# Patient Record
Sex: FEMALE | Race: ASIAN | Employment: UNEMPLOYED | URBAN - METROPOLITAN AREA
[De-identification: names, ages, dates, MRNs, and addresses within clinical notes are randomized per-mention and may not be internally consistent; named-entity substitution may affect disease eponyms.]

---

## 2023-04-04 ENCOUNTER — HOSPITAL ENCOUNTER (OUTPATIENT)
Dept: GENERAL RADIOLOGY | Age: 43
Discharge: HOME | End: 2023-04-04
Payer: MEDICAID

## 2023-04-04 ENCOUNTER — OFFICE VISIT (OUTPATIENT)
Dept: FAMILY MEDICINE CLINIC | Age: 43
End: 2023-04-04
Payer: MEDICAID

## 2023-04-04 VITALS
BODY MASS INDEX: 31.47 KG/M2 | TEMPERATURE: 98.3 F | HEART RATE: 89 BPM | OXYGEN SATURATION: 95 % | HEIGHT: 62 IN | SYSTOLIC BLOOD PRESSURE: 138 MMHG | DIASTOLIC BLOOD PRESSURE: 88 MMHG | WEIGHT: 171 LBS

## 2023-04-04 DIAGNOSIS — G89.29 CHRONIC RIGHT SHOULDER PAIN: ICD-10-CM

## 2023-04-04 DIAGNOSIS — Z00.00 ROUTINE GENERAL MEDICAL EXAMINATION AT A HEALTH CARE FACILITY: ICD-10-CM

## 2023-04-04 DIAGNOSIS — M95.8 DEFORMITY, CLAVICLE: ICD-10-CM

## 2023-04-04 DIAGNOSIS — M25.511 CHRONIC RIGHT SHOULDER PAIN: ICD-10-CM

## 2023-04-04 DIAGNOSIS — Z12.31 ENCOUNTER FOR SCREENING MAMMOGRAM FOR MALIGNANT NEOPLASM OF BREAST: ICD-10-CM

## 2023-04-04 DIAGNOSIS — D49.2 SKIN GROWTH: ICD-10-CM

## 2023-04-04 DIAGNOSIS — Z12.11 COLON CANCER SCREENING: Primary | ICD-10-CM

## 2023-04-04 PROBLEM — F33.1 MAJOR DEPRESSIVE DISORDER, RECURRENT EPISODE, MODERATE (HCC): Status: ACTIVE | Noted: 2023-01-18

## 2023-04-04 PROBLEM — F41.1 GAD (GENERALIZED ANXIETY DISORDER): Status: ACTIVE | Noted: 2023-01-18

## 2023-04-04 PROCEDURE — 90715 TDAP VACCINE 7 YRS/> IM: CPT | Performed by: NURSE PRACTITIONER

## 2023-04-04 PROCEDURE — 73000 X-RAY EXAM OF COLLAR BONE: CPT

## 2023-04-04 PROCEDURE — 73030 X-RAY EXAM OF SHOULDER: CPT

## 2023-04-04 PROCEDURE — 99204 OFFICE O/P NEW MOD 45 MIN: CPT | Performed by: NURSE PRACTITIONER

## 2023-04-04 PROCEDURE — 90471 IMMUNIZATION ADMIN: CPT | Performed by: NURSE PRACTITIONER

## 2023-04-04 RX ORDER — SUMATRIPTAN 100 MG/1
TABLET, FILM COATED ORAL
COMMUNITY
Start: 2022-11-24

## 2023-04-04 RX ORDER — FUROSEMIDE 80 MG
80 TABLET ORAL DAILY
COMMUNITY
Start: 2023-01-23

## 2023-04-04 RX ORDER — ASCORBIC ACID, THIAMINE MONONITRATE,RIBOFLAVIN, NIACINAMIDE, PYRIDOXINE HYDROCHLORIDE, FOLIC ACID, CYANOCOBALAMIN, BIOTIN, CALCIUM PANTOTHENATE, 100; 1.5; 1.7; 20; 10; 1; 6000; 150000; 5 MG/1; MG/1; MG/1; MG/1; MG/1; MG/1; UG/1; UG/1; MG/1
CAPSULE, LIQUID FILLED ORAL
COMMUNITY
Start: 2023-01-23

## 2023-04-04 RX ORDER — ERGOCALCIFEROL 1.25 MG/1
CAPSULE ORAL
COMMUNITY
Start: 2023-01-23

## 2023-04-04 RX ORDER — CALCIUM ACETATE 667 MG/1
CAPSULE ORAL
COMMUNITY
Start: 2023-01-23

## 2023-04-04 RX ORDER — ALBUTEROL SULFATE 90 UG/1
AEROSOL, METERED RESPIRATORY (INHALATION)
COMMUNITY
Start: 2023-01-23

## 2023-04-04 RX ORDER — PANTOPRAZOLE SODIUM 40 MG/1
TABLET, DELAYED RELEASE ORAL
COMMUNITY
Start: 2023-01-23

## 2023-04-04 RX ORDER — CYCLOBENZAPRINE HCL 5 MG
5 TABLET ORAL 3 TIMES DAILY PRN
COMMUNITY
Start: 2022-03-28

## 2023-04-04 RX ORDER — LEVOTHYROXINE SODIUM 0.03 MG/1
TABLET ORAL
COMMUNITY
Start: 2023-02-07

## 2023-04-04 RX ORDER — GABAPENTIN 100 MG/1
CAPSULE ORAL
COMMUNITY
Start: 2023-01-23

## 2023-04-04 SDOH — ECONOMIC STABILITY: FOOD INSECURITY: WITHIN THE PAST 12 MONTHS, THE FOOD YOU BOUGHT JUST DIDN'T LAST AND YOU DIDN'T HAVE MONEY TO GET MORE.: NEVER TRUE

## 2023-04-04 SDOH — ECONOMIC STABILITY: FOOD INSECURITY: WITHIN THE PAST 12 MONTHS, YOU WORRIED THAT YOUR FOOD WOULD RUN OUT BEFORE YOU GOT MONEY TO BUY MORE.: NEVER TRUE

## 2023-04-04 SDOH — ECONOMIC STABILITY: HOUSING INSECURITY
IN THE LAST 12 MONTHS, WAS THERE A TIME WHEN YOU DID NOT HAVE A STEADY PLACE TO SLEEP OR SLEPT IN A SHELTER (INCLUDING NOW)?: NO

## 2023-04-04 SDOH — ECONOMIC STABILITY: INCOME INSECURITY: HOW HARD IS IT FOR YOU TO PAY FOR THE VERY BASICS LIKE FOOD, HOUSING, MEDICAL CARE, AND HEATING?: NOT HARD AT ALL

## 2023-04-04 ASSESSMENT — ENCOUNTER SYMPTOMS
SINUS PRESSURE: 0
BACK PAIN: 0
ABDOMINAL PAIN: 0
CONSTIPATION: 0
COUGH: 0
SHORTNESS OF BREATH: 0
SINUS PAIN: 0
COLOR CHANGE: 0
DIARRHEA: 0
WHEEZING: 0

## 2023-04-04 ASSESSMENT — PATIENT HEALTH QUESTIONNAIRE - PHQ9
2. FEELING DOWN, DEPRESSED OR HOPELESS: 0
SUM OF ALL RESPONSES TO PHQ QUESTIONS 1-9: 0
DEPRESSION UNABLE TO ASSESS: FUNCTIONAL CAPACITY MOTIVATION LIMITS ACCURACY
SUM OF ALL RESPONSES TO PHQ QUESTIONS 1-9: 0
SUM OF ALL RESPONSES TO PHQ QUESTIONS 1-9: 0
1. LITTLE INTEREST OR PLEASURE IN DOING THINGS: 0
SUM OF ALL RESPONSES TO PHQ9 QUESTIONS 1 & 2: 0
SUM OF ALL RESPONSES TO PHQ QUESTIONS 1-9: 0

## 2023-04-04 NOTE — PROGRESS NOTES
Insecurity: No Food Insecurity    Worried About Running Out of Food in the Last Year: Never true    Ran Out of Food in the Last Year: Never true   Transportation Needs: Unknown    Lack of Transportation (Medical): Not on file    Lack of Transportation (Non-Medical): No   Physical Activity: Not on file   Stress: Not on file   Social Connections: Not on file   Intimate Partner Violence: Not on file   Housing Stability: Unknown    Unable to Pay for Housing in the Last Year: Not on file    Number of Places Lived in the Last Year: Not on file    Unstable Housing in the Last Year: No       Family History   Problem Relation Age of Onset    Diabetes Mother     Hypertension Mother     Stroke Brother        Vitals:    04/04/23 1416   BP: 138/88   Pulse: 89   Temp: 98.3 °F (36.8 °C)   SpO2: 95%       Wt Readings from Last 3 Encounters:   04/04/23 171 lb (77.6 kg)       Review of Systems   Constitutional:  Negative for chills, fatigue and fever. HENT:  Negative for congestion, sinus pressure and sinus pain. Respiratory:  Negative for cough, shortness of breath and wheezing. Cardiovascular:  Negative for chest pain and palpitations. Gastrointestinal:  Negative for abdominal pain, constipation and diarrhea. Musculoskeletal:  Positive for arthralgias (right shoulder). Negative for back pain and myalgias. Skin:  Negative for color change, pallor and rash. Skin growth to left external ear   Neurological:  Negative for dizziness, syncope, weakness, light-headedness and headaches. Psychiatric/Behavioral:  Negative for behavioral problems, confusion and sleep disturbance. The patient is not nervous/anxious. Objective:   Physical Exam  Constitutional:       Appearance: She is well-developed. HENT:      Head: Normocephalic and atraumatic. Eyes:      Conjunctiva/sclera: Conjunctivae normal.      Pupils: Pupils are equal, round, and reactive to light. Neck:      Thyroid: No thyromegaly.       Vascular: No

## 2023-04-05 DIAGNOSIS — G89.29 CHRONIC RIGHT SHOULDER PAIN: Primary | ICD-10-CM

## 2023-04-05 DIAGNOSIS — M25.511 CHRONIC RIGHT SHOULDER PAIN: Primary | ICD-10-CM

## 2023-04-05 PROBLEM — F33.1 MAJOR DEPRESSIVE DISORDER, RECURRENT EPISODE, MODERATE (HCC): Status: RESOLVED | Noted: 2023-01-18 | Resolved: 2023-04-05

## 2023-04-05 PROBLEM — F41.1 GAD (GENERALIZED ANXIETY DISORDER): Status: RESOLVED | Noted: 2023-01-18 | Resolved: 2023-04-05

## 2023-04-05 PROBLEM — Z00.00 ROUTINE GENERAL MEDICAL EXAMINATION AT A HEALTH CARE FACILITY: Status: ACTIVE | Noted: 2023-04-05

## 2023-04-05 PROBLEM — D49.2 SKIN GROWTH: Status: ACTIVE | Noted: 2023-04-05

## 2023-04-05 NOTE — ASSESSMENT & PLAN NOTE
Flesh-colored  Increasing in size in the last 2 months  We will refer to [facility]

## 2023-04-05 NOTE — ASSESSMENT & PLAN NOTE
Ongoing for several years  Limited range of motion  Occasionally takes Tylenol  We will check x-ray today  Referral to Ortho

## 2023-04-11 DIAGNOSIS — E78.2 MIXED HYPERLIPIDEMIA: ICD-10-CM

## 2023-04-11 DIAGNOSIS — R79.89 LFT ELEVATION: ICD-10-CM

## 2023-04-11 RX ORDER — ERGOCALCIFEROL 1.25 MG/1
50000 CAPSULE ORAL WEEKLY
Qty: 12 CAPSULE | Refills: 1 | Status: SHIPPED | OUTPATIENT
Start: 2023-04-11

## 2023-04-13 PROBLEM — Z12.4 SCREENING FOR CERVICAL CANCER: Status: ACTIVE | Noted: 2023-04-13

## 2023-05-05 PROBLEM — Z00.00 ROUTINE GENERAL MEDICAL EXAMINATION AT A HEALTH CARE FACILITY: Status: RESOLVED | Noted: 2023-04-05 | Resolved: 2023-05-05

## 2023-05-09 ENCOUNTER — APPOINTMENT (OUTPATIENT)
Dept: LAB | Facility: LAB | Age: 43
End: 2023-05-09

## 2023-05-09 LAB
ABO GROUP (TYPE) IN BLOOD: NORMAL
ALANINE AMINOTRANSFERASE (SGPT) (U/L) IN SER/PLAS: 17 U/L (ref 7–45)
ALBUMIN (G/DL) IN SER/PLAS: 4.3 G/DL (ref 3.4–5)
ALKALINE PHOSPHATASE (U/L) IN SER/PLAS: 82 U/L (ref 33–110)
ASPARTATE AMINOTRANSFERASE (SGOT) (U/L) IN SER/PLAS: 9 U/L (ref 9–39)
BILIRUBIN DIRECT (MG/DL) IN SER/PLAS: 0.1 MG/DL (ref 0–0.3)
BILIRUBIN TOTAL (MG/DL) IN SER/PLAS: 0.8 MG/DL (ref 0–1.2)
C PEPTIDE (NG/ML) IN SER/PLAS: 7.1 NG/ML (ref 0.7–3.9)
CREATININE (MG/DL) IN SER/PLAS: 10.4 MG/DL (ref 0.5–1.05)
CYTOMEGALOVIRUS IGG ANTIBODY: REACTIVE
ERYTHROCYTE DISTRIBUTION WIDTH (RATIO) BY AUTOMATED COUNT: 12.8 % (ref 11.5–14.5)
ERYTHROCYTE MEAN CORPUSCULAR HEMOGLOBIN CONCENTRATION (G/DL) BY AUTOMATED: 32.3 G/DL (ref 32–36)
ERYTHROCYTE MEAN CORPUSCULAR VOLUME (FL) BY AUTOMATED COUNT: 98 FL (ref 80–100)
ERYTHROCYTES (10*6/UL) IN BLOOD BY AUTOMATED COUNT: 4.08 X10E12/L (ref 4–5.2)
ESTIMATED AVERAGE GLUCOSE FOR HBA1C: 91 MG/DL
FLOW AUTOCROSSMATCH: NORMAL
GFR FEMALE: 4 ML/MIN/1.73M2
HEMATOCRIT (%) IN BLOOD BY AUTOMATED COUNT: 39.9 % (ref 36–46)
HEMOGLOBIN (G/DL) IN BLOOD: 12.9 G/DL (ref 12–16)
HEMOGLOBIN A1C/HEMOGLOBIN TOTAL IN BLOOD: 4.8 %
HEPATITIS B VIRUS CORE AB (PRESENCE) IN SER/PLAS BY IMM: REACTIVE
HEPATITIS B VIRUS SURFACE AB (MIU/ML) IN SERUM: 38.5 MIU/ML
HEPATITIS B VIRUS SURFACE AG PRESENCE IN SERUM: NONREACTIVE
HEPATITIS C VIRUS AB PRESENCE IN SERUM: NONREACTIVE
HIV 1/ 2 AG/AB SCREEN: NONREACTIVE
HLA CLASS I ANTIBODY SCREEN, FLOW CYTOMETRY: NORMAL
HLA CLASS II ANTIBODY SCREEN, FLOW CYTOMETRY: NORMAL
HLA-A LOCUS LOW RESOLUTION TYPING: NORMAL
HLA-B LOCUS LOW RESOLUTION TYPING: NORMAL
HLA-C LOCUS LOW RESOLUTION TYPING: NORMAL
HLA-DPB1 HIGH RESOLUTION TYPING: NORMAL
HLA-DQB1 HIGH RESOLUTION TYPING: NORMAL
HLA-DR1/3/4/5 + DQB1 LOW RES TYPING: NORMAL
LEUKOCYTES (10*3/UL) IN BLOOD BY AUTOMATED COUNT: 3.5 X10E9/L (ref 4.4–11.3)
NRBC (PER 100 WBCS) BY AUTOMATED COUNT: 0 /100 WBC (ref 0–0)
PHOSPHATE (MG/DL) IN SER/PLAS: 4 MG/DL (ref 2.5–4.9)
PLATELETS (10*3/UL) IN BLOOD AUTOMATED COUNT: 151 X10E9/L (ref 150–450)
PROTEIN TOTAL: 8.3 G/DL (ref 6.4–8.2)
RH FACTOR: NORMAL
SYPHILIS TOTAL AB: NONREACTIVE

## 2023-05-11 ENCOUNTER — HOSPITAL ENCOUNTER (OUTPATIENT)
Dept: CT IMAGING | Age: 43
Discharge: HOME | End: 2023-05-11
Payer: MEDICAID

## 2023-05-11 DIAGNOSIS — M25.511 CHRONIC RIGHT SHOULDER PAIN: ICD-10-CM

## 2023-05-11 DIAGNOSIS — G89.29 CHRONIC RIGHT SHOULDER PAIN: ICD-10-CM

## 2023-05-11 LAB
EBV INTERPRETATION: ABNORMAL
EPSTEIN-BARR VCA IGG: POSITIVE
EPSTEIN-BARR VCA IGM: NEGATIVE
EPSTEIN-BARR VIRUS EARLY ANTIGEN ANTIBODY, IGG: NEGATIVE
EPSTIEN-BARR NUCLEAR ANTIGEN AB: POSITIVE
NIL(NEG) CONTROL SPOT COUNT: NORMAL
PANEL A SPOT COUNT: 2
PANEL B SPOT COUNT: 1
POS CONTROL SPOT COUNT: NORMAL
T-SPOT. TB INTERPRETATION: NEGATIVE
VARICELLA ZOSTER IGG: POSITIVE

## 2023-05-11 PROCEDURE — 71250 CT THORAX DX C-: CPT

## 2023-05-13 PROBLEM — Z12.4 SCREENING FOR CERVICAL CANCER: Status: RESOLVED | Noted: 2023-04-13 | Resolved: 2023-05-13

## 2023-05-13 LAB
AMPHETAMINE SCREEN BLOOD: NEGATIVE NG/ML
BARBITURATE SCREEN BLOOD: NEGATIVE NG/ML
BENZODIAZEPINES SCREEN BLOOD: NEGATIVE NG/ML
BUPRENORPHINE SCREEN BLOOD: NEGATIVE NG/ML
CANNABINOIDS SCREEN BLOOD: NEGATIVE NG/ML
COCAINE SCREEN BLOOD: NEGATIVE NG/ML
DRUG SCREEN COMMENT BLOOD: NORMAL
METHADONE SCREEN BLOOD: NEGATIVE NG/ML
METHAMPHETAMINE, BLOOD, SCREEN: NEGATIVE NG/ML
OPIATE SCREEN BLOOD: NEGATIVE NG/ML
OXYCODONE SCREEN BLOOD: NEGATIVE NG/ML
PHENCYCLIDINE SCREEN BLOOD: NEGATIVE NG/ML

## 2023-05-15 LAB
COTININE BLOOD QUANTITATIVE: 415 NG/ML
NICOTINE BLOOD QUANTITATIVE: 21 NG/ML

## 2023-05-23 ENCOUNTER — TELEPHONE (OUTPATIENT)
Dept: ORTHOPEDIC SURGERY | Age: 43
End: 2023-05-23

## 2023-07-05 ENCOUNTER — HOSPITAL ENCOUNTER (EMERGENCY)
Age: 43
Discharge: HOME | End: 2023-07-05
Payer: MEDICAID

## 2023-07-05 VITALS
TEMPERATURE: 98 F | DIASTOLIC BLOOD PRESSURE: 103 MMHG | OXYGEN SATURATION: 98 % | SYSTOLIC BLOOD PRESSURE: 170 MMHG | RESPIRATION RATE: 16 BRPM | HEART RATE: 86 BPM

## 2023-07-05 DIAGNOSIS — M54.50 ACUTE BILATERAL LOW BACK PAIN, UNSPECIFIED WHETHER SCIATICA PRESENT: Primary | ICD-10-CM

## 2023-07-05 PROCEDURE — 6360000002 HC RX W HCPCS: Performed by: PHYSICIAN ASSISTANT

## 2023-07-05 PROCEDURE — 6370000000 HC RX 637 (ALT 250 FOR IP): Performed by: PHYSICIAN ASSISTANT

## 2023-07-05 PROCEDURE — 96372 THER/PROPH/DIAG INJ SC/IM: CPT

## 2023-07-05 PROCEDURE — 99284 EMERGENCY DEPT VISIT MOD MDM: CPT

## 2023-07-05 RX ORDER — NAPROXEN 500 MG/1
500 TABLET ORAL 2 TIMES DAILY
Qty: 20 TABLET | Refills: 0 | Status: SHIPPED | OUTPATIENT
Start: 2023-07-05 | End: 2023-07-15

## 2023-07-05 RX ORDER — KETOROLAC TROMETHAMINE 30 MG/ML
30 INJECTION, SOLUTION INTRAMUSCULAR; INTRAVENOUS ONCE
Status: COMPLETED | OUTPATIENT
Start: 2023-07-05 | End: 2023-07-05

## 2023-07-05 RX ORDER — LIDOCAINE 50 MG/G
1 PATCH TOPICAL DAILY
Qty: 30 PATCH | Refills: 0 | Status: SHIPPED | OUTPATIENT
Start: 2023-07-05

## 2023-07-05 RX ORDER — CYCLOBENZAPRINE HCL 10 MG
10 TABLET ORAL 3 TIMES DAILY PRN
Qty: 20 TABLET | Refills: 0 | Status: SHIPPED | OUTPATIENT
Start: 2023-07-05 | End: 2023-07-15

## 2023-07-05 RX ORDER — CYCLOBENZAPRINE HCL 10 MG
10 TABLET ORAL ONCE
Status: COMPLETED | OUTPATIENT
Start: 2023-07-05 | End: 2023-07-05

## 2023-07-05 RX ORDER — LIDOCAINE 4 G/G
1 PATCH TOPICAL ONCE
Status: DISCONTINUED | OUTPATIENT
Start: 2023-07-05 | End: 2023-07-05 | Stop reason: HOSPADM

## 2023-07-05 RX ADMIN — KETOROLAC TROMETHAMINE 30 MG: 30 INJECTION, SOLUTION INTRAMUSCULAR; INTRAVENOUS at 11:15

## 2023-07-05 RX ADMIN — CYCLOBENZAPRINE 10 MG: 10 TABLET, FILM COATED ORAL at 11:15

## 2023-07-05 ASSESSMENT — LIFESTYLE VARIABLES
HOW MANY STANDARD DRINKS CONTAINING ALCOHOL DO YOU HAVE ON A TYPICAL DAY: PATIENT DOES NOT DRINK
HOW OFTEN DO YOU HAVE A DRINK CONTAINING ALCOHOL: NEVER

## 2023-07-05 NOTE — ED NOTES
Patient discharged with discharge instructions and medications reviewed. Patient verbalized understanding of instructions and follow up.        Mikayla Gay RN  07/05/23 7801
99

## 2023-07-05 NOTE — ED PROVIDER NOTES
[facility]        Pt Name: Sherry Rico  MRN: [id number]  [address] [facility], [place] 1980  Date of evaluation: 7/5/2023  Provider: Ford Park PA-C  PCP: OMAR Melgoza CNP  Note Started: 11:20 AM EDT 7/5/23      MITCHELL. I have evaluated this patient. CHIEF COMPLAINT       Chief Complaint   Patient presents with    Back Pain     Complaints of bad back pain when standing or moving around or voiding x2-3 days states no pain with voiding just lower back pain with any movement       HISTORY OF PRESENT ILLNESS: 1 or more Elements     History From: patient  Limitations to history : Language language line [id number] was used to obtain history with [facility]     Sherry Rico is a 37 y.o. female who presents to the emergency department with a chief complaint of some low back pain more on the left side into the left buttock without trauma for the past 2 to 3 days. Denies difficulty urinating, dysuria, hematuria, diarrhea, bloody stool, loss of bowel or bladder function, history of kidney failure, diabetes or recent injection or surgery to her back. Denies abdominal pain, vomiting, fevers. Pain is worse with moving and walking. She was driven here by her children. Denies any other symptoms. Nursing Notes were all reviewed and agreed with or any disagreements were addressed in the HPI. REVIEW OF SYSTEMS :      Review of Systems    Positives and Pertinent negatives as per HPI. SURGICAL HISTORY   History reviewed. No pertinent surgical history. CURRENTMEDICATIONS       Previous Medications    ACETAMINOPHEN (TYLENOL) 325 MG CAPS    Take by mouth    ERGOCALCIFEROL (ERGOCALCIFEROL) 1.25 MG (81258 UT) CAPSULE    TAKE 1 CAPSULE BY MOUTH ONCE A WEEK ON SUNDAY ([facility])    VITAMIN D (ERGOCALCIFEROL) 1.25 MG (08826 UT) CAPS CAPSULE    Take 1 capsule by mouth once a week       ALLERGIES     Patient has no known allergies.     FAMILYHISTORY

## 2023-07-06 ENCOUNTER — OFFICE VISIT (OUTPATIENT)
Dept: FAMILY MEDICINE CLINIC | Age: 43
End: 2023-07-06
Payer: MEDICAID

## 2023-07-06 VITALS
HEART RATE: 83 BPM | TEMPERATURE: 98.5 F | DIASTOLIC BLOOD PRESSURE: 82 MMHG | WEIGHT: 171 LBS | BODY MASS INDEX: 31.47 KG/M2 | SYSTOLIC BLOOD PRESSURE: 112 MMHG | HEIGHT: 62 IN | OXYGEN SATURATION: 97 %

## 2023-07-06 DIAGNOSIS — M54.50 CHRONIC BILATERAL LOW BACK PAIN WITHOUT SCIATICA: Primary | ICD-10-CM

## 2023-07-06 DIAGNOSIS — G89.29 CHRONIC BILATERAL LOW BACK PAIN WITHOUT SCIATICA: Primary | ICD-10-CM

## 2023-07-06 PROCEDURE — 99214 OFFICE O/P EST MOD 30 MIN: CPT | Performed by: NURSE PRACTITIONER

## 2023-07-06 ASSESSMENT — ENCOUNTER SYMPTOMS
WHEEZING: 0
BLOOD IN STOOL: 0
NAUSEA: 0
DIARRHEA: 0
SHORTNESS OF BREATH: 0
BACK PAIN: 1

## 2023-07-06 NOTE — PROGRESS NOTES
Lisha Mathew (:  1980) is a 37 y.o. female,Established patient, here for evaluation of the following chief complaint(s):  Follow-up (ED, back pain)      ASSESSMENT/PLAN:  1. Chronic bilateral low back pain without sciatica  Assessment & Plan:   Patient states back pain is improving with medication. Patient educated that medication is going to help the pain but will not prevent pain from returning. Recommended physical therapy, but patient doesn't wish to have physical therapy at this time. Back exercises provided. Patient instructed to call if symptoms worsen or fail to improve. No follow-ups on file. SUBJECTIVE/OBJECTIVE:  HPI- patient is here for follow up from ED visit for back pain. She is here with her son. She refuses interpretor and wishes for her son to interpret. Son agrees to interpret for his mother. She states she doesn't remember injurying it and states she has had this back pain for years. Describes the pain as burning. She states the pain increases with movement, denies pain when lying down. Patient denies blood in urine or fevers. She states the pain has improved with the medication she was prescribed with from the ER visit (muscle relaxant, naproxen, and lidocaine patch). She states she wore the lidocaine patch for 12 hours. She denies ever going to physical therapy for her back pain. She states that whenever she experiences this back pain it eventually subsides. Current Outpatient Medications   Medication Sig Dispense Refill    naproxen (NAPROSYN) 500 MG tablet Take 1 tablet by mouth 2 times daily for 20 doses 20 tablet 0    cyclobenzaprine (FLEXERIL) 10 MG tablet Take 1 tablet by mouth 3 times daily as needed for Muscle spasms 20 tablet 0    lidocaine (LIDODERM) 5 % Place 1 patch onto the skin daily 12 hours on, 12 hours off.  30 patch 0    vitamin D (ERGOCALCIFEROL) 1.25 MG (11536 UT) CAPS capsule Take 1 capsule by mouth once a week (Patient not taking: Reported

## 2023-07-06 NOTE — ASSESSMENT & PLAN NOTE
Patient states back pain is improving with medication. Patient educated that medication is going to help the pain but will not prevent pain from returning. Recommended physical therapy, but patient doesn't wish to have physical therapy at this time. Back exercises provided. Patient instructed to call if symptoms worsen or fail to improve.

## 2023-07-07 ENCOUNTER — OFFICE VISIT (OUTPATIENT)
Dept: ORTHOPEDIC SURGERY | Age: 43
End: 2023-07-07

## 2023-07-07 DIAGNOSIS — M19.011 OSTEOARTHRITIS OF RIGHT STERNOCLAVICULAR JOINT: Primary | ICD-10-CM

## 2023-07-07 NOTE — PROGRESS NOTES
physical therapy. If these are not effective, cortisone injection can be considered. We discussed surgical options as well, should conservative measures fail. Electronically signed by Cheryl Ram MD on 7/7/2023 at 2:38 PM  This dictation was generated by voice recognition computer software. Although all attempts are made to edit the dictation for accuracy, there may be errors in the transcription that are not intended.

## 2023-07-10 ENCOUNTER — TELEPHONE (OUTPATIENT)
Dept: ORTHOPEDIC SURGERY | Age: 43
End: 2023-07-10

## 2023-07-10 DIAGNOSIS — M19.011 OSTEOARTHRITIS OF RIGHT STERNOCLAVICULAR JOINT: Primary | ICD-10-CM

## 2023-07-10 NOTE — TELEPHONE ENCOUNTER
ORDER FOR INJECTION, TITLE SAYS LEFT SHOULDER BUT T IN THE ORDER IT HAS RIGHT, PATIENT STATES IT IS HER RIGHT SHOULDER. PLEASE CORRECT ORDER IN Epic.

## 2023-07-18 ENCOUNTER — HOSPITAL ENCOUNTER (OUTPATIENT)
Dept: CT IMAGING | Age: 43
Discharge: HOME | End: 2023-07-18
Attending: ORTHOPAEDIC SURGERY
Payer: MEDICAID

## 2023-07-18 DIAGNOSIS — M19.011 OSTEOARTHRITIS OF RIGHT STERNOCLAVICULAR JOINT: ICD-10-CM

## 2023-07-18 PROCEDURE — 77012 CT SCAN FOR NEEDLE BIOPSY: CPT

## 2023-10-11 ENCOUNTER — TELEPHONE (OUTPATIENT)
Dept: PRIMARY CARE CLINIC | Age: 43
End: 2023-10-11

## 2023-10-11 ENCOUNTER — OFFICE VISIT (OUTPATIENT)
Dept: PRIMARY CARE CLINIC | Age: 43
End: 2023-10-11
Payer: MEDICAID

## 2023-10-11 VITALS
OXYGEN SATURATION: 96 % | HEART RATE: 87 BPM | SYSTOLIC BLOOD PRESSURE: 146 MMHG | RESPIRATION RATE: 14 BRPM | DIASTOLIC BLOOD PRESSURE: 100 MMHG | HEIGHT: 62 IN | WEIGHT: 173.2 LBS | BODY MASS INDEX: 31.87 KG/M2

## 2023-10-11 DIAGNOSIS — M25.511 RIGHT SHOULDER PAIN, UNSPECIFIED CHRONICITY: ICD-10-CM

## 2023-10-11 DIAGNOSIS — E78.2 MIXED HYPERLIPIDEMIA: ICD-10-CM

## 2023-10-11 DIAGNOSIS — I10 BENIGN ESSENTIAL HTN: Primary | ICD-10-CM

## 2023-10-11 DIAGNOSIS — Z12.11 COLON CANCER SCREENING: ICD-10-CM

## 2023-10-11 DIAGNOSIS — M25.521 RIGHT ELBOW PAIN: ICD-10-CM

## 2023-10-11 DIAGNOSIS — Z23 NEEDS FLU SHOT: ICD-10-CM

## 2023-10-11 PROCEDURE — 3077F SYST BP >= 140 MM HG: CPT | Performed by: INTERNAL MEDICINE

## 2023-10-11 PROCEDURE — 86833 HLA CLASS II HIGH DEFIN QUAL: CPT | Mod: OUT | Performed by: SURGERY

## 2023-10-11 PROCEDURE — 90471 IMMUNIZATION ADMIN: CPT | Performed by: INTERNAL MEDICINE

## 2023-10-11 PROCEDURE — 99204 OFFICE O/P NEW MOD 45 MIN: CPT | Performed by: INTERNAL MEDICINE

## 2023-10-11 PROCEDURE — 90674 CCIIV4 VAC NO PRSV 0.5 ML IM: CPT | Performed by: INTERNAL MEDICINE

## 2023-10-11 PROCEDURE — 3080F DIAST BP >= 90 MM HG: CPT | Performed by: INTERNAL MEDICINE

## 2023-10-11 RX ORDER — VALSARTAN 160 MG/1
160 TABLET ORAL DAILY
Qty: 30 TABLET | Refills: 5 | Status: SHIPPED | OUTPATIENT
Start: 2023-10-11

## 2023-10-11 ASSESSMENT — ENCOUNTER SYMPTOMS
BACK PAIN: 0
ABDOMINAL DISTENTION: 0
BLOOD IN STOOL: 0
DIARRHEA: 0
SHORTNESS OF BREATH: 0
NAUSEA: 0
VOMITING: 0
CHEST TIGHTNESS: 0
TROUBLE SWALLOWING: 0
ABDOMINAL PAIN: 0
WHEEZING: 0
CONSTIPATION: 0
EYE PAIN: 0
COLOR CHANGE: 0
EYE REDNESS: 0
COUGH: 0
SINUS PRESSURE: 0
SORE THROAT: 0

## 2023-10-11 NOTE — TELEPHONE ENCOUNTER
----- Message from Rocky Schmitt MD sent at 10/11/2023  1:47 PM EDT -----  Regarding: Right shoulder and elbow pain  Please provide patient telephone number to schedule appointment to see orthopedics for chronic right shoulder an elbow pain.

## 2023-10-11 NOTE — CONSULTS
Session ID: [id number]  Language: [facility]   ID: #[id number]   Name: Galdino Woods

## 2023-10-11 NOTE — ASSESSMENT & PLAN NOTE
Reviewing care everywhere it is documented that patient had colonoscopy 7/12/23  Patient is adamant that she never had colonoscopy  And thinks someone else's information was entered erroneously.   I will order a FIT test.  If positive she will be referred to GI done

## 2023-10-11 NOTE — ASSESSMENT & PLAN NOTE
BP elevated  Start valsartan 160 mg daily  To verify blood pressure cuff accuracy  To keep outpatient BP log,  counseled on exercise and diet (including DASH diet)  Goal to achieve appropriate BMI  Patient agreed with plan with verbal understanding

## 2023-10-11 NOTE — ASSESSMENT & PLAN NOTE
Patient called stating that he originally told Lenin Ferro that he did not want the Hoover 2 reader.  But now after thinking about it, he would like to get the reader to come with the sensor on 4/14/22    Will route to PharmD to review/order accordingly   Advised patient that if we had any questions we would call him back Patient will follow-up with orthopedics Tracking Only     CPA in place:  No   Recommendation Provided To: Patient/Caregiver: 1 via Telephone and Pharmacy: 1   Gap Closed?: Yes    Intervention Accepted By: Patient/Caregiver: 1 and Pharmacy: 1   Time Spent (min): 20

## 2023-10-13 ENCOUNTER — LAB REQUISITION (OUTPATIENT)
Dept: LAB | Facility: CLINIC | Age: 43
End: 2023-10-13
Payer: COMMERCIAL

## 2023-10-13 DIAGNOSIS — N18.6 END STAGE RENAL DISEASE (MULTI): ICD-10-CM

## 2023-10-13 LAB
HLA RESULTS: NORMAL
HLA-A+B+C AB NFR SER: NORMAL %
HLA-DP+DQ+DR AB NFR SER: NORMAL %

## 2023-10-16 ENCOUNTER — NURSE ONLY (OUTPATIENT)
Dept: PRIMARY CARE CLINIC | Age: 43
End: 2023-10-16
Payer: MEDICAID

## 2023-10-16 DIAGNOSIS — Z12.11 COLON CANCER SCREENING: Primary | ICD-10-CM

## 2023-10-16 LAB
CONTROL: NORMAL
FECAL BLOOD IMMUNOCHEMICAL TEST: NEGATIVE

## 2023-10-16 PROCEDURE — 82274 ASSAY TEST FOR BLOOD FECAL: CPT | Performed by: INTERNAL MEDICINE

## 2023-10-17 ENCOUNTER — OFFICE VISIT (OUTPATIENT)
Dept: ORTHOPEDIC SURGERY | Age: 43
End: 2023-10-17
Payer: MEDICAID

## 2023-10-17 VITALS — HEIGHT: 62 IN | BODY MASS INDEX: 31.83 KG/M2 | WEIGHT: 173 LBS

## 2023-10-17 DIAGNOSIS — M25.511 RIGHT SHOULDER PAIN, UNSPECIFIED CHRONICITY: Primary | ICD-10-CM

## 2023-10-17 PROCEDURE — 99244 OFF/OP CNSLTJ NEW/EST MOD 40: CPT | Performed by: ORTHOPAEDIC SURGERY

## 2023-10-17 RX ORDER — METHYLPREDNISOLONE 4 MG/1
TABLET ORAL
Qty: 1 KIT | Refills: 0 | Status: SHIPPED | OUTPATIENT
Start: 2023-10-17

## 2023-10-24 ENCOUNTER — OFFICE VISIT (OUTPATIENT)
Dept: PRIMARY CARE CLINIC | Age: 43
End: 2023-10-24
Payer: MEDICAID

## 2023-10-24 VITALS
DIASTOLIC BLOOD PRESSURE: 78 MMHG | SYSTOLIC BLOOD PRESSURE: 114 MMHG | HEART RATE: 102 BPM | OXYGEN SATURATION: 96 % | HEIGHT: 62 IN | RESPIRATION RATE: 14 BRPM | BODY MASS INDEX: 31.17 KG/M2 | WEIGHT: 169.4 LBS

## 2023-10-24 DIAGNOSIS — G89.29 CHRONIC BILATERAL LOW BACK PAIN WITHOUT SCIATICA: ICD-10-CM

## 2023-10-24 DIAGNOSIS — M25.511 CHRONIC RIGHT SHOULDER PAIN: ICD-10-CM

## 2023-10-24 DIAGNOSIS — M54.50 CHRONIC BILATERAL LOW BACK PAIN WITHOUT SCIATICA: ICD-10-CM

## 2023-10-24 DIAGNOSIS — E78.2 MIXED HYPERLIPIDEMIA: ICD-10-CM

## 2023-10-24 DIAGNOSIS — I10 BENIGN ESSENTIAL HTN: ICD-10-CM

## 2023-10-24 DIAGNOSIS — G89.29 CHRONIC RIGHT SHOULDER PAIN: ICD-10-CM

## 2023-10-24 PROCEDURE — 3078F DIAST BP <80 MM HG: CPT | Performed by: INTERNAL MEDICINE

## 2023-10-24 PROCEDURE — 99214 OFFICE O/P EST MOD 30 MIN: CPT | Performed by: INTERNAL MEDICINE

## 2023-10-24 PROCEDURE — 3074F SYST BP LT 130 MM HG: CPT | Performed by: INTERNAL MEDICINE

## 2023-10-24 ASSESSMENT — ENCOUNTER SYMPTOMS
BACK PAIN: 0
NAUSEA: 0
EYE PAIN: 0
CHEST TIGHTNESS: 0
EYE REDNESS: 0
SINUS PRESSURE: 0
SHORTNESS OF BREATH: 0
TROUBLE SWALLOWING: 0
ABDOMINAL PAIN: 0
SORE THROAT: 0
DIARRHEA: 0
ABDOMINAL DISTENTION: 0
CONSTIPATION: 0
VOMITING: 0
COUGH: 0
COLOR CHANGE: 0
WHEEZING: 0
BLOOD IN STOOL: 0

## 2023-10-24 NOTE — PROGRESS NOTES
imaging reviewing, and counseling on next steps. Lastly, time was spent discussing orders with my staff as well as providing documentation in the chart. Magda Campos MD            Orthopaedic Surgery Sports Medicine and [address] [facility]            Team Physician [facility] ([place])      Disclaimer: This note was dictated with voice recognition software. Though review and correction are routine, we apologize for any errors.

## 2023-10-24 NOTE — CONSULTS
Session ID: [id number]  Language: [facility]   ID: #[id number]   Name: Jonh Flores General Sunscreen Counseling: I recommended a broad spectrum sunscreen with a SPF of 30 or higher. I explained that SPF 30 sunscreens block approximately 97 percent of the sun's harmful rays.  Sunscreens should be applied at least 15 minutes prior to expected sun exposure and then every 2 hours after that as long as sun exposure continues. If swimming or exercising sunscreen should be reapplied every 45 minutes to an hour after getting wet or sweating. I also recommended a lip balm with a sunscreen as well. Detail Level: Generalized

## 2023-11-01 PROCEDURE — 86826 HLA X-MATCH NONCYTOTOXC ADDL: CPT | Mod: OUT | Performed by: SURGERY

## 2023-11-01 PROCEDURE — 86825 HLA X-MATH NON-CYTOTOXIC: CPT | Mod: OUT | Performed by: SURGERY

## 2023-11-02 ENCOUNTER — HOSPITAL ENCOUNTER (OUTPATIENT)
Dept: MRI IMAGING | Age: 43
Discharge: HOME | End: 2023-11-02
Attending: ORTHOPAEDIC SURGERY
Payer: MEDICAID

## 2023-11-02 DIAGNOSIS — M25.511 RIGHT SHOULDER PAIN, UNSPECIFIED CHRONICITY: ICD-10-CM

## 2023-11-02 PROCEDURE — 73221 MRI JOINT UPR EXTREM W/O DYE: CPT

## 2023-11-03 PROCEDURE — 86808 CYTOTOXIC ANTIBODY SCREENING: CPT | Mod: OUT | Performed by: SURGERY

## 2023-11-07 ENCOUNTER — OFFICE VISIT (OUTPATIENT)
Dept: ORTHOPEDIC SURGERY | Age: 43
End: 2023-11-07
Payer: MEDICAID

## 2023-11-07 VITALS — BODY MASS INDEX: 31.1 KG/M2 | HEIGHT: 62 IN | WEIGHT: 169 LBS

## 2023-11-07 DIAGNOSIS — M79.601 RIGHT ARM PAIN: Primary | ICD-10-CM

## 2023-11-07 PROCEDURE — 99214 OFFICE O/P EST MOD 30 MIN: CPT | Performed by: ORTHOPAEDIC SURGERY

## 2023-11-07 NOTE — PROGRESS NOTES
including history, physical examination, imaging reviewing, and counseling on next steps. Lastly, time was spent discussing orders with my staff as well as providing documentation in the chart. Anna Gonzalez MD            Orthopaedic Surgery Sports Medicine and [address] [facility] and [address] [facility]            Team Physician [facility] ([place])      Disclaimer: This note was dictated with voice recognition software. Though review and correction are routine, we apologize for any errors. I have seen and examined the patient. I agree with the above assessment and plan.

## 2023-11-10 PROBLEM — Z12.11 COLON CANCER SCREENING: Status: RESOLVED | Noted: 2023-04-05 | Resolved: 2023-11-10

## 2023-11-16 ENCOUNTER — TELEPHONE (OUTPATIENT)
Dept: TRANSPLANT | Facility: HOSPITAL | Age: 43
End: 2023-11-16

## 2023-11-22 ENCOUNTER — HOSPITAL ENCOUNTER (OUTPATIENT)
Dept: MRI IMAGING | Age: 43
Discharge: HOME | End: 2023-11-22
Attending: ORTHOPAEDIC SURGERY
Payer: MEDICAID

## 2023-11-22 DIAGNOSIS — M79.601 RIGHT ARM PAIN: ICD-10-CM

## 2023-11-22 PROCEDURE — 72141 MRI NECK SPINE W/O DYE: CPT

## 2023-11-29 ENCOUNTER — LAB REQUISITION (OUTPATIENT)
Dept: LAB | Facility: CLINIC | Age: 43
End: 2023-11-29
Payer: COMMERCIAL

## 2023-11-29 DIAGNOSIS — N18.6 END STAGE RENAL DISEASE (MULTI): ICD-10-CM

## 2023-11-29 LAB — FREEZE CROSSMATCH: NORMAL

## 2023-11-30 ENCOUNTER — APPOINTMENT (OUTPATIENT)
Dept: RADIOLOGY | Facility: HOSPITAL | Age: 43
DRG: 650 | End: 2023-11-30
Payer: COMMERCIAL

## 2023-11-30 ENCOUNTER — TELEPHONE (OUTPATIENT)
Dept: TRANSPLANT | Facility: HOSPITAL | Age: 43
End: 2023-11-30

## 2023-11-30 ENCOUNTER — ANESTHESIA (OUTPATIENT)
Dept: OPERATING ROOM | Facility: HOSPITAL | Age: 43
DRG: 650 | End: 2023-11-30
Payer: COMMERCIAL

## 2023-11-30 ENCOUNTER — ANESTHESIA EVENT (OUTPATIENT)
Dept: OPERATING ROOM | Facility: HOSPITAL | Age: 43
DRG: 650 | End: 2023-11-30
Payer: COMMERCIAL

## 2023-11-30 ENCOUNTER — APPOINTMENT (OUTPATIENT)
Dept: CARDIOLOGY | Facility: HOSPITAL | Age: 43
DRG: 650 | End: 2023-11-30
Payer: COMMERCIAL

## 2023-11-30 ENCOUNTER — LAB REQUISITION (OUTPATIENT)
Dept: LAB | Facility: CLINIC | Age: 43
End: 2023-11-30

## 2023-11-30 ENCOUNTER — LAB REQUISITION (OUTPATIENT)
Dept: LAB | Facility: CLINIC | Age: 43
End: 2023-11-30
Payer: COMMERCIAL

## 2023-11-30 ENCOUNTER — HOSPITAL ENCOUNTER (INPATIENT)
Facility: HOSPITAL | Age: 43
LOS: 14 days | Discharge: HOME | DRG: 650 | End: 2023-12-14
Attending: SURGERY | Admitting: SURGERY
Payer: COMMERCIAL

## 2023-11-30 DIAGNOSIS — N18.6 END STAGE RENAL DISEASE (MULTI): ICD-10-CM

## 2023-11-30 DIAGNOSIS — Z94.0 KIDNEY REPLACED BY TRANSPLANT (HHS-HCC): Primary | ICD-10-CM

## 2023-11-30 DIAGNOSIS — N18.6 ESRD (END STAGE RENAL DISEASE) (MULTI): ICD-10-CM

## 2023-11-30 PROBLEM — K21.9 GASTROESOPHAGEAL REFLUX DISEASE: Status: ACTIVE | Noted: 2023-11-30

## 2023-11-30 PROBLEM — I10 HTN (HYPERTENSION): Status: ACTIVE | Noted: 2023-11-30

## 2023-11-30 LAB
ABO GROUP (TYPE) IN BLOOD: NORMAL
ALBUMIN SERPL BCP-MCNC: 3.7 G/DL (ref 3.4–5)
ALBUMIN SERPL BCP-MCNC: 3.9 G/DL (ref 3.4–5)
ALP SERPL-CCNC: 139 U/L (ref 33–110)
ALT SERPL W P-5'-P-CCNC: 16 U/L (ref 7–45)
ANION GAP BLDV CALCULATED.4IONS-SCNC: 12 MMOL/L (ref 10–25)
ANION GAP BLDV CALCULATED.4IONS-SCNC: 9 MMOL/L (ref 10–25)
ANION GAP SERPL CALC-SCNC: 15 MMOL/L (ref 10–20)
ANION GAP SERPL CALC-SCNC: 17 MMOL/L (ref 10–20)
ANTIBODY SCREEN: NORMAL
APTT PPP: 33 SECONDS (ref 27–38)
AST SERPL W P-5'-P-CCNC: 11 U/L (ref 9–39)
ATRIAL RATE: 75 BPM
B-HCG SERPL-ACNC: <3 MIU/ML
BASE EXCESS BLDV CALC-SCNC: -5.3 MMOL/L (ref -2–3)
BASE EXCESS BLDV CALC-SCNC: 4 MMOL/L (ref -2–3)
BASOPHILS # BLD AUTO: 0 X10*3/UL (ref 0–0.1)
BASOPHILS NFR BLD AUTO: 0 %
BILIRUB DIRECT SERPL-MCNC: 0.1 MG/DL (ref 0–0.3)
BILIRUB SERPL-MCNC: 0.5 MG/DL (ref 0–1.2)
BODY TEMPERATURE: 37 DEGREES CELSIUS
BODY TEMPERATURE: 37 DEGREES CELSIUS
BUN SERPL-MCNC: 34 MG/DL (ref 6–23)
BUN SERPL-MCNC: 35 MG/DL (ref 6–23)
CA-I BLDV-SCNC: 1.02 MMOL/L (ref 1.1–1.33)
CA-I BLDV-SCNC: 1.15 MMOL/L (ref 1.1–1.33)
CALCIUM SERPL-MCNC: 8.4 MG/DL (ref 8.6–10.6)
CALCIUM SERPL-MCNC: 8.9 MG/DL (ref 8.6–10.6)
CHLORIDE BLDV-SCNC: 100 MMOL/L (ref 98–107)
CHLORIDE BLDV-SCNC: 99 MMOL/L (ref 98–107)
CHLORIDE SERPL-SCNC: 100 MMOL/L (ref 98–107)
CHLORIDE SERPL-SCNC: 97 MMOL/L (ref 98–107)
CMV IGG AVIDITY SERPL IA-RTO: REACTIVE %
CO2 SERPL-SCNC: 22 MMOL/L (ref 21–32)
CO2 SERPL-SCNC: 27 MMOL/L (ref 21–32)
CREAT SERPL-MCNC: 9.02 MG/DL (ref 0.5–1.05)
CREAT SERPL-MCNC: 9.46 MG/DL (ref 0.5–1.05)
EBV NA AB SER QL: POSITIVE
EOSINOPHIL # BLD AUTO: 0.01 X10*3/UL (ref 0–0.7)
EOSINOPHIL NFR BLD AUTO: 0.2 %
ERYTHROCYTE [DISTWIDTH] IN BLOOD BY AUTOMATED COUNT: 12.9 % (ref 11.5–14.5)
FERRITIN SERPL-MCNC: 1298 NG/ML (ref 8–150)
FLOW ALLOCROSSMATCH PEAK: NORMAL
GFR SERPL CREATININE-BSD FRML MDRD: 5 ML/MIN/1.73M*2
GFR SERPL CREATININE-BSD FRML MDRD: 5 ML/MIN/1.73M*2
GLUCOSE BLDV-MCNC: 143 MG/DL (ref 74–99)
GLUCOSE BLDV-MCNC: 83 MG/DL (ref 74–99)
GLUCOSE SERPL-MCNC: 130 MG/DL (ref 74–99)
GLUCOSE SERPL-MCNC: 76 MG/DL (ref 74–99)
HBV CORE AB SER QL: REACTIVE
HBV SURFACE AB SER-ACNC: 40.4 MIU/ML
HBV SURFACE AG SERPL QL IA: NONREACTIVE
HCO3 BLDV-SCNC: 22.6 MMOL/L (ref 22–26)
HCO3 BLDV-SCNC: 29.2 MMOL/L (ref 22–26)
HCT VFR BLD AUTO: 43.5 % (ref 36–46)
HCT VFR BLD EST: 35 % (ref 36–46)
HCT VFR BLD EST: 39 % (ref 36–46)
HCV AB SER QL: NONREACTIVE
HGB BLD-MCNC: 14.4 G/DL (ref 12–16)
HGB BLDV-MCNC: 11.6 G/DL (ref 12–16)
HGB BLDV-MCNC: 12.9 G/DL (ref 12–16)
HIV 1+2 AB+HIV1 P24 AG SERPL QL IA: NONREACTIVE
HLA RESULTS: NORMAL
HOLD SPECIMEN: NORMAL
IMM GRANULOCYTES # BLD AUTO: 0.03 X10*3/UL (ref 0–0.7)
IMM GRANULOCYTES NFR BLD AUTO: 0.5 % (ref 0–0.9)
INHALED O2 CONCENTRATION: 32 %
INHALED O2 CONCENTRATION: 99 %
INR PPP: 1 (ref 0.9–1.1)
IRON SATN MFR SERPL: 40 % (ref 25–45)
IRON SERPL-MCNC: 89 UG/DL (ref 35–150)
LACTATE BLDV-SCNC: 1.9 MMOL/L (ref 0.4–2)
LACTATE BLDV-SCNC: 2.7 MMOL/L (ref 0.4–2)
LYMPHOCYTES # BLD AUTO: 0.06 X10*3/UL (ref 1.2–4.8)
LYMPHOCYTES NFR BLD AUTO: 1 %
MCH RBC QN AUTO: 32.6 PG (ref 26–34)
MCHC RBC AUTO-ENTMCNC: 33.1 G/DL (ref 32–36)
MCV RBC AUTO: 98 FL (ref 80–100)
MONOCYTES # BLD AUTO: 0.13 X10*3/UL (ref 0.1–1)
MONOCYTES NFR BLD AUTO: 2.3 %
NEUTROPHILS # BLD AUTO: 5.49 X10*3/UL (ref 1.2–7.7)
NEUTROPHILS NFR BLD AUTO: 96 %
NRBC BLD-RTO: 0 /100 WBCS (ref 0–0)
OXYHGB MFR BLDV: 61.9 % (ref 45–75)
OXYHGB MFR BLDV: 91.7 % (ref 45–75)
P AXIS: 50 DEGREES
P OFFSET: 196 MS
P ONSET: 149 MS
PCO2 BLDV: 45 MM HG (ref 41–51)
PCO2 BLDV: 54 MM HG (ref 41–51)
PH BLDV: 7.23 PH (ref 7.33–7.43)
PH BLDV: 7.42 PH (ref 7.33–7.43)
PHOSPHATE SERPL-MCNC: 3 MG/DL (ref 2.5–4.9)
PHOSPHATE SERPL-MCNC: 3.3 MG/DL (ref 2.5–4.9)
PLATELET # BLD AUTO: 91 X10*3/UL (ref 150–450)
PO2 BLDV: 42 MM HG (ref 35–45)
PO2 BLDV: 66 MM HG (ref 35–45)
POTASSIUM BLDV-SCNC: 3.5 MMOL/L (ref 3.5–5.3)
POTASSIUM BLDV-SCNC: 4.3 MMOL/L (ref 3.5–5.3)
POTASSIUM SERPL-SCNC: 3.7 MMOL/L (ref 3.5–5.3)
POTASSIUM SERPL-SCNC: 4.3 MMOL/L (ref 3.5–5.3)
PR INTERVAL: 136 MS
PROT SERPL-MCNC: 7.2 G/DL (ref 6.4–8.2)
PROTHROMBIN TIME: 11.6 SECONDS (ref 9.8–12.8)
Q ONSET: 217 MS
QRS COUNT: 12 BEATS
QRS DURATION: 90 MS
QT INTERVAL: 422 MS
QTC CALCULATION(BAZETT): 471 MS
QTC FREDERICIA: 454 MS
R AXIS: 74 DEGREES
RBC # BLD AUTO: 4.42 X10*6/UL (ref 4–5.2)
RH FACTOR (ANTIGEN D): NORMAL
SAO2 % BLDV: 63 % (ref 45–75)
SAO2 % BLDV: 95 % (ref 45–75)
SARS-COV-2 RNA RESP QL NAA+PROBE: NOT DETECTED
SODIUM BLDV-SCNC: 129 MMOL/L (ref 136–145)
SODIUM BLDV-SCNC: 135 MMOL/L (ref 136–145)
SODIUM SERPL-SCNC: 132 MMOL/L (ref 136–145)
SODIUM SERPL-SCNC: 138 MMOL/L (ref 136–145)
T AXIS: 0 DEGREES
T OFFSET: 428 MS
TIBC SERPL-MCNC: 223 UG/DL (ref 240–445)
UIBC SERPL-MCNC: 134 UG/DL (ref 110–370)
VENTRICULAR RATE: 75 BPM
WBC # BLD AUTO: 5.7 X10*3/UL (ref 4.4–11.3)

## 2023-11-30 PROCEDURE — 93010 ELECTROCARDIOGRAM REPORT: CPT | Performed by: INTERNAL MEDICINE

## 2023-11-30 PROCEDURE — 2500000004 HC RX 250 GENERAL PHARMACY W/ HCPCS (ALT 636 FOR OP/ED)

## 2023-11-30 PROCEDURE — 93005 ELECTROCARDIOGRAM TRACING: CPT

## 2023-11-30 PROCEDURE — 5A1D70Z PERFORMANCE OF URINARY FILTRATION, INTERMITTENT, LESS THAN 6 HOURS PER DAY: ICD-10-PCS | Performed by: STUDENT IN AN ORGANIZED HEALTH CARE EDUCATION/TRAINING PROGRAM

## 2023-11-30 PROCEDURE — 86664 EPSTEIN-BARR NUCLEAR ANTIGEN: CPT | Performed by: STUDENT IN AN ORGANIZED HEALTH CARE EDUCATION/TRAINING PROGRAM

## 2023-11-30 PROCEDURE — 84132 ASSAY OF SERUM POTASSIUM: CPT | Performed by: STUDENT IN AN ORGANIZED HEALTH CARE EDUCATION/TRAINING PROGRAM

## 2023-11-30 PROCEDURE — 82728 ASSAY OF FERRITIN: CPT

## 2023-11-30 PROCEDURE — 2720000007 HC OR 272 NO HCPCS: Performed by: SURGERY

## 2023-11-30 PROCEDURE — 85610 PROTHROMBIN TIME: CPT | Performed by: STUDENT IN AN ORGANIZED HEALTH CARE EDUCATION/TRAINING PROGRAM

## 2023-11-30 PROCEDURE — 99223 1ST HOSP IP/OBS HIGH 75: CPT | Performed by: HOSPITALIST

## 2023-11-30 PROCEDURE — 36415 COLL VENOUS BLD VENIPUNCTURE: CPT | Performed by: STUDENT IN AN ORGANIZED HEALTH CARE EDUCATION/TRAINING PROGRAM

## 2023-11-30 PROCEDURE — 83540 ASSAY OF IRON: CPT

## 2023-11-30 PROCEDURE — 76776 US EXAM K TRANSPL W/DOPPLER: CPT

## 2023-11-30 PROCEDURE — 76776 US EXAM K TRANSPL W/DOPPLER: CPT | Performed by: RADIOLOGY

## 2023-11-30 PROCEDURE — 2500000004 HC RX 250 GENERAL PHARMACY W/ HCPCS (ALT 636 FOR OP/ED): Performed by: STUDENT IN AN ORGANIZED HEALTH CARE EDUCATION/TRAINING PROGRAM

## 2023-11-30 PROCEDURE — 3600000009 HC OR TIME - EACH INCREMENTAL 1 MINUTE - PROCEDURE LEVEL FOUR: Performed by: SURGERY

## 2023-11-30 PROCEDURE — 71045 X-RAY EXAM CHEST 1 VIEW: CPT | Performed by: RADIOLOGY

## 2023-11-30 PROCEDURE — 7100000001 HC RECOVERY ROOM TIME - INITIAL BASE CHARGE: Performed by: SURGERY

## 2023-11-30 PROCEDURE — 2500000005 HC RX 250 GENERAL PHARMACY W/O HCPCS: Performed by: STUDENT IN AN ORGANIZED HEALTH CARE EDUCATION/TRAINING PROGRAM

## 2023-11-30 PROCEDURE — 3600000004 HC OR TIME - INITIAL BASE CHARGE - PROCEDURE LEVEL FOUR: Performed by: SURGERY

## 2023-11-30 PROCEDURE — 7100000002 HC RECOVERY ROOM TIME - EACH INCREMENTAL 1 MINUTE: Performed by: SURGERY

## 2023-11-30 PROCEDURE — 87522 HEPATITIS C REVRS TRNSCRPJ: CPT | Performed by: STUDENT IN AN ORGANIZED HEALTH CARE EDUCATION/TRAINING PROGRAM

## 2023-11-30 PROCEDURE — 2500000004 HC RX 250 GENERAL PHARMACY W/ HCPCS (ALT 636 FOR OP/ED): Mod: JZ | Performed by: STUDENT IN AN ORGANIZED HEALTH CARE EDUCATION/TRAINING PROGRAM

## 2023-11-30 PROCEDURE — 99222 1ST HOSP IP/OBS MODERATE 55: CPT | Performed by: SURGERY

## 2023-11-30 PROCEDURE — 84702 CHORIONIC GONADOTROPIN TEST: CPT | Performed by: STUDENT IN AN ORGANIZED HEALTH CARE EDUCATION/TRAINING PROGRAM

## 2023-11-30 PROCEDURE — C2617 STENT, NON-COR, TEM W/O DEL: HCPCS | Performed by: SURGERY

## 2023-11-30 PROCEDURE — 87389 HIV-1 AG W/HIV-1&-2 AB AG IA: CPT | Performed by: STUDENT IN AN ORGANIZED HEALTH CARE EDUCATION/TRAINING PROGRAM

## 2023-11-30 PROCEDURE — 2500000001 HC RX 250 WO HCPCS SELF ADMINISTERED DRUGS (ALT 637 FOR MEDICARE OP): Performed by: STUDENT IN AN ORGANIZED HEALTH CARE EDUCATION/TRAINING PROGRAM

## 2023-11-30 PROCEDURE — 84132 ASSAY OF SERUM POTASSIUM: CPT

## 2023-11-30 PROCEDURE — 85025 COMPLETE CBC W/AUTO DIFF WBC: CPT | Performed by: STUDENT IN AN ORGANIZED HEALTH CARE EDUCATION/TRAINING PROGRAM

## 2023-11-30 PROCEDURE — 8110000001 HC LIVE DONOR - KIDNEY: Performed by: SURGERY

## 2023-11-30 PROCEDURE — 0TC30ZZ EXTIRPATION OF MATTER FROM RIGHT KIDNEY PELVIS, OPEN APPROACH: ICD-10-PCS | Performed by: STUDENT IN AN ORGANIZED HEALTH CARE EDUCATION/TRAINING PROGRAM

## 2023-11-30 PROCEDURE — 0TY00Z0 TRANSPLANTATION OF RIGHT KIDNEY, ALLOGENEIC, OPEN APPROACH: ICD-10-PCS | Performed by: STUDENT IN AN ORGANIZED HEALTH CARE EDUCATION/TRAINING PROGRAM

## 2023-11-30 PROCEDURE — 36415 COLL VENOUS BLD VENIPUNCTURE: CPT | Performed by: SURGERY

## 2023-11-30 PROCEDURE — 1100000001 HC PRIVATE ROOM DAILY

## 2023-11-30 PROCEDURE — 86644 CMV ANTIBODY: CPT | Performed by: STUDENT IN AN ORGANIZED HEALTH CARE EDUCATION/TRAINING PROGRAM

## 2023-11-30 PROCEDURE — 82248 BILIRUBIN DIRECT: CPT | Performed by: STUDENT IN AN ORGANIZED HEALTH CARE EDUCATION/TRAINING PROGRAM

## 2023-11-30 PROCEDURE — 71045 X-RAY EXAM CHEST 1 VIEW: CPT

## 2023-11-30 PROCEDURE — 2780000003 HC OR 278 NO HCPCS: Performed by: SURGERY

## 2023-11-30 PROCEDURE — 87635 SARS-COV-2 COVID-19 AMP PRB: CPT | Performed by: STUDENT IN AN ORGANIZED HEALTH CARE EDUCATION/TRAINING PROGRAM

## 2023-11-30 PROCEDURE — 36415 COLL VENOUS BLD VENIPUNCTURE: CPT

## 2023-11-30 PROCEDURE — 86704 HEP B CORE ANTIBODY TOTAL: CPT | Performed by: STUDENT IN AN ORGANIZED HEALTH CARE EDUCATION/TRAINING PROGRAM

## 2023-11-30 PROCEDURE — 86803 HEPATITIS C AB TEST: CPT | Performed by: STUDENT IN AN ORGANIZED HEALTH CARE EDUCATION/TRAINING PROGRAM

## 2023-11-30 PROCEDURE — 86901 BLOOD TYPING SEROLOGIC RH(D): CPT | Performed by: STUDENT IN AN ORGANIZED HEALTH CARE EDUCATION/TRAINING PROGRAM

## 2023-11-30 PROCEDURE — 3700000001 HC GENERAL ANESTHESIA TIME - INITIAL BASE CHARGE: Performed by: SURGERY

## 2023-11-30 PROCEDURE — 3700000002 HC GENERAL ANESTHESIA TIME - EACH INCREMENTAL 1 MINUTE: Performed by: SURGERY

## 2023-11-30 PROCEDURE — 87340 HEPATITIS B SURFACE AG IA: CPT | Performed by: STUDENT IN AN ORGANIZED HEALTH CARE EDUCATION/TRAINING PROGRAM

## 2023-11-30 PROCEDURE — 80504 PATH CLIN CONSLTJ MOD 21-40: CPT | Performed by: SURGERY

## 2023-11-30 PROCEDURE — 80069 RENAL FUNCTION PANEL: CPT | Mod: CCI | Performed by: STUDENT IN AN ORGANIZED HEALTH CARE EDUCATION/TRAINING PROGRAM

## 2023-11-30 PROCEDURE — 84100 ASSAY OF PHOSPHORUS: CPT | Performed by: STUDENT IN AN ORGANIZED HEALTH CARE EDUCATION/TRAINING PROGRAM

## 2023-11-30 PROCEDURE — 8120000002 HC CADAVER DONOR - KIDNEY: Performed by: SURGERY

## 2023-11-30 PROCEDURE — A4217 STERILE WATER/SALINE, 500 ML: HCPCS | Performed by: SURGERY

## 2023-11-30 PROCEDURE — 2500000004 HC RX 250 GENERAL PHARMACY W/ HCPCS (ALT 636 FOR OP/ED): Performed by: SURGERY

## 2023-11-30 PROCEDURE — 86706 HEP B SURFACE ANTIBODY: CPT | Performed by: STUDENT IN AN ORGANIZED HEALTH CARE EDUCATION/TRAINING PROGRAM

## 2023-11-30 DEVICE — PATCH, TACHOSIL, 4.8CM X 4.8CM, ABSORABLE FIBRIN SEALANT: Type: IMPLANTABLE DEVICE | Site: KIDNEY | Status: FUNCTIONAL

## 2023-11-30 DEVICE — STENT, POLARIS ULTRA 5FR X 12CM, W/O WIRE: Type: IMPLANTABLE DEVICE | Site: URETER | Status: FUNCTIONAL

## 2023-11-30 RX ORDER — HYDROMORPHONE HYDROCHLORIDE 1 MG/ML
0.2 INJECTION, SOLUTION INTRAMUSCULAR; INTRAVENOUS; SUBCUTANEOUS EVERY 5 MIN PRN
Status: DISCONTINUED | OUTPATIENT
Start: 2023-11-30 | End: 2023-12-01 | Stop reason: HOSPADM

## 2023-11-30 RX ORDER — MIRTAZAPINE 7.5 MG/1
7.5 TABLET, FILM COATED ORAL NIGHTLY
COMMUNITY
End: 2024-01-08 | Stop reason: SDUPTHER

## 2023-11-30 RX ORDER — HYDROMORPHONE HYDROCHLORIDE 1 MG/ML
0.5 INJECTION, SOLUTION INTRAMUSCULAR; INTRAVENOUS; SUBCUTANEOUS EVERY 5 MIN PRN
Status: DISCONTINUED | OUTPATIENT
Start: 2023-11-30 | End: 2023-12-01 | Stop reason: HOSPADM

## 2023-11-30 RX ORDER — METOPROLOL TARTRATE 1 MG/ML
INJECTION, SOLUTION INTRAVENOUS AS NEEDED
Status: DISCONTINUED | OUTPATIENT
Start: 2023-11-30 | End: 2023-11-30

## 2023-11-30 RX ORDER — MIDAZOLAM HYDROCHLORIDE 1 MG/ML
INJECTION INTRAMUSCULAR; INTRAVENOUS AS NEEDED
Status: DISCONTINUED | OUTPATIENT
Start: 2023-11-30 | End: 2023-11-30

## 2023-11-30 RX ORDER — HEPARIN SODIUM 1000 [USP'U]/ML
INJECTION, SOLUTION INTRAVENOUS; SUBCUTANEOUS AS NEEDED
Status: DISCONTINUED | OUTPATIENT
Start: 2023-11-30 | End: 2023-11-30

## 2023-11-30 RX ORDER — LABETALOL HYDROCHLORIDE 5 MG/ML
5 INJECTION, SOLUTION INTRAVENOUS ONCE AS NEEDED
Status: DISCONTINUED | OUTPATIENT
Start: 2023-11-30 | End: 2023-12-01 | Stop reason: HOSPADM

## 2023-11-30 RX ORDER — FENTANYL CITRATE 50 UG/ML
INJECTION, SOLUTION INTRAMUSCULAR; INTRAVENOUS AS NEEDED
Status: DISCONTINUED | OUTPATIENT
Start: 2023-11-30 | End: 2023-11-30

## 2023-11-30 RX ORDER — RENO CAPS 100; 1.5; 1.7; 20; 10; 1; 150; 5; 6 MG/1; MG/1; MG/1; MG/1; MG/1; MG/1; UG/1; MG/1; UG/1
1 CAPSULE ORAL
COMMUNITY
Start: 2022-08-30 | End: 2023-12-14 | Stop reason: HOSPADM

## 2023-11-30 RX ORDER — OXYCODONE HYDROCHLORIDE 5 MG/1
10 TABLET ORAL EVERY 4 HOURS PRN
Status: DISCONTINUED | OUTPATIENT
Start: 2023-11-30 | End: 2023-12-01 | Stop reason: HOSPADM

## 2023-11-30 RX ORDER — MANNITOL 20 G/100ML
INJECTION, SOLUTION INTRAVENOUS CONTINUOUS PRN
Status: DISCONTINUED | OUTPATIENT
Start: 2023-11-30 | End: 2023-11-30

## 2023-11-30 RX ORDER — SODIUM CHLORIDE 9 MG/ML
0-1000 INJECTION, SOLUTION INTRAVENOUS CONTINUOUS
Status: DISCONTINUED | OUTPATIENT
Start: 2023-11-30 | End: 2023-11-30

## 2023-11-30 RX ORDER — HYDROMORPHONE HYDROCHLORIDE 1 MG/ML
INJECTION, SOLUTION INTRAMUSCULAR; INTRAVENOUS; SUBCUTANEOUS AS NEEDED
Status: DISCONTINUED | OUTPATIENT
Start: 2023-11-30 | End: 2023-11-30

## 2023-11-30 RX ORDER — SODIUM CHLORIDE 0.9 G/100ML
IRRIGANT IRRIGATION AS NEEDED
Status: DISCONTINUED | OUTPATIENT
Start: 2023-11-30 | End: 2023-11-30 | Stop reason: HOSPADM

## 2023-11-30 RX ORDER — OXYCODONE HYDROCHLORIDE 5 MG/1
5 TABLET ORAL EVERY 4 HOURS PRN
Status: DISCONTINUED | OUTPATIENT
Start: 2023-11-30 | End: 2023-12-01 | Stop reason: HOSPADM

## 2023-11-30 RX ORDER — PANTOPRAZOLE SODIUM 40 MG/1
40 TABLET, DELAYED RELEASE ORAL
Status: DISCONTINUED | OUTPATIENT
Start: 2023-11-30 | End: 2023-12-01

## 2023-11-30 RX ORDER — LEVOTHYROXINE SODIUM 25 UG/1
25 TABLET ORAL
COMMUNITY

## 2023-11-30 RX ORDER — CEFAZOLIN SODIUM 2 G/100ML
2 INJECTION, SOLUTION INTRAVENOUS ONCE
Status: DISCONTINUED | OUTPATIENT
Start: 2023-11-30 | End: 2023-11-30 | Stop reason: HOSPADM

## 2023-11-30 RX ORDER — PANTOPRAZOLE SODIUM 40 MG/1
40 TABLET, DELAYED RELEASE ORAL
COMMUNITY
End: 2023-12-14 | Stop reason: HOSPADM

## 2023-11-30 RX ORDER — PROPOFOL 10 MG/ML
INJECTION, EMULSION INTRAVENOUS AS NEEDED
Status: DISCONTINUED | OUTPATIENT
Start: 2023-11-30 | End: 2023-11-30

## 2023-11-30 RX ORDER — ROCURONIUM BROMIDE 10 MG/ML
INJECTION, SOLUTION INTRAVENOUS AS NEEDED
Status: DISCONTINUED | OUTPATIENT
Start: 2023-11-30 | End: 2023-11-30

## 2023-11-30 RX ORDER — SODIUM CHLORIDE 9 MG/ML
0-1000 INJECTION, SOLUTION INTRAVENOUS CONTINUOUS
Status: DISCONTINUED | OUTPATIENT
Start: 2023-11-30 | End: 2023-12-01

## 2023-11-30 RX ORDER — METHYLPREDNISOLONE SODIUM SUCCINATE 500 MG/8ML
INJECTION INTRAMUSCULAR; INTRAVENOUS AS NEEDED
Status: DISCONTINUED | OUTPATIENT
Start: 2023-11-30 | End: 2023-11-30

## 2023-11-30 RX ORDER — GABAPENTIN 300 MG/1
300 CAPSULE ORAL ONCE
Status: DISCONTINUED | OUTPATIENT
Start: 2023-11-30 | End: 2023-11-30 | Stop reason: HOSPADM

## 2023-11-30 RX ORDER — LEVOTHYROXINE SODIUM 50 UG/1
25 TABLET ORAL
Status: DISCONTINUED | OUTPATIENT
Start: 2023-11-30 | End: 2023-12-14 | Stop reason: HOSPADM

## 2023-11-30 RX ORDER — DIPHENHYDRAMINE HYDROCHLORIDE 50 MG/ML
INJECTION INTRAMUSCULAR; INTRAVENOUS AS NEEDED
Status: DISCONTINUED | OUTPATIENT
Start: 2023-11-30 | End: 2023-11-30

## 2023-11-30 RX ORDER — LIDOCAINE HYDROCHLORIDE 20 MG/ML
INJECTION, SOLUTION INFILTRATION; PERINEURAL AS NEEDED
Status: DISCONTINUED | OUTPATIENT
Start: 2023-11-30 | End: 2023-11-30

## 2023-11-30 RX ORDER — ONDANSETRON HYDROCHLORIDE 2 MG/ML
INJECTION, SOLUTION INTRAVENOUS AS NEEDED
Status: DISCONTINUED | OUTPATIENT
Start: 2023-11-30 | End: 2023-11-30

## 2023-11-30 RX ORDER — SODIUM CHLORIDE, SODIUM LACTATE, POTASSIUM CHLORIDE, CALCIUM CHLORIDE 600; 310; 30; 20 MG/100ML; MG/100ML; MG/100ML; MG/100ML
100 INJECTION, SOLUTION INTRAVENOUS CONTINUOUS
Status: DISCONTINUED | OUTPATIENT
Start: 2023-11-30 | End: 2023-11-30

## 2023-11-30 RX ORDER — ACETAMINOPHEN 325 MG/1
975 TABLET ORAL ONCE
Status: DISCONTINUED | OUTPATIENT
Start: 2023-11-30 | End: 2023-11-30 | Stop reason: HOSPADM

## 2023-11-30 RX ORDER — SODIUM CHLORIDE 450 MG/100ML
60 INJECTION, SOLUTION INTRAVENOUS CONTINUOUS
Status: DISCONTINUED | OUTPATIENT
Start: 2023-11-30 | End: 2023-12-01

## 2023-11-30 RX ORDER — CALCIUM ACETATE 667 MG/1
667 CAPSULE ORAL
COMMUNITY
End: 2023-12-14 | Stop reason: HOSPADM

## 2023-11-30 RX ORDER — DEXTROSE MONOHYDRATE AND SODIUM CHLORIDE 5; .45 G/100ML; G/100ML
60 INJECTION, SOLUTION INTRAVENOUS CONTINUOUS
Status: DISCONTINUED | OUTPATIENT
Start: 2023-11-30 | End: 2023-11-30

## 2023-11-30 RX ORDER — FUROSEMIDE 80 MG/1
80 TABLET ORAL DAILY
Status: ON HOLD | COMMUNITY
End: 2023-12-14 | Stop reason: SDUPTHER

## 2023-11-30 RX ORDER — CEFAZOLIN 1 G/1
INJECTION, POWDER, FOR SOLUTION INTRAVENOUS AS NEEDED
Status: DISCONTINUED | OUTPATIENT
Start: 2023-11-30 | End: 2023-11-30

## 2023-11-30 RX ORDER — SODIUM CHLORIDE 450 MG/100ML
60 INJECTION, SOLUTION INTRAVENOUS CONTINUOUS
Status: DISCONTINUED | OUTPATIENT
Start: 2023-11-30 | End: 2023-11-30

## 2023-11-30 RX ORDER — FUROSEMIDE 10 MG/ML
INJECTION INTRAMUSCULAR; INTRAVENOUS AS NEEDED
Status: DISCONTINUED | OUTPATIENT
Start: 2023-11-30 | End: 2023-11-30

## 2023-11-30 RX ORDER — ONDANSETRON HYDROCHLORIDE 2 MG/ML
4 INJECTION, SOLUTION INTRAVENOUS ONCE AS NEEDED
Status: DISCONTINUED | OUTPATIENT
Start: 2023-11-30 | End: 2023-12-01 | Stop reason: HOSPADM

## 2023-11-30 RX ORDER — SODIUM CHLORIDE, SODIUM LACTATE, POTASSIUM CHLORIDE, CALCIUM CHLORIDE 600; 310; 30; 20 MG/100ML; MG/100ML; MG/100ML; MG/100ML
INJECTION, SOLUTION INTRAVENOUS CONTINUOUS PRN
Status: DISCONTINUED | OUTPATIENT
Start: 2023-11-30 | End: 2023-11-30

## 2023-11-30 RX ORDER — MIRTAZAPINE 7.5 MG/1
7.5 TABLET, FILM COATED ORAL NIGHTLY
Status: DISCONTINUED | OUTPATIENT
Start: 2023-11-30 | End: 2023-12-14 | Stop reason: HOSPADM

## 2023-11-30 RX ADMIN — ROCURONIUM BROMIDE 20 MG: 10 INJECTION INTRAVENOUS at 16:03

## 2023-11-30 RX ADMIN — METHYLPREDNISOLONE SODIUM SUCCINATE 500 MG: 500 INJECTION, POWDER, FOR SOLUTION INTRAMUSCULAR; INTRAVENOUS at 15:20

## 2023-11-30 RX ADMIN — SODIUM CHLORIDE 60 ML/HR: 4.5 INJECTION, SOLUTION INTRAVENOUS at 19:15

## 2023-11-30 RX ADMIN — LIDOCAINE HYDROCHLORIDE 100 MG: 20 INJECTION, SOLUTION INFILTRATION; PERINEURAL at 15:00

## 2023-11-30 RX ADMIN — HYDROMORPHONE HYDROCHLORIDE 0.4 MG: 1 INJECTION, SOLUTION INTRAMUSCULAR; INTRAVENOUS; SUBCUTANEOUS at 18:16

## 2023-11-30 RX ADMIN — ONDANSETRON 4 MG: 2 INJECTION INTRAMUSCULAR; INTRAVENOUS at 18:39

## 2023-11-30 RX ADMIN — MANNITOL: 20 INJECTION, SOLUTION INTRAVENOUS at 17:03

## 2023-11-30 RX ADMIN — PROPOFOL 50 MG: 10 INJECTION, EMULSION INTRAVENOUS at 17:29

## 2023-11-30 RX ADMIN — MIDAZOLAM HYDROCHLORIDE 2 MG: 1 INJECTION, SOLUTION INTRAMUSCULAR; INTRAVENOUS at 14:52

## 2023-11-30 RX ADMIN — METOPROLOL TARTRATE 2.5 MG: 1 INJECTION, SOLUTION INTRAVENOUS at 18:03

## 2023-11-30 RX ADMIN — LEVOTHYROXINE SODIUM 25 MCG: 50 TABLET ORAL at 08:29

## 2023-11-30 RX ADMIN — PROPOFOL 20 MG: 10 INJECTION, EMULSION INTRAVENOUS at 18:16

## 2023-11-30 RX ADMIN — SUGAMMADEX 100 MG: 100 INJECTION, SOLUTION INTRAVENOUS at 18:48

## 2023-11-30 RX ADMIN — HYDROMORPHONE HYDROCHLORIDE 0.5 MG: 1 INJECTION, SOLUTION INTRAMUSCULAR; INTRAVENOUS; SUBCUTANEOUS at 21:15

## 2023-11-30 RX ADMIN — PROPOFOL 30 MG: 10 INJECTION, EMULSION INTRAVENOUS at 18:07

## 2023-11-30 RX ADMIN — ROCURONIUM BROMIDE 50 MG: 10 INJECTION INTRAVENOUS at 15:00

## 2023-11-30 RX ADMIN — Medication 6 L/MIN: at 19:15

## 2023-11-30 RX ADMIN — PANTOPRAZOLE SODIUM 40 MG: 40 TABLET, DELAYED RELEASE ORAL at 08:29

## 2023-11-30 RX ADMIN — HEPARIN SODIUM 100 MG: 1000 INJECTION INTRAVENOUS; SUBCUTANEOUS at 15:44

## 2023-11-30 RX ADMIN — ROCURONIUM BROMIDE 10 MG: 10 INJECTION INTRAVENOUS at 17:13

## 2023-11-30 RX ADMIN — DIPHENHYDRAMINE HYDROCHLORIDE 50 MG: 50 INJECTION, SOLUTION INTRAMUSCULAR; INTRAVENOUS at 15:20

## 2023-11-30 RX ADMIN — HYDROMORPHONE HYDROCHLORIDE 0.5 MG: 1 INJECTION, SOLUTION INTRAMUSCULAR; INTRAVENOUS; SUBCUTANEOUS at 22:25

## 2023-11-30 RX ADMIN — FENTANYL CITRATE 50 MCG: 50 INJECTION, SOLUTION INTRAMUSCULAR; INTRAVENOUS at 15:35

## 2023-11-30 RX ADMIN — HEPARIN SODIUM 2000 UNITS: 1000 INJECTION INTRAVENOUS; SUBCUTANEOUS at 16:18

## 2023-11-30 RX ADMIN — SUGAMMADEX 200 MG: 100 INJECTION, SOLUTION INTRAVENOUS at 18:44

## 2023-11-30 RX ADMIN — HYDROMORPHONE HYDROCHLORIDE 0.6 MG: 1 INJECTION, SOLUTION INTRAMUSCULAR; INTRAVENOUS; SUBCUTANEOUS at 19:18

## 2023-11-30 RX ADMIN — ROCURONIUM BROMIDE 10 MG: 10 INJECTION INTRAVENOUS at 18:05

## 2023-11-30 RX ADMIN — SODIUM CHLORIDE, POTASSIUM CHLORIDE, SODIUM LACTATE AND CALCIUM CHLORIDE: 600; 310; 30; 20 INJECTION, SOLUTION INTRAVENOUS at 14:47

## 2023-11-30 RX ADMIN — METOPROLOL TARTRATE 2.5 MG: 1 INJECTION, SOLUTION INTRAVENOUS at 18:08

## 2023-11-30 RX ADMIN — HYDROMORPHONE HYDROCHLORIDE 0.4 MG: 1 INJECTION, SOLUTION INTRAMUSCULAR; INTRAVENOUS; SUBCUTANEOUS at 19:13

## 2023-11-30 RX ADMIN — FUROSEMIDE 100 MG: 10 INJECTION, SOLUTION INTRAMUSCULAR; INTRAVENOUS at 17:03

## 2023-11-30 RX ADMIN — PROPOFOL 100 MG: 10 INJECTION, EMULSION INTRAVENOUS at 15:00

## 2023-11-30 RX ADMIN — HYDROMORPHONE HYDROCHLORIDE 0.5 MG: 1 INJECTION, SOLUTION INTRAMUSCULAR; INTRAVENOUS; SUBCUTANEOUS at 20:07

## 2023-11-30 RX ADMIN — HYDROMORPHONE HYDROCHLORIDE 0.4 MG: 1 INJECTION, SOLUTION INTRAMUSCULAR; INTRAVENOUS; SUBCUTANEOUS at 18:07

## 2023-11-30 RX ADMIN — CEFAZOLIN 2 G: 330 INJECTION, POWDER, FOR SOLUTION INTRAMUSCULAR; INTRAVENOUS at 15:03

## 2023-11-30 RX ADMIN — SODIUM CHLORIDE 0 ML/HR: 9 INJECTION, SOLUTION INTRAVENOUS at 20:30

## 2023-11-30 RX ADMIN — FENTANYL CITRATE 50 MCG: 50 INJECTION, SOLUTION INTRAMUSCULAR; INTRAVENOUS at 15:00

## 2023-11-30 RX ADMIN — HYDROMORPHONE HYDROCHLORIDE 0.2 MG: 1 INJECTION, SOLUTION INTRAMUSCULAR; INTRAVENOUS; SUBCUTANEOUS at 18:03

## 2023-11-30 RX ADMIN — HYDROMORPHONE HYDROCHLORIDE 0.5 MG: 1 INJECTION, SOLUTION INTRAMUSCULAR; INTRAVENOUS; SUBCUTANEOUS at 20:43

## 2023-11-30 SDOH — SOCIAL STABILITY: SOCIAL INSECURITY: DO YOU FEEL UNSAFE GOING BACK TO THE PLACE WHERE YOU ARE LIVING?: NO

## 2023-11-30 SDOH — SOCIAL STABILITY: SOCIAL INSECURITY: ARE YOU OR HAVE YOU BEEN THREATENED OR ABUSED PHYSICALLY, EMOTIONALLY, OR SEXUALLY BY ANYONE?: NO

## 2023-11-30 SDOH — SOCIAL STABILITY: SOCIAL INSECURITY: HAVE YOU HAD THOUGHTS OF HARMING ANYONE ELSE?: NO

## 2023-11-30 SDOH — SOCIAL STABILITY: SOCIAL INSECURITY: DOES ANYONE TRY TO KEEP YOU FROM HAVING/CONTACTING OTHER FRIENDS OR DOING THINGS OUTSIDE YOUR HOME?: NO

## 2023-11-30 SDOH — SOCIAL STABILITY: SOCIAL INSECURITY: ARE THERE ANY APPARENT SIGNS OF INJURIES/BEHAVIORS THAT COULD BE RELATED TO ABUSE/NEGLECT?: NO

## 2023-11-30 SDOH — SOCIAL STABILITY: SOCIAL INSECURITY: HAS ANYONE EVER THREATENED TO HURT YOUR FAMILY OR YOUR PETS?: NO

## 2023-11-30 SDOH — SOCIAL STABILITY: SOCIAL INSECURITY: WERE YOU ABLE TO COMPLETE ALL THE BEHAVIORAL HEALTH SCREENINGS?: YES

## 2023-11-30 SDOH — SOCIAL STABILITY: SOCIAL INSECURITY: DO YOU FEEL ANYONE HAS EXPLOITED OR TAKEN ADVANTAGE OF YOU FINANCIALLY OR OF YOUR PERSONAL PROPERTY?: NO

## 2023-11-30 SDOH — SOCIAL STABILITY: SOCIAL INSECURITY: ABUSE: ADULT

## 2023-11-30 SDOH — HEALTH STABILITY: MENTAL HEALTH: CURRENT SMOKER: 0

## 2023-11-30 ASSESSMENT — ENCOUNTER SYMPTOMS
ABDOMINAL PAIN: 0
RESPIRATORY NEGATIVE: 1
FLANK PAIN: 0
ADENOPATHY: 0
MYALGIAS: 0
LIGHT-HEADEDNESS: 0
SHORTNESS OF BREATH: 0
NEUROLOGICAL NEGATIVE: 1
AGITATION: 0
HEADACHES: 0
RHINORRHEA: 0
CHILLS: 0
VOMITING: 0
EYE DISCHARGE: 0
CONSTITUTIONAL NEGATIVE: 1
ABDOMINAL DISTENTION: 0
DYSURIA: 0
ARTHRALGIAS: 0
CHEST TIGHTNESS: 0
GASTROINTESTINAL NEGATIVE: 1
COLOR CHANGE: 0
NAUSEA: 0
CARDIOVASCULAR NEGATIVE: 1
CONFUSION: 0
EYE ITCHING: 0
FEVER: 0
EYE PAIN: 0
BRUISES/BLEEDS EASILY: 0
ACTIVITY CHANGE: 0
COUGH: 0

## 2023-11-30 ASSESSMENT — PAIN SCALES - GENERAL
PAINLEVEL_OUTOF10: 8
PAINLEVEL_OUTOF10: 10 - WORST POSSIBLE PAIN
PAINLEVEL_OUTOF10: 0 - NO PAIN
PAINLEVEL_OUTOF10: 10 - WORST POSSIBLE PAIN
PAINLEVEL_OUTOF10: 8
PAINLEVEL_OUTOF10: 7
PAINLEVEL_OUTOF10: 10 - WORST POSSIBLE PAIN
PAINLEVEL_OUTOF10: 9
PAINLEVEL_OUTOF10: 9
PAINLEVEL_OUTOF10: 10 - WORST POSSIBLE PAIN
PAIN_LEVEL: 2
PAINLEVEL_OUTOF10: 10 - WORST POSSIBLE PAIN
PAINLEVEL_OUTOF10: 7

## 2023-11-30 ASSESSMENT — LIFESTYLE VARIABLES
HOW MANY STANDARD DRINKS CONTAINING ALCOHOL DO YOU HAVE ON A TYPICAL DAY: PATIENT DOES NOT DRINK
AUDIT-C TOTAL SCORE: 0
HOW OFTEN DO YOU HAVE 6 OR MORE DRINKS ON ONE OCCASION: NEVER
SUBSTANCE_ABUSE_PAST_12_MONTHS: NO
SKIP TO QUESTIONS 9-10: 1
HOW OFTEN DO YOU HAVE A DRINK CONTAINING ALCOHOL: NEVER
PRESCIPTION_ABUSE_PAST_12_MONTHS: NO
AUDIT-C TOTAL SCORE: 0

## 2023-11-30 ASSESSMENT — PAIN - FUNCTIONAL ASSESSMENT

## 2023-11-30 ASSESSMENT — ACTIVITIES OF DAILY LIVING (ADL)
ADEQUATE_TO_COMPLETE_ADL: YES
HEARING - LEFT EAR: FUNCTIONAL
GROOMING: INDEPENDENT
LACK_OF_TRANSPORTATION: NO
PATIENT'S MEMORY ADEQUATE TO SAFELY COMPLETE DAILY ACTIVITIES?: YES
DRESSING YOURSELF: INDEPENDENT
HEARING - RIGHT EAR: FUNCTIONAL
TOILETING: INDEPENDENT
WALKS IN HOME: INDEPENDENT
JUDGMENT_ADEQUATE_SAFELY_COMPLETE_DAILY_ACTIVITIES: YES
FEEDING YOURSELF: INDEPENDENT
BATHING: INDEPENDENT

## 2023-11-30 ASSESSMENT — COGNITIVE AND FUNCTIONAL STATUS - GENERAL
DAILY ACTIVITIY SCORE: 24
PATIENT BASELINE BEDBOUND: NO
MOBILITY SCORE: 24

## 2023-11-30 ASSESSMENT — PATIENT HEALTH QUESTIONNAIRE - PHQ9
1. LITTLE INTEREST OR PLEASURE IN DOING THINGS: NOT AT ALL
SUM OF ALL RESPONSES TO PHQ9 QUESTIONS 1 & 2: 0
2. FEELING DOWN, DEPRESSED OR HOPELESS: NOT AT ALL

## 2023-11-30 ASSESSMENT — COLUMBIA-SUICIDE SEVERITY RATING SCALE - C-SSRS
6. HAVE YOU EVER DONE ANYTHING, STARTED TO DO ANYTHING, OR PREPARED TO DO ANYTHING TO END YOUR LIFE?: NO
2. HAVE YOU ACTUALLY HAD ANY THOUGHTS OF KILLING YOURSELF?: NO
1. IN THE PAST MONTH, HAVE YOU WISHED YOU WERE DEAD OR WISHED YOU COULD GO TO SLEEP AND NOT WAKE UP?: NO

## 2023-11-30 NOTE — H&P
History Of Present Illness  Herber Leblanc is a 43 y.o. female with pertinent past medical history of chewing tobacco use and ESRD likely secondary to HTN vs chronic NSAID use presenting for kidney transplantation. She has been on dialysis via LUE AVF since 2018 on MWF. She reports making minimal urine since that time. She denies history of diabetes. She denies family history of kidney problems. She has had no prior abdominal surgeries.     Recipient:  ABO: B  CMV: pending  EBV: pending  HCV AB: pending  HBcAb: pending  HBsAg: pending    Donor:  ABO: B  CMV: positive  EBV: positive  Toxo: negative  HCV AB: negative  HBcAb: negative  HBsAg: negative     Past Medical History  Past Medical History:   Diagnosis Date    Chronic kidney disease, unspecified     CKD, patient interested in transplantation    Personal history of COVID-19 07/20/2022    History of COVID-19       Surgical History  Past Surgical History:   Procedure Laterality Date    MR HEAD ANGIO W AND WO IV CONTRAST  1/26/2021    MR HEAD ANGIO W AND WO IV CONTRAST    MR HEAD ANGIO WO IV CONTRAST  1/26/2021    MR HEAD ANGIO WO IV CONTRAST    OTHER SURGICAL HISTORY  07/20/2022    Arteriovenous fistula creation procedure        Social History  She reports that she has never smoked. She has never used smokeless tobacco. She reports that she does not drink alcohol and does not use drugs.  Chewing tobacco    Family History  No family history on file.  No family history of kidney disease     Allergies  Patient has no known allergies.    Review of Systems   Constitutional:  Negative for activity change, chills and fever.   HENT:  Negative for congestion and rhinorrhea.    Eyes:  Negative for pain, discharge and itching.   Respiratory:  Negative for cough, chest tightness and shortness of breath.    Cardiovascular:  Negative for chest pain.   Gastrointestinal:  Negative for abdominal distention, abdominal pain, nausea and vomiting.   Genitourinary:  Negative for dysuria and  "flank pain.   Musculoskeletal:  Negative for arthralgias and myalgias.   Skin:  Negative for color change and rash.   Neurological:  Negative for light-headedness and headaches.   Hematological:  Negative for adenopathy. Does not bruise/bleed easily.   Psychiatric/Behavioral:  Negative for agitation and confusion.         Physical Exam  Constitutional:       Appearance: Normal appearance.   HENT:      Head: Normocephalic and atraumatic.      Nose: Nose normal.      Mouth/Throat:      Mouth: Mucous membranes are moist.      Pharynx: Oropharynx is clear.   Eyes:      Extraocular Movements: Extraocular movements intact.      Pupils: Pupils are equal, round, and reactive to light.   Cardiovascular:      Rate and Rhythm: Normal rate.   Pulmonary:      Effort: Pulmonary effort is normal. No respiratory distress.   Abdominal:      General: Abdomen is flat. There is no distension.      Palpations: Abdomen is soft.      Tenderness: There is no abdominal tenderness.   Musculoskeletal:         General: Normal range of motion.      Cervical back: Normal range of motion.      Comments: LUE AVF with palpable thrill   Skin:     General: Skin is warm and dry.      Capillary Refill: Capillary refill takes less than 2 seconds.   Neurological:      General: No focal deficit present.      Mental Status: She is alert and oriented to person, place, and time.   Psychiatric:         Mood and Affect: Mood normal.         Behavior: Behavior normal.         Thought Content: Thought content normal.         Judgment: Judgment normal.          Last Recorded Vitals  Blood pressure 112/75, pulse 69, temperature 36.5 °C (97.7 °F), resp. rate 18, height 1.626 m (5' 4\"), weight 74.8 kg (164 lb 14.5 oz), SpO2 96 %.    Relevant Results  CBC, BMP, phos, Covid, HIV, hepatic function panel, CMB, coagulation screen, type and screen, EBV, HCV, HBV core, surface, and antibody, and VBG pending.      Assessment/Plan   Principal Problem:    ESRD (end stage " renal disease) (CMS/HCC)    Herber Leblanc is a 43 year old female with ESRD secondary to HTN vs NSAID use on dialysis MWF via LUE AVF presenting for kidney transplant.     NPO  Re-order home medications  CBC, BMP, hepatic function panel, coags  Check recipient serologies  CXR  ERAS protocol  Prepare 2 units pRBCs for OR  Transplant nephrology consultation  OR today for left kidney transplantation    Risks, benefits, and complications discussed with patient and her family/friend at bedside. They express understanding and agree to proceed.    Kristine Rodriguez MD

## 2023-11-30 NOTE — CONSULTS
"Inpatient consult to Nephrology  Consult performed by: Lexii Nielsen DO  Consult ordered by: Magi Lambert MD        Reason For Consult  ESRD, patient present for kidney transplant     History Of Present Illness  Herber Leblanc is a 43 y.o. female with PMHX of ESRD 2/2 HTN and chronic NSAID use is presenting for KT.  On dialysis since 2018 MWF schedule via LUE AVF    Recipient:  ABO: B  CMV: pending  EBV: pending  HCV AB: pending  HBcAb: pending  HBsAg: pending     Donor:  ABO: B,KDPI: 56%  CMV: positive  EBV: positive  Toxo: negative  HCV AB: negative  HBcAb: negative  HBsAg: negative       Past Medical History  She has a past medical history of Chronic kidney disease, unspecified and Personal history of COVID-19 (07/20/2022).      Surgical History  She has a past surgical history that includes Other surgical history (07/20/2022); MR angio head w and wo IV contrast (1/26/2021); and MR angio head wo IV contrast (1/26/2021).     Social History  She reports that she has never smoked. She has never used smokeless tobacco. She reports that she does not drink alcohol and does not use drugs.    Family History  No family history on file.     Allergies  Patient has no known allergies.    Review of Systems   Constitutional: Negative.    HENT: Negative.     Respiratory: Negative.     Cardiovascular: Negative.    Gastrointestinal: Negative.    Genitourinary: Negative.    Neurological: Negative.        Last Recorded Vitals  Blood pressure 117/75, pulse 58, temperature 36.1 °C (97 °F), resp. rate 18, height 1.626 m (5' 4\"), weight 74.8 kg (164 lb 14.5 oz), SpO2 96 %.     PHYSICAL EXAMINATION   General: Awake, alert, conversant, appears stated age, in no distress   Neuro: AOx4, moving all limbs spontaneously, follows commands  HEENT: PERRL, no scleral icterus, mucosa moist   CVS: Regular rate, Normal S1, S2, no murmurs   RESP: Lungs clear to auscultation   Abdomen: Soft, non-tender, no masses, guarding, rebound tenderness   : " No flank pain or indwelling urinary catheter  EXT: No peripheral edema, no asymmetry noted  MSK: No focal joint swelling noted  Skin: No suspect lesions or rashes noted on visible skin  Psych: Coherent thought process, appropriate mood and affect  Access: LUE AVF with palpable thrill        Relevant Results  Admission on 11/30/2023   Component Date Value Ref Range Status    POCT pH, Venous 11/30/2023 7.42  7.33 - 7.43 pH Final    POCT pCO2, Venous 11/30/2023 45  41 - 51 mm Hg Final    POCT pO2, Venous 11/30/2023 66 (H)  35 - 45 mm Hg Final    POCT SO2, Venous 11/30/2023 95 (H)  45 - 75 % Final    POCT Oxy Hemoglobin, Venous 11/30/2023 91.7 (H)  45.0 - 75.0 % Final    POCT Hematocrit Calculated, Venous 11/30/2023 39.0  36.0 - 46.0 % Final    POCT Sodium, Venous 11/30/2023 135 (L)  136 - 145 mmol/L Final    POCT Potassium, Venous 11/30/2023 3.5  3.5 - 5.3 mmol/L Final    POCT Chloride, Venous 11/30/2023 100  98 - 107 mmol/L Final    POCT Ionized Calicum, Venous 11/30/2023 1.15  1.10 - 1.33 mmol/L Final    POCT Glucose, Venous 11/30/2023 83  74 - 99 mg/dL Final    POCT Lactate, Venous 11/30/2023 1.9  0.4 - 2.0 mmol/L Final    POCT Base Excess, Venous 11/30/2023 4.0 (H)  -2.0 - 3.0 mmol/L Final    POCT HCO3 Calculated, Venous 11/30/2023 29.2 (H)  22.0 - 26.0 mmol/L Final    POCT Hemoglobin, Venous 11/30/2023 12.9  12.0 - 16.0 g/dL Final    POCT Anion Gap, Venous 11/30/2023 9.0 (L)  10.0 - 25.0 mmol/L Final    Patient Temperature 11/30/2023 37.0  degrees Celsius Final    FiO2 11/30/2023 99  % Final    Hepatitis C AB 11/30/2023 Nonreactive  Nonreactive Final    Results from patients taking biotin supplements or receiving high-dose biotin therapy should be interpreted with caution due to possible interference with this test. Providers may contact their local laboratory for further information.    Hepatitis B Surface AB 11/30/2023 40.4 (H)  <10.0 mIU/mL Final    Interpretive Criteria:                         <10 mIU/mL     Nonreactive                          >=10 mIU/mL    Reactive     Biotin interference may cause falsely decreased results. Patients taking a Biotin dose of up to 5 mg/day should refrain from taking Biotin for 24 hours before sample collection. Providers may contact their local laboratory for further information.    Hepatitis B Surface AG 11/30/2023 Nonreactive  Nonreactive Final    Biotin interference may cause falsely decreased results. Patients taking a Biotin dose of up to 5 mg/day should refrain from taking Biotin for 24 hours before sample collection. Providers may contact their local laboratory for  further information.    ABO TYPE 11/30/2023 B   Final    Rh TYPE 11/30/2023 POS   Final    ANTIBODY SCREEN 11/30/2023 NEG   Final    Protime 11/30/2023 11.6  9.8 - 12.8 seconds Final    INR 11/30/2023 1.0  0.9 - 1.1 Final    aPTT 11/30/2023 33  27 - 38 seconds Final    Albumin 11/30/2023 3.7  3.4 - 5.0 g/dL Final    Bilirubin, Total 11/30/2023 0.5  0.0 - 1.2 mg/dL Final    Bilirubin, Direct 11/30/2023 0.1  0.0 - 0.3 mg/dL Final    Alkaline Phosphatase 11/30/2023 139 (H)  33 - 110 U/L Final    ALT 11/30/2023 16  7 - 45 U/L Final    Patients treated with Sulfasalazine may generate falsely decreased results for ALT.    AST 11/30/2023 11  9 - 39 U/L Final    Total Protein 11/30/2023 7.2  6.4 - 8.2 g/dL Final    HIV 1/2 Antigen/Antibody Screen wi* 11/30/2023 Nonreactive  Nonreactive Final    Coronavirus 2019, PCR 11/30/2023 Not Detected  Not Detected Final    Phosphorus 11/30/2023 3.3  2.5 - 4.9 mg/dL Final    The performance characteristics of phosphorus testing in heparinized plasma have been validated by the individual  laboratory site where testing is performed. Testing on heparinized plasma is not approved by the FDA; however, such approval is not necessary.    Glucose 11/30/2023 76  74 - 99 mg/dL Final    Sodium 11/30/2023 138  136 - 145 mmol/L Final    Potassium 11/30/2023 3.7  3.5 - 5.3 mmol/L Final    Chloride  "11/30/2023 100  98 - 107 mmol/L Final    Bicarbonate 11/30/2023 27  21 - 32 mmol/L Final    Anion Gap 11/30/2023 15  10 - 20 mmol/L Final    Urea Nitrogen 11/30/2023 34 (H)  6 - 23 mg/dL Final    Creatinine 11/30/2023 9.02 (H)  0.50 - 1.05 mg/dL Final    eGFR 11/30/2023 5 (L)  >60 mL/min/1.73m*2 Final    Calculations of estimated GFR are performed using the 2021 CKD-EPI Study Refit equation without the race variable for the IDMS-Traceable creatinine methods.  https://jasn.asnjournals.org/content/early/2021/09/22/ASN.7750899877    Calcium 11/30/2023 8.9  8.6 - 10.6 mg/dL Final    Ventricular Rate 11/30/2023 75  BPM Final    Atrial Rate 11/30/2023 75  BPM Final    OH Interval 11/30/2023 136  ms Final    QRS Duration 11/30/2023 90  ms Final    QT Interval 11/30/2023 422  ms Final    QTC Calculation(Bazett) 11/30/2023 471  ms Final    P Axis 11/30/2023 50  degrees Final    R Axis 11/30/2023 74  degrees Final    T Axis 11/30/2023 0  degrees Final    QRS Count 11/30/2023 12  beats Final    Q Onset 11/30/2023 217  ms Final    P Onset 11/30/2023 149  ms Final    P Offset 11/30/2023 196  ms Final    T Offset 11/30/2023 428  ms Final    QTC Fredericia 11/30/2023 454  ms Final    Extra Tube 11/30/2023 Hold for add-ons.   Final    Auto resulted.    Extra Tube 11/30/2023 Hold for add-ons.   Preliminary    Auto resulted.    Extra Tube 11/30/2023 Hold for add-ons.   Preliminary    Auto resulted.    Extra Tube 11/30/2023 Hold for add-ons.   Preliminary    Auto resulted.    Extra Tube 11/30/2023 Hold for add-ons.   Preliminary    Auto resulted.    HCG, Beta-Quantitative 11/30/2023 <3  <5 mIU/mL Final   Lab Requisition on 11/01/2023   Component Date Value Ref Range Status    HLA Results 11/01/2023 See Flow Allocrossmatch Report   Final   Lab Requisition on 11/03/2023   Component Date Value Ref Range Status    Freeze Crossmatch 11/03/2023 Frozen Sample   Final   ;         No results found for: \"ALBUR\", \"[id number]\"       Lab Results "   Component Value Date     11/30/2023    K 3.7 11/30/2023     11/30/2023    CO2 27 11/30/2023            Admission on 11/30/2023   Component Date Value Ref Range Status    POCT pH, Venous 11/30/2023 7.42  7.33 - 7.43 pH Final    POCT pCO2, Venous 11/30/2023 45  41 - 51 mm Hg Final    POCT pO2, Venous 11/30/2023 66 (H)  35 - 45 mm Hg Final    POCT SO2, Venous 11/30/2023 95 (H)  45 - 75 % Final    POCT Oxy Hemoglobin, Venous 11/30/2023 91.7 (H)  45.0 - 75.0 % Final    POCT Hematocrit Calculated, Venous 11/30/2023 39.0  36.0 - 46.0 % Final    POCT Sodium, Venous 11/30/2023 135 (L)  136 - 145 mmol/L Final    POCT Potassium, Venous 11/30/2023 3.5  3.5 - 5.3 mmol/L Final    POCT Chloride, Venous 11/30/2023 100  98 - 107 mmol/L Final    POCT Ionized Calicum, Venous 11/30/2023 1.15  1.10 - 1.33 mmol/L Final    POCT Glucose, Venous 11/30/2023 83  74 - 99 mg/dL Final    POCT Lactate, Venous 11/30/2023 1.9  0.4 - 2.0 mmol/L Final    POCT Base Excess, Venous 11/30/2023 4.0 (H)  -2.0 - 3.0 mmol/L Final    POCT HCO3 Calculated, Venous 11/30/2023 29.2 (H)  22.0 - 26.0 mmol/L Final    POCT Hemoglobin, Venous 11/30/2023 12.9  12.0 - 16.0 g/dL Final    POCT Anion Gap, Venous 11/30/2023 9.0 (L)  10.0 - 25.0 mmol/L Final    Patient Temperature 11/30/2023 37.0  degrees Celsius Final    FiO2 11/30/2023 99  % Final    Hepatitis C AB 11/30/2023 Nonreactive  Nonreactive Final    Results from patients taking biotin supplements or receiving high-dose biotin therapy should be interpreted with caution due to possible interference with this test. Providers may contact their local laboratory for further information.    Hepatitis B Surface AB 11/30/2023 40.4 (H)  <10.0 mIU/mL Final    Interpretive Criteria:                         <10 mIU/mL    Nonreactive                          >=10 mIU/mL    Reactive     Biotin interference may cause falsely decreased results. Patients taking a Biotin dose of up to 5 mg/day should refrain from  taking Biotin for 24 hours before sample collection. Providers may contact their local laboratory for further information.    Hepatitis B Surface AG 11/30/2023 Nonreactive  Nonreactive Final    Biotin interference may cause falsely decreased results. Patients taking a Biotin dose of up to 5 mg/day should refrain from taking Biotin for 24 hours before sample collection. Providers may contact their local laboratory for  further information.    ABO TYPE 11/30/2023 B   Final    Rh TYPE 11/30/2023 POS   Final    ANTIBODY SCREEN 11/30/2023 NEG   Final    Protime 11/30/2023 11.6  9.8 - 12.8 seconds Final    INR 11/30/2023 1.0  0.9 - 1.1 Final    aPTT 11/30/2023 33  27 - 38 seconds Final    Albumin 11/30/2023 3.7  3.4 - 5.0 g/dL Final    Bilirubin, Total 11/30/2023 0.5  0.0 - 1.2 mg/dL Final    Bilirubin, Direct 11/30/2023 0.1  0.0 - 0.3 mg/dL Final    Alkaline Phosphatase 11/30/2023 139 (H)  33 - 110 U/L Final    ALT 11/30/2023 16  7 - 45 U/L Final    Patients treated with Sulfasalazine may generate falsely decreased results for ALT.    AST 11/30/2023 11  9 - 39 U/L Final    Total Protein 11/30/2023 7.2  6.4 - 8.2 g/dL Final    HIV 1/2 Antigen/Antibody Screen wi* 11/30/2023 Nonreactive  Nonreactive Final    Coronavirus 2019, PCR 11/30/2023 Not Detected  Not Detected Final    Phosphorus 11/30/2023 3.3  2.5 - 4.9 mg/dL Final    The performance characteristics of phosphorus testing in heparinized plasma have been validated by the individual  laboratory site where testing is performed. Testing on heparinized plasma is not approved by the FDA; however, such approval is not necessary.    Glucose 11/30/2023 76  74 - 99 mg/dL Final    Sodium 11/30/2023 138  136 - 145 mmol/L Final    Potassium 11/30/2023 3.7  3.5 - 5.3 mmol/L Final    Chloride 11/30/2023 100  98 - 107 mmol/L Final    Bicarbonate 11/30/2023 27  21 - 32 mmol/L Final    Anion Gap 11/30/2023 15  10 - 20 mmol/L Final    Urea Nitrogen 11/30/2023 34 (H)  6 - 23 mg/dL  Final    Creatinine 11/30/2023 9.02 (H)  0.50 - 1.05 mg/dL Final    eGFR 11/30/2023 5 (L)  >60 mL/min/1.73m*2 Final    Calculations of estimated GFR are performed using the 2021 CKD-EPI Study Refit equation without the race variable for the IDMS-Traceable creatinine methods.  https://jasn.asnjournals.org/content/early/2021/09/22/ASN.1532579192    Calcium 11/30/2023 8.9  8.6 - 10.6 mg/dL Final    Ventricular Rate 11/30/2023 75  BPM Final    Atrial Rate 11/30/2023 75  BPM Final    MD Interval 11/30/2023 136  ms Final    QRS Duration 11/30/2023 90  ms Final    QT Interval 11/30/2023 422  ms Final    QTC Calculation(Bazett) 11/30/2023 471  ms Final    P Axis 11/30/2023 50  degrees Final    R Axis 11/30/2023 74  degrees Final    T Axis 11/30/2023 0  degrees Final    QRS Count 11/30/2023 12  beats Final    Q Onset 11/30/2023 217  ms Final    P Onset 11/30/2023 149  ms Final    P Offset 11/30/2023 196  ms Final    T Offset 11/30/2023 428  ms Final    QTC Fredericia 11/30/2023 454  ms Final    Extra Tube 11/30/2023 Hold for add-ons.   Final    Auto resulted.    Extra Tube 11/30/2023 Hold for add-ons.   Preliminary    Auto resulted.    Extra Tube 11/30/2023 Hold for add-ons.   Preliminary    Auto resulted.    Extra Tube 11/30/2023 Hold for add-ons.   Preliminary    Auto resulted.    Extra Tube 11/30/2023 Hold for add-ons.   Preliminary    Auto resulted.    HCG, Beta-Quantitative 11/30/2023 <3  <5 mIU/mL Final   Lab Requisition on 11/01/2023   Component Date Value Ref Range Status    HLA Results 11/01/2023 See Flow Allocrossmatch Report   Final   Lab Requisition on 11/03/2023   Component Date Value Ref Range Status    Freeze Crossmatch 11/03/2023 Frozen Sample   Final        No intake or output data in the 24 hours ending 11/30/23 1316     Electrocardiogram, 12-lead PRN ACS symptoms  Normal sinus rhythm  Nonspecific T wave abnormality  Prolonged QT  Abnormal ECG  When compared with ECG of 30-NOV-2023 06:57,  No significant  change was found  Confirmed by Jasper Ferguson ([id number]) on 11/30/2023 10:28:26 AM  XR chest 1 view  Narrative: Interpreted By:  Fuad Quesada and Hofer Lindsay   STUDY:  XR CHEST 1 VIEW;  11/30/2023 7:41 am      INDICATION:  Signs/Symptoms:pre-procedure.      COMPARISON:  Chest radiograph 06/08/2023      ACCESSION NUMBER(S):  [id number]      ORDERING CLINICIAN:  JULIA AUGUSTINE      FINDINGS:  AP radiograph of the chest was provided.      MEDICAL DEVICES:  None      CARDIOMEDIASTINAL SILHOUETTE:  Cardiomediastinal silhouette is normal in size and configuration.      LUNGS:  No pulmonary consolidations. No pleural effusions. No visualized  pneumothorax.      ABDOMEN:  No remarkable upper abdominal findings.      BONES:  No acute osseous changes.      Impression: 1.  No evidence of acute cardiopulmonary process.      I personally reviewed the images/study and resident's interpretation  and I agree with the findings as stated by Kiara Caballero MD (resident  radiologist). This study was analyzed and interpreted at [place], [place].      MACRO:  None      Signed by: Fuad Quesada 11/30/2023 9:04 AM  Dictation workstation:   [id number]          Assessment/Plan   Herber Leblanc is a 43 y.o. female with PMHX of ESRD 2/2 HTN and chronic NSAID use is presenting for KT. Nephrology following for management of dialysis if needed post transplant in addition to immunosuppression.     Kidney info: KDPI :56%,PRA :48%,6 antigen mismatch ,CIT : 17 hrs ,DCD ,CMV D+/R+,EBV D+/R+,HCV negative .    Recommendations:  - labs reviewed, no indications for RRT   - will follow post-op with transplant surgery team ,Induction :Thymo  - will follow for dialysis needs if indicated post transplant     Lexii Nielsen DO

## 2023-11-30 NOTE — TELEPHONE ENCOUNTER
UNOS ID:  [id number]  MATCH ID:  [id number]  ORGAN:    KDPI (if applicable):  KNOWN RISK STATUS:   LOCAL VS IMPORT:   HCV (if applicable):  HepBcAb (if applicable):    Confirm the following:  Any COVID symptoms (nausea, vomiting, diarrhea, fever, chills, cough, sore throat, headache): YES-DESCRIBE/NO: No  Any contact with anyone positive for or suspected to have COVID: YES-DESCRIBE/NO: No  Any recent hospitalizations: YES-DESCRIBE/NO: No  Any recent illnesses: YES-DESCRIBE/NO: No  Any recent injuries (cracked teeth, broken bones, wounds that won’t heal): YES-DESCRIBE/NO: No  Any antibiotics: YES-DESCRIBE/NO: No  Any blood thinners: YES-DESCRIBE/NO: No  Any blood transfusions: YES-DESCRIBE/NO: No  When was last dialysis/type: YES/NA/NO: Yes  hemodialysis    The last time they ate or drank anythin/29 1900  Ask the surgeon whether or not the patient should be made NPO at this time.  It is not necessary for the patient to bring anything with them other than a current medication list and insurance information  Determine ETA to hospital: 0630  Patient aware of the possibility of a dry-run.

## 2023-11-30 NOTE — ANESTHESIA PROCEDURE NOTES
Peripheral IV  Date/Time: 11/30/2023 3:10 PM      Placement  Needle size: 18 G  Laterality: right  Location: wrist  Local anesthetic: none  Site prep: chlorhexidine  Technique: anatomical landmarks  Attempts: 1

## 2023-11-30 NOTE — ANESTHESIA PREPROCEDURE EVALUATION
Patient: Herber Leblanc    Procedure Information       Date/Time: 11/30/23 1300    Procedure: Transplant Kidney (Abdomen)    Location: [facility] OR 17 / [facility] OR    Surgeons: Magi Lambert MD            Relevant Problems   Anesthesia (within normal limits)  Denies family history of malignant hyperthermia, denies personal history of any issues with anesthesia       Cardiovascular   (+) HTN (hypertension)      Endocrine (within normal limits)      GI   (+) Gastroesophageal reflux disease      /Renal   (+) ESRD (end stage renal disease) (CMS/HCC)      Neuro/Psych (within normal limits)      Pulmonary (within normal limits)      GI/Hepatic (within normal limits)      Hematology (within normal limits)      Musculoskeletal (within normal limits)       Clinical information reviewed:   Tobacco  Allergies  Meds  Problems  Med Hx  Surg Hx   Fam Hx  Soc   Hx        NPO Detail:  NPO/Void Status  Date of Last Liquid: 11/30/23  Time of Last Liquid: 0800 (sips with meds)  Date of Last Solid: 11/29/23  Time of Last Solid: 0000         Physical Exam    Airway  Mallampati: III  TM distance: >3 FB  Neck ROM: full     Cardiovascular    Dental    Pulmonary    Abdominal      Other findings: Nasal piercing left side of nose, unable to remove           Anesthesia Plan    ASA 3     general     The patient is not a current smoker.    intravenous induction   Postoperative administration of opioids is intended.  Trial extubation is planned.  Anesthetic plan and risks discussed with patient, spouse and sibling.  Use of blood products discussed with patient, spouse and sibling who consented to blood products.    Plan discussed with resident and attending.    History and anesthesia preoperative discussed with patient and her  and brother at bedside. Per the patient's family and discussion with team, [facility] dialect not easily translated by Shaji and family agrees to translate. Brother served as , patient  agreed to this. Discussed and questions answered. Agree to proceed.

## 2023-11-30 NOTE — TELEPHONE ENCOUNTER
Received call from Francy Mcmillan coordinator asking for admission orders, placed orders called transfer center spoke to Jose confirmed order was received. Jose will update on bed with direct call.

## 2023-12-01 ENCOUNTER — APPOINTMENT (OUTPATIENT)
Dept: RADIOLOGY | Facility: HOSPITAL | Age: 43
DRG: 650 | End: 2023-12-01
Payer: COMMERCIAL

## 2023-12-01 ENCOUNTER — APPOINTMENT (OUTPATIENT)
Dept: DIALYSIS | Facility: HOSPITAL | Age: 43
End: 2023-12-01
Payer: COMMERCIAL

## 2023-12-01 ENCOUNTER — DOCUMENTATION (OUTPATIENT)
Dept: TRANSPLANT | Facility: HOSPITAL | Age: 43
End: 2023-12-01
Payer: COMMERCIAL

## 2023-12-01 ENCOUNTER — SPECIALTY PHARMACY (OUTPATIENT)
Dept: PHARMACY | Facility: CLINIC | Age: 43
End: 2023-12-01

## 2023-12-01 LAB
ALBUMIN SERPL BCP-MCNC: 3 G/DL (ref 3.4–5)
ANION GAP SERPL CALC-SCNC: 20 MMOL/L (ref 10–20)
BASOPHILS # BLD AUTO: 0.02 X10*3/UL (ref 0–0.1)
BASOPHILS NFR BLD AUTO: 0.1 %
BUN SERPL-MCNC: 43 MG/DL (ref 6–23)
CALCIUM SERPL-MCNC: 7.4 MG/DL (ref 8.6–10.6)
CHLORIDE SERPL-SCNC: 99 MMOL/L (ref 98–107)
CO2 SERPL-SCNC: 20 MMOL/L (ref 21–32)
CREAT SERPL-MCNC: 10.07 MG/DL (ref 0.5–1.05)
EOSINOPHIL # BLD AUTO: 0.01 X10*3/UL (ref 0–0.7)
EOSINOPHIL NFR BLD AUTO: 0.1 %
ERYTHROCYTE [DISTWIDTH] IN BLOOD BY AUTOMATED COUNT: 12.7 % (ref 11.5–14.5)
FLOW ALLOCROSSMATCH: NORMAL
GFR SERPL CREATININE-BSD FRML MDRD: 4 ML/MIN/1.73M*2
GLUCOSE SERPL-MCNC: 123 MG/DL (ref 74–99)
HCT VFR BLD AUTO: 30.6 % (ref 36–46)
HCV RNA SERPL NAA+PROBE-ACNC: NOT DETECTED K[IU]/ML
HCV RNA SERPL NAA+PROBE-LOG IU: NORMAL {LOG_IU}/ML
HGB BLD-MCNC: 10.1 G/DL (ref 12–16)
HLA RESULTS: NORMAL
IMM GRANULOCYTES # BLD AUTO: 0.11 X10*3/UL (ref 0–0.7)
IMM GRANULOCYTES NFR BLD AUTO: 0.6 % (ref 0–0.9)
LACTATE BLDV-SCNC: 4.8 MMOL/L (ref 0.4–2)
LACTATE SERPL-SCNC: 3.3 MMOL/L (ref 0.4–2)
LACTATE SERPL-SCNC: 3.5 MMOL/L (ref 0.4–2)
LACTATE SERPL-SCNC: 3.9 MMOL/L (ref 0.4–2)
LACTATE SERPL-SCNC: 4 MMOL/L (ref 0.4–2)
LYMPHOCYTES # BLD AUTO: 0.03 X10*3/UL (ref 1.2–4.8)
LYMPHOCYTES NFR BLD AUTO: 0.2 %
MAGNESIUM SERPL-MCNC: 1.93 MG/DL (ref 1.6–2.4)
MCH RBC QN AUTO: 32.1 PG (ref 26–34)
MCHC RBC AUTO-ENTMCNC: 33 G/DL (ref 32–36)
MCV RBC AUTO: 97 FL (ref 80–100)
MONOCYTES # BLD AUTO: 0.64 X10*3/UL (ref 0.1–1)
MONOCYTES NFR BLD AUTO: 3.7 %
NEUTROPHILS # BLD AUTO: 16.63 X10*3/UL (ref 1.2–7.7)
NEUTROPHILS NFR BLD AUTO: 95.3 %
NRBC BLD-RTO: 0 /100 WBCS (ref 0–0)
PHOSPHATE SERPL-MCNC: 3.5 MG/DL (ref 2.5–4.9)
PLATELET # BLD AUTO: 128 X10*3/UL (ref 150–450)
POTASSIUM SERPL-SCNC: 5.6 MMOL/L (ref 3.5–5.3)
RBC # BLD AUTO: 3.15 X10*6/UL (ref 4–5.2)
SODIUM SERPL-SCNC: 133 MMOL/L (ref 136–145)
WBC # BLD AUTO: 17.4 X10*3/UL (ref 4.4–11.3)

## 2023-12-01 PROCEDURE — 2500000001 HC RX 250 WO HCPCS SELF ADMINISTERED DRUGS (ALT 637 FOR MEDICARE OP): Performed by: STUDENT IN AN ORGANIZED HEALTH CARE EDUCATION/TRAINING PROGRAM

## 2023-12-01 PROCEDURE — 80069 RENAL FUNCTION PANEL: CPT | Performed by: STUDENT IN AN ORGANIZED HEALTH CARE EDUCATION/TRAINING PROGRAM

## 2023-12-01 PROCEDURE — 99233 SBSQ HOSP IP/OBS HIGH 50: CPT | Performed by: HOSPITALIST

## 2023-12-01 PROCEDURE — 85025 COMPLETE CBC W/AUTO DIFF WBC: CPT | Performed by: STUDENT IN AN ORGANIZED HEALTH CARE EDUCATION/TRAINING PROGRAM

## 2023-12-01 PROCEDURE — 76776 US EXAM K TRANSPL W/DOPPLER: CPT | Performed by: RADIOLOGY

## 2023-12-01 PROCEDURE — 8010000001 HC DIALYSIS - HEMODIALYSIS PER DAY

## 2023-12-01 PROCEDURE — 83605 ASSAY OF LACTIC ACID: CPT

## 2023-12-01 PROCEDURE — 36415 COLL VENOUS BLD VENIPUNCTURE: CPT

## 2023-12-01 PROCEDURE — 2500000004 HC RX 250 GENERAL PHARMACY W/ HCPCS (ALT 636 FOR OP/ED)

## 2023-12-01 PROCEDURE — 76776 US EXAM K TRANSPL W/DOPPLER: CPT

## 2023-12-01 PROCEDURE — 83735 ASSAY OF MAGNESIUM: CPT | Performed by: STUDENT IN AN ORGANIZED HEALTH CARE EDUCATION/TRAINING PROGRAM

## 2023-12-01 PROCEDURE — 1200000002 HC GENERAL ROOM WITH TELEMETRY DAILY

## 2023-12-01 PROCEDURE — 2500000004 HC RX 250 GENERAL PHARMACY W/ HCPCS (ALT 636 FOR OP/ED): Performed by: STUDENT IN AN ORGANIZED HEALTH CARE EDUCATION/TRAINING PROGRAM

## 2023-12-01 RX ORDER — TACROLIMUS 1 MG/1
1 CAPSULE ORAL
Status: DISCONTINUED | OUTPATIENT
Start: 2023-12-01 | End: 2023-12-03

## 2023-12-01 RX ORDER — CALCIUM GLUCONATE 20 MG/ML
2 INJECTION, SOLUTION INTRAVENOUS ONCE
Status: DISCONTINUED | OUTPATIENT
Start: 2023-12-01 | End: 2023-12-02

## 2023-12-01 RX ORDER — SODIUM BICARBONATE 650 MG/1
650 TABLET ORAL 3 TIMES DAILY
Status: DISCONTINUED | OUTPATIENT
Start: 2023-12-01 | End: 2023-12-07

## 2023-12-01 RX ORDER — POLYETHYLENE GLYCOL 3350 17 G/17G
17 POWDER, FOR SOLUTION ORAL DAILY PRN
Status: DISCONTINUED | OUTPATIENT
Start: 2023-12-01 | End: 2023-12-02

## 2023-12-01 RX ORDER — FUROSEMIDE 10 MG/ML
80 INJECTION INTRAMUSCULAR; INTRAVENOUS EVERY 8 HOURS SCHEDULED
Status: DISCONTINUED | OUTPATIENT
Start: 2023-12-01 | End: 2023-12-01

## 2023-12-01 RX ORDER — PREDNISONE 10 MG/1
20 TABLET ORAL DAILY
Status: DISCONTINUED | OUTPATIENT
Start: 2023-12-04 | End: 2023-12-13

## 2023-12-01 RX ORDER — PANTOPRAZOLE SODIUM 40 MG/1
40 TABLET, DELAYED RELEASE ORAL
Status: DISCONTINUED | OUTPATIENT
Start: 2023-12-01 | End: 2023-12-14 | Stop reason: HOSPADM

## 2023-12-01 RX ORDER — SULFAMETHOXAZOLE AND TRIMETHOPRIM 400; 80 MG/1; MG/1
80 TABLET ORAL DAILY
Status: DISCONTINUED | OUTPATIENT
Start: 2023-12-01 | End: 2023-12-14 | Stop reason: HOSPADM

## 2023-12-01 RX ORDER — CEFAZOLIN SODIUM 2 G/100ML
2 INJECTION, SOLUTION INTRAVENOUS EVERY 8 HOURS
Status: COMPLETED | OUTPATIENT
Start: 2023-12-01 | End: 2023-12-01

## 2023-12-01 RX ORDER — DEXTROSE MONOHYDRATE 100 MG/ML
50 INJECTION, SOLUTION INTRAVENOUS CONTINUOUS
Status: DISCONTINUED | OUTPATIENT
Start: 2023-12-01 | End: 2023-12-01

## 2023-12-01 RX ORDER — HYDROMORPHONE HYDROCHLORIDE 1 MG/ML
0.2 INJECTION, SOLUTION INTRAMUSCULAR; INTRAVENOUS; SUBCUTANEOUS
Status: DISCONTINUED | OUTPATIENT
Start: 2023-12-01 | End: 2023-12-02

## 2023-12-01 RX ORDER — CLOTRIMAZOLE 10 MG/1
10 LOZENGE ORAL; TOPICAL
Status: DISCONTINUED | OUTPATIENT
Start: 2023-12-01 | End: 2023-12-14 | Stop reason: HOSPADM

## 2023-12-01 RX ORDER — VALGANCICLOVIR 450 MG/1
450 TABLET, FILM COATED ORAL
Status: DISCONTINUED | OUTPATIENT
Start: 2023-12-01 | End: 2023-12-14 | Stop reason: HOSPADM

## 2023-12-01 RX ORDER — OXYCODONE HYDROCHLORIDE 5 MG/1
5 TABLET ORAL EVERY 4 HOURS PRN
Status: DISCONTINUED | OUTPATIENT
Start: 2023-12-01 | End: 2023-12-06

## 2023-12-01 RX ORDER — ACETAMINOPHEN 325 MG/1
650 TABLET ORAL EVERY 6 HOURS PRN
Status: DISCONTINUED | OUTPATIENT
Start: 2023-12-02 | End: 2023-12-04 | Stop reason: SDUPTHER

## 2023-12-01 RX ORDER — OXYCODONE HYDROCHLORIDE 5 MG/1
10 TABLET ORAL EVERY 4 HOURS PRN
Status: DISCONTINUED | OUTPATIENT
Start: 2023-12-01 | End: 2023-12-06

## 2023-12-01 RX ORDER — DEXTROSE 50 % IN WATER (D50W) INTRAVENOUS SYRINGE
25 ONCE
Status: DISCONTINUED | OUTPATIENT
Start: 2023-12-01 | End: 2023-12-04

## 2023-12-01 RX ORDER — ACETAMINOPHEN 325 MG/1
650 TABLET ORAL EVERY 6 HOURS SCHEDULED
Status: DISPENSED | OUTPATIENT
Start: 2023-12-01 | End: 2023-12-02

## 2023-12-01 RX ORDER — MYCOPHENOLATE MOFETIL 250 MG/1
1000 CAPSULE ORAL 2 TIMES DAILY
Status: DISCONTINUED | OUTPATIENT
Start: 2023-12-01 | End: 2023-12-14 | Stop reason: HOSPADM

## 2023-12-01 RX ORDER — NALOXONE HYDROCHLORIDE 0.4 MG/ML
0.2 INJECTION, SOLUTION INTRAMUSCULAR; INTRAVENOUS; SUBCUTANEOUS EVERY 5 MIN PRN
Status: DISCONTINUED | OUTPATIENT
Start: 2023-12-01 | End: 2023-12-14 | Stop reason: HOSPADM

## 2023-12-01 RX ORDER — ONDANSETRON HYDROCHLORIDE 2 MG/ML
4 INJECTION, SOLUTION INTRAVENOUS EVERY 8 HOURS PRN
Status: DISCONTINUED | OUTPATIENT
Start: 2023-12-01 | End: 2023-12-14 | Stop reason: HOSPADM

## 2023-12-01 RX ADMIN — ACETAMINOPHEN 650 MG: 325 TABLET ORAL at 17:53

## 2023-12-01 RX ADMIN — OXYCODONE HYDROCHLORIDE 10 MG: 5 TABLET ORAL at 10:32

## 2023-12-01 RX ADMIN — PANTOPRAZOLE SODIUM 40 MG: 40 TABLET, DELAYED RELEASE ORAL at 06:41

## 2023-12-01 RX ADMIN — SODIUM BICARBONATE 650 MG TABLET 650 MG: at 10:35

## 2023-12-01 RX ADMIN — SODIUM BICARBONATE 650 MG TABLET 650 MG: at 21:13

## 2023-12-01 RX ADMIN — SODIUM CHLORIDE 500 ML: 9 INJECTION, SOLUTION INTRAVENOUS at 03:45

## 2023-12-01 RX ADMIN — OXYCODONE HYDROCHLORIDE 10 MG: 5 TABLET ORAL at 15:57

## 2023-12-01 RX ADMIN — TENOFOVIR ALAFENAMIDE 25 MG: 25 TABLET ORAL at 15:57

## 2023-12-01 RX ADMIN — LEVOTHYROXINE SODIUM 25 MCG: 50 TABLET ORAL at 08:18

## 2023-12-01 RX ADMIN — MIRTAZAPINE 7.5 MG: 7.5 TABLET, FILM COATED ORAL at 21:13

## 2023-12-01 RX ADMIN — ACETAMINOPHEN 650 MG: 325 TABLET ORAL at 01:27

## 2023-12-01 RX ADMIN — OXYCODONE HYDROCHLORIDE 10 MG: 5 TABLET ORAL at 21:13

## 2023-12-01 RX ADMIN — HYDROMORPHONE HYDROCHLORIDE 0.2 MG: 1 INJECTION, SOLUTION INTRAMUSCULAR; INTRAVENOUS; SUBCUTANEOUS at 08:18

## 2023-12-01 RX ADMIN — FUROSEMIDE 80 MG: 10 INJECTION, SOLUTION INTRAVENOUS at 01:26

## 2023-12-01 RX ADMIN — SODIUM CHLORIDE 60 ML/HR: 4.5 INJECTION, SOLUTION INTRAVENOUS at 10:47

## 2023-12-01 RX ADMIN — ACETAMINOPHEN 650 MG: 325 TABLET ORAL at 08:18

## 2023-12-01 RX ADMIN — PANTOPRAZOLE SODIUM 40 MG: 40 TABLET, DELAYED RELEASE ORAL at 15:57

## 2023-12-01 RX ADMIN — CEFAZOLIN SODIUM 2 G: 2 INJECTION, SOLUTION INTRAVENOUS at 01:27

## 2023-12-01 RX ADMIN — DEXTROSE MONOHYDRATE 250 MG: 50 INJECTION, SOLUTION INTRAVENOUS at 10:32

## 2023-12-01 RX ADMIN — FUROSEMIDE 80 MG: 10 INJECTION, SOLUTION INTRAVENOUS at 08:18

## 2023-12-01 RX ADMIN — CLOTRIMAZOLE 10 MG: 10 LOZENGE ORAL; TOPICAL at 17:53

## 2023-12-01 RX ADMIN — MYCOPHENOLATE MOFETIL 1000 MG: 250 CAPSULE ORAL at 17:53

## 2023-12-01 RX ADMIN — TACROLIMUS 1 MG: 1 CAPSULE ORAL at 17:53

## 2023-12-01 RX ADMIN — HYDROMORPHONE HYDROCHLORIDE 0.2 MG: 1 INJECTION, SOLUTION INTRAMUSCULAR; INTRAVENOUS; SUBCUTANEOUS at 01:27

## 2023-12-01 RX ADMIN — CEFAZOLIN SODIUM 2 G: 2 INJECTION, SOLUTION INTRAVENOUS at 08:18

## 2023-12-01 RX ADMIN — SULFAMETHOXAZOLE AND TRIMETHOPRIM 80 MG: 400; 80 TABLET ORAL at 10:32

## 2023-12-01 RX ADMIN — SODIUM BICARBONATE 650 MG TABLET 650 MG: at 15:58

## 2023-12-01 RX ADMIN — VALGANCICLOVIR HYDROCHLORIDE 450 MG: 450 TABLET ORAL at 10:32

## 2023-12-01 RX ADMIN — SODIUM ZIRCONIUM CYCLOSILICATE 10 G: 10 POWDER, FOR SUSPENSION ORAL at 10:32

## 2023-12-01 RX ADMIN — CEFAZOLIN SODIUM 2 G: 2 INJECTION, SOLUTION INTRAVENOUS at 15:57

## 2023-12-01 RX ADMIN — CLOTRIMAZOLE 10 MG: 10 LOZENGE ORAL; TOPICAL at 08:18

## 2023-12-01 RX ADMIN — OXYCODONE HYDROCHLORIDE 10 MG: 5 TABLET ORAL at 06:41

## 2023-12-01 ASSESSMENT — COGNITIVE AND FUNCTIONAL STATUS - GENERAL
DRESSING REGULAR LOWER BODY CLOTHING: A LITTLE
MOBILITY SCORE: 18
CLIMB 3 TO 5 STEPS WITH RAILING: A LITTLE
TURNING FROM BACK TO SIDE WHILE IN FLAT BAD: A LITTLE
MOBILITY SCORE: 18
MOBILITY SCORE: 18
TOILETING: A LITTLE
CLIMB 3 TO 5 STEPS WITH RAILING: A LITTLE
MOVING TO AND FROM BED TO CHAIR: A LITTLE
HELP NEEDED FOR BATHING: A LITTLE
MOVING TO AND FROM BED TO CHAIR: A LITTLE
MOVING FROM LYING ON BACK TO SITTING ON SIDE OF FLAT BED WITH BEDRAILS: A LITTLE
DRESSING REGULAR UPPER BODY CLOTHING: A LITTLE
MOVING FROM LYING ON BACK TO SITTING ON SIDE OF FLAT BED WITH BEDRAILS: A LITTLE
DRESSING REGULAR LOWER BODY CLOTHING: A LITTLE
HELP NEEDED FOR BATHING: A LITTLE
MOVING FROM LYING ON BACK TO SITTING ON SIDE OF FLAT BED WITH BEDRAILS: A LITTLE
TOILETING: A LITTLE
WALKING IN HOSPITAL ROOM: A LITTLE
DAILY ACTIVITIY SCORE: 20
MOVING TO AND FROM BED TO CHAIR: A LITTLE
HELP NEEDED FOR BATHING: A LITTLE
STANDING UP FROM CHAIR USING ARMS: A LITTLE
CLIMB 3 TO 5 STEPS WITH RAILING: A LITTLE
DAILY ACTIVITIY SCORE: 20
TOILETING: A LITTLE
STANDING UP FROM CHAIR USING ARMS: A LITTLE
DRESSING REGULAR UPPER BODY CLOTHING: A LITTLE
DRESSING REGULAR LOWER BODY CLOTHING: A LITTLE
WALKING IN HOSPITAL ROOM: A LITTLE
WALKING IN HOSPITAL ROOM: A LITTLE
STANDING UP FROM CHAIR USING ARMS: A LITTLE
DRESSING REGULAR UPPER BODY CLOTHING: A LITTLE
DAILY ACTIVITIY SCORE: 20

## 2023-12-01 ASSESSMENT — PAIN SCALES - WONG BAKER: WONGBAKER_NUMERICALRESPONSE: HURTS WHOLE LOT

## 2023-12-01 ASSESSMENT — PAIN DESCRIPTION - LOCATION
LOCATION: ABDOMEN

## 2023-12-01 ASSESSMENT — PAIN SCALES - GENERAL
PAINLEVEL_OUTOF10: 8
PAINLEVEL_OUTOF10: 0 - NO PAIN
PAINLEVEL_OUTOF10: 6
PAINLEVEL_OUTOF10: 10 - WORST POSSIBLE PAIN
PAINLEVEL_OUTOF10: 4
PAINLEVEL_OUTOF10: 7
PAINLEVEL_OUTOF10: 10 - WORST POSSIBLE PAIN

## 2023-12-01 ASSESSMENT — PAIN - FUNCTIONAL ASSESSMENT
PAIN_FUNCTIONAL_ASSESSMENT: NO/DENIES PAIN
PAIN_FUNCTIONAL_ASSESSMENT: 0-10
PAIN_FUNCTIONAL_ASSESSMENT: 0-10
PAIN_FUNCTIONAL_ASSESSMENT: WONG-BAKER FACES
PAIN_FUNCTIONAL_ASSESSMENT: 0-10

## 2023-12-01 ASSESSMENT — PAIN DESCRIPTION - ORIENTATION: ORIENTATION: RIGHT

## 2023-12-01 NOTE — PROGRESS NOTES
Physical Therapy                 Therapy Communication Note    Patient Name: Herber Leblanc  MRN: [id number]  Today's Date: 12/1/2023     Discipline: Physical Therapy    Missed Visit Reason: Missed Visit Reason: Other (Comment)    Missed Time: Attempt    Comment:  PT eval attempted; pt off floor at 9:31 and 12:08pm. Will follow and re-attempt as schedule permits.

## 2023-12-01 NOTE — CARE PLAN
The patient's goals for the shift include      The clinical goals for the shift include pain mgt, sufficient urint output

## 2023-12-01 NOTE — PROGRESS NOTES
Pharmacist Post-Transplant Note    The Clinical Transplant Pharmacist is aware that Herber Leblanc  has been admitted and has undergone kidney transplantation. A transplant pharmacist will be rounding with the multidisciplinary transplant team and making recommendations on her medication regimen.     The patient's medications have been reviewed in conjunction with the multidisciplinary transplant team. The pharmacist is in agreement with the plan of care for medications at this time.    Patient and donor factors were screened to determine the appropriate post-transplant protocol and current immunosuppression plan includes:    Induction Regimen:  Antithymocyte globulin    Maintenance Regimen:  Standard maintenance immunosuppression with tacrolimus, mycophenolate, and a steroid taper.     Maintenance immunosuppression and anti-infective prophylaxis will begin according to protocol. Home medications will begin when the patient is clinically stable.     Of note, CMV D+/R+ so patient will require 3 months of CMV prophylaxis with valganciclovir per protocol.    Alice Rinaldi, PharmD, BCTXP  Clinical Pharmacy Specialist - Solid Organ Transplant

## 2023-12-01 NOTE — BRIEF OP NOTE
Date: 2023  OR Location: [facility] OR    Name: Herber Leblanc, : 1980, Age: 43 y.o., MRN: [id number], Sex: female    Diagnosis  Pre-op Diagnosis     * ESRD (end stage renal disease) (CMS/[facility]) [N18.6] Post-op Diagnosis     * ESRD (end stage renal disease) (CMS/[facility]ly [facility]) [N18.6]     Procedures  Transplant Kidney   - [place] SOLID ORGAN TRANSPL PKG      Surgeons      * Magi Lambert - Primary     * Anita Louis    Resident/Fellow/Other Assistant:  Surgeon(s) and Role:     * MD Kristine Hitchcock MD.    Procedure Summary  Anesthesia: General  ASA: III  Anesthesia Staff: Anesthesiologist: Mariel Ramires MD; Zafar Clement MD  Anesthesia Resident: Naveen Jose MD  Estimated Blood Loss: 100mL  Intra-op Medications:   Medication Name Total Dose   anti-thymocyte globulin rabbit (Thymoglobulin) 100 mg, hydrocortisone sod succ (PF) (Solu-CORTEF) 20 mg, heparin 1,000 Units in sodium chloride 0.9% 500 mL  mg              Anesthesia Record               Intraprocedure I/O Totals          Intake    mannitol 20 % 125.00 mL    Propofol Drip 0.00 mL    The total shown is the total volume documented since Anesthesia Start was filed.    anti-thymocyte globulin rabbit (Thymoglobulin) 100 mg, hydrocortisone sod succ (PF) (Solu-CORTEF) 20 mg, heparin 1,000 Units in sodium chloride 0.9% 500 mL .00 mL    Total Intake 625 mL       Output    Est. Blood Loss 100 mL    Total Output 100 mL       Net    Net Volume 525 mL          Specimen: No specimens collected     Staff:   Circulator: Lazara Fox RN  Relief Circulator: Candi Mixon RN; Viki Merchant RN; Kit Smart, OCTAVIO; Haseeb Covarrubias RN  Relief Scrub: Twin Buckley RN; Lee Ann Oh  Scrub Person: Yanci Bunn          Findings: left kidney DDKT, 19F gregorio drain placed.    Complications:  None; patient tolerated the procedure well.     Disposition: PACU - hemodynamically stable.  Condition: stable  Specimens Collected: No specimens  collected    Kristine Rodriguez MD 7:06 PM 11/30/23

## 2023-12-01 NOTE — ANESTHESIA POSTPROCEDURE EVALUATION
Patient: Herber Leblanc    Procedure Summary       Date: 11/30/23 Room / Location: [facility] OR 17 / Virtual [facility] [place] [facility]    Anesthesia Start: 1448 Anesthesia Stop: 1919    Procedure: Transplant Kidney (Abdomen) Diagnosis:       ESRD (end stage renal disease) (CMS/[facility])      (ESRD (end stage renal disease) (CMS/[facility]) [N18.6])    Surgeons: Magi Lambert MD Responsible Provider: Zafar Clement MD    Anesthesia Type: general ASA Status: 3            Anesthesia Type: general    Vitals Value Taken Time   /80 11/30/23 1919   Temp 36 11/30/23 1919   Pulse 75 11/30/23 1917   Resp 18 11/30/23 1917   SpO2 100 % 11/30/23 1917   Vitals shown include unvalidated device data.    Anesthesia Post Evaluation    Patient location during evaluation: floor  Patient participation: complete - patient participated  Level of consciousness: awake and alert  Pain score: 2  Pain management: adequate  Airway patency: patent  Cardiovascular status: acceptable  Respiratory status: acceptable  Hydration status: acceptable  Postoperative Nausea and Vomiting: none        No notable events documented.

## 2023-12-01 NOTE — PROGRESS NOTES
Transplant event completed in Epic on 12/01/2023.  Verified serologies in Epic with UNET data.  Life[employer] form submitted.

## 2023-12-01 NOTE — SIGNIFICANT EVENT
POST OP CHECK NOTE    Ms. Herber Leblanc is  a 43 y.o. female now POD 0 s/p DDKT for ESRD 2/2 HTN vs Chronic NSAID use. She tolerated the procedure well. Pain is well controlled on current pain regimen. No complaints of headaches, N/V, chest pain, SOB. She states that she is waiting for when she can have something to drink.    Drains : x1 RUSSELL in Right LQ, with serosang output    Curran: with minimal clear yellow urine      Objective  Vitals:    12/01/23 0016   BP: 114/72   Pulse: 88   Resp: 16   Temp: 36.2 °C (97.2 °F)   SpO2: 96%       Physical Exam:  General: laying bed, in NAD  HEENT: MMM, PERRL, iEOM, NC 2L  CV: RRR, capillary refill <2  Resp: breathing comfortably on NC  Abdomen/GI: R LQ cortez incision covered with woven guaze pressure dressing with scant serosang strikethrough. RUSSELL with serosang output. Soft, appropriately tender, midly distended  : curran with clear yellow urine  MSK: strength 5/5  Neuro: Aox3, no focal deficits, CN 1-12     I/O this shift:  In: 2270 (30.3 mL/kg) [I.V.:2270 (30.3 mL/kg)]  Out: 85 (1.1 mL/kg) [Urine:35; Drains:50]  Weight: 74.8 kg        Lab Results   Component Value Date    WBC 5.7 11/30/2023    HGB 14.4 11/30/2023    HCT 43.5 11/30/2023    MCV 98 11/30/2023    PLT 91 (L) 11/30/2023     Lab Results   Component Value Date    GLUCOSE 130 (H) 11/30/2023    CALCIUM 8.4 (L) 11/30/2023     (L) 11/30/2023    K 4.3 11/30/2023    CO2 22 11/30/2023    CL 97 (L) 11/30/2023    BUN 35 (H) 11/30/2023    CREATININE 9.46 (H) 11/30/2023      Assessment/Plan:  Ms. Herber Leblanc is  a 43 y.o. female now POD 0 s/p DDKT for ESRD 2/2 HTN vs Chronic NSAID use. She are recovering well.  Her pain is currently well controlled, she is making 5-10 cc    -pain control with tylenol, oxy, dilaudid  -Q4 vitals checks, continue on tele  -maintain SpO2 >90%  -Wean supplemental O2 as tolerated  -NPO with sips for meds, CLD in the AM  -morning labs : CBC , BMP  -Curran will remain in until POD5  -strict I/Os  -DVT Ppx:  lovenox subQ + SCDs  -monitor RUSSELL drain output and character for changes     Will continue to monitor  overnight.     Elida Bob MD   PGY1 Abdominal Transplant Surgery  [id number]

## 2023-12-01 NOTE — PROGRESS NOTES
"Mrs. Leblanc received a kidney transplant on 11/30/2023. SW reviewed the pre transplant Social Work evaluation as well as interdisciplinary notes of this admission. Attended transplant interdisciplinary rounds. SW reviewed, participated and is in agreement with MDT Plan of Care.  I met with patient who was awake, alert, oriented and able to discuss their condition.  Functional/Medical Status: SW met with Pt, Pt's  Ajith and Pt's friend, Danuta at the bedside. Pt's friend Danuta interpreted during discussion with SW, as Pt is [place]ian-speaking. Pt was showing signs of being in pain and was very tired. SW spoke mainly with Pt's friend, Danuta. Pt was initially sitting up during discussion with SW, then laid back down.   Dialysis start date: June 2019  Adaption to the Stress of Transplant: Pt's friend Danuta reported the FMLA paperwork for Pt's  was being faxed over to the hospital. SW discussed FMLA process with Pt's friend.   Mental Health/Substance use: Pt reported her mood as \"tired.\" Pt's friend shared that Pt experienced SI in 2019 due to language barrier and her not seeing a future for herself after being diagnosed with kidney disease. Pt's friend shared that she takes Mirtazapine for MH symptoms and she is doing better now and has a beautiful family. SW could not inquire about substance use at this time. Per chart, Pt stopped smoking cigarettes 1 year ago. Per chart, Pt denied any current/past alcohol or illicit drug use.   Post Discharge Supports/Recovery Plan: Pt's friend reported that Pt's , Ajith is Pt's primary support and Danuta, Pt's friend is secondary support.   Benefits: Medicaid [employer]   Impressions: SW met with Pt, Pt's  Ajith and Pt's friend, Danuta at the bedside. Pt's friend Danuta interpreted during discussion with SW, as Pt is [place]ian-speaking. Pt was showing signs of being in pain and was very tired. SW spoke mainly with Pt's friend, Danuta. Pt was initially sitting up during " "discussion with SW, then laid back down. Pt's friend Danuta reported the FMLA paperwork for Pt's  was being faxed over to the hospital. SW discussed FMLA process with Pt's friend. Pt reported her mood as \"tired.\" Pt's friend shared that Pt experienced SI in 2019 due to language barrier and her not seeing a future for herself after being diagnosed with kidney disease. Pt's friend shared that she takes Mirtazapine for MH symptoms and she is doing better now and has a beautiful family. SW could not inquire about substance use at this time. Per chart, Pt stopped smoking cigarettes 1 year ago. Per chart, Pt denied any current/past alcohol or illicit drug use. Pt's friend reported that Pt's , Ajith is Pt's primary support and Danuta, Pt's friend is secondary support. Patient and family/support system are in agreement and report understanding of their treatment plan. They were provided an opportunity to ask questions, though denied any issues or concerns at this time.        PLAN:  We reviewed the importance of having lab work done twice a week or as directed; scheduled post-transplant appointments; reporting alarming symptoms; restrictions of activities and importance of adherence to medical regimen. We also discussed the precautions to take due to being immunosuppressed. Patient indicated understanding of this information along with the discharge plan and instructions. Patient was given the opportunity to discuss any issues. Patient was given social work contact information.    "

## 2023-12-01 NOTE — CONSULTS
Nutrition Diet Education:   Nutrition Assessment    Reason for Assessment: Provider consult order (education for post kidney transplant)    Patient is a 43 y.o. female s/p kidney transplant (11/30)    Nutrition Diet Education  Pt was off the floor, left the patient with the food safety handout packet on top of the red binder in the room. Will follow up if needed.    Time Spent/Follow-up Reminder:   Follow Up  Time Spent (min): 30 minutes  Last Date of Nutrition Visit: 12/01/23  Nutrition Follow-Up Needed?: Dietitian to reassess per policy  Follow up Comment: education, will attempt to follow up if service is able

## 2023-12-01 NOTE — OP NOTE
Transplant Kidney Operative Note     Date: 2023  OR Location: [facility] OR    Name: Herber Leblanc, : 1980, Age: 43 y.o., MRN: [id number], Sex: female    Diagnosis  Pre-op Diagnosis     * ESRD (end stage renal disease) (CMS/[facility]) [N18.6] Post-op Diagnosis     * ESRD (end stage renal disease) (CMS/[facility]) [N18.6]     Procedures   Backbench preparation of  donor renal allograft    Surgeon  Anita Louis MD    Indication for Procedure: The patient was diagnosed with renal failure and was found to be a suitable candidate for kidney transplantation here at [facility]. The patient was allocated an appropriately matched kidney through the OPTN/UNOS allocation system. UNOS ID# was [id number].  After weighing the risks and benefits, the patient agreed to proceed with kidney transplant.     Description of Procedure:   I performed the backbench preparation of the transplant kidney allograft. The donor kidney was a pumped, DCD, left kidney. The donor kidney anatomy included single renal artery, single vein and single ureter. There was extensive, dense adhesive perinephric fat. The renal parenchyma was cleared of this fat at much as possible, without injuring the renal capsule. We then dissected the vessels up toward the hilum. The venous branches were tied with 3-0 and 4-0 silk ties. The artery was dissected circumferentially. The ureter was identified and excess tissue was removed. Of note there was an area of the ureter which had been stripped during the donor surgery. This was close to the inferior pole of the kidney. The kidney was flushed through the artery to ensure no leaking. The kidney was then covered and placed on ice until the recipient was ready for implantation.

## 2023-12-01 NOTE — NURSING NOTE
Report from Sending RN:    Report From: Philip  Recent Surgery of Procedure: Yes  Baseline Level of Consciousness (LOC): A and O x 3  Oxygen Use: No  Type:   Diabetic: No  Last BP Med Given Day of Dialysis: none  Last Pain Med Given: 8 AM Oxy  Lab Tests to be Obtained with Dialysis: No  Blood Transfusion to be Given During Dialysis: No  Available IV Access: Yes  Medications to be Administered During Dialysis: Yes  Continuous IV Infusion Running: Yes  Restraints on Currently or in the Last 24 Hours: N/A  Hand-Off Communication: New Transplant, speaks no english

## 2023-12-01 NOTE — NURSING NOTE
Report to Receiving RN:    Report To: Philip  Time Report Called: [id number]  Hand-Off Communication: Removed 2L of fluid, /79 P94  Complications During Treatment: No  Ultrafiltration Treatment: Yes  Medications Administered During Dialysis: No  Blood Products Administered During Dialysis: No  Labs Sent During Dialysis: No  Heparin Drip Rate Changes: N/A        Last Updated: 3:22 PM by ASHLEY MURRAY

## 2023-12-01 NOTE — PROGRESS NOTES
Occupational Therapy                 Therapy Communication Note    Patient Name: Herber Leblanc  MRN: [id number]  Today's Date: 12/1/2023     Discipline: Occupational Therapy    Missed Visit Reason: Missed Visit Reason:  (1st attempt 9:28 off floor at US; 2nd attempt 14:09 off floor at )    Missed Time: Attempt

## 2023-12-01 NOTE — PROGRESS NOTES
Transplant Nephrology progress note     Date of admission: 11/30/2023     Herber Leblanc is a 43 y.o.  with PMH   Past Medical History:   Diagnosis Date    Chronic kidney disease, unspecified     CKD, patient interested in transplantation    Personal history of COVID-19 07/20/2022    History of COVID-19        SUBJECTIVE: Patient underwent kidney transplant last night, was unable to see this morning in rounds as she went for ultrasound.  Urine output in last 24 hours is around only 72 cc.        PROBLEM LIST:  Principal Problem:    ESRD (end stage renal disease) (CMS/[facility])  Active Problems:    HTN (hypertension)    Gastroesophageal reflux disease         ALLERGIES:  No Known Allergies         CURRENT MEDICATIONS:  Scheduled medications  acetaminophen, 650 mg, oral, q6h JANIE  calcium gluconate in NS, 2 g, intravenous, Once  ceFAZolin, 2 g, intravenous, q8h  clotrimazole, 10 mg, oral, TID after meals  insulin regular, 10 Units, intravenous, Once   Followed by  dextrose, 25 g, intravenous, Once  levothyroxine, 25 mcg, oral, Daily before breakfast  [START ON 12/2/2023] methylPREDNISolone sodium succinate (PF), 125 mg, intravenous, Once  [START ON 12/3/2023] methylPREDNISolone sodium succinate (PF), 60 mg, intravenous, Once  mirtazapine, 7.5 mg, oral, Nightly  mycophenolate, 1,000 mg, oral, BID  pantoprazole, 40 mg, oral, BID AC  [START ON 12/4/2023] predniSONE, 20 mg, oral, Daily  sodium bicarbonate, 650 mg, oral, TID  sodium zirconium cyclosilicate, 10 g, oral, TID  sulfamethoxazole-trimethoprim, 80 mg, oral, Daily  tacrolimus, 1 mg, oral, q12h JANIE  tenofovir alafenamide, 25 mg, oral, Daily  valGANciclovir, 450 mg, oral, q48h      Continuous medications  dextrose 10 % in water (D10W), 50 mL/hr  sodium chloride, 60 mL/hr, Last Rate: 60 mL/hr (12/01/23 1047)  sodium chloride 0.9%, 0-1,000 mL/hr, Last Rate: 7 mL/hr (12/01/23 1025)      PRN medications  PRN medications: [START ON 12/2/2023] acetaminophen, HYDROmorphone, naloxone,  "ondansetron, oxyCODONE, oxyCODONE, oxygen, polyethylene glycol       OBJECTIVE:    VITALS: Visit Vitals  /90 (BP Location: Right arm, Patient Position: Lying)   Pulse 77   Temp 36.5 °C (97.7 °F) (Temporal)   Resp 20   Ht 1.626 m (5' 4\")   Wt 78.2 kg (172 lb 6.4 oz)   SpO2 98%   BMI 29.59 kg/m²   Smoking Status Never   BSA 1.88 m²           LABS:  Results from last 72 hours   Lab Units 12/01/23  0544   WBC AUTO x10*3/uL 17.4*   HEMOGLOBIN g/dL 10.1*   MCV fL 97   PLATELETS AUTO x10*3/uL 128*   BUN mg/dL 43*   CREATININE mg/dL 10.07*   CALCIUM mg/dL 7.4*            Intake/Output Summary (Last 24 hours) at 12/1/2023 1410  Last data filed at 12/1/2023 1330  Gross per 24 hour   Intake 4697.5 ml   Output 349.5 ml   Net 4348 ml          ASSESSMENT AND PLAN:    Herber Leblanc is a 43 y.o. with a history of ESRD secondary to hypertension and chronic NSAID use s/p desisted kidney transplant on 11/30/2023.  She was on dialysis since 2018 Monday Wednesday Friday via left upper extremity AV fistula.  Nephrology consulted for comanagement of index transplant.    Kidney information: KDPI is a 56%, PRA 48%, 6 antigen mismatch, CMV status donor plus/R+, EBV D+/R+, hepatitis C viral negative.    Allograft function: DGF secondary to hyperkalemia planning dialysis today.  -Current access is left upper extremity AV fistula.  -Ultrasound transplant showing nonobstructive renal calculus planning to repeat ultrasound this morning.  -Can DC Lokelma .  -Monitor I's and O's very closely and avoid hypotension.    Immunosuppression: Induction by Thymoglobulin total 4.5 mg/kg.  -Maintaining triple immunosuppression.  Aim tacrolimus levels 8-10.    Hemodynamics: Blood pressures currently optimal we will continue to monitor.    Infectious prophylaxis: Patient is positive for hepatitis B core positive will consider starting Vemlidy.  Started on clotrimazole, Bactrim and Valcyte.    Anemia/leukocytosis; in the setting of steroid use.  Monitor " hemoglobin closely.    Bone mineral disease: Calcium and phosphorus levels are optimal continue with the current management.      Thank you for consulting .  Billie Mcfarland MD

## 2023-12-02 LAB
ALBUMIN SERPL BCP-MCNC: 3.1 G/DL (ref 3.4–5)
ANION GAP BLDV CALCULATED.4IONS-SCNC: 15 MMOL/L (ref 10–25)
ANION GAP BLDV CALCULATED.4IONS-SCNC: 17 MMOL/L (ref 10–25)
ANION GAP SERPL CALC-SCNC: 17 MMOL/L (ref 10–20)
BASE EXCESS BLDV CALC-SCNC: -2.3 MMOL/L (ref -2–3)
BASE EXCESS BLDV CALC-SCNC: -3.6 MMOL/L (ref -2–3)
BASOPHILS # BLD AUTO: 0.01 X10*3/UL (ref 0–0.1)
BASOPHILS NFR BLD AUTO: 0.1 %
BODY TEMPERATURE: 37 DEGREES CELSIUS
BODY TEMPERATURE: 37 DEGREES CELSIUS
BUN SERPL-MCNC: 48 MG/DL (ref 6–23)
CA-I BLDV-SCNC: 0.92 MMOL/L (ref 1.1–1.33)
CA-I BLDV-SCNC: 0.97 MMOL/L (ref 1.1–1.33)
CALCIUM SERPL-MCNC: 7.3 MG/DL (ref 8.6–10.6)
CHLORIDE BLDV-SCNC: 94 MMOL/L (ref 98–107)
CHLORIDE BLDV-SCNC: 97 MMOL/L (ref 98–107)
CHLORIDE SERPL-SCNC: 92 MMOL/L (ref 98–107)
CO2 SERPL-SCNC: 26 MMOL/L (ref 21–32)
CREAT SERPL-MCNC: 8.19 MG/DL (ref 0.5–1.05)
EOSINOPHIL # BLD AUTO: 0 X10*3/UL (ref 0–0.7)
EOSINOPHIL NFR BLD AUTO: 0 %
ERYTHROCYTE [DISTWIDTH] IN BLOOD BY AUTOMATED COUNT: 12.9 % (ref 11.5–14.5)
GFR SERPL CREATININE-BSD FRML MDRD: 6 ML/MIN/1.73M*2
GLUCOSE BLDV-MCNC: 146 MG/DL (ref 74–99)
GLUCOSE BLDV-MCNC: 153 MG/DL (ref 74–99)
GLUCOSE SERPL-MCNC: 121 MG/DL (ref 74–99)
HCO3 BLDV-SCNC: 21.5 MMOL/L (ref 22–26)
HCO3 BLDV-SCNC: 23.2 MMOL/L (ref 22–26)
HCT VFR BLD AUTO: 26.8 % (ref 36–46)
HCT VFR BLD EST: 12 % (ref 36–46)
HCT VFR BLD EST: 30 % (ref 36–46)
HGB BLD-MCNC: 8.9 G/DL (ref 12–16)
HGB BLDV-MCNC: 4.1 G/DL (ref 12–16)
HGB BLDV-MCNC: 9.9 G/DL (ref 12–16)
IMM GRANULOCYTES # BLD AUTO: 0.06 X10*3/UL (ref 0–0.7)
IMM GRANULOCYTES NFR BLD AUTO: 0.5 % (ref 0–0.9)
INHALED O2 CONCENTRATION: 21 %
INHALED O2 CONCENTRATION: 21 %
LACTATE BLDV-SCNC: 3.2 MMOL/L (ref 0.4–2)
LACTATE BLDV-SCNC: 3.6 MMOL/L (ref 0.4–2)
LACTATE BLDV-SCNC: 3.8 MMOL/L (ref 0.4–2)
LACTATE SERPL-SCNC: 2.9 MMOL/L (ref 0.4–2)
LACTATE SERPL-SCNC: 3.2 MMOL/L (ref 0.4–2)
LYMPHOCYTES # BLD AUTO: 0.08 X10*3/UL (ref 1.2–4.8)
LYMPHOCYTES NFR BLD AUTO: 0.6 %
MAGNESIUM SERPL-MCNC: 2.14 MG/DL (ref 1.6–2.4)
MCH RBC QN AUTO: 32 PG (ref 26–34)
MCHC RBC AUTO-ENTMCNC: 33.2 G/DL (ref 32–36)
MCV RBC AUTO: 96 FL (ref 80–100)
MONOCYTES # BLD AUTO: 0.46 X10*3/UL (ref 0.1–1)
MONOCYTES NFR BLD AUTO: 3.5 %
NEUTROPHILS # BLD AUTO: 12.6 X10*3/UL (ref 1.2–7.7)
NEUTROPHILS NFR BLD AUTO: 95.3 %
NRBC BLD-RTO: 0 /100 WBCS (ref 0–0)
OXYHGB MFR BLDV: 63.4 % (ref 45–75)
OXYHGB MFR BLDV: 86.2 % (ref 45–75)
PCO2 BLDV: 38 MM HG (ref 41–51)
PCO2 BLDV: 42 MM HG (ref 41–51)
PH BLDV: 7.35 PH (ref 7.33–7.43)
PH BLDV: 7.36 PH (ref 7.33–7.43)
PHOSPHATE SERPL-MCNC: 5.7 MG/DL (ref 2.5–4.9)
PLATELET # BLD AUTO: 90 X10*3/UL (ref 150–450)
PO2 BLDV: 41 MM HG (ref 35–45)
PO2 BLDV: 51 MM HG (ref 35–45)
POTASSIUM BLDV-SCNC: 3.7 MMOL/L (ref 3.5–5.3)
POTASSIUM BLDV-SCNC: 4.2 MMOL/L (ref 3.5–5.3)
POTASSIUM SERPL-SCNC: 4.1 MMOL/L (ref 3.5–5.3)
RBC # BLD AUTO: 2.78 X10*6/UL (ref 4–5.2)
SAO2 % BLDV: 64 % (ref 45–75)
SAO2 % BLDV: 87 % (ref 45–75)
SODIUM BLDV-SCNC: 128 MMOL/L (ref 136–145)
SODIUM BLDV-SCNC: 131 MMOL/L (ref 136–145)
SODIUM SERPL-SCNC: 131 MMOL/L (ref 136–145)
TACROLIMUS BLD-MCNC: 2.4 NG/ML
WBC # BLD AUTO: 13.2 X10*3/UL (ref 4.4–11.3)

## 2023-12-02 PROCEDURE — 2500000004 HC RX 250 GENERAL PHARMACY W/ HCPCS (ALT 636 FOR OP/ED)

## 2023-12-02 PROCEDURE — 83605 ASSAY OF LACTIC ACID: CPT

## 2023-12-02 PROCEDURE — 2500000004 HC RX 250 GENERAL PHARMACY W/ HCPCS (ALT 636 FOR OP/ED): Performed by: STUDENT IN AN ORGANIZED HEALTH CARE EDUCATION/TRAINING PROGRAM

## 2023-12-02 PROCEDURE — 84132 ASSAY OF SERUM POTASSIUM: CPT

## 2023-12-02 PROCEDURE — 99233 SBSQ HOSP IP/OBS HIGH 50: CPT | Performed by: HOSPITALIST

## 2023-12-02 PROCEDURE — 85027 COMPLETE CBC AUTOMATED: CPT

## 2023-12-02 PROCEDURE — 36415 COLL VENOUS BLD VENIPUNCTURE: CPT

## 2023-12-02 PROCEDURE — 2500000001 HC RX 250 WO HCPCS SELF ADMINISTERED DRUGS (ALT 637 FOR MEDICARE OP)

## 2023-12-02 PROCEDURE — 36415 COLL VENOUS BLD VENIPUNCTURE: CPT | Performed by: STUDENT IN AN ORGANIZED HEALTH CARE EDUCATION/TRAINING PROGRAM

## 2023-12-02 PROCEDURE — 85025 COMPLETE CBC W/AUTO DIFF WBC: CPT | Performed by: STUDENT IN AN ORGANIZED HEALTH CARE EDUCATION/TRAINING PROGRAM

## 2023-12-02 PROCEDURE — 99232 SBSQ HOSP IP/OBS MODERATE 35: CPT | Performed by: STUDENT IN AN ORGANIZED HEALTH CARE EDUCATION/TRAINING PROGRAM

## 2023-12-02 PROCEDURE — 80197 ASSAY OF TACROLIMUS: CPT

## 2023-12-02 PROCEDURE — 83735 ASSAY OF MAGNESIUM: CPT | Performed by: STUDENT IN AN ORGANIZED HEALTH CARE EDUCATION/TRAINING PROGRAM

## 2023-12-02 PROCEDURE — 2500000001 HC RX 250 WO HCPCS SELF ADMINISTERED DRUGS (ALT 637 FOR MEDICARE OP): Performed by: STUDENT IN AN ORGANIZED HEALTH CARE EDUCATION/TRAINING PROGRAM

## 2023-12-02 PROCEDURE — 1200000002 HC GENERAL ROOM WITH TELEMETRY DAILY

## 2023-12-02 PROCEDURE — 80069 RENAL FUNCTION PANEL: CPT | Performed by: STUDENT IN AN ORGANIZED HEALTH CARE EDUCATION/TRAINING PROGRAM

## 2023-12-02 RX ORDER — CALCIUM GLUCONATE 20 MG/ML
2 INJECTION, SOLUTION INTRAVENOUS ONCE
Status: COMPLETED | OUTPATIENT
Start: 2023-12-02 | End: 2023-12-02

## 2023-12-02 RX ORDER — DOCUSATE SODIUM 100 MG/1
100 CAPSULE, LIQUID FILLED ORAL 2 TIMES DAILY
Status: DISCONTINUED | OUTPATIENT
Start: 2023-12-02 | End: 2023-12-14 | Stop reason: HOSPADM

## 2023-12-02 RX ORDER — DIPHENHYDRAMINE HCL 25 MG
25 CAPSULE ORAL ONCE
Status: COMPLETED | OUTPATIENT
Start: 2023-12-02 | End: 2023-12-02

## 2023-12-02 RX ORDER — BISACODYL 10 MG/1
10 SUPPOSITORY RECTAL DAILY
Status: DISCONTINUED | OUTPATIENT
Start: 2023-12-02 | End: 2023-12-14 | Stop reason: HOSPADM

## 2023-12-02 RX ORDER — POLYETHYLENE GLYCOL 3350 17 G/17G
17 POWDER, FOR SOLUTION ORAL DAILY
Status: DISCONTINUED | OUTPATIENT
Start: 2023-12-02 | End: 2023-12-11

## 2023-12-02 RX ORDER — ACETAMINOPHEN 325 MG/1
650 TABLET ORAL ONCE
Status: COMPLETED | OUTPATIENT
Start: 2023-12-02 | End: 2023-12-02

## 2023-12-02 RX ORDER — SODIUM CHLORIDE 9 MG/ML
30 INJECTION, SOLUTION INTRAVENOUS CONTINUOUS
Status: DISCONTINUED | OUTPATIENT
Start: 2023-12-02 | End: 2023-12-06

## 2023-12-02 RX ORDER — SODIUM CHLORIDE 450 MG/100ML
60 INJECTION, SOLUTION INTRAVENOUS CONTINUOUS
Status: DISCONTINUED | OUTPATIENT
Start: 2023-12-02 | End: 2023-12-02

## 2023-12-02 RX ADMIN — TENOFOVIR ALAFENAMIDE 25 MG: 25 TABLET ORAL at 08:37

## 2023-12-02 RX ADMIN — OXYCODONE HYDROCHLORIDE 10 MG: 5 TABLET ORAL at 06:11

## 2023-12-02 RX ADMIN — DOCUSATE SODIUM 100 MG: 100 CAPSULE, LIQUID FILLED ORAL at 21:33

## 2023-12-02 RX ADMIN — MYCOPHENOLATE MOFETIL 1000 MG: 250 CAPSULE ORAL at 21:33

## 2023-12-02 RX ADMIN — CLOTRIMAZOLE 10 MG: 10 LOZENGE ORAL; TOPICAL at 12:12

## 2023-12-02 RX ADMIN — SODIUM BICARBONATE 650 MG TABLET 650 MG: at 15:01

## 2023-12-02 RX ADMIN — LEVOTHYROXINE SODIUM 25 MCG: 50 TABLET ORAL at 06:10

## 2023-12-02 RX ADMIN — OXYCODONE HYDROCHLORIDE 10 MG: 5 TABLET ORAL at 01:21

## 2023-12-02 RX ADMIN — OXYCODONE HYDROCHLORIDE 5 MG: 5 TABLET ORAL at 22:17

## 2023-12-02 RX ADMIN — ACETAMINOPHEN 650 MG: 325 TABLET ORAL at 18:03

## 2023-12-02 RX ADMIN — SULFAMETHOXAZOLE AND TRIMETHOPRIM 80 MG: 400; 80 TABLET ORAL at 08:58

## 2023-12-02 RX ADMIN — ACETAMINOPHEN 650 MG: 325 TABLET ORAL at 01:21

## 2023-12-02 RX ADMIN — SODIUM CHLORIDE 500 ML: 9 INJECTION, SOLUTION INTRAVENOUS at 06:06

## 2023-12-02 RX ADMIN — MIRTAZAPINE 7.5 MG: 7.5 TABLET, FILM COATED ORAL at 21:33

## 2023-12-02 RX ADMIN — SODIUM CHLORIDE 60 ML/HR: 9 INJECTION, SOLUTION INTRAVENOUS at 08:36

## 2023-12-02 RX ADMIN — ACETAMINOPHEN 650 MG: 325 TABLET ORAL at 12:31

## 2023-12-02 RX ADMIN — SODIUM BICARBONATE 650 MG TABLET 650 MG: at 08:37

## 2023-12-02 RX ADMIN — TACROLIMUS 1 MG: 1 CAPSULE ORAL at 06:11

## 2023-12-02 RX ADMIN — Medication: at 15:05

## 2023-12-02 RX ADMIN — SODIUM BICARBONATE 650 MG TABLET 650 MG: at 21:33

## 2023-12-02 RX ADMIN — ACETAMINOPHEN 650 MG: 325 TABLET ORAL at 06:10

## 2023-12-02 RX ADMIN — CALCIUM GLUCONATE 2 G: 20 INJECTION, SOLUTION INTRAVENOUS at 08:37

## 2023-12-02 RX ADMIN — SODIUM CHLORIDE 500 ML: 9 INJECTION, SOLUTION INTRAVENOUS at 23:35

## 2023-12-02 RX ADMIN — CLOTRIMAZOLE 10 MG: 10 LOZENGE ORAL; TOPICAL at 18:03

## 2023-12-02 RX ADMIN — TACROLIMUS 1 MG: 1 CAPSULE ORAL at 18:03

## 2023-12-02 RX ADMIN — DIPHENHYDRAMINE HYDROCHLORIDE 25 MG: 25 CAPSULE ORAL at 12:31

## 2023-12-02 RX ADMIN — CLOTRIMAZOLE 10 MG: 10 LOZENGE ORAL; TOPICAL at 08:37

## 2023-12-02 RX ADMIN — METHYLPREDNISOLONE SODIUM SUCCINATE 125 MG: 125 INJECTION, POWDER, FOR SOLUTION INTRAMUSCULAR; INTRAVENOUS at 08:37

## 2023-12-02 RX ADMIN — SODIUM CHLORIDE 60 ML/HR: 4.5 INJECTION, SOLUTION INTRAVENOUS at 06:06

## 2023-12-02 RX ADMIN — HEPARIN SODIUM 125 MG: 1000 INJECTION INTRAVENOUS; SUBCUTANEOUS at 13:10

## 2023-12-02 RX ADMIN — MYCOPHENOLATE MOFETIL 1000 MG: 250 CAPSULE ORAL at 08:38

## 2023-12-02 RX ADMIN — DOCUSATE SODIUM 100 MG: 100 CAPSULE, LIQUID FILLED ORAL at 08:38

## 2023-12-02 RX ADMIN — POLYETHYLENE GLYCOL 3350 17 G: 17 POWDER, FOR SOLUTION ORAL at 08:38

## 2023-12-02 RX ADMIN — PANTOPRAZOLE SODIUM 40 MG: 40 TABLET, DELAYED RELEASE ORAL at 06:10

## 2023-12-02 RX ADMIN — PANTOPRAZOLE SODIUM 40 MG: 40 TABLET, DELAYED RELEASE ORAL at 15:01

## 2023-12-02 RX ADMIN — BISACODYL 10 MG: 10 SUPPOSITORY RECTAL at 15:01

## 2023-12-02 ASSESSMENT — PAIN DESCRIPTION - DESCRIPTORS: DESCRIPTORS: ACHING

## 2023-12-02 ASSESSMENT — COGNITIVE AND FUNCTIONAL STATUS - GENERAL
DRESSING REGULAR LOWER BODY CLOTHING: A LITTLE
MOVING TO AND FROM BED TO CHAIR: A LITTLE
STANDING UP FROM CHAIR USING ARMS: A LITTLE
DAILY ACTIVITIY SCORE: 20
MOBILITY SCORE: 17
MOVING FROM LYING ON BACK TO SITTING ON SIDE OF FLAT BED WITH BEDRAILS: A LITTLE
WALKING IN HOSPITAL ROOM: A LITTLE
STANDING UP FROM CHAIR USING ARMS: A LITTLE
TOILETING: A LITTLE
DRESSING REGULAR UPPER BODY CLOTHING: A LITTLE
TOILETING: A LITTLE
HELP NEEDED FOR BATHING: A LITTLE
WALKING IN HOSPITAL ROOM: A LITTLE
TURNING FROM BACK TO SIDE WHILE IN FLAT BAD: A LITTLE
CLIMB 3 TO 5 STEPS WITH RAILING: A LOT
MOVING FROM LYING ON BACK TO SITTING ON SIDE OF FLAT BED WITH BEDRAILS: A LITTLE
TURNING FROM BACK TO SIDE WHILE IN FLAT BAD: A LITTLE
DRESSING REGULAR UPPER BODY CLOTHING: A LITTLE
DAILY ACTIVITIY SCORE: 19
MOBILITY SCORE: 18
HELP NEEDED FOR BATHING: A LOT
CLIMB 3 TO 5 STEPS WITH RAILING: A LITTLE
MOVING TO AND FROM BED TO CHAIR: A LITTLE
DRESSING REGULAR LOWER BODY CLOTHING: A LITTLE

## 2023-12-02 ASSESSMENT — PAIN SCALES - GENERAL
PAINLEVEL_OUTOF10: 7
PAINLEVEL_OUTOF10: 0 - NO PAIN
PAINLEVEL_OUTOF10: 1
PAINLEVEL_OUTOF10: 9
PAINLEVEL_OUTOF10: 3
PAINLEVEL_OUTOF10: 9
PAINLEVEL_OUTOF10: 5 - MODERATE PAIN

## 2023-12-02 ASSESSMENT — PAIN - FUNCTIONAL ASSESSMENT
PAIN_FUNCTIONAL_ASSESSMENT: 0-10

## 2023-12-02 ASSESSMENT — PAIN SCALES - WONG BAKER: WONGBAKER_NUMERICALRESPONSE: HURTS LITTLE MORE

## 2023-12-02 ASSESSMENT — PAIN DESCRIPTION - LOCATION
LOCATION: ABDOMEN
LOCATION: ABDOMEN

## 2023-12-02 NOTE — PROGRESS NOTES
Herber Leblanc is a 43 y.o. y.o. female POD#2 from DDKT from a DCD donor. Thin bladder; 5-7 day Rain. Expected hyperkalemia/DGF.    Objective   Gen: A+OX3; NAD  HEENT: PERRL, sclera anicteric, MMM  Cardiac: RRR  Chest: Normal inspiratory effort  Abdomen: S/NT/ND. Incision C/D/I.  Ext: No LE edema  Drain: serosanguinous    Last Recorded Vitals  Visit Vitals  /76 (BP Location: Right arm, Patient Position: Sitting)   Pulse 90   Temp 36.3 °C (97.3 °F) (Temporal)   Resp 19      Intake/Output last 3 Shifts:    Intake/Output Summary (Last 24 hours) at 12/2/2023 1139  Last data filed at 12/2/2023 1100  Gross per 24 hour   Intake 1522.67 ml   Output 2520 ml   Net -997.33 ml        Vitals:    12/02/23 0400   Weight: 77.4 kg (170 lb 10.2 oz)        Scheduled medications  acetaminophen, 650 mg, oral, q6h JANIE  acetaminophen, 650 mg, oral, Once  antithymocyte globulin (rabbit), 1.5 mg/kg, intravenous, Once  clotrimazole, 10 mg, oral, TID after meals  [START ON 12/3/2023] darbepoetin jacob, 100 mcg, subcutaneous, Weekly  insulin regular, 10 Units, intravenous, Once   Followed by  dextrose, 25 g, intravenous, Once  diphenhydrAMINE, 25 mg, oral, Once  docusate sodium, 100 mg, oral, BID  levothyroxine, 25 mcg, oral, Daily before breakfast  [START ON 12/3/2023] methylPREDNISolone sodium succinate (PF), 60 mg, intravenous, Once  mirtazapine, 7.5 mg, oral, Nightly  mycophenolate, 1,000 mg, oral, BID  pantoprazole, 40 mg, oral, BID AC  polyethylene glycol, 17 g, oral, Daily  [START ON 12/4/2023] predniSONE, 20 mg, oral, Daily  sodium bicarbonate, 650 mg, oral, TID  sulfamethoxazole-trimethoprim, 80 mg, oral, Daily  tacrolimus, 1 mg, oral, q12h JANIE  tenofovir alafenamide, 25 mg, oral, Daily  valGANciclovir, 450 mg, oral, q48h      Continuous medications  sodium chloride 0.9%, 60 mL/hr, Last Rate: 60 mL/hr (12/02/23 8267)    PRN medications  PRN medications: acetaminophen, naloxone, ondansetron, oxyCODONE, oxyCODONE, oxygen      Assessment/Plan   Principal Problem:    Kidney transplant recipient  Active Problems:  Patient Active Problem List   Diagnosis    ESRD (end stage renal disease) (CMS/HCC)    HTN (hypertension)    Gastroesophageal reflux disease      - Plan Thymoglobulin 4.5 mg/kg; 2nd dose tomorrow  - Tacrolimus goal 8-10 ng/mL; started 1 mg bid with slow ramp up to therapeutic levels over next 4-5 days  - Solumedrol taper  - No Heparin subcutaneous due to diffuse donor kidney capsular oozing intra-op  - Thin bladder; 5-7 day Rain.   - Trend Lactate, continue mIVF   - PT/OT  - Renal diet  - Home meds: synthroid and mirtazapine  - Recipient HBV core AB+ with low titers so plan samy MEYER spent 35 minutes in the professional and overall care of this patient, including immunosuppression management.    Maryam Bhatt MD, [facility]  Transplant & Hepatobiliary Surgery

## 2023-12-02 NOTE — PROGRESS NOTES
Herber Leblanc is a 43 y.o. y.o. female POD#1 from DDKT from a DCD donor. Thin bladder; 5-7 day Rian. Expected hyperkalemia/DGF.    Objective   Gen: A+OX3; NAD  HEENT: PERRL, sclera anicteric, MMM  Cardiac: RRR  Chest: Normal inspiratory effort  Abdomen: S/NT/ND. Incision C/D/I.  Ext: No LE edema  Drain: serosanguinous    Last Recorded Vitals  Visit Vitals  /90 (BP Location: Right arm, Patient Position: Lying)   Pulse 94   Temp 35.5 °C (95.9 °F) (Skin)   Resp 20      Intake/Output last 3 Shifts:    Intake/Output Summary (Last 24 hours) at 12/1/2023 2038  Last data filed at 12/1/2023 1727  Gross per 24 hour   Intake 2966.17 ml   Output 2669.5 ml   Net 296.67 ml      Vitals:    12/01/23 0641   Weight: 78.2 kg (172 lb 6.4 oz)        Scheduled medications  acetaminophen, 650 mg, oral, q6h JANIE  calcium gluconate in NS, 2 g, intravenous, Once  clotrimazole, 10 mg, oral, TID after meals  insulin regular, 10 Units, intravenous, Once   Followed by  dextrose, 25 g, intravenous, Once  levothyroxine, 25 mcg, oral, Daily before breakfast  [START ON 12/2/2023] methylPREDNISolone sodium succinate (PF), 125 mg, intravenous, Once  [START ON 12/3/2023] methylPREDNISolone sodium succinate (PF), 60 mg, intravenous, Once  mirtazapine, 7.5 mg, oral, Nightly  mycophenolate, 1,000 mg, oral, BID  pantoprazole, 40 mg, oral, BID AC  [START ON 12/4/2023] predniSONE, 20 mg, oral, Daily  sodium bicarbonate, 650 mg, oral, TID  sulfamethoxazole-trimethoprim, 80 mg, oral, Daily  tacrolimus, 1 mg, oral, q12h JANIE  tenofovir alafenamide, 25 mg, oral, Daily  valGANciclovir, 450 mg, oral, q48h      Continuous medications     PRN medications  PRN medications: [START ON 12/2/2023] acetaminophen, HYDROmorphone, naloxone, ondansetron, oxyCODONE, oxyCODONE, oxygen, polyethylene glycol     Assessment/Plan   Principal Problem:    Kidney transplant recipient  Active Problems:  Patient Active Problem List   Diagnosis    ESRD (end stage renal disease)  (CMS/HCC)    HTN (hypertension)    Gastroesophageal reflux disease      - Plan Thymoglobulin 4.5 mg/kg; 2nd dose tomorrow  - Tacrolimus goal 8-10 ng/mL; start 1 mg bid tonight with slow ramp up to therapeutic levels over next 4-5 days  - Solumedrol taper  - No Heparin subcutaneous due to diffuse donor kidney capsular oozing intra-op  - PT/OT  - Renal diet  - Home meds: synthroid and mirtazapine  - Recipient HBV core AB+ with low titers so plan samy MEYER spent 35 minutes in the professional and overall care of this patient, including immunosuppression management.    Magi Lambert MD, PHD, FACS  Chief Transplant and Hepatobiliary Surgery

## 2023-12-02 NOTE — PROGRESS NOTES
Transplant Nephrology progress note     Date of admission: 11/30/2023     Herber Leblanc is a 43 y.o.  with [facility]   Past Medical History:   Diagnosis Date    Chronic kidney disease, unspecified     CKD, patient interested in transplantation    Personal history of COVID-19 07/20/2022    History of COVID-19        SUBJECTIVE: Denied any complaints this morning.  Tolerated dialysis yesterday with no issues.  Discussed plan of care with her brother-in-law who is at bedside as a communicator      PROBLEM LIST:  Principal Problem:    ESRD (end stage renal disease) (CMS/[facility])  Active Problems:    HTN (hypertension)    Gastroesophageal reflux disease         ALLERGIES:  No Known Allergies         CURRENT MEDICATIONS:  Scheduled medications  acetaminophen, 650 mg, oral, q6h JANIE  antithymocyte globulin (rabbit), 1.5 mg/kg, intravenous, Once  clotrimazole, 10 mg, oral, TID after meals  [START ON 12/3/2023] darbepoetin jacob, 100 mcg, subcutaneous, Weekly  insulin regular, 10 Units, intravenous, Once   Followed by  dextrose, 25 g, intravenous, Once  docusate sodium, 100 mg, oral, BID  levothyroxine, 25 mcg, oral, Daily before breakfast  [START ON 12/3/2023] methylPREDNISolone sodium succinate (PF), 60 mg, intravenous, Once  mirtazapine, 7.5 mg, oral, Nightly  mycophenolate, 1,000 mg, oral, BID  pantoprazole, 40 mg, oral, BID AC  polyethylene glycol, 17 g, oral, Daily  [START ON 12/4/2023] predniSONE, 20 mg, oral, Daily  sodium bicarbonate, 650 mg, oral, TID  sulfamethoxazole-trimethoprim, 80 mg, oral, Daily  tacrolimus, 1 mg, oral, q12h JANIE  tenofovir alafenamide, 25 mg, oral, Daily  valGANciclovir, 450 mg, oral, q48h      Continuous medications  sodium chloride 0.9%, 60 mL/hr, Last Rate: 60 mL/hr (12/02/23 0935)      PRN medications  PRN medications: acetaminophen, naloxone, ondansetron, oxyCODONE, oxyCODONE, oxygen       OBJECTIVE:    VITALS: Visit Vitals  BP (!) 151/92   Pulse 88   Temp 36.3 °C (97.3 °F) (Temporal)   Resp 18   Ht 1.626  "m (5' 4\")   Wt 77.4 kg (170 lb 10.2 oz)   SpO2 93%   BMI 29.29 kg/m²   Smoking Status Never   BSA 1.87 m²     Physical:  HEENT: normal   Lungs : clear  CVS s1,s2  Abdomen : sugical sight looks clean with dressing and staples intact .  Rain cathter is in .  +1 EDEMA        LABS:  Results from last 72 hours   Lab Units 12/02/23  0616   WBC AUTO x10*3/uL 13.2*   HEMOGLOBIN g/dL 8.9*   MCV fL 96   PLATELETS AUTO x10*3/uL 90*   BUN mg/dL 48*   CREATININE mg/dL 8.19*   CALCIUM mg/dL 7.3*   TACROLIMUS ng/mL 2.4              Intake/Output Summary (Last 24 hours) at 12/2/2023 1338  Last data filed at 12/2/2023 1100  Gross per 24 hour   Intake 1122.67 ml   Output 2520 ml   Net -1397.33 ml            ASSESSMENT AND PLAN:    Herber Leblanc is a 43 y.o. with a history of ESRD secondary to hypertension and chronic NSAID use s/p desisted kidney transplant on 11/30/2023.  She was on dialysis since 2018 Monday Wednesday Friday via left upper extremity AV fistula.  Nephrology consulted for comanagement of index transplant.    Kidney information: KDPI is a 56%, PRA 48%, 6 antigen mismatch, CMV status donor plus/R+, EBV D+/R+, hepatitis C viral negative.    Allograft function: DGF secondary to hyperkalemia s/p dialysis on 12/1/2023.  -Current access is left upper extremity AV fistula.  -Ultrasound transplant showing nonobstructive renal calculus , small perinephric collection.  -can stop bicarb .  -Monitor I's and O's very closely and avoid hypotension.    Immunosuppression: Induction by Thymoglobulin total 4.5 mg/kg.  Today is day 2  -Maintaining triple immunosuppression.  Aim tacrolimus levels 8-10.    Hemodynamics: Blood pressures currently optimal we will continue to monitor.    Infectious prophylaxis: Patient is positive for hepatitis B core positive started on Vemlidy.  Started on clotrimazole, Bactrim and Valcyte.    Anemia/leukocytosis; in the setting of steroid use.  Monitor hemoglobin closely on Aranesp .    Bone mineral disease: " Calcium and phosphorus levels are optimal continue with the current management.      Thank you for consulting .  Billie Mcfarland MD

## 2023-12-02 NOTE — CARE PLAN
The patient's goals for the shift include pain control     The clinical goals for the shift include pain management, increase urine output

## 2023-12-02 NOTE — OP NOTE
Transplant Kidney Operative Note     Date:  [unfilled]                  OR Location: [facility] OR     Name: Herber Leblanc : 1980  Age:  43 y.o.  MRN: [id number]     Diagnosis  Pre-op Diagnosis     * ESRD Post-op Diagnosis     * S/p kidney transplant      Procedures   donor kidney transplant  Placement of ureteral stent     Surgeon: Magi Lambert MD, PHD, MPH     Assistant: Kristine Rodriguez MD    Specimen: none collected    Indications: Herber Leblanc  is a 43 y.o. female presents with ESRD for kidney transplant.    Procedure Details:  The patient was brought into the operating room and placed in a supine position on the OR table. Prior to proceeding, a complete team huddle was taken and recorded in the EMR to include identification of the patient, identification of the procedure, identification of the operative site, presence of all required radiographs and/or equipment, and the timely administration of appropriate antibiotics. In addition, I personally verified that the B blood type of the recipient was compatible with the B blood type of the donor and that the UNOS ID number [id number] on the packaging matched that of the paperwork for the kidney. This was separately recorded in the EMR with two signatures prior to anesthesia induction.     After the induction of GET anesthesia, lines were placed by the anesthesiologist. The urinary bladder was catheterized and irrigated with saline. There was no tension on the axillae and all pressure points were padded. Sequential compression boots were used as were Escobar Huggers. The abdomen was prepped and draped in the usual sterile fashion.     Prior to starting the operation, the LEFT kidney was prepared on the back table by Dr. Louis; please refer to her operative note.      Skin was incised over the RIGHT lower quadrant with a knife and deepened with electrocautery. Attention was turned to mobilization of the external iliac vessels. Overlying lymphatics were ligated or  cauterized and the vessels were dissected free for a length compatible with anastomosis. Venous control was obtained with a vascular clamp. A venotomy was made, the vein irrigated, and a renal vein to external iliac vein anastomosis was created with 5-0 Prolene. Arterial control was obtained with a vascular clamp. Arteriotomy was made, the artery irrigated, and the donor renal artery was anastomosed to external iliac artery with 6-0 Prolene. Anastomosis time was 51 minutes and cold ischemia time was 22 hours and 38 minutes. Kidney reperfused nicely with good turgor; did not make intra-op urine. After hemostasis was obtained, a Lich uretero-neocystostomy was created. The bladder was filled and identified, opened, and the anastomosis created; a ureteral stent was used. Markedly thin bladder; 5- to 7-day Rain.     The kidney was well perfused and sitting appropriately without tension on the anastomoses. A 19 Green[place]ic Andrew drain was placed in the perinephric space. The fascia was closed with running #1 Prolene. The subcutaneous tissues were irrigated, and Keven's was closed with running 3-0 Vicryl. The skin was closed with staples, and a dry, sterile dressing was placed. At the end of the case the sponge and instrument counts were all correct. The patient was extubated and brought to the PACU in stable condition.    Complications: None; patient tolerated the procedure well     Attending Attestation: I was present and scrubbed for the entire case.      Magi Lambert MD, PhD  Chief of Transplant and Hepatobiliary Surgery

## 2023-12-02 NOTE — CARE PLAN
The patient's goals for the shift include      The clinical goals for the shift include pain score <7, increased urine output

## 2023-12-03 ENCOUNTER — APPOINTMENT (OUTPATIENT)
Dept: RADIOLOGY | Facility: HOSPITAL | Age: 43
DRG: 650 | End: 2023-12-03
Payer: COMMERCIAL

## 2023-12-03 ENCOUNTER — ANESTHESIA (OUTPATIENT)
Dept: OPERATING ROOM | Facility: HOSPITAL | Age: 43
DRG: 650 | End: 2023-12-03
Payer: COMMERCIAL

## 2023-12-03 ENCOUNTER — ANESTHESIA EVENT (OUTPATIENT)
Dept: OPERATING ROOM | Facility: HOSPITAL | Age: 43
DRG: 650 | End: 2023-12-03
Payer: COMMERCIAL

## 2023-12-03 PROBLEM — Z94.0 KIDNEY REPLACED BY TRANSPLANT (HHS-HCC): Status: ACTIVE | Noted: 2023-11-30

## 2023-12-03 LAB
25(OH)D3 SERPL-MCNC: 47 NG/ML (ref 30–100)
ABO GROUP (TYPE) IN BLOOD: NORMAL
ALBUMIN SERPL BCP-MCNC: 3 G/DL (ref 3.4–5)
ALBUMIN SERPL BCP-MCNC: 3.5 G/DL (ref 3.4–5)
ANION GAP BLDV CALCULATED.4IONS-SCNC: 15 MMOL/L (ref 10–25)
ANION GAP BLDV CALCULATED.4IONS-SCNC: 16 MMOL/L (ref 10–25)
ANION GAP SERPL CALC-SCNC: 17 MMOL/L (ref 10–20)
ANION GAP SERPL CALC-SCNC: 21 MMOL/L (ref 10–20)
ANTIBODY SCREEN: NORMAL
APTT PPP: 26 SECONDS (ref 27–38)
BASE EXCESS BLDV CALC-SCNC: -5.1 MMOL/L (ref -2–3)
BASE EXCESS BLDV CALC-SCNC: -5.5 MMOL/L (ref -2–3)
BASOPHILS # BLD AUTO: 0 X10*3/UL (ref 0–0.1)
BASOPHILS # BLD AUTO: 0 X10*3/UL (ref 0–0.1)
BASOPHILS NFR BLD AUTO: 0 %
BASOPHILS NFR BLD AUTO: 0 %
BILIRUB DIRECT SERPL-MCNC: 0.1 MG/DL (ref 0–0.3)
BLOOD EXPIRATION DATE: NORMAL
BODY TEMPERATURE: 37 DEGREES CELSIUS
BODY TEMPERATURE: 37 DEGREES CELSIUS
BUN SERPL-MCNC: 77 MG/DL (ref 6–23)
BUN SERPL-MCNC: 79 MG/DL (ref 6–23)
C3 SERPL-MCNC: 61 MG/DL (ref 87–200)
C4 SERPL-MCNC: 27 MG/DL (ref 10–50)
CA-I BLD-SCNC: 0.85 MMOL/L (ref 1.1–1.33)
CA-I BLDV-SCNC: 0.91 MMOL/L (ref 1.1–1.33)
CA-I BLDV-SCNC: 0.93 MMOL/L (ref 1.1–1.33)
CALCIUM SERPL-MCNC: 6.6 MG/DL (ref 8.6–10.6)
CALCIUM SERPL-MCNC: 7.1 MG/DL (ref 8.6–10.6)
CHLORIDE BLDV-SCNC: 100 MMOL/L (ref 98–107)
CHLORIDE BLDV-SCNC: 98 MMOL/L (ref 98–107)
CHLORIDE SERPL-SCNC: 97 MMOL/L (ref 98–107)
CHLORIDE SERPL-SCNC: 98 MMOL/L (ref 98–107)
CO2 SERPL-SCNC: 21 MMOL/L (ref 21–32)
CO2 SERPL-SCNC: 21 MMOL/L (ref 21–32)
CREAT SERPL-MCNC: 10.01 MG/DL (ref 0.5–1.05)
CREAT SERPL-MCNC: 9.77 MG/DL (ref 0.5–1.05)
D DIMER PPP FEU-MCNC: 4284 NG/ML FEU
DISPENSE STATUS: NORMAL
EOSINOPHIL # BLD AUTO: 0 X10*3/UL (ref 0–0.7)
EOSINOPHIL # BLD AUTO: 0.01 X10*3/UL (ref 0–0.7)
EOSINOPHIL NFR BLD AUTO: 0 %
EOSINOPHIL NFR BLD AUTO: 0.1 %
ERYTHROCYTE [DISTWIDTH] IN BLOOD BY AUTOMATED COUNT: 13.2 % (ref 11.5–14.5)
FIBRINOGEN PPP-MCNC: 237 MG/DL (ref 200–400)
GFR SERPL CREATININE-BSD FRML MDRD: 4 ML/MIN/1.73M*2
GFR SERPL CREATININE-BSD FRML MDRD: 5 ML/MIN/1.73M*2
GLUCOSE BLDV-MCNC: 135 MG/DL (ref 74–99)
GLUCOSE BLDV-MCNC: 97 MG/DL (ref 74–99)
GLUCOSE SERPL-MCNC: 108 MG/DL (ref 74–99)
GLUCOSE SERPL-MCNC: 128 MG/DL (ref 74–99)
HCO3 BLDV-SCNC: 20 MMOL/L (ref 22–26)
HCO3 BLDV-SCNC: 20.6 MMOL/L (ref 22–26)
HCT VFR BLD AUTO: 22.5 % (ref 36–46)
HCT VFR BLD AUTO: 23.7 % (ref 36–46)
HCT VFR BLD AUTO: 24.4 % (ref 36–46)
HCT VFR BLD EST: 23 % (ref 36–46)
HCT VFR BLD EST: 29 % (ref 36–46)
HGB BLD-MCNC: 7.5 G/DL (ref 12–16)
HGB BLD-MCNC: 7.8 G/DL (ref 12–16)
HGB BLD-MCNC: 8.1 G/DL (ref 12–16)
HGB BLDV-MCNC: 7.5 G/DL (ref 12–16)
HGB BLDV-MCNC: 9.5 G/DL (ref 12–16)
HGB RETIC QN: 31 PG (ref 28–38)
IMM GRANULOCYTES # BLD AUTO: 0.05 X10*3/UL (ref 0–0.7)
IMM GRANULOCYTES # BLD AUTO: 0.05 X10*3/UL (ref 0–0.7)
IMM GRANULOCYTES NFR BLD AUTO: 0.5 % (ref 0–0.9)
IMM GRANULOCYTES NFR BLD AUTO: 0.8 % (ref 0–0.9)
IMMATURE RETIC FRACTION: 11.9 %
INHALED O2 CONCENTRATION: 21 %
INHALED O2 CONCENTRATION: 21 %
INR PPP: 1.1 (ref 0.9–1.1)
INTERPRETATION FOR ANTI-PLATELET FACTOR 4 ANTIBODY: NEGATIVE
LACTATE BLDV-SCNC: 1 MMOL/L (ref 0.4–2)
LACTATE BLDV-SCNC: 1.1 MMOL/L (ref 0.4–2)
LACTATE SERPL-SCNC: 1.6 MMOL/L (ref 0.4–2)
LDH SERPL L TO P-CCNC: 539 U/L (ref 84–246)
LYMPHOCYTES # BLD AUTO: 0.04 X10*3/UL (ref 1.2–4.8)
LYMPHOCYTES # BLD AUTO: 0.07 X10*3/UL (ref 1.2–4.8)
LYMPHOCYTES NFR BLD AUTO: 0.6 %
LYMPHOCYTES NFR BLD AUTO: 0.7 %
MAGNESIUM SERPL-MCNC: 2.32 MG/DL (ref 1.6–2.4)
MCH RBC QN AUTO: 32.2 PG (ref 26–34)
MCH RBC QN AUTO: 32.2 PG (ref 26–34)
MCH RBC QN AUTO: 33.2 PG (ref 26–34)
MCHC RBC AUTO-ENTMCNC: 32 G/DL (ref 32–36)
MCHC RBC AUTO-ENTMCNC: 33.3 G/DL (ref 32–36)
MCHC RBC AUTO-ENTMCNC: 34.2 G/DL (ref 32–36)
MCV RBC AUTO: 101 FL (ref 80–100)
MCV RBC AUTO: 97 FL (ref 80–100)
MCV RBC AUTO: 97 FL (ref 80–100)
MONOCYTES # BLD AUTO: 0.06 X10*3/UL (ref 0.1–1)
MONOCYTES # BLD AUTO: 0.2 X10*3/UL (ref 0.1–1)
MONOCYTES NFR BLD AUTO: 0.9 %
MONOCYTES NFR BLD AUTO: 2.1 %
NEUTROPHILS # BLD AUTO: 6.42 X10*3/UL (ref 1.2–7.7)
NEUTROPHILS # BLD AUTO: 9.18 X10*3/UL (ref 1.2–7.7)
NEUTROPHILS NFR BLD AUTO: 96.6 %
NEUTROPHILS NFR BLD AUTO: 97.7 %
NRBC BLD-RTO: 0 /100 WBCS (ref 0–0)
OXYHGB MFR BLDV: 72.6 % (ref 45–75)
OXYHGB MFR BLDV: 94.1 % (ref 45–75)
PCO2 BLDV: 38 MM HG (ref 41–51)
PCO2 BLDV: 40 MM HG (ref 41–51)
PH BLDV: 7.32 PH (ref 7.33–7.43)
PH BLDV: 7.33 PH (ref 7.33–7.43)
PHOSPHATE SERPL-MCNC: 4.9 MG/DL (ref 2.5–4.9)
PHOSPHATE SERPL-MCNC: 5.9 MG/DL (ref 2.5–4.9)
PLATELET # BLD AUTO: 55 X10*3/UL (ref 150–450)
PLATELET # BLD AUTO: 63 X10*3/UL (ref 150–450)
PLATELET # BLD AUTO: 69 X10*3/UL (ref 150–450)
PO2 BLDV: 47 MM HG (ref 35–45)
PO2 BLDV: 71 MM HG (ref 35–45)
POTASSIUM BLDV-SCNC: 4.4 MMOL/L (ref 3.5–5.3)
POTASSIUM BLDV-SCNC: 4.8 MMOL/L (ref 3.5–5.3)
POTASSIUM SERPL-SCNC: 4.3 MMOL/L (ref 3.5–5.3)
POTASSIUM SERPL-SCNC: 4.8 MMOL/L (ref 3.5–5.3)
PRODUCT BLOOD TYPE: 7300
PRODUCT CODE: NORMAL
PROTHROMBIN TIME: 12.2 SECONDS (ref 9.8–12.8)
PTH-INTACT SERPL-MCNC: 915.1 PG/ML (ref 18.5–88)
RBC # BLD AUTO: 2.33 X10*6/UL (ref 4–5.2)
RBC # BLD AUTO: 2.42 X10*6/UL (ref 4–5.2)
RBC # BLD AUTO: 2.44 X10*6/UL (ref 4–5.2)
RETICS #: 0.09 X10*6/UL (ref 0.02–0.08)
RETICS/RBC NFR AUTO: 3.8 % (ref 0.5–2)
RH FACTOR (ANTIGEN D): NORMAL
SAO2 % BLDV: 74 % (ref 45–75)
SAO2 % BLDV: 96 % (ref 45–75)
SERUM AND PLATELET FACTOR 4: 0.14 OD UNITS
SODIUM BLDV-SCNC: 130 MMOL/L (ref 136–145)
SODIUM BLDV-SCNC: 130 MMOL/L (ref 136–145)
SODIUM SERPL-SCNC: 132 MMOL/L (ref 136–145)
SODIUM SERPL-SCNC: 134 MMOL/L (ref 136–145)
TACROLIMUS BLD-MCNC: 4.5 NG/ML
UNIT ABO: NORMAL
UNIT NUMBER: NORMAL
UNIT RH: NORMAL
UNIT VOLUME: 334
WBC # BLD AUTO: 6.6 X10*3/UL (ref 4.4–11.3)
WBC # BLD AUTO: 9.5 X10*3/UL (ref 4.4–11.3)
WBC # BLD AUTO: 9.6 X10*3/UL (ref 4.4–11.3)

## 2023-12-03 PROCEDURE — 2500000001 HC RX 250 WO HCPCS SELF ADMINISTERED DRUGS (ALT 637 FOR MEDICARE OP)

## 2023-12-03 PROCEDURE — 83010 ASSAY OF HAPTOGLOBIN QUANT: CPT

## 2023-12-03 PROCEDURE — 82306 VITAMIN D 25 HYDROXY: CPT | Performed by: STUDENT IN AN ORGANIZED HEALTH CARE EDUCATION/TRAINING PROGRAM

## 2023-12-03 PROCEDURE — 74176 CT ABD & PELVIS W/O CONTRAST: CPT

## 2023-12-03 PROCEDURE — 2500000004 HC RX 250 GENERAL PHARMACY W/ HCPCS (ALT 636 FOR OP/ED)

## 2023-12-03 PROCEDURE — A4217 STERILE WATER/SALINE, 500 ML: HCPCS | Performed by: STUDENT IN AN ORGANIZED HEALTH CARE EDUCATION/TRAINING PROGRAM

## 2023-12-03 PROCEDURE — 83970 ASSAY OF PARATHORMONE: CPT | Performed by: STUDENT IN AN ORGANIZED HEALTH CARE EDUCATION/TRAINING PROGRAM

## 2023-12-03 PROCEDURE — 80197 ASSAY OF TACROLIMUS: CPT | Performed by: STUDENT IN AN ORGANIZED HEALTH CARE EDUCATION/TRAINING PROGRAM

## 2023-12-03 PROCEDURE — 36415 COLL VENOUS BLD VENIPUNCTURE: CPT

## 2023-12-03 PROCEDURE — 7100000002 HC RECOVERY ROOM TIME - EACH INCREMENTAL 1 MINUTE: Performed by: STUDENT IN AN ORGANIZED HEALTH CARE EDUCATION/TRAINING PROGRAM

## 2023-12-03 PROCEDURE — 2500000005 HC RX 250 GENERAL PHARMACY W/O HCPCS: Performed by: STUDENT IN AN ORGANIZED HEALTH CARE EDUCATION/TRAINING PROGRAM

## 2023-12-03 PROCEDURE — 83605 ASSAY OF LACTIC ACID: CPT

## 2023-12-03 PROCEDURE — 2500000004 HC RX 250 GENERAL PHARMACY W/ HCPCS (ALT 636 FOR OP/ED): Performed by: STUDENT IN AN ORGANIZED HEALTH CARE EDUCATION/TRAINING PROGRAM

## 2023-12-03 PROCEDURE — 76776 US EXAM K TRANSPL W/DOPPLER: CPT

## 2023-12-03 PROCEDURE — 76776 US EXAM K TRANSPL W/DOPPLER: CPT | Performed by: RADIOLOGY

## 2023-12-03 PROCEDURE — 82435 ASSAY OF BLOOD CHLORIDE: CPT | Performed by: STUDENT IN AN ORGANIZED HEALTH CARE EDUCATION/TRAINING PROGRAM

## 2023-12-03 PROCEDURE — 85384 FIBRINOGEN ACTIVITY: CPT

## 2023-12-03 PROCEDURE — 83735 ASSAY OF MAGNESIUM: CPT | Performed by: STUDENT IN AN ORGANIZED HEALTH CARE EDUCATION/TRAINING PROGRAM

## 2023-12-03 PROCEDURE — 96372 THER/PROPH/DIAG INJ SC/IM: CPT

## 2023-12-03 PROCEDURE — 86022 PLATELET ANTIBODIES: CPT

## 2023-12-03 PROCEDURE — 85045 AUTOMATED RETICULOCYTE COUNT: CPT

## 2023-12-03 PROCEDURE — 83615 LACTATE (LD) (LDH) ENZYME: CPT

## 2023-12-03 PROCEDURE — 7100000001 HC RECOVERY ROOM TIME - INITIAL BASE CHARGE: Performed by: STUDENT IN AN ORGANIZED HEALTH CARE EDUCATION/TRAINING PROGRAM

## 2023-12-03 PROCEDURE — 85610 PROTHROMBIN TIME: CPT

## 2023-12-03 PROCEDURE — 36430 TRANSFUSION BLD/BLD COMPNT: CPT

## 2023-12-03 PROCEDURE — 97161 PT EVAL LOW COMPLEX 20 MIN: CPT | Mod: GP | Performed by: PHYSICAL MEDICINE & REHABILITATION

## 2023-12-03 PROCEDURE — 49002 REOPENING OF ABDOMEN: CPT | Performed by: TRANSPLANT SURGERY

## 2023-12-03 PROCEDURE — P9073 PLATELETS PHERESIS PATH REDU: HCPCS

## 2023-12-03 PROCEDURE — 82330 ASSAY OF CALCIUM: CPT

## 2023-12-03 PROCEDURE — 49002 REOPENING OF ABDOMEN: CPT | Performed by: STUDENT IN AN ORGANIZED HEALTH CARE EDUCATION/TRAINING PROGRAM

## 2023-12-03 PROCEDURE — 85730 THROMBOPLASTIN TIME PARTIAL: CPT

## 2023-12-03 PROCEDURE — 82565 ASSAY OF CREATININE: CPT | Performed by: STUDENT IN AN ORGANIZED HEALTH CARE EDUCATION/TRAINING PROGRAM

## 2023-12-03 PROCEDURE — 85025 COMPLETE CBC W/AUTO DIFF WBC: CPT | Performed by: STUDENT IN AN ORGANIZED HEALTH CARE EDUCATION/TRAINING PROGRAM

## 2023-12-03 PROCEDURE — 36600 WITHDRAWAL OF ARTERIAL BLOOD: CPT | Performed by: STUDENT IN AN ORGANIZED HEALTH CARE EDUCATION/TRAINING PROGRAM

## 2023-12-03 PROCEDURE — 85379 FIBRIN DEGRADATION QUANT: CPT

## 2023-12-03 PROCEDURE — 84132 ASSAY OF SERUM POTASSIUM: CPT | Performed by: STUDENT IN AN ORGANIZED HEALTH CARE EDUCATION/TRAINING PROGRAM

## 2023-12-03 PROCEDURE — 2500000005 HC RX 250 GENERAL PHARMACY W/O HCPCS

## 2023-12-03 PROCEDURE — 3700000002 HC GENERAL ANESTHESIA TIME - EACH INCREMENTAL 1 MINUTE: Performed by: STUDENT IN AN ORGANIZED HEALTH CARE EDUCATION/TRAINING PROGRAM

## 2023-12-03 PROCEDURE — 82248 BILIRUBIN DIRECT: CPT | Performed by: STUDENT IN AN ORGANIZED HEALTH CARE EDUCATION/TRAINING PROGRAM

## 2023-12-03 PROCEDURE — 2500000001 HC RX 250 WO HCPCS SELF ADMINISTERED DRUGS (ALT 637 FOR MEDICARE OP): Performed by: STUDENT IN AN ORGANIZED HEALTH CARE EDUCATION/TRAINING PROGRAM

## 2023-12-03 PROCEDURE — 3600000008 HC OR TIME - EACH INCREMENTAL 1 MINUTE - PROCEDURE LEVEL THREE: Performed by: STUDENT IN AN ORGANIZED HEALTH CARE EDUCATION/TRAINING PROGRAM

## 2023-12-03 PROCEDURE — 86160 COMPLEMENT ANTIGEN: CPT

## 2023-12-03 PROCEDURE — 86920 COMPATIBILITY TEST SPIN: CPT

## 2023-12-03 PROCEDURE — 3600000003 HC OR TIME - INITIAL BASE CHARGE - PROCEDURE LEVEL THREE: Performed by: STUDENT IN AN ORGANIZED HEALTH CARE EDUCATION/TRAINING PROGRAM

## 2023-12-03 PROCEDURE — 2780000003 HC OR 278 NO HCPCS: Performed by: STUDENT IN AN ORGANIZED HEALTH CARE EDUCATION/TRAINING PROGRAM

## 2023-12-03 PROCEDURE — 74176 CT ABD & PELVIS W/O CONTRAST: CPT | Performed by: RADIOLOGY

## 2023-12-03 PROCEDURE — 99233 SBSQ HOSP IP/OBS HIGH 50: CPT | Performed by: HOSPITALIST

## 2023-12-03 PROCEDURE — 86901 BLOOD TYPING SEROLOGIC RH(D): CPT

## 2023-12-03 PROCEDURE — 2720000007 HC OR 272 NO HCPCS: Performed by: STUDENT IN AN ORGANIZED HEALTH CARE EDUCATION/TRAINING PROGRAM

## 2023-12-03 PROCEDURE — 3700000001 HC GENERAL ANESTHESIA TIME - INITIAL BASE CHARGE: Performed by: STUDENT IN AN ORGANIZED HEALTH CARE EDUCATION/TRAINING PROGRAM

## 2023-12-03 PROCEDURE — 1200000002 HC GENERAL ROOM WITH TELEMETRY DAILY

## 2023-12-03 DEVICE — PATCH, TACHOSIL, 9.5CM X 4.8CM, ABSORBABLE FIBRIN SEALANT: Type: IMPLANTABLE DEVICE | Site: ABDOMEN | Status: FUNCTIONAL

## 2023-12-03 RX ORDER — WATER 1 ML/ML
IRRIGANT IRRIGATION AS NEEDED
Status: DISCONTINUED | OUTPATIENT
Start: 2023-12-03 | End: 2023-12-03 | Stop reason: HOSPADM

## 2023-12-03 RX ORDER — CALCIUM GLUCONATE 20 MG/ML
1 INJECTION, SOLUTION INTRAVENOUS ONCE
Status: COMPLETED | OUTPATIENT
Start: 2023-12-03 | End: 2023-12-03

## 2023-12-03 RX ORDER — ONDANSETRON HYDROCHLORIDE 2 MG/ML
4 INJECTION, SOLUTION INTRAVENOUS ONCE AS NEEDED
Status: DISCONTINUED | OUTPATIENT
Start: 2023-12-03 | End: 2023-12-03 | Stop reason: HOSPADM

## 2023-12-03 RX ORDER — LIDOCAINE HYDROCHLORIDE 20 MG/ML
INJECTION, SOLUTION INFILTRATION; PERINEURAL AS NEEDED
Status: DISCONTINUED | OUTPATIENT
Start: 2023-12-03 | End: 2023-12-03

## 2023-12-03 RX ORDER — CEFAZOLIN 1 G/1
INJECTION, POWDER, FOR SOLUTION INTRAVENOUS AS NEEDED
Status: DISCONTINUED | OUTPATIENT
Start: 2023-12-03 | End: 2023-12-03

## 2023-12-03 RX ORDER — SODIUM CHLORIDE, SODIUM LACTATE, POTASSIUM CHLORIDE, CALCIUM CHLORIDE 600; 310; 30; 20 MG/100ML; MG/100ML; MG/100ML; MG/100ML
100 INJECTION, SOLUTION INTRAVENOUS CONTINUOUS
Status: DISCONTINUED | OUTPATIENT
Start: 2023-12-03 | End: 2023-12-03 | Stop reason: HOSPADM

## 2023-12-03 RX ORDER — HYDROMORPHONE HYDROCHLORIDE 1 MG/ML
INJECTION, SOLUTION INTRAMUSCULAR; INTRAVENOUS; SUBCUTANEOUS AS NEEDED
Status: DISCONTINUED | OUTPATIENT
Start: 2023-12-03 | End: 2023-12-03

## 2023-12-03 RX ORDER — FENTANYL CITRATE 50 UG/ML
INJECTION, SOLUTION INTRAMUSCULAR; INTRAVENOUS AS NEEDED
Status: DISCONTINUED | OUTPATIENT
Start: 2023-12-03 | End: 2023-12-03

## 2023-12-03 RX ORDER — LABETALOL HYDROCHLORIDE 5 MG/ML
5 INJECTION, SOLUTION INTRAVENOUS ONCE AS NEEDED
Status: DISCONTINUED | OUTPATIENT
Start: 2023-12-03 | End: 2023-12-03 | Stop reason: HOSPADM

## 2023-12-03 RX ORDER — ACETAMINOPHEN 500 MG
5 TABLET ORAL NIGHTLY PRN
Status: DISCONTINUED | OUTPATIENT
Start: 2023-12-03 | End: 2023-12-14 | Stop reason: HOSPADM

## 2023-12-03 RX ORDER — PROPOFOL 10 MG/ML
INJECTION, EMULSION INTRAVENOUS AS NEEDED
Status: DISCONTINUED | OUTPATIENT
Start: 2023-12-03 | End: 2023-12-03

## 2023-12-03 RX ORDER — HYDROMORPHONE HYDROCHLORIDE 1 MG/ML
0.2 INJECTION, SOLUTION INTRAMUSCULAR; INTRAVENOUS; SUBCUTANEOUS EVERY 5 MIN PRN
Status: DISCONTINUED | OUTPATIENT
Start: 2023-12-03 | End: 2023-12-03 | Stop reason: HOSPADM

## 2023-12-03 RX ORDER — DEXAMETHASONE SODIUM PHOSPHATE 4 MG/ML
INJECTION, SOLUTION INTRA-ARTICULAR; INTRALESIONAL; INTRAMUSCULAR; INTRAVENOUS; SOFT TISSUE AS NEEDED
Status: DISCONTINUED | OUTPATIENT
Start: 2023-12-03 | End: 2023-12-03

## 2023-12-03 RX ORDER — SODIUM CHLORIDE 0.9 G/100ML
IRRIGANT IRRIGATION AS NEEDED
Status: DISCONTINUED | OUTPATIENT
Start: 2023-12-03 | End: 2023-12-03 | Stop reason: HOSPADM

## 2023-12-03 RX ORDER — HYDROMORPHONE HYDROCHLORIDE 1 MG/ML
0.5 INJECTION, SOLUTION INTRAMUSCULAR; INTRAVENOUS; SUBCUTANEOUS EVERY 5 MIN PRN
Status: DISCONTINUED | OUTPATIENT
Start: 2023-12-03 | End: 2023-12-03 | Stop reason: HOSPADM

## 2023-12-03 RX ORDER — OXYCODONE HYDROCHLORIDE 5 MG/1
5 TABLET ORAL EVERY 4 HOURS PRN
Status: DISCONTINUED | OUTPATIENT
Start: 2023-12-03 | End: 2023-12-03 | Stop reason: HOSPADM

## 2023-12-03 RX ORDER — DIPHENHYDRAMINE HYDROCHLORIDE 50 MG/ML
12.5 INJECTION INTRAMUSCULAR; INTRAVENOUS EVERY 8 HOURS PRN
Status: DISCONTINUED | OUTPATIENT
Start: 2023-12-03 | End: 2023-12-14 | Stop reason: HOSPADM

## 2023-12-03 RX ORDER — CEFAZOLIN SODIUM 2 G/100ML
2 INJECTION, SOLUTION INTRAVENOUS ONCE
Status: DISCONTINUED | OUTPATIENT
Start: 2023-12-03 | End: 2023-12-04

## 2023-12-03 RX ORDER — ROCURONIUM BROMIDE 10 MG/ML
INJECTION, SOLUTION INTRAVENOUS AS NEEDED
Status: DISCONTINUED | OUTPATIENT
Start: 2023-12-03 | End: 2023-12-03

## 2023-12-03 RX ORDER — OXYCODONE HYDROCHLORIDE 5 MG/1
10 TABLET ORAL EVERY 4 HOURS PRN
Status: DISCONTINUED | OUTPATIENT
Start: 2023-12-03 | End: 2023-12-03 | Stop reason: HOSPADM

## 2023-12-03 RX ADMIN — OXYCODONE HYDROCHLORIDE 10 MG: 5 TABLET ORAL at 13:02

## 2023-12-03 RX ADMIN — PROPOFOL 150 MG: 10 INJECTION, EMULSION INTRAVENOUS at 17:19

## 2023-12-03 RX ADMIN — SODIUM CHLORIDE 30 ML/HR: 9 INJECTION, SOLUTION INTRAVENOUS at 21:45

## 2023-12-03 RX ADMIN — HYDROMORPHONE HYDROCHLORIDE 0.5 MG: 1 INJECTION, SOLUTION INTRAMUSCULAR; INTRAVENOUS; SUBCUTANEOUS at 19:15

## 2023-12-03 RX ADMIN — TACROLIMUS 2.5 MG: 1 CAPSULE ORAL at 21:44

## 2023-12-03 RX ADMIN — DEXAMETHASONE SODIUM PHOSPHATE 4 MG: 4 INJECTION, SOLUTION INTRAMUSCULAR; INTRAVENOUS at 17:26

## 2023-12-03 RX ADMIN — OXYCODONE HYDROCHLORIDE 10 MG: 5 TABLET ORAL at 21:44

## 2023-12-03 RX ADMIN — HYDROMORPHONE HYDROCHLORIDE 0.5 MG: 1 INJECTION, SOLUTION INTRAMUSCULAR; INTRAVENOUS; SUBCUTANEOUS at 18:47

## 2023-12-03 RX ADMIN — OXYCODONE HYDROCHLORIDE 10 MG: 5 TABLET ORAL at 03:53

## 2023-12-03 RX ADMIN — ROCURONIUM BROMIDE 50 MG: 10 INJECTION, SOLUTION INTRAVENOUS at 17:19

## 2023-12-03 RX ADMIN — VALGANCICLOVIR HYDROCHLORIDE 450 MG: 450 TABLET ORAL at 08:15

## 2023-12-03 RX ADMIN — FENTANYL CITRATE 25 MCG: 50 INJECTION, SOLUTION INTRAMUSCULAR; INTRAVENOUS at 18:21

## 2023-12-03 RX ADMIN — HEPARIN SODIUM 125 MG: 1000 INJECTION INTRAVENOUS; SUBCUTANEOUS at 21:57

## 2023-12-03 RX ADMIN — Medication 5 MG: at 01:08

## 2023-12-03 RX ADMIN — Medication 5 MG: at 23:50

## 2023-12-03 RX ADMIN — DIPHENHYDRAMINE HYDROCHLORIDE 12.5 MG: 50 INJECTION INTRAMUSCULAR; INTRAVENOUS at 23:42

## 2023-12-03 RX ADMIN — CALCIUM GLUCONATE 1 G: 20 INJECTION, SOLUTION INTRAVENOUS at 11:49

## 2023-12-03 RX ADMIN — LEVOTHYROXINE SODIUM 25 MCG: 50 TABLET ORAL at 07:15

## 2023-12-03 RX ADMIN — MIRTAZAPINE 7.5 MG: 7.5 TABLET, FILM COATED ORAL at 21:43

## 2023-12-03 RX ADMIN — SODIUM CHLORIDE: 9 INJECTION, SOLUTION INTRAVENOUS at 17:07

## 2023-12-03 RX ADMIN — CLOTRIMAZOLE 10 MG: 10 LOZENGE ORAL; TOPICAL at 21:44

## 2023-12-03 RX ADMIN — ACETAMINOPHEN 650 MG: 325 TABLET ORAL at 23:42

## 2023-12-03 RX ADMIN — PANTOPRAZOLE SODIUM 40 MG: 40 TABLET, DELAYED RELEASE ORAL at 07:15

## 2023-12-03 RX ADMIN — CEFAZOLIN 2 G: 330 INJECTION, POWDER, FOR SOLUTION INTRAMUSCULAR; INTRAVENOUS at 17:24

## 2023-12-03 RX ADMIN — CLOTRIMAZOLE 10 MG: 10 LOZENGE ORAL; TOPICAL at 13:02

## 2023-12-03 RX ADMIN — HYDROMORPHONE HYDROCHLORIDE 0.5 MG: 1 INJECTION, SOLUTION INTRAMUSCULAR; INTRAVENOUS; SUBCUTANEOUS at 19:00

## 2023-12-03 RX ADMIN — SODIUM BICARBONATE 650 MG TABLET 650 MG: at 21:43

## 2023-12-03 RX ADMIN — FENTANYL CITRATE 50 MCG: 50 INJECTION, SOLUTION INTRAMUSCULAR; INTRAVENOUS at 17:16

## 2023-12-03 RX ADMIN — HYDROMORPHONE HYDROCHLORIDE 0.5 MG: 1 INJECTION, SOLUTION INTRAMUSCULAR; INTRAVENOUS; SUBCUTANEOUS at 18:42

## 2023-12-03 RX ADMIN — DARBEPOETIN ALFA 100 MCG: 100 INJECTION, SOLUTION INTRAVENOUS; SUBCUTANEOUS at 11:27

## 2023-12-03 RX ADMIN — Medication: at 11:30

## 2023-12-03 RX ADMIN — BISACODYL 10 MG: 10 SUPPOSITORY RECTAL at 11:27

## 2023-12-03 RX ADMIN — DOCUSATE SODIUM 100 MG: 100 CAPSULE, LIQUID FILLED ORAL at 21:43

## 2023-12-03 RX ADMIN — SODIUM CHLORIDE: 9 INJECTION, SOLUTION INTRAVENOUS at 17:54

## 2023-12-03 RX ADMIN — DOCUSATE SODIUM 100 MG: 100 CAPSULE, LIQUID FILLED ORAL at 11:27

## 2023-12-03 RX ADMIN — SULFAMETHOXAZOLE AND TRIMETHOPRIM 80 MG: 400; 80 TABLET ORAL at 08:15

## 2023-12-03 RX ADMIN — TACROLIMUS 1 MG: 1 CAPSULE ORAL at 07:16

## 2023-12-03 RX ADMIN — MYCOPHENOLATE MOFETIL 1000 MG: 250 CAPSULE ORAL at 08:15

## 2023-12-03 RX ADMIN — PROPOFOL 30 MG: 10 INJECTION, EMULSION INTRAVENOUS at 17:21

## 2023-12-03 RX ADMIN — MYCOPHENOLATE MOFETIL 1000 MG: 250 CAPSULE ORAL at 21:44

## 2023-12-03 RX ADMIN — SUGAMMADEX 200 MG: 100 INJECTION, SOLUTION INTRAVENOUS at 18:35

## 2023-12-03 RX ADMIN — TENOFOVIR ALAFENAMIDE 25 MG: 25 TABLET ORAL at 08:15

## 2023-12-03 RX ADMIN — PROPOFOL 20 MG: 10 INJECTION, EMULSION INTRAVENOUS at 17:20

## 2023-12-03 RX ADMIN — ONDANSETRON 4 MG: 2 INJECTION INTRAMUSCULAR; INTRAVENOUS at 18:32

## 2023-12-03 RX ADMIN — POLYETHYLENE GLYCOL 3350 17 G: 17 POWDER, FOR SOLUTION ORAL at 11:26

## 2023-12-03 RX ADMIN — LIDOCAINE HYDROCHLORIDE 100 MG: 20 INJECTION, SOLUTION INFILTRATION; PERINEURAL at 17:19

## 2023-12-03 RX ADMIN — METHYLPREDNISOLONE SODIUM SUCCINATE 60 MG: 125 INJECTION, POWDER, FOR SOLUTION INTRAMUSCULAR; INTRAVENOUS at 11:26

## 2023-12-03 ASSESSMENT — PAIN SCALES - WONG BAKER
WONGBAKER_NUMERICALRESPONSE: HURTS WHOLE LOT
WONGBAKER_NUMERICALRESPONSE: HURTS WHOLE LOT

## 2023-12-03 ASSESSMENT — PAIN SCALES - GENERAL
PAINLEVEL_OUTOF10: 8
PAINLEVEL_OUTOF10: 7
PAINLEVEL_OUTOF10: 7
PAINLEVEL_OUTOF10: 0 - NO PAIN
PAIN_LEVEL: 2
PAINLEVEL_OUTOF10: 0 - NO PAIN
PAINLEVEL_OUTOF10: 7
PAINLEVEL_OUTOF10: 8
PAINLEVEL_OUTOF10: 8
PAINLEVEL_OUTOF10: 7
PAINLEVEL_OUTOF10: 0 - NO PAIN

## 2023-12-03 ASSESSMENT — COGNITIVE AND FUNCTIONAL STATUS - GENERAL
STANDING UP FROM CHAIR USING ARMS: A LITTLE
HELP NEEDED FOR BATHING: A LOT
STANDING UP FROM CHAIR USING ARMS: A LITTLE
MOVING FROM LYING ON BACK TO SITTING ON SIDE OF FLAT BED WITH BEDRAILS: A LITTLE
MOVING TO AND FROM BED TO CHAIR: A LITTLE
DRESSING REGULAR LOWER BODY CLOTHING: A LOT
MOVING FROM LYING ON BACK TO SITTING ON SIDE OF FLAT BED WITH BEDRAILS: A LITTLE
CLIMB 3 TO 5 STEPS WITH RAILING: TOTAL
MOBILITY SCORE: 17
WALKING IN HOSPITAL ROOM: A LITTLE
TURNING FROM BACK TO SIDE WHILE IN FLAT BAD: A LOT
TOILETING: A LITTLE
DAILY ACTIVITIY SCORE: 17
WALKING IN HOSPITAL ROOM: A LITTLE
DRESSING REGULAR UPPER BODY CLOTHING: A LOT
MOBILITY SCORE: 16
CLIMB 3 TO 5 STEPS WITH RAILING: A LITTLE
DAILY ACTIVITIY SCORE: 17
WALKING IN HOSPITAL ROOM: A LITTLE
CLIMB 3 TO 5 STEPS WITH RAILING: TOTAL
DRESSING REGULAR UPPER BODY CLOTHING: A LOT
MOVING TO AND FROM BED TO CHAIR: A LITTLE
DRESSING REGULAR LOWER BODY CLOTHING: A LOT
HELP NEEDED FOR BATHING: A LOT
TOILETING: A LITTLE
TURNING FROM BACK TO SIDE WHILE IN FLAT BAD: A LITTLE
MOVING TO AND FROM BED TO CHAIR: A LITTLE
MOBILITY SCORE: 16
MOVING FROM LYING ON BACK TO SITTING ON SIDE OF FLAT BED WITH BEDRAILS: A LITTLE
STANDING UP FROM CHAIR USING ARMS: A LITTLE
TURNING FROM BACK TO SIDE WHILE IN FLAT BAD: A LITTLE

## 2023-12-03 ASSESSMENT — ACTIVITIES OF DAILY LIVING (ADL): ADL_ASSISTANCE: INDEPENDENT

## 2023-12-03 ASSESSMENT — PAIN - FUNCTIONAL ASSESSMENT
PAIN_FUNCTIONAL_ASSESSMENT: 0-10

## 2023-12-03 ASSESSMENT — PAIN DESCRIPTION - LOCATION: LOCATION: ABDOMEN

## 2023-12-03 ASSESSMENT — PAIN DESCRIPTION - DESCRIPTORS
DESCRIPTORS: ACHING
DESCRIPTORS: ACHING

## 2023-12-03 NOTE — OP NOTE
EXPLORATION OF KIDNEY Operative Note     Date: 2023 - 12/3/2023  OR Location: [facility] OR    Name: Herber Leblanc YOB: 1980, Age: 43 y.o., MRN: [id number], Sex: female    Diagnosis  Pre-op Diagnosis     * Kidney replaced by transplant [Z94.0] Post-op Diagnosis     * Kidney replaced by transplant [Z94.0]     Procedures  EXPLORATION OF KIDNEY  79774 - MS EXPLORATORY LAPAROTOMY CELIOTOMY W/WO BIOPSY SPX      Surgeons      * Maryam Bhatt - Primary    Resident/Fellow/Other Assistant:  Surgeon(s) and Role:     * Don Lopez MD - Assisting    Procedure Summary  Anesthesia: General  ASA: III  Anesthesia Staff: Anesthesiologist: Chris Baltazar MD  Anesthesia Resident: Gualberto [place] DO; Naveen Jose MD  Estimated Blood Loss: 100 mL  Intra-op Medications: * No intraprocedure medications in log *           Anesthesia Record               Intraprocedure I/O Totals          Intake    NaCl 0.9 % 400.00 mL    Total Intake 400 mL       Output    Est. Blood Loss 360 mL    Total Output 360 mL       Net    Net Volume 40 mL          Specimen: No specimens collected     Staff:   Circulator: Lizz Diaz RN  Relief Circulator: Bladimir Mckay RN  Relief Scrub: Lizz Diaz RN  Scrub Person: Arcelia Justice RN         Drains and/or Catheters:   Closed/Suction Drain Right RLQ Bulb 19 Fr. (Active)   Site Description Leaking at site 23 1110   Dressing Status Clean;Dry 23 1827   Drainage Appearance Serosanguineous 23 1446   Status To bulb suction 23 2330   Output (mL) 0 mL 23 0700       Urethral Catheter Straight-tip 16 Fr. (Active)   Site Assessment Clean;Skin intact 23 1446   Collection Container Standard drainage bag 23 1446   Securement Method Securing device (Describe) 23 1446   Reason for Continuing Urinary Catheterization surgical procedures: urological/gynecological, pelvic oncology, anal, prolonged surgical procedure 23 1446   Output (mL) 10 mL 23 0412       Tourniquet Times:          Implants:  Implants       Type Name Action Serial No.      Other Cardiac Implant PATCH, TACHOSIL, 9.5CM X 4.8CM, ABSORBABLE FIBRIN SEALANT - [id number] - [id number] Implanted [id number]     Other Cardiac Implant PATCH, TACHOSIL, 9.5CM X 4.8CM, ABSORBABLE FIBRIN SEALANT - [id number] - [id number] Implanted [id number]     Other Cardiac Implant PATCH, TACHOSIL, 9.5CM X 4.8CM, ABSORBABLE FIBRIN SEALANT - [id number] - [id number] Implanted [id number]              Findings:     Subcapsular hematoma on the anterior surface of the transplant kidney  Hematoma in right lower pelvis    Indications: Herber Leblanc is an 43 y.o. female who is having surgery for exploration of right iliac fossa kidney transplant on POD 3 s/p DDKT from a DCD donor    The patient was seen in the inpatient unit on [facility]. The risks, benefits, complications, treatment options, non-operative alternatives, expected recovery and outcomes were discussed with the patient and family members who acted as an  due to language barrier. The possibilities of reaction to medication, pulmonary aspiration, injury to surrounding structures, bleeding, recurrent infection, the need for additional procedures, failure to diagnose a condition, and creating a complication requiring transfusion or operation were discussed with the patient. The patient concurred with the proposed plan, giving informed consent.      Procedure Details:   Patient was brought to the operating room and placed supine. A team huddle was performed to verify patient identify and intended procedure x2. GETA was then induced by the anesthesia team. Pre-operative antibiotics were administered.    Abdomen was prepped and draped in a sterile manner. A final time out was performed and procedure began by gaining exposure of the right iliac fossa via the previous skin incision and fascia. A large amount of hematoma was noted medial to the kidney in lower pelvis that  was evacuated and operative field washed out with sterile irrigation. We then examined the kidney allograft and noted that the entire capsule off the anterior surface was de-capsulated due to underlying hematoma. This hematoma was evacuated and diffuse oozing from the kidney surface was secured via a combination of argon coagulation, and hemostatic agents. We then examined the renal hilum and arterial and venous anastomoses which were hemostatic. Renal vein was soft and distended and renal artery had good palpable and visible pulse. Ureteral anastomosis was intact. Diffuse bleeding from raw muscle edges was also controlled. Pre-existing 19Fr Andrew drain was repositioned in the lower pelvis. Operative field was irrigated with sterile water one more time. Hemostasis was verified. Fascia was closed in single layer using a #1 Prolene. Skin incision was irrigated and re-approximated. Needle, Sponge, and Instrument count was correct x2. Patient tolerated the procedure well and brought to the PACU extubated in a stable condition.    I was present and scrubbed for the entire procedure. Dr Lopez was the first assist as No qualified resident was available to assist this potentially complex case.    Complications:  None; patient tolerated the procedure well.    Disposition: PACU - hemodynamically stable.  Condition: stable       Attending Attestation: I was present and scrubbed for the entire procedure.    Maryam Bhatt  Phone Number: [phone]

## 2023-12-03 NOTE — CARE PLAN
The patient's goals for the shift include      The clinical goals for the shift include pain score <7, increased urine output, and free from constipation.    Over the shift, the patient's had pain level of was 5/10 to 8/10. Pain interventions ongoing. Scan urine output throughout shift. Patient made 34 trips to bathroom but unable to pass BM. Oral fluids encouraged.

## 2023-12-03 NOTE — PROGRESS NOTES
Physical Therapy    Physical Therapy Evaluation    Patient Name: Herber Leblanc  MRN: [id number]  Today's Date: 12/3/2023   Time Calculation  Start Time: 1054  Stop Time: 1113  Time Calculation (min): 19 min    Assessment/Plan   PT Assessment  PT Assessment Results: Decreased strength, Decreased endurance, Impaired balance, Decreased mobility, Pain  Rehab Prognosis: Good  Evaluation/Treatment Tolerance: Patient tolerated treatment well  Strengths: Support of extended family/friends, Coping skills, Ability to acquire knowledge  Barriers to Participation: Rehab experience  End of Session Communication: Bedside nurse  Assessment Comment: Patient presents with decline in functional mobiltiy as compared to baseline. Patient could benfit from continued PT in ordert to  maximize functional mobility prior to D/C.  End of Session Patient Position: Up in chair  IP OR SWING BED PT PLAN  Inpatient or Swing Bed: Inpatient  PT Plan  Treatment/Interventions: Bed mobility, Transfer training, Gait training, Balance training, Neuromuscular re-education, Strengthening, Endurance training, Range of motion, Therapeutic exercise, Therapeutic activity, Positioning  PT Plan: Skilled PT  PT Frequency: 3 times per week  PT Discharge Recommendations: No PT needed after discharge  Equipment Recommended upon Discharge: Wheeled walker  PT Recommended Transfer Status: Assist x1, Assistive device  PT - OK to Discharge: Yes (Evaluation completed D/C recommendation made)      Subjective   General Visit Information:  General  Reason for Referral: 43 year old female s/p kidney transplant  Past Medical History Relevant to Rehab: ESRD, HTN  Family/Caregiver Present: Yes  Caregiver Feedback: brother in room to help assist with language interpretation  Prior to Session Communication: Bedside nurse  Patient Position Received: Bed, 3 rail up  Preferred Learning Style: visual  General Comment: [place] overall mobilizing relatively well post-op. Brother in room to  help interpret as patient is [place] speaking. Patient does understand some simple English and was pleasant and agreeable to PT Eval  Home Living:  Home Living  Type of Home: Apartment  Lives With: Spouse, Dependent children (Able to assist as needed)  Home Adaptive Equipment: None  Home Layout: One level  Home Access: Level entry  Prior Level of Function:  Prior Function Per Pt/Caregiver Report  Level of [place]: Independent with ADLs and functional transfers  ADL Assistance: Independent  Homemaking Assistance: Independent  Ambulatory Assistance: Independent  Precautions:  Precautions  Medical Precautions: Fall precautions  Post-Surgical Precautions: Abdominal surgery precautions      Objective   Pain:  Pain Assessment  Pain Assessment: 0-10  Pain Score: 7  Pain Type: Surgical pain  Pain Location: Abdomen  Pain Interventions: Repositioned  Response to Interventions: Improved 6/10  Cognition:  Cognition  Overall Cognitive Status: Within Functional Limits      Sensation  Light Touch: No apparent deficits    Coordination  Movements are Fluid and Coordinated: Yes         Static Sitting Balance  Static Sitting-Balance Support: Feet supported, No upper extremity supported  Static Sitting-Level of Assistance: Distant supervision  Dynamic Sitting Balance  Dynamic Sitting-Balance Support: Feet supported  Dynamic Sitting-Balance: Reaching for objects  Dynamic Sitting-Comments: SBA    Static Standing Balance  Static Standing-Balance Support: No upper extremity supported  Static Standing-Level of Assistance: Close supervision  Dynamic Standing Balance  Dynamic Standing-Balance Support: No upper extremity supported  Dynamic Standing-Balance: Lateral lean, Reaching for objects  Dynamic Standing-Comments: SBA  Functional Assessments:  Bed Mobility  Bed Mobility: Yes  Bed Mobility 1  Bed Mobility 1: Supine to sitting (via log roll)  Level of Assistance 1: Moderate assistance, Maximum verbal cues, Maximum tactile cues  Bed  Mobility Comments 1: Cues for sequencing and technique. Mod A at trunk    Transfers  Transfer: Yes  Transfer 1  Transfer From 1: Sit to  Transfer to 1: Stand  Transfer Device 1: Walker  Transfer Level of Assistance 1: Close supervision, Minimal verbal cues, Minimal tactile cues  Transfers 2  Transfer From 2: Stand to  Transfer to 2: Sit  Transfer Device 2: Walker  Transfer Level of Assistance 2: Close supervision, Minimal verbal cues, Minimal tactile cues    Ambulation/Gait Training  Ambulation/Gait Training Performed: Yes  Ambulation/Gait Training 1  Device 1: Rolling walker  Assistance 1: Close supervision  Quality of Gait 1: Decreased step length, Soft knee(s) (decreased meg)  Comments/Distance (ft) 1: 200  Extremity/Trunk Assessments:  RUE   RUE : Within Functional Limits  LUE   LUE: Within Functional Limits  RLE   RLE : Within Functional Limits  LLE   LLE : Within Functional Limits  Outcome Measures:  [facility] Basic Mobility  Turning from your back to your side while in a flat bed without using bedrails: A little  Moving from lying on your back to sitting on the side of a flat bed without using bedrails: A lot  Moving to and from bed to chair (including a wheelchair): A little  Standing up from a chair using your arms (e.g. wheelchair or bedside chair): A little  To walk in hospital room: A little  Climbing 3-5 steps with railing: A little  Basic Mobility - Total Score: 17    Encounter Problems       Encounter Problems (Active)       Mobility       STG - Patient will ambulate community distance independent  (Progressing)       Start:  12/03/23    Expected End:  12/17/23               Pain - Adult          Transfers       STG - Transfer from bed to chair independent  (Progressing)       Start:  12/03/23    Expected End:  12/17/23            STG - Patient will perform bed mobility independent  (Progressing)       Start:  12/03/23    Expected End:  12/17/23            STG - Patient will transfer sit to and from  stand independent  (Progressing)       Start:  12/03/23    Expected End:  12/17/23                   Education Documentation  Precautions, taught by Diana Collier, PT at 12/3/2023  2:44 PM.  Learner: Patient  Readiness: Acceptance  Method: Explanation, Demonstration  Response: Verbalizes Understanding, Demonstrated Understanding  Comment: Patient educated on abdominal precautions    Education Comments  No comments found.

## 2023-12-03 NOTE — PROGRESS NOTES
Transplant Nephrology progress note     Date of admission: 11/30/2023     Herber Leblanc is a 43 y.o.  with PMH   Past Medical History:   Diagnosis Date    Chronic kidney disease, unspecified     CKD, patient interested in transplantation    Personal history of COVID-19 07/20/2022    History of COVID-19        SUBJECTIVE: Complained of pain in the right labial area where the hematoma is tracking.  Urine output in last 24 hours is only 25 cc.  Planning to repeat ultrasound this afternoon and evaluate for reexploration due to hematoma.    PROBLEM LIST:  Principal Problem:    ESRD (end stage renal disease) (CMS/[facility])  Active Problems:    HTN (hypertension)    Gastroesophageal reflux disease    Kidney replaced by transplant         ALLERGIES:  No Known Allergies         CURRENT MEDICATIONS:  Scheduled medications  antithymocyte globulin (rabbit), 1.5 mg/kg, intravenous, Once  bisacodyl, 10 mg, rectal, Daily  ceFAZolin, 2 g, intravenous, Once  clotrimazole, 10 mg, oral, TID after meals  darbepoetin jacob, 100 mcg, subcutaneous, Weekly  insulin regular, 10 Units, intravenous, Once   Followed by  dextrose, 25 g, intravenous, Once  docusate sodium, 100 mg, oral, BID  levothyroxine, 25 mcg, oral, Daily before breakfast  mirtazapine, 7.5 mg, oral, Nightly  mycophenolate, 1,000 mg, oral, BID  pantoprazole, 40 mg, oral, BID AC  polyethylene glycol, 17 g, oral, Daily  [START ON 12/4/2023] predniSONE, 20 mg, oral, Daily  sodium bicarbonate, 650 mg, oral, TID  sulfamethoxazole-trimethoprim, 80 mg, oral, Daily  tacrolimus, 1 mg, oral, q12h JANIE  tenofovir alafenamide, 25 mg, oral, Daily  valGANciclovir, 450 mg, oral, q48h      Continuous medications  sodium chloride 0.9%, 30 mL/hr, Last Rate: 30 mL/hr (12/03/23 0601)      PRN medications  PRN medications: acetaminophen, diphenhydrAMINE, melatonin, naloxone, ondansetron, oxyCODONE, oxyCODONE, oxygen       OBJECTIVE:    VITALS: Visit Vitals  /78 (BP Location: Right arm, Patient Position:  "Lying)   Pulse 86   Temp 36.3 °C (97.3 °F) (Temporal)   Resp 18   Ht 1.626 m (5' 4\")   Wt 80.6 kg (177 lb 11.1 oz)   SpO2 96%   BMI 30.50 kg/m²   Smoking Status Never   BSA 1.91 m²     Physical:  HEENT: normal   Lungs : clear  CVS s1,s2  Abdomen : sugical sight looks clean with dressing and staples intact .  Rain cathter is in .  +1 EDEMA        LABS:  Results from last 72 hours   Lab Units 12/03/23  0643 12/03/23  0638 12/02/23  2333 12/02/23  0616   WBC AUTO x10*3/uL 9.5  --    < > 13.2*   HEMOGLOBIN g/dL 7.8*  --    < > 8.9*   MCV fL 101*  --    < > 96   PLATELETS AUTO x10*3/uL 55*  --    < > 90*   BUN mg/dL  --  77*  --  48*   CREATININE mg/dL  --  9.77*  --  8.19*   CALCIUM mg/dL  --  6.6*  --  7.3*   TACROLIMUS ng/mL  --   --   --  2.4    < > = values in this interval not displayed.              Intake/Output Summary (Last 24 hours) at 12/3/2023 1236  Last data filed at 12/3/2023 0700  Gross per 24 hour   Intake 2297 ml   Output 47.5 ml   Net 2249.5 ml            ASSESSMENT AND PLAN:    Herber Leblanc is a 43 y.o. with a history of ESRD secondary to hypertension and chronic NSAID use s/p desceased kidney transplant on 11/30/2023.  She was on dialysis since 2018 Monday Wednesday Friday via left upper extremity AV fistula.  Nephrology consulted for comanagement of index transplant.    Kidney information: KDPI is a 56%, PRA 48%, 6 antigen mismatch, CMV status donor plus/R+, EBV D+/R+, hepatitis C viral negative.    Allograft function: DGF secondary to hyperkalemia s/p dialysis on 12/1/2023.  Electrolytes reviewed seems to be stable no acute indication today.  -Current access is left upper extremity AV fistula.  -Ultrasound transplant showing nonobstructive renal calculus , perinephric collections/hematoma increased in size.  Planning repeat ultrasound and likely exploration this afternoon.  -Monitor I's and O's very closely and avoid hypotension.    Immunosuppression: Induction by Thymoglobulin total 4.5 mg/kg.  Today " is day 3 we will be doing half the dose of Day3 in the setting of low platelet counts.  -Maintaining triple immunosuppression.  Aim tacrolimus levels 8-10.    Hemodynamics: Blood pressures currently optimal we will continue to monitor.    Infectious prophylaxis: Patient is positive for hepatitis B core positive started on Vemlidy.  Started on clotrimazole, Bactrim and Valcyte.    Anemia/leukocytosis; s/p platelet transfusion this morning.  Will also check haptoglobin LDH and liver function test.    Bone mineral disease: Calcium levels are running low s/p IV calcium this morning.  Will check vitamin D and PTH levels.      Thank you for consulting .  Billie Mcfarland MD

## 2023-12-03 NOTE — ANESTHESIA POSTPROCEDURE EVALUATION
Patient: Herber Leblanc    Procedure Summary       Date: 12/03/23 Room / Location: [facility] OR 17 / [facility] OR    Anesthesia Start: 1707 Anesthesia Stop: 1848    Procedure: EXPLORATION OF KIDNEY (Abdomen) Diagnosis:       Kidney replaced by transplant      (Kidney replaced by transplant [Z94.0])    Surgeons: Maryam Bhatt MD Responsible Provider: Chris Baltazar MD    Anesthesia Type: general ASA Status: 3            Anesthesia Type: general    Vitals Value Taken Time   /70 12/03/23 1848   Temp 36 12/03/23 1848   Pulse 86 12/03/23 1848   Resp 16 12/03/23 1848   SpO2 100 12/03/23 1848       Anesthesia Post Evaluation    Patient location during evaluation: PACU  Patient participation: complete - patient participated  Level of consciousness: awake and alert  Pain score: 2  Pain management: adequate  Airway patency: patent  Cardiovascular status: acceptable  Respiratory status: acceptable  Hydration status: acceptable  Postoperative Nausea and Vomiting: none        No notable events documented.

## 2023-12-03 NOTE — PROGRESS NOTES
Herber Leblanc is a 43 y.o. y.o. female POD#2 from DDKT from a DCD donor. Thin bladder; 5-7 day Rain. Expected hyperkalemia/DGF.    C/o right lower pelvic/ labial pain  US followed by CT of the transplant kidney shows subcapsular and kennedy-ureteral hematoma    Objective   Gen: A+OX3; NAD  HEENT: PERRL, sclera anicteric, MMM  Cardiac: RRR  Chest: Normal inspiratory effort  Abdomen: S/NT/ND. Incision C/D/I.  Ext: No LE edema  Drain: serosanguinous    Last Recorded Vitals  Visit Vitals  /78 (BP Location: Right arm, Patient Position: Lying)   Pulse 86   Temp 36.3 °C (97.3 °F) (Temporal)   Resp 18      Intake/Output last 3 Shifts:    Intake/Output Summary (Last 24 hours) at 12/3/2023 1257  Last data filed at 12/3/2023 0700  Gross per 24 hour   Intake 2297 ml   Output 47.5 ml   Net 2249.5 ml        Vitals:    12/03/23 0412   Weight: 80.6 kg (177 lb 11.1 oz)        Scheduled medications  antithymocyte globulin (rabbit), 1.5 mg/kg, intravenous, Once  bisacodyl, 10 mg, rectal, Daily  ceFAZolin, 2 g, intravenous, Once  clotrimazole, 10 mg, oral, TID after meals  darbepoetin jacob, 100 mcg, subcutaneous, Weekly  insulin regular, 10 Units, intravenous, Once   Followed by  dextrose, 25 g, intravenous, Once  docusate sodium, 100 mg, oral, BID  levothyroxine, 25 mcg, oral, Daily before breakfast  mirtazapine, 7.5 mg, oral, Nightly  mycophenolate, 1,000 mg, oral, BID  pantoprazole, 40 mg, oral, BID AC  polyethylene glycol, 17 g, oral, Daily  [START ON 12/4/2023] predniSONE, 20 mg, oral, Daily  sodium bicarbonate, 650 mg, oral, TID  sulfamethoxazole-trimethoprim, 80 mg, oral, Daily  tacrolimus, 1 mg, oral, q12h JANIE  tenofovir alafenamide, 25 mg, oral, Daily  valGANciclovir, 450 mg, oral, q48h      Continuous medications  sodium chloride 0.9%, 30 mL/hr, Last Rate: 30 mL/hr (12/03/23 0601)    PRN medications  PRN medications: acetaminophen, diphenhydrAMINE, melatonin, naloxone, ondansetron, oxyCODONE, oxyCODONE, oxygen      Assessment/Plan   Principal Problem:    Kidney transplant recipient  Active Problems:  Patient Active Problem List   Diagnosis    ESRD (end stage renal disease) (CMS/[facility])    HTN (hypertension)    Gastroesophageal reflux disease    Kidney replaced by transplant      - Return to OR today for exploration of kidney transplant and washout  - 2 packs platelet this morning    - Plan Thymoglobulin 4.5 mg/kg; 2nd dose tomorrow  - Tacrolimus goal 8-10 ng/mL; started 1 mg bid with slow ramp up to therapeutic levels over next 4-5 days  - Solumedrol taper  - No Heparin subcutaneous due to diffuse donor kidney capsular oozing intra-op  - Thin bladder; 5-7 day Rain.   - Trend Lactate - normalized  - PT/OT  - Renal diet  - Home meds: synthroid and mirtazapine  - Recipient HBV core AB+ with low titers so plan samy MEYER spent 35 minutes in the professional and overall care of this patient, including immunosuppression management.    Maryam Bhatt MD, [facility]  Transplant & Hepatobiliary Surgery

## 2023-12-03 NOTE — SIGNIFICANT EVENT
Nikia was used as witness during obtaining of consent with interpretor.     Canelo Ray   General Surgery  PGY1

## 2023-12-03 NOTE — ANESTHESIA PROCEDURE NOTES
Airway  Date/Time: 12/3/2023 5:22 PM  Urgency: elective    Airway not difficult    Staffing  Performed: resident   Authorized by: Gualberto Cleveland DO    Performed by: Gualberto Cleveland DO  Patient location during procedure: OR    Indications and Patient Condition  Indications for airway management: anesthesia  Spontaneous ventilation: present  Sedation level: deep  Preoxygenated: yes  Patient position: sniffing  MILS not maintained throughout  Mask difficulty assessment: 1 - vent by mask    Final Airway Details  Final airway type: endotracheal airway      Successful airway: ETT  Cuffed: yes   Successful intubation technique: direct laryngoscopy  Facilitating devices/methods: intubating stylet  Blade: Jose  Blade size: #4  ETT size (mm): 7.0  Cormack-Lehane Classification: grade I - full view of glottis  Placement verified by: chest auscultation   Measured from: lips  ETT to lips (cm): 23  Number of attempts at approach: 1  Number of other approaches attempted: 0

## 2023-12-04 ENCOUNTER — PHARMACY VISIT (OUTPATIENT)
Dept: PHARMACY | Facility: CLINIC | Age: 43
End: 2023-12-04
Payer: COMMERCIAL

## 2023-12-04 ENCOUNTER — APPOINTMENT (OUTPATIENT)
Dept: RADIOLOGY | Facility: HOSPITAL | Age: 43
DRG: 650 | End: 2023-12-04
Payer: COMMERCIAL

## 2023-12-04 ENCOUNTER — APPOINTMENT (OUTPATIENT)
Dept: DIALYSIS | Facility: HOSPITAL | Age: 43
End: 2023-12-04
Payer: COMMERCIAL

## 2023-12-04 LAB
ALBUMIN SERPL BCP-MCNC: 3 G/DL (ref 3.4–5)
ANION GAP SERPL CALC-SCNC: 18 MMOL/L (ref 10–20)
BLOOD EXPIRATION DATE: NORMAL
BUN SERPL-MCNC: 31 MG/DL (ref 6–23)
CALCIUM SERPL-MCNC: 7.3 MG/DL (ref 8.6–10.6)
CFT BLD TEG: 2 MIN (ref 0.8–2.1)
CHLORIDE SERPL-SCNC: 99 MMOL/L (ref 98–107)
CLOT ANGLE BLD TEG: 71.9 DEG (ref 63–78)
CLOT INIT BLD TEG: 5.1 MIN (ref 4.6–9.1)
CLOT INIT P HPASE BLD TEG: 4.9 MIN (ref 4.3–8.3)
CO2 SERPL-SCNC: 24 MMOL/L (ref 21–32)
CREAT SERPL-MCNC: 4.75 MG/DL (ref 0.5–1.05)
DISPENSE STATUS: NORMAL
ERYTHROCYTE [DISTWIDTH] IN BLOOD BY AUTOMATED COUNT: 13.2 % (ref 11.5–14.5)
ERYTHROCYTE [DISTWIDTH] IN BLOOD BY AUTOMATED COUNT: 14.3 % (ref 11.5–14.5)
FIBRINOGEN BLD CALC-MCNC: 314 MG/DL (ref 278–581)
GFR SERPL CREATININE-BSD FRML MDRD: 11 ML/MIN/1.73M*2
GLUCOSE SERPL-MCNC: 95 MG/DL (ref 74–99)
HAPTOGLOB SERPL-MCNC: 31 MG/DL (ref 37–246)
HCT VFR BLD AUTO: 16 % (ref 36–46)
HCT VFR BLD AUTO: 22.4 % (ref 36–46)
HGB BLD-MCNC: 5.2 G/DL (ref 12–16)
HGB BLD-MCNC: 7.7 G/DL (ref 12–16)
MAGNESIUM SERPL-MCNC: 2.39 MG/DL (ref 1.6–2.4)
MCF BLD TEG: 17.2 MM (ref 15–32)
MCF BLD TEG: 54.3 MM (ref 52–70)
MCF BLD TEG: 55.5 MM (ref 52–69)
MCH RBC QN AUTO: 29.6 PG (ref 26–34)
MCH RBC QN AUTO: 31.1 PG (ref 26–34)
MCHC RBC AUTO-ENTMCNC: 32.5 G/DL (ref 32–36)
MCHC RBC AUTO-ENTMCNC: 34.4 G/DL (ref 32–36)
MCV RBC AUTO: 86 FL (ref 80–100)
MCV RBC AUTO: 96 FL (ref 80–100)
NRBC BLD-RTO: 0 /100 WBCS (ref 0–0)
NRBC BLD-RTO: 0 /100 WBCS (ref 0–0)
PHOSPHATE SERPL-MCNC: 3.6 MG/DL (ref 2.5–4.9)
PLATELET # BLD AUTO: 58 X10*3/UL (ref 150–450)
PLATELET # BLD AUTO: 59 X10*3/UL (ref 150–450)
POTASSIUM SERPL-SCNC: 3.3 MMOL/L (ref 3.5–5.3)
PRODUCT BLOOD TYPE: 7300
PRODUCT CODE: NORMAL
RBC # BLD AUTO: 1.67 X10*6/UL (ref 4–5.2)
RBC # BLD AUTO: 2.6 X10*6/UL (ref 4–5.2)
SODIUM SERPL-SCNC: 138 MMOL/L (ref 136–145)
TACROLIMUS BLD-MCNC: 9.9 NG/ML
UNIT ABO: NORMAL
UNIT NUMBER: NORMAL
UNIT RH: NORMAL
UNIT VOLUME: 350
UNIT VOLUME: 350
UNIT VOLUME: 360
WBC # BLD AUTO: 2.4 X10*3/UL (ref 4.4–11.3)
WBC # BLD AUTO: 4 X10*3/UL (ref 4.4–11.3)
XM INTEP: NORMAL
XM INTEP: NORMAL

## 2023-12-04 PROCEDURE — RXMED WILLOW AMBULATORY MEDICATION CHARGE

## 2023-12-04 PROCEDURE — 2500000001 HC RX 250 WO HCPCS SELF ADMINISTERED DRUGS (ALT 637 FOR MEDICARE OP)

## 2023-12-04 PROCEDURE — 2500000001 HC RX 250 WO HCPCS SELF ADMINISTERED DRUGS (ALT 637 FOR MEDICARE OP): Performed by: STUDENT IN AN ORGANIZED HEALTH CARE EDUCATION/TRAINING PROGRAM

## 2023-12-04 PROCEDURE — 87040 BLOOD CULTURE FOR BACTERIA: CPT | Performed by: STUDENT IN AN ORGANIZED HEALTH CARE EDUCATION/TRAINING PROGRAM

## 2023-12-04 PROCEDURE — 80069 RENAL FUNCTION PANEL: CPT | Performed by: STUDENT IN AN ORGANIZED HEALTH CARE EDUCATION/TRAINING PROGRAM

## 2023-12-04 PROCEDURE — 2500000004 HC RX 250 GENERAL PHARMACY W/ HCPCS (ALT 636 FOR OP/ED)

## 2023-12-04 PROCEDURE — 2500000004 HC RX 250 GENERAL PHARMACY W/ HCPCS (ALT 636 FOR OP/ED): Performed by: STUDENT IN AN ORGANIZED HEALTH CARE EDUCATION/TRAINING PROGRAM

## 2023-12-04 PROCEDURE — 83735 ASSAY OF MAGNESIUM: CPT | Performed by: STUDENT IN AN ORGANIZED HEALTH CARE EDUCATION/TRAINING PROGRAM

## 2023-12-04 PROCEDURE — A4217 STERILE WATER/SALINE, 500 ML: HCPCS | Performed by: STUDENT IN AN ORGANIZED HEALTH CARE EDUCATION/TRAINING PROGRAM

## 2023-12-04 PROCEDURE — 76776 US EXAM K TRANSPL W/DOPPLER: CPT

## 2023-12-04 PROCEDURE — 36415 COLL VENOUS BLD VENIPUNCTURE: CPT | Performed by: STUDENT IN AN ORGANIZED HEALTH CARE EDUCATION/TRAINING PROGRAM

## 2023-12-04 PROCEDURE — 80197 ASSAY OF TACROLIMUS: CPT | Performed by: STUDENT IN AN ORGANIZED HEALTH CARE EDUCATION/TRAINING PROGRAM

## 2023-12-04 PROCEDURE — P9016 RBC LEUKOCYTES REDUCED: HCPCS

## 2023-12-04 PROCEDURE — 99232 SBSQ HOSP IP/OBS MODERATE 35: CPT | Performed by: STUDENT IN AN ORGANIZED HEALTH CARE EDUCATION/TRAINING PROGRAM

## 2023-12-04 PROCEDURE — 85384 FIBRINOGEN ACTIVITY: CPT | Performed by: STUDENT IN AN ORGANIZED HEALTH CARE EDUCATION/TRAINING PROGRAM

## 2023-12-04 PROCEDURE — 85027 COMPLETE CBC AUTOMATED: CPT

## 2023-12-04 PROCEDURE — 8010000001 HC DIALYSIS - HEMODIALYSIS PER DAY

## 2023-12-04 PROCEDURE — 76776 US EXAM K TRANSPL W/DOPPLER: CPT | Performed by: RADIOLOGY

## 2023-12-04 PROCEDURE — 1200000002 HC GENERAL ROOM WITH TELEMETRY DAILY

## 2023-12-04 PROCEDURE — 85027 COMPLETE CBC AUTOMATED: CPT | Performed by: STUDENT IN AN ORGANIZED HEALTH CARE EDUCATION/TRAINING PROGRAM

## 2023-12-04 PROCEDURE — 36430 TRANSFUSION BLD/BLD COMPNT: CPT

## 2023-12-04 RX ORDER — DOCUSATE SODIUM 100 MG/1
100 CAPSULE, LIQUID FILLED ORAL 2 TIMES DAILY PRN
Qty: 60 CAPSULE | Refills: 0 | Status: SHIPPED | OUTPATIENT
Start: 2023-12-04 | End: 2024-01-08 | Stop reason: ALTCHOICE

## 2023-12-04 RX ORDER — VALGANCICLOVIR 450 MG/1
450 TABLET, FILM COATED ORAL
Qty: 15 TABLET | Refills: 0 | Status: SHIPPED | OUTPATIENT
Start: 2023-12-05 | End: 2023-12-14 | Stop reason: SDUPTHER

## 2023-12-04 RX ORDER — TACROLIMUS 1 MG/1
1 CAPSULE ORAL 2 TIMES DAILY
Qty: 60 CAPSULE | Refills: 0 | Status: SHIPPED | OUTPATIENT
Start: 2023-12-04 | End: 2023-12-14 | Stop reason: HOSPADM

## 2023-12-04 RX ORDER — ACETAMINOPHEN 325 MG/1
650 TABLET ORAL EVERY 6 HOURS SCHEDULED
Status: COMPLETED | OUTPATIENT
Start: 2023-12-04 | End: 2023-12-05

## 2023-12-04 RX ORDER — VANCOMYCIN HYDROCHLORIDE 1 G/20ML
INJECTION, POWDER, LYOPHILIZED, FOR SOLUTION INTRAVENOUS DAILY PRN
Status: DISCONTINUED | OUTPATIENT
Start: 2023-12-04 | End: 2023-12-05

## 2023-12-04 RX ORDER — HYDROMORPHONE HYDROCHLORIDE 1 MG/ML
0.2 INJECTION, SOLUTION INTRAMUSCULAR; INTRAVENOUS; SUBCUTANEOUS
Status: DISCONTINUED | OUTPATIENT
Start: 2023-12-04 | End: 2023-12-04

## 2023-12-04 RX ORDER — PANTOPRAZOLE SODIUM 40 MG/1
40 TABLET, DELAYED RELEASE ORAL
Qty: 30 TABLET | Refills: 0 | Status: SHIPPED | OUTPATIENT
Start: 2023-12-04 | End: 2024-01-08 | Stop reason: SDUPTHER

## 2023-12-04 RX ORDER — SULFAMETHOXAZOLE AND TRIMETHOPRIM 400; 80 MG/1; MG/1
1 TABLET ORAL DAILY
Qty: 30 TABLET | Refills: 0 | Status: SHIPPED | OUTPATIENT
Start: 2023-12-04 | End: 2023-12-14 | Stop reason: SDUPTHER

## 2023-12-04 RX ORDER — TACROLIMUS 0.5 MG/1
1 CAPSULE, GELATIN COATED ORAL 2 TIMES DAILY
Qty: 120 CAPSULE | Refills: 0 | Status: SHIPPED | OUTPATIENT
Start: 2023-12-04 | End: 2023-12-14 | Stop reason: HOSPADM

## 2023-12-04 RX ORDER — TACROLIMUS 1 MG/1
2 CAPSULE ORAL
Status: DISCONTINUED | OUTPATIENT
Start: 2023-12-04 | End: 2023-12-05

## 2023-12-04 RX ORDER — POLYETHYLENE GLYCOL 3350 17 G/17G
17 POWDER, FOR SOLUTION ORAL DAILY
Status: DISCONTINUED | OUTPATIENT
Start: 2023-12-04 | End: 2023-12-04

## 2023-12-04 RX ORDER — LIDOCAINE HYDROCHLORIDE 20 MG/ML
1 JELLY TOPICAL ONCE
Status: COMPLETED | OUTPATIENT
Start: 2023-12-04 | End: 2023-12-04

## 2023-12-04 RX ORDER — BISACODYL 5 MG
10 TABLET, DELAYED RELEASE (ENTERIC COATED) ORAL DAILY
Status: DISCONTINUED | OUTPATIENT
Start: 2023-12-05 | End: 2023-12-14 | Stop reason: HOSPADM

## 2023-12-04 RX ORDER — POLYETHYLENE GLYCOL 3350 17 G/17G
17 POWDER, FOR SOLUTION ORAL DAILY
Qty: 30 PACKET | Refills: 0 | Status: SHIPPED | OUTPATIENT
Start: 2023-12-04 | End: 2024-01-08 | Stop reason: ALTCHOICE

## 2023-12-04 RX ORDER — PREDNISONE 20 MG/1
20 TABLET ORAL DAILY
Qty: 30 TABLET | Refills: 0 | Status: SHIPPED | OUTPATIENT
Start: 2023-12-04 | End: 2023-12-08 | Stop reason: SDUPTHER

## 2023-12-04 RX ORDER — MYCOPHENOLATE MOFETIL 250 MG/1
1000 CAPSULE ORAL 2 TIMES DAILY
Qty: 240 CAPSULE | Refills: 0 | Status: SHIPPED | OUTPATIENT
Start: 2023-12-04 | End: 2023-12-14 | Stop reason: SDUPTHER

## 2023-12-04 RX ORDER — METHOCARBAMOL 500 MG/1
500 TABLET, FILM COATED ORAL EVERY 6 HOURS SCHEDULED
Status: DISCONTINUED | OUTPATIENT
Start: 2023-12-04 | End: 2023-12-08

## 2023-12-04 RX ORDER — CLOTRIMAZOLE 10 MG/1
10 LOZENGE ORAL; TOPICAL
Qty: 90 TABLET | Refills: 0 | Status: SHIPPED | OUTPATIENT
Start: 2023-12-04 | End: 2023-12-14 | Stop reason: SDUPTHER

## 2023-12-04 RX ADMIN — LIDOCAINE HYDROCHLORIDE 1 APPLICATION: 20 JELLY TOPICAL at 18:15

## 2023-12-04 RX ADMIN — HYDROMORPHONE HYDROCHLORIDE 0.2 MG: 1 INJECTION, SOLUTION INTRAMUSCULAR; INTRAVENOUS; SUBCUTANEOUS at 12:27

## 2023-12-04 RX ADMIN — PANTOPRAZOLE SODIUM 40 MG: 40 TABLET, DELAYED RELEASE ORAL at 07:50

## 2023-12-04 RX ADMIN — PANTOPRAZOLE SODIUM 40 MG: 40 TABLET, DELAYED RELEASE ORAL at 16:54

## 2023-12-04 RX ADMIN — VANCOMYCIN HYDROCHLORIDE 1250 MG: 5 INJECTION, POWDER, LYOPHILIZED, FOR SOLUTION INTRAVENOUS at 16:54

## 2023-12-04 RX ADMIN — ONDANSETRON 4 MG: 2 INJECTION INTRAMUSCULAR; INTRAVENOUS at 05:20

## 2023-12-04 RX ADMIN — CLOTRIMAZOLE 10 MG: 10 LOZENGE ORAL; TOPICAL at 18:14

## 2023-12-04 RX ADMIN — LEVOTHYROXINE SODIUM 25 MCG: 50 TABLET ORAL at 07:50

## 2023-12-04 RX ADMIN — OXYCODONE HYDROCHLORIDE 10 MG: 5 TABLET ORAL at 22:22

## 2023-12-04 RX ADMIN — ACETAMINOPHEN 650 MG: 325 TABLET ORAL at 18:14

## 2023-12-04 RX ADMIN — SODIUM BICARBONATE 650 MG TABLET 650 MG: at 16:54

## 2023-12-04 RX ADMIN — POLYETHYLENE GLYCOL 3350 17 G: 17 POWDER, FOR SOLUTION ORAL at 12:28

## 2023-12-04 RX ADMIN — CLOTRIMAZOLE 10 MG: 10 LOZENGE ORAL; TOPICAL at 12:27

## 2023-12-04 RX ADMIN — PREDNISONE 20 MG: 10 TABLET ORAL at 12:27

## 2023-12-04 RX ADMIN — ACETAMINOPHEN 650 MG: 325 TABLET ORAL at 12:26

## 2023-12-04 RX ADMIN — TACROLIMUS 2 MG: 1 CAPSULE ORAL at 18:14

## 2023-12-04 RX ADMIN — SODIUM BICARBONATE 650 MG TABLET 650 MG: at 12:26

## 2023-12-04 RX ADMIN — ACETAMINOPHEN 650 MG: 325 TABLET ORAL at 07:50

## 2023-12-04 RX ADMIN — MIRTAZAPINE 7.5 MG: 7.5 TABLET, FILM COATED ORAL at 20:50

## 2023-12-04 RX ADMIN — MYCOPHENOLATE MOFETIL 1000 MG: 250 CAPSULE ORAL at 12:26

## 2023-12-04 RX ADMIN — MYCOPHENOLATE MOFETIL 1000 MG: 250 CAPSULE ORAL at 20:50

## 2023-12-04 RX ADMIN — SULFAMETHOXAZOLE AND TRIMETHOPRIM 80 MG: 400; 80 TABLET ORAL at 12:27

## 2023-12-04 RX ADMIN — METHOCARBAMOL 500 MG: 500 TABLET ORAL at 12:27

## 2023-12-04 RX ADMIN — DOCUSATE SODIUM 100 MG: 100 CAPSULE, LIQUID FILLED ORAL at 20:50

## 2023-12-04 RX ADMIN — TENOFOVIR ALAFENAMIDE 25 MG: 25 TABLET ORAL at 12:27

## 2023-12-04 RX ADMIN — SODIUM BICARBONATE 650 MG TABLET 650 MG: at 20:50

## 2023-12-04 RX ADMIN — OXYCODONE HYDROCHLORIDE 10 MG: 5 TABLET ORAL at 18:14

## 2023-12-04 RX ADMIN — OXYCODONE HYDROCHLORIDE 10 MG: 5 TABLET ORAL at 05:21

## 2023-12-04 RX ADMIN — DIPHENHYDRAMINE HYDROCHLORIDE 12.5 MG: 50 INJECTION INTRAMUSCULAR; INTRAVENOUS at 10:51

## 2023-12-04 RX ADMIN — MEROPENEM 500 MG: 500 INJECTION, POWDER, FOR SOLUTION INTRAVENOUS at 12:27

## 2023-12-04 RX ADMIN — BISACODYL 10 MG: 10 SUPPOSITORY RECTAL at 05:29

## 2023-12-04 RX ADMIN — DOCUSATE SODIUM 100 MG: 100 CAPSULE, LIQUID FILLED ORAL at 12:26

## 2023-12-04 RX ADMIN — HYDROMORPHONE HYDROCHLORIDE 0.2 MG: 1 INJECTION, SOLUTION INTRAMUSCULAR; INTRAVENOUS; SUBCUTANEOUS at 07:37

## 2023-12-04 RX ADMIN — TACROLIMUS 2.5 MG: 1 CAPSULE ORAL at 07:00

## 2023-12-04 RX ADMIN — METHOCARBAMOL 500 MG: 500 TABLET ORAL at 18:15

## 2023-12-04 ASSESSMENT — PAIN DESCRIPTION - LOCATION
LOCATION: ABDOMEN
LOCATION: INCISION
LOCATION: ABDOMEN

## 2023-12-04 ASSESSMENT — PAIN - FUNCTIONAL ASSESSMENT
PAIN_FUNCTIONAL_ASSESSMENT: 0-10
PAIN_FUNCTIONAL_ASSESSMENT: NO/DENIES PAIN

## 2023-12-04 ASSESSMENT — COGNITIVE AND FUNCTIONAL STATUS - GENERAL
DAILY ACTIVITIY SCORE: 17
TOILETING: A LITTLE
DRESSING REGULAR LOWER BODY CLOTHING: A LOT
WALKING IN HOSPITAL ROOM: A LITTLE
HELP NEEDED FOR BATHING: A LOT
DAILY ACTIVITIY SCORE: 17
STANDING UP FROM CHAIR USING ARMS: A LITTLE
DRESSING REGULAR LOWER BODY CLOTHING: A LOT
DRESSING REGULAR UPPER BODY CLOTHING: A LOT
WALKING IN HOSPITAL ROOM: A LITTLE
MOBILITY SCORE: 16
TURNING FROM BACK TO SIDE WHILE IN FLAT BAD: A LITTLE
TURNING FROM BACK TO SIDE WHILE IN FLAT BAD: A LITTLE
MOVING FROM LYING ON BACK TO SITTING ON SIDE OF FLAT BED WITH BEDRAILS: A LITTLE
CLIMB 3 TO 5 STEPS WITH RAILING: TOTAL
MOVING FROM LYING ON BACK TO SITTING ON SIDE OF FLAT BED WITH BEDRAILS: A LITTLE
HELP NEEDED FOR BATHING: A LOT
MOVING TO AND FROM BED TO CHAIR: A LITTLE
TOILETING: A LITTLE
CLIMB 3 TO 5 STEPS WITH RAILING: TOTAL
MOVING TO AND FROM BED TO CHAIR: A LITTLE
DRESSING REGULAR UPPER BODY CLOTHING: A LOT

## 2023-12-04 ASSESSMENT — PAIN SCALES - WONG BAKER: WONGBAKER_NUMERICALRESPONSE: HURTS WHOLE LOT

## 2023-12-04 ASSESSMENT — PAIN SCALES - GENERAL
PAINLEVEL_OUTOF10: 10 - WORST POSSIBLE PAIN
PAINLEVEL_OUTOF10: 0 - NO PAIN
PAINLEVEL_OUTOF10: 7
PAINLEVEL_OUTOF10: 8
PAINLEVEL_OUTOF10: 10 - WORST POSSIBLE PAIN
PAINLEVEL_OUTOF10: 6
PAINLEVEL_OUTOF10: 7

## 2023-12-04 ASSESSMENT — PAIN DESCRIPTION - ORIENTATION
ORIENTATION: RIGHT;LOWER
ORIENTATION: RIGHT;LOWER

## 2023-12-04 ASSESSMENT — PAIN DESCRIPTION - DESCRIPTORS
DESCRIPTORS: ACHING;DISCOMFORT
DESCRIPTORS: ACHING;DISCOMFORT

## 2023-12-04 NOTE — CARE PLAN
The patient's goals for the shift include      The clinical goals for the shift include pain level <7 and ability to have bowel movement.    Over the shift, the patient has reported pain 7/10 majority of shift and constipation. Barriers to progression include surgical procedure in less than 24 hrs and factors leading to constipation: immobility, npo, pain management with opioid. Recommendations to address these barriers include increasing mobility, encourage oral fluid intake once patient is no longer npo and request and administer enema if patient is unable to have bowel movement by the end of the shift.

## 2023-12-04 NOTE — PROGRESS NOTES
Discharge Planning Note:      Herber Leblanc is a 43 y.o. female on day 4 of admission presenting with ESRD (end stage renal disease) (CMS/[facility]).        Spoke with family today in room informed of recommendations of from therapy for SNF. Declined will take home with homecare preference [facility]. Wants HHA, nurse and therapy. Has HHA hours in the community, unsure of the name will call this TCC back for follow up.       Ariane Griggs RN TCC

## 2023-12-04 NOTE — SIGNIFICANT EVENT
POST-OP CHECK NOTE    Subjective  Ms. Herber Leblanc is a 42 yo F POD 3 s/p DDKT now POD 0 s/p exploration of transplanted kidney and washout of hematoma. She tolerated the procedure well. Pain is well controlled on current pain regimen. No complaints of headaches, N/V, chest pain, SOB.      -Drains : x1 RLQ RUSSELL drain  -Curran       Objective  Vitals:    12/03/23 2338   BP: 147/85   Pulse: 84   Resp: 15   Temp: 36.7 °C (98.1 °F)   SpO2: 93%       Physical Exam:  General: laying bed, in NAD  HEENT: MMM, PERRL, iEOM  CV: RRR, capillary refill <2  Resp: breathing comfortably on 2L NC  Abdomen/GI: cortez incision covered with woven gauze pressure dressing with minimal serosang strikethrough. RUSSELL with serosang output.  : curran minimal with clear yellow urine  MSK: strength 5/5  Neuro: Aox3, no focal deficits, CN 1-12      Assessment/Plan:  Ms. Herber Leblanc is a 42 yo F POD 3 s/p DDKT now POD 0 s/p exploration of transplanted kidney and washout of hematoma. Patient tolerated the procedure well. Post-op labs and renal US are largely unremarkable. Lactate down trending now 1.1. kidney has produced ~8-10cc of urine per hour post-op.     Update 0130 12/4:        -continue care on RNF  -mIVF NS @ 30 ml/hr  -Q4 vitals checks,  tele  -maintain SpO2 >90%  -Wean supplemental O2 as tolerated  -regular diet  -AROBF  -morning labs : CBC , BMP  -Curran will remain until POD5  - Plan Thymoglobulin 4.5 mg/kg; 2nd dose 12/4  - Tacrolimus goal 8-10 ng/mL; started 1 mg bid with slow ramp up to therapeutic levels over next 4-5 days  -strict I/Os  -transfuse as needed for Hgb <7  -DVT Ppx:  SCDs  -monitor RUSSELL drain output and character for changes     Will continue to monitor  overnight.     Elida Bob MD   PGY1 Transplant  [id number]

## 2023-12-04 NOTE — PROGRESS NOTES
Occupational Therapy                 Therapy Communication Note    Patient Name: Herber Leblanc  MRN: [id number]  Today's Date: 12/4/2023     Discipline: Occupational Therapy    Missed Visit Reason: Missed Visit Reason: Patient placed on medical hold (hemo 5.3 will reattempt as appropriate)    Missed Time: Attempt    Comment:

## 2023-12-04 NOTE — PROGRESS NOTES
Occupational Therapy                 Therapy Communication Note    Patient Name: Herber Leblanc  MRN: [id number]  Today's Date: 12/4/2023     Discipline: Occupational Therapy    Missed Visit Reason: Missed Visit Reason: Other (Comment), Patient placed on medical hold (second attempt to see patient, RN requested to hold off until this afternoon)    Missed Time: Attempt    Comment:

## 2023-12-04 NOTE — PROGRESS NOTES
Physical Therapy                 Therapy Communication Note    Patient Name: Herber Leblanc  MRN: [id number]  Today's Date: 12/4/2023     Discipline: Physical Therapy    Missed Visit Reason: Missed Visit Reason: Other (Comment), Patient placed on medical hold (Rn requested pt be placed on medical hold to allow pt to rest and for pain to decrease. will reattempt as appropriate and as schedule permits.)    Missed Time: Attempt    Comment:

## 2023-12-04 NOTE — PROGRESS NOTES
"Vancomycin Dosing by Pharmacy- INITIAL    Herber Leblanc is a 43 y.o. year old female who Pharmacy has been consulted for vancomycin dosing for Surgical prophylaxis. Based on the patient's indication and renal status this patient will be dosed based on a goal trough/random level of 15-20.     Renal function is currently unstable, s/p kidney transplant (11/30/23) & received dialysis this morning. Per provider, dialysis prn at this time, no scheduled sessions, therefore will start vancomycin dose by level but may need adjusted if renal plan changes.     Visit Vitals  /84 (BP Location: Right arm, Patient Position: Lying)   Pulse 74   Temp 36 °C (96.8 °F) (Skin)   Resp 18        Lab Results   Component Value Date    CREATININE 10.01 (H) 12/03/2023    CREATININE 9.77 (H) 12/03/2023    CREATININE 8.19 (H) 12/02/2023    CREATININE 10.07 (H) 12/01/2023        Patient weight is No results found for: \"PTWEIGHT\"    No results found for: \"CULTURE\"     I/O last 3 completed shifts:  In: 3474.9 (43.1 mL/kg) [P.O.:240; I.V.:867 (10.8 mL/kg); Blood:367.9; IV Piggyback:2000]  Out: 852.5 (10.6 mL/kg) [Urine:157.5 (0.1 mL/kg/hr); Drains:335; Blood:360]  Weight: 80.6 kg       Lab Results   Component Value Date    PATIENTTEMP 37.0 12/03/2023    PATIENTTEMP 37.0 12/03/2023    PATIENTTEMP 37.0 12/02/2023          Assessment/Plan     Patient will be given a one time dose of 1250 mg once, then schedule dose by level     A 24-hour level will be ordered on 12/5, unless clinically indicated sooner.    Will continue to monitor renal function daily while on vancomycin and order serum creatinine at least every 48 hours if not already ordered.  Follow for continued vancomycin needs, clinical response, and signs/symptoms of toxicity.       Kristal Keen, PharmD    "

## 2023-12-04 NOTE — PROGRESS NOTES
"Herber Leblanc is a 43 y.o. female w/ ESRD 2/2 HTN/NSAID on HD now POD # 4 s/p DCD kidney transplant 11/30/23.  Recipient with thin bladder, plan 7 day curran. Takeback and washout 12/03/23 for hematoma.     Subjective   Doing well. Pain better/ HD this AM/ RUSSELL 325 ml.  cc       Objective   Physical Exam  Gen: A+OX3; NAD  Abd: S/NT/ND. Incision C/D/I.  Drains: Serosanguinous    Ext: no LE edema    Last Recorded Vitals  Blood pressure 132/84, pulse 95, temperature 36.3 °C (97.3 °F), temperature source Skin, resp. rate 18, height 1.626 m (5' 4\"), weight 80.6 kg (177 lb 11.1 oz), SpO2 95 %.  Intake/Output last 3 Shifts:  I/O last 3 completed shifts:  In: 3474.9 (43.1 mL/kg) [P.O.:240; I.V.:867 (10.8 mL/kg); Blood:367.9; IV Piggyback:2000]  Out: 852.5 (10.6 mL/kg) [Urine:157.5 (0.1 mL/kg/hr); Drains:335; Blood:360]  Weight: 80.6 kg     Patient Active Problem List   Diagnosis    ESRD (end stage renal disease) (CMS/[facility])    HTN (hypertension)    Gastroesophageal reflux disease    Kidney replaced by transplant        Assessment/Plan    S/p DDKT 11/30/23  KDPI 56%   DGF, washout 12/3/23 for hematoma  Curran 7 days for thin bladder  HD today  Post washout US w/ improvement  Anemia / PLT drop - LDH and hapto pending  Hold hep / ASA    Immunosuppression   Thymo 4.5 mg/kg  Tac / MMF/ Pred    Donor Kidney Info:  Etiology:  Anoxia  Risk: No  Terminal Cr: 1.3  ABO: B  KDPI:  56 %  PRA: 2 %  DCD: yes  Side: Left kidney   CIT: 18 hr 33 min, WIT 29 minutes  CMV: D+/R+  EBV: D+/R+  Toxo: Donor Negative  HCV Ab/SENAIT: Negative  HBcAB: Negative  HBsAg/HBV SENAIT: Negative     I spent 35 minutes in the professional and overall care of this patient.      Anita Louis MD  "

## 2023-12-04 NOTE — SIGNIFICANT EVENT
Seen and examined on dialysis today. Complaining of pain at surgical incision. BP was low and ultrafiltration was turned off. Plan for dialysis again on 12/6. Surgical team notified of hypotension.

## 2023-12-04 NOTE — PROGRESS NOTES
Occupational Therapy                 Therapy Communication Note    Patient Name: Herber Leblanc  MRN: [id number]  Today's Date: 12/4/2023     Discipline: Occupational Therapy    Missed Visit Reason: Missed Visit Reason: Other (Comment) (off floor at dialysis, will reattempt as possible)    Missed Time: Attempt    Comment:

## 2023-12-04 NOTE — NURSING NOTE
Report to Receiving RN:    Report To: Philip Mon RN)  Time Report Called: 1153 am  Hand-Off Communication: vs 101/62; HR 88; No FR ran even  Complications During Treatment: Yes, SBP 88; Pt UF was off HD tx  Ultrafiltration Treatment: No  Medications Administered During Dialysis: Yes, benadryl 12.5 mg IVP 1030 am  Blood Products Administered During Dialysis: No  Labs Sent During Dialysis: No  Heparin Drip Rate Changes: No    Electronic Signatures:   (Signed Ellen Arndt RN)   Authored:    (Signed )   Authored:     Last Updated: 11:52 AM by ELLEN ARNDT

## 2023-12-04 NOTE — NURSING NOTE
.Report from Sending RN:    Report From: OCTAVIO Francois  Recent Surgery of Procedure: Yes, hematoma evacuation 12/3/23  Baseline Level of Consciousness (LOC): A&O X4  Oxygen Use: No  Type: Room air  Diabetic: No  Last BP Med Given Day of Dialysis: none  Last Pain Med Given: oxycodone 10mg  Lab Tests to be Obtained with Dialysis: No  Blood Transfusion to be Given During Dialysis: No  Available IV Access: Yes  Medications to be Administered During Dialysis: No  Continuous IV Infusion Running: No  Restraints on Currently or in the Last 24 Hours: No  Hand-Off Communication: Pt had no acute event overnight, vital signs stable. Been in lots of pain per floor nurse. Not on precaution, okay to travel by bed,

## 2023-12-05 ENCOUNTER — APPOINTMENT (OUTPATIENT)
Dept: RADIOLOGY | Facility: HOSPITAL | Age: 43
DRG: 650 | End: 2023-12-05
Payer: COMMERCIAL

## 2023-12-05 LAB
ALBUMIN SERPL BCP-MCNC: 2.8 G/DL (ref 3.4–5)
ANION GAP SERPL CALC-SCNC: 13 MMOL/L (ref 10–20)
BASOPHILS # BLD AUTO: 0 X10*3/UL (ref 0–0.1)
BASOPHILS NFR BLD AUTO: 0 %
BLOOD EXPIRATION DATE: NORMAL
BUN SERPL-MCNC: 49 MG/DL (ref 6–23)
CALCIUM SERPL-MCNC: 6.7 MG/DL (ref 8.6–10.6)
CHLORIDE SERPL-SCNC: 98 MMOL/L (ref 98–107)
CO2 SERPL-SCNC: 28 MMOL/L (ref 21–32)
CREAT SERPL-MCNC: 6.94 MG/DL (ref 0.5–1.05)
DISPENSE STATUS: NORMAL
EOSINOPHIL # BLD AUTO: 0.01 X10*3/UL (ref 0–0.7)
EOSINOPHIL NFR BLD AUTO: 0.2 %
ERYTHROCYTE [DISTWIDTH] IN BLOOD BY AUTOMATED COUNT: 15.9 % (ref 11.5–14.5)
ERYTHROCYTE [DISTWIDTH] IN BLOOD BY AUTOMATED COUNT: 16.2 % (ref 11.5–14.5)
ERYTHROCYTE [DISTWIDTH] IN BLOOD BY AUTOMATED COUNT: 16.3 % (ref 11.5–14.5)
GFR SERPL CREATININE-BSD FRML MDRD: 7 ML/MIN/1.73M*2
GLUCOSE SERPL-MCNC: 103 MG/DL (ref 74–99)
HCT VFR BLD AUTO: 20.6 % (ref 36–46)
HCT VFR BLD AUTO: 23.3 % (ref 36–46)
HCT VFR BLD AUTO: 27 % (ref 36–46)
HGB BLD-MCNC: 7 G/DL (ref 12–16)
HGB BLD-MCNC: 7.9 G/DL (ref 12–16)
HGB BLD-MCNC: 8.9 G/DL (ref 12–16)
IMM GRANULOCYTES # BLD AUTO: 0.18 X10*3/UL (ref 0–0.7)
IMM GRANULOCYTES NFR BLD AUTO: 4.4 % (ref 0–0.9)
LDH SERPL L TO P-CCNC: 395 U/L (ref 84–246)
LYMPHOCYTES # BLD AUTO: 0.14 X10*3/UL (ref 1.2–4.8)
LYMPHOCYTES NFR BLD AUTO: 3.4 %
MAGNESIUM SERPL-MCNC: 2.23 MG/DL (ref 1.6–2.4)
MCH RBC QN AUTO: 30.5 PG (ref 26–34)
MCH RBC QN AUTO: 30.9 PG (ref 26–34)
MCH RBC QN AUTO: 31.1 PG (ref 26–34)
MCHC RBC AUTO-ENTMCNC: 33 G/DL (ref 32–36)
MCHC RBC AUTO-ENTMCNC: 33.9 G/DL (ref 32–36)
MCHC RBC AUTO-ENTMCNC: 34 G/DL (ref 32–36)
MCV RBC AUTO: 91 FL (ref 80–100)
MCV RBC AUTO: 92 FL (ref 80–100)
MCV RBC AUTO: 93 FL (ref 80–100)
MONOCYTES # BLD AUTO: 0.16 X10*3/UL (ref 0.1–1)
MONOCYTES NFR BLD AUTO: 3.9 %
NEUTROPHILS # BLD AUTO: 3.61 X10*3/UL (ref 1.2–7.7)
NEUTROPHILS NFR BLD AUTO: 88.1 %
NRBC BLD-RTO: 0 /100 WBCS (ref 0–0)
PHOSPHATE SERPL-MCNC: 4.1 MG/DL (ref 2.5–4.9)
PLATELET # BLD AUTO: 55 X10*3/UL (ref 150–450)
PLATELET # BLD AUTO: 57 X10*3/UL (ref 150–450)
PLATELET # BLD AUTO: 65 X10*3/UL (ref 150–450)
POTASSIUM SERPL-SCNC: 3.9 MMOL/L (ref 3.5–5.3)
PRODUCT BLOOD TYPE: 7300
PRODUCT CODE: NORMAL
RBC # BLD AUTO: 2.25 X10*6/UL (ref 4–5.2)
RBC # BLD AUTO: 2.56 X10*6/UL (ref 4–5.2)
RBC # BLD AUTO: 2.92 X10*6/UL (ref 4–5.2)
SODIUM SERPL-SCNC: 135 MMOL/L (ref 136–145)
TACROLIMUS BLD-MCNC: 6.6 NG/ML
UNIT ABO: NORMAL
UNIT NUMBER: NORMAL
UNIT RH: NORMAL
UNIT VOLUME: 350
WBC # BLD AUTO: 4.1 X10*3/UL (ref 4.4–11.3)
WBC # BLD AUTO: 4.2 X10*3/UL (ref 4.4–11.3)
WBC # BLD AUTO: 6.7 X10*3/UL (ref 4.4–11.3)
XM INTEP: NORMAL

## 2023-12-05 PROCEDURE — 2500000001 HC RX 250 WO HCPCS SELF ADMINISTERED DRUGS (ALT 637 FOR MEDICARE OP): Performed by: STUDENT IN AN ORGANIZED HEALTH CARE EDUCATION/TRAINING PROGRAM

## 2023-12-05 PROCEDURE — 1200000002 HC GENERAL ROOM WITH TELEMETRY DAILY

## 2023-12-05 PROCEDURE — 97530 THERAPEUTIC ACTIVITIES: CPT | Mod: GO

## 2023-12-05 PROCEDURE — 36415 COLL VENOUS BLD VENIPUNCTURE: CPT

## 2023-12-05 PROCEDURE — 85027 COMPLETE CBC AUTOMATED: CPT

## 2023-12-05 PROCEDURE — 2500000004 HC RX 250 GENERAL PHARMACY W/ HCPCS (ALT 636 FOR OP/ED)

## 2023-12-05 PROCEDURE — 76776 US EXAM K TRANSPL W/DOPPLER: CPT | Performed by: STUDENT IN AN ORGANIZED HEALTH CARE EDUCATION/TRAINING PROGRAM

## 2023-12-05 PROCEDURE — 80069 RENAL FUNCTION PANEL: CPT | Performed by: STUDENT IN AN ORGANIZED HEALTH CARE EDUCATION/TRAINING PROGRAM

## 2023-12-05 PROCEDURE — 85025 COMPLETE CBC W/AUTO DIFF WBC: CPT | Performed by: STUDENT IN AN ORGANIZED HEALTH CARE EDUCATION/TRAINING PROGRAM

## 2023-12-05 PROCEDURE — 80197 ASSAY OF TACROLIMUS: CPT | Performed by: STUDENT IN AN ORGANIZED HEALTH CARE EDUCATION/TRAINING PROGRAM

## 2023-12-05 PROCEDURE — 76776 US EXAM K TRANSPL W/DOPPLER: CPT

## 2023-12-05 PROCEDURE — 99232 SBSQ HOSP IP/OBS MODERATE 35: CPT | Performed by: INTERNAL MEDICINE

## 2023-12-05 PROCEDURE — 83615 LACTATE (LD) (LDH) ENZYME: CPT

## 2023-12-05 PROCEDURE — 83010 ASSAY OF HAPTOGLOBIN QUANT: CPT

## 2023-12-05 PROCEDURE — 83735 ASSAY OF MAGNESIUM: CPT | Performed by: STUDENT IN AN ORGANIZED HEALTH CARE EDUCATION/TRAINING PROGRAM

## 2023-12-05 PROCEDURE — 2500000004 HC RX 250 GENERAL PHARMACY W/ HCPCS (ALT 636 FOR OP/ED): Performed by: STUDENT IN AN ORGANIZED HEALTH CARE EDUCATION/TRAINING PROGRAM

## 2023-12-05 PROCEDURE — 36430 TRANSFUSION BLD/BLD COMPNT: CPT

## 2023-12-05 PROCEDURE — P9016 RBC LEUKOCYTES REDUCED: HCPCS

## 2023-12-05 PROCEDURE — 97165 OT EVAL LOW COMPLEX 30 MIN: CPT | Mod: GO

## 2023-12-05 PROCEDURE — 97535 SELF CARE MNGMENT TRAINING: CPT | Mod: GO

## 2023-12-05 RX ORDER — VANCOMYCIN HYDROCHLORIDE 1 G/20ML
INJECTION, POWDER, LYOPHILIZED, FOR SOLUTION INTRAVENOUS DAILY PRN
Status: DISCONTINUED | OUTPATIENT
Start: 2023-12-05 | End: 2023-12-07

## 2023-12-05 RX ORDER — OXYBUTYNIN CHLORIDE 5 MG/1
5 TABLET ORAL 2 TIMES DAILY
Status: DISCONTINUED | OUTPATIENT
Start: 2023-12-05 | End: 2023-12-12

## 2023-12-05 RX ADMIN — OXYBUTYNIN CHLORIDE 5 MG: 5 TABLET ORAL at 20:20

## 2023-12-05 RX ADMIN — METHOCARBAMOL 500 MG: 500 TABLET ORAL at 18:31

## 2023-12-05 RX ADMIN — DOCUSATE SODIUM 100 MG: 100 CAPSULE, LIQUID FILLED ORAL at 20:20

## 2023-12-05 RX ADMIN — SODIUM CHLORIDE 30 ML/HR: 9 INJECTION, SOLUTION INTRAVENOUS at 07:00

## 2023-12-05 RX ADMIN — CLOTRIMAZOLE 10 MG: 10 LOZENGE ORAL; TOPICAL at 12:30

## 2023-12-05 RX ADMIN — ACETAMINOPHEN 650 MG: 325 TABLET ORAL at 08:43

## 2023-12-05 RX ADMIN — SULFAMETHOXAZOLE AND TRIMETHOPRIM 80 MG: 400; 80 TABLET ORAL at 08:43

## 2023-12-05 RX ADMIN — OXYCODONE HYDROCHLORIDE 10 MG: 5 TABLET ORAL at 18:31

## 2023-12-05 RX ADMIN — METHOCARBAMOL 500 MG: 500 TABLET ORAL at 08:42

## 2023-12-05 RX ADMIN — DEXTROSE MONOHYDRATE 825 MG: 50 INJECTION, SOLUTION INTRAVENOUS at 13:21

## 2023-12-05 RX ADMIN — SODIUM BICARBONATE 650 MG TABLET 650 MG: at 15:31

## 2023-12-05 RX ADMIN — TACROLIMUS 2 MG: 1 CAPSULE ORAL at 07:02

## 2023-12-05 RX ADMIN — ACETAMINOPHEN 650 MG: 325 TABLET ORAL at 01:45

## 2023-12-05 RX ADMIN — OXYCODONE HYDROCHLORIDE 10 MG: 5 TABLET ORAL at 23:59

## 2023-12-05 RX ADMIN — MIRTAZAPINE 7.5 MG: 7.5 TABLET, FILM COATED ORAL at 20:20

## 2023-12-05 RX ADMIN — MYCOPHENOLATE MOFETIL 1000 MG: 250 CAPSULE ORAL at 20:20

## 2023-12-05 RX ADMIN — SODIUM BICARBONATE 650 MG TABLET 650 MG: at 08:42

## 2023-12-05 RX ADMIN — BISACODYL 10 MG: 5 TABLET, COATED ORAL at 08:42

## 2023-12-05 RX ADMIN — SODIUM BICARBONATE 650 MG TABLET 650 MG: at 20:20

## 2023-12-05 RX ADMIN — BISACODYL 10 MG: 10 SUPPOSITORY RECTAL at 08:43

## 2023-12-05 RX ADMIN — MYCOPHENOLATE MOFETIL 1000 MG: 250 CAPSULE ORAL at 08:42

## 2023-12-05 RX ADMIN — PANTOPRAZOLE SODIUM 40 MG: 40 TABLET, DELAYED RELEASE ORAL at 15:31

## 2023-12-05 RX ADMIN — LEVOTHYROXINE SODIUM 25 MCG: 50 TABLET ORAL at 07:01

## 2023-12-05 RX ADMIN — VALGANCICLOVIR HYDROCHLORIDE 450 MG: 450 TABLET ORAL at 08:42

## 2023-12-05 RX ADMIN — ACETAMINOPHEN 650 MG: 325 TABLET ORAL at 12:30

## 2023-12-05 RX ADMIN — PREDNISONE 20 MG: 10 TABLET ORAL at 08:42

## 2023-12-05 RX ADMIN — METHOCARBAMOL 500 MG: 500 TABLET ORAL at 23:59

## 2023-12-05 RX ADMIN — CLOTRIMAZOLE 10 MG: 10 LOZENGE ORAL; TOPICAL at 18:31

## 2023-12-05 RX ADMIN — TENOFOVIR ALAFENAMIDE 25 MG: 25 TABLET ORAL at 08:43

## 2023-12-05 RX ADMIN — POLYETHYLENE GLYCOL 3350 17 G: 17 POWDER, FOR SOLUTION ORAL at 08:42

## 2023-12-05 RX ADMIN — METHOCARBAMOL 500 MG: 500 TABLET ORAL at 01:45

## 2023-12-05 RX ADMIN — ACETAMINOPHEN 650 MG: 325 TABLET ORAL at 23:59

## 2023-12-05 RX ADMIN — DOCUSATE SODIUM 100 MG: 100 CAPSULE, LIQUID FILLED ORAL at 08:43

## 2023-12-05 RX ADMIN — OXYCODONE HYDROCHLORIDE 10 MG: 5 TABLET ORAL at 07:05

## 2023-12-05 RX ADMIN — PANTOPRAZOLE SODIUM 40 MG: 40 TABLET, DELAYED RELEASE ORAL at 07:02

## 2023-12-05 RX ADMIN — OXYCODONE HYDROCHLORIDE 10 MG: 5 TABLET ORAL at 12:30

## 2023-12-05 RX ADMIN — OXYBUTYNIN CHLORIDE 5 MG: 5 TABLET ORAL at 12:30

## 2023-12-05 RX ADMIN — CLOTRIMAZOLE 10 MG: 10 LOZENGE ORAL; TOPICAL at 08:43

## 2023-12-05 RX ADMIN — METHOCARBAMOL 500 MG: 500 TABLET ORAL at 12:30

## 2023-12-05 RX ADMIN — MEROPENEM 500 MG: 500 INJECTION, POWDER, FOR SOLUTION INTRAVENOUS at 14:30

## 2023-12-05 RX ADMIN — ACETAMINOPHEN 650 MG: 325 TABLET ORAL at 18:31

## 2023-12-05 ASSESSMENT — COGNITIVE AND FUNCTIONAL STATUS - GENERAL
DAILY ACTIVITIY SCORE: 17
WALKING IN HOSPITAL ROOM: A LOT
STANDING UP FROM CHAIR USING ARMS: A LOT
DRESSING REGULAR LOWER BODY CLOTHING: A LITTLE
TOILETING: A LITTLE
DRESSING REGULAR UPPER BODY CLOTHING: A LITTLE
DAILY ACTIVITIY SCORE: 20
MOBILITY SCORE: 13
MOBILITY SCORE: 17
HELP NEEDED FOR BATHING: A LOT
TOILETING: A LITTLE
STANDING UP FROM CHAIR USING ARMS: A LITTLE
WALKING IN HOSPITAL ROOM: A LITTLE
DRESSING REGULAR LOWER BODY CLOTHING: A LOT
CLIMB 3 TO 5 STEPS WITH RAILING: A LOT
MOVING TO AND FROM BED TO CHAIR: A LOT
DRESSING REGULAR LOWER BODY CLOTHING: A LOT
HELP NEEDED FOR BATHING: A LITTLE
HELP NEEDED FOR BATHING: A LOT
PERSONAL GROOMING: A LITTLE
TURNING FROM BACK TO SIDE WHILE IN FLAT BAD: A LITTLE
TOILETING: A LITTLE
TURNING FROM BACK TO SIDE WHILE IN FLAT BAD: A LITTLE
MOVING FROM LYING ON BACK TO SITTING ON SIDE OF FLAT BED WITH BEDRAILS: A LITTLE
CLIMB 3 TO 5 STEPS WITH RAILING: TOTAL
MOVING FROM LYING ON BACK TO SITTING ON SIDE OF FLAT BED WITH BEDRAILS: A LITTLE
DRESSING REGULAR UPPER BODY CLOTHING: A LOT
DRESSING REGULAR UPPER BODY CLOTHING: A LITTLE
DAILY ACTIVITIY SCORE: 17
MOVING TO AND FROM BED TO CHAIR: A LITTLE

## 2023-12-05 ASSESSMENT — PAIN SCALES - GENERAL
PAINLEVEL_OUTOF10: 10 - WORST POSSIBLE PAIN
PAINLEVEL_OUTOF10: 8
PAINLEVEL_OUTOF10: 7
PAINLEVEL_OUTOF10: 7

## 2023-12-05 ASSESSMENT — PAIN DESCRIPTION - LOCATION
LOCATION: ABDOMEN

## 2023-12-05 ASSESSMENT — PAIN - FUNCTIONAL ASSESSMENT
PAIN_FUNCTIONAL_ASSESSMENT: 0-10

## 2023-12-05 ASSESSMENT — ACTIVITIES OF DAILY LIVING (ADL)
HOME_MANAGEMENT_TIME_ENTRY: 12
ADL_ASSISTANCE: INDEPENDENT

## 2023-12-05 NOTE — PROGRESS NOTES
"Vancomycin Dosing by Pharmacy- INITIAL    Herber Leblanc is a 43 y.o. year old female who Pharmacy has been consulted for vancomycin dosing for other prophylaxis after take back following transplant  . Based on the patient's indication and renal status this patient will be dosed based on a goal pre-HD level of 15-25.     Renal function is currently delayed graft function/ dialysis.    Visit Vitals  /89   Pulse 82   Temp 36.8 °C (98.2 °F) (Temporal)   Resp 18        Lab Results   Component Value Date    CREATININE 6.94 (H) 12/05/2023    CREATININE 4.75 (H) 12/04/2023    CREATININE 10.01 (H) 12/03/2023    CREATININE 9.77 (H) 12/03/2023        Patient weight is No results found for: \"PTWEIGHT\"    No results found for: \"CULTURE\"     I/O last 3 completed shifts:  In: 2450 (29.7 mL/kg) [I.V.:600 (7.3 mL/kg); Blood:700; Other:200; IV Piggyback:950]  Out: 994 (12 mL/kg) [Urine:140 (0 mL/kg/hr); Drains:350; Other:504]  Weight: 82.5 kg   @[facility]@    Lab Results   Component Value Date    PATIENTTEMP 37.0 12/03/2023    PATIENTTEMP 37.0 12/03/2023    PATIENTTEMP 37.0 12/02/2023          Assessment/Plan     Patient has already been given a dose of 1250 mg after dialysis yesterday. Order pharmacy to dose placeholder and dose by levels.  Follow-up level prior to dialysis 12/6 to determine redosing.  Will continue to monitor renal function daily while on vancomycin and order serum creatinine at least every 48 hours if not already ordered.  Follow for continued vancomycin needs, clinical response, and signs/symptoms of toxicity.       Alice Rinaldi, PharmD       "

## 2023-12-05 NOTE — HOSPITAL COURSE
Pt is a 42 y/o female with a hx of ESRD secondary to HTN/NSAID whom received a  donor kidney transplant on 23 by Dr. Marbin Lambert & Dr. Louis with a KDPI of 56% and PRA of 48%.  HCV -/- and donor has not met risk factors. EBV +/+. 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 Dry weight is 81.1 kg (discharge weight is 76.2kg ).  Pt received a total of 4.5 mg/kg total of thymoglobulin induction therapy in conjunction with 500mg IV solumedrol.  Pt was initiated on Tacrolimus & Cellcept immunosuppression in conjunction with IV solumedrol taper.  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 Pt was started on valcyte (CMV D + / R + ) and bactrim for CMV & PCP prophylaxis per protocol. She was started on clotrimazole as antifungal coverage per protocol. Operative course was complicated by return to OR for exploration of transplanted kidney and washout of hematoma. Hospital course was complicated by post-operative pain and constipation, which has resolved.     Rain catheter was removed on POD #6 and the patient is voiding spontaneously.The patient had DGF 2/2 hyperkalemia. Pt required dialysis and electrolytes remain stable. Dialysis access via LUE AVF and was patent on last use. BP & glucose under good control.     Pt is tolerating a regular diet, maintaining adequate hydration with oral intake, ambulating independently and having BMs. Immunosuppression was managed by the transplant team. Patient is in stable condition for discharge home with renal telehealth today and homecare for drain. RX delivered to bedside prior to discharge. The patient will f/u in the transplant institute and have labs drawn in the walk-in clinic.

## 2023-12-05 NOTE — CONSULTS
Vancomycin Dosing by Pharmacy- Cessation of Therapy    Consult to pharmacy for vancomycin dosing has been discontinued by the prescriber, pharmacy will sign off at this time.    Please call pharmacy if there are further questions or re-enter a consult if vancomycin is resumed.     Kristal Keen, PharmD

## 2023-12-05 NOTE — PROGRESS NOTES
"Transplant Nephrology progress note     Date of admission: 11/30/2023     Herber Leblanc is a 43 y.o.  with [facility]   Past Medical History:   Diagnosis Date    Chronic kidney disease, unspecified     CKD, patient interested in transplantation    GERD (gastroesophageal reflux disease)     Personal history of COVID-19 07/20/2022    History of COVID-19        SUBJECTIVE: Complaining of pelvic pain due to curran catheter. Had dialysis yesterday and also received 2 units of blood.    PROBLEM LIST:  Principal Problem:    ESRD (end stage renal disease) (CMS/[facility])  Active Problems:    HTN (hypertension)    Gastroesophageal reflux disease    Kidney replaced by transplant         ALLERGIES:  No Known Allergies         CURRENT MEDICATIONS:  Scheduled medications  acetaminophen, 650 mg, oral, q6h JANIE  belatacept, 10 mg/kg, intravenous, Once  bisacodyl, 10 mg, oral, Daily  bisacodyl, 10 mg, rectal, Daily  clotrimazole, 10 mg, oral, TID after meals  darbepoetin jacob, 100 mcg, subcutaneous, Weekly  docusate sodium, 100 mg, oral, BID  levothyroxine, 25 mcg, oral, Daily before breakfast  methocarbamol, 500 mg, oral, q6h JANIE  mirtazapine, 7.5 mg, oral, Nightly  mycophenolate, 1,000 mg, oral, BID  oxybutynin, 5 mg, oral, BID  pantoprazole, 40 mg, oral, BID AC  polyethylene glycol, 17 g, oral, Daily  predniSONE, 20 mg, oral, Daily  sodium bicarbonate, 650 mg, oral, TID  sulfamethoxazole-trimethoprim, 80 mg, oral, Daily  tenofovir alafenamide, 25 mg, oral, Daily  valGANciclovir, 450 mg, oral, q48h      Continuous medications  sodium chloride 0.9%, 30 mL/hr, Last Rate: 30 mL/hr (12/03/23 4440)      PRN medications  PRN medications: diphenhydrAMINE, melatonin, naloxone, ondansetron, oxyCODONE, oxyCODONE, oxygen, oxygen       OBJECTIVE:    VITALS: Visit Vitals  /81 (BP Location: Left arm, Patient Position: Lying)   Pulse 83   Temp 35.6 °C (96.1 °F) (Temporal)   Resp 18   Ht 1.626 m (5' 4\")   Wt 82.5 kg (181 lb 14.1 oz)   SpO2 97%   BMI 31.22 " kg/m²   Smoking Status Never   BSA 1.93 m²     Physical:  HEENT: normal   Lungs : clear  CVS s1,s2  Abdomen : sugical sight looks clean with dressing and staples intact .  Rain cathter is in .  +1 EDEMA        LABS:  Results from last 72 hours   Lab Units 12/05/23  0554   WBC AUTO x10*3/uL 4.1*   HEMOGLOBIN g/dL 7.0*   MCV fL 92   PLATELETS AUTO x10*3/uL 57*   BUN mg/dL 49*   CREATININE mg/dL 6.94*   CALCIUM mg/dL 6.7*   TACROLIMUS ng/mL 6.6              Intake/Output Summary (Last 24 hours) at 12/5/2023 1151  Last data filed at 12/5/2023 0748  Gross per 24 hour   Intake 1150 ml   Output 80 ml   Net 1070 ml            ASSESSMENT AND PLAN:    Herber Leblanc is a 43 y.o. with a history of ESRD secondary to hypertension and chronic NSAID use s/p desceased kidney transplant on 11/30/2023.  She was on dialysis since 2018 Monday Wednesday Friday via left upper extremity AV fistula.  Nephrology consulted for comanagement of index transplant.    Kidney information: KDPI is a 56%, PRA 48%, 6 antigen mismatch, CMV status donor plus/R+, EBV D+/R+, hepatitis C viral negative.    Allograft function: DGF secondary to hyperkalemia s/p dialysis on 12/4/2023.  Electrolytes reviewed seems to be stable no acute indication today.  -Current access is left upper extremity AV fistula.  -Repeat ultrasound to monitor the hematoma.    Immunosuppression: Induction by Thymoglobulin total 4.5 mg/kg.  Plan to withdrawn CNI and Belatacept noted.    Infectious prophylaxis: Patient is positive for hepatitis B core positive started on Vemlidy.  Started on clotrimazole, Bactrim and Valcyte.

## 2023-12-05 NOTE — CONSULTS
Nutrition Diet Education:   Nutrition Assessment    Reason for Assessment: Provider consult order (education)    Attempted to meet with patient to review the handout provided on previous visit.   Pt was working with PT, will attempt as able.     Time Spent/Follow-up Reminder:   Follow Up  Time Spent (min): 15 minutes  Last Date of Nutrition Visit: 12/05/23  Nutrition Follow-Up Needed?: Dietitian to reassess per policy  Follow up Comment: attempted to provide education, pt unavailable.

## 2023-12-05 NOTE — PROGRESS NOTES
"Herber Leblanc is a 43 y.o. female w/ ESRD 2/2 HTN/NSAID on HD now POD # 5 s/p DCD kidney transplant 11/30/23.  Recipient with thin bladder, plan 7 day curran. Takeback and washout 12/03/23 for hematoma.     Subjective   Doing well. Pain better. RUSSELL 325 ml.  cc       Objective   Physical Exam  Gen: A+OX3; NAD  Abd: S/NT/ND. Incision C/D/I.  Drains: Serosanguinous    Ext: no LE edema    Last Recorded Vitals  Blood pressure 128/81, pulse 83, temperature 35.6 °C (96.1 °F), temperature source Temporal, resp. rate 18, height 1.626 m (5' 4\"), weight 82.5 kg (181 lb 14.1 oz), SpO2 97 %.  Intake/Output last 3 Shifts:  I/O last 3 completed shifts:  In: 2450 (29.7 mL/kg) [I.V.:600 (7.3 mL/kg); Blood:700; Other:200; IV Piggyback:950]  Out: 994 (12 mL/kg) [Urine:140 (0 mL/kg/hr); Drains:350; Other:504]  Weight: 82.5 kg     Patient Active Problem List   Diagnosis    ESRD (end stage renal disease) (CMS/[facility])    HTN (hypertension)    Gastroesophageal reflux disease    Kidney replaced by transplant        Assessment/Plan    S/p DDKT 11/30/23  KDPI 56%   DGF, washout 12/3/23 for hematoma  Curran 6 days for thin bladder - out tomorrow  Post washout US w/ improvement - repeat today  Anemia / PLT drop - LDH and hapto pending  Hold hep / ASA    Immunosuppression   Thymo 4.5 mg/kg  Tac / MMF/ Pred  Begin transition to [place]    Donor Kidney Info:  Etiology:  Anoxia  Risk: No  Terminal Cr: 1.3  ABO: B  KDPI:  56 %  PRA: 2 %  DCD: yes  Side: Left kidney   CIT: 18 hr 33 min, WIT 29 minutes  CMV: D+/R+  EBV: D+/R+  Toxo: Donor Negative  HCV Ab/SENAIT: Negative  HBcAB: Negative  HBsAg/HBV SENAIT: Negative     I spent 35 minutes in the professional and overall care of this patient.      Anita Louis MD  "

## 2023-12-05 NOTE — PROGRESS NOTES
Occupational Therapy    Evaluation/Treatment    Patient Name: Herber Leblanc  MRN: [id number]  : 1980  Today's Date: 23  Time Calculation  Start Time: 936  Stop Time: 5  Time Calculation (min): 39 min       Assessment:  OT Assessment: Herber is a 44yo female presenting with decreased functional activity, ADLs, IADLs, transfers, and positioning for pain management. Herber would benefit from skilled OT intervention to facilitate return to highest PLOF  Prognosis: Good  Barriers to Discharge: None  Evaluation/Treatment Tolerance: Patient limited by pain, Patient limited by fatigue  Medical Staff Made Aware: Yes  End of Session Communication: Bedside nurse  End of Session Patient Position: Bed, 3 rail up, Alarm off, caregiver present  OT Assessment Results: Decreased ADL status, Decreased upper extremity strength, Decreased endurance, Decreased gross motor control, Decreased IADLs, Decreased trunk control for functional activities  Prognosis: Good  Barriers to Discharge: None  Evaluation/Treatment Tolerance: Patient limited by pain, Patient limited by fatigue  Medical Staff Made Aware: Yes  Strengths: Housing layout, Support of Caregivers  Barriers to Participation: Language  Plan:  Treatment Interventions: ADL retraining, Functional transfer training, UE strengthening/ROM, Endurance training, Patient/family training, Compensatory technique education  OT Frequency: 2 times per week  OT Discharge Recommendations: Low intensity level of continued care  Equipment Recommended upon Discharge: Wheeled walker  OT Recommended Transfer Status: Assist of 1  OT - OK to Discharge: Yes  Treatment Interventions: ADL retraining, Functional transfer training, UE strengthening/ROM, Endurance training, Patient/family training, Compensatory technique education    Subjective   Current Problem:  1. Kidney replaced by transplant  Case Request Operating Room: Exploration Laparotomy    Case Request Operating Room: Exploration Laparotomy     Case Request Operating Room: Exploration Kidney    Case Request Operating Room: Exploration Kidney    pantoprazole (ProtoNix) 40 mg EC tablet    mycophenolate (Cellcept) 250 mg capsule    polyethylene glycol (Glycolax, Miralax) 17 gram packet    predniSONE (Deltasone) 20 mg tablet    sulfamethoxazole-trimethoprim (Bactrim) 400-80 mg tablet    tacrolimus (Prograf) 0.5 mg capsule    tacrolimus (Prograf) 1 mg capsule    tenofovir alafenamide (Vemlidy) 25 mg tablet tablet    valGANciclovir (Valcyte) 450 mg tablet    clotrimazole (Mycelex) 10 mg samia    docusate sodium (Colace) 100 mg capsule      2. ESRD (end stage renal disease) (CMS/[facility])  Case Request Operating Room: Transplant Kidney    Case Request Operating Room: Transplant Kidney    Infusion Appointment Request        General:   OT Received On: 12/05/23  General  Reason for Referral: 43 year old female s/p kidney transplant  Past Medical History Relevant to Rehab: ESRD, HTN  Missed Visit: Yes  Missed Visit Reason: Patient placed on medical hold (hemo 5.3 will reattempt as appropriate)  Family/Caregiver Present: Yes  Caregiver Feedback: brother in room,  in room at start of session  Prior to Session Communication: Bedside nurse  Patient Position Received: Bed, 3 rail up, Alarm off, not on at start of session  Preferred Learning Style: visual, verbal  General Comment: OT attempted to use MARTTI device, device not properly functioning, introduces self using iphone, pt brother entering and pt confirming brother is ok to translate for patient. Pt pleasant and willing to participate in therapy, pt overall demeaner appears that she is in pain, holds abdomen area often  Precautions:  Post-Surgical Precautions: Abdominal surgery precautions  Vital Signs:     Pain:  Pain Assessment  Pain Assessment: 0-10  Pain Score: 8  Pain Type: Acute pain, Surgical pain (catheter area)  Pain Location: Abdomen  Pain Interventions: Ambulation/increased activity    Objective    Cognition:  Overall Cognitive Status: Within Functional Limits  Attention: Within Functional Limits           Home Living:  Type of Home: Apartment  Lives With: Spouse, Other (Comment) (teen children)  Home Layout: One level  Home Access: Stairs to enter with rails  Entrance Stairs-Number of Steps: 1  Bathroom Shower/Tub: Tub/shower unit  Bathroom Equipment: Shower chair with back  Home Living Comments: Brother reports pt has steps to assist to get into bed  Prior Function:  Level of [place]: Independent with ADLs and functional transfers  Receives Help From: Family  ADL Assistance: Independent  Homemaking Assistance: Independent  Ambulatory Assistance: Independent  Prior Function Comments: Brother reporting pt has been quick to fatigue xyears. Pt uses cane  IADL History:     ADL:  UE Dressing Assistance: Independent  UE Dressing Deficit: Setup  Toileting Assistance with Device: Stand by  Toileting Deficit: Grab bar use, Setup  Activities of Daily Living:      UE Dressing  UE Dressing Level of Assistance: Independent, Setup  UE Dressing Where Assessed: Edge of bed  UE Dressing Comments: donning second gown backwards to cover up for functional activity with s/u    Toileting  Toileting Level of Assistance: Contact guard  Where Assessed: Toilet  Toileting Comments: Pt unable to void, completing transfer to potentially faciliate BM  Activity Tolerance:  Endurance: Tolerates 30 min exercise with multiple rests  Functional Standing Tolerance:     Bed Mobility/Transfers: Bed Mobility  Bed Mobility: Yes  Bed Mobility 1  Bed Mobility 1: Supine to sitting (via log roll)  Level of Assistance 1: Moderate assistance, Moderate verbal cues, Moderate tactile cues  Bed Mobility Comments 1: cues for log roll performance    Transfers  Transfer: Yes  Transfer 1  Transfer From 1: Sit to, Stand to  Transfer to 1: Sit, Stand  Technique 1: Sit to stand, Stand to sit  Transfer Device 1: Walker  Transfer Level of Assistance 1: Contact  guard, Minimal verbal cues  Trials/Comments 1: x2 trials at bed  Transfers 2  Transfer From 2: Bed to  Transfer to 2: Commode-standard  Transfer Device 2: Walker  Transfer Level of Assistance 2: Close supervision, Minimal verbal cues, Minimal tactile cues  Transfers 3  Transfer From 3: Commode-standard to  Transfer to 3: Stand  Technique 3: Sit to stand  Transfer Device 3: Walker  Transfer Level of Assistance 3: Contact guard, Minimal verbal cues         Ambulation/Gait Training:  Ambulation/Gait Training  Ambulation/Gait Training Performed: Yes  Ambulation/Gait Training 1  Surface 1: Level tile  Device 1: Rolling walker  Assistance 1: Contact guard, Minimal verbal cues  Comments/Distance (ft) 1:  (functional mobility x6 minutes in donaldson with x1 seated rest break)  Sitting Balance:  Static Sitting Balance  Static Sitting-Balance Support: No upper extremity supported  Static Sitting-Level of Assistance: Close supervision  Static Sitting-Comment/Number of Minutes: sitting EOB unsupported x10 minutes during discussion with medical team and with therapist for education         Therapy/Activity: Therapeutic Activity  Therapeutic Activity Performed: Yes  Therapeutic Activity 1: With x2 STS transfers from bed, functional mobility in donaldson and unsupported EOB seating pt demos moderate endurance, requires x3 rest breaks throughout s/t fatigue and pain    Vision:Vision - Basic Assessment  Current Vision: No visual deficits    Coordination:  Movements are Fluid and Coordinated: Yes   Hand Function:     Extremities: RUE   RUE : Within Functional Limits and LUE   LUE: Within Functional Limits      Outcome Measures: [facility] Daily Activity  Putting on and taking off regular lower body clothing: A lot  Bathing (including washing, rinsing, drying): A lot  Putting on and taking off regular upper body clothing: A little  Toileting, which includes using toilet, bedpan or urinal: A little  Taking care of personal grooming such as brushing  teeth: A little  Eating Meals: None  Daily Activity - Total Score: 17         and OT Adult Other Outcome Measures  4AT: 0, negative    Education Documentation  Body Mechanics, taught by Lizz Oh OT at 12/5/2023 12:13 PM.  Learner: Family, Patient  Readiness: Acceptance  Method: Explanation, Demonstration  Response: Verbalizes Understanding    Precautions, taught by Lizz Oh OT at 12/5/2023 12:13 PM.  Learner: Family, Patient  Readiness: Acceptance  Method: Explanation, Demonstration  Response: Verbalizes Understanding    ADL Training, taught by Lizz Oh OT at 12/5/2023 12:13 PM.  Learner: Family, Patient  Readiness: Acceptance  Method: Explanation, Demonstration  Response: Verbalizes Understanding    Education Comments  No comments found.        OP EDUCATION:       Goals:  Encounter Problems       Encounter Problems (Active)       ADLs       Patient with complete upper body dressing with independent level of assistance donning and doffing all UE clothes with no adaptive equipment while edge of bed  (Progressing)       Start:  12/05/23    Expected End:  12/26/23            Patient with complete lower body dressing with modified independent level of assistance donning and doffing all LE clothes  with PRN adaptive equipment while edge of bed  (Progressing)       Start:  12/05/23    Expected End:  12/26/23            Patient will complete daily grooming tasks  with supervision level of assistance and PRN adaptive equipment while standing. (Progressing)       Start:  12/05/23    Expected End:  12/26/23            Patient will complete toileting including hygiene clothing management/hygiene with modified independent level of assistance. (Progressing)       Start:  12/05/23    Expected End:  12/26/23               COGNITION/SAFETY       Patient will recall and adhere to abdominal precautions during all functional mobility/ADL tasks in order to demonstrate improved understanding and promote healing post  op (Progressing)       Start:  12/05/23    Expected End:  12/26/23               EXERCISE/STRENGTHENING       Pt will demonstrate I with energy conservation techniques to increase functional activity tolerance to x15+ min without rest breaks/while maintaining O2 sats.  (Progressing)       Start:  12/05/23    Expected End:  12/26/23               TRANSFERS       Patient will complete functional transfer to toilet with least restrictive device with modified independent level of assistance.       Start:  12/05/23    Expected End:  12/26/23

## 2023-12-06 ENCOUNTER — APPOINTMENT (OUTPATIENT)
Dept: DIALYSIS | Facility: HOSPITAL | Age: 43
End: 2023-12-06
Payer: COMMERCIAL

## 2023-12-06 LAB
ALBUMIN SERPL BCP-MCNC: 2.8 G/DL (ref 3.4–5)
ANION GAP SERPL CALC-SCNC: 15 MMOL/L (ref 10–20)
BASOPHILS # BLD AUTO: 0.01 X10*3/UL (ref 0–0.1)
BASOPHILS NFR BLD AUTO: 0.2 %
BUN SERPL-MCNC: 58 MG/DL (ref 6–23)
CALCIUM SERPL-MCNC: 6.8 MG/DL (ref 8.6–10.6)
CHLORIDE SERPL-SCNC: 97 MMOL/L (ref 98–107)
CO2 SERPL-SCNC: 27 MMOL/L (ref 21–32)
CREAT SERPL-MCNC: 8.49 MG/DL (ref 0.5–1.05)
EOSINOPHIL # BLD AUTO: 0.03 X10*3/UL (ref 0–0.7)
EOSINOPHIL NFR BLD AUTO: 0.5 %
ERYTHROCYTE [DISTWIDTH] IN BLOOD BY AUTOMATED COUNT: 15.9 % (ref 11.5–14.5)
ERYTHROCYTE [DISTWIDTH] IN BLOOD BY AUTOMATED COUNT: 16 % (ref 11.5–14.5)
GFR SERPL CREATININE-BSD FRML MDRD: 5 ML/MIN/1.73M*2
GLUCOSE SERPL-MCNC: 94 MG/DL (ref 74–99)
HAPTOGLOB SERPL-MCNC: 13 MG/DL (ref 37–246)
HCT VFR BLD AUTO: 22.9 % (ref 36–46)
HCT VFR BLD AUTO: 24.7 % (ref 36–46)
HGB BLD-MCNC: 7.8 G/DL (ref 12–16)
HGB BLD-MCNC: 8.5 G/DL (ref 12–16)
IMM GRANULOCYTES # BLD AUTO: 0.27 X10*3/UL (ref 0–0.7)
IMM GRANULOCYTES NFR BLD AUTO: 4.8 % (ref 0–0.9)
LYMPHOCYTES # BLD AUTO: 0.21 X10*3/UL (ref 1.2–4.8)
LYMPHOCYTES NFR BLD AUTO: 3.8 %
MAGNESIUM SERPL-MCNC: 2.34 MG/DL (ref 1.6–2.4)
MCH RBC QN AUTO: 31.1 PG (ref 26–34)
MCH RBC QN AUTO: 31.3 PG (ref 26–34)
MCHC RBC AUTO-ENTMCNC: 34.1 G/DL (ref 32–36)
MCHC RBC AUTO-ENTMCNC: 34.4 G/DL (ref 32–36)
MCV RBC AUTO: 91 FL (ref 80–100)
MCV RBC AUTO: 91 FL (ref 80–100)
MONOCYTES # BLD AUTO: 0.13 X10*3/UL (ref 0.1–1)
MONOCYTES NFR BLD AUTO: 2.3 %
NEUTROPHILS # BLD AUTO: 4.92 X10*3/UL (ref 1.2–7.7)
NEUTROPHILS NFR BLD AUTO: 88.4 %
NRBC BLD-RTO: 0 /100 WBCS (ref 0–0)
NRBC BLD-RTO: 0.4 /100 WBCS (ref 0–0)
PHOSPHATE SERPL-MCNC: 4.1 MG/DL (ref 2.5–4.9)
PLATELET # BLD AUTO: 62 X10*3/UL (ref 150–450)
PLATELET # BLD AUTO: 71 X10*3/UL (ref 150–450)
POTASSIUM SERPL-SCNC: 3.9 MMOL/L (ref 3.5–5.3)
RBC # BLD AUTO: 2.51 X10*6/UL (ref 4–5.2)
RBC # BLD AUTO: 2.72 X10*6/UL (ref 4–5.2)
SODIUM SERPL-SCNC: 135 MMOL/L (ref 136–145)
VANCOMYCIN SERPL-MCNC: 15 UG/ML (ref 5–20)
WBC # BLD AUTO: 5.6 X10*3/UL (ref 4.4–11.3)
WBC # BLD AUTO: 7.6 X10*3/UL (ref 4.4–11.3)

## 2023-12-06 PROCEDURE — 1200000002 HC GENERAL ROOM WITH TELEMETRY DAILY

## 2023-12-06 PROCEDURE — 2500000004 HC RX 250 GENERAL PHARMACY W/ HCPCS (ALT 636 FOR OP/ED): Performed by: STUDENT IN AN ORGANIZED HEALTH CARE EDUCATION/TRAINING PROGRAM

## 2023-12-06 PROCEDURE — 2500000001 HC RX 250 WO HCPCS SELF ADMINISTERED DRUGS (ALT 637 FOR MEDICARE OP)

## 2023-12-06 PROCEDURE — 99232 SBSQ HOSP IP/OBS MODERATE 35: CPT | Performed by: STUDENT IN AN ORGANIZED HEALTH CARE EDUCATION/TRAINING PROGRAM

## 2023-12-06 PROCEDURE — 85027 COMPLETE CBC AUTOMATED: CPT

## 2023-12-06 PROCEDURE — 80202 ASSAY OF VANCOMYCIN: CPT

## 2023-12-06 PROCEDURE — 2500000001 HC RX 250 WO HCPCS SELF ADMINISTERED DRUGS (ALT 637 FOR MEDICARE OP): Performed by: STUDENT IN AN ORGANIZED HEALTH CARE EDUCATION/TRAINING PROGRAM

## 2023-12-06 PROCEDURE — 8010000001 HC DIALYSIS - HEMODIALYSIS PER DAY

## 2023-12-06 PROCEDURE — 83735 ASSAY OF MAGNESIUM: CPT | Performed by: STUDENT IN AN ORGANIZED HEALTH CARE EDUCATION/TRAINING PROGRAM

## 2023-12-06 PROCEDURE — 2500000004 HC RX 250 GENERAL PHARMACY W/ HCPCS (ALT 636 FOR OP/ED)

## 2023-12-06 PROCEDURE — 80069 RENAL FUNCTION PANEL: CPT | Performed by: STUDENT IN AN ORGANIZED HEALTH CARE EDUCATION/TRAINING PROGRAM

## 2023-12-06 PROCEDURE — 85025 COMPLETE CBC W/AUTO DIFF WBC: CPT | Performed by: STUDENT IN AN ORGANIZED HEALTH CARE EDUCATION/TRAINING PROGRAM

## 2023-12-06 PROCEDURE — 36415 COLL VENOUS BLD VENIPUNCTURE: CPT | Performed by: STUDENT IN AN ORGANIZED HEALTH CARE EDUCATION/TRAINING PROGRAM

## 2023-12-06 PROCEDURE — 36415 COLL VENOUS BLD VENIPUNCTURE: CPT

## 2023-12-06 PROCEDURE — 97530 THERAPEUTIC ACTIVITIES: CPT | Mod: GP

## 2023-12-06 RX ORDER — ACETAMINOPHEN 325 MG/1
650 TABLET ORAL EVERY 6 HOURS SCHEDULED
Status: COMPLETED | OUTPATIENT
Start: 2023-12-07 | End: 2023-12-08

## 2023-12-06 RX ORDER — VANCOMYCIN HYDROCHLORIDE 750 MG/150ML
750 INJECTION, SOLUTION INTRAVENOUS ONCE
Status: COMPLETED | OUTPATIENT
Start: 2023-12-06 | End: 2023-12-06

## 2023-12-06 RX ORDER — TRAMADOL HYDROCHLORIDE 50 MG/1
25 TABLET ORAL EVERY 6 HOURS PRN
Status: DISCONTINUED | OUTPATIENT
Start: 2023-12-06 | End: 2023-12-14 | Stop reason: HOSPADM

## 2023-12-06 RX ORDER — TRAMADOL HYDROCHLORIDE 50 MG/1
50 TABLET ORAL EVERY 6 HOURS PRN
Status: DISCONTINUED | OUTPATIENT
Start: 2023-12-06 | End: 2023-12-14 | Stop reason: HOSPADM

## 2023-12-06 RX ADMIN — OXYBUTYNIN CHLORIDE 5 MG: 5 TABLET ORAL at 08:53

## 2023-12-06 RX ADMIN — VANCOMYCIN HYDROCHLORIDE 750 MG: 750 INJECTION, SOLUTION INTRAVENOUS at 10:27

## 2023-12-06 RX ADMIN — SODIUM BICARBONATE 650 MG TABLET 650 MG: at 20:14

## 2023-12-06 RX ADMIN — CLOTRIMAZOLE 10 MG: 10 LOZENGE ORAL; TOPICAL at 17:28

## 2023-12-06 RX ADMIN — OXYCODONE HYDROCHLORIDE 10 MG: 5 TABLET ORAL at 04:03

## 2023-12-06 RX ADMIN — SODIUM BICARBONATE 650 MG TABLET 650 MG: at 08:54

## 2023-12-06 RX ADMIN — TRAMADOL HYDROCHLORIDE 50 MG: 50 TABLET, COATED ORAL at 16:35

## 2023-12-06 RX ADMIN — TENOFOVIR ALAFENAMIDE 25 MG: 25 TABLET ORAL at 08:53

## 2023-12-06 RX ADMIN — CLOTRIMAZOLE 10 MG: 10 LOZENGE ORAL; TOPICAL at 08:53

## 2023-12-06 RX ADMIN — DOCUSATE SODIUM 100 MG: 100 CAPSULE, LIQUID FILLED ORAL at 08:52

## 2023-12-06 RX ADMIN — METHOCARBAMOL 500 MG: 500 TABLET ORAL at 11:18

## 2023-12-06 RX ADMIN — MEROPENEM 500 MG: 500 INJECTION, POWDER, FOR SOLUTION INTRAVENOUS at 16:35

## 2023-12-06 RX ADMIN — SULFAMETHOXAZOLE AND TRIMETHOPRIM 80 MG: 400; 80 TABLET ORAL at 08:52

## 2023-12-06 RX ADMIN — PANTOPRAZOLE SODIUM 40 MG: 40 TABLET, DELAYED RELEASE ORAL at 06:41

## 2023-12-06 RX ADMIN — TRAMADOL HYDROCHLORIDE 50 MG: 50 TABLET, COATED ORAL at 08:53

## 2023-12-06 RX ADMIN — LEVOTHYROXINE SODIUM 25 MCG: 50 TABLET ORAL at 06:41

## 2023-12-06 RX ADMIN — DOCUSATE SODIUM 100 MG: 100 CAPSULE, LIQUID FILLED ORAL at 20:14

## 2023-12-06 RX ADMIN — MIRTAZAPINE 7.5 MG: 7.5 TABLET, FILM COATED ORAL at 20:14

## 2023-12-06 RX ADMIN — BISACODYL 10 MG: 5 TABLET, COATED ORAL at 08:52

## 2023-12-06 RX ADMIN — POLYETHYLENE GLYCOL 3350 17 G: 17 POWDER, FOR SOLUTION ORAL at 08:52

## 2023-12-06 RX ADMIN — MYCOPHENOLATE MOFETIL 1000 MG: 250 CAPSULE ORAL at 20:14

## 2023-12-06 RX ADMIN — Medication: at 08:53

## 2023-12-06 RX ADMIN — METHOCARBAMOL 500 MG: 500 TABLET ORAL at 23:47

## 2023-12-06 RX ADMIN — PREDNISONE 20 MG: 10 TABLET ORAL at 08:52

## 2023-12-06 RX ADMIN — BISACODYL 10 MG: 10 SUPPOSITORY RECTAL at 08:52

## 2023-12-06 RX ADMIN — OXYBUTYNIN CHLORIDE 5 MG: 5 TABLET ORAL at 20:14

## 2023-12-06 RX ADMIN — MYCOPHENOLATE MOFETIL 1000 MG: 250 CAPSULE ORAL at 08:52

## 2023-12-06 RX ADMIN — METHOCARBAMOL 500 MG: 500 TABLET ORAL at 17:28

## 2023-12-06 RX ADMIN — METHOCARBAMOL 500 MG: 500 TABLET ORAL at 06:41

## 2023-12-06 ASSESSMENT — PAIN SCALES - GENERAL
PAINLEVEL_OUTOF10: 8
PAINLEVEL_OUTOF10: 3
PAINLEVEL_OUTOF10: 5 - MODERATE PAIN
PAINLEVEL_OUTOF10: 10 - WORST POSSIBLE PAIN
PAINLEVEL_OUTOF10: 9
PAINLEVEL_OUTOF10: 9
PAINLEVEL_OUTOF10: 4
PAINLEVEL_OUTOF10: 7
PAINLEVEL_OUTOF10: 8
PAINLEVEL_OUTOF10: 7

## 2023-12-06 ASSESSMENT — COGNITIVE AND FUNCTIONAL STATUS - GENERAL
TOILETING: A LITTLE
MOBILITY SCORE: 17
TURNING FROM BACK TO SIDE WHILE IN FLAT BAD: A LITTLE
CLIMB 3 TO 5 STEPS WITH RAILING: A LOT
DRESSING REGULAR UPPER BODY CLOTHING: A LITTLE
WALKING IN HOSPITAL ROOM: A LITTLE
MOVING FROM LYING ON BACK TO SITTING ON SIDE OF FLAT BED WITH BEDRAILS: A LITTLE
WALKING IN HOSPITAL ROOM: A LITTLE
CLIMB 3 TO 5 STEPS WITH RAILING: A LOT
MOVING TO AND FROM BED TO CHAIR: A LITTLE
STANDING UP FROM CHAIR USING ARMS: A LITTLE
DRESSING REGULAR LOWER BODY CLOTHING: A LITTLE
DRESSING REGULAR LOWER BODY CLOTHING: A LITTLE
TURNING FROM BACK TO SIDE WHILE IN FLAT BAD: A LITTLE
CLIMB 3 TO 5 STEPS WITH RAILING: A LOT
TURNING FROM BACK TO SIDE WHILE IN FLAT BAD: A LITTLE
DAILY ACTIVITIY SCORE: 20
STANDING UP FROM CHAIR USING ARMS: A LITTLE
HELP NEEDED FOR BATHING: A LITTLE
MOVING FROM LYING ON BACK TO SITTING ON SIDE OF FLAT BED WITH BEDRAILS: A LITTLE
MOVING TO AND FROM BED TO CHAIR: A LITTLE
WALKING IN HOSPITAL ROOM: A LITTLE
TOILETING: A LITTLE
HELP NEEDED FOR BATHING: A LITTLE
MOVING FROM LYING ON BACK TO SITTING ON SIDE OF FLAT BED WITH BEDRAILS: A LITTLE
CLIMB 3 TO 5 STEPS WITH RAILING: A LOT
MOVING TO AND FROM BED TO CHAIR: A LITTLE
DRESSING REGULAR LOWER BODY CLOTHING: A LITTLE
DRESSING REGULAR UPPER BODY CLOTHING: A LITTLE
DRESSING REGULAR UPPER BODY CLOTHING: A LITTLE
DAILY ACTIVITIY SCORE: 20
TOILETING: A LITTLE
HELP NEEDED FOR BATHING: A LITTLE
WALKING IN HOSPITAL ROOM: A LITTLE
STANDING UP FROM CHAIR USING ARMS: A LITTLE
MOBILITY SCORE: 17
TURNING FROM BACK TO SIDE WHILE IN FLAT BAD: A LITTLE
MOVING FROM LYING ON BACK TO SITTING ON SIDE OF FLAT BED WITH BEDRAILS: A LITTLE
STANDING UP FROM CHAIR USING ARMS: A LITTLE
MOVING TO AND FROM BED TO CHAIR: A LITTLE
MOBILITY SCORE: 17

## 2023-12-06 ASSESSMENT — PAIN - FUNCTIONAL ASSESSMENT
PAIN_FUNCTIONAL_ASSESSMENT: 0-10

## 2023-12-06 ASSESSMENT — PAIN DESCRIPTION - DESCRIPTORS: DESCRIPTORS: DISCOMFORT

## 2023-12-06 ASSESSMENT — PAIN DESCRIPTION - LOCATION: LOCATION: ABDOMEN

## 2023-12-06 NOTE — CARE PLAN
Problem: Pain - Adult  Goal: Verbalizes/displays adequate comfort level or baseline comfort level  Outcome: Progressing     Problem: Safety - Adult  Goal: Free from fall injury  Outcome: Progressing     Problem: Chronic Conditions and Co-morbidities  Goal: Patient's chronic conditions and co-morbidity symptoms are monitored and maintained or improved  Outcome: Progressing     Problem: Skin  Goal: Decreased wound size/increased tissue granulation at next dressing change  Outcome: Progressing  Flowsheets (Taken 12/6/2023 0444)  Decreased wound size/increased tissue granulation at next dressing change: Promote sleep for wound healing  Goal: Participates in plan/prevention/treatment measures  Outcome: Progressing  Goal: Prevent/manage excess moisture  Outcome: Progressing  Goal: Prevent/minimize sheer/friction injuries  Outcome: Progressing  Goal: Promote/optimize nutrition  Outcome: Progressing  Goal: Promote skin healing  Outcome: Progressing   The patient's goals for the shift include      The clinical goals for the shift include pain mgt

## 2023-12-06 NOTE — PROGRESS NOTES
"Herber Leblanc is a 43 y.o. female w/ ESRD 2/2 HTN/NSAID on HD now POD # 6 s/p DCD kidney transplant 11/30/23.  Recipient with thin bladder, plan 7 day curran. Takeback and washout 12/03/23 for hematoma.     Subjective   Doing well. Some incisional drainage. RUSSELL 70 ml.  cc       Objective   Physical Exam  Gen: A+OX3; NAD  Abd: S/NT/ND. Incision C/D/I.  Drains: Serosanguinous    Ext: no LE edema    Last Recorded Vitals  Blood pressure 125/82, pulse 71, temperature 36 °C (96.8 °F), temperature source Temporal, resp. rate 18, height 1.626 m (5' 4\"), weight 83.4 kg (183 lb 13.8 oz), SpO2 94 %.  Intake/Output last 3 Shifts:  I/O last 3 completed shifts:  In: 2320 (27.8 mL/kg) [I.V.:1620 (19.4 mL/kg); Blood:350; Other:50; IV Piggyback:300]  Out: 230 (2.8 mL/kg) [Urine:120 (0 mL/kg/hr); Drains:110]  Weight: 83.4 kg     Patient Active Problem List   Diagnosis    ESRD (end stage renal disease) (CMS/[facility])    HTN (hypertension)    Gastroesophageal reflux disease    Kidney replaced by transplant        Assessment/Plan    S/p DDKT 11/30/23  KDPI 56%   DGF, washout 12/3/23 for hematoma  Start ASA  D.c antibiotics     Immunosuppression   Thymo 4.5 mg/kg  Tac / MMF/ Pred  Started rachel 12/5/23    Donor Kidney Info:  Etiology:  Anoxia  Risk: No  Terminal Cr: 1.3  ABO: B  KDPI:  56 %  PRA: 2 %  DCD: yes  Side: Left kidney   CIT: 18 hr 33 min, WIT 29 minutes  CMV: D+/R+  EBV: D+/R+  Toxo: Donor Negative  HCV Ab/SENAIT: Negative  HBcAB: Negative  HBsAg/HBV SENAIT: Negative     I spent 35 minutes in the professional and overall care of this patient.      Anita Louis MD  "

## 2023-12-06 NOTE — PROGRESS NOTES
Physical Therapy    Physical Therapy Treatment    Patient Name: Herber Leblanc  MRN: [id number]  Today's Date: 12/6/2023  Time Calculation  Start Time: 1054  Stop Time: 1117  Time Calculation (min): 23 min       Assessment/Plan   PT Assessment  End of Session Communication: Bedside nurse  Assessment Comment: pt tolerated session well but reporting pain with ambulating. Patient encouraged to continue mobility and ambulating  End of Session Patient Position: Up in chair     PT Plan  Treatment/Interventions: Bed mobility, Transfer training, Gait training, Balance training, Neuromuscular re-education, Strengthening, Endurance training, Range of motion, Therapeutic exercise, Therapeutic activity, Positioning  PT Plan: Skilled PT  PT Frequency: 3 times per week  PT Discharge Recommendations: No PT needed after discharge  Equipment Recommended upon Discharge: Wheeled walker  PT Recommended Transfer Status: Assist x1, Assistive device  PT - OK to Discharge: Yes (Evaluation completed D/C recommendation made)      General Visit Information:   PT  Visit  PT Received On: 12/06/23  Response to Previous Treatment: Patient with no complaints from previous session.  General  Family/Caregiver Present: Yes  Caregiver Feedback:  (family present and able to translate)  Prior to Session Communication: Bedside nurse  Patient Position Received:  (sitting EOB)  General Comment: pt sitting EOB, family able to translate- reports just ambulated to bathroom and now drain is leaking. RN looked at drain and dressing and cleared pt to ambulate with PT. Patient reports pain during session and complaints of the bed causing her back pain. Educated pt to spend time in chair    Subjective   Precautions:  Precautions  Medical Precautions: Fall precautions  Post-Surgical Precautions: Abdominal surgery precautions    Objective   Pain:  Pain Assessment  Pain Assessment: 0-10  Pain Score:  (not rated but reports pain in abdomen and  back)  Cognition:  Cognition  Overall Cognitive Status: Within Functional Limits    Activity Tolerance:  Activity Tolerance  Endurance: Tolerates 10 - 20 min exercise with multiple rests  Treatments:  Therapeutic Activity  Therapeutic Activity Performed: Yes  Therapeutic Activity 1: pt completed transfers from EOB with FWW, cueing required for walker management at times. Patient ambulated in donaldson and agreed to sit in chair.    Bed Mobility  Bed Mobility: No (pt sitting EOB)    Ambulation/Gait Training  Ambulation/Gait Training Performed: Yes  Ambulation/Gait Training 1  Surface 1: Level tile  Device 1: Rolling walker  Assistance 1: Close supervision  Quality of Gait 1:  (decreased meg)  Comments/Distance (ft) 1: pt ambulated 120' with decreased meg, cues for walker sequencing at times. frequently stopping to grab abdomen or back due to pain.  Transfers  Transfer: Yes  Transfer 1  Transfer From 1: Sit to, Stand to  Transfer to 1: Stand, Sit  Technique 1: Sit to stand, Stand to sit  Transfer Device 1: Walker  Transfer Level of Assistance 1: Close supervision  Trials/Comments 1: pt stood from EOB twice with SBA and cueing for walker    Outcome Measures:  [facility] Basic Mobility  Turning from your back to your side while in a flat bed without using bedrails: A little  Moving from lying on your back to sitting on the side of a flat bed without using bedrails: A little  Moving to and from bed to chair (including a wheelchair): A little  Standing up from a chair using your arms (e.g. wheelchair or bedside chair): A little  To walk in hospital room: A little  Climbing 3-5 steps with railing: A lot  Basic Mobility - Total Score: 17    Education Documentation  Precautions, taught by Elvira Reardon, PT at 12/6/2023  2:32 PM.  Learner: Patient  Readiness: Acceptance  Method: Explanation  Response: Verbalizes Understanding  Comment: edu on mobility and OOB to chair    Education Comments  No comments found.        OP  EDUCATION:       Encounter Problems       Encounter Problems (Active)       Mobility       STG - Patient will ambulate community distance independent  (Progressing)       Start:  12/03/23    Expected End:  12/17/23               Pain - Adult          Transfers       STG - Transfer from bed to chair independent  (Progressing)       Start:  12/03/23    Expected End:  12/17/23            STG - Patient will perform bed mobility independent  (Progressing)       Start:  12/03/23    Expected End:  12/17/23            STG - Patient will transfer sit to and from stand independent  (Progressing)       Start:  12/03/23    Expected End:  12/17/23

## 2023-12-06 NOTE — NURSING NOTE
Report from Sending RN:    Report From: Luz Mon RN)  Recent Surgery of Procedure: No  Baseline Level of Consciousness (LOC): A/O x 4  Oxygen Use: No  Type: none  Diabetic: No  Last BP Med Given Day of Dialysis: none  Last Pain Med Given: Tramadol 50 mg 0900 am  Lab Tests to be Obtained with Dialysis: No  Blood Transfusion to be Given During Dialysis: No  Available IV Access: Yes  Medications to be Administered During Dialysis: No  Continuous IV Infusion Running: No  Restraints on Currently or in the Last 24 Hours: No  Hand-Off Communication: no acute overnight or morning events;  vss; Pt did take morning medications; No labs needed; Pt  may go off unit without telemetry; Pt is is a full code; Christy Welch RN.

## 2023-12-06 NOTE — NURSING NOTE
Report to Receiving RN:    Report To: OCTAVIO Escobar  Time Report Called: 17:40  Hand-Off Communication: pt tolerated tx well, no meds were giving during tx, post tx /68 HR 74, alert and stable post tx  Complications During Treatment: No  Ultrafiltration Treatment: Yes  Medications Administered During Dialysis: No  Blood Products Administered During Dialysis: No  Labs Sent During Dialysis: No  Heparin Drip Rate Changes: N/A      Last Updated: 5:46 PM by JESS DYER

## 2023-12-06 NOTE — PROGRESS NOTES
"Vancomycin Dosing by Pharmacy- FOLLOW UP    Herber Leblanc is a 43 y.o. year old female who Pharmacy has been consulted for vancomycin dosing for surgical prophylaxis. Based on the patient's indication and renal status this patient is being dosed based on a goal trough/random level of 15-20.     Renal function is currently declining - on iHD.       Current vancomycin dose: dose off levels    Most recent random level: 15 mcg/mL    Visit Vitals  /82 (BP Location: Left arm, Patient Position: Lying)   Pulse 71   Temp 36 °C (96.8 °F) (Temporal)   Resp 18        Lab Results   Component Value Date    CREATININE 8.49 (H) 12/06/2023    CREATININE 6.94 (H) 12/05/2023    CREATININE 4.75 (H) 12/04/2023    CREATININE 10.01 (H) 12/03/2023        Patient weight is No results found for: \"PTWEIGHT\"    Lab Results   Component Value Date    VANCORANDOM 15.0 12/06/2023        I/O last 3 completed shifts:  In: 2320 (27.8 mL/kg) [I.V.:1620 (19.4 mL/kg); Blood:350; Other:50; IV Piggyback:300]  Out: 230 (2.8 mL/kg) [Urine:120 (0 mL/kg/hr); Drains:110]  Weight: 83.4 kg       Lab Results   Component Value Date    PATIENTTEMP 37.0 12/03/2023    PATIENTTEMP 37.0 12/03/2023    PATIENTTEMP 37.0 12/02/2023        Blood Culture   Date/Time Value Ref Range Status   12/04/2023 08:56 AM No growth at 1 day  Preliminary        Assessment/Plan    Below level - ordered one time dose of 750mg after iHD today. Will continue to monitor and dose accordingly.   The next level will be obtained depending on iHD schedule (tentative for 12/8). May be obtained sooner if clinically indicated.   Will continue to monitor renal function daily while on vancomycin and order serum creatinine at least every 48 hours if not already ordered.  Follow for continued vancomycin needs, clinical response, and signs/symptoms of toxicity.       Velia Barclay, PharmD           "

## 2023-12-06 NOTE — PROGRESS NOTES
"Transplant Nephrology progress note     Date of admission: 11/30/2023     Herber Leblanc is a 43 y.o.  with [facility]   Past Medical History:   Diagnosis Date    Chronic kidney disease, unspecified     CKD, patient interested in transplantation    GERD (gastroesophageal reflux disease)     Personal history of COVID-19 07/20/2022    History of COVID-19        SUBJECTIVE: Doing better. No new complaints.    PROBLEM LIST:  Principal Problem:    ESRD (end stage renal disease) (CMS/[facility])  Active Problems:    HTN (hypertension)    Gastroesophageal reflux disease    Kidney replaced by transplant         ALLERGIES:  No Known Allergies         CURRENT MEDICATIONS:  Scheduled medications  bisacodyl, 10 mg, oral, Daily  bisacodyl, 10 mg, rectal, Daily  clotrimazole, 10 mg, oral, TID after meals  darbepoetin jacob, 100 mcg, subcutaneous, Weekly  docusate sodium, 100 mg, oral, BID  levothyroxine, 25 mcg, oral, Daily before breakfast  meropenem, 500 mg, intravenous, q24h  methocarbamol, 500 mg, oral, q6h JANIE  micafungin, 100 mg, intravenous, q24h  mirtazapine, 7.5 mg, oral, Nightly  mycophenolate, 1,000 mg, oral, BID  oxybutynin, 5 mg, oral, BID  pantoprazole, 40 mg, oral, BID AC  polyethylene glycol, 17 g, oral, Daily  predniSONE, 20 mg, oral, Daily  sodium bicarbonate, 650 mg, oral, TID  sulfamethoxazole-trimethoprim, 80 mg, oral, Daily  tenofovir alafenamide, 25 mg, oral, Daily  valGANciclovir, 450 mg, oral, q48h      Continuous medications       PRN medications  PRN medications: diphenhydrAMINE, melatonin, naloxone, ondansetron, oxygen, oxygen, traMADol, traMADol, vancomycin       OBJECTIVE:    VITALS: Visit Vitals  /85   Pulse 75   Temp 36.4 °C (97.5 °F) (Temporal)   Resp 17   Ht 1.626 m (5' 4\")   Wt 83.4 kg (183 lb 13.8 oz)   SpO2 96%   BMI 31.56 kg/m²   Smoking Status Never   BSA 1.94 m²     Physical:  HEENT: normal   Lungs : clear  CVS s1,s2  Abdomen : sugical sight looks clean with dressing and staples intact .  Rain cathter is " in .  +1 EDEMA        LABS:  Results from last 72 hours   Lab Units 12/06/23  1303 12/06/23  0501 12/05/23  1957 12/05/23  0554   WBC AUTO x10*3/uL 7.6 5.6   < > 4.1*   HEMOGLOBIN g/dL 8.5* 7.8*   < > 7.0*   MCV fL 91 91   < > 92   PLATELETS AUTO x10*3/uL 71* 62*   < > 57*   BUN mg/dL  --  58*  --  49*   CREATININE mg/dL  --  8.49*  --  6.94*   CALCIUM mg/dL  --  6.8*  --  6.7*   TACROLIMUS ng/mL  --   --   --  6.6    < > = values in this interval not displayed.              Intake/Output Summary (Last 24 hours) at 12/6/2023 1358  Last data filed at 12/6/2023 1330  Gross per 24 hour   Intake 2520 ml   Output 195 ml   Net 2325 ml            ASSESSMENT AND PLAN:    Herber Leblanc is a 43 y.o. with a history of ESRD secondary to hypertension and chronic NSAID use s/p desceased kidney transplant on 11/30/2023.  She was on dialysis since 2018 Monday Wednesday Friday via left upper extremity AV fistula.  Nephrology consulted for comanagement of index transplant.    Kidney information: KDPI is a 56%, PRA 48%, 6 antigen mismatch, CMV status donor plus/R+, EBV D+/R+, hepatitis C viral negative.    Allograft function: DGF secondary to hyperkalemia s/p dialysis on 12/4/2023.  Dialysis today.  -Current access is left upper extremity AV fistula.  -Repeat ultrasound to monitor the hematoma.    Immunosuppression: Induction by Thymoglobulin total 4.5 mg/kg.  Plan to withdrawn CNI and Belatacept noted.    Infectious prophylaxis: Patient is positive for hepatitis B core positive started on Vemlidy.  Started on clotrimazole, Bactrim and Valcyte.

## 2023-12-07 DIAGNOSIS — Z94.0 KIDNEY REPLACED BY TRANSPLANT (HHS-HCC): ICD-10-CM

## 2023-12-07 DIAGNOSIS — D84.9 IMMUNOSUPPRESSION (MULTI): ICD-10-CM

## 2023-12-07 LAB
ALBUMIN SERPL BCP-MCNC: 2.7 G/DL (ref 3.4–5)
ANION GAP SERPL CALC-SCNC: 16 MMOL/L (ref 10–20)
BASOPHILS # BLD MANUAL: 0 X10*3/UL (ref 0–0.1)
BASOPHILS NFR BLD MANUAL: 0 %
BUN SERPL-MCNC: 38 MG/DL (ref 6–23)
CALCIUM SERPL-MCNC: 7.1 MG/DL (ref 8.6–10.6)
CHLORIDE SERPL-SCNC: 98 MMOL/L (ref 98–107)
CO2 SERPL-SCNC: 28 MMOL/L (ref 21–32)
CREAT SERPL-MCNC: 6.07 MG/DL (ref 0.5–1.05)
EOSINOPHIL # BLD MANUAL: 0.05 X10*3/UL (ref 0–0.7)
EOSINOPHIL NFR BLD MANUAL: 0.8 %
ERYTHROCYTE [DISTWIDTH] IN BLOOD BY AUTOMATED COUNT: 15.8 % (ref 11.5–14.5)
GFR SERPL CREATININE-BSD FRML MDRD: 8 ML/MIN/1.73M*2
GLUCOSE SERPL-MCNC: 94 MG/DL (ref 74–99)
HCT VFR BLD AUTO: 25.5 % (ref 36–46)
HGB BLD-MCNC: 8.2 G/DL (ref 12–16)
IMM GRANULOCYTES # BLD AUTO: 0.34 X10*3/UL (ref 0–0.7)
IMM GRANULOCYTES NFR BLD AUTO: 5.3 % (ref 0–0.9)
LYMPHOCYTES # BLD MANUAL: 0.06 X10*3/UL (ref 1.2–4.8)
LYMPHOCYTES NFR BLD MANUAL: 0.9 %
MAGNESIUM SERPL-MCNC: 2.13 MG/DL (ref 1.6–2.4)
MCH RBC QN AUTO: 30 PG (ref 26–34)
MCHC RBC AUTO-ENTMCNC: 32.2 G/DL (ref 32–36)
MCV RBC AUTO: 93 FL (ref 80–100)
MONOCYTES # BLD MANUAL: 0.06 X10*3/UL (ref 0.1–1)
MONOCYTES NFR BLD MANUAL: 0.9 %
NEUTROPHILS # BLD MANUAL: 6.07 X10*3/UL (ref 1.2–7.7)
NEUTS BAND # BLD MANUAL: 0.17 X10*3/UL (ref 0–0.7)
NEUTS BAND NFR BLD MANUAL: 2.6 %
NEUTS SEG # BLD MANUAL: 5.9 X10*3/UL (ref 1.2–7)
NEUTS SEG NFR BLD MANUAL: 92.2 %
NRBC BLD-RTO: 0 /100 WBCS (ref 0–0)
PHOSPHATE SERPL-MCNC: 3.4 MG/DL (ref 2.5–4.9)
PLATELET # BLD AUTO: 78 X10*3/UL (ref 150–450)
POTASSIUM SERPL-SCNC: 3.7 MMOL/L (ref 3.5–5.3)
PROMYELOCYTES # BLD MANUAL: 0.06 X10*3/UL
PROMYELOCYTES NFR BLD MANUAL: 0.9 %
RBC # BLD AUTO: 2.73 X10*6/UL (ref 4–5.2)
RBC MORPH BLD: ABNORMAL
SODIUM SERPL-SCNC: 138 MMOL/L (ref 136–145)
TOTAL CELLS COUNTED BLD: 115
VARIANT LYMPHS # BLD MANUAL: 0.11 X10*3/UL (ref 0–0.5)
VARIANT LYMPHS NFR BLD: 1.7 %
WBC # BLD AUTO: 6.4 X10*3/UL (ref 4.4–11.3)

## 2023-12-07 PROCEDURE — 96372 THER/PROPH/DIAG INJ SC/IM: CPT | Performed by: STUDENT IN AN ORGANIZED HEALTH CARE EDUCATION/TRAINING PROGRAM

## 2023-12-07 PROCEDURE — 2500000001 HC RX 250 WO HCPCS SELF ADMINISTERED DRUGS (ALT 637 FOR MEDICARE OP): Performed by: STUDENT IN AN ORGANIZED HEALTH CARE EDUCATION/TRAINING PROGRAM

## 2023-12-07 PROCEDURE — 83735 ASSAY OF MAGNESIUM: CPT | Performed by: STUDENT IN AN ORGANIZED HEALTH CARE EDUCATION/TRAINING PROGRAM

## 2023-12-07 PROCEDURE — 99232 SBSQ HOSP IP/OBS MODERATE 35: CPT | Performed by: STUDENT IN AN ORGANIZED HEALTH CARE EDUCATION/TRAINING PROGRAM

## 2023-12-07 PROCEDURE — 85007 BL SMEAR W/DIFF WBC COUNT: CPT | Performed by: STUDENT IN AN ORGANIZED HEALTH CARE EDUCATION/TRAINING PROGRAM

## 2023-12-07 PROCEDURE — 2500000004 HC RX 250 GENERAL PHARMACY W/ HCPCS (ALT 636 FOR OP/ED)

## 2023-12-07 PROCEDURE — 2500000004 HC RX 250 GENERAL PHARMACY W/ HCPCS (ALT 636 FOR OP/ED): Performed by: STUDENT IN AN ORGANIZED HEALTH CARE EDUCATION/TRAINING PROGRAM

## 2023-12-07 PROCEDURE — 2500000005 HC RX 250 GENERAL PHARMACY W/O HCPCS

## 2023-12-07 PROCEDURE — 85027 COMPLETE CBC AUTOMATED: CPT | Performed by: STUDENT IN AN ORGANIZED HEALTH CARE EDUCATION/TRAINING PROGRAM

## 2023-12-07 PROCEDURE — 36415 COLL VENOUS BLD VENIPUNCTURE: CPT | Performed by: STUDENT IN AN ORGANIZED HEALTH CARE EDUCATION/TRAINING PROGRAM

## 2023-12-07 PROCEDURE — 2500000005 HC RX 250 GENERAL PHARMACY W/O HCPCS: Performed by: STUDENT IN AN ORGANIZED HEALTH CARE EDUCATION/TRAINING PROGRAM

## 2023-12-07 PROCEDURE — 2500000001 HC RX 250 WO HCPCS SELF ADMINISTERED DRUGS (ALT 637 FOR MEDICARE OP)

## 2023-12-07 PROCEDURE — 80069 RENAL FUNCTION PANEL: CPT | Performed by: STUDENT IN AN ORGANIZED HEALTH CARE EDUCATION/TRAINING PROGRAM

## 2023-12-07 PROCEDURE — 1200000002 HC GENERAL ROOM WITH TELEMETRY DAILY

## 2023-12-07 RX ORDER — EPINEPHRINE 0.3 MG/.3ML
0.3 INJECTION SUBCUTANEOUS EVERY 5 MIN PRN
Status: CANCELLED | OUTPATIENT
Start: 2024-03-25

## 2023-12-07 RX ORDER — NAPROXEN SODIUM 220 MG/1
81 TABLET, FILM COATED ORAL DAILY
Status: DISCONTINUED | OUTPATIENT
Start: 2023-12-07 | End: 2023-12-14 | Stop reason: HOSPADM

## 2023-12-07 RX ORDER — DIPHENHYDRAMINE HYDROCHLORIDE 50 MG/ML
50 INJECTION INTRAMUSCULAR; INTRAVENOUS AS NEEDED
Status: CANCELLED | OUTPATIENT
Start: 2024-03-25

## 2023-12-07 RX ORDER — EPINEPHRINE 0.3 MG/.3ML
0.3 INJECTION SUBCUTANEOUS EVERY 5 MIN PRN
Status: CANCELLED | OUTPATIENT
Start: 2023-12-18

## 2023-12-07 RX ORDER — LIDOCAINE HYDROCHLORIDE 10 MG/ML
INJECTION INFILTRATION; PERINEURAL
Status: COMPLETED
Start: 2023-12-07 | End: 2023-12-07

## 2023-12-07 RX ORDER — LIDOCAINE HYDROCHLORIDE 10 MG/ML
10 INJECTION, SOLUTION EPIDURAL; INFILTRATION; INTRACAUDAL; PERINEURAL ONCE
Status: COMPLETED | OUTPATIENT
Start: 2023-12-07 | End: 2023-12-07

## 2023-12-07 RX ORDER — HYDROMORPHONE HYDROCHLORIDE 1 MG/ML
0.2 INJECTION, SOLUTION INTRAMUSCULAR; INTRAVENOUS; SUBCUTANEOUS ONCE
Status: DISCONTINUED | OUTPATIENT
Start: 2023-12-07 | End: 2023-12-08

## 2023-12-07 RX ORDER — ALBUTEROL SULFATE 0.83 MG/ML
3 SOLUTION RESPIRATORY (INHALATION) AS NEEDED
Status: CANCELLED | OUTPATIENT
Start: 2023-12-18

## 2023-12-07 RX ORDER — FAMOTIDINE 10 MG/ML
20 INJECTION INTRAVENOUS ONCE AS NEEDED
Status: CANCELLED | OUTPATIENT
Start: 2024-03-25

## 2023-12-07 RX ORDER — CALCIUM GLUCONATE 20 MG/ML
2 INJECTION, SOLUTION INTRAVENOUS ONCE
Status: COMPLETED | OUTPATIENT
Start: 2023-12-07 | End: 2023-12-07

## 2023-12-07 RX ORDER — ALBUTEROL SULFATE 0.83 MG/ML
3 SOLUTION RESPIRATORY (INHALATION) AS NEEDED
Status: CANCELLED | OUTPATIENT
Start: 2024-03-25

## 2023-12-07 RX ORDER — DIPHENHYDRAMINE HYDROCHLORIDE 50 MG/ML
50 INJECTION INTRAMUSCULAR; INTRAVENOUS AS NEEDED
Status: CANCELLED | OUTPATIENT
Start: 2023-12-18

## 2023-12-07 RX ORDER — FAMOTIDINE 10 MG/ML
20 INJECTION INTRAVENOUS ONCE AS NEEDED
Status: CANCELLED | OUTPATIENT
Start: 2023-12-18

## 2023-12-07 RX ADMIN — DOCUSATE SODIUM 100 MG: 100 CAPSULE, LIQUID FILLED ORAL at 08:57

## 2023-12-07 RX ADMIN — DOCUSATE SODIUM 100 MG: 100 CAPSULE, LIQUID FILLED ORAL at 21:20

## 2023-12-07 RX ADMIN — SODIUM BICARBONATE 650 MG TABLET 650 MG: at 08:57

## 2023-12-07 RX ADMIN — CALCIUM GLUCONATE 2 G: 20 INJECTION, SOLUTION INTRAVENOUS at 09:02

## 2023-12-07 RX ADMIN — TENOFOVIR ALAFENAMIDE 25 MG: 25 TABLET ORAL at 08:56

## 2023-12-07 RX ADMIN — CLOTRIMAZOLE 10 MG: 10 LOZENGE ORAL; TOPICAL at 14:08

## 2023-12-07 RX ADMIN — SULFAMETHOXAZOLE AND TRIMETHOPRIM 80 MG: 400; 80 TABLET ORAL at 08:56

## 2023-12-07 RX ADMIN — ACETAMINOPHEN 650 MG: 325 TABLET ORAL at 00:23

## 2023-12-07 RX ADMIN — MYCOPHENOLATE MOFETIL 1000 MG: 250 CAPSULE ORAL at 21:20

## 2023-12-07 RX ADMIN — BISACODYL 10 MG: 10 SUPPOSITORY RECTAL at 09:00

## 2023-12-07 RX ADMIN — ACETAMINOPHEN 650 MG: 325 TABLET ORAL at 05:55

## 2023-12-07 RX ADMIN — ACETAMINOPHEN 650 MG: 325 TABLET ORAL at 18:09

## 2023-12-07 RX ADMIN — LIDOCAINE HYDROCHLORIDE 100 MG: 10 INJECTION, SOLUTION EPIDURAL; INFILTRATION; INTRACAUDAL; PERINEURAL at 10:30

## 2023-12-07 RX ADMIN — ACETAMINOPHEN 650 MG: 325 TABLET ORAL at 12:49

## 2023-12-07 RX ADMIN — PREDNISONE 20 MG: 10 TABLET ORAL at 08:57

## 2023-12-07 RX ADMIN — METHOCARBAMOL 500 MG: 500 TABLET ORAL at 12:49

## 2023-12-07 RX ADMIN — LIDOCAINE HYDROCHLORIDE 500 MG: 10 INJECTION, SOLUTION INFILTRATION; PERINEURAL at 10:48

## 2023-12-07 RX ADMIN — PANTOPRAZOLE SODIUM 40 MG: 40 TABLET, DELAYED RELEASE ORAL at 15:28

## 2023-12-07 RX ADMIN — CLOTRIMAZOLE 10 MG: 10 LOZENGE ORAL; TOPICAL at 09:31

## 2023-12-07 RX ADMIN — BISACODYL 10 MG: 5 TABLET, COATED ORAL at 09:08

## 2023-12-07 RX ADMIN — MYCOPHENOLATE MOFETIL 1000 MG: 250 CAPSULE ORAL at 08:56

## 2023-12-07 RX ADMIN — METHOCARBAMOL 500 MG: 500 TABLET ORAL at 05:54

## 2023-12-07 RX ADMIN — LEVOTHYROXINE SODIUM 25 MCG: 50 TABLET ORAL at 06:50

## 2023-12-07 RX ADMIN — CLOTRIMAZOLE 10 MG: 10 LOZENGE ORAL; TOPICAL at 18:11

## 2023-12-07 RX ADMIN — TRAMADOL HYDROCHLORIDE 50 MG: 50 TABLET, COATED ORAL at 09:08

## 2023-12-07 RX ADMIN — MIRTAZAPINE 7.5 MG: 7.5 TABLET, FILM COATED ORAL at 21:20

## 2023-12-07 RX ADMIN — METHOCARBAMOL 500 MG: 500 TABLET ORAL at 18:09

## 2023-12-07 RX ADMIN — PANTOPRAZOLE SODIUM 40 MG: 40 TABLET, DELAYED RELEASE ORAL at 06:55

## 2023-12-07 RX ADMIN — POLYETHYLENE GLYCOL 3350 17 G: 17 POWDER, FOR SOLUTION ORAL at 08:58

## 2023-12-07 RX ADMIN — TRAMADOL HYDROCHLORIDE 50 MG: 50 TABLET, COATED ORAL at 03:00

## 2023-12-07 RX ADMIN — VALGANCICLOVIR HYDROCHLORIDE 450 MG: 450 TABLET ORAL at 08:57

## 2023-12-07 RX ADMIN — TRAMADOL HYDROCHLORIDE 50 MG: 50 TABLET, COATED ORAL at 15:27

## 2023-12-07 RX ADMIN — ASPIRIN 81 MG CHEWABLE TABLET 81 MG: 81 TABLET CHEWABLE at 12:49

## 2023-12-07 RX ADMIN — OXYBUTYNIN CHLORIDE 5 MG: 5 TABLET ORAL at 08:57

## 2023-12-07 RX ADMIN — OXYBUTYNIN CHLORIDE 5 MG: 5 TABLET ORAL at 21:20

## 2023-12-07 ASSESSMENT — COGNITIVE AND FUNCTIONAL STATUS - GENERAL
MOVING FROM LYING ON BACK TO SITTING ON SIDE OF FLAT BED WITH BEDRAILS: A LITTLE
WALKING IN HOSPITAL ROOM: A LITTLE
DRESSING REGULAR UPPER BODY CLOTHING: A LITTLE
CLIMB 3 TO 5 STEPS WITH RAILING: A LOT
TURNING FROM BACK TO SIDE WHILE IN FLAT BAD: A LITTLE
STANDING UP FROM CHAIR USING ARMS: A LITTLE
HELP NEEDED FOR BATHING: A LITTLE
MOBILITY SCORE: 17
DRESSING REGULAR LOWER BODY CLOTHING: A LITTLE
MOVING TO AND FROM BED TO CHAIR: A LITTLE
DAILY ACTIVITIY SCORE: 20
TOILETING: A LITTLE

## 2023-12-07 ASSESSMENT — PAIN DESCRIPTION - LOCATION
LOCATION: ABDOMEN

## 2023-12-07 ASSESSMENT — PAIN - FUNCTIONAL ASSESSMENT
PAIN_FUNCTIONAL_ASSESSMENT: 0-10

## 2023-12-07 ASSESSMENT — PAIN SCALES - GENERAL
PAINLEVEL_OUTOF10: 6
PAINLEVEL_OUTOF10: 9
PAINLEVEL_OUTOF10: 10 - WORST POSSIBLE PAIN
PAINLEVEL_OUTOF10: 5 - MODERATE PAIN
PAINLEVEL_OUTOF10: 6
PAINLEVEL_OUTOF10: 3
PAINLEVEL_OUTOF10: 5 - MODERATE PAIN

## 2023-12-07 ASSESSMENT — PAIN DESCRIPTION - ORIENTATION
ORIENTATION: RIGHT;LOWER
ORIENTATION: RIGHT;LOWER

## 2023-12-07 NOTE — CONSULTS
Vancomycin Dosing by Pharmacy- Cessation of Therapy    Consult to pharmacy for vancomycin dosing has been discontinued by the prescriber, pharmacy will sign off at this time.    Please call pharmacy if there are further questions or re-enter a consult if vancomycin is resumed.     Alice Rinaldi, PharmD

## 2023-12-08 ENCOUNTER — APPOINTMENT (OUTPATIENT)
Dept: DIALYSIS | Facility: HOSPITAL | Age: 43
End: 2023-12-08
Payer: COMMERCIAL

## 2023-12-08 ENCOUNTER — APPOINTMENT (OUTPATIENT)
Dept: RADIOLOGY | Facility: HOSPITAL | Age: 43
DRG: 650 | End: 2023-12-08
Payer: COMMERCIAL

## 2023-12-08 LAB
ALBUMIN SERPL BCP-MCNC: 2.7 G/DL (ref 3.4–5)
ANION GAP SERPL CALC-SCNC: 18 MMOL/L (ref 10–20)
BACTERIA BLD CULT: NORMAL
BASOPHILS # BLD AUTO: 0.01 X10*3/UL (ref 0–0.1)
BASOPHILS NFR BLD AUTO: 0.2 %
BUN SERPL-MCNC: 56 MG/DL (ref 6–23)
CA-I BLD-SCNC: 0.97 MMOL/L (ref 1.1–1.33)
CALCIUM SERPL-MCNC: 7.2 MG/DL (ref 8.6–10.6)
CHLORIDE SERPL-SCNC: 95 MMOL/L (ref 98–107)
CO2 SERPL-SCNC: 27 MMOL/L (ref 21–32)
CREAT SERPL-MCNC: 7.81 MG/DL (ref 0.5–1.05)
EOSINOPHIL # BLD AUTO: 0.05 X10*3/UL (ref 0–0.7)
EOSINOPHIL NFR BLD AUTO: 0.8 %
ERYTHROCYTE [DISTWIDTH] IN BLOOD BY AUTOMATED COUNT: 15.5 % (ref 11.5–14.5)
GFR SERPL CREATININE-BSD FRML MDRD: 6 ML/MIN/1.73M*2
GLUCOSE SERPL-MCNC: 94 MG/DL (ref 74–99)
HCT VFR BLD AUTO: 23 % (ref 36–46)
HGB BLD-MCNC: 7.5 G/DL (ref 12–16)
IMM GRANULOCYTES # BLD AUTO: 0.31 X10*3/UL (ref 0–0.7)
IMM GRANULOCYTES NFR BLD AUTO: 4.7 % (ref 0–0.9)
LYMPHOCYTES # BLD AUTO: 0.17 X10*3/UL (ref 1.2–4.8)
LYMPHOCYTES NFR BLD AUTO: 2.6 %
MAGNESIUM SERPL-MCNC: 2.31 MG/DL (ref 1.6–2.4)
MCH RBC QN AUTO: 30.2 PG (ref 26–34)
MCHC RBC AUTO-ENTMCNC: 32.6 G/DL (ref 32–36)
MCV RBC AUTO: 93 FL (ref 80–100)
MONOCYTES # BLD AUTO: 0.19 X10*3/UL (ref 0.1–1)
MONOCYTES NFR BLD AUTO: 2.9 %
NEUTROPHILS # BLD AUTO: 5.88 X10*3/UL (ref 1.2–7.7)
NEUTROPHILS NFR BLD AUTO: 88.8 %
NRBC BLD-RTO: 0 /100 WBCS (ref 0–0)
PHOSPHATE SERPL-MCNC: 4.8 MG/DL (ref 2.5–4.9)
PLATELET # BLD AUTO: 91 X10*3/UL (ref 150–450)
POTASSIUM SERPL-SCNC: 4 MMOL/L (ref 3.5–5.3)
RBC # BLD AUTO: 2.48 X10*6/UL (ref 4–5.2)
SODIUM SERPL-SCNC: 136 MMOL/L (ref 136–145)
WBC # BLD AUTO: 6.6 X10*3/UL (ref 4.4–11.3)

## 2023-12-08 PROCEDURE — 2500000004 HC RX 250 GENERAL PHARMACY W/ HCPCS (ALT 636 FOR OP/ED): Performed by: STUDENT IN AN ORGANIZED HEALTH CARE EDUCATION/TRAINING PROGRAM

## 2023-12-08 PROCEDURE — 2500000001 HC RX 250 WO HCPCS SELF ADMINISTERED DRUGS (ALT 637 FOR MEDICARE OP)

## 2023-12-08 PROCEDURE — 76776 US EXAM K TRANSPL W/DOPPLER: CPT | Performed by: RADIOLOGY

## 2023-12-08 PROCEDURE — 99232 SBSQ HOSP IP/OBS MODERATE 35: CPT | Performed by: STUDENT IN AN ORGANIZED HEALTH CARE EDUCATION/TRAINING PROGRAM

## 2023-12-08 PROCEDURE — 2500000001 HC RX 250 WO HCPCS SELF ADMINISTERED DRUGS (ALT 637 FOR MEDICARE OP): Performed by: STUDENT IN AN ORGANIZED HEALTH CARE EDUCATION/TRAINING PROGRAM

## 2023-12-08 PROCEDURE — 99232 SBSQ HOSP IP/OBS MODERATE 35: CPT | Performed by: INTERNAL MEDICINE

## 2023-12-08 PROCEDURE — 2500000004 HC RX 250 GENERAL PHARMACY W/ HCPCS (ALT 636 FOR OP/ED)

## 2023-12-08 PROCEDURE — 82330 ASSAY OF CALCIUM: CPT

## 2023-12-08 PROCEDURE — 76776 US EXAM K TRANSPL W/DOPPLER: CPT

## 2023-12-08 PROCEDURE — 83735 ASSAY OF MAGNESIUM: CPT | Performed by: STUDENT IN AN ORGANIZED HEALTH CARE EDUCATION/TRAINING PROGRAM

## 2023-12-08 PROCEDURE — 8010000001 HC DIALYSIS - HEMODIALYSIS PER DAY

## 2023-12-08 PROCEDURE — 85025 COMPLETE CBC W/AUTO DIFF WBC: CPT | Performed by: STUDENT IN AN ORGANIZED HEALTH CARE EDUCATION/TRAINING PROGRAM

## 2023-12-08 PROCEDURE — 36415 COLL VENOUS BLD VENIPUNCTURE: CPT

## 2023-12-08 PROCEDURE — 1100000001 HC PRIVATE ROOM DAILY

## 2023-12-08 PROCEDURE — 80069 RENAL FUNCTION PANEL: CPT | Performed by: STUDENT IN AN ORGANIZED HEALTH CARE EDUCATION/TRAINING PROGRAM

## 2023-12-08 PROCEDURE — RXMED WILLOW AMBULATORY MEDICATION CHARGE

## 2023-12-08 PROCEDURE — 36415 COLL VENOUS BLD VENIPUNCTURE: CPT | Performed by: STUDENT IN AN ORGANIZED HEALTH CARE EDUCATION/TRAINING PROGRAM

## 2023-12-08 RX ORDER — TRAMADOL HYDROCHLORIDE 50 MG/1
50 TABLET ORAL EVERY 6 HOURS PRN
Qty: 15 TABLET | Refills: 0 | Status: SHIPPED | OUTPATIENT
Start: 2023-12-08 | End: 2023-12-22 | Stop reason: HOSPADM

## 2023-12-08 RX ORDER — METHOCARBAMOL 500 MG/1
500 TABLET, FILM COATED ORAL EVERY 6 HOURS PRN
Status: DISCONTINUED | OUTPATIENT
Start: 2023-12-08 | End: 2023-12-09

## 2023-12-08 RX ORDER — PREDNISONE 5 MG/1
20 TABLET ORAL DAILY
Qty: 120 TABLET | Refills: 0 | Status: SHIPPED | OUTPATIENT
Start: 2023-12-08 | End: 2023-12-14 | Stop reason: SDUPTHER

## 2023-12-08 RX ORDER — CALCIUM GLUCONATE 20 MG/ML
2 INJECTION, SOLUTION INTRAVENOUS ONCE
Status: COMPLETED | OUTPATIENT
Start: 2023-12-08 | End: 2023-12-08

## 2023-12-08 RX ORDER — NAPROXEN SODIUM 220 MG/1
81 TABLET, FILM COATED ORAL DAILY
Qty: 30 TABLET | Refills: 0 | Status: SHIPPED | OUTPATIENT
Start: 2023-12-08 | End: 2023-12-13 | Stop reason: HOSPADM

## 2023-12-08 RX ADMIN — OXYBUTYNIN CHLORIDE 5 MG: 5 TABLET ORAL at 08:58

## 2023-12-08 RX ADMIN — PREDNISONE 20 MG: 10 TABLET ORAL at 08:57

## 2023-12-08 RX ADMIN — TRAMADOL HYDROCHLORIDE 50 MG: 50 TABLET, COATED ORAL at 15:12

## 2023-12-08 RX ADMIN — METHOCARBAMOL 500 MG: 500 TABLET ORAL at 06:04

## 2023-12-08 RX ADMIN — ACETAMINOPHEN 650 MG: 325 TABLET ORAL at 17:00

## 2023-12-08 RX ADMIN — PANTOPRAZOLE SODIUM 40 MG: 40 TABLET, DELAYED RELEASE ORAL at 06:04

## 2023-12-08 RX ADMIN — BISACODYL 10 MG: 5 TABLET, COATED ORAL at 08:58

## 2023-12-08 RX ADMIN — METHOCARBAMOL 500 MG: 500 TABLET ORAL at 12:31

## 2023-12-08 RX ADMIN — TRAMADOL HYDROCHLORIDE 50 MG: 50 TABLET, COATED ORAL at 00:06

## 2023-12-08 RX ADMIN — DIPHENHYDRAMINE HYDROCHLORIDE 12.5 MG: 50 INJECTION INTRAMUSCULAR; INTRAVENOUS at 18:57

## 2023-12-08 RX ADMIN — DOCUSATE SODIUM 100 MG: 100 CAPSULE, LIQUID FILLED ORAL at 23:03

## 2023-12-08 RX ADMIN — SULFAMETHOXAZOLE AND TRIMETHOPRIM 80 MG: 400; 80 TABLET ORAL at 08:58

## 2023-12-08 RX ADMIN — DOCUSATE SODIUM 100 MG: 100 CAPSULE, LIQUID FILLED ORAL at 08:57

## 2023-12-08 RX ADMIN — CLOTRIMAZOLE 10 MG: 10 LOZENGE ORAL; TOPICAL at 17:00

## 2023-12-08 RX ADMIN — TENOFOVIR ALAFENAMIDE 25 MG: 25 TABLET ORAL at 08:58

## 2023-12-08 RX ADMIN — MIRTAZAPINE 7.5 MG: 7.5 TABLET, FILM COATED ORAL at 23:03

## 2023-12-08 RX ADMIN — Medication 5 MG: at 23:03

## 2023-12-08 RX ADMIN — MYCOPHENOLATE MOFETIL 1000 MG: 250 CAPSULE ORAL at 23:03

## 2023-12-08 RX ADMIN — CLOTRIMAZOLE 10 MG: 10 LOZENGE ORAL; TOPICAL at 08:58

## 2023-12-08 RX ADMIN — PANTOPRAZOLE SODIUM 40 MG: 40 TABLET, DELAYED RELEASE ORAL at 15:12

## 2023-12-08 RX ADMIN — ACETAMINOPHEN 650 MG: 325 TABLET ORAL at 00:06

## 2023-12-08 RX ADMIN — ACETAMINOPHEN 650 MG: 325 TABLET ORAL at 06:03

## 2023-12-08 RX ADMIN — ACETAMINOPHEN 650 MG: 325 TABLET ORAL at 12:31

## 2023-12-08 RX ADMIN — OXYBUTYNIN CHLORIDE 5 MG: 5 TABLET ORAL at 23:03

## 2023-12-08 RX ADMIN — TRAMADOL HYDROCHLORIDE 50 MG: 50 TABLET, COATED ORAL at 08:58

## 2023-12-08 RX ADMIN — ASPIRIN 81 MG CHEWABLE TABLET 81 MG: 81 TABLET CHEWABLE at 08:57

## 2023-12-08 RX ADMIN — TRAMADOL HYDROCHLORIDE 50 MG: 50 TABLET, COATED ORAL at 23:03

## 2023-12-08 RX ADMIN — CALCIUM GLUCONATE 2 G: 20 INJECTION, SOLUTION INTRAVENOUS at 09:08

## 2023-12-08 RX ADMIN — METHOCARBAMOL 500 MG: 500 TABLET ORAL at 00:06

## 2023-12-08 RX ADMIN — LEVOTHYROXINE SODIUM 25 MCG: 50 TABLET ORAL at 06:04

## 2023-12-08 RX ADMIN — CLOTRIMAZOLE 10 MG: 10 LOZENGE ORAL; TOPICAL at 12:31

## 2023-12-08 RX ADMIN — MYCOPHENOLATE MOFETIL 1000 MG: 250 CAPSULE ORAL at 08:57

## 2023-12-08 ASSESSMENT — PAIN - FUNCTIONAL ASSESSMENT
PAIN_FUNCTIONAL_ASSESSMENT: 0-10

## 2023-12-08 ASSESSMENT — COGNITIVE AND FUNCTIONAL STATUS - GENERAL
WALKING IN HOSPITAL ROOM: A LITTLE
MOVING TO AND FROM BED TO CHAIR: A LITTLE
HELP NEEDED FOR BATHING: A LITTLE
STANDING UP FROM CHAIR USING ARMS: A LITTLE
TURNING FROM BACK TO SIDE WHILE IN FLAT BAD: A LITTLE
MOVING FROM LYING ON BACK TO SITTING ON SIDE OF FLAT BED WITH BEDRAILS: A LITTLE
DRESSING REGULAR UPPER BODY CLOTHING: A LITTLE
DAILY ACTIVITIY SCORE: 20
TOILETING: A LITTLE
MOBILITY SCORE: 18
CLIMB 3 TO 5 STEPS WITH RAILING: A LITTLE
DRESSING REGULAR LOWER BODY CLOTHING: A LITTLE

## 2023-12-08 ASSESSMENT — PAIN SCALES - GENERAL
PAINLEVEL_OUTOF10: 8
PAINLEVEL_OUTOF10: 7
PAINLEVEL_OUTOF10: 4
PAINLEVEL_OUTOF10: 6

## 2023-12-08 ASSESSMENT — PAIN DESCRIPTION - DESCRIPTORS
DESCRIPTORS: SHARP

## 2023-12-08 ASSESSMENT — PAIN DESCRIPTION - LOCATION
LOCATION: ABDOMEN
LOCATION: ABDOMEN

## 2023-12-08 ASSESSMENT — PAIN DESCRIPTION - ORIENTATION
ORIENTATION: RIGHT;LOWER
ORIENTATION: RIGHT;LOWER

## 2023-12-08 NOTE — PROGRESS NOTES
"Herber Leblanc is a 43 y.o. female w/ ESRD 2/2 HTN/NSAID on HD now POD # 7 s/p DCD kidney transplant 11/30/23.  Recipient with thin bladder, plan 7 day curran. Takeback and washout 12/03/23 for hematoma.     Subjective   Doing well. HD today. RUSSELL not recorded. UOP 50 cc     Objective   Physical Exam  Gen: A+OX3; NAD  Abd: S/NT/ND. Incision C/D/I.  Drains: Serosanguinous    Ext: no LE edema    Last Recorded Vitals  Blood pressure 143/84, pulse 69, temperature 36.6 °C (97.9 °F), temperature source Temporal, resp. rate 18, height 1.626 m (5' 4\"), weight 85.8 kg (189 lb 1.6 oz), SpO2 96 %.  Intake/Output last 3 Shifts:  I/O last 3 completed shifts:  In: 120 (1.4 mL/kg) [P.O.:120]  Out: 115 (1.3 mL/kg) [Urine:95 (0 mL/kg/hr); Drains:20]  Weight: 85.2 kg     Patient Active Problem List   Diagnosis    ESRD (end stage renal disease) (CMS/[facility])    HTN (hypertension)    Gastroesophageal reflux disease    Kidney replaced by transplant    Immunosuppression (CMS/[facility])        Assessment/Plan    S/p DDKT 11/30/23  KDPI 56%   DGF, washout 12/3/23 for hematoma  Hold asa  Interval US today    HD today    Immunosuppression   Thymo 4.5 mg/kg  Tac / MMF/ Pred  Started rachel 12/5/23 - next dose 12/9/23    Donor Kidney Info:  Etiology:  Anoxia  Risk: No  Terminal Cr: 1.3  ABO: B  KDPI:  56 %  PRA: 2 %  DCD: yes  Side: Left kidney   CIT: 18 hr 33 min, WIT 29 minutes  CMV: D+/R+  EBV: D+/R+  Toxo: Donor Negative  HCV Ab/SENAIT: Negative  HBcAB: Negative  HBsAg/HBV SENAIT: Negative     I spent 35 minutes in the professional and overall care of this patient.      Anita Louis MD  "

## 2023-12-08 NOTE — PROGRESS NOTES
Pharmacist Transplant Education Note    The medications for Herber Leblanc were reviewed in conjunction with the multidisciplinary transplant team. The pharmacist is in agreement with the plan of care for medications at this time.     Approximately 40 minutes were spent with this patient's caregiver providing medication education and reviewing new post-transplant medications. Also participating in the education session was/were the patient's brother-in-law.     An outpatient dosing schedule was created for all oral medications. This schedule was discussed in detail with the patient, including drug name, use, dose, and the appropriate timing of self-administration (i.e. with or without meals, with or without other medications). Also discussed side effects, drug interactions, and the importance of adherence. Both verbal and written instructions were given to the patient outlining the appropriate use of all current oral medications.     The patient's caregiver demonstrated an acceptable understanding of her current medication list. All questions were answered to the patient's satisfaction, and the patient's caregiver confirmed understanding.     Follow-up education will take place prior to discharge where a finalized list of discharge medications will be reviewed with the patient. Further educational reinforcement should be provided in the outpatient clinic.    Alice Rinaldi, PharmD, BCTXP  Clinical Pharmacy Specialist - Solid Organ Transplant

## 2023-12-08 NOTE — NURSING NOTE
Report from Sending RN:    Report From: Max  Recent Surgery of Procedure: No  Baseline Level of Consciousness (LOC): A and O x 3  Oxygen Use: No  Type:   Diabetic: N/A  Last BP Med Given Day of Dialysis: none  Last Pain Med Given: 1513 pm  Lab Tests to be Obtained with Dialysis: No  Blood Transfusion to be Given During Dialysis: No  Available IV Access: Yes  Medications to be Administered During Dialysis: No  Continuous IV Infusion Running: N/A  Restraints on Currently or in the Last 24 Hours: N/A  Hand-Off Communication: Pt stable and eager to come for tx. Just medicated for pain at RUSSELL drain site

## 2023-12-08 NOTE — PROGRESS NOTES
"Herber Leblanc is a 43 y.o. female w/ ESRD 2/2 HTN/NSAID on HD now POD # 7 s/p DCD kidney transplant 11/30/23.  Recipient with thin bladder, plan 7 day curran. Takeback and washout 12/03/23 for hematoma.     Subjective   Doing well. Some incisional drainage. RUSSELL 70 ml.  cc       Objective   Physical Exam  Gen: A+OX3; NAD  Abd: S/NT/ND. Incision C/D/I.  Drains: Serosanguinous    Ext: no LE edema    Last Recorded Vitals  Blood pressure 143/84, pulse 69, temperature 36.6 °C (97.9 °F), temperature source Temporal, resp. rate 18, height 1.626 m (5' 4\"), weight 85.8 kg (189 lb 1.6 oz), SpO2 96 %.  Intake/Output last 3 Shifts:  I/O last 3 completed shifts:  In: 120 (1.4 mL/kg) [P.O.:120]  Out: 115 (1.3 mL/kg) [Urine:95 (0 mL/kg/hr); Drains:20]  Weight: 85.2 kg     Patient Active Problem List   Diagnosis    ESRD (end stage renal disease) (CMS/[facility])    HTN (hypertension)    Gastroesophageal reflux disease    Kidney replaced by transplant    Immunosuppression (CMS/[facility])        Assessment/Plan    S/p DDKT 11/30/23  KDPI 56%   DGF, washout 12/3/23 for hematoma  Start ASA  D.c antibiotics     Immunosuppression   Thymo 4.5 mg/kg  Tac / MMF/ Pred  Started rachel 12/5/23    Donor Kidney Info:  Etiology:  Anoxia  Risk: No  Terminal Cr: 1.3  ABO: B  KDPI:  56 %  PRA: 2 %  DCD: yes  Side: Left kidney   CIT: 18 hr 33 min, WIT 29 minutes  CMV: D+/R+  EBV: D+/R+  Toxo: Donor Negative  HCV Ab/SENAIT: Negative  HBcAB: Negative  HBsAg/HBV SENAIT: Negative     I spent 35 minutes in the professional and overall care of this patient.      Anita oLuis MD  "

## 2023-12-08 NOTE — PROGRESS NOTES
"Transplant Nephrology progress note     Date of admission: 11/30/2023     Herber Leblanc is a 43 y.o.  with [facility]   Past Medical History:   Diagnosis Date    Chronic kidney disease, unspecified     CKD, patient interested in transplantation    GERD (gastroesophageal reflux disease)     Personal history of COVID-19 07/20/2022    History of COVID-19        SUBJECTIVE: Still has pain at surgical site. Otherwise doing OK. Cousin was the .    PROBLEM LIST:  Principal Problem:    ESRD (end stage renal disease) (CMS/[facility])  Active Problems:    HTN (hypertension)    Gastroesophageal reflux disease    Kidney replaced by transplant         ALLERGIES:  No Known Allergies         CURRENT MEDICATIONS:  Scheduled medications  acetaminophen, 650 mg, oral, q6h JANIE  aspirin, 81 mg, oral, Daily  [START ON 12/9/2023] belatacept, 10 mg/kg (Order-Specific), intravenous, Once  bisacodyl, 10 mg, oral, Daily  bisacodyl, 10 mg, rectal, Daily  clotrimazole, 10 mg, oral, TID after meals  darbepoetin jacob, 100 mcg, subcutaneous, Weekly  docusate sodium, 100 mg, oral, BID  levothyroxine, 25 mcg, oral, Daily before breakfast  mirtazapine, 7.5 mg, oral, Nightly  mycophenolate, 1,000 mg, oral, BID  oxybutynin, 5 mg, oral, BID  pantoprazole, 40 mg, oral, BID AC  polyethylene glycol, 17 g, oral, Daily  predniSONE, 20 mg, oral, Daily  sulfamethoxazole-trimethoprim, 80 mg, oral, Daily  tenofovir alafenamide, 25 mg, oral, Daily  valGANciclovir, 450 mg, oral, q48h      Continuous medications       PRN medications  PRN medications: diphenhydrAMINE, melatonin, methocarbamol, naloxone, ondansetron, oxygen, oxygen, traMADol, traMADol       OBJECTIVE:    VITALS: Visit Vitals  BP (!) 146/91 (BP Location: Right arm, Patient Position: Sitting)   Pulse 70   Temp 36.4 °C (97.5 °F) (Temporal)   Resp 17   Ht 1.626 m (5' 4\")   Wt 85.8 kg (189 lb 1.6 oz)   SpO2 99%   BMI 32.46 kg/m²   Smoking Status Never   BSA 1.97 m²     Physical:  HEENT: normal   Lungs : " clear  CVS s1,s2  Abdomen : sugical sight looks clean with dressing and staples intact .  Rain cathter is in .  +1 EDEMA        LABS:  Results from last 72 hours   Lab Units 12/08/23  0509   WBC AUTO x10*3/uL 6.6   HEMOGLOBIN g/dL 7.5*   MCV fL 93   PLATELETS AUTO x10*3/uL 91*   BUN mg/dL 56*   CREATININE mg/dL 7.81*   CALCIUM mg/dL 7.2*              Intake/Output Summary (Last 24 hours) at 12/8/2023 1406  Last data filed at 12/8/2023 1236  Gross per 24 hour   Intake 0 ml   Output 222.5 ml   Net -222.5 ml            ASSESSMENT AND PLAN:    Herber Leblanc is a 43 y.o. with a history of ESRD secondary to hypertension and chronic NSAID use s/p desceased kidney transplant on 11/30/2023.  She was on dialysis since 2018 Monday Wednesday Friday via left upper extremity AV fistula.  Nephrology consulted for comanagement of index transplant.    Kidney information: KDPI is a 56%, PRA 48%, 6 antigen mismatch, CMV status donor plus/R+, EBV D+/R+, hepatitis C viral negative.    Allograft function: DGF secondary to hyperkalemia s/p dialysis on 12/4/2023.  Dialysis today.  -Current access is left upper extremity AV fistula.    Immunosuppression: Induction by Thymoglobulin total 4.5 mg/kg.  On Belatacept, Mycophenolate, and Prednisone.    Infectious prophylaxis: Patient is positive for hepatitis B core positive started on Vemlidy.  On clotrimazole, Bactrim and Valcyte.

## 2023-12-09 ENCOUNTER — APPOINTMENT (OUTPATIENT)
Dept: RADIOLOGY | Facility: HOSPITAL | Age: 43
DRG: 650 | End: 2023-12-09
Payer: COMMERCIAL

## 2023-12-09 LAB
ABO GROUP (TYPE) IN BLOOD: NORMAL
ALBUMIN SERPL BCP-MCNC: 2.9 G/DL (ref 3.4–5)
ANION GAP SERPL CALC-SCNC: 17 MMOL/L (ref 10–20)
ANTIBODY SCREEN: NORMAL
BASOPHILS # BLD MANUAL: 0 X10*3/UL (ref 0–0.1)
BASOPHILS NFR BLD MANUAL: 0 %
BUN SERPL-MCNC: 30 MG/DL (ref 6–23)
CA-I BLD-SCNC: 0.84 MMOL/L (ref 1.1–1.33)
CALCIUM SERPL-MCNC: 7.4 MG/DL (ref 8.6–10.6)
CHLORIDE SERPL-SCNC: 97 MMOL/L (ref 98–107)
CO2 SERPL-SCNC: 26 MMOL/L (ref 21–32)
CREAT FLD-MCNC: 6.02 MG/DL
CREAT SERPL-MCNC: 5.31 MG/DL (ref 0.5–1.05)
EOSINOPHIL # BLD MANUAL: 0.07 X10*3/UL (ref 0–0.7)
EOSINOPHIL NFR BLD MANUAL: 0.9 %
ERYTHROCYTE [DISTWIDTH] IN BLOOD BY AUTOMATED COUNT: 15.8 % (ref 11.5–14.5)
GFR SERPL CREATININE-BSD FRML MDRD: 10 ML/MIN/1.73M*2
GLUCOSE SERPL-MCNC: 108 MG/DL (ref 74–99)
HCT VFR BLD AUTO: 26.7 % (ref 36–46)
HGB BLD-MCNC: 8.8 G/DL (ref 12–16)
IMM GRANULOCYTES # BLD AUTO: 0.46 X10*3/UL (ref 0–0.7)
IMM GRANULOCYTES NFR BLD AUTO: 6.1 % (ref 0–0.9)
LYMPHOCYTES # BLD MANUAL: 0 X10*3/UL (ref 1.2–4.8)
LYMPHOCYTES NFR BLD MANUAL: 0 %
MAGNESIUM SERPL-MCNC: 2.25 MG/DL (ref 1.6–2.4)
MCH RBC QN AUTO: 31.1 PG (ref 26–34)
MCHC RBC AUTO-ENTMCNC: 33 G/DL (ref 32–36)
MCV RBC AUTO: 94 FL (ref 80–100)
MONOCYTES # BLD MANUAL: 0.13 X10*3/UL (ref 0.1–1)
MONOCYTES NFR BLD MANUAL: 1.7 %
NEUTS SEG # BLD MANUAL: 7.18 X10*3/UL (ref 1.2–7)
NEUTS SEG NFR BLD MANUAL: 95.7 %
NRBC BLD-RTO: 0 /100 WBCS (ref 0–0)
PHOSPHATE SERPL-MCNC: 3.8 MG/DL (ref 2.5–4.9)
PLATELET # BLD AUTO: 141 X10*3/UL (ref 150–450)
POLYCHROMASIA BLD QL SMEAR: ABNORMAL
POTASSIUM SERPL-SCNC: 4.2 MMOL/L (ref 3.5–5.3)
PTH-INTACT SERPL-MCNC: 681.7 PG/ML (ref 18.5–88)
RBC # BLD AUTO: 2.83 X10*6/UL (ref 4–5.2)
RBC MORPH BLD: ABNORMAL
RH FACTOR (ANTIGEN D): NORMAL
SCHISTOCYTES BLD QL SMEAR: ABNORMAL
SODIUM SERPL-SCNC: 136 MMOL/L (ref 136–145)
TOTAL CELLS COUNTED BLD: 115
VARIANT LYMPHS # BLD MANUAL: 0.13 X10*3/UL (ref 0–0.5)
VARIANT LYMPHS NFR BLD: 1.7 %
WBC # BLD AUTO: 7.5 X10*3/UL (ref 4.4–11.3)

## 2023-12-09 PROCEDURE — 36415 COLL VENOUS BLD VENIPUNCTURE: CPT

## 2023-12-09 PROCEDURE — 2500000004 HC RX 250 GENERAL PHARMACY W/ HCPCS (ALT 636 FOR OP/ED): Performed by: STUDENT IN AN ORGANIZED HEALTH CARE EDUCATION/TRAINING PROGRAM

## 2023-12-09 PROCEDURE — 2500000004 HC RX 250 GENERAL PHARMACY W/ HCPCS (ALT 636 FOR OP/ED)

## 2023-12-09 PROCEDURE — 82570 ASSAY OF URINE CREATININE: CPT

## 2023-12-09 PROCEDURE — 82330 ASSAY OF CALCIUM: CPT

## 2023-12-09 PROCEDURE — 86900 BLOOD TYPING SEROLOGIC ABO: CPT

## 2023-12-09 PROCEDURE — 83735 ASSAY OF MAGNESIUM: CPT

## 2023-12-09 PROCEDURE — 85007 BL SMEAR W/DIFF WBC COUNT: CPT

## 2023-12-09 PROCEDURE — 2500000005 HC RX 250 GENERAL PHARMACY W/O HCPCS

## 2023-12-09 PROCEDURE — 80069 RENAL FUNCTION PANEL: CPT

## 2023-12-09 PROCEDURE — 83970 ASSAY OF PARATHORMONE: CPT

## 2023-12-09 PROCEDURE — 2500000001 HC RX 250 WO HCPCS SELF ADMINISTERED DRUGS (ALT 637 FOR MEDICARE OP)

## 2023-12-09 PROCEDURE — 74176 CT ABD & PELVIS W/O CONTRAST: CPT | Performed by: STUDENT IN AN ORGANIZED HEALTH CARE EDUCATION/TRAINING PROGRAM

## 2023-12-09 PROCEDURE — 2500000001 HC RX 250 WO HCPCS SELF ADMINISTERED DRUGS (ALT 637 FOR MEDICARE OP): Performed by: STUDENT IN AN ORGANIZED HEALTH CARE EDUCATION/TRAINING PROGRAM

## 2023-12-09 PROCEDURE — 74176 CT ABD & PELVIS W/O CONTRAST: CPT

## 2023-12-09 PROCEDURE — 99232 SBSQ HOSP IP/OBS MODERATE 35: CPT | Performed by: SURGERY

## 2023-12-09 PROCEDURE — 85027 COMPLETE CBC AUTOMATED: CPT

## 2023-12-09 PROCEDURE — 1100000001 HC PRIVATE ROOM DAILY

## 2023-12-09 PROCEDURE — 2720000007 HC OR 272 NO HCPCS

## 2023-12-09 RX ORDER — METHOCARBAMOL 500 MG/1
500 TABLET, FILM COATED ORAL EVERY 6 HOURS SCHEDULED
Status: DISCONTINUED | OUTPATIENT
Start: 2023-12-09 | End: 2023-12-13

## 2023-12-09 RX ORDER — FUROSEMIDE 10 MG/ML
100 INJECTION INTRAMUSCULAR; INTRAVENOUS ONCE
Status: COMPLETED | OUTPATIENT
Start: 2023-12-09 | End: 2023-12-09

## 2023-12-09 RX ORDER — LIDOCAINE 560 MG/1
1 PATCH PERCUTANEOUS; TOPICAL; TRANSDERMAL DAILY
Status: DISCONTINUED | OUTPATIENT
Start: 2023-12-09 | End: 2023-12-14 | Stop reason: HOSPADM

## 2023-12-09 RX ADMIN — TRAMADOL HYDROCHLORIDE 50 MG: 50 TABLET, COATED ORAL at 16:21

## 2023-12-09 RX ADMIN — MYCOPHENOLATE MOFETIL 1000 MG: 250 CAPSULE ORAL at 08:43

## 2023-12-09 RX ADMIN — CLOTRIMAZOLE 10 MG: 10 LOZENGE ORAL; TOPICAL at 14:36

## 2023-12-09 RX ADMIN — LEVOTHYROXINE SODIUM 25 MCG: 50 TABLET ORAL at 06:14

## 2023-12-09 RX ADMIN — METHYLNALTREXONE BROMIDE 6 MG: 12 INJECTION, SOLUTION SUBCUTANEOUS at 14:36

## 2023-12-09 RX ADMIN — PANTOPRAZOLE SODIUM 40 MG: 40 TABLET, DELAYED RELEASE ORAL at 16:21

## 2023-12-09 RX ADMIN — FUROSEMIDE 100 MG: 10 INJECTION, SOLUTION INTRAVENOUS at 11:38

## 2023-12-09 RX ADMIN — OXYBUTYNIN CHLORIDE 5 MG: 5 TABLET ORAL at 21:55

## 2023-12-09 RX ADMIN — TENOFOVIR ALAFENAMIDE 25 MG: 25 TABLET ORAL at 08:44

## 2023-12-09 RX ADMIN — CLOTRIMAZOLE 10 MG: 10 LOZENGE ORAL; TOPICAL at 18:08

## 2023-12-09 RX ADMIN — OXYBUTYNIN CHLORIDE 5 MG: 5 TABLET ORAL at 08:44

## 2023-12-09 RX ADMIN — DOCUSATE SODIUM 100 MG: 100 CAPSULE, LIQUID FILLED ORAL at 08:43

## 2023-12-09 RX ADMIN — METHOCARBAMOL 500 MG: 500 TABLET ORAL at 18:08

## 2023-12-09 RX ADMIN — VALGANCICLOVIR HYDROCHLORIDE 450 MG: 450 TABLET ORAL at 08:30

## 2023-12-09 RX ADMIN — METHOCARBAMOL 500 MG: 500 TABLET ORAL at 12:00

## 2023-12-09 RX ADMIN — PREDNISONE 20 MG: 10 TABLET ORAL at 08:43

## 2023-12-09 RX ADMIN — CALCIUM GLUCONATE 3 G: 98 INJECTION, SOLUTION INTRAVENOUS at 09:30

## 2023-12-09 RX ADMIN — DEXTROSE MONOHYDRATE 825 MG: 50 INJECTION, SOLUTION INTRAVENOUS at 08:46

## 2023-12-09 RX ADMIN — CLOTRIMAZOLE 10 MG: 10 LOZENGE ORAL; TOPICAL at 08:46

## 2023-12-09 RX ADMIN — DOCUSATE SODIUM 100 MG: 100 CAPSULE, LIQUID FILLED ORAL at 21:56

## 2023-12-09 RX ADMIN — PANTOPRAZOLE SODIUM 40 MG: 40 TABLET, DELAYED RELEASE ORAL at 06:14

## 2023-12-09 RX ADMIN — TRAMADOL HYDROCHLORIDE 50 MG: 50 TABLET, COATED ORAL at 08:42

## 2023-12-09 RX ADMIN — SULFAMETHOXAZOLE AND TRIMETHOPRIM 80 MG: 400; 80 TABLET ORAL at 08:44

## 2023-12-09 RX ADMIN — MIRTAZAPINE 7.5 MG: 7.5 TABLET, FILM COATED ORAL at 21:56

## 2023-12-09 RX ADMIN — MYCOPHENOLATE MOFETIL 1000 MG: 250 CAPSULE ORAL at 21:55

## 2023-12-09 RX ADMIN — LIDOCAINE 1 PATCH: 4 PATCH TOPICAL at 12:00

## 2023-12-09 ASSESSMENT — COGNITIVE AND FUNCTIONAL STATUS - GENERAL
PERSONAL GROOMING: A LITTLE
TOILETING: A LITTLE
DRESSING REGULAR UPPER BODY CLOTHING: A LITTLE
DRESSING REGULAR LOWER BODY CLOTHING: A LITTLE
DAILY ACTIVITIY SCORE: 18
EATING MEALS: A LITTLE
MOBILITY SCORE: 24
HELP NEEDED FOR BATHING: A LITTLE

## 2023-12-09 ASSESSMENT — PAIN SCALES - GENERAL
PAINLEVEL_OUTOF10: 8
PAINLEVEL_OUTOF10: 7

## 2023-12-09 ASSESSMENT — PAIN - FUNCTIONAL ASSESSMENT
PAIN_FUNCTIONAL_ASSESSMENT: 0-10
PAIN_FUNCTIONAL_ASSESSMENT: 0-10

## 2023-12-09 NOTE — PROGRESS NOTES
Herber Leblanc is a 43 y.o.  female POD#9 from DDKT from a DCD donor. POD #6 was evacuation of subcapsular hematoma. Ongoing constipation/abdominal pain. UOP remains ~150 ml. Repeat US yesterday did show increase size of perinephric hematoma, ASA stopped and hgb remains stable.    Objective   Gen: A+OX3; uncomfortable appearing  HEENT: PERRL, sclera anicteric, MMM  Cardiac: RRR  Chest: Normal inspiratory effort  Abdomen: Soft/mildly distended, tender to palpation, no peritoneal signs. Incision C/D/I.  Ext: Mild LE edema  Drain: serosanguinous    Last Recorded Vitals  Visit Vitals  /85 (BP Location: Right arm, Patient Position: Sitting)   Pulse 60   Temp 36.7 °C (98.1 °F) (Temporal)   Resp 18      Intake/Output last 3 Shifts:    Intake/Output Summary (Last 24 hours) at 12/9/2023 1233  Last data filed at 12/9/2023 0415  Gross per 24 hour   Intake 602 ml   Output 57.5 ml   Net 544.5 ml      Vitals:    12/09/23 0600   Weight: 79.9 kg (176 lb 2.4 oz)        Scheduled medications  [Held by provider] aspirin, 81 mg, oral, Daily  bisacodyl, 10 mg, oral, Daily  bisacodyl, 10 mg, rectal, Daily  clotrimazole, 10 mg, oral, TID after meals  darbepoetin jacob, 100 mcg, subcutaneous, Weekly  docusate sodium, 100 mg, oral, BID  levothyroxine, 25 mcg, oral, Daily before breakfast  lidocaine, 1 patch, transdermal, Daily  methocarbamol, 500 mg, oral, q6h JANIE  methynaltrexone, 6 mg, subcutaneous, Every other day  mirtazapine, 7.5 mg, oral, Nightly  mycophenolate, 1,000 mg, oral, BID  oxybutynin, 5 mg, oral, BID  pantoprazole, 40 mg, oral, BID AC  polyethylene glycol, 17 g, oral, Daily  predniSONE, 20 mg, oral, Daily  sulfamethoxazole-trimethoprim, 80 mg, oral, Daily  tenofovir alafenamide, 25 mg, oral, Daily  valGANciclovir, 450 mg, oral, q48h      Continuous medications     PRN medications  PRN medications: diphenhydrAMINE, melatonin, naloxone, ondansetron, oxygen, oxygen, traMADol, traMADol     Assessment/Plan   Principal  Problem:    Kidney transplant recipient  Active Problems:  Patient Active Problem List   Diagnosis    ESRD (end stage renal disease) (CMS/[facility])    HTN (hypertension)    Gastroesophageal reflux disease    Kidney replaced by transplant    Immunosuppression (CMS/[facility])      - Non-contrast CT scan A/P  - Belatacept today; next dose due 12/18  - Lasix 100 mg today  - Hold heparin subcutaneous, SCDs for DVT prophylaxis  - Renal diet    I spent 35 minutes in the professional and overall care of this patient, including immunosuppression management.    Magi Lambert MD, PHD, FACS  Chief Transplant and Hepatobiliary Surgery

## 2023-12-09 NOTE — NURSING NOTE
Report to Receiving RN:    Report To: Bobby CUNNINGHAM  Time Report Called: 8483  Hand-Off Communication: tolerated treatment, 3 liters of fluid removed, VSS  Complications During Treatment: No  Ultrafiltration Treatment: No  Medications Administered During Dialysis: Yes, benadryl 12.5 mg IV  Blood Products Administered During Dialysis: No  Labs Sent During Dialysis: No  Heparin Drip Rate Changes: N/A

## 2023-12-09 NOTE — CARE PLAN
Problem: Pain - Adult  Goal: Verbalizes/displays adequate comfort level or baseline comfort level  Outcome: Progressing     Problem: Safety - Adult  Goal: Free from fall injury  Outcome: Progressing     Problem: Discharge Planning  Goal: Discharge to home or other facility with appropriate resources  Outcome: Progressing     Problem: Chronic Conditions and Co-morbidities  Goal: Patient's chronic conditions and co-morbidity symptoms are monitored and maintained or improved  Outcome: Progressing     Problem: Skin  Goal: Decreased wound size/increased tissue granulation at next dressing change  Outcome: Progressing  Goal: Participates in plan/prevention/treatment measures  Outcome: Progressing  Goal: Prevent/manage excess moisture  Outcome: Progressing  Goal: Prevent/minimize sheer/friction injuries  Outcome: Progressing  Goal: Promote/optimize nutrition  Outcome: Progressing  Goal: Promote skin healing  Outcome: Progressing

## 2023-12-10 LAB
ALBUMIN SERPL BCP-MCNC: 2.8 G/DL (ref 3.4–5)
ANION GAP SERPL CALC-SCNC: 18 MMOL/L (ref 10–20)
BASOPHILS # BLD MANUAL: 0 X10*3/UL (ref 0–0.1)
BASOPHILS NFR BLD MANUAL: 0 %
BUN SERPL-MCNC: 43 MG/DL (ref 6–23)
CA-I BLD-SCNC: 0.96 MMOL/L (ref 1.1–1.33)
CALCIUM SERPL-MCNC: 7.5 MG/DL (ref 8.6–10.6)
CHLORIDE SERPL-SCNC: 95 MMOL/L (ref 98–107)
CO2 SERPL-SCNC: 25 MMOL/L (ref 21–32)
CREAT SERPL-MCNC: 7.32 MG/DL (ref 0.5–1.05)
EOSINOPHIL # BLD MANUAL: 0.13 X10*3/UL (ref 0–0.7)
EOSINOPHIL NFR BLD MANUAL: 1.7 %
ERYTHROCYTE [DISTWIDTH] IN BLOOD BY AUTOMATED COUNT: 15.7 % (ref 11.5–14.5)
GFR SERPL CREATININE-BSD FRML MDRD: 7 ML/MIN/1.73M*2
GLUCOSE SERPL-MCNC: 86 MG/DL (ref 74–99)
HCT VFR BLD AUTO: 23.4 % (ref 36–46)
HGB BLD-MCNC: 7.7 G/DL (ref 12–16)
IMM GRANULOCYTES # BLD AUTO: 0.52 X10*3/UL (ref 0–0.7)
IMM GRANULOCYTES NFR BLD AUTO: 6.8 % (ref 0–0.9)
LYMPHOCYTES # BLD MANUAL: 0.07 X10*3/UL (ref 1.2–4.8)
LYMPHOCYTES NFR BLD MANUAL: 0.9 %
MAGNESIUM SERPL-MCNC: 2.12 MG/DL (ref 1.6–2.4)
MCH RBC QN AUTO: 30.9 PG (ref 26–34)
MCHC RBC AUTO-ENTMCNC: 32.9 G/DL (ref 32–36)
MCV RBC AUTO: 94 FL (ref 80–100)
MONOCYTES # BLD MANUAL: 0.07 X10*3/UL (ref 0.1–1)
MONOCYTES NFR BLD MANUAL: 0.9 %
NEUTROPHILS # BLD MANUAL: 7.43 X10*3/UL (ref 1.2–7.7)
NEUTS BAND # BLD MANUAL: 0.13 X10*3/UL (ref 0–0.7)
NEUTS BAND NFR BLD MANUAL: 1.7 %
NEUTS SEG # BLD MANUAL: 7.3 X10*3/UL (ref 1.2–7)
NEUTS SEG NFR BLD MANUAL: 94.8 %
NRBC BLD-RTO: 0 /100 WBCS (ref 0–0)
PHOSPHATE SERPL-MCNC: 4.2 MG/DL (ref 2.5–4.9)
PLATELET # BLD AUTO: 157 X10*3/UL (ref 150–450)
POLYCHROMASIA BLD QL SMEAR: ABNORMAL
POTASSIUM SERPL-SCNC: 3.6 MMOL/L (ref 3.5–5.3)
RBC # BLD AUTO: 2.49 X10*6/UL (ref 4–5.2)
RBC MORPH BLD: ABNORMAL
SCHISTOCYTES BLD QL SMEAR: ABNORMAL
SODIUM SERPL-SCNC: 134 MMOL/L (ref 136–145)
TOTAL CELLS COUNTED BLD: 116
WBC # BLD AUTO: 7.7 X10*3/UL (ref 4.4–11.3)

## 2023-12-10 PROCEDURE — 2500000001 HC RX 250 WO HCPCS SELF ADMINISTERED DRUGS (ALT 637 FOR MEDICARE OP)

## 2023-12-10 PROCEDURE — 2500000004 HC RX 250 GENERAL PHARMACY W/ HCPCS (ALT 636 FOR OP/ED): Performed by: STUDENT IN AN ORGANIZED HEALTH CARE EDUCATION/TRAINING PROGRAM

## 2023-12-10 PROCEDURE — 36415 COLL VENOUS BLD VENIPUNCTURE: CPT

## 2023-12-10 PROCEDURE — 80069 RENAL FUNCTION PANEL: CPT

## 2023-12-10 PROCEDURE — 96372 THER/PROPH/DIAG INJ SC/IM: CPT | Performed by: STUDENT IN AN ORGANIZED HEALTH CARE EDUCATION/TRAINING PROGRAM

## 2023-12-10 PROCEDURE — 82330 ASSAY OF CALCIUM: CPT

## 2023-12-10 PROCEDURE — 99232 SBSQ HOSP IP/OBS MODERATE 35: CPT | Performed by: INTERNAL MEDICINE

## 2023-12-10 PROCEDURE — 2500000005 HC RX 250 GENERAL PHARMACY W/O HCPCS

## 2023-12-10 PROCEDURE — 2500000001 HC RX 250 WO HCPCS SELF ADMINISTERED DRUGS (ALT 637 FOR MEDICARE OP): Performed by: STUDENT IN AN ORGANIZED HEALTH CARE EDUCATION/TRAINING PROGRAM

## 2023-12-10 PROCEDURE — 2500000004 HC RX 250 GENERAL PHARMACY W/ HCPCS (ALT 636 FOR OP/ED)

## 2023-12-10 PROCEDURE — 1100000001 HC PRIVATE ROOM DAILY

## 2023-12-10 PROCEDURE — 85027 COMPLETE CBC AUTOMATED: CPT

## 2023-12-10 PROCEDURE — 83735 ASSAY OF MAGNESIUM: CPT

## 2023-12-10 PROCEDURE — 99232 SBSQ HOSP IP/OBS MODERATE 35: CPT | Performed by: SURGERY

## 2023-12-10 PROCEDURE — 85007 BL SMEAR W/DIFF WBC COUNT: CPT

## 2023-12-10 RX ORDER — CALCIUM CARBONATE 200(500)MG
1000 TABLET,CHEWABLE ORAL ONCE
Status: COMPLETED | OUTPATIENT
Start: 2023-12-10 | End: 2023-12-10

## 2023-12-10 RX ADMIN — METHOCARBAMOL 500 MG: 500 TABLET ORAL at 18:50

## 2023-12-10 RX ADMIN — METHOCARBAMOL 500 MG: 500 TABLET ORAL at 06:55

## 2023-12-10 RX ADMIN — MYCOPHENOLATE MOFETIL 1000 MG: 250 CAPSULE ORAL at 09:04

## 2023-12-10 RX ADMIN — DOCUSATE SODIUM 100 MG: 100 CAPSULE, LIQUID FILLED ORAL at 09:05

## 2023-12-10 RX ADMIN — MIRTAZAPINE 7.5 MG: 7.5 TABLET, FILM COATED ORAL at 22:27

## 2023-12-10 RX ADMIN — LIDOCAINE 1 PATCH: 4 PATCH TOPICAL at 09:08

## 2023-12-10 RX ADMIN — DOCUSATE SODIUM 100 MG: 100 CAPSULE, LIQUID FILLED ORAL at 22:29

## 2023-12-10 RX ADMIN — PREDNISONE 20 MG: 10 TABLET ORAL at 09:05

## 2023-12-10 RX ADMIN — OXYBUTYNIN CHLORIDE 5 MG: 5 TABLET ORAL at 22:27

## 2023-12-10 RX ADMIN — METHOCARBAMOL 500 MG: 500 TABLET ORAL at 01:49

## 2023-12-10 RX ADMIN — PANTOPRAZOLE SODIUM 40 MG: 40 TABLET, DELAYED RELEASE ORAL at 16:09

## 2023-12-10 RX ADMIN — TENOFOVIR ALAFENAMIDE 25 MG: 25 TABLET ORAL at 09:06

## 2023-12-10 RX ADMIN — CLOTRIMAZOLE 10 MG: 10 LOZENGE ORAL; TOPICAL at 09:06

## 2023-12-10 RX ADMIN — SODIUM PHOSPHATE 1 ENEMA: 7; 19 ENEMA RECTAL at 14:09

## 2023-12-10 RX ADMIN — SULFAMETHOXAZOLE AND TRIMETHOPRIM 80 MG: 400; 80 TABLET ORAL at 09:06

## 2023-12-10 RX ADMIN — TRAMADOL HYDROCHLORIDE 50 MG: 50 TABLET, COATED ORAL at 22:27

## 2023-12-10 RX ADMIN — DARBEPOETIN ALFA 100 MCG: 100 INJECTION, SOLUTION INTRAVENOUS; SUBCUTANEOUS at 09:06

## 2023-12-10 RX ADMIN — METHOCARBAMOL 500 MG: 500 TABLET ORAL at 14:09

## 2023-12-10 RX ADMIN — OXYBUTYNIN CHLORIDE 5 MG: 5 TABLET ORAL at 09:06

## 2023-12-10 RX ADMIN — MYCOPHENOLATE MOFETIL 1000 MG: 250 CAPSULE ORAL at 22:26

## 2023-12-10 RX ADMIN — POLYETHYLENE GLYCOL 3350 17 G: 17 POWDER, FOR SOLUTION ORAL at 09:05

## 2023-12-10 RX ADMIN — TRAMADOL HYDROCHLORIDE 50 MG: 50 TABLET, COATED ORAL at 09:04

## 2023-12-10 RX ADMIN — TRAMADOL HYDROCHLORIDE 50 MG: 50 TABLET, COATED ORAL at 01:49

## 2023-12-10 RX ADMIN — LEVOTHYROXINE SODIUM 25 MCG: 50 TABLET ORAL at 06:55

## 2023-12-10 RX ADMIN — CALCIUM CARBONATE (ANTACID) CHEW TAB 500 MG 1000 MG: 500 CHEW TAB at 09:06

## 2023-12-10 RX ADMIN — Medication 5 MG: at 22:27

## 2023-12-10 RX ADMIN — CLOTRIMAZOLE 10 MG: 10 LOZENGE ORAL; TOPICAL at 18:50

## 2023-12-10 RX ADMIN — CLOTRIMAZOLE 10 MG: 10 LOZENGE ORAL; TOPICAL at 14:09

## 2023-12-10 RX ADMIN — BISACODYL 10 MG: 5 TABLET, COATED ORAL at 09:05

## 2023-12-10 RX ADMIN — PANTOPRAZOLE SODIUM 40 MG: 40 TABLET, DELAYED RELEASE ORAL at 06:55

## 2023-12-10 ASSESSMENT — PAIN - FUNCTIONAL ASSESSMENT
PAIN_FUNCTIONAL_ASSESSMENT: 0-10
PAIN_FUNCTIONAL_ASSESSMENT: 0-10

## 2023-12-10 ASSESSMENT — PAIN SCALES - GENERAL
PAINLEVEL_OUTOF10: 4
PAINLEVEL_OUTOF10: 7

## 2023-12-10 NOTE — PROGRESS NOTES
"Transplant Nephrology progress note     Date of admission: 11/30/2023     Herber Leblanc is a 43 y.o.  with [facility]   Past Medical History:   Diagnosis Date    Chronic kidney disease, unspecified     CKD, patient interested in transplantation    GERD (gastroesophageal reflux disease)     Personal history of COVID-19 07/20/2022    History of COVID-19        SUBJECTIVE: Still has pain at surgical site. Also constipated. Breathing is OK.    PROBLEM LIST:  Principal Problem:    ESRD (end stage renal disease) (CMS/[facility])  Active Problems:    HTN (hypertension)    Gastroesophageal reflux disease    Kidney replaced by transplant         ALLERGIES:  No Known Allergies         CURRENT MEDICATIONS:  Scheduled medications  [Held by provider] aspirin, 81 mg, oral, Daily  bisacodyl, 10 mg, oral, Daily  bisacodyl, 10 mg, rectal, Daily  clotrimazole, 10 mg, oral, TID after meals  darbepoetin jacob, 100 mcg, subcutaneous, Weekly  docusate sodium, 100 mg, oral, BID  levothyroxine, 25 mcg, oral, Daily before breakfast  lidocaine, 1 patch, transdermal, Daily  methocarbamol, 500 mg, oral, q6h JANIE  methynaltrexone, 6 mg, subcutaneous, Every other day  mirtazapine, 7.5 mg, oral, Nightly  mycophenolate, 1,000 mg, oral, BID  oxybutynin, 5 mg, oral, BID  pantoprazole, 40 mg, oral, BID AC  polyethylene glycol, 17 g, oral, Daily  predniSONE, 20 mg, oral, Daily  sulfamethoxazole-trimethoprim, 80 mg, oral, Daily  tenofovir alafenamide, 25 mg, oral, Daily  valGANciclovir, 450 mg, oral, q48h      Continuous medications       PRN medications  PRN medications: diphenhydrAMINE, melatonin, naloxone, ondansetron, oxygen, oxygen, traMADol, traMADol       OBJECTIVE:    VITALS: Visit Vitals  /89 (BP Location: Right arm, Patient Position: Lying)   Pulse 87   Temp 36.9 °C (98.4 °F) (Temporal)   Resp 18   Ht 1.626 m (5' 4\")   Wt 79 kg (174 lb 2.6 oz)   SpO2 97%   BMI 29.90 kg/m²   Smoking Status Never   BSA 1.89 m²     Physical:  HEENT: normal   Lungs : clear  CVS " s1,s2  Abdomen : sugical sight looks clean with dressing and staples intact .  Rain cathter is in .  +1 EDEMA        LABS:  Results from last 72 hours   Lab Units 12/10/23  0634   WBC AUTO x10*3/uL 7.7   HEMOGLOBIN g/dL 7.7*   MCV fL 94   PLATELETS AUTO x10*3/uL 157   BUN mg/dL 43*   CREATININE mg/dL 7.32*   CALCIUM mg/dL 7.5*              Intake/Output Summary (Last 24 hours) at 12/10/2023 1457  Last data filed at 12/10/2023 0905  Gross per 24 hour   Intake 120 ml   Output 95 ml   Net 25 ml            ASSESSMENT AND PLAN:    Herber Leblanc is a 43 y.o. with a history of ESRD secondary to hypertension and chronic NSAID use s/p desceased kidney transplant on 11/30/2023.  She was on dialysis since 2018 Monday Wednesday Friday via left upper extremity AV fistula.  Nephrology consulted for comanagement of index transplant.    Kidney information: KDPI is a 56%, PRA 48%, 6 antigen mismatch, CMV status donor plus/R+, EBV D+/R+, hepatitis C viral negative.    Allograft function: DGF secondary to hyperkalemia s/p dialysis on 12/4/2023.  Dialysis tomorrow.  -Current access is left upper extremity AV fistula.    Immunosuppression: Induction by Thymoglobulin total 4.5 mg/kg.  On Belatacept, Mycophenolate, and Prednisone.    Infectious prophylaxis: Patient is positive for hepatitis B core positive started on Vemlidy.  On clotrimazole, Bactrim and Valcyte.

## 2023-12-10 NOTE — PROGRESS NOTES
Herber Leblanc is a 43 y.o. female POD#10 from DDKT and POD#7 from subcapsular hematoma evacuation. Ongoing constipation; did not receive enema yesterday. Stable CT scan and RUSSELL Cr unremarkable. Seen with extended family at bedside able to provide translation.    Objective   Gen: A+OX3; uncomfortable appearing, tearful.  HEENT: PERRL, sclera anicteric, MMM  Cardiac: RRR  Chest: Normal inspiratory effort  Abdomen: S/NT/ND. Incision C/D/I.  Ext: No LE edema  Drain: serosanguinous    Last Recorded Vitals  Visit Vitals  /89 (BP Location: Right arm, Patient Position: Lying)   Pulse 87   Temp 36.9 °C (98.4 °F) (Temporal)   Resp 18      Intake/Output last 3 Shifts:    Intake/Output Summary (Last 24 hours) at 12/10/2023 1308  Last data filed at 12/10/2023 0905  Gross per 24 hour   Intake 120 ml   Output 95 ml   Net 25 ml      Vitals:    12/10/23 0600   Weight: 79 kg (174 lb 2.6 oz)        Scheduled medications  [Held by provider] aspirin, 81 mg, oral, Daily  bisacodyl, 10 mg, oral, Daily  bisacodyl, 10 mg, rectal, Daily  clotrimazole, 10 mg, oral, TID after meals  darbepoetin jacob, 100 mcg, subcutaneous, Weekly  docusate sodium, 100 mg, oral, BID  levothyroxine, 25 mcg, oral, Daily before breakfast  lidocaine, 1 patch, transdermal, Daily  methocarbamol, 500 mg, oral, q6h JANIE  methynaltrexone, 6 mg, subcutaneous, Every other day  mirtazapine, 7.5 mg, oral, Nightly  mycophenolate, 1,000 mg, oral, BID  oxybutynin, 5 mg, oral, BID  pantoprazole, 40 mg, oral, BID AC  polyethylene glycol, 17 g, oral, Daily  predniSONE, 20 mg, oral, Daily  sodium phosphates, 1 enema, rectal, Once  sulfamethoxazole-trimethoprim, 80 mg, oral, Daily  tenofovir alafenamide, 25 mg, oral, Daily  valGANciclovir, 450 mg, oral, q48h      PRN medications  PRN medications: diphenhydrAMINE, melatonin, naloxone, ondansetron, oxygen, oxygen, traMADol, traMADol     Assessment/Plan   Principal Problem:    Kidney transplant recipient  Active Problems:  Patient  Active Problem List   Diagnosis    ESRD (end stage renal disease) (CMS/[facility])    HTN (hypertension)    Gastroesophageal reflux disease    Kidney replaced by transplant    Immunosuppression (CMS/[facility])      - Belatacept next dose due 12/18  - Hold heparin subcutaneous, SCDs for DVT prophylaxis  - Renal diet  - PT/OT  - Enema this AM, reassess in PM    I spent 35 minutes in the professional and overall care of this patient, including immunosuppression management.    Magi Lambert MD, PHD, FACS  Chief Transplant and Hepatobiliary Surgery

## 2023-12-10 NOTE — CARE PLAN
Problem: Bowel Elimination  Goal: LTG - I will complete bowel elimination  Outcome: Progressing      The patient's goals for the shift include  to have a bowel movement.    The clinical goals for the shift include To have a bowel movement    Over the shift, the patient did make progress toward the following goals.

## 2023-12-11 ENCOUNTER — APPOINTMENT (OUTPATIENT)
Dept: RADIOLOGY | Facility: HOSPITAL | Age: 43
DRG: 650 | End: 2023-12-11
Payer: COMMERCIAL

## 2023-12-11 LAB
ALBUMIN SERPL BCP-MCNC: 2.8 G/DL (ref 3.4–5)
ANION GAP SERPL CALC-SCNC: 21 MMOL/L (ref 10–20)
BASOPHILS # BLD MANUAL: 0 X10*3/UL (ref 0–0.1)
BASOPHILS NFR BLD MANUAL: 0 %
BUN SERPL-MCNC: 51 MG/DL (ref 6–23)
CA-I BLD-SCNC: 0.9 MMOL/L (ref 1.1–1.33)
CALCIUM SERPL-MCNC: 7.2 MG/DL (ref 8.6–10.6)
CHLORIDE SERPL-SCNC: 94 MMOL/L (ref 98–107)
CO2 SERPL-SCNC: 23 MMOL/L (ref 21–32)
CREAT SERPL-MCNC: 9.2 MG/DL (ref 0.5–1.05)
EOSINOPHIL # BLD MANUAL: 0 X10*3/UL (ref 0–0.7)
EOSINOPHIL NFR BLD MANUAL: 0 %
ERYTHROCYTE [DISTWIDTH] IN BLOOD BY AUTOMATED COUNT: 15.5 % (ref 11.5–14.5)
GFR SERPL CREATININE-BSD FRML MDRD: 5 ML/MIN/1.73M*2
GLUCOSE SERPL-MCNC: 87 MG/DL (ref 74–99)
HCT VFR BLD AUTO: 24.8 % (ref 36–46)
HGB BLD-MCNC: 8.2 G/DL (ref 12–16)
IMM GRANULOCYTES # BLD AUTO: 0.52 X10*3/UL (ref 0–0.7)
IMM GRANULOCYTES NFR BLD AUTO: 5.8 % (ref 0–0.9)
LYMPHOCYTES # BLD MANUAL: 0.31 X10*3/UL (ref 1.2–4.8)
LYMPHOCYTES NFR BLD MANUAL: 3.4 %
MAGNESIUM SERPL-MCNC: 2.31 MG/DL (ref 1.6–2.4)
MCH RBC QN AUTO: 31.7 PG (ref 26–34)
MCHC RBC AUTO-ENTMCNC: 33.1 G/DL (ref 32–36)
MCV RBC AUTO: 96 FL (ref 80–100)
METAMYELOCYTES # BLD MANUAL: 0.08 X10*3/UL
METAMYELOCYTES NFR BLD MANUAL: 0.9 %
MONOCYTES # BLD MANUAL: 0.08 X10*3/UL (ref 0.1–1)
MONOCYTES NFR BLD MANUAL: 0.9 %
NEUTS SEG # BLD MANUAL: 8.53 X10*3/UL (ref 1.2–7)
NEUTS SEG NFR BLD MANUAL: 94.8 %
NRBC BLD-RTO: 0 /100 WBCS (ref 0–0)
PHOSPHATE SERPL-MCNC: 5.1 MG/DL (ref 2.5–4.9)
PLATELET # BLD AUTO: 178 X10*3/UL (ref 150–450)
POTASSIUM SERPL-SCNC: 3.7 MMOL/L (ref 3.5–5.3)
RBC # BLD AUTO: 2.59 X10*6/UL (ref 4–5.2)
RBC MORPH BLD: ABNORMAL
SODIUM SERPL-SCNC: 134 MMOL/L (ref 136–145)
TOTAL CELLS COUNTED BLD: 116
WBC # BLD AUTO: 9 X10*3/UL (ref 4.4–11.3)

## 2023-12-11 PROCEDURE — 2500000001 HC RX 250 WO HCPCS SELF ADMINISTERED DRUGS (ALT 637 FOR MEDICARE OP)

## 2023-12-11 PROCEDURE — 2500000005 HC RX 250 GENERAL PHARMACY W/O HCPCS

## 2023-12-11 PROCEDURE — 1100000001 HC PRIVATE ROOM DAILY

## 2023-12-11 PROCEDURE — 83735 ASSAY OF MAGNESIUM: CPT

## 2023-12-11 PROCEDURE — 2500000001 HC RX 250 WO HCPCS SELF ADMINISTERED DRUGS (ALT 637 FOR MEDICARE OP): Performed by: STUDENT IN AN ORGANIZED HEALTH CARE EDUCATION/TRAINING PROGRAM

## 2023-12-11 PROCEDURE — 74019 RADEX ABDOMEN 2 VIEWS: CPT | Mod: FY

## 2023-12-11 PROCEDURE — 2500000004 HC RX 250 GENERAL PHARMACY W/ HCPCS (ALT 636 FOR OP/ED)

## 2023-12-11 PROCEDURE — 36415 COLL VENOUS BLD VENIPUNCTURE: CPT

## 2023-12-11 PROCEDURE — 99232 SBSQ HOSP IP/OBS MODERATE 35: CPT | Performed by: SURGERY

## 2023-12-11 PROCEDURE — 99233 SBSQ HOSP IP/OBS HIGH 50: CPT | Performed by: INTERNAL MEDICINE

## 2023-12-11 PROCEDURE — 2500000004 HC RX 250 GENERAL PHARMACY W/ HCPCS (ALT 636 FOR OP/ED): Performed by: STUDENT IN AN ORGANIZED HEALTH CARE EDUCATION/TRAINING PROGRAM

## 2023-12-11 PROCEDURE — 82330 ASSAY OF CALCIUM: CPT

## 2023-12-11 PROCEDURE — 80069 RENAL FUNCTION PANEL: CPT

## 2023-12-11 PROCEDURE — 85007 BL SMEAR W/DIFF WBC COUNT: CPT

## 2023-12-11 PROCEDURE — 74019 RADEX ABDOMEN 2 VIEWS: CPT | Performed by: RADIOLOGY

## 2023-12-11 PROCEDURE — 85027 COMPLETE CBC AUTOMATED: CPT

## 2023-12-11 RX ORDER — SENNOSIDES 8.6 MG/1
1 TABLET ORAL DAILY
Status: DISCONTINUED | OUTPATIENT
Start: 2023-12-11 | End: 2023-12-14 | Stop reason: HOSPADM

## 2023-12-11 RX ORDER — GABAPENTIN 100 MG/1
100 CAPSULE ORAL 3 TIMES DAILY
Status: DISCONTINUED | OUTPATIENT
Start: 2023-12-11 | End: 2023-12-13

## 2023-12-11 RX ORDER — DIAZEPAM 10 MG/1
5 TABLET ORAL EVERY 12 HOURS PRN
Status: DISCONTINUED | OUTPATIENT
Start: 2023-12-11 | End: 2023-12-13

## 2023-12-11 RX ORDER — POLYETHYLENE GLYCOL 3350 17 G/17G
17 POWDER, FOR SOLUTION ORAL 2 TIMES DAILY
Status: DISCONTINUED | OUTPATIENT
Start: 2023-12-11 | End: 2023-12-14 | Stop reason: HOSPADM

## 2023-12-11 RX ADMIN — MYCOPHENOLATE MOFETIL 1000 MG: 250 CAPSULE ORAL at 21:13

## 2023-12-11 RX ADMIN — METHOCARBAMOL 500 MG: 500 TABLET ORAL at 17:13

## 2023-12-11 RX ADMIN — Medication 5 MG: at 21:16

## 2023-12-11 RX ADMIN — DOCUSATE SODIUM 100 MG: 100 CAPSULE, LIQUID FILLED ORAL at 08:34

## 2023-12-11 RX ADMIN — MIRTAZAPINE 7.5 MG: 7.5 TABLET, FILM COATED ORAL at 21:13

## 2023-12-11 RX ADMIN — LEVOTHYROXINE SODIUM 25 MCG: 50 TABLET ORAL at 06:31

## 2023-12-11 RX ADMIN — METHOCARBAMOL 500 MG: 500 TABLET ORAL at 06:31

## 2023-12-11 RX ADMIN — OXYBUTYNIN CHLORIDE 5 MG: 5 TABLET ORAL at 21:13

## 2023-12-11 RX ADMIN — POLYETHYLENE GLYCOL 3350 17 G: 17 POWDER, FOR SOLUTION ORAL at 21:13

## 2023-12-11 RX ADMIN — PANTOPRAZOLE SODIUM 40 MG: 40 TABLET, DELAYED RELEASE ORAL at 16:47

## 2023-12-11 RX ADMIN — CLOTRIMAZOLE 10 MG: 10 LOZENGE ORAL; TOPICAL at 17:13

## 2023-12-11 RX ADMIN — OXYBUTYNIN CHLORIDE 5 MG: 5 TABLET ORAL at 08:34

## 2023-12-11 RX ADMIN — GABAPENTIN 100 MG: 100 CAPSULE ORAL at 21:13

## 2023-12-11 RX ADMIN — MYCOPHENOLATE MOFETIL 1000 MG: 250 CAPSULE ORAL at 08:34

## 2023-12-11 RX ADMIN — VALGANCICLOVIR HYDROCHLORIDE 450 MG: 450 TABLET ORAL at 08:34

## 2023-12-11 RX ADMIN — TRAMADOL HYDROCHLORIDE 50 MG: 50 TABLET, COATED ORAL at 16:46

## 2023-12-11 RX ADMIN — PANTOPRAZOLE SODIUM 40 MG: 40 TABLET, DELAYED RELEASE ORAL at 06:32

## 2023-12-11 RX ADMIN — LIDOCAINE 1 PATCH: 4 PATCH TOPICAL at 08:36

## 2023-12-11 RX ADMIN — SULFAMETHOXAZOLE AND TRIMETHOPRIM 80 MG: 400; 80 TABLET ORAL at 08:35

## 2023-12-11 RX ADMIN — TRAMADOL HYDROCHLORIDE 25 MG: 50 TABLET, COATED ORAL at 08:52

## 2023-12-11 RX ADMIN — PREDNISONE 20 MG: 10 TABLET ORAL at 08:36

## 2023-12-11 RX ADMIN — BISACODYL 10 MG: 5 TABLET, COATED ORAL at 08:34

## 2023-12-11 RX ADMIN — CALCIUM GLUCONATE 3 G: 98 INJECTION, SOLUTION INTRAVENOUS at 16:51

## 2023-12-11 RX ADMIN — METHYLNALTREXONE BROMIDE 6 MG: 12 INJECTION, SOLUTION SUBCUTANEOUS at 16:47

## 2023-12-11 RX ADMIN — SENNOSIDES 8.6 MG: 8.6 TABLET, FILM COATED ORAL at 16:47

## 2023-12-11 RX ADMIN — DOCUSATE SODIUM 100 MG: 100 CAPSULE, LIQUID FILLED ORAL at 21:12

## 2023-12-11 RX ADMIN — GABAPENTIN 100 MG: 100 CAPSULE ORAL at 16:47

## 2023-12-11 RX ADMIN — METHOCARBAMOL 500 MG: 500 TABLET ORAL at 01:17

## 2023-12-11 RX ADMIN — TENOFOVIR ALAFENAMIDE 25 MG: 25 TABLET ORAL at 08:34

## 2023-12-11 RX ADMIN — CLOTRIMAZOLE 10 MG: 10 LOZENGE ORAL; TOPICAL at 08:35

## 2023-12-11 RX ADMIN — POLYETHYLENE GLYCOL 3350 17 G: 17 POWDER, FOR SOLUTION ORAL at 08:34

## 2023-12-11 ASSESSMENT — PAIN - FUNCTIONAL ASSESSMENT
PAIN_FUNCTIONAL_ASSESSMENT: 0-10

## 2023-12-11 ASSESSMENT — PAIN SCALES - GENERAL
PAINLEVEL_OUTOF10: 2
PAINLEVEL_OUTOF10: 5 - MODERATE PAIN
PAINLEVEL_OUTOF10: 8
PAINLEVEL_OUTOF10: 2
PAINLEVEL_OUTOF10: 3
PAINLEVEL_OUTOF10: 2

## 2023-12-11 ASSESSMENT — COGNITIVE AND FUNCTIONAL STATUS - GENERAL
MOBILITY SCORE: 23
DAILY ACTIVITIY SCORE: 23
DRESSING REGULAR LOWER BODY CLOTHING: A LITTLE
CLIMB 3 TO 5 STEPS WITH RAILING: A LITTLE

## 2023-12-11 ASSESSMENT — PAIN DESCRIPTION - LOCATION: LOCATION: ABDOMEN

## 2023-12-11 NOTE — NURSING NOTE
Report from Sending RN:    Report From: Shamika ( OCTAVIO)  Recent Surgery of Procedure: No  Baseline Level of Consciousness (LOC): A/O x 4  Oxygen Use: No  Type: none  Diabetic: No  Last BP Med Given Day of Dialysis: none  Last Pain Med Given: Tramadol 50 mg 0852 am  Lab Tests to be Obtained with Dialysis: No  Blood Transfusion to be Given During Dialysis: No  Available IV Access: Yes  Medications to be Administered During Dialysis: No  Continuous IV Infusion Running: No  Restraints on Currently or in the Last 24 Hours: No  Hand-Off Communication: No  acute overnight or morning events; vss; Pt did take morning medications; No labs needed; Pt is a full code; Christy Welch RN.

## 2023-12-11 NOTE — NURSING NOTE
Report to Receiving RN:    Report To: OCTAVIO Wick  Time Report Called: 16:15  Hand-Off Communication: No acute change Rn to RN report.  Patient tolerated treatment well with 2.5L. Last /79, HR 64.  Patient complaint of pain but refused pain med at this time.  Patient is in transport.  Complications During Treatment: No  Ultrafiltration Treatment: Yes  Medications Administered During Dialysis: No  Blood Products Administered During Dialysis: No  Labs Sent During Dialysis: No  Heparin Drip Rate Changes: No    Electronic Signatures:   (Signed 12/11/23)   Authored: Don Cosme RN   (Signed )   Authored:     Last Updated: 4:31 PM by DON COSME

## 2023-12-11 NOTE — PROGRESS NOTES
"Herber Leblanc is a 43 y.o. female on day 11 of admission presenting with ESRD (end stage renal disease) (CMS/[facility]).    Subjective   Pt seen at bedside this am. C/o feeling tired.   No acute events overnight.       Objective     Last Recorded Vitals  Blood pressure 114/75, pulse 70, temperature 36 °C (96.8 °F), resp. rate 18, height 1.626 m (5' 4\"), weight 79 kg (174 lb 2.6 oz), SpO2 99 %.  Intake/Output last 3 Shifts:  I/O last 3 completed shifts:  In: 240 (3 mL/kg) [P.O.:240]  Out: 270 (3.4 mL/kg) [Urine:200 (0.1 mL/kg/hr); Drains:70]  Weight: 79 kg     A&ox3, no distress  Lungs clear anteriorly   Rrr, no r/g  Abd soft, nt, nd  No edema b/l    Scheduled medications  [Held by provider] aspirin, 81 mg, oral, Daily  bisacodyl, 10 mg, oral, Daily  bisacodyl, 10 mg, rectal, Daily  calcium gluconate, 3 g, intravenous, Once  clotrimazole, 10 mg, oral, TID after meals  darbepoetin jacob, 100 mcg, subcutaneous, Weekly  docusate sodium, 100 mg, oral, BID  gabapentin, 100 mg, oral, TID  levothyroxine, 25 mcg, oral, Daily before breakfast  lidocaine, 1 patch, transdermal, Daily  methocarbamol, 500 mg, oral, q6h JANIE  methynaltrexone, 6 mg, subcutaneous, Every other day  mirtazapine, 7.5 mg, oral, Nightly  mycophenolate, 1,000 mg, oral, BID  oxybutynin, 5 mg, oral, BID  pantoprazole, 40 mg, oral, BID AC  polyethylene glycol, 17 g, oral, BID  predniSONE, 20 mg, oral, Daily  sennosides, 1 tablet, oral, Daily  sulfamethoxazole-trimethoprim, 80 mg, oral, Daily  tenofovir alafenamide, 25 mg, oral, Daily  valGANciclovir, 450 mg, oral, q48h      Continuous medications     PRN medications  PRN medications: diazePAM, diphenhydrAMINE, melatonin, naloxone, ondansetron, oxygen, oxygen, traMADol, traMADol    Results for orders placed or performed during the hospital encounter of 11/30/23 (from the past 24 hour(s))   Calcium, ionized   Result Value Ref Range    POCT Calcium, Ionized 0.90 (L) 1.1 - 1.33 mmol/L   CBC and Auto Differential   Result " Value Ref Range    WBC 9.0 4.4 - 11.3 x10*3/uL    nRBC 0.0 0.0 - 0.0 /100 WBCs    RBC 2.59 (L) 4.00 - 5.20 x10*6/uL    Hemoglobin 8.2 (L) 12.0 - 16.0 g/dL    Hematocrit 24.8 (L) 36.0 - 46.0 %    MCV 96 80 - 100 fL    MCH 31.7 26.0 - 34.0 pg    MCHC 33.1 32.0 - 36.0 g/dL    RDW 15.5 (H) 11.5 - 14.5 %    Platelets 178 150 - 450 x10*3/uL    Immature Granulocytes %, Automated 5.8 (H) 0.0 - 0.9 %    Immature Granulocytes Absolute, Automated 0.52 0.00 - 0.70 x10*3/uL   Magnesium   Result Value Ref Range    Magnesium 2.31 1.60 - 2.40 mg/dL   Renal Function Panel   Result Value Ref Range    Glucose 87 74 - 99 mg/dL    Sodium 134 (L) 136 - 145 mmol/L    Potassium 3.7 3.5 - 5.3 mmol/L    Chloride 94 (L) 98 - 107 mmol/L    Bicarbonate 23 21 - 32 mmol/L    Anion Gap 21 (H) 10 - 20 mmol/L    Urea Nitrogen 51 (H) 6 - 23 mg/dL    Creatinine 9.20 (H) 0.50 - 1.05 mg/dL    eGFR 5 (L) >60 mL/min/1.73m*2    Calcium 7.2 (L) 8.6 - 10.6 mg/dL    Phosphorus 5.1 (H) 2.5 - 4.9 mg/dL    Albumin 2.8 (L) 3.4 - 5.0 g/dL   Manual Differential   Result Value Ref Range    Neutrophils %, Manual 94.8 40.0 - 80.0 %    Lymphocytes %, Manual 3.4 13.0 - 44.0 %    Monocytes %, Manual 0.9 2.0 - 10.0 %    Eosinophils %, Manual 0.0 0.0 - 6.0 %    Basophils %, Manual 0.0 0.0 - 2.0 %    Metamyelocytes %, Manual 0.9 0.0 - 0.0 %    Seg Neutrophils Absolute, Manual 8.53 (H) 1.20 - 7.00 x10*3/uL    Lymphocytes Absolute, Manual 0.31 (L) 1.20 - 4.80 x10*3/uL    Monocytes Absolute, Manual 0.08 (L) 0.10 - 1.00 x10*3/uL    Eosinophils Absolute, Manual 0.00 0.00 - 0.70 x10*3/uL    Basophils Absolute, Manual 0.00 0.00 - 0.10 x10*3/uL    Metamyelocytes Absolute, Manual 0.08 0.00 - 0.00 x10*3/uL    Total Cells Counted 116     RBC Morphology No significant RBC morphology present            A&P:  43 y.o. with ESRD sec to HTN, chronic NSAID use s/p desceased kidney transplant on 11/30/2023.  She was on dialysis since 2018 via left upper extremity AV fistula.  Nephrology  consulted for comanagement of index transplant.     Kidney information: KDPI is a 56%, PRA 48%, 6 antigen mismatch, CMV status D+/R+, EBV D+/R+, hepatitis C viral negative. Pt HBcAb +ve.      Allograft function: DGF on dialysis since 12/4/2023.  plan for HD today for volume management. Will plan for ~1L UF.   -Current access is left upper extremity AV fistula.     Immunosuppression: Induction by Thymoglobulin total 4.5 mg/kg.  On Belatacept, Mycophenolate, and Prednisone.     Infectious prophylaxis: Patient is positive for hepatitis B core positive started on Vemlidy.  On clotrimazole, Bactrim and Valcyte.     Carlitos Stanford MD

## 2023-12-11 NOTE — CARE PLAN
The patient's goals for the shift include  to have a bowel movement.     The clinical goals for the shift include To have a bowel movement.    Over the shift, the patient did make progress toward the following goals.

## 2023-12-12 ENCOUNTER — PHARMACY VISIT (OUTPATIENT)
Dept: PHARMACY | Facility: CLINIC | Age: 43
End: 2023-12-12
Payer: COMMERCIAL

## 2023-12-12 ENCOUNTER — APPOINTMENT (OUTPATIENT)
Dept: RADIOLOGY | Facility: HOSPITAL | Age: 43
DRG: 650 | End: 2023-12-12
Payer: COMMERCIAL

## 2023-12-12 LAB
ALBUMIN SERPL BCP-MCNC: 3 G/DL (ref 3.4–5)
ANION GAP SERPL CALC-SCNC: 14 MMOL/L (ref 10–20)
BASOPHILS # BLD MANUAL: 0.09 X10*3/UL (ref 0–0.1)
BASOPHILS NFR BLD MANUAL: 0.9 %
BUN SERPL-MCNC: 27 MG/DL (ref 6–23)
CA-I BLD-SCNC: 0.93 MMOL/L (ref 1.1–1.33)
CALCIUM SERPL-MCNC: 7.8 MG/DL (ref 8.6–10.6)
CHLORIDE SERPL-SCNC: 98 MMOL/L (ref 98–107)
CO2 SERPL-SCNC: 28 MMOL/L (ref 21–32)
CREAT FLD-MCNC: 6.46 MG/DL
CREAT SERPL-MCNC: 5.84 MG/DL (ref 0.5–1.05)
EOSINOPHIL # BLD MANUAL: 0.17 X10*3/UL (ref 0–0.7)
EOSINOPHIL NFR BLD MANUAL: 1.7 %
ERYTHROCYTE [DISTWIDTH] IN BLOOD BY AUTOMATED COUNT: 15.5 % (ref 11.5–14.5)
GFR SERPL CREATININE-BSD FRML MDRD: 9 ML/MIN/1.73M*2
GLUCOSE SERPL-MCNC: 86 MG/DL (ref 74–99)
HCT VFR BLD AUTO: 25.6 % (ref 36–46)
HGB BLD-MCNC: 8 G/DL (ref 12–16)
IMM GRANULOCYTES # BLD AUTO: 0.55 X10*3/UL (ref 0–0.7)
IMM GRANULOCYTES NFR BLD AUTO: 5.6 % (ref 0–0.9)
LYMPHOCYTES # BLD MANUAL: 0.17 X10*3/UL (ref 1.2–4.8)
LYMPHOCYTES NFR BLD MANUAL: 1.7 %
MAGNESIUM SERPL-MCNC: 2.16 MG/DL (ref 1.6–2.4)
MCH RBC QN AUTO: 30.1 PG (ref 26–34)
MCHC RBC AUTO-ENTMCNC: 31.3 G/DL (ref 32–36)
MCV RBC AUTO: 96 FL (ref 80–100)
MONOCYTES # BLD MANUAL: 0 X10*3/UL (ref 0.1–1)
MONOCYTES NFR BLD MANUAL: 0 %
NEUTROPHILS # BLD MANUAL: 9.38 X10*3/UL (ref 1.2–7.7)
NEUTS BAND # BLD MANUAL: 0.33 X10*3/UL (ref 0–0.7)
NEUTS BAND NFR BLD MANUAL: 3.4 %
NEUTS SEG # BLD MANUAL: 9.05 X10*3/UL (ref 1.2–7)
NEUTS SEG NFR BLD MANUAL: 92.3 %
NRBC BLD-RTO: 0 /100 WBCS (ref 0–0)
OVALOCYTES BLD QL SMEAR: ABNORMAL
PHOSPHATE SERPL-MCNC: 3.7 MG/DL (ref 2.5–4.9)
PLATELET # BLD AUTO: 230 X10*3/UL (ref 150–450)
POLYCHROMASIA BLD QL SMEAR: ABNORMAL
POTASSIUM SERPL-SCNC: 3.4 MMOL/L (ref 3.5–5.3)
RBC # BLD AUTO: 2.66 X10*6/UL (ref 4–5.2)
RBC MORPH BLD: ABNORMAL
SODIUM SERPL-SCNC: 137 MMOL/L (ref 136–145)
STOMATOCYTES BLD QL SMEAR: ABNORMAL
TOTAL CELLS COUNTED BLD: 117
WBC # BLD AUTO: 9.8 X10*3/UL (ref 4.4–11.3)

## 2023-12-12 PROCEDURE — 80069 RENAL FUNCTION PANEL: CPT

## 2023-12-12 PROCEDURE — 76776 US EXAM K TRANSPL W/DOPPLER: CPT | Performed by: STUDENT IN AN ORGANIZED HEALTH CARE EDUCATION/TRAINING PROGRAM

## 2023-12-12 PROCEDURE — 2500000001 HC RX 250 WO HCPCS SELF ADMINISTERED DRUGS (ALT 637 FOR MEDICARE OP): Performed by: STUDENT IN AN ORGANIZED HEALTH CARE EDUCATION/TRAINING PROGRAM

## 2023-12-12 PROCEDURE — 82570 ASSAY OF URINE CREATININE: CPT

## 2023-12-12 PROCEDURE — 97116 GAIT TRAINING THERAPY: CPT | Mod: GP

## 2023-12-12 PROCEDURE — 85027 COMPLETE CBC AUTOMATED: CPT

## 2023-12-12 PROCEDURE — 85007 BL SMEAR W/DIFF WBC COUNT: CPT

## 2023-12-12 PROCEDURE — 2500000004 HC RX 250 GENERAL PHARMACY W/ HCPCS (ALT 636 FOR OP/ED): Performed by: STUDENT IN AN ORGANIZED HEALTH CARE EDUCATION/TRAINING PROGRAM

## 2023-12-12 PROCEDURE — 1100000001 HC PRIVATE ROOM DAILY

## 2023-12-12 PROCEDURE — 2500000004 HC RX 250 GENERAL PHARMACY W/ HCPCS (ALT 636 FOR OP/ED)

## 2023-12-12 PROCEDURE — 2500000001 HC RX 250 WO HCPCS SELF ADMINISTERED DRUGS (ALT 637 FOR MEDICARE OP)

## 2023-12-12 PROCEDURE — 76776 US EXAM K TRANSPL W/DOPPLER: CPT

## 2023-12-12 PROCEDURE — 2500000005 HC RX 250 GENERAL PHARMACY W/O HCPCS

## 2023-12-12 PROCEDURE — 83735 ASSAY OF MAGNESIUM: CPT

## 2023-12-12 PROCEDURE — 82330 ASSAY OF CALCIUM: CPT

## 2023-12-12 PROCEDURE — RXMED WILLOW AMBULATORY MEDICATION CHARGE

## 2023-12-12 PROCEDURE — 99232 SBSQ HOSP IP/OBS MODERATE 35: CPT | Performed by: STUDENT IN AN ORGANIZED HEALTH CARE EDUCATION/TRAINING PROGRAM

## 2023-12-12 PROCEDURE — 36415 COLL VENOUS BLD VENIPUNCTURE: CPT

## 2023-12-12 RX ORDER — CALCIUM CARBONATE 200(500)MG
500 TABLET,CHEWABLE ORAL 2 TIMES DAILY
Status: DISCONTINUED | OUTPATIENT
Start: 2023-12-12 | End: 2023-12-14 | Stop reason: HOSPADM

## 2023-12-12 RX ADMIN — GABAPENTIN 100 MG: 100 CAPSULE ORAL at 20:45

## 2023-12-12 RX ADMIN — CLOTRIMAZOLE 10 MG: 10 LOZENGE ORAL; TOPICAL at 10:49

## 2023-12-12 RX ADMIN — BISACODYL 10 MG: 5 TABLET, COATED ORAL at 10:48

## 2023-12-12 RX ADMIN — LIDOCAINE 1 PATCH: 4 PATCH TOPICAL at 10:47

## 2023-12-12 RX ADMIN — POLYETHYLENE GLYCOL 3350 17 G: 17 POWDER, FOR SOLUTION ORAL at 20:45

## 2023-12-12 RX ADMIN — TRAMADOL HYDROCHLORIDE 50 MG: 50 TABLET, COATED ORAL at 10:46

## 2023-12-12 RX ADMIN — DOCUSATE SODIUM 100 MG: 100 CAPSULE, LIQUID FILLED ORAL at 20:44

## 2023-12-12 RX ADMIN — PREDNISONE 20 MG: 10 TABLET ORAL at 10:48

## 2023-12-12 RX ADMIN — PANTOPRAZOLE SODIUM 40 MG: 40 TABLET, DELAYED RELEASE ORAL at 16:26

## 2023-12-12 RX ADMIN — LEVOTHYROXINE SODIUM 25 MCG: 50 TABLET ORAL at 06:55

## 2023-12-12 RX ADMIN — METHOCARBAMOL 500 MG: 500 TABLET ORAL at 18:16

## 2023-12-12 RX ADMIN — METHOCARBAMOL 500 MG: 500 TABLET ORAL at 02:23

## 2023-12-12 RX ADMIN — BISACODYL 10 MG: 10 SUPPOSITORY RECTAL at 10:50

## 2023-12-12 RX ADMIN — CALCIUM CARBONATE (ANTACID) CHEW TAB 500 MG 500 MG: 500 CHEW TAB at 20:45

## 2023-12-12 RX ADMIN — PANTOPRAZOLE SODIUM 40 MG: 40 TABLET, DELAYED RELEASE ORAL at 06:55

## 2023-12-12 RX ADMIN — TRAMADOL HYDROCHLORIDE 50 MG: 50 TABLET, COATED ORAL at 20:44

## 2023-12-12 RX ADMIN — OXYBUTYNIN CHLORIDE 5 MG: 5 TABLET ORAL at 10:49

## 2023-12-12 RX ADMIN — CLOTRIMAZOLE 10 MG: 10 LOZENGE ORAL; TOPICAL at 13:32

## 2023-12-12 RX ADMIN — CLOTRIMAZOLE 10 MG: 10 LOZENGE ORAL; TOPICAL at 18:16

## 2023-12-12 RX ADMIN — MYCOPHENOLATE MOFETIL 1000 MG: 250 CAPSULE ORAL at 20:45

## 2023-12-12 RX ADMIN — SENNOSIDES 8.6 MG: 8.6 TABLET, FILM COATED ORAL at 10:48

## 2023-12-12 RX ADMIN — METHOCARBAMOL 500 MG: 500 TABLET ORAL at 11:00

## 2023-12-12 RX ADMIN — TENOFOVIR ALAFENAMIDE 25 MG: 25 TABLET ORAL at 10:50

## 2023-12-12 RX ADMIN — DOCUSATE SODIUM 100 MG: 100 CAPSULE, LIQUID FILLED ORAL at 10:48

## 2023-12-12 RX ADMIN — MYCOPHENOLATE MOFETIL 1000 MG: 250 CAPSULE ORAL at 10:48

## 2023-12-12 RX ADMIN — GABAPENTIN 100 MG: 100 CAPSULE ORAL at 16:26

## 2023-12-12 RX ADMIN — TRAMADOL HYDROCHLORIDE 50 MG: 50 TABLET, COATED ORAL at 02:23

## 2023-12-12 RX ADMIN — MIRTAZAPINE 7.5 MG: 7.5 TABLET, FILM COATED ORAL at 20:45

## 2023-12-12 RX ADMIN — CALCIUM CARBONATE (ANTACID) CHEW TAB 500 MG 500 MG: 500 CHEW TAB at 10:49

## 2023-12-12 RX ADMIN — SULFAMETHOXAZOLE AND TRIMETHOPRIM 80 MG: 400; 80 TABLET ORAL at 10:49

## 2023-12-12 RX ADMIN — GABAPENTIN 100 MG: 100 CAPSULE ORAL at 10:49

## 2023-12-12 RX ADMIN — POLYETHYLENE GLYCOL 3350 17 G: 17 POWDER, FOR SOLUTION ORAL at 10:48

## 2023-12-12 RX ADMIN — Medication 5 MG: at 20:44

## 2023-12-12 ASSESSMENT — COGNITIVE AND FUNCTIONAL STATUS - GENERAL
TURNING FROM BACK TO SIDE WHILE IN FLAT BAD: A LITTLE
CLIMB 3 TO 5 STEPS WITH RAILING: A LITTLE
WALKING IN HOSPITAL ROOM: A LITTLE
TURNING FROM BACK TO SIDE WHILE IN FLAT BAD: A LITTLE
STANDING UP FROM CHAIR USING ARMS: A LITTLE
DAILY ACTIVITIY SCORE: 20
MOBILITY SCORE: 18
DRESSING REGULAR UPPER BODY CLOTHING: A LITTLE
MOVING TO AND FROM BED TO CHAIR: A LITTLE
CLIMB 3 TO 5 STEPS WITH RAILING: A LITTLE
HELP NEEDED FOR BATHING: A LITTLE
MOVING TO AND FROM BED TO CHAIR: A LITTLE
STANDING UP FROM CHAIR USING ARMS: A LITTLE
WALKING IN HOSPITAL ROOM: A LITTLE
DRESSING REGULAR LOWER BODY CLOTHING: A LITTLE
MOVING FROM LYING ON BACK TO SITTING ON SIDE OF FLAT BED WITH BEDRAILS: A LITTLE
MOVING FROM LYING ON BACK TO SITTING ON SIDE OF FLAT BED WITH BEDRAILS: A LITTLE
TOILETING: A LITTLE
MOBILITY SCORE: 18

## 2023-12-12 ASSESSMENT — PAIN - FUNCTIONAL ASSESSMENT
PAIN_FUNCTIONAL_ASSESSMENT: 0-10

## 2023-12-12 ASSESSMENT — PAIN DESCRIPTION - ORIENTATION
ORIENTATION: RIGHT
ORIENTATION: RIGHT

## 2023-12-12 ASSESSMENT — PAIN SCALES - GENERAL
PAINLEVEL_OUTOF10: 7
PAINLEVEL_OUTOF10: 3
PAINLEVEL_OUTOF10: 7
PAINLEVEL_OUTOF10: 5 - MODERATE PAIN
PAINLEVEL_OUTOF10: 7
PAINLEVEL_OUTOF10: 5 - MODERATE PAIN

## 2023-12-12 ASSESSMENT — PAIN DESCRIPTION - LOCATION
LOCATION: ABDOMEN

## 2023-12-12 NOTE — PROGRESS NOTES
Physical Therapy    Physical Therapy Treatment    Patient Name: Herber Leblanc  MRN: [id number]  Today's Date: 12/12/2023  Time Calculation  Start Time: 1152  Stop Time: 1205  Time Calculation (min): 13 min       Assessment/Plan   PT Assessment  End of Session Communication: Bedside nurse  Assessment Comment: pt tolerated session well and able to complete stairs with CGA. tolerated well. edu pt and family to continue ambulating  End of Session Patient Position: Bed, 3 rail up     PT Plan  Treatment/Interventions: Bed mobility, Transfer training, Gait training, Balance training, Neuromuscular re-education, Strengthening, Endurance training, Range of motion, Therapeutic exercise, Therapeutic activity, Positioning  PT Plan: Skilled PT  PT Frequency: 3 times per week  PT Discharge Recommendations: No PT needed after discharge  Equipment Recommended upon Discharge: Wheeled walker  PT Recommended Transfer Status: Assist x1, Assistive device  PT - OK to Discharge: Yes (Evaluation completed D/C recommendation made)      General Visit Information:   PT  Visit  PT Received On: 12/12/23  Response to Previous Treatment: Patient with no complaints from previous session.  General  Prior to Session Communication: Bedside nurse  Patient Position Received: Bed, 3 rail up, Alarm off, not on at start of session  General Comment: pt supine in bed, brother at bedside. Patient reporting pain with mobility but able to complete. RN cleared    Subjective   Precautions:  Precautions  Medical Precautions: Fall precautions  Post-Surgical Precautions: Abdominal surgery precautions  Vital Signs:  Vital Signs  Heart Rate: 89  SpO2: 99 %    Objective   Pain:  Pain Assessment  Pain Assessment: 0-10  Pain Score: 5 - Moderate pain  Cognition:  Cognition  Overall Cognitive Status: Within Functional Limits    Activity Tolerance:  Activity Tolerance  Endurance: Endurance does not limit participation in activity (appears to have decreased tolerance to  pain)  Treatments:  Therapeutic Activity  Therapeutic Activity Performed: Yes  Therapeutic Activity 1: pt completed bed mobility, transfers and ambulation. patient also completed stairs this date.    Bed Mobility  Bed Mobility: Yes  Bed Mobility 1  Bed Mobility 1: Supine to sitting, Log roll  Level of Assistance 1: Contact guard  Bed Mobility Comments 1: edu on log roll  Bed Mobility 2  Bed Mobility  2: Sitting to supine  Level of Assistance 2: Minimum assistance  Bed Mobility Comments 2: assist with BLEs to return to supine    Ambulation/Gait Training  Ambulation/Gait Training Performed: Yes  Ambulation/Gait Training 1  Surface 1: Level tile  Device 1: Rolling walker  Assistance 1: Close supervision  Quality of Gait 1: Decreased step length  Comments/Distance (ft) 1: pt ambulated 250' with FWW, SBSA. Patient had a seated rest break, completed stairs and then ambulated 50' back to bed  Transfers  Transfer: Yes  Transfer 1  Transfer From 1: Sit to, Stand to  Transfer to 1: Stand, Sit  Technique 1: Sit to stand, Stand to sit  Transfer Device 1: Walker  Transfer Level of Assistance 1: Close supervision  Trials/Comments 1: pt stood from EOB and from chair, cues for walker management    Stairs  Stairs: Yes  Stairs  Rails 1: Right  Device 1: Railing  Assistance 1: Contact guard  Comment/Number of Steps 1: 8 steps, alternating foot pattern    Outcome Measures:  [facility] Basic Mobility  Turning from your back to your side while in a flat bed without using bedrails: A little  Moving from lying on your back to sitting on the side of a flat bed without using bedrails: A little  Moving to and from bed to chair (including a wheelchair): A little  Standing up from a chair using your arms (e.g. wheelchair or bedside chair): A little  To walk in hospital room: A little  Climbing 3-5 steps with railing: A little  Basic Mobility - Total Score: 18    Education Documentation  Mobility Training, taught by Elvira Reardon, PT at 12/12/2023  12:48 PM.  Learner: Family, Patient  Readiness: Acceptance  Method: Explanation  Response: Verbalizes Understanding  Comment: edu pt and family on role of PT and importance of ambulating and OOB to chair    Precautions, taught by Elvira Reardon, PT at 12/12/2023 12:48 PM.  Learner: Family, Patient  Readiness: Acceptance  Method: Explanation  Response: Verbalizes Understanding  Comment: edu pt and family on role of PT and importance of ambulating and OOB to chair    Education Comments  No comments found.        OP EDUCATION:       Encounter Problems       Encounter Problems (Active)       Mobility       STG - Patient will ambulate community distance independent  (Progressing)       Start:  12/03/23    Expected End:  01/02/24               Pain - Adult          Transfers       STG - Transfer from bed to chair independent  (Progressing)       Start:  12/03/23    Expected End:  01/02/24            STG - Patient will perform bed mobility independent  (Progressing)       Start:  12/03/23    Expected End:  01/02/24            STG - Patient will transfer sit to and from stand independent  (Progressing)       Start:  12/03/23    Expected End:  01/02/24

## 2023-12-12 NOTE — PROGRESS NOTES
Discharge Planning Note:      Herber Leblanc is a 43 y.o. female on day 12 of admission presenting with ESRD (end stage renal disease) (CMS/[facility]).      Received a script for FWW for home. Placing a referral to [facility] and taking from the closet to give to patient at bedside.                     Ariane Griggs RN TCC

## 2023-12-12 NOTE — CARE PLAN
Problem: Bowel Elimination  Goal: LTG - I will complete bowel elimination  Outcome: Progressing     The patient's goals for the shift include  to have a bowel movement.     The clinical goals for the shift include To have a bowel movement.    Over the shift, the patient did make progress toward the following goals.

## 2023-12-12 NOTE — PROGRESS NOTES
Herber Leblanc is a 43 y.o. female POD#11 from DDKT and POD#8 from evacuation of subcapsular hematoma from a DCD donor. No acute events; continued pain at RUSSELL site/incision. Had 3 small BM's. UOP increasing.    Objective   Gen: A+OX3; sleeping uncomfortable when aroused  HEENT: PERRL, sclera anicteric, MMM  Cardiac: RRR  Chest: Normal inspiratory effort  Abdomen: S/mildly tender/mild distension. Incision C/D/I.  Ext: 1+ LE edema  Drain: serosanguinous    Last Recorded Vitals  Visit Vitals  /70 (BP Location: Right arm, Patient Position: Lying)   Pulse 91   Temp 36.1 °C (97 °F) (Temporal)   Resp 18      Intake/Output last 3 Shifts:    Intake/Output Summary (Last 24 hours) at 12/11/2023 2207  Last data filed at 12/11/2023 1842  Gross per 24 hour   Intake 1240 ml   Output 2995 ml   Net -1755 ml      Vitals:    12/10/23 0600   Weight: 79 kg (174 lb 2.6 oz)        Scheduled medications  [Held by provider] aspirin, 81 mg, oral, Daily  bisacodyl, 10 mg, oral, Daily  bisacodyl, 10 mg, rectal, Daily  clotrimazole, 10 mg, oral, TID after meals  darbepoetin jacob, 100 mcg, subcutaneous, Weekly  docusate sodium, 100 mg, oral, BID  gabapentin, 100 mg, oral, TID  levothyroxine, 25 mcg, oral, Daily before breakfast  lidocaine, 1 patch, transdermal, Daily  methocarbamol, 500 mg, oral, q6h JANIE  methynaltrexone, 6 mg, subcutaneous, Every other day  mirtazapine, 7.5 mg, oral, Nightly  mycophenolate, 1,000 mg, oral, BID  oxybutynin, 5 mg, oral, BID  pantoprazole, 40 mg, oral, BID AC  polyethylene glycol, 17 g, oral, BID  predniSONE, 20 mg, oral, Daily  sennosides, 1 tablet, oral, Daily  sulfamethoxazole-trimethoprim, 80 mg, oral, Daily  tenofovir alafenamide, 25 mg, oral, Daily  valGANciclovir, 450 mg, oral, q48h      Continuous medications     PRN medications  PRN medications: diazePAM, diphenhydrAMINE, melatonin, naloxone, ondansetron, oxygen, oxygen, traMADol, traMADol     Assessment/Plan   Principal Problem:    Kidney transplant  recipient  Active Problems:  Patient Active Problem List   Diagnosis    ESRD (end stage renal disease) (CMS/[facility])    HTN (hypertension)    Gastroesophageal reflux disease    Kidney replaced by transplant    Immunosuppression (CMS/[facility])      - Belatacept next dose 12/18  - Add neurontin and valium  - Solumedrol taper  - Hold heparin subcutaneous, SCDs for DVT prophylaxis  - Renal diet  - PT/OT  - HD today    I spent 35 minutes in the professional and overall care of this patient, including immunosuppression management.    Magi Lambert MD, PHD, FACS  Chief Transplant and Hepatobiliary Surgery

## 2023-12-13 ENCOUNTER — DOCUMENTATION (OUTPATIENT)
Dept: TRANSPLANT | Facility: HOSPITAL | Age: 43
End: 2023-12-13
Payer: COMMERCIAL

## 2023-12-13 DIAGNOSIS — T86.10 COMPLICATION OF TRANSPLANTED KIDNEY, UNSPECIFIED COMPLICATION (HHS-HCC): ICD-10-CM

## 2023-12-13 DIAGNOSIS — Z94.0 KIDNEY REPLACED BY TRANSPLANT (HHS-HCC): ICD-10-CM

## 2023-12-13 LAB
ALBUMIN SERPL BCP-MCNC: 3.1 G/DL (ref 3.4–5)
ANION GAP SERPL CALC-SCNC: 20 MMOL/L (ref 10–20)
BASOPHILS # BLD AUTO: 0.03 X10*3/UL (ref 0–0.1)
BASOPHILS NFR BLD AUTO: 0.3 %
BUN SERPL-MCNC: 36 MG/DL (ref 6–23)
CA-I BLD-SCNC: 0.89 MMOL/L (ref 1.1–1.33)
CALCIUM SERPL-MCNC: 7.5 MG/DL (ref 8.6–10.6)
CHLORIDE SERPL-SCNC: 96 MMOL/L (ref 98–107)
CO2 SERPL-SCNC: 24 MMOL/L (ref 21–32)
CREAT SERPL-MCNC: 7.22 MG/DL (ref 0.5–1.05)
EOSINOPHIL # BLD AUTO: 0.06 X10*3/UL (ref 0–0.7)
EOSINOPHIL NFR BLD AUTO: 0.5 %
ERYTHROCYTE [DISTWIDTH] IN BLOOD BY AUTOMATED COUNT: 15.2 % (ref 11.5–14.5)
GFR SERPL CREATININE-BSD FRML MDRD: 7 ML/MIN/1.73M*2
GLUCOSE SERPL-MCNC: 73 MG/DL (ref 74–99)
HCT VFR BLD AUTO: 26.9 % (ref 36–46)
HGB BLD-MCNC: 8.4 G/DL (ref 12–16)
HOLD SPECIMEN: NORMAL
IMM GRANULOCYTES # BLD AUTO: 0.37 X10*3/UL (ref 0–0.7)
IMM GRANULOCYTES NFR BLD AUTO: 3.3 % (ref 0–0.9)
LYMPHOCYTES # BLD AUTO: 0.34 X10*3/UL (ref 1.2–4.8)
LYMPHOCYTES NFR BLD AUTO: 3 %
MAGNESIUM SERPL-MCNC: 2.11 MG/DL (ref 1.6–2.4)
MCH RBC QN AUTO: 30.5 PG (ref 26–34)
MCHC RBC AUTO-ENTMCNC: 31.2 G/DL (ref 32–36)
MCV RBC AUTO: 98 FL (ref 80–100)
MONOCYTES # BLD AUTO: 0.37 X10*3/UL (ref 0.1–1)
MONOCYTES NFR BLD AUTO: 3.3 %
NEUTROPHILS # BLD AUTO: 10.02 X10*3/UL (ref 1.2–7.7)
NEUTROPHILS NFR BLD AUTO: 89.6 %
NRBC BLD-RTO: 0 /100 WBCS (ref 0–0)
PHOSPHATE SERPL-MCNC: 4.3 MG/DL (ref 2.5–4.9)
PLATELET # BLD AUTO: 275 X10*3/UL (ref 150–450)
POTASSIUM SERPL-SCNC: 3.5 MMOL/L (ref 3.5–5.3)
RBC # BLD AUTO: 2.75 X10*6/UL (ref 4–5.2)
SODIUM SERPL-SCNC: 136 MMOL/L (ref 136–145)
WBC # BLD AUTO: 11.2 X10*3/UL (ref 4.4–11.3)

## 2023-12-13 PROCEDURE — 80069 RENAL FUNCTION PANEL: CPT

## 2023-12-13 PROCEDURE — 2500000004 HC RX 250 GENERAL PHARMACY W/ HCPCS (ALT 636 FOR OP/ED)

## 2023-12-13 PROCEDURE — 85025 COMPLETE CBC W/AUTO DIFF WBC: CPT

## 2023-12-13 PROCEDURE — 2500000001 HC RX 250 WO HCPCS SELF ADMINISTERED DRUGS (ALT 637 FOR MEDICARE OP): Performed by: STUDENT IN AN ORGANIZED HEALTH CARE EDUCATION/TRAINING PROGRAM

## 2023-12-13 PROCEDURE — 2500000005 HC RX 250 GENERAL PHARMACY W/O HCPCS

## 2023-12-13 PROCEDURE — 99233 SBSQ HOSP IP/OBS HIGH 50: CPT | Performed by: INTERNAL MEDICINE

## 2023-12-13 PROCEDURE — 36415 COLL VENOUS BLD VENIPUNCTURE: CPT

## 2023-12-13 PROCEDURE — 1100000001 HC PRIVATE ROOM DAILY

## 2023-12-13 PROCEDURE — 2500000001 HC RX 250 WO HCPCS SELF ADMINISTERED DRUGS (ALT 637 FOR MEDICARE OP)

## 2023-12-13 PROCEDURE — RXMED WILLOW AMBULATORY MEDICATION CHARGE

## 2023-12-13 PROCEDURE — 2500000004 HC RX 250 GENERAL PHARMACY W/ HCPCS (ALT 636 FOR OP/ED): Performed by: STUDENT IN AN ORGANIZED HEALTH CARE EDUCATION/TRAINING PROGRAM

## 2023-12-13 PROCEDURE — 82330 ASSAY OF CALCIUM: CPT

## 2023-12-13 PROCEDURE — 99232 SBSQ HOSP IP/OBS MODERATE 35: CPT | Performed by: STUDENT IN AN ORGANIZED HEALTH CARE EDUCATION/TRAINING PROGRAM

## 2023-12-13 PROCEDURE — 83735 ASSAY OF MAGNESIUM: CPT

## 2023-12-13 RX ORDER — CALCIUM CARBONATE 200(500)MG
500 TABLET,CHEWABLE ORAL 2 TIMES DAILY
Qty: 60 TABLET | Refills: 0 | Status: SHIPPED | OUTPATIENT
Start: 2023-12-13 | End: 2024-01-08 | Stop reason: SDUPTHER

## 2023-12-13 RX ORDER — METHOCARBAMOL 500 MG/1
500 TABLET, FILM COATED ORAL EVERY 12 HOURS PRN
Status: DISCONTINUED | OUTPATIENT
Start: 2023-12-13 | End: 2023-12-14 | Stop reason: HOSPADM

## 2023-12-13 RX ORDER — PREDNISONE 10 MG/1
15 TABLET ORAL DAILY
Status: DISCONTINUED | OUTPATIENT
Start: 2023-12-14 | End: 2023-12-14 | Stop reason: HOSPADM

## 2023-12-13 RX ORDER — LIDOCAINE 560 MG/1
1 PATCH PERCUTANEOUS; TOPICAL; TRANSDERMAL DAILY
Qty: 30 PATCH | Refills: 0 | Status: SHIPPED | OUTPATIENT
Start: 2023-12-14 | End: 2024-01-02 | Stop reason: ALTCHOICE

## 2023-12-13 RX ORDER — FUROSEMIDE 10 MG/ML
80 INJECTION INTRAMUSCULAR; INTRAVENOUS ONCE
Status: COMPLETED | OUTPATIENT
Start: 2023-12-13 | End: 2023-12-13

## 2023-12-13 RX ADMIN — BISACODYL 10 MG: 10 SUPPOSITORY RECTAL at 09:21

## 2023-12-13 RX ADMIN — CALCIUM CARBONATE (ANTACID) CHEW TAB 500 MG 500 MG: 500 CHEW TAB at 20:10

## 2023-12-13 RX ADMIN — CALCIUM CARBONATE (ANTACID) CHEW TAB 500 MG 500 MG: 500 CHEW TAB at 09:19

## 2023-12-13 RX ADMIN — CLOTRIMAZOLE 10 MG: 10 LOZENGE ORAL; TOPICAL at 18:04

## 2023-12-13 RX ADMIN — METHOCARBAMOL 500 MG: 500 TABLET ORAL at 06:16

## 2023-12-13 RX ADMIN — POLYETHYLENE GLYCOL 3350 17 G: 17 POWDER, FOR SOLUTION ORAL at 09:20

## 2023-12-13 RX ADMIN — CLOTRIMAZOLE 10 MG: 10 LOZENGE ORAL; TOPICAL at 09:19

## 2023-12-13 RX ADMIN — MYCOPHENOLATE MOFETIL 1000 MG: 250 CAPSULE ORAL at 20:11

## 2023-12-13 RX ADMIN — PANTOPRAZOLE SODIUM 40 MG: 40 TABLET, DELAYED RELEASE ORAL at 06:15

## 2023-12-13 RX ADMIN — TRAMADOL HYDROCHLORIDE 50 MG: 50 TABLET, COATED ORAL at 06:15

## 2023-12-13 RX ADMIN — DOCUSATE SODIUM 100 MG: 100 CAPSULE, LIQUID FILLED ORAL at 20:11

## 2023-12-13 RX ADMIN — LEVOTHYROXINE SODIUM 25 MCG: 50 TABLET ORAL at 06:16

## 2023-12-13 RX ADMIN — METHOCARBAMOL 500 MG: 500 TABLET ORAL at 13:00

## 2023-12-13 RX ADMIN — GABAPENTIN 100 MG: 100 CAPSULE ORAL at 09:20

## 2023-12-13 RX ADMIN — TRAMADOL HYDROCHLORIDE 25 MG: 50 TABLET, COATED ORAL at 20:20

## 2023-12-13 RX ADMIN — SULFAMETHOXAZOLE AND TRIMETHOPRIM 80 MG: 400; 80 TABLET ORAL at 09:20

## 2023-12-13 RX ADMIN — METHYLNALTREXONE BROMIDE 6 MG: 12 INJECTION, SOLUTION SUBCUTANEOUS at 09:30

## 2023-12-13 RX ADMIN — SENNOSIDES 8.6 MG: 8.6 TABLET, FILM COATED ORAL at 09:20

## 2023-12-13 RX ADMIN — Medication 5 MG: at 20:11

## 2023-12-13 RX ADMIN — CALCIUM GLUCONATE 3 G: 98 INJECTION, SOLUTION INTRAVENOUS at 11:39

## 2023-12-13 RX ADMIN — CLOTRIMAZOLE 10 MG: 10 LOZENGE ORAL; TOPICAL at 13:12

## 2023-12-13 RX ADMIN — FUROSEMIDE 80 MG: 10 INJECTION, SOLUTION INTRAVENOUS at 10:11

## 2023-12-13 RX ADMIN — BISACODYL 10 MG: 5 TABLET, COATED ORAL at 09:19

## 2023-12-13 RX ADMIN — MYCOPHENOLATE MOFETIL 1000 MG: 250 CAPSULE ORAL at 09:19

## 2023-12-13 RX ADMIN — PANTOPRAZOLE SODIUM 40 MG: 40 TABLET, DELAYED RELEASE ORAL at 17:10

## 2023-12-13 RX ADMIN — LIDOCAINE 1 PATCH: 4 PATCH TOPICAL at 09:20

## 2023-12-13 RX ADMIN — VALGANCICLOVIR HYDROCHLORIDE 450 MG: 450 TABLET ORAL at 08:30

## 2023-12-13 RX ADMIN — DOCUSATE SODIUM 100 MG: 100 CAPSULE, LIQUID FILLED ORAL at 09:20

## 2023-12-13 RX ADMIN — PREDNISONE 20 MG: 10 TABLET ORAL at 09:19

## 2023-12-13 RX ADMIN — MIRTAZAPINE 7.5 MG: 7.5 TABLET, FILM COATED ORAL at 20:10

## 2023-12-13 RX ADMIN — TENOFOVIR ALAFENAMIDE 25 MG: 25 TABLET ORAL at 09:30

## 2023-12-13 ASSESSMENT — COGNITIVE AND FUNCTIONAL STATUS - GENERAL
TURNING FROM BACK TO SIDE WHILE IN FLAT BAD: A LITTLE
WALKING IN HOSPITAL ROOM: A LITTLE
MOVING FROM LYING ON BACK TO SITTING ON SIDE OF FLAT BED WITH BEDRAILS: A LITTLE
MOVING FROM LYING ON BACK TO SITTING ON SIDE OF FLAT BED WITH BEDRAILS: A LITTLE
STANDING UP FROM CHAIR USING ARMS: A LITTLE
DRESSING REGULAR LOWER BODY CLOTHING: A LITTLE
MOBILITY SCORE: 18
DAILY ACTIVITIY SCORE: 22
DAILY ACTIVITIY SCORE: 20
MOBILITY SCORE: 18
CLIMB 3 TO 5 STEPS WITH RAILING: A LITTLE
DRESSING REGULAR UPPER BODY CLOTHING: A LITTLE
MOVING TO AND FROM BED TO CHAIR: A LITTLE
HELP NEEDED FOR BATHING: A LITTLE
STANDING UP FROM CHAIR USING ARMS: A LITTLE
DRESSING REGULAR LOWER BODY CLOTHING: A LITTLE
WALKING IN HOSPITAL ROOM: A LITTLE
CLIMB 3 TO 5 STEPS WITH RAILING: A LITTLE
TOILETING: A LITTLE
MOVING TO AND FROM BED TO CHAIR: A LITTLE
HELP NEEDED FOR BATHING: A LITTLE
TURNING FROM BACK TO SIDE WHILE IN FLAT BAD: A LITTLE

## 2023-12-13 ASSESSMENT — PAIN - FUNCTIONAL ASSESSMENT
PAIN_FUNCTIONAL_ASSESSMENT: 0-10

## 2023-12-13 ASSESSMENT — PAIN SCALES - GENERAL
PAINLEVEL_OUTOF10: 0 - NO PAIN
PAINLEVEL_OUTOF10: 0 - NO PAIN
PAINLEVEL_OUTOF10: 3
PAINLEVEL_OUTOF10: 5 - MODERATE PAIN
PAINLEVEL_OUTOF10: 7
PAINLEVEL_OUTOF10: 5 - MODERATE PAIN
PAINLEVEL_OUTOF10: 0 - NO PAIN

## 2023-12-13 ASSESSMENT — PAIN DESCRIPTION - LOCATION
LOCATION: ABDOMEN

## 2023-12-13 NOTE — CARE PLAN
The patient's goals for the shift include  pt remain stable     The clinical goals for the shift include to have a BM      Problem: Pain - Adult  Goal: Verbalizes/displays adequate comfort level or baseline comfort level  Outcome: Progressing     Problem: Safety - Adult  Goal: Free from fall injury  Outcome: Progressing     Problem: Discharge Planning  Goal: Discharge to home or other facility with appropriate resources  Outcome: Progressing     Problem: Skin  Goal: Promote/optimize nutrition  Outcome: Progressing  Flowsheets (Taken 12/13/2023 0750)  Promote/optimize nutrition:   Consume > 50% meals/supplements   Monitor/record intake including meals  Goal: Promote skin healing  Outcome: Progressing     Problem: Bowel Elimination  Goal: LTG - I will complete bowel elimination  Outcome: Progressing

## 2023-12-13 NOTE — PROGRESS NOTES
Mrs. Herber Leblanc received a kidney transplant on November 30, 2023. SW reviewed the pre transplant Social Work evaluation as well as interdisciplinary notes of this admission. Attended transplant interdisciplinary rounds. SW reviewed, participated and is in agreement with MDT Plan of Care.  I met with patient who was awake, alert, oriented and able to discuss their condition.  Functional/Medical Status:  SW met with pt and pt's  at bedside.  Pt and her  were pleasant.  Pt' friendDanuta assisted with translating on the phone.  Pt was lying in bed.  She reported being in pain.  She reported she has been eating and drinking much better.  She has been walking on the floor.    Dialysis start date:  June 2019  Adaption to the Stress of Transplant:  Pt and her  shared that they will have financial concerns as his pay will be affected due to being on FMLA.  They reported they are seeking assistance with rent and utilities.  SW provided resources for the [facility], [employer], and [employer].  Mental Health/Substance use:  Pt's mood appeared stable.  There are no substance use concerns.  Post Discharge Supports/Recovery Plan:  Pt's , Ajith is her primary support and her friend, Danuta is secondary support.  Supports drive, have reliable transportation, and are available on short notice.  Pt's  is planning on taking FMLA to assist in pt's recovery.    Benefits:  [employer]  Impressions:  SW met with pt and pt's  at bedside.  Pt and her  were pleasant.  Pt' friendDanuta assisted with translating on the phone.  Pt was lying in bed.  She reported being in pain.  She reported she has been eating and drinking much better.  She has been walking on the floor.  Pt and her  shared that they will have financial concerns as his pay will be affected due to being on FMLA.  They reported they are seeking assistance with rent and utilities.  SW provided resources for the  [facility], [employer], and [employer].  Pt's mood appeared stable.  There are no substance use concerns.  Pt's , Ajith is her primary support and her friend, Danuta is secondary support.  Supports drive, have reliable transportation, and are available on short notice.  Pt's  is planning on taking FMLA to assist in pt's recovery.    Patient and family/support system are in agreement and report understanding of their treatment plan. They were provided an opportunity to ask questions, though denied any issues or concerns at this time.      PLAN:  We reviewed the importance of having lab work done twice a week or as directed; scheduled post-transplant appointments; reporting alarming symptoms; restrictions of activities and importance of adherence to medical regimen. We also discussed the precautions to take due to being immunosuppressed. Patient indicated understanding of this information along with the discharge plan and instructions. Patient was given the opportunity to discuss any issues. Patient was given social work contact information.

## 2023-12-13 NOTE — PROGRESS NOTES
Herber Leblanc is a 43 y.o. female POD#13 from DDKT and POD#10 from evacuation of subcapsular hematoma from a DCD donor.   No acute events; continued pain at RUSSELL site/incision.   Has Bowel function.   UOP increasing daily    Objective   Gen: A+OX3; sleeping uncomfortable when aroused  HEENT: PERRL, sclera anicteric, MMM  Cardiac: RRR  Chest: Normal inspiratory effort  Abdomen: S/mildly tender/mild distension. Incision C/D/I.  Ext: 1+ LE edema  Drain: serosanguinous    Last Recorded Vitals  Visit Vitals  /73 (BP Location: Right arm, Patient Position: Lying)   Pulse 86   Temp 36.3 °C (97.3 °F) (Temporal)   Resp 18      Intake/Output last 3 Shifts:    Intake/Output Summary (Last 24 hours) at 12/13/2023 1559  Last data filed at 12/13/2023 1315  Gross per 24 hour   Intake 1410 ml   Output 1360 ml   Net 50 ml        Vitals:    12/13/23 0630   Weight: 77.1 kg (169 lb 15.6 oz)        Scheduled medications  [Held by provider] aspirin, 81 mg, oral, Daily  bisacodyl, 10 mg, oral, Daily  bisacodyl, 10 mg, rectal, Daily  calcium carbonate, 500 mg, oral, BID  clotrimazole, 10 mg, oral, TID after meals  darbepoetin jacob, 100 mcg, subcutaneous, Weekly  docusate sodium, 100 mg, oral, BID  levothyroxine, 25 mcg, oral, Daily before breakfast  lidocaine, 1 patch, transdermal, Daily  methynaltrexone, 6 mg, subcutaneous, Every other day  mirtazapine, 7.5 mg, oral, Nightly  mycophenolate, 1,000 mg, oral, BID  pantoprazole, 40 mg, oral, BID AC  polyethylene glycol, 17 g, oral, BID  [START ON 12/14/2023] predniSONE, 15 mg, oral, Daily  sennosides, 1 tablet, oral, Daily  sulfamethoxazole-trimethoprim, 80 mg, oral, Daily  tenofovir alafenamide, 25 mg, oral, Daily  valGANciclovir, 450 mg, oral, q48h      Continuous medications     PRN medications  PRN medications: diphenhydrAMINE, melatonin, methocarbamol, naloxone, ondansetron, traMADol, traMADol     Assessment/Plan   Principal Problem:    Kidney transplant recipient  Active  Problems:  Patient Active Problem List   Diagnosis    ESRD (end stage renal disease) (CMS/[facility])    HTN (hypertension)    Gastroesophageal reflux disease    Kidney replaced by transplant    Immunosuppression (CMS/[facility])      - Belatacept next dose 12/18  - Cont neurontin and valium  - Solumedrol taper  - Hold heparin subcutaneous, SCDs for DVT prophylaxis  - Renal diet  - PT/OT  - HD MWF  - Transplant US 12/12/23 - known hematoma, arterial waveforms good  - Likely discharge tomorrow pending HD needs on AM labs    I spent 35 minutes in the professional and overall care of this patient, including immunosuppression management.    Maryam Bhatt MD, [facility]  Transplant & Hepatobiliary Surgery

## 2023-12-14 ENCOUNTER — PHARMACY VISIT (OUTPATIENT)
Dept: PHARMACY | Facility: CLINIC | Age: 43
End: 2023-12-14
Payer: COMMERCIAL

## 2023-12-14 ENCOUNTER — APPOINTMENT (OUTPATIENT)
Dept: DIALYSIS | Facility: HOSPITAL | Age: 43
End: 2023-12-14
Payer: COMMERCIAL

## 2023-12-14 VITALS
TEMPERATURE: 97.5 F | DIASTOLIC BLOOD PRESSURE: 86 MMHG | BODY MASS INDEX: 28.68 KG/M2 | SYSTOLIC BLOOD PRESSURE: 128 MMHG | HEIGHT: 64 IN | OXYGEN SATURATION: 100 % | RESPIRATION RATE: 18 BRPM | WEIGHT: 167.99 LBS | HEART RATE: 77 BPM

## 2023-12-14 LAB
ALBUMIN SERPL BCP-MCNC: 3.4 G/DL (ref 3.4–5)
ANION GAP SERPL CALC-SCNC: 20 MMOL/L (ref 10–20)
BASOPHILS # BLD AUTO: 0.03 X10*3/UL (ref 0–0.1)
BASOPHILS NFR BLD AUTO: 0.3 %
BUN SERPL-MCNC: 49 MG/DL (ref 6–23)
CA-I BLD-SCNC: 0.75 MMOL/L (ref 1.1–1.33)
CALCIUM SERPL-MCNC: 7.4 MG/DL (ref 8.6–10.6)
CHLORIDE SERPL-SCNC: 96 MMOL/L (ref 98–107)
CO2 SERPL-SCNC: 23 MMOL/L (ref 21–32)
CREAT FLD-MCNC: 7.44 MG/DL
CREAT SERPL-MCNC: 8.22 MG/DL (ref 0.5–1.05)
EOSINOPHIL # BLD AUTO: 0.06 X10*3/UL (ref 0–0.7)
EOSINOPHIL NFR BLD AUTO: 0.6 %
ERYTHROCYTE [DISTWIDTH] IN BLOOD BY AUTOMATED COUNT: 14.9 % (ref 11.5–14.5)
GFR SERPL CREATININE-BSD FRML MDRD: 6 ML/MIN/1.73M*2
GLUCOSE SERPL-MCNC: 83 MG/DL (ref 74–99)
HCT VFR BLD AUTO: 27.9 % (ref 36–46)
HGB BLD-MCNC: 8.5 G/DL (ref 12–16)
IMM GRANULOCYTES # BLD AUTO: 0.36 X10*3/UL (ref 0–0.7)
IMM GRANULOCYTES NFR BLD AUTO: 3.3 % (ref 0–0.9)
LYMPHOCYTES # BLD AUTO: 0.39 X10*3/UL (ref 1.2–4.8)
LYMPHOCYTES NFR BLD AUTO: 3.6 %
MAGNESIUM SERPL-MCNC: 2.22 MG/DL (ref 1.6–2.4)
MCH RBC QN AUTO: 30.7 PG (ref 26–34)
MCHC RBC AUTO-ENTMCNC: 30.5 G/DL (ref 32–36)
MCV RBC AUTO: 101 FL (ref 80–100)
MONOCYTES # BLD AUTO: 0.3 X10*3/UL (ref 0.1–1)
MONOCYTES NFR BLD AUTO: 2.8 %
NEUTROPHILS # BLD AUTO: 9.72 X10*3/UL (ref 1.2–7.7)
NEUTROPHILS NFR BLD AUTO: 89.4 %
NRBC BLD-RTO: 0 /100 WBCS (ref 0–0)
PHOSPHATE SERPL-MCNC: 4.9 MG/DL (ref 2.5–4.9)
PLATELET # BLD AUTO: 344 X10*3/UL (ref 150–450)
POTASSIUM SERPL-SCNC: 3.2 MMOL/L (ref 3.5–5.3)
RBC # BLD AUTO: 2.77 X10*6/UL (ref 4–5.2)
SODIUM SERPL-SCNC: 136 MMOL/L (ref 136–145)
WBC # BLD AUTO: 10.9 X10*3/UL (ref 4.4–11.3)

## 2023-12-14 PROCEDURE — 2500000005 HC RX 250 GENERAL PHARMACY W/O HCPCS

## 2023-12-14 PROCEDURE — 2500000001 HC RX 250 WO HCPCS SELF ADMINISTERED DRUGS (ALT 637 FOR MEDICARE OP): Performed by: STUDENT IN AN ORGANIZED HEALTH CARE EDUCATION/TRAINING PROGRAM

## 2023-12-14 PROCEDURE — 99232 SBSQ HOSP IP/OBS MODERATE 35: CPT

## 2023-12-14 PROCEDURE — 36415 COLL VENOUS BLD VENIPUNCTURE: CPT

## 2023-12-14 PROCEDURE — 80069 RENAL FUNCTION PANEL: CPT

## 2023-12-14 PROCEDURE — 8010000001 HC DIALYSIS - HEMODIALYSIS PER DAY

## 2023-12-14 PROCEDURE — 82570 ASSAY OF URINE CREATININE: CPT

## 2023-12-14 PROCEDURE — 2500000004 HC RX 250 GENERAL PHARMACY W/ HCPCS (ALT 636 FOR OP/ED): Performed by: STUDENT IN AN ORGANIZED HEALTH CARE EDUCATION/TRAINING PROGRAM

## 2023-12-14 PROCEDURE — 82330 ASSAY OF CALCIUM: CPT

## 2023-12-14 PROCEDURE — 2500000004 HC RX 250 GENERAL PHARMACY W/ HCPCS (ALT 636 FOR OP/ED)

## 2023-12-14 PROCEDURE — 83735 ASSAY OF MAGNESIUM: CPT

## 2023-12-14 PROCEDURE — 2500000001 HC RX 250 WO HCPCS SELF ADMINISTERED DRUGS (ALT 637 FOR MEDICARE OP)

## 2023-12-14 PROCEDURE — 85025 COMPLETE CBC W/AUTO DIFF WBC: CPT

## 2023-12-14 PROCEDURE — RXMED WILLOW AMBULATORY MEDICATION CHARGE

## 2023-12-14 PROCEDURE — 99232 SBSQ HOSP IP/OBS MODERATE 35: CPT | Performed by: STUDENT IN AN ORGANIZED HEALTH CARE EDUCATION/TRAINING PROGRAM

## 2023-12-14 RX ORDER — CALCITRIOL 0.25 UG/1
0.25 CAPSULE ORAL DAILY
Status: DISCONTINUED | OUTPATIENT
Start: 2023-12-14 | End: 2023-12-14 | Stop reason: HOSPADM

## 2023-12-14 RX ORDER — METHOCARBAMOL 500 MG/1
500 TABLET, FILM COATED ORAL EVERY 12 HOURS PRN
Qty: 10 TABLET | Refills: 0 | Status: ON HOLD | OUTPATIENT
Start: 2023-12-14 | End: 2023-12-22

## 2023-12-14 RX ORDER — CALCIUM GLUCONATE 20 MG/ML
2 INJECTION, SOLUTION INTRAVENOUS ONCE
Status: DISCONTINUED | OUTPATIENT
Start: 2023-12-14 | End: 2023-12-14

## 2023-12-14 RX ORDER — ACETAMINOPHEN 325 MG/1
650 TABLET ORAL EVERY 6 HOURS PRN
Qty: 30 TABLET | Refills: 0
Start: 2023-12-14 | End: 2024-02-02 | Stop reason: ALTCHOICE

## 2023-12-14 RX ORDER — ACETAMINOPHEN 325 MG/1
650 TABLET ORAL EVERY 6 HOURS PRN
Status: DISCONTINUED | OUTPATIENT
Start: 2023-12-14 | End: 2023-12-14 | Stop reason: HOSPADM

## 2023-12-14 RX ORDER — CALCITRIOL 0.25 UG/1
0.25 CAPSULE ORAL DAILY
Qty: 30 CAPSULE | Refills: 0 | Status: SHIPPED | OUTPATIENT
Start: 2023-12-14 | End: 2024-01-08 | Stop reason: SDUPTHER

## 2023-12-14 RX ORDER — FUROSEMIDE 40 MG/1
40 TABLET ORAL 2 TIMES DAILY
Qty: 60 TABLET | Refills: 0 | Status: SHIPPED | OUTPATIENT
Start: 2023-12-14 | End: 2023-12-29 | Stop reason: SDUPTHER

## 2023-12-14 RX ORDER — POTASSIUM CHLORIDE 1.5 G/1.58G
20 POWDER, FOR SOLUTION ORAL ONCE
Status: COMPLETED | OUTPATIENT
Start: 2023-12-14 | End: 2023-12-14

## 2023-12-14 RX ADMIN — LEVOTHYROXINE SODIUM 25 MCG: 50 TABLET ORAL at 06:52

## 2023-12-14 RX ADMIN — SENNOSIDES 8.6 MG: 8.6 TABLET, FILM COATED ORAL at 11:41

## 2023-12-14 RX ADMIN — TENOFOVIR ALAFENAMIDE 25 MG: 25 TABLET ORAL at 11:49

## 2023-12-14 RX ADMIN — POTASSIUM CHLORIDE 20 MEQ: 1.5 POWDER, FOR SOLUTION ORAL at 11:46

## 2023-12-14 RX ADMIN — CALCIUM CARBONATE (ANTACID) CHEW TAB 500 MG 500 MG: 500 CHEW TAB at 11:41

## 2023-12-14 RX ADMIN — CALCITRIOL CAPSULES 0.25 MCG 0.25 MCG: 0.25 CAPSULE ORAL at 12:38

## 2023-12-14 RX ADMIN — MYCOPHENOLATE MOFETIL 1000 MG: 250 CAPSULE ORAL at 11:40

## 2023-12-14 RX ADMIN — PREDNISONE 15 MG: 10 TABLET ORAL at 11:41

## 2023-12-14 RX ADMIN — LIDOCAINE 1 PATCH: 4 PATCH TOPICAL at 11:49

## 2023-12-14 RX ADMIN — SULFAMETHOXAZOLE AND TRIMETHOPRIM 80 MG: 400; 80 TABLET ORAL at 11:42

## 2023-12-14 RX ADMIN — PANTOPRAZOLE SODIUM 40 MG: 40 TABLET, DELAYED RELEASE ORAL at 06:52

## 2023-12-14 RX ADMIN — CLOTRIMAZOLE 10 MG: 10 LOZENGE ORAL; TOPICAL at 11:41

## 2023-12-14 RX ADMIN — CALCIUM GLUCONATE 3 G: 98 INJECTION, SOLUTION INTRAVENOUS at 11:40

## 2023-12-14 RX ADMIN — DOCUSATE SODIUM 100 MG: 100 CAPSULE, LIQUID FILLED ORAL at 11:42

## 2023-12-14 RX ADMIN — BISACODYL 10 MG: 5 TABLET, COATED ORAL at 11:41

## 2023-12-14 ASSESSMENT — PAIN - FUNCTIONAL ASSESSMENT: PAIN_FUNCTIONAL_ASSESSMENT: 0-10

## 2023-12-14 ASSESSMENT — PAIN SCALES - PAIN ASSESSMENT IN ADVANCED DEMENTIA (PAINAD)
CONSOLABILITY: NO NEED TO CONSOLE
BREATHING: NORMAL
BODYLANGUAGE: RELAXED

## 2023-12-14 ASSESSMENT — PAIN SCALES - GENERAL
PAINLEVEL_OUTOF10: 7
PAINLEVEL_OUTOF10: 5 - MODERATE PAIN

## 2023-12-14 NOTE — PROGRESS NOTES
Herber Leblanc is a 43 y.o. female POD#14 from DDKT and POD#11 from evacuation of subcapsular hematoma from a DCD donor.   No acute events; continued pain at RUSSELL site/incision.   Has Bowel function.   UOP increasing daily 1L last 24h    Objective   Gen: A+OX3; sleeping uncomfortable when aroused  HEENT: PERRL, sclera anicteric, MMM  Cardiac: RRR  Chest: Normal inspiratory effort  Abdomen: S/mildly tender/mild distension. Incision C/D/I.  Ext: 1+ LE edema  Drain: serosanguinous    Last Recorded Vitals  Visit Vitals  /86 (BP Location: Right arm, Patient Position: Lying)   Pulse 77   Temp 36.4 °C (97.5 °F) (Temporal)   Resp 18      Intake/Output last 3 Shifts:    Intake/Output Summary (Last 24 hours) at 12/14/2023 1117  Last data filed at 12/14/2023 1059  Gross per 24 hour   Intake 1260 ml   Output 2075 ml   Net -815 ml        Vitals:    12/14/23 0502   Weight: 76.2 kg (167 lb 15.9 oz)        Scheduled medications  [Held by provider] aspirin, 81 mg, oral, Daily  bisacodyl, 10 mg, oral, Daily  bisacodyl, 10 mg, rectal, Daily  calcitriol, 0.25 mcg, oral, Daily  calcium carbonate, 500 mg, oral, BID  calcium gluconate, 3 g, intravenous, Once  clotrimazole, 10 mg, oral, TID after meals  darbepoetin jacob, 100 mcg, subcutaneous, Weekly  docusate sodium, 100 mg, oral, BID  levothyroxine, 25 mcg, oral, Daily before breakfast  lidocaine, 1 patch, transdermal, Daily  methynaltrexone, 6 mg, subcutaneous, Every other day  mirtazapine, 7.5 mg, oral, Nightly  mycophenolate, 1,000 mg, oral, BID  pantoprazole, 40 mg, oral, BID AC  polyethylene glycol, 17 g, oral, BID  potassium chloride, 20 mEq, oral, Once  predniSONE, 15 mg, oral, Daily  sennosides, 1 tablet, oral, Daily  sulfamethoxazole-trimethoprim, 80 mg, oral, Daily  tenofovir alafenamide, 25 mg, oral, Daily  valGANciclovir, 450 mg, oral, q48h      Continuous medications     PRN medications  PRN medications: acetaminophen, diphenhydrAMINE, melatonin, methocarbamol, naloxone,  ondansetron, traMADol, traMADol     Assessment/Plan   Principal Problem:    Kidney transplant recipient  Active Problems:  Patient Active Problem List   Diagnosis    ESRD (end stage renal disease) (CMS/[facility])    HTN (hypertension)    Gastroesophageal reflux disease    Kidney replaced by transplant    Immunosuppression (CMS/[facility])      - Belatacept next dose 12/18  - DC robaxin prn at discharge  - Solumedrol taper  - Hold heparin subcutaneous, SCDs for DVT prophylaxis  - Renal diet  - PT/OT  - HD MWF  - Discharge home today; remove drain; 1 weeks follow-up in clinic. Labs bi-weekly  - Evaluate labs and Volume status for HD needs Monday 12/18    I spent 35 minutes in the professional and overall care of this patient, including immunosuppression management.    Maryam Bhatt MD, [facility]  Transplant & Hepatobiliary Surgery

## 2023-12-14 NOTE — NURSING NOTE
.Report from Sending RN:    Report From: OCTAVIO Bermudez  Recent Surgery of Procedure: No  Baseline Level of Consciousness (LOC): A&O X3  Oxygen Use: No  Type: Room air  Diabetic: No  Last BP Med Given Day of Dialysis: none  Last Pain Med Given: none  Lab Tests to be Obtained with Dialysis: Yes  Blood Transfusion to be Given During Dialysis: No  Available IV Access: Yes  Medications to be Administered During Dialysis: No  Continuous IV Infusion Running: No  Restraints on Currently or in the Last 24 Hours: No  Hand-Off Communication: Pt had no acute event overnight, vital signs stable. Not on precaution, okay to travel by cart.

## 2023-12-14 NOTE — PROGRESS NOTES
Music Therapy Note    Herber Leblanc     Therapy Session  Referral Type: New referral this admission  Visit Type: New visit  Session Start Time: 1310  Session End Time: 1329  Intervention Delivery: In-person  Conflict of Service: None  Number of family members present: 1  Family Present for Session: Spouse/Significant Other  Family Participation: Supportive     Pre-assessment  Unable to Assess Reason:  (language barrier)  Mood/Affect: Appropriate, Calm, Cooperative         Treatment/Interventions  Music Therapy Interventions: Assessment, Recorded music listening  Interruption: No  Patient Fell Asleep at End of Session: No    Post-assessment  Unable to Assess Reason:  (language barrier)  Mood/Affect: Calm, Appropriate, Cooperative  Continue Visiting: Yes  Total Session Time (min): 19 minutes    Narrative  Assessment Detail: Patient presented awake and alert, supine in bed, with spouse at bedside. Patient and pt spouse both native French speakers with limited English proficiency; MT with insufficient French language proficiency. MT, pt, and pt spouse primarily communicated through simple sentences and allowing pt to browse through MT's Spotify French music playlist to find familair music.  Plan: MT engaged pt and pt spouse in recorded music listening of French music to assess pt's needs and preferences.  Intervention: Patient participated in music intervention by choosing music from a playlist handed to her by MT of hit French songs by various artists.  Evaluation: Patient expressed dislike for much of the music chosen, but struggled to communicate preference with MT when asked or shown options on MT's phone. Patient expressed gratitude for MT's visit and denied additional needs.  Follow-up: MT to follow-up if requested.    Education Documentation  No documentation found.

## 2023-12-14 NOTE — DISCHARGE SUMMARY
Discharge Diagnosis  ESRD (end stage renal disease) (CMS/[facility])    Issues Requiring Follow-Up  Renal Function  Cr 8.22, BUN 49  UOP 1L, RUSSELL drain removed   HD  - will get labs Monday to determine HD need.     Immunosuppression  Belatacept, MMF 1000mg BID, prednisone 15mg    Disposition  Home     Test Results Pending At Discharge  Pending Labs       Order Current Status    Blood Culture Collected ([id number])            Hospital Course  Pt is a 42 y/o female with a hx of ESRD secondary to HTN/NSAID whom received a  donor kidney transplant on 23 by Dr. Marbin Lambert & Dr. Louis with a KDPI of 56% and PRA of 48%.  HCV -/- and donor has not met risk factors. EBV +/+. 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 Dry weight is 81.1 kg (discharge weight is 76.2kg ).  Pt received a total of 4.5 mg/kg total of thymoglobulin induction therapy in conjunction with 500mg IV solumedrol.  Pt was initiated on Tacrolimus & Cellcept immunosuppression in conjunction with IV solumedrol taper.  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 Pt was started on valcyte (CMV D + / R + ) and bactrim for CMV & PCP prophylaxis per protocol. She was started on clotrimazole as antifungal coverage per protocol. Operative course was complicated by return to OR for exploration of transplanted kidney and washout of hematoma. Hospital course was complicated by post-operative pain and constipation, which has resolved.     Rain catheter was removed on POD #6 and the patient is voiding spontaneously.The patient had DGF 2/2 hyperkalemia. Pt required dialysis and electrolytes remain stable. Dialysis access via LUE AVF and was patent on last use. BP & glucose under good control.     Pt is tolerating a regular diet, maintaining adequate hydration with oral intake, ambulating independently and having BMs. Immunosuppression was managed by the transplant team. Patient is in stable  condition for discharge home with renal telehealth today and homecare for drain. RX delivered to bedside prior to discharge. The patient will f/u in the transplant institute and have labs drawn in the walk-in clinic.     Pertinent Physical Exam At Time of Discharge  Physical Exam  Vitals reviewed.   HENT:      Head: Normocephalic.   Cardiovascular:      Rate and Rhythm: Normal rate.      Pulses: Normal pulses.   Pulmonary:      Effort: Pulmonary effort is normal.   Abdominal:      Palpations: Abdomen is soft.   Musculoskeletal:         General: Normal range of motion.      Cervical back: Normal range of motion.   Skin:     General: Skin is warm.      Comments: Right cortez incision closed with staples   Neurological:      Mental Status: She is alert. Mental status is at baseline.   Psychiatric:         Behavior: Behavior normal.         Home Medications     Medication List      START taking these medications     calcitriol 0.25 mcg capsule; Commonly known as: Rocaltrol; Take 1   capsule (0.25 mcg) by mouth once daily.   calcium carbonate 200 mg calcium chewable tablet; Commonly known as:   Tums; Chew 1 tablet (500 mg) 2 times a day.   clotrimazole 10 mg samia; Commonly known as: Mycelex; Take 1 tablet (10   mg) by mouth 3 times a day after meals.   docusate sodium 100 mg capsule; Commonly known as: Colace; Take 1   capsule (100 mg) by mouth 2 times a day as needed for constipation.   lidocaine 4 % patch; Place 1 patch over 12 hours on the skin once daily.   Remove & discard patch within 12 hours or as directed by MD. Do not start   before December 14, 2023.   methocarbamol 500 mg tablet; Commonly known as: Robaxin; Take 1 tablet   (500 mg) by mouth every 12 hours if needed for muscle spasms for up to 5   days.   mycophenolate 250 mg capsule; Commonly known as: Cellcept; Take 4   capsules (1,000 mg) by mouth 2 times a day.   polyethylene glycol 17 gram packet; Commonly known as: Glycolax,   Miralax; Take 17 g by  mouth once daily.   predniSONE 5 mg tablet; Commonly known as: Deltasone; Take 4 tablets (20   mg) by mouth once daily.   sulfamethoxazole-trimethoprim 400-80 mg tablet; Commonly known as:   Bactrim; Take 1 tablet by mouth once daily.   traMADol 50 mg tablet; Commonly known as: Ultram; Take 1 tablet (50 mg)   by mouth every 6 hours if needed for severe pain (7 - 10) for up to 7   days.   valGANciclovir 450 mg tablet; Commonly known as: Valcyte; Take 1 tablet   (450 mg) by mouth every other day. Do not crush or chew. Do not start   before December 5, 2023.   Vemlidy 25 mg tablet tablet; Generic drug: tenofovir alafenamide; Take 1   tablet (25 mg) by mouth once daily.     CHANGE how you take these medications     furosemide 40 mg tablet; Commonly known as: Lasix; Take 1 tablet (40 mg)   by mouth 2 times a day.; What changed: medication strength, how much to   take, when to take this   pantoprazole 40 mg EC tablet; Commonly known as: ProtoNix; Take 1 tablet   (40 mg) by mouth once daily in the morning. Take before meals. Do not   crush, chew, or split.; What changed: additional instructions     CONTINUE taking these medications     levothyroxine 25 mcg tablet; Commonly known as: Synthroid, Levoxyl   mirtazapine 7.5 mg tablet; Commonly known as: Remeron     STOP taking these medications     calcium acetate 667 mg capsule; Commonly known as: Phoslo   [place] Caps 1 mg capsule; Generic drug: B complex-vitamin C-folic acid       Outpatient Follow-Up  Future Appointments   Date Time Provider Department Center   12/14/2023 12:00 PM HD [facility]IR [facility]   12/15/2023 11:30 AM Graham Milner MD [facility]   12/18/2023  8:30 AM POR  INFUSION [id number] [facility]   12/19/2023 11:00 AM TXP ABDOMINAL SURGEON [id number]   12/27/2023 10:00 AM Peggy Davila RDN, PILLO [id number]   1/2/2024  8:30 AM POR  INFUSION [id number] [facility]   1/30/2024  9:00 AM POR  INFUSION [id number] [facility]   3/26/2024   9:00 AM POR  INFUSION [id number] [facility]   4/23/2024  9:00 AM POR  INFUSION [id number] [facility]       Alice Gaitan, APRN-CNP

## 2023-12-14 NOTE — NURSING NOTE
Clinical Event: Final Transplant Discharge Education  Clinical Event Note:  Topic Transplant Coordinator Note  Details   Inpatient Discharge Education Provided to patient,  Ajith, and support person Daríoemmy today. The following topics were reviewed:   signs and symptoms of rejection   signs and symptoms of infection   transplant medication indications   transplant medication administration   transplant medication side effects   the importance of following post-transplant lab and appointment schedules   All verbalized good understanding of all information provided   Danuta and Ajith participated in pillbox fill without error or difficulty      Provided to patient all [facility] contact information for both during and after hours     Patient will have labs drawn locally Monday 12/18 to determine whether dialysis treatment is needed Tuesday Tuesday 12/19 patient will follow up at the [facility] in [place] [id number] with or without dialysis prior. Transplant nurse coordinators will confirm plan with Danuta after Monday's lab results are reviewed.   Stent removal appointment was adjusted to 12/21 at 3pm at [facility]. This information was also reviewed prior to discharge.

## 2023-12-14 NOTE — PROGRESS NOTES
"Subjective   Pt seen at bedside this am. No acute events overnight.   UOP increased yesterday.      Objective     Last Recorded Vitals  Blood pressure 153/78, pulse 99, temperature 36.5 °C (97.7 °F), temperature source Temporal, resp. rate 18, height 1.626 m (5' 4\"), weight 77.1 kg (169 lb 15.6 oz), SpO2 94 %.  Intake/Output last 3 Shifts:  I/O last 3 completed shifts:  In: 1700 (22 mL/kg) [P.O.:1600; IV Piggyback:100]  Out: 1730 (22.4 mL/kg) [Urine:1700 (0.6 mL/kg/hr); Drains:30]  Weight: 77.1 kg     A&ox3, no distress  Lungs clear anteriorly   Rrr, no r/g  Abd soft, nt, nd  No edema b/l    Scheduled medications  [Held by provider] aspirin, 81 mg, oral, Daily  bisacodyl, 10 mg, oral, Daily  bisacodyl, 10 mg, rectal, Daily  calcium carbonate, 500 mg, oral, BID  clotrimazole, 10 mg, oral, TID after meals  darbepoetin jacob, 100 mcg, subcutaneous, Weekly  docusate sodium, 100 mg, oral, BID  levothyroxine, 25 mcg, oral, Daily before breakfast  lidocaine, 1 patch, transdermal, Daily  methynaltrexone, 6 mg, subcutaneous, Every other day  mirtazapine, 7.5 mg, oral, Nightly  mycophenolate, 1,000 mg, oral, BID  pantoprazole, 40 mg, oral, BID AC  polyethylene glycol, 17 g, oral, BID  [START ON 12/14/2023] predniSONE, 15 mg, oral, Daily  sennosides, 1 tablet, oral, Daily  sulfamethoxazole-trimethoprim, 80 mg, oral, Daily  tenofovir alafenamide, 25 mg, oral, Daily  valGANciclovir, 450 mg, oral, q48h      Continuous medications     PRN medications  PRN medications: diphenhydrAMINE, melatonin, methocarbamol, naloxone, ondansetron, traMADol, traMADol    Results for orders placed or performed during the hospital encounter of 11/30/23 (from the past 24 hour(s))   Calcium, ionized   Result Value Ref Range    POCT Calcium, Ionized 0.89 (L) 1.1 - 1.33 mmol/L   CBC and Auto Differential   Result Value Ref Range    WBC 11.2 4.4 - 11.3 x10*3/uL    nRBC 0.0 0.0 - 0.0 /100 WBCs    RBC 2.75 (L) 4.00 - 5.20 x10*6/uL    Hemoglobin 8.4 (L) 12.0 " - 16.0 g/dL    Hematocrit 26.9 (L) 36.0 - 46.0 %    MCV 98 80 - 100 fL    MCH 30.5 26.0 - 34.0 pg    MCHC 31.2 (L) 32.0 - 36.0 g/dL    RDW 15.2 (H) 11.5 - 14.5 %    Platelets 275 150 - 450 x10*3/uL    Neutrophils % 89.6 40.0 - 80.0 %    Immature Granulocytes %, Automated 3.3 (H) 0.0 - 0.9 %    Lymphocytes % 3.0 13.0 - 44.0 %    Monocytes % 3.3 2.0 - 10.0 %    Eosinophils % 0.5 0.0 - 6.0 %    Basophils % 0.3 0.0 - 2.0 %    Neutrophils Absolute 10.02 (H) 1.20 - 7.70 x10*3/uL    Immature Granulocytes Absolute, Automated 0.37 0.00 - 0.70 x10*3/uL    Lymphocytes Absolute 0.34 (L) 1.20 - 4.80 x10*3/uL    Monocytes Absolute 0.37 0.10 - 1.00 x10*3/uL    Eosinophils Absolute 0.06 0.00 - 0.70 x10*3/uL    Basophils Absolute 0.03 0.00 - 0.10 x10*3/uL   Magnesium   Result Value Ref Range    Magnesium 2.11 1.60 - 2.40 mg/dL   Renal Function Panel   Result Value Ref Range    Glucose 73 (L) 74 - 99 mg/dL    Sodium 136 136 - 145 mmol/L    Potassium 3.5 3.5 - 5.3 mmol/L    Chloride 96 (L) 98 - 107 mmol/L    Bicarbonate 24 21 - 32 mmol/L    Anion Gap 20 10 - 20 mmol/L    Urea Nitrogen 36 (H) 6 - 23 mg/dL    Creatinine 7.22 (H) 0.50 - 1.05 mg/dL    eGFR 7 (L) >60 mL/min/1.73m*2    Calcium 7.5 (L) 8.6 - 10.6 mg/dL    Phosphorus 4.3 2.5 - 4.9 mg/dL    Albumin 3.1 (L) 3.4 - 5.0 g/dL   SST TOP   Result Value Ref Range    Extra Tube Hold for add-ons.    Lavender Top   Result Value Ref Range    Extra Tube Hold for add-ons.    Lavender Top   Result Value Ref Range    Extra Tube Hold for add-ons.    Red Top   Result Value Ref Range    Extra Tube Hold for add-ons.            A&P:  43 y.o. with ESRD sec to HTN, chronic NSAID use s/p desceased kidney transplant on 11/30/2023.  She was on dialysis since 2018 via left upper extremity AV fistula.  Nephrology consulted for comanagement of index transplant.     Kidney information: KDPI is a 56%, PRA 48%, 6 antigen mismatch, CMV status D+/R+, EBV D+/R+, hepatitis C viral negative. Pt HBcAb +ve.       Allograft function: DGF on dialysis since 12/4/2023.    UOP picked up over the past 24hrs. Will plan 80 mg IV Lasix. Hold HD today, tentaive HD tomorrow am. Will schedule pt for TTS schedule and monitor for signs of renal recovery.    plan for HD today for volume management. Will plan for ~1L UF.   -Current access is left upper extremity AV fistula.     Immunosuppression: Induction by Thymoglobulin total 4.5 mg/kg.  On Belatacept, Mycophenolate, and Prednisone.     Infectious prophylaxis: Patient is positive for hepatitis B core positive started on Vemlidy.  On clotrimazole, Bactrim and Valcyte.     Carlitos Stanford MD

## 2023-12-14 NOTE — PROGRESS NOTES
Pharmacist Transplant Discharge Note    Medications for Herber Leblanc were reviewed in conjunction with the multidisciplinary transplant team. The pharmacist is in agreement with the plan of care for medications at this time.     Approximately 10 minutes were spent with this patient, her , and her support person, Danuta, providing follow-up education and reviewing her finalized list of discharge medications. An updated outpatient dosing schedule was provided to the patient and her caregivers. All questions were answered to the patient's satisfaction, and the patient and her caregivers confirmed understanding.    The patient had her medications filled at [facility] and delivered prior to discharge. Further educational reinforcement should be provided in the outpatient clinic.    Kev Noble, PharmD, BCPS, BCTXP  Solid Organ Transplant Clinical Pharmacy Specialist

## 2023-12-14 NOTE — CARE PLAN
The patient's goals for the shift include  patient maintaining a level of safety during the shift.    The clinical goals for the shift include pt to remain stable and pain <3/10

## 2023-12-15 ENCOUNTER — APPOINTMENT (OUTPATIENT)
Dept: UROLOGY | Facility: HOSPITAL | Age: 43
End: 2023-12-15

## 2023-12-18 ENCOUNTER — INFUSION (OUTPATIENT)
Dept: INFUSION THERAPY | Facility: HOSPITAL | Age: 43
End: 2023-12-18
Payer: COMMERCIAL

## 2023-12-18 VITALS
TEMPERATURE: 97.4 F | DIASTOLIC BLOOD PRESSURE: 79 MMHG | RESPIRATION RATE: 20 BRPM | OXYGEN SATURATION: 100 % | HEART RATE: 86 BPM | SYSTOLIC BLOOD PRESSURE: 129 MMHG

## 2023-12-18 DIAGNOSIS — D84.9 IMMUNOSUPPRESSION (MULTI): Primary | ICD-10-CM

## 2023-12-18 DIAGNOSIS — Z94.0 KIDNEY REPLACED BY TRANSPLANT (HHS-HCC): ICD-10-CM

## 2023-12-18 LAB
ALBUMIN SERPL BCP-MCNC: 3.5 G/DL (ref 3.4–5)
ANION GAP SERPL CALC-SCNC: 16 MMOL/L (ref 10–20)
BASOPHILS # BLD AUTO: 0.06 X10*3/UL (ref 0–0.1)
BASOPHILS NFR BLD AUTO: 1.1 %
BUN SERPL-MCNC: 54 MG/DL (ref 6–23)
CALCIUM SERPL-MCNC: 8 MG/DL (ref 8.6–10.3)
CHLORIDE SERPL-SCNC: 102 MMOL/L (ref 98–107)
CO2 SERPL-SCNC: 22 MMOL/L (ref 21–32)
CREAT SERPL-MCNC: 6.66 MG/DL (ref 0.5–1.05)
EOSINOPHIL # BLD AUTO: 0.06 X10*3/UL (ref 0–0.7)
EOSINOPHIL NFR BLD AUTO: 1.1 %
ERYTHROCYTE [DISTWIDTH] IN BLOOD BY AUTOMATED COUNT: 14.8 % (ref 11.5–14.5)
GFR SERPL CREATININE-BSD FRML MDRD: 7 ML/MIN/1.73M*2
GLUCOSE SERPL-MCNC: 101 MG/DL (ref 74–99)
HCT VFR BLD AUTO: 24.7 % (ref 36–46)
HGB BLD-MCNC: 8 G/DL (ref 12–16)
IMM GRANULOCYTES # BLD AUTO: 0.09 X10*3/UL (ref 0–0.7)
IMM GRANULOCYTES NFR BLD AUTO: 1.7 % (ref 0–0.9)
LYMPHOCYTES # BLD AUTO: 0.3 X10*3/UL (ref 1.2–4.8)
LYMPHOCYTES NFR BLD AUTO: 5.5 %
MAGNESIUM SERPL-MCNC: 1.75 MG/DL (ref 1.6–2.4)
MCH RBC QN AUTO: 30.9 PG (ref 26–34)
MCHC RBC AUTO-ENTMCNC: 32.4 G/DL (ref 32–36)
MCV RBC AUTO: 95 FL (ref 80–100)
MONOCYTES # BLD AUTO: 0.23 X10*3/UL (ref 0.1–1)
MONOCYTES NFR BLD AUTO: 4.2 %
NEUTROPHILS # BLD AUTO: 4.71 X10*3/UL (ref 1.2–7.7)
NEUTROPHILS NFR BLD AUTO: 86.4 %
NRBC BLD-RTO: 0 /100 WBCS (ref 0–0)
PHOSPHATE SERPL-MCNC: 5.1 MG/DL (ref 2.5–4.9)
PLATELET # BLD AUTO: 383 X10*3/UL (ref 150–450)
POTASSIUM SERPL-SCNC: 3.9 MMOL/L (ref 3.5–5.3)
RBC # BLD AUTO: 2.59 X10*6/UL (ref 4–5.2)
SODIUM SERPL-SCNC: 136 MMOL/L (ref 136–145)
WBC # BLD AUTO: 5.5 X10*3/UL (ref 4.4–11.3)

## 2023-12-18 PROCEDURE — 96365 THER/PROPH/DIAG IV INF INIT: CPT | Mod: INF

## 2023-12-18 PROCEDURE — 80069 RENAL FUNCTION PANEL: CPT

## 2023-12-18 PROCEDURE — 36415 COLL VENOUS BLD VENIPUNCTURE: CPT

## 2023-12-18 PROCEDURE — 83735 ASSAY OF MAGNESIUM: CPT

## 2023-12-18 PROCEDURE — 85025 COMPLETE CBC W/AUTO DIFF WBC: CPT

## 2023-12-18 PROCEDURE — 2500000004 HC RX 250 GENERAL PHARMACY W/ HCPCS (ALT 636 FOR OP/ED): Performed by: STUDENT IN AN ORGANIZED HEALTH CARE EDUCATION/TRAINING PROGRAM

## 2023-12-18 RX ORDER — DIPHENHYDRAMINE HYDROCHLORIDE 50 MG/ML
50 INJECTION INTRAMUSCULAR; INTRAVENOUS AS NEEDED
Status: CANCELLED | OUTPATIENT
Start: 2024-01-15

## 2023-12-18 RX ORDER — ALBUTEROL SULFATE 0.83 MG/ML
3 SOLUTION RESPIRATORY (INHALATION) AS NEEDED
Status: CANCELLED | OUTPATIENT
Start: 2024-01-15

## 2023-12-18 RX ORDER — ALBUTEROL SULFATE 0.83 MG/ML
3 SOLUTION RESPIRATORY (INHALATION) AS NEEDED
Status: CANCELLED | OUTPATIENT
Start: 2024-01-01

## 2023-12-18 RX ORDER — FAMOTIDINE 10 MG/ML
20 INJECTION INTRAVENOUS ONCE AS NEEDED
Status: CANCELLED | OUTPATIENT
Start: 2024-01-01

## 2023-12-18 RX ORDER — DIPHENHYDRAMINE HYDROCHLORIDE 50 MG/ML
50 INJECTION INTRAMUSCULAR; INTRAVENOUS AS NEEDED
Status: CANCELLED | OUTPATIENT
Start: 2024-01-01

## 2023-12-18 RX ORDER — EPINEPHRINE 0.3 MG/.3ML
0.3 INJECTION SUBCUTANEOUS EVERY 5 MIN PRN
Status: CANCELLED | OUTPATIENT
Start: 2024-01-15

## 2023-12-18 RX ORDER — EPINEPHRINE 0.3 MG/.3ML
0.3 INJECTION SUBCUTANEOUS EVERY 5 MIN PRN
Status: CANCELLED | OUTPATIENT
Start: 2024-01-01

## 2023-12-18 RX ORDER — FAMOTIDINE 10 MG/ML
20 INJECTION INTRAVENOUS ONCE AS NEEDED
Status: CANCELLED | OUTPATIENT
Start: 2024-01-15

## 2023-12-18 RX ADMIN — DEXTROSE MONOHYDRATE 825 MG: 50 INJECTION, SOLUTION INTRAVENOUS at 10:18

## 2023-12-18 ASSESSMENT — PATIENT HEALTH QUESTIONNAIRE - PHQ9
8. MOVING OR SPEAKING SO SLOWLY THAT OTHER PEOPLE COULD HAVE NOTICED. OR THE OPPOSITE, BEING SO FIGETY OR RESTLESS THAT YOU HAVE BEEN MOVING AROUND A LOT MORE THAN USUAL: NOT AT ALL
4. FEELING TIRED OR HAVING LITTLE ENERGY: SEVERAL DAYS
SUM OF ALL RESPONSES TO PHQ QUESTIONS 1-9: 10
7. TROUBLE CONCENTRATING ON THINGS, SUCH AS READING THE NEWSPAPER OR WATCHING TELEVISION: SEVERAL DAYS
6. FEELING BAD ABOUT YOURSELF - OR THAT YOU ARE A FAILURE OR HAVE LET YOURSELF OR YOUR FAMILY DOWN: SEVERAL DAYS
3. TROUBLE FALLING OR STAYING ASLEEP OR SLEEPING TOO MUCH: MORE THAN HALF THE DAYS
9. THOUGHTS THAT YOU WOULD BE BETTER OFF DEAD, OR OF HURTING YOURSELF: NOT AT ALL
SUM OF ALL RESPONSES TO PHQ9 QUESTIONS 1 AND 2: 4
5. POOR APPETITE OR OVEREATING: SEVERAL DAYS
2. FEELING DOWN, DEPRESSED OR HOPELESS: MORE THAN HALF THE DAYS
1. LITTLE INTEREST OR PLEASURE IN DOING THINGS: MORE THAN HALF THE DAYS

## 2023-12-18 ASSESSMENT — PAIN SCALES - GENERAL: PAINLEVEL: 8

## 2023-12-18 ASSESSMENT — ENCOUNTER SYMPTOMS
DEPRESSION: 0
LOSS OF SENSATION IN FEET: 1
OCCASIONAL FEELINGS OF UNSTEADINESS: 1

## 2023-12-19 ENCOUNTER — APPOINTMENT (OUTPATIENT)
Dept: DIALYSIS | Facility: HOSPITAL | Age: 43
End: 2023-12-19
Payer: COMMERCIAL

## 2023-12-19 ENCOUNTER — PHARMACY VISIT (OUTPATIENT)
Dept: PHARMACY | Facility: CLINIC | Age: 43
End: 2023-12-19
Payer: COMMERCIAL

## 2023-12-19 ENCOUNTER — OFFICE VISIT (OUTPATIENT)
Dept: TRANSPLANT | Facility: HOSPITAL | Age: 43
End: 2023-12-19
Payer: COMMERCIAL

## 2023-12-19 ENCOUNTER — ANCILLARY PROCEDURE (OUTPATIENT)
Dept: RADIOLOGY | Facility: CLINIC | Age: 43
End: 2023-12-19
Payer: COMMERCIAL

## 2023-12-19 VITALS
DIASTOLIC BLOOD PRESSURE: 78 MMHG | HEART RATE: 70 BPM | OXYGEN SATURATION: 100 % | SYSTOLIC BLOOD PRESSURE: 123 MMHG | TEMPERATURE: 97.5 F

## 2023-12-19 DIAGNOSIS — L76.82 PAIN AT SURGICAL INCISION: ICD-10-CM

## 2023-12-19 DIAGNOSIS — R19.03 ABDOMINAL RIGHT LOWER QUADRANT SWELLING: ICD-10-CM

## 2023-12-19 DIAGNOSIS — Z94.0 KIDNEY REPLACED BY TRANSPLANT (HHS-HCC): ICD-10-CM

## 2023-12-19 DIAGNOSIS — Z98.890 HISTORY OF EVACUATION OF HEMATOMA: ICD-10-CM

## 2023-12-19 PROCEDURE — 1036F TOBACCO NON-USER: CPT | Performed by: STUDENT IN AN ORGANIZED HEALTH CARE EDUCATION/TRAINING PROGRAM

## 2023-12-19 PROCEDURE — RXMED WILLOW AMBULATORY MEDICATION CHARGE

## 2023-12-19 PROCEDURE — 3078F DIAST BP <80 MM HG: CPT | Performed by: STUDENT IN AN ORGANIZED HEALTH CARE EDUCATION/TRAINING PROGRAM

## 2023-12-19 PROCEDURE — 74176 CT ABD & PELVIS W/O CONTRAST: CPT | Performed by: RADIOLOGY

## 2023-12-19 PROCEDURE — 99215 OFFICE O/P EST HI 40 MIN: CPT | Performed by: STUDENT IN AN ORGANIZED HEALTH CARE EDUCATION/TRAINING PROGRAM

## 2023-12-19 PROCEDURE — 3074F SYST BP LT 130 MM HG: CPT | Performed by: STUDENT IN AN ORGANIZED HEALTH CARE EDUCATION/TRAINING PROGRAM

## 2023-12-19 PROCEDURE — 74176 CT ABD & PELVIS W/O CONTRAST: CPT

## 2023-12-19 RX ORDER — AMOXICILLIN 250 MG
2 CAPSULE ORAL 2 TIMES DAILY
Status: CANCELLED | OUTPATIENT
Start: 2023-12-19

## 2023-12-19 RX ORDER — OXYCODONE HYDROCHLORIDE 5 MG/1
5 TABLET ORAL EVERY 6 HOURS PRN
Qty: 15 TABLET | Refills: 0 | Status: SHIPPED | OUTPATIENT
Start: 2023-12-19 | End: 2023-12-26

## 2023-12-19 RX ORDER — SULFAMETHOXAZOLE AND TRIMETHOPRIM 400; 80 MG/1; MG/1
1 TABLET ORAL DAILY
Qty: 30 TABLET | Refills: 4 | Status: SHIPPED | OUTPATIENT
Start: 2023-12-19 | End: 2024-04-24 | Stop reason: ALTCHOICE

## 2023-12-19 RX ORDER — MYCOPHENOLATE MOFETIL 250 MG/1
1000 CAPSULE ORAL EVERY 12 HOURS
Qty: 240 CAPSULE | Refills: 11 | Status: ON HOLD | OUTPATIENT
Start: 2023-12-19 | End: 2024-05-29

## 2023-12-19 RX ORDER — PREDNISONE 5 MG/1
15 TABLET ORAL DAILY
Qty: 90 TABLET | Refills: 11 | Status: SHIPPED | OUTPATIENT
Start: 2023-12-19 | End: 2023-12-29 | Stop reason: SDUPTHER

## 2023-12-19 RX ORDER — LIDOCAINE 560 MG/1
2 PATCH PERCUTANEOUS; TOPICAL; TRANSDERMAL DAILY
Status: CANCELLED | OUTPATIENT
Start: 2023-12-19

## 2023-12-19 RX ORDER — CLOTRIMAZOLE 10 MG/1
10 LOZENGE ORAL; TOPICAL
Qty: 90 TABLET | Refills: 1 | Status: SHIPPED | OUTPATIENT
Start: 2023-12-19 | End: 2024-03-10

## 2023-12-19 RX ORDER — OXYCODONE HYDROCHLORIDE 5 MG/1
5 TABLET ORAL EVERY 6 HOURS PRN
Qty: 15 TABLET | Refills: 0 | Status: SHIPPED | OUTPATIENT
Start: 2023-12-19 | End: 2023-12-19 | Stop reason: SDUPTHER

## 2023-12-19 RX ORDER — VALGANCICLOVIR 450 MG/1
450 TABLET, FILM COATED ORAL
Qty: 15 TABLET | Refills: 1 | Status: SHIPPED | OUTPATIENT
Start: 2023-12-19 | End: 2024-01-15 | Stop reason: SDUPTHER

## 2023-12-19 RX ORDER — OXYCODONE HYDROCHLORIDE 5 MG/1
5 TABLET ORAL EVERY 4 HOURS PRN
Status: CANCELLED | OUTPATIENT
Start: 2023-12-19

## 2023-12-19 ASSESSMENT — PAIN SCALES - GENERAL: PAINLEVEL: 0-NO PAIN

## 2023-12-19 NOTE — PATIENT INSTRUCTIONS
Please be sure to take pain medication if needed  We have added oxycodone for pain: 1 tab every 6 hours as needed. This is stronger than tramadol. [facility] will contact you about delivery or other options to get the medication.   CT scan is scheduled at [facility] near [place] at 2:30pm today. Once completed, we will review to see if you need admitted. We will call you with an update.   Labs next locally tomorrow to see if dialysis is needed on Thursday  Clinic appointment next week - please stop at the desk to schedule

## 2023-12-19 NOTE — PROGRESS NOTES
Herber Leblanc is a 43 y.o. female who underwent DCD DDKT on 11/30/23. Here today for follow-up     Subjective  C/o severe incisional pain and swelling this morning  Not taking adequate pain meds      Objective   Vitals:    12/19/23 0850   BP: 123/78   Pulse: 70   Temp: 36.4 °C (97.5 °F)   SpO2: 100%       General: Alert and oriented to time, place and person. Not in distress  ENT: unremarkable  Cardiovascular: Regular rate, normotensive  Respiratory: no signs of labored breathing on room air  Abdomen/ GI: Kidney transplant incision with erythema and swelling as well as area of firmness around it  Extremity: BLE trace edema -  Skin: no rash     Assessment/Plan      Herber Leblanc is a 43 y.o. female who underwent DCD DDKT on 11/30/23.      Graft Function:    Initial DGF, now resolving - last HD at discharge last week  Creatinine 6.66, down trending  Stent removal 12/21 scheduled     Immunosuppression:     Belatacept switched from Tac on POD 6 for hemolytic anemia  Prednisone 15 mg daily  MMF 1000 mg BID       CT AP non-con rule out incisional hematoma - decide on admission vs Home after the CT - CT reviewed and shows increased kennedy-nephric fluid collection    Admit to inpatient unit and arrange for percutaneous IR guided drainage    Send Type and Screen and Coags    Diuretics/ Electrolytes:  No changes     RTC:             1 week with twice a week labs      I saw and examined Herber Leblanc this morning in the outpatient clinic.     I spent 35 minutes in the professional and overall care of this patient, including immunosuppression management.     Maryam Bhatt MD, DABS  Transplant and Hepatobiliary Surgery

## 2023-12-20 ENCOUNTER — HOSPITAL ENCOUNTER (INPATIENT)
Facility: HOSPITAL | Age: 43
LOS: 2 days | Discharge: HOME | End: 2023-12-22
Attending: TRANSPLANT SURGERY | Admitting: TRANSPLANT SURGERY
Payer: COMMERCIAL

## 2023-12-20 DIAGNOSIS — Z94.9 TRANSPLANT: Primary | ICD-10-CM

## 2023-12-20 DIAGNOSIS — Z94.0 KIDNEY REPLACED BY TRANSPLANT (HHS-HCC): ICD-10-CM

## 2023-12-20 LAB
ABO GROUP (TYPE) IN BLOOD: NORMAL
ABO GROUP (TYPE) IN BLOOD: NORMAL
ALBUMIN SERPL BCP-MCNC: 3.5 G/DL (ref 3.4–5)
ALBUMIN SERPL BCP-MCNC: 3.5 G/DL (ref 3.4–5)
ALP SERPL-CCNC: 155 U/L (ref 33–110)
ALT SERPL W P-5'-P-CCNC: 9 U/L (ref 7–45)
ANION GAP SERPL CALC-SCNC: 18 MMOL/L (ref 10–20)
ANION GAP SERPL CALC-SCNC: 21 MMOL/L (ref 10–20)
ANTIBODY SCREEN: NORMAL
ANTIBODY SCREEN: NORMAL
APPEARANCE UR: CLEAR
AST SERPL W P-5'-P-CCNC: 34 U/L (ref 9–39)
BACTERIA #/AREA URNS AUTO: ABNORMAL /HPF
BILIRUB SERPL-MCNC: 0.7 MG/DL (ref 0–1.2)
BILIRUB UR STRIP.AUTO-MCNC: NEGATIVE MG/DL
BUN SERPL-MCNC: 56 MG/DL (ref 6–23)
BUN SERPL-MCNC: 60 MG/DL (ref 6–23)
CALCIUM SERPL-MCNC: 7.5 MG/DL (ref 8.6–10.6)
CALCIUM SERPL-MCNC: 8.2 MG/DL (ref 8.6–10.6)
CHLORIDE SERPL-SCNC: 101 MMOL/L (ref 98–107)
CHLORIDE SERPL-SCNC: 101 MMOL/L (ref 98–107)
CO2 SERPL-SCNC: 19 MMOL/L (ref 21–32)
CO2 SERPL-SCNC: 20 MMOL/L (ref 21–32)
COLOR UR: ABNORMAL
CREAT SERPL-MCNC: 5.84 MG/DL (ref 0.5–1.05)
CREAT SERPL-MCNC: 6.02 MG/DL (ref 0.5–1.05)
ERYTHROCYTE [DISTWIDTH] IN BLOOD BY AUTOMATED COUNT: 13.9 % (ref 11.5–14.5)
GFR SERPL CREATININE-BSD FRML MDRD: 8 ML/MIN/1.73M*2
GFR SERPL CREATININE-BSD FRML MDRD: 9 ML/MIN/1.73M*2
GLUCOSE SERPL-MCNC: 118 MG/DL (ref 74–99)
GLUCOSE SERPL-MCNC: 140 MG/DL (ref 74–99)
GLUCOSE UR STRIP.AUTO-MCNC: ABNORMAL MG/DL
HCT VFR BLD AUTO: 23.3 % (ref 36–46)
HGB BLD-MCNC: 7.6 G/DL (ref 12–16)
HOLD SPECIMEN: NORMAL
INR PPP: 1.2 (ref 0.9–1.1)
KETONES UR STRIP.AUTO-MCNC: NEGATIVE MG/DL
LEUKOCYTE ESTERASE UR QL STRIP.AUTO: NEGATIVE
MAGNESIUM SERPL-MCNC: 1.99 MG/DL (ref 1.6–2.4)
MCH RBC QN AUTO: 30.9 PG (ref 26–34)
MCHC RBC AUTO-ENTMCNC: 32.6 G/DL (ref 32–36)
MCV RBC AUTO: 95 FL (ref 80–100)
NITRITE UR QL STRIP.AUTO: NEGATIVE
NRBC BLD-RTO: 0 /100 WBCS (ref 0–0)
PH UR STRIP.AUTO: 7 [PH]
PHOSPHATE SERPL-MCNC: 4.6 MG/DL (ref 2.5–4.9)
PLATELET # BLD AUTO: 428 X10*3/UL (ref 150–450)
POTASSIUM SERPL-SCNC: 4.4 MMOL/L (ref 3.5–5.3)
POTASSIUM SERPL-SCNC: 5.7 MMOL/L (ref 3.5–5.3)
PROT SERPL-MCNC: 6.6 G/DL (ref 6.4–8.2)
PROT UR STRIP.AUTO-MCNC: ABNORMAL MG/DL
PROTHROMBIN TIME: 13.9 SECONDS (ref 9.8–12.8)
RBC # BLD AUTO: 2.46 X10*6/UL (ref 4–5.2)
RBC # UR STRIP.AUTO: ABNORMAL /UL
RBC #/AREA URNS AUTO: ABNORMAL /HPF
RH FACTOR (ANTIGEN D): NORMAL
RH FACTOR (ANTIGEN D): NORMAL
SODIUM SERPL-SCNC: 135 MMOL/L (ref 136–145)
SODIUM SERPL-SCNC: 135 MMOL/L (ref 136–145)
SP GR UR STRIP.AUTO: 1.01
UROBILINOGEN UR STRIP.AUTO-MCNC: <2 MG/DL
WBC # BLD AUTO: 6 X10*3/UL (ref 4.4–11.3)
WBC #/AREA URNS AUTO: ABNORMAL /HPF

## 2023-12-20 PROCEDURE — 81001 URINALYSIS AUTO W/SCOPE: CPT

## 2023-12-20 PROCEDURE — 99222 1ST HOSP IP/OBS MODERATE 55: CPT | Performed by: TRANSPLANT SURGERY

## 2023-12-20 PROCEDURE — 80053 COMPREHEN METABOLIC PANEL: CPT

## 2023-12-20 PROCEDURE — 2500000004 HC RX 250 GENERAL PHARMACY W/ HCPCS (ALT 636 FOR OP/ED)

## 2023-12-20 PROCEDURE — 85610 PROTHROMBIN TIME: CPT

## 2023-12-20 PROCEDURE — 85027 COMPLETE CBC AUTOMATED: CPT

## 2023-12-20 PROCEDURE — 2500000001 HC RX 250 WO HCPCS SELF ADMINISTERED DRUGS (ALT 637 FOR MEDICARE OP)

## 2023-12-20 PROCEDURE — 86850 RBC ANTIBODY SCREEN: CPT

## 2023-12-20 PROCEDURE — 36415 COLL VENOUS BLD VENIPUNCTURE: CPT

## 2023-12-20 PROCEDURE — 80069 RENAL FUNCTION PANEL: CPT | Mod: CCI

## 2023-12-20 PROCEDURE — 94760 N-INVAS EAR/PLS OXIMETRY 1: CPT

## 2023-12-20 PROCEDURE — 1100000001 HC PRIVATE ROOM DAILY

## 2023-12-20 PROCEDURE — 76937 US GUIDE VASCULAR ACCESS: CPT

## 2023-12-20 PROCEDURE — 86901 BLOOD TYPING SEROLOGIC RH(D): CPT

## 2023-12-20 PROCEDURE — 2500000005 HC RX 250 GENERAL PHARMACY W/O HCPCS

## 2023-12-20 PROCEDURE — 83735 ASSAY OF MAGNESIUM: CPT

## 2023-12-20 RX ORDER — TRAMADOL HYDROCHLORIDE 50 MG/1
50 TABLET ORAL EVERY 6 HOURS PRN
Status: DISCONTINUED | OUTPATIENT
Start: 2023-12-20 | End: 2023-12-20

## 2023-12-20 RX ORDER — SENNOSIDES 8.6 MG/1
2 TABLET ORAL 2 TIMES DAILY
Status: DISCONTINUED | OUTPATIENT
Start: 2023-12-20 | End: 2023-12-22 | Stop reason: HOSPADM

## 2023-12-20 RX ORDER — TRAMADOL HYDROCHLORIDE 50 MG/1
50 TABLET ORAL EVERY 12 HOURS PRN
Status: DISCONTINUED | OUTPATIENT
Start: 2023-12-20 | End: 2023-12-22 | Stop reason: HOSPADM

## 2023-12-20 RX ORDER — FUROSEMIDE 40 MG/1
40 TABLET ORAL 2 TIMES DAILY
Status: DISCONTINUED | OUTPATIENT
Start: 2023-12-20 | End: 2023-12-22 | Stop reason: HOSPADM

## 2023-12-20 RX ORDER — DOCUSATE SODIUM 100 MG/1
100 CAPSULE, LIQUID FILLED ORAL 2 TIMES DAILY PRN
Status: DISCONTINUED | OUTPATIENT
Start: 2023-12-20 | End: 2023-12-22 | Stop reason: HOSPADM

## 2023-12-20 RX ORDER — ONDANSETRON HYDROCHLORIDE 2 MG/ML
4 INJECTION, SOLUTION INTRAVENOUS EVERY 8 HOURS PRN
Status: DISCONTINUED | OUTPATIENT
Start: 2023-12-20 | End: 2023-12-22 | Stop reason: HOSPADM

## 2023-12-20 RX ORDER — ACETAMINOPHEN 650 MG/1
650 SUPPOSITORY RECTAL EVERY 4 HOURS PRN
Status: DISCONTINUED | OUTPATIENT
Start: 2023-12-20 | End: 2023-12-22 | Stop reason: HOSPADM

## 2023-12-20 RX ORDER — ACETAMINOPHEN 160 MG/5ML
650 SOLUTION ORAL EVERY 4 HOURS PRN
Status: DISCONTINUED | OUTPATIENT
Start: 2023-12-20 | End: 2023-12-22 | Stop reason: HOSPADM

## 2023-12-20 RX ORDER — OXYCODONE HYDROCHLORIDE 5 MG/1
5 TABLET ORAL EVERY 6 HOURS PRN
Status: DISCONTINUED | OUTPATIENT
Start: 2023-12-20 | End: 2023-12-22 | Stop reason: HOSPADM

## 2023-12-20 RX ORDER — CALCIUM CARBONATE 200(500)MG
500 TABLET,CHEWABLE ORAL 2 TIMES DAILY
Status: DISCONTINUED | OUTPATIENT
Start: 2023-12-20 | End: 2023-12-22 | Stop reason: HOSPADM

## 2023-12-20 RX ORDER — LIDOCAINE HYDROCHLORIDE 10 MG/ML
5 INJECTION INFILTRATION; PERINEURAL ONCE
Status: DISCONTINUED | OUTPATIENT
Start: 2023-12-20 | End: 2023-12-22 | Stop reason: HOSPADM

## 2023-12-20 RX ORDER — CLOTRIMAZOLE 10 MG/1
10 LOZENGE ORAL; TOPICAL
Status: DISCONTINUED | OUTPATIENT
Start: 2023-12-20 | End: 2023-12-22 | Stop reason: HOSPADM

## 2023-12-20 RX ORDER — MIRTAZAPINE 7.5 MG/1
7.5 TABLET, FILM COATED ORAL NIGHTLY
Status: DISCONTINUED | OUTPATIENT
Start: 2023-12-20 | End: 2023-12-22 | Stop reason: HOSPADM

## 2023-12-20 RX ORDER — LIDOCAINE 560 MG/1
1 PATCH PERCUTANEOUS; TOPICAL; TRANSDERMAL DAILY
Status: DISCONTINUED | OUTPATIENT
Start: 2023-12-20 | End: 2023-12-22 | Stop reason: HOSPADM

## 2023-12-20 RX ORDER — PANTOPRAZOLE SODIUM 40 MG/1
40 TABLET, DELAYED RELEASE ORAL
Status: DISCONTINUED | OUTPATIENT
Start: 2023-12-21 | End: 2023-12-22 | Stop reason: HOSPADM

## 2023-12-20 RX ORDER — ACETAMINOPHEN 325 MG/1
650 TABLET ORAL EVERY 6 HOURS
Status: DISCONTINUED | OUTPATIENT
Start: 2023-12-20 | End: 2023-12-20

## 2023-12-20 RX ORDER — LEVOTHYROXINE SODIUM 50 UG/1
25 TABLET ORAL
Status: DISCONTINUED | OUTPATIENT
Start: 2023-12-21 | End: 2023-12-22 | Stop reason: HOSPADM

## 2023-12-20 RX ORDER — SULFAMETHOXAZOLE AND TRIMETHOPRIM 400; 80 MG/1; MG/1
1 TABLET ORAL DAILY
Status: DISCONTINUED | OUTPATIENT
Start: 2023-12-20 | End: 2023-12-22 | Stop reason: HOSPADM

## 2023-12-20 RX ORDER — VALGANCICLOVIR 450 MG/1
450 TABLET, FILM COATED ORAL
Status: DISCONTINUED | OUTPATIENT
Start: 2023-12-20 | End: 2023-12-22 | Stop reason: HOSPADM

## 2023-12-20 RX ORDER — PREDNISONE 10 MG/1
15 TABLET ORAL DAILY
Status: DISCONTINUED | OUTPATIENT
Start: 2023-12-20 | End: 2023-12-22 | Stop reason: HOSPADM

## 2023-12-20 RX ORDER — ONDANSETRON 4 MG/1
4 TABLET, FILM COATED ORAL EVERY 8 HOURS PRN
Status: DISCONTINUED | OUTPATIENT
Start: 2023-12-20 | End: 2023-12-22 | Stop reason: HOSPADM

## 2023-12-20 RX ORDER — SODIUM CHLORIDE 450 MG/100ML
100 INJECTION, SOLUTION INTRAVENOUS CONTINUOUS
Status: DISCONTINUED | OUTPATIENT
Start: 2023-12-21 | End: 2023-12-21

## 2023-12-20 RX ORDER — OXYCODONE HYDROCHLORIDE 5 MG/1
5 TABLET ORAL EVERY 6 HOURS PRN
Status: DISCONTINUED | OUTPATIENT
Start: 2023-12-20 | End: 2023-12-20

## 2023-12-20 RX ORDER — ACETAMINOPHEN 325 MG/1
650 TABLET ORAL EVERY 4 HOURS PRN
Status: DISCONTINUED | OUTPATIENT
Start: 2023-12-20 | End: 2023-12-22 | Stop reason: HOSPADM

## 2023-12-20 RX ORDER — CALCITRIOL 0.25 UG/1
0.25 CAPSULE ORAL DAILY
Status: DISCONTINUED | OUTPATIENT
Start: 2023-12-20 | End: 2023-12-22 | Stop reason: HOSPADM

## 2023-12-20 RX ORDER — MYCOPHENOLATE MOFETIL 250 MG/1
1000 CAPSULE ORAL EVERY 12 HOURS
Status: DISCONTINUED | OUTPATIENT
Start: 2023-12-20 | End: 2023-12-22 | Stop reason: HOSPADM

## 2023-12-20 RX ORDER — POLYETHYLENE GLYCOL 3350 17 G/17G
17 POWDER, FOR SOLUTION ORAL DAILY
Status: DISCONTINUED | OUTPATIENT
Start: 2023-12-20 | End: 2023-12-20

## 2023-12-20 RX ORDER — POLYETHYLENE GLYCOL 3350 17 G/17G
17 POWDER, FOR SOLUTION ORAL DAILY
Status: DISCONTINUED | OUTPATIENT
Start: 2023-12-20 | End: 2023-12-22 | Stop reason: HOSPADM

## 2023-12-20 RX ADMIN — PREDNISONE 15 MG: 10 TABLET ORAL at 18:08

## 2023-12-20 RX ADMIN — FUROSEMIDE 40 MG: 40 TABLET ORAL at 20:17

## 2023-12-20 RX ADMIN — CALCIUM CARBONATE (ANTACID) CHEW TAB 500 MG 500 MG: 500 CHEW TAB at 20:17

## 2023-12-20 RX ADMIN — SENNOSIDES 17.2 MG: 8.6 TABLET, FILM COATED ORAL at 20:17

## 2023-12-20 RX ADMIN — MYCOPHENOLATE MOFETIL 1000 MG: 250 CAPSULE ORAL at 18:08

## 2023-12-20 RX ADMIN — LIDOCAINE 1 PATCH: 4 PATCH TOPICAL at 22:52

## 2023-12-20 RX ADMIN — POLYETHYLENE GLYCOL 3350 17 G: 17 POWDER, FOR SOLUTION ORAL at 14:20

## 2023-12-20 RX ADMIN — SULFAMETHOXAZOLE AND TRIMETHOPRIM 1 TABLET: 400; 80 TABLET ORAL at 18:07

## 2023-12-20 RX ADMIN — SENNOSIDES 17.2 MG: 8.6 TABLET, FILM COATED ORAL at 14:20

## 2023-12-20 RX ADMIN — VALGANCICLOVIR HYDROCHLORIDE 450 MG: 450 TABLET ORAL at 18:07

## 2023-12-20 RX ADMIN — CLOTRIMAZOLE 10 MG: 10 LOZENGE ORAL; TOPICAL at 18:00

## 2023-12-20 RX ADMIN — MIRTAZAPINE 7.5 MG: 7.5 TABLET, FILM COATED ORAL at 20:17

## 2023-12-20 RX ADMIN — OXYCODONE HYDROCHLORIDE 5 MG: 5 TABLET ORAL at 16:34

## 2023-12-20 RX ADMIN — ACETAMINOPHEN 650 MG: 325 TABLET ORAL at 20:17

## 2023-12-20 RX ADMIN — SODIUM CHLORIDE 100 ML/HR: 4.5 INJECTION, SOLUTION INTRAVENOUS at 23:36

## 2023-12-20 ASSESSMENT — PAIN - FUNCTIONAL ASSESSMENT
PAIN_FUNCTIONAL_ASSESSMENT: 0-10

## 2023-12-20 ASSESSMENT — COGNITIVE AND FUNCTIONAL STATUS - GENERAL
HELP NEEDED FOR BATHING: A LITTLE
DRESSING REGULAR UPPER BODY CLOTHING: A LITTLE
MOBILITY SCORE: 24
DAILY ACTIVITIY SCORE: 22

## 2023-12-20 ASSESSMENT — PAIN SCALES - PAIN ASSESSMENT IN ADVANCED DEMENTIA (PAINAD)
BODYLANGUAGE: TENSE, DISTRESSED PACING, FIDGETING
BREATHING: NORMAL

## 2023-12-20 ASSESSMENT — PAIN SCALES - GENERAL
PAINLEVEL_OUTOF10: 6
PAINLEVEL_OUTOF10: 0 - NO PAIN
PAINLEVEL_OUTOF10: 8

## 2023-12-20 ASSESSMENT — ACTIVITIES OF DAILY LIVING (ADL): EFFECT OF PAIN ON DAILY ACTIVITIES: RESTLESS

## 2023-12-20 ASSESSMENT — PAIN DESCRIPTION - DESCRIPTORS
DESCRIPTORS: ACHING;BURNING;DISCOMFORT
DESCRIPTORS: ACHING;SHARP

## 2023-12-20 NOTE — H&P
ABDOMINAL TRANSPLANT SURGERY H&P    Herber Leblanc  [id number]  1980      History Of Present Illness  Herber Leblanc is a 43 y.o. female PMH of ESRD secondary to HTN/NSAID s/p DDKTon 11/30/23 by Dr. Marbin Lambert & Dr. Louis.    She is now presenting with ongoing perinephric fluid collection seen on CT that is concerning for unresolved hematoma vs contained seroma. She states that she has moderately controlled with lidocaine patches over the right abdomen. She states that her pain radiates to her low back and is localized on the right abdomen, just above her transplant site.    She states that she makes ~200 ml of urine up to x10 a day (~2L of UOP each day). She has not needed dialysis since discharge. Her ureteral stent remains inplace. She is tolerating and adherent to her immunosuppression medications including MMF 1g BID, Pred 15 daily, and belacept infusion. She was seen in office 12/19 by Dr. Bhatt who suggested imaging with CT AP which showed increased size of perinephric fluid collection:    ROS  She denies any headaches, lightheadedness, dizziness, chest pain SOB, suprapubic pain, dysuria, hematuria. No N/V , fever/chill, fatigue, weakness, diarrhea, constipation, changes in appetite.   She does state that her pain keeps her up at night and that the lidocaine patches only provide temporary relief.    Organ details and past hospital course:  KDPI of 56% and PRA of 48%.  HCV -/- and donor has not met risk factors. EBV +/+. 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 Pt received a total of 4.5 mg/kg total of thymoglobulin induction therapy in conjunction with 500mg IV solumedrol.  Pt was initiated on Tacrolimus & Cellcept immunosuppression in conjunction with IV solumedrol taper.  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 Pt was started on valcyte (CMV D + / R + ) and bactrim for CMV & PCP prophylaxis per protocol. She was started on clotrimazole as antifungal  coverage per protocol. Operative course was complicated by return to OR for exploration of transplanted kidney and washout of hematoma. Hospital course was complicated by post-operative pain and constipation, which has resolved.      Rain catheter was removed on POD #6 and the patient is voiding spontaneously.The patient had DGF 2/2 hyperkalemia. Pt required dialysis and electrolytes remain stable. Dialysis access via LUE AVF and was patent on last use. BP & glucose under good control. Patient deemed appropraite for discharge on 12/14/23.     Past Medical History  Past Medical History:   Diagnosis Date    Chronic kidney disease     Chronic kidney disease, unspecified     CKD, patient interested in transplantation    GERD (gastroesophageal reflux disease)     Personal history of COVID-19 07/20/2022    History of COVID-19       Surgical History  Past Surgical History:   Procedure Laterality Date    MR HEAD ANGIO W AND WO IV CONTRAST  01/26/2021    MR HEAD ANGIO W AND WO IV CONTRAST    MR HEAD ANGIO WO IV CONTRAST  01/26/2021    MR HEAD ANGIO WO IV CONTRAST    OTHER SURGICAL HISTORY  07/20/2022    Arteriovenous fistula creation procedure    TRANSPLANT, KIDNEY, OPEN Right 11/30/2023        Social History  She reports that she has never smoked. She has never used smokeless tobacco. She reports that she does not drink alcohol and does not use drugs.    Family History  No family history on file.     Allergies  Patient has no known allergies.    Objective:      12/14/2023     9:49 AM 12/14/2023    10:59 AM 12/18/2023     8:29 AM 12/18/2023    11:27 AM 12/18/2023    11:35 AM 12/19/2023     8:50 AM 12/20/2023    11:00 AM   Vitals   Systolic  128 133 117 129 123 134   Diastolic  86 82 75 79 78 82   Heart Rate 82 77 77 85 86 70    Temp 36.6 °C (97.9 °F) 36.4 °C (97.5 °F) 36.3 °C (97.4 °F)   36.4 °C (97.5 °F) 36.8 °C (98.2 °F)   Resp  18 20 20 20     Visit Report      Report           Physical Exam  Constitutional:        General: She is not in acute distress.     Appearance: She is not ill-appearing.   HENT:      Head: Normocephalic and atraumatic.      Nose: Nose normal.      Mouth/Throat:      Mouth: Mucous membranes are dry.      Pharynx: Oropharynx is clear.   Eyes:      Extraocular Movements: Extraocular movements intact.      Conjunctiva/sclera: Conjunctivae normal.      Pupils: Pupils are equal, round, and reactive to light.   Cardiovascular:      Rate and Rhythm: Normal rate and regular rhythm.   Pulmonary:      Effort: Pulmonary effort is normal. No respiratory distress.      Breath sounds: Normal breath sounds. No wheezing.   Abdominal:      General: Abdomen is flat. There is no distension.      Palpations: Abdomen is soft.      Comments: Tenderness to palpation over the right abdomen, no guarding or rebound, no pain over b/l flanks   Musculoskeletal:         General: No tenderness or deformity. Normal range of motion.      Right lower leg: No edema.      Left lower leg: No edema.   Skin:     General: Skin is warm and dry.      Capillary Refill: Capillary refill takes less than 2 seconds.      Findings: No bruising, erythema or rash.   Neurological:      General: No focal deficit present.      Mental Status: She is alert and oriented to person, place, and time. Mental status is at baseline.   Psychiatric:         Mood and Affect: Mood normal.         Behavior: Behavior normal.         Thought Content: Thought content normal.         Relevant Results       8.0    5.5>-----<383         24.7       136  102  54                  ----------------<101     3.9  22  6.66              Ca 8.0 Phos 5.1 Mg 1.75       ALT 16 AST 11 AlkPhos 139 tBili 0.5           CT AP wo IV con 12/19/23  IMPRESSION:  1.  Enlargement of the complex transplant transplant perirenal  collection. This is namely due to enlargement of the fluid collection  at the preperitoneal space anteriorly which may be due to seroma,  liquified hematoma or urinoma, or  combination of such. There appears  to been decrease in the subcapsular collection/hematoma, as well as  decreased contain air. There has also been near resolution of gaseous  component within the lower compartment of the collection as described.  2. Cortical thickening of the transplant kidney. Nonobstructing  calculus of the lower pole. No hydronephrosis.  3. Increased subcutaneous reticulation and incision line indicating  edema or possibly contusion.  4. Decreased posterior basilar compressive atelectasis and resolution  of small pleural effusions.  5. Increased gallbladder distension suggesting hydrops.    Assessment/Plan   Principal Problem:    Transplant  Herber Leblanc is a 43 y.o. female PMH of ESRD secondary to HTN/NSAID s/p DDKTon 11/30/23 admitted for expedited placement of IR drain in perinephric collection with interval increased in size since discharge. She is otherwise well and stable. Her pain is not well controlled, but states that oxy 5mg and lidocaine patches moderately alleviate her pain.     Plan:  -admit to transplant surgery  -Okay for regular diet now  -NPO @ midnight for IR drain placement 12/21  - pain control with tylenol, lidocaine patches, tramadol, oxy 5 mg for severe pain  -zofran PRN for nausea  -continue all home medications  -follow-up admission labs including CBC, CMP, mag, T&S, Coag panel  -transfuse for Hgb <7        D/w attending Dr. Lopez.      Elida Bob MD  PGY1 Abdominal Transplant Surgery  [id number]

## 2023-12-20 NOTE — Clinical Note
8fr drain placed RLQ. New drain connected to Urasil drainage system. New drain sutured in place and Mfix dressing applied. No visible bleeding or hematoma at drain site. ~120cc clear al drainage aspirated and sent to lab. Patient received 2mg versed, 100mcg fentanyl IVP for sedation during case. VSS throughout procedure.

## 2023-12-21 ENCOUNTER — APPOINTMENT (OUTPATIENT)
Dept: RADIOLOGY | Facility: HOSPITAL | Age: 43
End: 2023-12-21
Payer: COMMERCIAL

## 2023-12-21 ENCOUNTER — TELEPHONE (OUTPATIENT)
Dept: TRANSPLANT | Facility: HOSPITAL | Age: 43
End: 2023-12-21

## 2023-12-21 LAB
ALBUMIN SERPL BCP-MCNC: 3.5 G/DL (ref 3.4–5)
ANION GAP SERPL CALC-SCNC: 17 MMOL/L (ref 10–20)
BUN SERPL-MCNC: 60 MG/DL (ref 6–23)
CALCIUM SERPL-MCNC: 8.3 MG/DL (ref 8.6–10.6)
CHLORIDE SERPL-SCNC: 102 MMOL/L (ref 98–107)
CLARITY FLD: ABNORMAL
CO2 SERPL-SCNC: 20 MMOL/L (ref 21–32)
COLOR FLD: ABNORMAL
CREAT SERPL-MCNC: 5.52 MG/DL (ref 0.5–1.05)
CREAT SERPL-MCNC: 8.19 MG/DL (ref 0.5–1.05)
ERYTHROCYTE [DISTWIDTH] IN BLOOD BY AUTOMATED COUNT: 14 % (ref 11.5–14.5)
GFR SERPL CREATININE-BSD FRML MDRD: 6 ML/MIN/1.73M*2
GFR SERPL CREATININE-BSD FRML MDRD: 9 ML/MIN/1.73M*2
GLUCOSE SERPL-MCNC: 116 MG/DL (ref 74–99)
HCT VFR BLD AUTO: 23.2 % (ref 36–46)
HGB BLD-MCNC: 7.5 G/DL (ref 12–16)
MAGNESIUM SERPL-MCNC: 1.76 MG/DL (ref 1.6–2.4)
MCH RBC QN AUTO: 30.7 PG (ref 26–34)
MCHC RBC AUTO-ENTMCNC: 32.3 G/DL (ref 32–36)
MCV RBC AUTO: 95 FL (ref 80–100)
NRBC BLD-RTO: 0 /100 WBCS (ref 0–0)
PHOSPHATE SERPL-MCNC: 5.6 MG/DL (ref 2.5–4.9)
PLATELET # BLD AUTO: 399 X10*3/UL (ref 150–450)
POTASSIUM SERPL-SCNC: 4.1 MMOL/L (ref 3.5–5.3)
RBC # BLD AUTO: 2.44 X10*6/UL (ref 4–5.2)
RBC # FLD AUTO: ABNORMAL /UL
SODIUM SERPL-SCNC: 135 MMOL/L (ref 136–145)
WBC # BLD AUTO: 7.3 X10*3/UL (ref 4.4–11.3)
WBC # FLD AUTO: 6119 /UL

## 2023-12-21 PROCEDURE — 99223 1ST HOSP IP/OBS HIGH 75: CPT | Performed by: HOSPITALIST

## 2023-12-21 PROCEDURE — 2500000004 HC RX 250 GENERAL PHARMACY W/ HCPCS (ALT 636 FOR OP/ED)

## 2023-12-21 PROCEDURE — 2720000007 HC OR 272 NO HCPCS

## 2023-12-21 PROCEDURE — 83735 ASSAY OF MAGNESIUM: CPT

## 2023-12-21 PROCEDURE — 2500000001 HC RX 250 WO HCPCS SELF ADMINISTERED DRUGS (ALT 637 FOR MEDICARE OP)

## 2023-12-21 PROCEDURE — C1729 CATH, DRAINAGE: HCPCS

## 2023-12-21 PROCEDURE — 2500000004 HC RX 250 GENERAL PHARMACY W/ HCPCS (ALT 636 FOR OP/ED): Performed by: RADIOLOGY

## 2023-12-21 PROCEDURE — 96372 THER/PROPH/DIAG INJ SC/IM: CPT

## 2023-12-21 PROCEDURE — 99232 SBSQ HOSP IP/OBS MODERATE 35: CPT | Performed by: TRANSPLANT SURGERY

## 2023-12-21 PROCEDURE — 87070 CULTURE OTHR SPECIMN AEROBIC: CPT | Performed by: STUDENT IN AN ORGANIZED HEALTH CARE EDUCATION/TRAINING PROGRAM

## 2023-12-21 PROCEDURE — 80069 RENAL FUNCTION PANEL: CPT

## 2023-12-21 PROCEDURE — 36415 COLL VENOUS BLD VENIPUNCTURE: CPT

## 2023-12-21 PROCEDURE — 89050 BODY FLUID CELL COUNT: CPT | Performed by: STUDENT IN AN ORGANIZED HEALTH CARE EDUCATION/TRAINING PROGRAM

## 2023-12-21 PROCEDURE — 99152 MOD SED SAME PHYS/QHP 5/>YRS: CPT

## 2023-12-21 PROCEDURE — 36415 COLL VENOUS BLD VENIPUNCTURE: CPT | Performed by: STUDENT IN AN ORGANIZED HEALTH CARE EDUCATION/TRAINING PROGRAM

## 2023-12-21 PROCEDURE — 76942 ECHO GUIDE FOR BIOPSY: CPT

## 2023-12-21 PROCEDURE — 99152 MOD SED SAME PHYS/QHP 5/>YRS: CPT | Performed by: RADIOLOGY

## 2023-12-21 PROCEDURE — 85027 COMPLETE CBC AUTOMATED: CPT

## 2023-12-21 PROCEDURE — 49406 IMAGE CATH FLUID PERI/RETRO: CPT

## 2023-12-21 PROCEDURE — 82565 ASSAY OF CREATININE: CPT | Performed by: STUDENT IN AN ORGANIZED HEALTH CARE EDUCATION/TRAINING PROGRAM

## 2023-12-21 PROCEDURE — 1100000001 HC PRIVATE ROOM DAILY

## 2023-12-21 PROCEDURE — 0W9H30Z DRAINAGE OF RETROPERITONEUM WITH DRAINAGE DEVICE, PERCUTANEOUS APPROACH: ICD-10-PCS | Performed by: RADIOLOGY

## 2023-12-21 PROCEDURE — 49405 IMAGE CATH FLUID COLXN VISC: CPT | Performed by: RADIOLOGY

## 2023-12-21 PROCEDURE — 87070 CULTURE OTHR SPECIMN AEROBIC: CPT

## 2023-12-21 RX ORDER — MIDAZOLAM HYDROCHLORIDE 1 MG/ML
INJECTION INTRAMUSCULAR; INTRAVENOUS
Status: COMPLETED | OUTPATIENT
Start: 2023-12-21 | End: 2023-12-21

## 2023-12-21 RX ORDER — HEPARIN SODIUM 5000 [USP'U]/ML
5000 INJECTION, SOLUTION INTRAVENOUS; SUBCUTANEOUS EVERY 8 HOURS
Status: DISCONTINUED | OUTPATIENT
Start: 2023-12-21 | End: 2023-12-22 | Stop reason: HOSPADM

## 2023-12-21 RX ORDER — FENTANYL CITRATE 50 UG/ML
INJECTION, SOLUTION INTRAMUSCULAR; INTRAVENOUS
Status: COMPLETED | OUTPATIENT
Start: 2023-12-21 | End: 2023-12-21

## 2023-12-21 RX ORDER — SODIUM BICARBONATE 650 MG/1
650 TABLET ORAL 2 TIMES DAILY
Status: DISCONTINUED | OUTPATIENT
Start: 2023-12-21 | End: 2023-12-22 | Stop reason: HOSPADM

## 2023-12-21 RX ORDER — HEPARIN SODIUM 5000 [USP'U]/ML
5000 INJECTION, SOLUTION INTRAVENOUS; SUBCUTANEOUS EVERY 8 HOURS
Status: DISCONTINUED | OUTPATIENT
Start: 2023-12-21 | End: 2023-12-22

## 2023-12-21 RX ORDER — LANOLIN ALCOHOL/MO/W.PET/CERES
400 CREAM (GRAM) TOPICAL DAILY
Status: DISCONTINUED | OUTPATIENT
Start: 2023-12-21 | End: 2023-12-22 | Stop reason: HOSPADM

## 2023-12-21 RX ORDER — METHOCARBAMOL 500 MG/1
500 TABLET, FILM COATED ORAL EVERY 6 HOURS SCHEDULED
Status: DISCONTINUED | OUTPATIENT
Start: 2023-12-21 | End: 2023-12-22 | Stop reason: HOSPADM

## 2023-12-21 RX ADMIN — SULFAMETHOXAZOLE AND TRIMETHOPRIM 1 TABLET: 400; 80 TABLET ORAL at 09:50

## 2023-12-21 RX ADMIN — CALCIUM CARBONATE (ANTACID) CHEW TAB 500 MG 500 MG: 500 CHEW TAB at 09:50

## 2023-12-21 RX ADMIN — MIDAZOLAM HYDROCHLORIDE 1 MG: 1 INJECTION, SOLUTION INTRAMUSCULAR; INTRAVENOUS at 15:40

## 2023-12-21 RX ADMIN — POLYETHYLENE GLYCOL 3350 17 G: 17 POWDER, FOR SOLUTION ORAL at 09:50

## 2023-12-21 RX ADMIN — CALCITRIOL CAPSULES 0.25 MCG 0.25 MCG: 0.25 CAPSULE ORAL at 09:50

## 2023-12-21 RX ADMIN — MAGNESIUM OXIDE TAB 400 MG (241.3 MG ELEMENTAL MG) 400 MG: 400 (241.3 MG) TAB at 09:50

## 2023-12-21 RX ADMIN — SODIUM BICARBONATE 650 MG: 650 TABLET ORAL at 12:55

## 2023-12-21 RX ADMIN — CALCIUM CARBONATE (ANTACID) CHEW TAB 500 MG 500 MG: 500 CHEW TAB at 21:50

## 2023-12-21 RX ADMIN — SENNOSIDES 17.2 MG: 8.6 TABLET, FILM COATED ORAL at 09:50

## 2023-12-21 RX ADMIN — MIDAZOLAM HYDROCHLORIDE 1 MG: 1 INJECTION, SOLUTION INTRAMUSCULAR; INTRAVENOUS at 15:47

## 2023-12-21 RX ADMIN — OXYCODONE HYDROCHLORIDE 5 MG: 5 TABLET ORAL at 10:12

## 2023-12-21 RX ADMIN — TENOFOVIR ALAFENAMIDE 25 MG: 25 TABLET ORAL at 09:50

## 2023-12-21 RX ADMIN — CLOTRIMAZOLE 10 MG: 10 LOZENGE ORAL; TOPICAL at 09:50

## 2023-12-21 RX ADMIN — SODIUM BICARBONATE 650 MG: 650 TABLET ORAL at 21:50

## 2023-12-21 RX ADMIN — FUROSEMIDE 40 MG: 40 TABLET ORAL at 09:50

## 2023-12-21 RX ADMIN — MIRTAZAPINE 7.5 MG: 7.5 TABLET, FILM COATED ORAL at 21:51

## 2023-12-21 RX ADMIN — OXYCODONE HYDROCHLORIDE 5 MG: 5 TABLET ORAL at 17:07

## 2023-12-21 RX ADMIN — CLOTRIMAZOLE 10 MG: 10 LOZENGE ORAL; TOPICAL at 12:58

## 2023-12-21 RX ADMIN — MYCOPHENOLATE MOFETIL 1000 MG: 250 CAPSULE ORAL at 17:07

## 2023-12-21 RX ADMIN — CLOTRIMAZOLE 10 MG: 10 LOZENGE ORAL; TOPICAL at 18:34

## 2023-12-21 RX ADMIN — MYCOPHENOLATE MOFETIL 1000 MG: 250 CAPSULE ORAL at 04:08

## 2023-12-21 RX ADMIN — FENTANYL CITRATE 50 MCG: 50 INJECTION, SOLUTION INTRAMUSCULAR; INTRAVENOUS at 15:40

## 2023-12-21 RX ADMIN — FENTANYL CITRATE 50 MCG: 50 INJECTION, SOLUTION INTRAMUSCULAR; INTRAVENOUS at 15:47

## 2023-12-21 RX ADMIN — HEPARIN SODIUM 5000 UNITS: 5000 INJECTION INTRAVENOUS; SUBCUTANEOUS at 18:34

## 2023-12-21 RX ADMIN — METHOCARBAMOL 500 MG: 500 TABLET ORAL at 18:34

## 2023-12-21 RX ADMIN — PANTOPRAZOLE SODIUM 40 MG: 40 TABLET, DELAYED RELEASE ORAL at 09:50

## 2023-12-21 RX ADMIN — METHOCARBAMOL 500 MG: 500 TABLET ORAL at 12:55

## 2023-12-21 RX ADMIN — PREDNISONE 15 MG: 10 TABLET ORAL at 09:50

## 2023-12-21 RX ADMIN — FUROSEMIDE 40 MG: 40 TABLET ORAL at 21:51

## 2023-12-21 RX ADMIN — TRAMADOL HYDROCHLORIDE 50 MG: 50 TABLET, COATED ORAL at 04:12

## 2023-12-21 RX ADMIN — LEVOTHYROXINE SODIUM 25 MCG: 50 TABLET ORAL at 09:50

## 2023-12-21 RX ADMIN — SENNOSIDES 17.2 MG: 8.6 TABLET, FILM COATED ORAL at 21:50

## 2023-12-21 ASSESSMENT — PAIN SCALES - GENERAL
PAINLEVEL_OUTOF10: 8
PAINLEVEL_OUTOF10: 6
PAINLEVEL_OUTOF10: 5 - MODERATE PAIN
PAINLEVEL_OUTOF10: 8

## 2023-12-21 ASSESSMENT — PAIN - FUNCTIONAL ASSESSMENT
PAIN_FUNCTIONAL_ASSESSMENT: 0-10

## 2023-12-21 ASSESSMENT — PAIN DESCRIPTION - LOCATION
LOCATION: ABDOMEN

## 2023-12-21 ASSESSMENT — PAIN DESCRIPTION - ORIENTATION: ORIENTATION: RIGHT;LOWER

## 2023-12-21 NOTE — CONSULTS
Reason For Consult  Midline placement       Picc team was consulted for midline placement. RN reached out to day shift MD Lopez for permission to place midline with GFR of 9. MD stated that peripheral IV ok at this time and no midline is needed. Midline order to be discontinued per MD. If anything changes with patients vascular access, please re-consult picc team at pager number [phone].        AURELIA PAINTER RN

## 2023-12-21 NOTE — POST-PROCEDURE NOTE
Interventional Radiology Brief Postprocedure Note    Attending: Dr. Shanks    Assistant: Dr. Matute    Diagnosis: perinephric fluid collection    Description of procedure:     Consent was obtained and a timeout was performed. Limited [ultrasonographic images were obtained through the [right lower quadrant] for the purposes of needle guidance were taken. The images demonstrate [hypoechoic perinephric fluid collection surrounding the right iliac fossa transplant kidney].     Patient was placed in [supine] position and prepped in the usual sterile manner. Lidocaine was used for local anesthesia. Utilizing ultrasound guidance an 8 Occ[place] pigtail drainage catheter was placed in the collection. The pigtail drain was formed, connected to drain, and sutured/secured in place. Approximately 120 cc serosanguineous fluid was aspirated from the collection.      Postprocedure images demonstrated [no evidence of hemorrhage.]      [Fluid sample was sent to the laboratory for further analysis.]     The patient tolerated the procedure well without any immediate complications.     Anesthesia:  Local, moderate conscious sedation.    Complications: None    Estimated Blood Loss: minimal    See detailed result report with images in PACS.    The patient tolerated the procedure well without incident or complication and is in stable condition.

## 2023-12-21 NOTE — PRE-PROCEDURE NOTE
Interventional Radiology Preprocedure Note    Indication for procedure: The encounter diagnosis was Transplant.    Relevant review of systems: NA    Relevant Labs:   Lab Results   Component Value Date    CREATININE 5.52 (H) 12/21/2023    EGFR 9 (L) 12/21/2023    INR 1.2 (H) 12/20/2023    PROTIME 13.9 (H) 12/20/2023       Planned Sedation/Anesthesia: Moderate    Airway assessment: normal    Directed physical examination:    Physical Exam  Constitutional:       Appearance: She is obese.   HENT:      Head: Normocephalic and atraumatic.   Cardiovascular:      Rate and Rhythm: Normal rate and regular rhythm.   Pulmonary:      Effort: Pulmonary effort is normal.   Neurological:      Mental Status: She is alert.          Mallampati: IV (only hard palate visible)    ASA Score: ASA 3 - Patient with moderate systemic disease with functional limitations    Benefits, risks and alternatives of procedure and planned sedation have been discussed with the patient and/or their representative. All questions answered and they agree to proceed.

## 2023-12-21 NOTE — PROGRESS NOTES
Physical Therapy                 Therapy Communication Note    Patient Name: Herber Leblanc  MRN: [id number]  Today's Date: 12/21/2023     Discipline: Physical Therapy    Missed Visit Reason: Missed Visit Reason: Other (Comment)    Missed Time: Attempt    Comment:  PT eval attempted; pending going to IR for drainage. Will follow and re-attempt following.

## 2023-12-21 NOTE — NURSING NOTE
VAST consulted for PIV replacement. 24g diffusics in right hand found to be nonfunctional. The left arm was not assessed due to presence of hemodialysis access. Upon assessing right upper extremity via ultrasound no suitable venous target was found below the antecubital fossa. Bedside RN contacted transplant team MD on call who ordered midline IV access to the right upper arm. The right upper arm cephalic vein was found suitable and cannulated with a 20g 2.25 inch accucath IV in a sterile fashion. Midline/PICC services to follow up to assess for catheter exchange need.

## 2023-12-21 NOTE — CARE PLAN
The patient's goals for the shift include pain management.    The clinical goals for the shift include pain management.    Over the shift, the patient did not make progress toward the following goals. Barriers to progression include n/a. Recommendations to address these barriers include n/a.

## 2023-12-21 NOTE — PROGRESS NOTES
"Herber Leblanc is a 43 y.o. female on day 1 of admission presenting with Transplant.    Subjective   Complaining of flank and incisional pain       Objective       12/20/2023    11:15 AM 12/20/2023     8:02 PM 12/21/2023    12:01 AM 12/21/2023     4:03 AM 12/21/2023     6:00 AM 12/21/2023     8:18 AM 12/21/2023    11:00 AM   Vitals   Systolic  131 94 117  127 136   Diastolic  84 55 71  79 82   Heart Rate  75 81 69  62 60   Temp  36.8 °C (98.2 °F) 36.6 °C (97.9 °F) 36.2 °C (97.2 °F)  35.9 °C (96.6 °F) 37 °C (98.6 °F)   Resp  20 18 16  16 18   Height (in) 1.626 m (5' 4.02\")         Weight (lb) 167.99    160.38     BMI 28.82 kg/m2    27.52 kg/m2     BSA (m2) 1.86 m2    1.81 m2        Physical Exam  Constitutional:       Appearance: Normal appearance.   Eyes:      Pupils: Pupils are equal, round, and reactive to light.   Cardiovascular:      Rate and Rhythm: Normal rate.   Pulmonary:      Effort: Pulmonary effort is normal. No respiratory distress.   Abdominal:      General: There is no distension.      Palpations: Abdomen is soft. There is no mass.      Comments: Swollen, Flank tenderness   Musculoskeletal:         General: No swelling.   Skin:     General: Skin is warm and dry.   Neurological:      General: No focal deficit present.      Mental Status: She is alert and oriented to person, place, and time.   Psychiatric:         Mood and Affect: Mood normal.         Behavior: Behavior normal.         Last Recorded Vitals  Blood pressure 136/82, pulse 60, temperature 37 °C (98.6 °F), resp. rate 18, height 1.626 m (5' 4.02\"), weight 72.7 kg (160 lb 6.2 oz), SpO2 99 %.  Intake/Output last 3 Shifts:  I/O last 3 completed shifts:  In: 655 (9 mL/kg) [I.V.:655 (9 mL/kg)]  Out: 1500 (20.6 mL/kg) [Urine:1500 (0.6 mL/kg/hr)]  Weight: 72.7 kg     Relevant Results  Lab Results   Component Value Date    WBC 7.3 12/21/2023    HGB 7.5 (L) 12/21/2023    HCT 23.2 (L) 12/21/2023    MCV 95 12/21/2023     12/21/2023     Lab Results "   Component Value Date    GLUCOSE 116 (H) 12/21/2023    CALCIUM 8.3 (L) 12/21/2023     (L) 12/21/2023    K 4.1 12/21/2023    CO2 20 (L) 12/21/2023     12/21/2023    BUN 60 (H) 12/21/2023    CREATININE 5.52 (H) 12/21/2023     calcitriol, 0.25 mcg, oral, Daily  calcium carbonate, 500 mg, oral, BID  clotrimazole, 10 mg, oral, TID after meals  [START ON 12/22/2023] darbepoetin jacob, 100 mcg, subcutaneous, q14 days  furosemide, 40 mg, oral, BID  [Held by provider] heparin, 5,000 Units, subcutaneous, q8h  levothyroxine, 25 mcg, oral, Daily before breakfast  lidocaine, 5 mL, infiltration, Once  lidocaine, 1 patch, transdermal, Daily  magnesium oxide, 400 mg, oral, Daily  methocarbamol, 500 mg, oral, q6h JANIE  mirtazapine, 7.5 mg, oral, Nightly  mycophenolate, 1,000 mg, oral, q12h  pantoprazole, 40 mg, oral, Daily before breakfast  polyethylene glycol, 17 g, oral, Daily  predniSONE, 15 mg, oral, Daily  sennosides, 2 tablet, oral, BID  sodium bicarbonate, 650 mg, oral, BID  sulfamethoxazole-trimethoprim, 1 tablet, oral, Daily  tenofovir alafenamide, 25 mg, oral, Daily  valGANciclovir, 450 mg, oral, q48h        Assessment/Plan   Principal Problem:    Transplant    Kidney allograft function   UOP 1500   DGF resolved   Creatinine improving 5.52, no HD   Rose-nephritic hematoma/collection - scheduled for IR drainage today    Immunosuppression  Belatacept, no Tac  MMF 1000/1000  Pred 15mg              I spent 35 minutes in the professional and overall care of this patient.      Don Lopez MD

## 2023-12-21 NOTE — CONSULTS
Reason For Consult  History of renal transplant     History Of Present Illness  Herber Leblanc is a 43 y.o. female with PMH ESRD secondary to HTN/NSAID s/p DDKT on 11/30/23 who has been admitted on account of ongoing perinephric fluid collection. Transplant nephrology following along with surgery team. Ms Leblanc is planned for IR drainage today.      Past Medical History  She has a past medical history of Chronic kidney disease, Chronic kidney disease, unspecified, GERD (gastroesophageal reflux disease), and Personal history of COVID-19 (07/20/2022).    Surgical History  She has a past surgical history that includes Other surgical history (07/20/2022); MR angio head w and wo IV contrast (01/26/2021); MR angio head wo IV contrast (01/26/2021); and transplant, kidney, open (Right, 11/30/2023).     Social History  She reports that she has never smoked. She has never used smokeless tobacco. She reports that she does not drink alcohol and does not use drugs.    Family History  No family history on file.     Allergies  Patient has no known allergies.    Review of Systems  ROS is negative apart from abdominal pain      Physical Exam  General appearance: no distress  Eyes: non-icteric  HEENT: atrumatic head, PEERLA, moist mucosa  Skin: no apparent rash  Heart: NSR, S1, S2 normal, no murmur or gallop  Lungs: Symmetrical expansion,CTA bilat no wheezing/crackles  Abdomen: soft, surgical site clean and dry   Extremities: no edema bilaterally  Neuro: No FND,asterixis         I&O 24HR    Intake/Output Summary (Last 24 hours) at 12/21/2023 1347  Last data filed at 12/21/2023 1301  Gross per 24 hour   Intake 655 ml   Output 2250 ml   Net -1595 ml       Vitals 24HR  Heart Rate:  [60-81]   Temp:  [35.9 °C (96.6 °F)-37 °C (98.6 °F)]   Resp:  [16-20]   BP: ()/(55-84)   Weight:  [72.7 kg (160 lb 6.2 oz)]   SpO2:  [96 %-100 %]       Scheduled medications  calcitriol, 0.25 mcg, oral, Daily  calcium carbonate, 500 mg, oral, BID  clotrimazole,  10 mg, oral, TID after meals  [START ON 12/22/2023] darbepoetin jacob, 100 mcg, subcutaneous, q14 days  furosemide, 40 mg, oral, BID  [Held by provider] heparin, 5,000 Units, subcutaneous, q8h  levothyroxine, 25 mcg, oral, Daily before breakfast  lidocaine, 5 mL, infiltration, Once  lidocaine, 1 patch, transdermal, Daily  magnesium oxide, 400 mg, oral, Daily  methocarbamol, 500 mg, oral, q6h JANIE  mirtazapine, 7.5 mg, oral, Nightly  mycophenolate, 1,000 mg, oral, q12h  pantoprazole, 40 mg, oral, Daily before breakfast  polyethylene glycol, 17 g, oral, Daily  predniSONE, 15 mg, oral, Daily  sennosides, 2 tablet, oral, BID  sodium bicarbonate, 650 mg, oral, BID  sulfamethoxazole-trimethoprim, 1 tablet, oral, Daily  tenofovir alafenamide, 25 mg, oral, Daily  valGANciclovir, 450 mg, oral, q48h      Continuous medications  sodium chloride, 100 mL/hr, Last Rate: 100 mL/hr (12/20/23 2336)      PRN medications  PRN medications: acetaminophen **OR** acetaminophen **OR** acetaminophen, docusate sodium, ondansetron **OR** ondansetron, oxyCODONE, traMADol     Relevant Results  Results for orders placed or performed during the hospital encounter of 12/20/23 (from the past 24 hour(s))   Comprehensive metabolic panel   Result Value Ref Range    Glucose 118 (H) 74 - 99 mg/dL    Sodium 135 (L) 136 - 145 mmol/L    Potassium 5.7 (H) 3.5 - 5.3 mmol/L    Chloride 101 98 - 107 mmol/L    Bicarbonate 19 (L) 21 - 32 mmol/L    Anion Gap 21 (H) 10 - 20 mmol/L    Urea Nitrogen 56 (H) 6 - 23 mg/dL    Creatinine 6.02 (H) 0.50 - 1.05 mg/dL    eGFR 8 (L) >60 mL/min/1.73m*2    Calcium 8.2 (L) 8.6 - 10.6 mg/dL    Albumin 3.5 3.4 - 5.0 g/dL    Alkaline Phosphatase 155 (H) 33 - 110 U/L    Total Protein 6.6 6.4 - 8.2 g/dL    AST 34 9 - 39 U/L    Bilirubin, Total 0.7 0.0 - 1.2 mg/dL    ALT 9 7 - 45 U/L   CBC   Result Value Ref Range    WBC 6.0 4.4 - 11.3 x10*3/uL    nRBC 0.0 0.0 - 0.0 /100 WBCs    RBC 2.46 (L) 4.00 - 5.20 x10*6/uL    Hemoglobin 7.6 (L)  12.0 - 16.0 g/dL    Hematocrit 23.3 (L) 36.0 - 46.0 %    MCV 95 80 - 100 fL    MCH 30.9 26.0 - 34.0 pg    MCHC 32.6 32.0 - 36.0 g/dL    RDW 13.9 11.5 - 14.5 %    Platelets 428 150 - 450 x10*3/uL   Type And Screen   Result Value Ref Range    ABO TYPE B     Rh TYPE POS     ANTIBODY SCREEN NEG    Protime-INR   Result Value Ref Range    Protime 13.9 (H) 9.8 - 12.8 seconds    INR 1.2 (H) 0.9 - 1.1   Magnesium   Result Value Ref Range    Magnesium 1.99 1.60 - 2.40 mg/dL   Urinalysis with Reflex Microscopic   Result Value Ref Range    Color, Urine Straw Straw, Yellow    Appearance, Urine Clear Clear    Specific Gravity, Urine 1.008 1.005 - 1.035    pH, Urine 7.0 5.0, 5.5, 6.0, 6.5, 7.0, 7.5, 8.0    Protein, Urine 30 (1+) (N) NEGATIVE mg/dL    Glucose, Urine 50 (1+) (A) NEGATIVE mg/dL    Blood, Urine MODERATE (2+) (A) NEGATIVE    Ketones, Urine NEGATIVE NEGATIVE mg/dL    Bilirubin, Urine NEGATIVE NEGATIVE    Urobilinogen, Urine <2.0 <2.0 mg/dL    Nitrite, Urine NEGATIVE NEGATIVE    Leukocyte Esterase, Urine NEGATIVE NEGATIVE   Microscopic Only, Urine   Result Value Ref Range    WBC, Urine 1-5 1-5, NONE /HPF    RBC, Urine 3-5 NONE, 1-2, 3-5 /HPF    Bacteria, Urine 1+ (A) NONE SEEN /HPF   Light Blue Top   Result Value Ref Range    Extra Tube Hold for add-ons.    Red Top   Result Value Ref Range    Extra Tube Hold for add-ons.    SST TOP   Result Value Ref Range    Extra Tube Hold for add-ons.    SST TOP   Result Value Ref Range    Extra Tube Hold for add-ons.    Type and Screen   Result Value Ref Range    ABO TYPE B     Rh TYPE POS     ANTIBODY SCREEN NEG    Renal Function Panel   Result Value Ref Range    Glucose 140 (H) 74 - 99 mg/dL    Sodium 135 (L) 136 - 145 mmol/L    Potassium 4.4 3.5 - 5.3 mmol/L    Chloride 101 98 - 107 mmol/L    Bicarbonate 20 (L) 21 - 32 mmol/L    Anion Gap 18 10 - 20 mmol/L    Urea Nitrogen 60 (H) 6 - 23 mg/dL    Creatinine 5.84 (H) 0.50 - 1.05 mg/dL    eGFR 9 (L) >60 mL/min/1.73m*2    Calcium 7.5  (L) 8.6 - 10.6 mg/dL    Phosphorus 4.6 2.5 - 4.9 mg/dL    Albumin 3.5 3.4 - 5.0 g/dL   Renal Function Panel   Result Value Ref Range    Glucose 116 (H) 74 - 99 mg/dL    Sodium 135 (L) 136 - 145 mmol/L    Potassium 4.1 3.5 - 5.3 mmol/L    Chloride 102 98 - 107 mmol/L    Bicarbonate 20 (L) 21 - 32 mmol/L    Anion Gap 17 10 - 20 mmol/L    Urea Nitrogen 60 (H) 6 - 23 mg/dL    Creatinine 5.52 (H) 0.50 - 1.05 mg/dL    eGFR 9 (L) >60 mL/min/1.73m*2    Calcium 8.3 (L) 8.6 - 10.6 mg/dL    Phosphorus 5.6 (H) 2.5 - 4.9 mg/dL    Albumin 3.5 3.4 - 5.0 g/dL   Magnesium   Result Value Ref Range    Magnesium 1.76 1.60 - 2.40 mg/dL   CBC   Result Value Ref Range    WBC 7.3 4.4 - 11.3 x10*3/uL    nRBC 0.0 0.0 - 0.0 /100 WBCs    RBC 2.44 (L) 4.00 - 5.20 x10*6/uL    Hemoglobin 7.5 (L) 12.0 - 16.0 g/dL    Hematocrit 23.2 (L) 36.0 - 46.0 %    MCV 95 80 - 100 fL    MCH 30.7 26.0 - 34.0 pg    MCHC 32.3 32.0 - 36.0 g/dL    RDW 14.0 11.5 - 14.5 %    Platelets 399 150 - 450 x10*3/uL          Assessment/Plan   Herber Leblanc is a 43 y.o. female with PMH ESRD secondary to HTN/NSAID s/p DDKT on 11/30/23 who has been admitted on account of ongoing perinephric fluid collection.Of note her post op course is complicated by return to OR and exploration of transplanted kidney with hematoma wash out as of 12/3/2023. Transplant nephrology following along with surgery team. Ms Leblanc is planned for IR drainage today.     Kidney info:  KDPI of 56% and PRA of 48%.  HCV -/- and donor has not met risk factors. EBV +/+     #Allograft function  Date of transplant: 11/30/2023 ,DGF but gradually recovering.  Baseline Cr:  5.5-6 , gradually down trending   Urine output: 1.5L yesterday .  Patient have significant perinephric collection that required CT guided drainage .  -On lasix 40mg po bid     #Immunosuppression  -Prednisone 15mg po daily   -MMF 1000mg po bid   -Belatacept outpatient     #Infectious prophylaxis  -Bactrim  -Valcyte   -Clotrimazole    #Hemodynamics    -Stable    #Electrolytes  -K 4.1     #CKD-MBD  -Calcitriol 0.25mcg po once daily  -Calcium carbonate 500mg po bid     #CKD-Anemia   -Aranesp 100mcg q 2 weekly     #Acid-base   -Sodium bicarbonate 650mg po bid      Transplant surgery team and attending Dr Billie Thomas MD   Nephrology fellow PGY 4   Available via Epic Chat   Transplant pager #[phone]

## 2023-12-22 ENCOUNTER — APPOINTMENT (OUTPATIENT)
Dept: TRANSPLANT | Facility: HOSPITAL | Age: 43
End: 2023-12-22

## 2023-12-22 ENCOUNTER — PHARMACY VISIT (OUTPATIENT)
Dept: PHARMACY | Facility: CLINIC | Age: 43
End: 2023-12-22
Payer: COMMERCIAL

## 2023-12-22 ENCOUNTER — TELEPHONE (OUTPATIENT)
Dept: HOME HEALTH SERVICES | Facility: HOME HEALTH | Age: 43
End: 2023-12-22
Payer: COMMERCIAL

## 2023-12-22 ENCOUNTER — HOME HEALTH ADMISSION (OUTPATIENT)
Dept: HOME HEALTH SERVICES | Facility: HOME HEALTH | Age: 43
End: 2023-12-22
Payer: COMMERCIAL

## 2023-12-22 ENCOUNTER — DOCUMENTATION (OUTPATIENT)
Dept: HOME HEALTH SERVICES | Facility: HOME HEALTH | Age: 43
End: 2023-12-22
Payer: COMMERCIAL

## 2023-12-22 VITALS
DIASTOLIC BLOOD PRESSURE: 77 MMHG | TEMPERATURE: 96.4 F | OXYGEN SATURATION: 97 % | WEIGHT: 156.53 LBS | SYSTOLIC BLOOD PRESSURE: 129 MMHG | HEART RATE: 80 BPM | HEIGHT: 64 IN | BODY MASS INDEX: 26.72 KG/M2 | RESPIRATION RATE: 17 BRPM

## 2023-12-22 LAB
ALBUMIN SERPL BCP-MCNC: 3.4 G/DL (ref 3.4–5)
ANION GAP SERPL CALC-SCNC: 17 MMOL/L (ref 10–20)
BUN SERPL-MCNC: 55 MG/DL (ref 6–23)
CALCIUM SERPL-MCNC: 8.5 MG/DL (ref 8.6–10.6)
CHLORIDE SERPL-SCNC: 104 MMOL/L (ref 98–107)
CO2 SERPL-SCNC: 21 MMOL/L (ref 21–32)
CREAT SERPL-MCNC: 5.22 MG/DL (ref 0.5–1.05)
ERYTHROCYTE [DISTWIDTH] IN BLOOD BY AUTOMATED COUNT: 14.2 % (ref 11.5–14.5)
GFR SERPL CREATININE-BSD FRML MDRD: 10 ML/MIN/1.73M*2
GLUCOSE SERPL-MCNC: 86 MG/DL (ref 74–99)
HCT VFR BLD AUTO: 24.3 % (ref 36–46)
HGB BLD-MCNC: 7.8 G/DL (ref 12–16)
MAGNESIUM SERPL-MCNC: 1.84 MG/DL (ref 1.6–2.4)
MCH RBC QN AUTO: 31 PG (ref 26–34)
MCHC RBC AUTO-ENTMCNC: 32.1 G/DL (ref 32–36)
MCV RBC AUTO: 96 FL (ref 80–100)
NRBC BLD-RTO: 0 /100 WBCS (ref 0–0)
PHOSPHATE SERPL-MCNC: 5.8 MG/DL (ref 2.5–4.9)
PLATELET # BLD AUTO: 413 X10*3/UL (ref 150–450)
POTASSIUM SERPL-SCNC: 3.3 MMOL/L (ref 3.5–5.3)
RBC # BLD AUTO: 2.52 X10*6/UL (ref 4–5.2)
SODIUM SERPL-SCNC: 139 MMOL/L (ref 136–145)
WBC # BLD AUTO: 5.8 X10*3/UL (ref 4.4–11.3)

## 2023-12-22 PROCEDURE — 99232 SBSQ HOSP IP/OBS MODERATE 35: CPT

## 2023-12-22 PROCEDURE — 2500000004 HC RX 250 GENERAL PHARMACY W/ HCPCS (ALT 636 FOR OP/ED)

## 2023-12-22 PROCEDURE — 85027 COMPLETE CBC AUTOMATED: CPT

## 2023-12-22 PROCEDURE — 97530 THERAPEUTIC ACTIVITIES: CPT | Mod: GP

## 2023-12-22 PROCEDURE — 99232 SBSQ HOSP IP/OBS MODERATE 35: CPT | Performed by: TRANSPLANT SURGERY

## 2023-12-22 PROCEDURE — 83735 ASSAY OF MAGNESIUM: CPT

## 2023-12-22 PROCEDURE — 96372 THER/PROPH/DIAG INJ SC/IM: CPT

## 2023-12-22 PROCEDURE — RXMED WILLOW AMBULATORY MEDICATION CHARGE

## 2023-12-22 PROCEDURE — 2500000001 HC RX 250 WO HCPCS SELF ADMINISTERED DRUGS (ALT 637 FOR MEDICARE OP)

## 2023-12-22 PROCEDURE — 36415 COLL VENOUS BLD VENIPUNCTURE: CPT

## 2023-12-22 PROCEDURE — 80069 RENAL FUNCTION PANEL: CPT

## 2023-12-22 PROCEDURE — 2500000005 HC RX 250 GENERAL PHARMACY W/O HCPCS

## 2023-12-22 PROCEDURE — 97162 PT EVAL MOD COMPLEX 30 MIN: CPT | Mod: GP

## 2023-12-22 RX ORDER — POTASSIUM CHLORIDE 20 MEQ/1
40 TABLET, EXTENDED RELEASE ORAL ONCE
Status: COMPLETED | OUTPATIENT
Start: 2023-12-22 | End: 2023-12-22

## 2023-12-22 RX ORDER — METHOCARBAMOL 500 MG/1
500 TABLET, FILM COATED ORAL EVERY 6 HOURS PRN
Qty: 10 TABLET | Refills: 0 | Status: SHIPPED | OUTPATIENT
Start: 2023-12-22 | End: 2024-01-15 | Stop reason: SDUPTHER

## 2023-12-22 RX ORDER — LANOLIN ALCOHOL/MO/W.PET/CERES
400 CREAM (GRAM) TOPICAL DAILY
Qty: 30 TABLET | Refills: 0 | Status: SHIPPED | OUTPATIENT
Start: 2023-12-22 | End: 2024-01-15 | Stop reason: SDUPTHER

## 2023-12-22 RX ORDER — POTASSIUM CHLORIDE 20 MEQ/1
20 TABLET, EXTENDED RELEASE ORAL DAILY
Qty: 30 TABLET | Refills: 0 | Status: SHIPPED | OUTPATIENT
Start: 2023-12-22 | End: 2023-12-22 | Stop reason: HOSPADM

## 2023-12-22 RX ORDER — SODIUM BICARBONATE 650 MG/1
650 TABLET ORAL 2 TIMES DAILY
Qty: 60 TABLET | Refills: 0 | Status: SHIPPED | OUTPATIENT
Start: 2023-12-22 | End: 2024-01-08 | Stop reason: ALTCHOICE

## 2023-12-22 RX ORDER — LANOLIN ALCOHOL/MO/W.PET/CERES
400 CREAM (GRAM) TOPICAL DAILY
Status: DISCONTINUED | OUTPATIENT
Start: 2023-12-22 | End: 2023-12-22

## 2023-12-22 RX ADMIN — TENOFOVIR ALAFENAMIDE 25 MG: 25 TABLET ORAL at 08:06

## 2023-12-22 RX ADMIN — MYCOPHENOLATE MOFETIL 1000 MG: 250 CAPSULE ORAL at 06:10

## 2023-12-22 RX ADMIN — POTASSIUM CHLORIDE 40 MEQ: 1500 TABLET, EXTENDED RELEASE ORAL at 08:05

## 2023-12-22 RX ADMIN — DARBEPOETIN ALFA 100 MCG: 100 INJECTION, SOLUTION INTRAVENOUS; SUBCUTANEOUS at 11:14

## 2023-12-22 RX ADMIN — ACETAMINOPHEN 650 MG: 325 TABLET ORAL at 11:12

## 2023-12-22 RX ADMIN — OXYCODONE HYDROCHLORIDE 5 MG: 5 TABLET ORAL at 08:04

## 2023-12-22 RX ADMIN — CALCIUM CARBONATE (ANTACID) CHEW TAB 500 MG 500 MG: 500 CHEW TAB at 08:05

## 2023-12-22 RX ADMIN — LEVOTHYROXINE SODIUM 25 MCG: 50 TABLET ORAL at 06:10

## 2023-12-22 RX ADMIN — FUROSEMIDE 40 MG: 40 TABLET ORAL at 08:05

## 2023-12-22 RX ADMIN — CLOTRIMAZOLE 10 MG: 10 LOZENGE ORAL; TOPICAL at 08:06

## 2023-12-22 RX ADMIN — MAGNESIUM OXIDE TAB 400 MG (241.3 MG ELEMENTAL MG) 400 MG: 400 (241.3 MG) TAB at 08:05

## 2023-12-22 RX ADMIN — METHOCARBAMOL 500 MG: 500 TABLET ORAL at 11:12

## 2023-12-22 RX ADMIN — SODIUM BICARBONATE 650 MG: 650 TABLET ORAL at 08:05

## 2023-12-22 RX ADMIN — CALCITRIOL CAPSULES 0.25 MCG 0.25 MCG: 0.25 CAPSULE ORAL at 08:08

## 2023-12-22 RX ADMIN — HEPARIN SODIUM 5000 UNITS: 5000 INJECTION INTRAVENOUS; SUBCUTANEOUS at 08:50

## 2023-12-22 RX ADMIN — PREDNISONE 15 MG: 10 TABLET ORAL at 08:05

## 2023-12-22 RX ADMIN — SENNOSIDES 17.2 MG: 8.6 TABLET, FILM COATED ORAL at 08:05

## 2023-12-22 RX ADMIN — POLYETHYLENE GLYCOL 3350 17 G: 17 POWDER, FOR SOLUTION ORAL at 08:09

## 2023-12-22 RX ADMIN — SULFAMETHOXAZOLE AND TRIMETHOPRIM 1 TABLET: 400; 80 TABLET ORAL at 08:05

## 2023-12-22 RX ADMIN — LIDOCAINE 1 PATCH: 4 PATCH TOPICAL at 08:06

## 2023-12-22 RX ADMIN — PANTOPRAZOLE SODIUM 40 MG: 40 TABLET, DELAYED RELEASE ORAL at 06:10

## 2023-12-22 RX ADMIN — OXYCODONE HYDROCHLORIDE 5 MG: 5 TABLET ORAL at 00:49

## 2023-12-22 RX ADMIN — METHOCARBAMOL 500 MG: 500 TABLET ORAL at 06:10

## 2023-12-22 RX ADMIN — METHOCARBAMOL 500 MG: 500 TABLET ORAL at 00:49

## 2023-12-22 ASSESSMENT — PAIN SCALES - GENERAL
PAINLEVEL_OUTOF10: 9
PAINLEVEL_OUTOF10: 6
PAINLEVEL_OUTOF10: 9
PAINLEVEL_OUTOF10: 9

## 2023-12-22 ASSESSMENT — COGNITIVE AND FUNCTIONAL STATUS - GENERAL
HELP NEEDED FOR BATHING: A LITTLE
MOBILITY SCORE: 24
STANDING UP FROM CHAIR USING ARMS: A LITTLE
DAILY ACTIVITIY SCORE: 22
WALKING IN HOSPITAL ROOM: A LITTLE
CLIMB 3 TO 5 STEPS WITH RAILING: A LITTLE
MOVING TO AND FROM BED TO CHAIR: A LITTLE
MOBILITY SCORE: 19
DRESSING REGULAR UPPER BODY CLOTHING: A LITTLE
TURNING FROM BACK TO SIDE WHILE IN FLAT BAD: A LITTLE

## 2023-12-22 ASSESSMENT — PAIN - FUNCTIONAL ASSESSMENT
PAIN_FUNCTIONAL_ASSESSMENT: 0-10

## 2023-12-22 ASSESSMENT — PAIN DESCRIPTION - DESCRIPTORS
DESCRIPTORS: ACHING
DESCRIPTORS: ACHING;SHARP

## 2023-12-22 ASSESSMENT — PAIN DESCRIPTION - ORIENTATION
ORIENTATION: RIGHT;LOWER
ORIENTATION: RIGHT;LOWER

## 2023-12-22 ASSESSMENT — PAIN DESCRIPTION - LOCATION
LOCATION: ABDOMEN
LOCATION: ABDOMEN

## 2023-12-22 NOTE — DISCHARGE SUMMARY
Discharge Diagnosis  Transplant    Issues Requiring Follow-Up  Immunosuppression  Fluid collection    Test Results Pending At Discharge  Pending Labs       Order Current Status    Sterile Fluid Culture/Smear Preliminary result            Hospital Course  Herber Leblanc is a 43 y.o. female PMH of ESRD secondary to HTN/NSAID s/p DDKTon 11/30/23 by Dr. Marbin Lambert & Dr. Louis. She presented with ongoing perinephric fluid collection seen on CT that is concerning for unresolved hematoma vs contained seroma. She has not needed dialysis since discharge. Her ureteral stent remains inplace. She was seen in office 12/19 by Dr. Bhatt who suggested imaging with CT AP which showed increased size of perinephric fluid collection. Right lower quadrant fluid collection drained in IR and drain remains in place. Approximately 750 ml drained since being placed.     Pt is tolerating a regular diet, maintaining adequate hydration with oral intake, ambulating independently and having BMs. Immunosuppression was managed by the transplant team. Patient is in stable condition for discharge home with renal telehealth today and homecare for drain. RX delivered to bedside prior to discharge.     Pertinent Physical Exam At Time of Discharge  Physical Exam  HENT:      Head: Normocephalic and atraumatic.      Mouth/Throat:      Mouth: Mucous membranes are moist.   Eyes:      Extraocular Movements: Extraocular movements intact.   Cardiovascular:      Rate and Rhythm: Normal rate and regular rhythm.      Heart sounds: Normal heart sounds.   Pulmonary:      Breath sounds: Normal breath sounds.   Abdominal:      General: Bowel sounds are normal.      Tenderness: There is abdominal tenderness.   Skin:     General: Skin is warm and dry.      Comments: Right Reeves incision intact with staples, NICK, well approximated. Drain in place, RLQ, dsg D & I   Neurological:      General: No focal deficit present.      Mental Status: She is alert.   Psychiatric:          Behavior: Behavior normal.         Home Medications     Medication List      START taking these medications     magnesium oxide 400 mg (241.3 mg magnesium) tablet; Commonly known as:   Mag-Ox; Take 1 tablet (400 mg) by mouth once daily.   sodium bicarbonate 650 mg tablet; Take 1 tablet (650 mg) by mouth 2   times a day.     CHANGE how you take these medications     methocarbamol 500 mg tablet; Commonly known as: Robaxin; Take 1 tablet   (500 mg) by mouth every 6 hours if needed for muscle spasms for up to 6   days.; What changed: when to take this     CONTINUE taking these medications     acetaminophen 325 mg tablet; Commonly known as: Tylenol; Take 2 tablets   (650 mg) by mouth every 6 hours if needed for mild pain (1 - 3).   calcitriol 0.25 mcg capsule; Commonly known as: Rocaltrol; Take 1   capsule (0.25 mcg) by mouth once daily.   Calcium Antacid 200 mg calcium chewable tablet; Generic drug: calcium   carbonate; Chew 1 tablet (500 mg) 2 times a day.   clotrimazole 10 mg samia; Commonly known as: Mycelex; Take 1 tablet (10   mg) by mouth 3 times a day after meals.   docusate sodium 100 mg capsule; Commonly known as: Colace; Take 1   capsule (100 mg) by mouth 2 times a day as needed for constipation.   furosemide 40 mg tablet; Commonly known as: Lasix; Take 1 tablet (40 mg)   by mouth 2 times a day.   levothyroxine 25 mcg tablet; Commonly known as: Synthroid, Levoxyl   lidocaine 4 % patch; Place 1 patch over 12 hours on the skin once daily.   Remove & discard patch within 12 hours or as directed by MD. Do not start   before December 14, 2023.   mirtazapine 7.5 mg tablet; Commonly known as: Remeron   mycophenolate 250 mg capsule; Commonly known as: Cellcept; Take 4   capsules (1,000 mg) by mouth every 12 hours.   oxyCODONE 5 mg immediate release tablet; Commonly known as: Roxicodone;   Take 1 tablet (5 mg) by mouth every 6 hours if needed for severe pain (7 -   10) for up to 7 days.   pantoprazole 40 mg EC tablet;  Commonly known as: ProtoNix; Take 1 tablet   (40 mg) by mouth once daily in the morning. Take before meals. Do not   crush, chew, or split.   polyethylene glycol 17 gram packet; Commonly known as: Glycolax,   Miralax; Take 17 g by mouth once daily.   predniSONE 5 mg tablet; Commonly known as: Deltasone; Take 3 tablets (15   mg) by mouth once daily.   sulfamethoxazole-trimethoprim 400-80 mg tablet; Commonly known as:   Bactrim; Take 1 tablet by mouth once daily.   tenofovir alafenamide 25 mg tablet tablet; Commonly known as: Vemlidy;   Take 1 tablet (25 mg) by mouth once daily.   valGANciclovir 450 mg tablet; Commonly known as: Valcyte; Take 1 tablet   (450 mg) by mouth every other day. Do not crush or chew.     STOP taking these medications     traMADol 50 mg tablet; Commonly known as: Ultram       Outpatient Follow-Up  Future Appointments   Date Time Provider Department Center   12/23/2023  7:30 AM HD CHAIR [facility]   12/27/2023 10:00 AM [facility]ila [facility], LD [id number]   1/2/2024  8:30 AM POR  INFUSION [id number] [facility]   1/30/2024  9:00 AM POR  INFUSION [id number] [facility]   3/26/2024  9:00 AM POR  INFUSION [id number] [facility]   4/23/2024  9:00 AM POR  INFUSION [id number] [facility]       Emerald Aggarwal APRN-CNP

## 2023-12-22 NOTE — PROGRESS NOTES
Physical Therapy    Physical Therapy Evaluation & Treatment    Patient Name: Herber Leblanc  MRN: [id number]  Today's Date: 12/22/2023   Time Calculation  Start Time: 1057  Stop Time: 1122  Time Calculation (min): 25 min    Assessment/Plan   PT Assessment  Medical Staff Made Aware: Yes  End of Session Communication: Bedside nurse  Assessment Comment: Pt is a 43 y.o female admitted for perinephric fluid collection who is s/p DDKT on 11/30.  She presents to PT with no strength, balance, or mobility deficits.  Pt was educated on logroll technique for bed entry/exit to decrease strain and pressure on abdomen.  Able to complete bed mobility independently with VCs for logroll technique.  Pt is limited by pain but was able to complete all PT tasks this session with increased time and attempts.  PT does not have any acute PT needs at this time nor does she require PT after discharge.  PT will sign off but will remain available for reconsult should there be any changes.  End of Session Patient Position: Bed, 3 rail up, Alarm off, not on at start of session   IP OR SWING BED PT PLAN  Inpatient or Swing Bed: Inpatient  PT Plan  PT Plan: PT Eval only  PT Eval Only Reason: No acute PT needs identified (Safe to return home)  PT Frequency: PT eval only  Equipment Recommended upon Discharge:  (none)  PT Recommended Transfer Status: Independent  PT - OK to Discharge: Yes (eval completed and discharge recommendations made)      Subjective     General Visit Information:  General  Reason for Referral: Admitted for ongoing perinephric fluid collection, pt is s/p DDKT 11/30  Past Medical History Relevant to Rehab: Per chart, PMH includes ESRD secondary to HTN/NSAID s/p DDKT on 11/30/23, Chronic kidney disease, Chronic kidney disease, unspecified, GERD, and Personal history of COVID-19 (07/20/2022)  Family/Caregiver Present: Yes ( and brother)  Caregiver Feedback: brother translating throughout session, all questions answered  Prior to  Session Communication: Bedside nurse  Patient Position Received: Bed, 4 rail up, Alarm off, not on at start of session  General Comment: Pt cleared for PT by RN.  Pt alert and agreeable to PT.  Home Living:  Home Living  Type of Home: House  Lives With: Spouse, Dependent children  Home Adaptive Equipment: Walker rolling or standard  Home Layout: One level  Home Access: Level entry  Prior Level of Function:  Prior Function Per Pt/Caregiver Report  Level of [place]: Independent with ADLs and functional transfers, Independent with homemaking with ambulation  Precautions:  Precautions  Medical Precautions: Fall precautions, Abdominal precautions  Post-Surgical Precautions: Abdominal surgery precautions  Vital Signs:       Objective   Pain:  Pain Assessment  Pain Assessment: 0-10  Pain Score: 9  Pain Location: Abdomen  Pain Interventions:  (RN notified)  Cognition:  Cognition  Overall Cognitive Status: Within Functional Limits    General Assessments:  Activity Tolerance  Endurance: Endurance does not limit participation in activity    Strength  Strength Comments: B LE strength grossly WFL based on observation with functional transfers and ambulation  Coordination  Movements are Fluid and Coordinated: Yes    Static Sitting Balance  Static Sitting-Balance Support: No upper extremity supported, Feet supported  Static Sitting-Level of Assistance: Independent  Dynamic Sitting Balance  Dynamic Sitting-Balance Support: No upper extremity supported, Feet supported  Dynamic Sitting-Balance: Forward lean  Dynamic Sitting-Comments: Independent    Static Standing Balance  Static Standing-Balance Support: No upper extremity supported  Static Standing-Level of Assistance: Independent  Dynamic Standing Balance  Dynamic Standing-Balance Support: No upper extremity supported  Dynamic Standing-Balance: Turning  Dynamic Standing-Comments: Independent  Functional Assessments:  Bed Mobility  Bed Mobility: Yes  Bed Mobility 1  Bed  Mobility 1: Supine to sitting, Sitting to supine  Level of Assistance 1: Minimum assistance (LE management, HOB elevated)  Bed Mobility 2  Bed Mobility  2: Supine to sitting, Sitting to supine  Level of Assistance 2: Independent  Bed Mobility Comments 2: HOB flat, VCs for B knee flexion and log roll technique    Transfers  Transfer: Yes  Transfer 1  Transfer From 1: Bed to  Transfer to 1: Stand  Technique 1: Sit to stand, Stand to sit  Transfer Level of Assistance 1: Independent  Trials/Comments 1: 2x throughout session    Ambulation/Gait Training  Ambulation/Gait Training Performed: Yes  Ambulation/Gait Training 1  Surface 1: Level tile  Device 1: Rolling walker  Assistance 1: Distant supervision  Quality of Gait 1: Narrow base of support, Decreased step length, Forward flexed posture (decreased gait speed)  Comments/Distance (ft) 1: 200ft  Treatments:  Therapeutic Activity  Therapeutic Activity Performed: Yes  Therapeutic Activity 1: Increased time for skilled bed mobility education using log roll technique, education on abdominal precautions, increased time for ambulation  Outcome Measures:  [facility] Basic Mobility  Turning from your back to your side while in a flat bed without using bedrails: None  Moving from lying on your back to sitting on the side of a flat bed without using bedrails: None  Moving to and from bed to chair (including a wheelchair): None  Standing up from a chair using your arms (e.g. wheelchair or bedside chair): None  To walk in hospital room: None  Climbing 3-5 steps with railing: None  Basic Mobility - Total Score: 24    [place]  Sitting Balance: Steady, safe  Arises: Able without using arms  Attempts to Arise: Able to arise, one attempt  Immediate Standing Balance (First 5 Seconds): Steady without walker or other support  Standing Balance: Narrow stance without support  Nudged: Steady without walker or other support  Eyes Closed: Steady  Turned 360 Degrees: Steadiness: Steady  Turned 360  Degrees: Continuity of Steps: Continuous  Sitting Down: Uses arms or not a smooth motion  Balance Score: 15  Initiation of Gait: No hesitancy  Step Height: R Swing Foot: Right foot complete clears floor  Step Length: R Swing Foot: Passes left stance foot  Step Height: L Swing Foot: Left foot complete clears floor  Step Length: L Swing Foot: Passes right stance foot  Step Symmetry: Right and left step appear equal  Step Continuity: Steps appear continuous  Path: Mild/moderate deviation or uses walking aid  Trunk: Marked sway or uses walking aid  Walking Time: Heels almost touching while walking  Gait Score: 9  Total Score: 24    Encounter Problems       Encounter Problems (Active)       Safety       LTG - Patient will adhere to hip precautions during ADL's and transfers       Start:  12/20/23            LTG - Patient will demonstrate safety requirements appropriate to situation/environment       Start:  12/20/23            LTG - Patient will utilize safety techniques       Start:  12/20/23            STG - Patient locks brakes on wheelchair       Start:  12/20/23            STG - Patient uses call light consistently to request assistance with transfers       Start:  12/20/23                   Education Documentation  Body Mechanics, taught by Palak Hdez PT at 12/22/2023 11:37 AM.  Learner: Significant Other, Family, Patient  Readiness: Acceptance  Method: Explanation, Teach-back  Response: Verbalizes Understanding  Comment: Log roll, abdominal precautions    Mobility Training, taught by Palak Hdez PT at 12/22/2023 11:37 AM.  Learner: Significant Other, Family, Patient  Readiness: Acceptance  Method: Explanation, Teach-back  Response: Verbalizes Understanding  Comment: Log roll, abdominal precautions    Precautions, taught by Palak Hdez PT at 12/22/2023 11:37 AM.  Learner: Significant Other, Family, Patient  Readiness: Acceptance  Method: Explanation, Teach-back  Response: Verbalizes  Understanding  Comment: Log roll, abdominal precautions    Education Comments  No comments found.

## 2023-12-22 NOTE — CONSULTS
Nutrition Diet Education  Provider consult order (Pt requested to meet with RDN)     Education Documentation  Nutrition/Diet, taught by Alice Hunt RDN, [facility] at 12/22/2023 10:55 AM.  Learner: Family  Readiness: Acceptance  Method: Explanation  Response: Verbalizes Understanding  Comment: Met with family due to concern spicy and acidic foods were causing discomfort and pain around her belly. Discussed to avoid these foods for now until patient is healed and then when she feels ready to introduce the items as she wishes.      Discussed with patient RDN will not change the diet in the chart to encourage intake and due to the family providing most of the patients intake.     Follow Up:     Time Spent (min): 30 minutes  Last Date of Nutrition Visit: 12/22/23  Nutrition Follow-Up Needed?: Dietitian to reassess per policy  Follow up Comment: Edu: no need to follow up at this time,re-consult as needed

## 2023-12-22 NOTE — HOSPITAL COURSE
Herber Leblanc is a 43 y.o. female PMH of ESRD secondary to HTN/NSAID s/p DDKTon 11/30/23 by Dr. Marbin Lambert & Dr. Louis. She presented with ongoing perinephric fluid collection seen on CT that is concerning for unresolved hematoma vs contained seroma. She has not needed dialysis since discharge. Her ureteral stent remains inplace. She was seen in office 12/19 by Dr. Bhatt who suggested imaging with CT AP which showed increased size of perinephric fluid collection. Right lower quadrant fluid collection drained in IR and drain remains in place. Approximately 750 ml drained since being placed.     Pt is tolerating a regular diet, maintaining adequate hydration with oral intake, ambulating independently and having BMs. Immunosuppression was managed by the transplant team. Patient is in stable condition for discharge home with renal telehealth today and homecare for drain. RX delivered to bedside prior to discharge.

## 2023-12-22 NOTE — TELEPHONE ENCOUNTER
Thank you for the referral to  Home care. Unfortunately we are not able to provide an aide in the patient's area. Also our first available start of care would be Wednesday 12/27. If the patient needs an aide or if home care is needed sooner than that, please refer to an alternate agency.     Thank you,   [facility] Intake

## 2023-12-22 NOTE — PROGRESS NOTES
"12/22/23 @1435 Transitional Care Coordinator Note.   Received a secure chat from Xi Tucker RN [facility] that their first available in that area is Wednesday. She doesn't think this patient would be safe to wait that long for homecare so we will need to decline. I included Emerald Aggarwal and the transplant team to the secure chat with Xi. I tried to call the pt to see if she had any other home care choices but she did not answer and now the pt is discharged. I did send a secure chat to the above thread to make the team aware that I was not able to reach the pt. Xi also responded via the groupchat that they do not have a home care aide in the pt's area. Will continue to follow.     12/22/23 @7308 addendum  I added Dr. Lopez and Dr. Bob to the secure chat thread with the above team members and stated \"Is it ok for this pt to have a delay in soc first available is Wednesday. I tried to reach pt to see if she had other choices for home care and she did not answer and pt has been discharged. I just want to make sure pt has something set up for home care.\"  Roseann MARTIN responded on the secure chat that Wednesday is fine for a delayed SOC, Xi was also on this thread as well. I did send Xi another separate message via secure chat to please look at the group thread that we had been communicating on to be sure that she saw that the team was ok with delayed SOC of Wednesday.     12/22/23 @3823 addendum  Nadine MARTIN confirmed via the group secure chat that [facility] can process the referral without a home care aide. Xi Tucker RN [facility] is aware and said that referral was sent for insurance verification.   "

## 2023-12-22 NOTE — HH CARE COORDINATION
Home Care received a Referral for Nursing. We have processed the referral for a Start of Care on 12/27.     If you have any questions or concerns, please feel free to contact us at [phone]. Follow the prompts, enter your five digit zip code, and you will be directed to your care team on [facility].

## 2023-12-22 NOTE — NURSING NOTE
Discharge paperwork printed and provided to patient. New medication and follow up appointments reviewed with patient and family members at the bedside. IV removed with catheter intact. Drain education provided to the patient and her brother, both verbalized understanding. Pt waiting for meds to beds at this time and then can leave with family.

## 2023-12-22 NOTE — NURSING NOTE
Meds top beds delivered medications. Transport taking patient down to the lobby in wheelchair. Patient is now discharged.

## 2023-12-23 ENCOUNTER — APPOINTMENT (OUTPATIENT)
Dept: DIALYSIS | Facility: HOSPITAL | Age: 43
End: 2023-12-23
Payer: COMMERCIAL

## 2023-12-23 NOTE — PROGRESS NOTES
"Herber Leblanc is a 43 y.o. female on day 2 of admission presenting with Transplant.    Subjective   Feeling better today after IR drain       Objective       12/21/2023     4:04 PM 12/21/2023     7:35 PM 12/21/2023     9:00 PM 12/21/2023    11:22 PM 12/22/2023     4:30 AM 12/22/2023     7:00 AM 12/22/2023    10:36 AM   Vitals   Systolic 101 151 134 124 144  129   Diastolic 77 89 73 73 81  77   Heart Rate 77 101 71 67 101  80   Temp  37.5 °C (99.5 °F) 36.8 °C (98.2 °F) 36.6 °C (97.9 °F) 36.2 °C (97.2 °F)  35.8 °C (96.4 °F)   Resp 16 17 17 17 17  17   Weight (lb)      156.53    BMI      26.85 kg/m2    BSA (m2)      1.79 m2       Physical Exam  Constitutional:       Appearance: Normal appearance.   Eyes:      Pupils: Pupils are equal, round, and reactive to light.   Cardiovascular:      Rate and Rhythm: Normal rate.   Pulmonary:      Effort: Pulmonary effort is normal. No respiratory distress.   Abdominal:      General: There is no distension.      Palpations: Abdomen is soft. There is no mass.      Comments: Drain in place   Musculoskeletal:         General: No swelling.   Skin:     General: Skin is warm and dry.   Neurological:      General: No focal deficit present.      Mental Status: She is alert and oriented to person, place, and time.   Psychiatric:         Mood and Affect: Mood normal.         Behavior: Behavior normal.         Last Recorded Vitals  Blood pressure 129/77, pulse 80, temperature 35.8 °C (96.4 °F), resp. rate 17, height 1.626 m (5' 4.02\"), weight 71 kg (156 lb 8.4 oz), SpO2 97 %.  Intake/Output last 3 Shifts:  I/O last 3 completed shifts:  In: 0 (0 mL/kg)   Out: 2575 (36.3 mL/kg) [Urine:1750 (0.7 mL/kg/hr); Drains:825]  Weight: 71 kg     Relevant Results  Lab Results   Component Value Date    WBC 5.8 12/22/2023    HGB 7.8 (L) 12/22/2023    HCT 24.3 (L) 12/22/2023    MCV 96 12/22/2023     12/22/2023     Lab Results   Component Value Date    GLUCOSE 86 12/22/2023    CALCIUM 8.5 (L) 12/22/2023    NA " 139 12/22/2023    K 3.3 (L) 12/22/2023    CO2 21 12/22/2023     12/22/2023    BUN 55 (H) 12/22/2023    CREATININE 5.22 (H) 12/22/2023           Assessment/Plan   Principal Problem:    Transplant    Kidney allograft function   DGF resolved, good UOP   Rose-nephritic hematoma/collection - s/p IR drain today    Immunosuppression  Belatacept, no Tac  MMF 1000/1000  Pred 15mg    Dispo  Plan for discharge today          I spent 35 minutes in the professional and overall care of this patient.      Don Lopez MD

## 2023-12-24 LAB
BACTERIA FLD CULT: NORMAL
GRAM STN SPEC: NORMAL
GRAM STN SPEC: NORMAL

## 2023-12-26 ENCOUNTER — APPOINTMENT (OUTPATIENT)
Dept: UROLOGY | Facility: CLINIC | Age: 43
End: 2023-12-26
Payer: COMMERCIAL

## 2023-12-27 ENCOUNTER — HOME CARE VISIT (OUTPATIENT)
Dept: HOME HEALTH SERVICES | Facility: HOME HEALTH | Age: 43
End: 2023-12-27
Payer: COMMERCIAL

## 2023-12-27 VITALS
SYSTOLIC BLOOD PRESSURE: 105 MMHG | TEMPERATURE: 98.1 F | HEART RATE: 83 BPM | DIASTOLIC BLOOD PRESSURE: 75 MMHG | RESPIRATION RATE: 18 BRPM | OXYGEN SATURATION: 99 % | BODY MASS INDEX: 28.68 KG/M2 | HEIGHT: 64 IN | WEIGHT: 168 LBS

## 2023-12-27 PROCEDURE — G0299 HHS/HOSPICE OF RN EA 15 MIN: HCPCS | Mod: HHH

## 2023-12-27 PROCEDURE — 1090000001 HH PPS REVENUE CREDIT

## 2023-12-27 PROCEDURE — 0023 HH SOC

## 2023-12-27 PROCEDURE — 1090000002 HH PPS REVENUE DEBIT

## 2023-12-27 PROCEDURE — 169592 NO-PAY CLAIM PROCEDURE

## 2023-12-27 ASSESSMENT — ENCOUNTER SYMPTOMS
PAIN: 1
LAST BOWEL MOVEMENT: 66835
PAIN LOCATION - PAIN FREQUENCY: CONSTANT
PAIN LOCATION - PAIN DURATION: CONSTANT
BOWEL PATTERN NORMAL: 1
PAIN SEVERITY GOAL: 0/10
STOOL FREQUENCY: DAILY
PAIN LOCATION - RELIEVING FACTORS: REST, MEDICATION
PAIN LOCATION: ABDOMEN
PAIN LOCATION - EXACERBATING FACTORS: MOVEMENT
ABDOMINAL PAIN: 1
SUBJECTIVE PAIN PROGRESSION: WAXING AND WANING
DIZZINESS: 1
HIGHEST PAIN SEVERITY IN PAST 24 HOURS: 8/10
FATIGUES EASILY: 1
PAIN LOCATION - PAIN QUALITY: SHARP, BURNING
NAUSEA: 1
LOWEST PAIN SEVERITY IN PAST 24 HOURS: 7/10
PAIN LOCATION - PAIN SEVERITY: 8/10

## 2023-12-28 ENCOUNTER — LAB (OUTPATIENT)
Dept: LAB | Facility: LAB | Age: 43
End: 2023-12-28
Payer: COMMERCIAL

## 2023-12-28 DIAGNOSIS — D84.9 IMMUNOSUPPRESSION (MULTI): ICD-10-CM

## 2023-12-28 DIAGNOSIS — Z94.0 KIDNEY REPLACED BY TRANSPLANT (HHS-HCC): ICD-10-CM

## 2023-12-28 LAB
ALBUMIN SERPL BCP-MCNC: 3.6 G/DL (ref 3.4–5)
ANION GAP SERPL CALC-SCNC: 12 MMOL/L (ref 10–20)
BASOPHILS # BLD AUTO: 0.05 X10*3/UL (ref 0–0.1)
BASOPHILS NFR BLD AUTO: 0.9 %
BUN SERPL-MCNC: 32 MG/DL (ref 6–23)
CALCIUM SERPL-MCNC: 8.8 MG/DL (ref 8.6–10.3)
CHLORIDE SERPL-SCNC: 106 MMOL/L (ref 98–107)
CO2 SERPL-SCNC: 22 MMOL/L (ref 21–32)
CREAT SERPL-MCNC: 3.09 MG/DL (ref 0.5–1.05)
EOSINOPHIL # BLD AUTO: 0.03 X10*3/UL (ref 0–0.7)
EOSINOPHIL NFR BLD AUTO: 0.5 %
ERYTHROCYTE [DISTWIDTH] IN BLOOD BY AUTOMATED COUNT: 14.8 % (ref 11.5–14.5)
GFR SERPL CREATININE-BSD FRML MDRD: 18 ML/MIN/1.73M*2
GLUCOSE SERPL-MCNC: 89 MG/DL (ref 74–99)
HCT VFR BLD AUTO: 29.1 % (ref 36–46)
HGB BLD-MCNC: 8.9 G/DL (ref 12–16)
IMM GRANULOCYTES # BLD AUTO: 0.15 X10*3/UL (ref 0–0.7)
IMM GRANULOCYTES NFR BLD AUTO: 2.6 % (ref 0–0.9)
LYMPHOCYTES # BLD AUTO: 0.43 X10*3/UL (ref 1.2–4.8)
LYMPHOCYTES NFR BLD AUTO: 7.6 %
MAGNESIUM SERPL-MCNC: 1.81 MG/DL (ref 1.6–2.4)
MCH RBC QN AUTO: 30.6 PG (ref 26–34)
MCHC RBC AUTO-ENTMCNC: 30.6 G/DL (ref 32–36)
MCV RBC AUTO: 100 FL (ref 80–100)
MONOCYTES # BLD AUTO: 0.27 X10*3/UL (ref 0.1–1)
MONOCYTES NFR BLD AUTO: 4.7 %
NEUTROPHILS # BLD AUTO: 4.76 X10*3/UL (ref 1.2–7.7)
NEUTROPHILS NFR BLD AUTO: 83.7 %
NRBC BLD-RTO: 0 /100 WBCS (ref 0–0)
PHOSPHATE SERPL-MCNC: 3.4 MG/DL (ref 2.5–4.9)
PLATELET # BLD AUTO: 368 X10*3/UL (ref 150–450)
POTASSIUM SERPL-SCNC: 3.8 MMOL/L (ref 3.5–5.3)
RBC # BLD AUTO: 2.91 X10*6/UL (ref 4–5.2)
SODIUM SERPL-SCNC: 136 MMOL/L (ref 136–145)
WBC # BLD AUTO: 5.7 X10*3/UL (ref 4.4–11.3)

## 2023-12-28 PROCEDURE — 83735 ASSAY OF MAGNESIUM: CPT

## 2023-12-28 PROCEDURE — 80069 RENAL FUNCTION PANEL: CPT

## 2023-12-28 PROCEDURE — 1090000002 HH PPS REVENUE DEBIT

## 2023-12-28 PROCEDURE — 36415 COLL VENOUS BLD VENIPUNCTURE: CPT

## 2023-12-28 PROCEDURE — 1090000001 HH PPS REVENUE CREDIT

## 2023-12-28 PROCEDURE — 85025 COMPLETE CBC W/AUTO DIFF WBC: CPT

## 2023-12-29 ENCOUNTER — OFFICE VISIT (OUTPATIENT)
Dept: TRANSPLANT | Facility: HOSPITAL | Age: 43
End: 2023-12-29
Payer: COMMERCIAL

## 2023-12-29 ENCOUNTER — HOSPITAL ENCOUNTER (OUTPATIENT)
Dept: RADIOLOGY | Facility: HOSPITAL | Age: 43
Discharge: HOME | End: 2023-12-29
Payer: COMMERCIAL

## 2023-12-29 VITALS
SYSTOLIC BLOOD PRESSURE: 129 MMHG | OXYGEN SATURATION: 100 % | WEIGHT: 159 LBS | TEMPERATURE: 98.2 F | BODY MASS INDEX: 27.14 KG/M2 | HEIGHT: 64 IN | HEART RATE: 93 BPM | RESPIRATION RATE: 18 BRPM | DIASTOLIC BLOOD PRESSURE: 84 MMHG

## 2023-12-29 DIAGNOSIS — Z94.0 KIDNEY REPLACED BY TRANSPLANT (HHS-HCC): ICD-10-CM

## 2023-12-29 DIAGNOSIS — D84.9 IMMUNOSUPPRESSION (MULTI): Primary | ICD-10-CM

## 2023-12-29 LAB — SARS-COV-2 RNA RESP QL NAA+PROBE: NOT DETECTED

## 2023-12-29 PROCEDURE — 1090000002 HH PPS REVENUE DEBIT

## 2023-12-29 PROCEDURE — 71046 X-RAY EXAM CHEST 2 VIEWS: CPT

## 2023-12-29 PROCEDURE — 99214 OFFICE O/P EST MOD 30 MIN: CPT

## 2023-12-29 PROCEDURE — 87635 SARS-COV-2 COVID-19 AMP PRB: CPT

## 2023-12-29 PROCEDURE — 3079F DIAST BP 80-89 MM HG: CPT | Performed by: STUDENT IN AN ORGANIZED HEALTH CARE EDUCATION/TRAINING PROGRAM

## 2023-12-29 PROCEDURE — 87632 RESP VIRUS 6-11 TARGETS: CPT

## 2023-12-29 PROCEDURE — 71046 X-RAY EXAM CHEST 2 VIEWS: CPT | Performed by: RADIOLOGY

## 2023-12-29 PROCEDURE — 1090000001 HH PPS REVENUE CREDIT

## 2023-12-29 PROCEDURE — 3074F SYST BP LT 130 MM HG: CPT | Performed by: STUDENT IN AN ORGANIZED HEALTH CARE EDUCATION/TRAINING PROGRAM

## 2023-12-29 PROCEDURE — 1036F TOBACCO NON-USER: CPT | Performed by: STUDENT IN AN ORGANIZED HEALTH CARE EDUCATION/TRAINING PROGRAM

## 2023-12-29 PROCEDURE — 99214 OFFICE O/P EST MOD 30 MIN: CPT | Mod: 24

## 2023-12-29 RX ORDER — FUROSEMIDE 40 MG/1
40 TABLET ORAL DAILY
Qty: 30 TABLET | Refills: 0 | Status: SHIPPED | OUTPATIENT
Start: 2023-12-29 | End: 2024-01-08 | Stop reason: ALTCHOICE

## 2023-12-29 RX ORDER — PREDNISONE 5 MG/1
10 TABLET ORAL DAILY
Qty: 60 TABLET | Refills: 11 | Status: SHIPPED | OUTPATIENT
Start: 2023-12-29 | End: 2024-12-28

## 2023-12-29 NOTE — PROGRESS NOTES
"Herber Leblanc is a 43 y.o. female on day 0 of admission presenting with No Principal Problem: There is no principal problem currently on the Problem List. Please update the Problem List and refresh..    Subjective   Doing well  IR drain with 300 ml serous output q day  Some cough and mild chest pain with the cough       Objective       12/21/2023    11:22 PM 12/22/2023     4:30 AM 12/22/2023     7:00 AM 12/22/2023    10:36 AM 12/27/2023    10:59 AM 12/27/2023    11:41 AM 12/29/2023    10:45 AM   Vitals   Systolic 124 144  129 105  129   Diastolic 73 81  77 75  84   Heart Rate 67 101  80 83  93   Temp 36.6 °C (97.9 °F) 36.2 °C (97.2 °F)  35.8 °C (96.4 °F) 36.7 °C (98.1 °F)  36.8 °C (98.2 °F)   Resp 17 17  17 18  18   Height (in)     1.626 m (5' 4\") 1.626 m (5' 4.02\") 1.626 m (5' 4\")   Weight (lb)   156.53  168  159   BMI   26.85 kg/m2  28.84 kg/m2 28.82 kg/m2 27.29 kg/m2   BSA (m2)   1.79 m2  1.85 m2 1.86 m2 1.8 m2   Visit Report       Report      Physical Exam  Constitutional:       Appearance: Normal appearance.   Eyes:      Pupils: Pupils are equal, round, and reactive to light.   Cardiovascular:      Rate and Rhythm: Normal rate.   Pulmonary:      Effort: Pulmonary effort is normal. No respiratory distress.   Abdominal:      General: There is no distension.      Palpations: Abdomen is soft. There is no mass.      Comments: Drain in place   Musculoskeletal:         General: No swelling.   Skin:     General: Skin is warm and dry.   Neurological:      General: No focal deficit present.      Mental Status: She is alert and oriented to person, place, and time.   Psychiatric:         Mood and Affect: Mood normal.         Behavior: Behavior normal.       Last Recorded Vitals  Blood pressure 129/84, pulse 93, temperature 36.8 °C (98.2 °F), temperature source Temporal, resp. rate 18, height 1.626 m (5' 4\"), weight 72.1 kg (159 lb), SpO2 100 %.  Intake/Output last 3 Shifts:  No intake/output data recorded.    Relevant " Results  Lab Results   Component Value Date    WBC 5.7 12/28/2023    HGB 8.9 (L) 12/28/2023    HCT 29.1 (L) 12/28/2023     12/28/2023     12/28/2023     Lab Results   Component Value Date    GLUCOSE 89 12/28/2023    CALCIUM 8.8 12/28/2023     12/28/2023    K 3.8 12/28/2023    CO2 22 12/28/2023     12/28/2023    BUN 32 (H) 12/28/2023    CREATININE 3.09 (H) 12/28/2023           Assessment/Plan   Active Problems:  There are no active Hospital Problems.    Kidney allograft function   DGF resolved, good UOP, Cr slow downtrend   Stent out 1/11/24   Keep IR drain for now until decreased output    Immunosuppression  Belatacept next 1/2/24  MMF 1000/1000  Pred 10 mg    Cough   Check covid   CXR    Follow up   1 week    I spent 35 minutes in the professional and overall care of this patient.      Anita Louis MD

## 2023-12-29 NOTE — PATIENT INSTRUCTIONS
-Decrease prednisone 10 mg (2 tablet) daily  -Decrease lasix 40 mg daily  -Ok to take cough drops, sore throat lozenges  -Please go to [facility] 2nd floor to get a chest xray  -RTC 1 week  -Labs weekly

## 2023-12-30 PROCEDURE — 1090000002 HH PPS REVENUE DEBIT

## 2023-12-30 PROCEDURE — 1090000001 HH PPS REVENUE CREDIT

## 2023-12-30 ASSESSMENT — ACTIVITIES OF DAILY LIVING (ADL)
CURRENT_FUNCTION: MINIMUM ASSIST
BATHING ASSESSED: 1
PHYSICAL TRANSFERS ASSESSED: 1
TOILETING: ONE PERSON
DRESSING_LB_CURRENT_FUNCTION: MODERATE ASSIST
GROOMING_CURRENT_FUNCTION: MODERATE ASSIST
DRESSING_UB_CURRENT_FUNCTION: MODERATE ASSIST
TOILETING: 1
OASIS_M1830: 06
AMBULATION ASSISTANCE: ONE PERSON
HOUSEKEEPING ASSESSED: 1
AMBULATION ASSISTANCE: 1
FEEDING ASSESSED: 1
ENTERING_EXITING_HOME: ONE PERSON
LIGHT HOUSEKEEPING: DEPENDENT
GROOMING ASSESSED: 1
PREPARING MEALS: DEPENDENT
BATHING_CURRENT_FUNCTION: ONE PERSON
FEEDING: MODERATE ASSIST

## 2023-12-30 ASSESSMENT — ENCOUNTER SYMPTOMS
MUSCLE WEAKNESS: 1
CHANGE IN APPETITE: DECREASED
APPETITE LEVEL: POOR
EARLY SATIETY: 1

## 2023-12-31 PROCEDURE — 1090000001 HH PPS REVENUE CREDIT

## 2023-12-31 PROCEDURE — 1090000002 HH PPS REVENUE DEBIT

## 2024-01-01 PROCEDURE — 1090000001 HH PPS REVENUE CREDIT

## 2024-01-01 PROCEDURE — 1090000002 HH PPS REVENUE DEBIT

## 2024-01-02 ENCOUNTER — INFUSION (OUTPATIENT)
Dept: INFUSION THERAPY | Facility: HOSPITAL | Age: 44
End: 2024-01-02
Payer: COMMERCIAL

## 2024-01-02 ENCOUNTER — APPOINTMENT (OUTPATIENT)
Dept: DIALYSIS | Facility: HOSPITAL | Age: 44
End: 2024-01-02
Payer: COMMERCIAL

## 2024-01-02 VITALS
HEART RATE: 81 BPM | OXYGEN SATURATION: 99 % | TEMPERATURE: 98 F | BODY MASS INDEX: 26.53 KG/M2 | SYSTOLIC BLOOD PRESSURE: 113 MMHG | DIASTOLIC BLOOD PRESSURE: 83 MMHG | WEIGHT: 154.54 LBS | RESPIRATION RATE: 20 BRPM

## 2024-01-02 DIAGNOSIS — Z94.0 KIDNEY REPLACED BY TRANSPLANT (HHS-HCC): ICD-10-CM

## 2024-01-02 DIAGNOSIS — D84.9 IMMUNOSUPPRESSION (MULTI): ICD-10-CM

## 2024-01-02 LAB
ALBUMIN SERPL BCP-MCNC: 4 G/DL (ref 3.4–5)
ANION GAP SERPL CALC-SCNC: 11 MMOL/L (ref 10–20)
BASOPHILS # BLD AUTO: 0.02 X10*3/UL (ref 0–0.1)
BASOPHILS NFR BLD AUTO: 0.7 %
BUN SERPL-MCNC: 27 MG/DL (ref 6–23)
CALCIUM SERPL-MCNC: 9 MG/DL (ref 8.6–10.3)
CHLORIDE SERPL-SCNC: 103 MMOL/L (ref 98–107)
CO2 SERPL-SCNC: 25 MMOL/L (ref 21–32)
CREAT SERPL-MCNC: 2.32 MG/DL (ref 0.5–1.05)
EOSINOPHIL # BLD AUTO: 0.02 X10*3/UL (ref 0–0.7)
EOSINOPHIL NFR BLD AUTO: 0.7 %
ERYTHROCYTE [DISTWIDTH] IN BLOOD BY AUTOMATED COUNT: 14.8 % (ref 11.5–14.5)
GFR SERPL CREATININE-BSD FRML MDRD: 26 ML/MIN/1.73M*2
GLUCOSE SERPL-MCNC: 89 MG/DL (ref 74–99)
HCT VFR BLD AUTO: 31.7 % (ref 36–46)
HGB BLD-MCNC: 10.2 G/DL (ref 12–16)
IMM GRANULOCYTES # BLD AUTO: 0.08 X10*3/UL (ref 0–0.7)
IMM GRANULOCYTES NFR BLD AUTO: 2.7 % (ref 0–0.9)
LYMPHOCYTES # BLD AUTO: 0.47 X10*3/UL (ref 1.2–4.8)
LYMPHOCYTES NFR BLD AUTO: 15.7 %
MAGNESIUM SERPL-MCNC: 1.56 MG/DL (ref 1.6–2.4)
MCH RBC QN AUTO: 30.7 PG (ref 26–34)
MCHC RBC AUTO-ENTMCNC: 32.2 G/DL (ref 32–36)
MCV RBC AUTO: 96 FL (ref 80–100)
MONOCYTES # BLD AUTO: 0.32 X10*3/UL (ref 0.1–1)
MONOCYTES NFR BLD AUTO: 10.7 %
NEUTROPHILS # BLD AUTO: 2.08 X10*3/UL (ref 1.2–7.7)
NEUTROPHILS NFR BLD AUTO: 69.5 %
NRBC BLD-RTO: 0 /100 WBCS (ref 0–0)
PHOSPHATE SERPL-MCNC: 2.9 MG/DL (ref 2.5–4.9)
PLATELET # BLD AUTO: 237 X10*3/UL (ref 150–450)
POTASSIUM SERPL-SCNC: 3.3 MMOL/L (ref 3.5–5.3)
RBC # BLD AUTO: 3.32 X10*6/UL (ref 4–5.2)
SODIUM SERPL-SCNC: 136 MMOL/L (ref 136–145)
WBC # BLD AUTO: 3 X10*3/UL (ref 4.4–11.3)

## 2024-01-02 PROCEDURE — 36415 COLL VENOUS BLD VENIPUNCTURE: CPT

## 2024-01-02 PROCEDURE — 2500000004 HC RX 250 GENERAL PHARMACY W/ HCPCS (ALT 636 FOR OP/ED): Performed by: STUDENT IN AN ORGANIZED HEALTH CARE EDUCATION/TRAINING PROGRAM

## 2024-01-02 PROCEDURE — 1090000001 HH PPS REVENUE CREDIT

## 2024-01-02 PROCEDURE — 85025 COMPLETE CBC W/AUTO DIFF WBC: CPT

## 2024-01-02 PROCEDURE — 84100 ASSAY OF PHOSPHORUS: CPT

## 2024-01-02 PROCEDURE — 96365 THER/PROPH/DIAG IV INF INIT: CPT | Mod: INF

## 2024-01-02 PROCEDURE — 1090000002 HH PPS REVENUE DEBIT

## 2024-01-02 PROCEDURE — 83735 ASSAY OF MAGNESIUM: CPT

## 2024-01-02 RX ORDER — DIPHENHYDRAMINE HYDROCHLORIDE 50 MG/ML
50 INJECTION INTRAMUSCULAR; INTRAVENOUS AS NEEDED
Status: CANCELLED | OUTPATIENT
Start: 2024-01-11

## 2024-01-02 RX ORDER — FAMOTIDINE 10 MG/ML
20 INJECTION INTRAVENOUS ONCE AS NEEDED
Status: CANCELLED | OUTPATIENT
Start: 2024-03-25

## 2024-01-02 RX ORDER — FAMOTIDINE 10 MG/ML
20 INJECTION INTRAVENOUS ONCE AS NEEDED
Status: CANCELLED | OUTPATIENT
Start: 2024-01-11

## 2024-01-02 RX ORDER — EPINEPHRINE 0.3 MG/.3ML
0.3 INJECTION SUBCUTANEOUS EVERY 5 MIN PRN
Status: CANCELLED | OUTPATIENT
Start: 2024-03-25

## 2024-01-02 RX ORDER — DIPHENHYDRAMINE HYDROCHLORIDE 50 MG/ML
50 INJECTION INTRAMUSCULAR; INTRAVENOUS AS NEEDED
Status: CANCELLED | OUTPATIENT
Start: 2024-03-25

## 2024-01-02 RX ORDER — EPINEPHRINE 0.3 MG/.3ML
0.3 INJECTION SUBCUTANEOUS EVERY 5 MIN PRN
Status: CANCELLED | OUTPATIENT
Start: 2024-01-11

## 2024-01-02 RX ORDER — ALBUTEROL SULFATE 0.83 MG/ML
3 SOLUTION RESPIRATORY (INHALATION) AS NEEDED
Status: CANCELLED | OUTPATIENT
Start: 2024-01-11

## 2024-01-02 RX ORDER — ALBUTEROL SULFATE 0.83 MG/ML
3 SOLUTION RESPIRATORY (INHALATION) AS NEEDED
Status: CANCELLED | OUTPATIENT
Start: 2024-03-25

## 2024-01-02 RX ADMIN — DEXTROSE MONOHYDRATE 725 MG: 50 INJECTION, SOLUTION INTRAVENOUS at 10:49

## 2024-01-02 ASSESSMENT — COLUMBIA-SUICIDE SEVERITY RATING SCALE - C-SSRS
2. HAVE YOU ACTUALLY HAD ANY THOUGHTS OF KILLING YOURSELF?: NO
1. IN THE PAST MONTH, HAVE YOU WISHED YOU WERE DEAD OR WISHED YOU COULD GO TO SLEEP AND NOT WAKE UP?: NO
6. HAVE YOU EVER DONE ANYTHING, STARTED TO DO ANYTHING, OR PREPARED TO DO ANYTHING TO END YOUR LIFE?: NO

## 2024-01-02 ASSESSMENT — PAIN SCALES - GENERAL: PAINLEVEL: 6

## 2024-01-02 ASSESSMENT — ENCOUNTER SYMPTOMS
LOSS OF SENSATION IN FEET: 1
DEPRESSION: 0
OCCASIONAL FEELINGS OF UNSTEADINESS: 1

## 2024-01-02 ASSESSMENT — PATIENT HEALTH QUESTIONNAIRE - PHQ9
2. FEELING DOWN, DEPRESSED OR HOPELESS: NOT AT ALL
SUM OF ALL RESPONSES TO PHQ9 QUESTIONS 1 AND 2: 0
1. LITTLE INTEREST OR PLEASURE IN DOING THINGS: NOT AT ALL

## 2024-01-03 PROCEDURE — 1090000002 HH PPS REVENUE DEBIT

## 2024-01-03 PROCEDURE — 1090000001 HH PPS REVENUE CREDIT

## 2024-01-04 ENCOUNTER — LAB (OUTPATIENT)
Dept: LAB | Facility: LAB | Age: 44
End: 2024-01-04
Payer: COMMERCIAL

## 2024-01-04 ENCOUNTER — HOME CARE VISIT (OUTPATIENT)
Dept: HOME HEALTH SERVICES | Facility: HOME HEALTH | Age: 44
End: 2024-01-04
Payer: COMMERCIAL

## 2024-01-04 DIAGNOSIS — D84.9 IMMUNOSUPPRESSION (MULTI): ICD-10-CM

## 2024-01-04 DIAGNOSIS — Z94.0 KIDNEY REPLACED BY TRANSPLANT (HHS-HCC): ICD-10-CM

## 2024-01-04 LAB
ADENOVIRUS RVP, VIRC: NOT DETECTED
ALBUMIN SERPL BCP-MCNC: 3.7 G/DL (ref 3.4–5)
ANION GAP SERPL CALC-SCNC: 12 MMOL/L (ref 10–20)
BASOPHILS # BLD AUTO: 0.02 X10*3/UL (ref 0–0.1)
BASOPHILS NFR BLD AUTO: 0.6 %
BUN SERPL-MCNC: 31 MG/DL (ref 6–23)
CALCIUM SERPL-MCNC: 8.6 MG/DL (ref 8.6–10.3)
CHLORIDE SERPL-SCNC: 104 MMOL/L (ref 98–107)
CO2 SERPL-SCNC: 24 MMOL/L (ref 21–32)
CREAT SERPL-MCNC: 2.47 MG/DL (ref 0.5–1.05)
ENTEROVIRUS/RHINOVIRUS RVP, VIRC: NOT DETECTED
EOSINOPHIL # BLD AUTO: 0.01 X10*3/UL (ref 0–0.7)
EOSINOPHIL NFR BLD AUTO: 0.3 %
ERYTHROCYTE [DISTWIDTH] IN BLOOD BY AUTOMATED COUNT: 15.1 % (ref 11.5–14.5)
GFR SERPL CREATININE-BSD FRML MDRD: 24 ML/MIN/1.73M*2
GLUCOSE SERPL-MCNC: 85 MG/DL (ref 74–99)
HCT VFR BLD AUTO: 31.3 % (ref 36–46)
HGB BLD-MCNC: 9.5 G/DL (ref 12–16)
HUMAN BOCAVIRUS RVP, VIRC: NOT DETECTED
HUMAN CORONAVIRUS RVP, VIRC: NOT DETECTED
IMM GRANULOCYTES # BLD AUTO: 0.05 X10*3/UL (ref 0–0.7)
IMM GRANULOCYTES NFR BLD AUTO: 1.4 % (ref 0–0.9)
INFLUENZA A , VIRC: NOT DETECTED
INFLUENZA A H1N1-09 , VIRC: NOT DETECTED
INFLUENZA B PCR, VIRC: POSITIVE
LYMPHOCYTES # BLD AUTO: 0.53 X10*3/UL (ref 1.2–4.8)
LYMPHOCYTES NFR BLD AUTO: 15.3 %
MAGNESIUM SERPL-MCNC: 1.94 MG/DL (ref 1.6–2.4)
MCH RBC QN AUTO: 30.3 PG (ref 26–34)
MCHC RBC AUTO-ENTMCNC: 30.4 G/DL (ref 32–36)
MCV RBC AUTO: 100 FL (ref 80–100)
METAPNEUMOVIRUS , VIRC: NOT DETECTED
MONOCYTES # BLD AUTO: 0.24 X10*3/UL (ref 0.1–1)
MONOCYTES NFR BLD AUTO: 6.9 %
NEUTROPHILS # BLD AUTO: 2.62 X10*3/UL (ref 1.2–7.7)
NEUTROPHILS NFR BLD AUTO: 75.5 %
NRBC BLD-RTO: 0 /100 WBCS (ref 0–0)
PARAINFLUENZA PCR, VIRC: NOT DETECTED
PHOSPHATE SERPL-MCNC: 3.5 MG/DL (ref 2.5–4.9)
PLATELET # BLD AUTO: 223 X10*3/UL (ref 150–450)
POTASSIUM SERPL-SCNC: 3.3 MMOL/L (ref 3.5–5.3)
RBC # BLD AUTO: 3.14 X10*6/UL (ref 4–5.2)
RSV PCR, RVP, VIRC: NOT DETECTED
SODIUM SERPL-SCNC: 137 MMOL/L (ref 136–145)
WBC # BLD AUTO: 3.5 X10*3/UL (ref 4.4–11.3)

## 2024-01-04 PROCEDURE — 80069 RENAL FUNCTION PANEL: CPT

## 2024-01-04 PROCEDURE — 85025 COMPLETE CBC W/AUTO DIFF WBC: CPT

## 2024-01-04 PROCEDURE — 36415 COLL VENOUS BLD VENIPUNCTURE: CPT

## 2024-01-04 PROCEDURE — 83735 ASSAY OF MAGNESIUM: CPT

## 2024-01-04 PROCEDURE — 1090000001 HH PPS REVENUE CREDIT

## 2024-01-04 PROCEDURE — 1090000002 HH PPS REVENUE DEBIT

## 2024-01-04 NOTE — TELEPHONE ENCOUNTER
Called and spoke with patient he is doing better, drainage in less in 12 hrs 4 oz, drainage is more clear. Feeling better. No complains.

## 2024-01-05 PROCEDURE — 1090000002 HH PPS REVENUE DEBIT

## 2024-01-05 PROCEDURE — 1090000001 HH PPS REVENUE CREDIT

## 2024-01-06 ENCOUNTER — APPOINTMENT (OUTPATIENT)
Dept: DIALYSIS | Facility: HOSPITAL | Age: 44
End: 2024-01-06
Payer: COMMERCIAL

## 2024-01-06 PROCEDURE — 1090000001 HH PPS REVENUE CREDIT

## 2024-01-06 PROCEDURE — 1090000002 HH PPS REVENUE DEBIT

## 2024-01-07 PROCEDURE — 1090000001 HH PPS REVENUE CREDIT

## 2024-01-07 PROCEDURE — 1090000002 HH PPS REVENUE DEBIT

## 2024-01-08 ENCOUNTER — OFFICE VISIT (OUTPATIENT)
Dept: TRANSPLANT | Facility: HOSPITAL | Age: 44
End: 2024-01-08
Payer: COMMERCIAL

## 2024-01-08 VITALS
SYSTOLIC BLOOD PRESSURE: 114 MMHG | WEIGHT: 154.4 LBS | BODY MASS INDEX: 26.5 KG/M2 | HEART RATE: 81 BPM | OXYGEN SATURATION: 100 % | DIASTOLIC BLOOD PRESSURE: 80 MMHG | TEMPERATURE: 97.9 F

## 2024-01-08 DIAGNOSIS — Z94.0 KIDNEY REPLACED BY TRANSPLANT (HHS-HCC): Primary | ICD-10-CM

## 2024-01-08 PROCEDURE — 99213 OFFICE O/P EST LOW 20 MIN: CPT | Performed by: SURGERY

## 2024-01-08 PROCEDURE — 1036F TOBACCO NON-USER: CPT | Performed by: SURGERY

## 2024-01-08 PROCEDURE — 3074F SYST BP LT 130 MM HG: CPT | Performed by: SURGERY

## 2024-01-08 PROCEDURE — 3079F DIAST BP 80-89 MM HG: CPT | Performed by: SURGERY

## 2024-01-08 PROCEDURE — 1090000002 HH PPS REVENUE DEBIT

## 2024-01-08 PROCEDURE — 1090000001 HH PPS REVENUE CREDIT

## 2024-01-08 RX ORDER — CALCIUM CARBONATE 200(500)MG
500 TABLET,CHEWABLE ORAL 2 TIMES DAILY
Qty: 60 TABLET | Refills: 2 | Status: SHIPPED | OUTPATIENT
Start: 2024-01-08 | End: 2024-01-15 | Stop reason: SDUPTHER

## 2024-01-08 RX ORDER — MIRTAZAPINE 7.5 MG/1
15 TABLET, FILM COATED ORAL NIGHTLY
Qty: 60 TABLET | Refills: 0 | Status: SHIPPED | OUTPATIENT
Start: 2024-01-08 | End: 2024-02-06 | Stop reason: SDUPTHER

## 2024-01-08 RX ORDER — CALCITRIOL 0.25 UG/1
0.25 CAPSULE ORAL DAILY
Qty: 30 CAPSULE | Refills: 2 | Status: SHIPPED | OUTPATIENT
Start: 2024-01-08 | End: 2024-04-15

## 2024-01-08 RX ORDER — PANTOPRAZOLE SODIUM 40 MG/1
40 TABLET, DELAYED RELEASE ORAL
Qty: 30 TABLET | Refills: 1 | Status: SHIPPED | OUTPATIENT
Start: 2024-01-08 | End: 2024-05-08

## 2024-01-08 ASSESSMENT — PAIN SCALES - GENERAL: PAINLEVEL: 0-NO PAIN

## 2024-01-08 NOTE — PATIENT INSTRUCTIONS
STOP lasix  STOP sodium bicarb  Increase Remeron to 15 mg (2 tablet) daily  Ok to drink Ensure (nutrition drinks)  Ok to use Lidocaine patches around drain site  Increase fluid goal to 70 oz daily  Labs weekly  RTC 1 week

## 2024-01-08 NOTE — PROGRESS NOTES
Herber Leblanc is a 44 y.o. female POD#39 from DDKT.    - IR drain doing 60 ml daily for past 2 days  - Poor appetite and only taking in ~1.2L daily  - Wt down 5# since last clinic visit    Objective   Gen: A+OX3; NAD  HEENT: PERRL, sclera anicteric, MMM  Cardiac: RRR  Chest: Normal inspiratory effort  Abdomen: S/NT/ND. Incision C/D/I.  Ext: No LE edema    Last Recorded Vitals  Visit Vitals  /80   Pulse 81   Temp 36.6 °C (97.9 °F) (Temporal)      Intake/Output last 3 Shifts:    Vitals:    01/08/24 0911   Weight: 70 kg (154 lb 6.4 oz)      Assessment/Plan   Principal Problem:    Kidney transplant recipient  Active Problems:  Patient Active Problem List   Diagnosis    ESRD (end stage renal disease) (CMS/[facility])    HTN (hypertension)    Gastroesophageal reflux disease    Kidney replaced by transplant    Immunosuppression (CMS/[facility])    Transplant      - Recent influenza  - Stop lasix and bicarbonate  - Needs increased fluid intake close to 2L daily  - Increase mirtazapine to 15 mg daily  - Continue IR drain 1 more week  - RTC 1 week    I spent 25 minutes in the professional and overall care of this patient, including immunosuppression management.    Magi Lambert MD, PHD, FACS  Chief Transplant and Hepatobiliary Surgery

## 2024-01-08 NOTE — LETTER
01/08/24    To Whom It May Concern:    Herber Leblanc has been under our care at [facility] as a result of having abdominal surgery on 11/30/2023. Herber requires help at home to manage her medications, assist with ADL's such as showering, care around her house, drain care, etc. It is recommended she have aid care for at least 7-8 hours each day. Please reach out with any questions.    Thank you,         Magi Lambert MD  [phone]

## 2024-01-09 ENCOUNTER — SPECIALTY PHARMACY (OUTPATIENT)
Dept: PHARMACY | Facility: CLINIC | Age: 44
End: 2024-01-09

## 2024-01-09 ENCOUNTER — TELEPHONE (OUTPATIENT)
Dept: TRANSPLANT | Facility: HOSPITAL | Age: 44
End: 2024-01-09
Payer: COMMERCIAL

## 2024-01-09 ENCOUNTER — OFFICE VISIT (OUTPATIENT)
Dept: PRIMARY CARE CLINIC | Age: 44
End: 2024-01-09
Payer: MEDICAID

## 2024-01-09 ENCOUNTER — OFFICE VISIT (OUTPATIENT)
Dept: ORTHOPEDIC SURGERY | Age: 44
End: 2024-01-09

## 2024-01-09 VITALS
WEIGHT: 171.4 LBS | RESPIRATION RATE: 14 BRPM | OXYGEN SATURATION: 95 % | DIASTOLIC BLOOD PRESSURE: 74 MMHG | HEART RATE: 82 BPM | BODY MASS INDEX: 31.54 KG/M2 | HEIGHT: 62 IN | SYSTOLIC BLOOD PRESSURE: 104 MMHG

## 2024-01-09 VITALS — BODY MASS INDEX: 31.47 KG/M2 | HEIGHT: 62 IN | WEIGHT: 171 LBS

## 2024-01-09 DIAGNOSIS — M25.511 RIGHT SHOULDER PAIN, UNSPECIFIED CHRONICITY: Primary | ICD-10-CM

## 2024-01-09 DIAGNOSIS — E78.2 MIXED HYPERLIPIDEMIA: ICD-10-CM

## 2024-01-09 DIAGNOSIS — M19.011 OSTEOARTHRITIS OF RIGHT STERNOCLAVICULAR JOINT: ICD-10-CM

## 2024-01-09 DIAGNOSIS — M25.511 CHRONIC RIGHT SHOULDER PAIN: ICD-10-CM

## 2024-01-09 DIAGNOSIS — Z00.00 ENCOUNTER FOR WELL ADULT EXAM WITHOUT ABNORMAL FINDINGS: ICD-10-CM

## 2024-01-09 DIAGNOSIS — M19.011 ARTHRITIS OF RIGHT ACROMIOCLAVICULAR JOINT: ICD-10-CM

## 2024-01-09 DIAGNOSIS — R73.09 IMPAIRED GLUCOSE REGULATION: ICD-10-CM

## 2024-01-09 DIAGNOSIS — M25.511 STERNOCLAVICULAR JOINT PAIN, RIGHT: ICD-10-CM

## 2024-01-09 DIAGNOSIS — Z00.00 ENCOUNTER FOR WELL ADULT EXAM WITHOUT ABNORMAL FINDINGS: Primary | ICD-10-CM

## 2024-01-09 DIAGNOSIS — E66.9 OBESITY (BMI 30-39.9): ICD-10-CM

## 2024-01-09 DIAGNOSIS — Z13.220 SCREENING CHOLESTEROL LEVEL: ICD-10-CM

## 2024-01-09 DIAGNOSIS — R74.01 ELEVATED ALT MEASUREMENT: ICD-10-CM

## 2024-01-09 DIAGNOSIS — E55.9 VITAMIN D DEFICIENCY: ICD-10-CM

## 2024-01-09 DIAGNOSIS — I10 BENIGN ESSENTIAL HTN: ICD-10-CM

## 2024-01-09 DIAGNOSIS — G89.29 CHRONIC RIGHT SHOULDER PAIN: ICD-10-CM

## 2024-01-09 LAB
25(OH)D3 SERPL-MCNC: 20.3 NG/ML
BASOPHILS # BLD: 0.1 K/UL (ref 0–0.2)
BASOPHILS NFR BLD: 1.5 %
DEPRECATED RDW RBC AUTO: 14 % (ref 12.4–15.4)
EOSINOPHIL # BLD: 0.1 K/UL (ref 0–0.6)
EOSINOPHIL NFR BLD: 2.2 %
HCT VFR BLD AUTO: 43.1 % (ref 36–48)
HGB BLD-MCNC: 14.1 G/DL (ref 12–16)
LYMPHOCYTES # BLD: 2.5 K/UL (ref 1–5.1)
LYMPHOCYTES NFR BLD: 44.8 %
MCH RBC QN AUTO: 29.2 PG (ref 26–34)
MCHC RBC AUTO-ENTMCNC: 32.8 G/DL (ref 31–36)
MCV RBC AUTO: 89 FL (ref 80–100)
MONOCYTES # BLD: 0.4 K/UL (ref 0–1.3)
MONOCYTES NFR BLD: 6.4 %
NEUTROPHILS # BLD: 2.5 K/UL (ref 1.7–7.7)
NEUTROPHILS NFR BLD: 45.1 %
PLATELET # BLD AUTO: 239 K/UL (ref 135–450)
PMV BLD AUTO: 10.8 FL (ref 5–10.5)
RBC # BLD AUTO: 4.84 M/UL (ref 4–5.2)
WBC # BLD AUTO: 5.5 K/UL (ref 4–11)

## 2024-01-09 PROCEDURE — RXMED WILLOW AMBULATORY MEDICATION CHARGE

## 2024-01-09 PROCEDURE — 99396 PREV VISIT EST AGE 40-64: CPT | Performed by: INTERNAL MEDICINE

## 2024-01-09 PROCEDURE — 1090000002 HH PPS REVENUE DEBIT

## 2024-01-09 PROCEDURE — 1090000001 HH PPS REVENUE CREDIT

## 2024-01-09 PROCEDURE — 3078F DIAST BP <80 MM HG: CPT | Performed by: INTERNAL MEDICINE

## 2024-01-09 PROCEDURE — 3074F SYST BP LT 130 MM HG: CPT | Performed by: INTERNAL MEDICINE

## 2024-01-09 ASSESSMENT — ENCOUNTER SYMPTOMS
ABDOMINAL DISTENTION: 0
CHEST TIGHTNESS: 0
VOMITING: 0
EYE REDNESS: 0
BACK PAIN: 0
BLOOD IN STOOL: 0
DIARRHEA: 0
COLOR CHANGE: 0
EYE PAIN: 0
SORE THROAT: 0
TROUBLE SWALLOWING: 0
ABDOMINAL PAIN: 0
SHORTNESS OF BREATH: 0
SINUS PRESSURE: 0
NAUSEA: 0
CONSTIPATION: 0
COUGH: 0
WHEEZING: 0

## 2024-01-09 ASSESSMENT — PATIENT HEALTH QUESTIONNAIRE - PHQ9
1. LITTLE INTEREST OR PLEASURE IN DOING THINGS: 0
SUM OF ALL RESPONSES TO PHQ QUESTIONS 1-9: 0
2. FEELING DOWN, DEPRESSED OR HOPELESS: 0
SUM OF ALL RESPONSES TO PHQ QUESTIONS 1-9: 0
SUM OF ALL RESPONSES TO PHQ9 QUESTIONS 1 & 2: 0
SUM OF ALL RESPONSES TO PHQ QUESTIONS 1-9: 0
SUM OF ALL RESPONSES TO PHQ QUESTIONS 1-9: 0

## 2024-01-09 NOTE — PATIENT INSTRUCTIONS
hands, brush your teeth twice a day, and wear a seat belt in the car.   Where can you learn more?  Go to https://www.[employer].net/patientEd and enter P072 to learn more about \"Well Visit, Ages 18 to 65: Care Instructions.\"  Current as of: August 6, 2023               Content Version: 13.9  © 5857-0968 [employer].   Care instructions adapted under license by [employer]. If you have questions about a medical condition or this instruction, always ask your healthcare professional. [employer] disclaims any warranty or liability for your use of this information.           Learning About Changing a Habit by Setting Goals  How can you change a habit?     If you've decided to change a habit--whether it's quitting smoking, lowering your blood pressure, becoming more active, or doing something else to improve your health--congratulations! Making that decision is the first step toward making a change.  What happens next? Have a reason. Set goals you can reach. Prepare for slip-ups. And get support.  What's your reason?  Your reason for wanting to change a habit is really important. Maybe you want to quit smoking so that you can avoid future health problems. Or maybe you want to eat a healthier diet so you can lose weight. If you have high blood pressure, your reason may be clear: to lower your blood pressure. Maybe you smoke and want to save money on cigarettes.  You need to feel ready to make a change. If you don't feel ready now, that's okay. You can still be thinking and planning. When you truly want to make changes, you're ready for the next step.  It's not easy to change habits--but you can do it. Taking the time to really think about what will motivate or inspire you will help you reach your goals.  How do you set goals?  Setting goals can help a lot when you're trying to make a healthy change.  Focus on small goals. This will help you reach larger goals over time. With smaller goals,

## 2024-01-09 NOTE — ASSESSMENT & PLAN NOTE
Patient comes in for wellness exam   we discussed age appropriate USPSTF screens  Over 75% of the visit was spent counselling patient on appropriate lifestyle choices.

## 2024-01-09 NOTE — PROGRESS NOTES
Well Adult Note  Name: Marciano RAZA Rai Today’s Date: 2024   MRN: [id number] Sex: Female   Age: 44 y.o. Ethnicity: Non- / Non    : 1980 Race: Other       Marciano RAZA Rai is here for well adult exam.  History:  Wellness exam - Patient presents today for annual physical. Patient  reports feeling well. Patient has a normal appetite. Patient  eats 5+ servings of vegetables/fruits each day. Patient prepares food at home multiple times/day, eats in restaurants rarely. Patient  states that she sleeps well and gets 7-8 hours of sleep on average. In regards to emotional health, patient  denies depression or anxiety. Libido is considered to be normal. In regards to bowel habits, patient  has no complaints. Regarding urination, no complaints. Patient reports they feels safe at home, uses seat belts and has smoke detectors. Patient has a good work-life balance, and is happy with her job.    Review of Systems   Constitutional:  Negative for activity change, appetite change, chills, fatigue, fever and unexpected weight change.   HENT:  Negative for congestion, ear pain, postnasal drip, sinus pressure, sore throat, tinnitus and trouble swallowing.    Eyes:  Negative for pain and redness.   Respiratory:  Negative for cough, chest tightness, shortness of breath and wheezing.    Cardiovascular:  Negative for chest pain, palpitations and leg swelling.   Gastrointestinal:  Negative for abdominal distention, abdominal pain, blood in stool, constipation, diarrhea, nausea and vomiting.   Endocrine: Negative for cold intolerance, heat intolerance and polydipsia.   Genitourinary:  Negative for decreased urine volume, dysuria, flank pain, frequency, hematuria and urgency.   Musculoskeletal:  Negative for arthralgias, back pain, joint swelling, neck pain and neck stiffness.   Skin:  Negative for color change and rash.   Neurological:  Negative for dizziness, weakness, numbness and headaches.   Hematological:  Negative for

## 2024-01-09 NOTE — TELEPHONE ENCOUNTER
Patient was seen in clinic yesterday. Has home PT and home nurse. Letter provided to patient requesting more home aide hours. Nothing further to do at this time.

## 2024-01-09 NOTE — ASSESSMENT & PLAN NOTE
Patient counseled about weight loss and diet.  Mentioned that her weight will be followed closely.

## 2024-01-09 NOTE — TELEPHONE ENCOUNTER
HAKAN on 12/29/23 from Grecia Mendez at [facility] . She called to make sure PT was getting all the resources she needs. Is there home health or a nurse? Would like a call back, if this hasn't been resolved. [phone]

## 2024-01-09 NOTE — ASSESSMENT & PLAN NOTE
BP stable  On valsartan 160 mg daily  To verify blood pressure cuff accuracy  To keep outpatient BP log,  counseled on exercise and diet (including DASH diet)  Goal to achieve appropriate BMI  Patient agreed with plan with verbal understanding

## 2024-01-09 NOTE — PROGRESS NOTES
patient and reviewed on 1/9/2024.  The review is negative with the exception of those things mentioned in the History of Present Illness and Past Medical History.      Past Medical History:  Patient's medications, allergies, past medical, surgical, social and family histories were reviewed and updated as appropriate.    Allergies:  No Known Allergies    Physical Exam:  There were no vitals filed for this visit.  General: Marciano RAZA Rai is a healthy and well appearing 44 y.o. female who is sitting comfortably in our office in no acute distress.     Skin:  There are no skin lesions, cellulitis, or extreme edema. The patient has warm and well-perfused Bilateral upper extremities with brisk capillary refill.    Eyes: Extra-ocular muscles intact  Mouth: Oral mucosa moist. No perioral lesions  Pulm: Respirations unlabored and regular.  Neuro: Alert and oriented x3    Right shoulder Exam:  Inspection: Notable deformity over the right SC joint.  No gross deformities, no signs of infection.    Palpation:  There is no crepitus.  Tenderness to palpation over the SC joint. Non-tender to palpation over the clavicle, AC joint, acromion, bicipital groove and greater tuberosity.    Active Range of Motion: Forward elevation of 160, abduction of 160, external rotation with elbow at the side 40, internal rotation to the back is L5    Passive Range of Motion: Deferred    Strength: External rotation with resistance with elbow at the side 5/5, internal rotation with resistance with elbow at the side 5/5, Champagne toast testing 5/5, Jobes test 5/5    Special Tests:   No Yves muscle deformity.      Neurovascular: Sensation to light touch is intact, no motor deficits, palpable radial pulses 2+    Additional Examinations:    Examination of the contralateral extremity does not show any tenderness, deformity or injury. Range of motion is unremarkable. There is no gross instability. There are no rashes, ulcerations or lesions. Strength and tone

## 2024-01-10 ENCOUNTER — TELEPHONE (OUTPATIENT)
Dept: PRIMARY CARE CLINIC | Age: 44
End: 2024-01-10

## 2024-01-10 ENCOUNTER — PHARMACY VISIT (OUTPATIENT)
Dept: PHARMACY | Facility: CLINIC | Age: 44
End: 2024-01-10
Payer: COMMERCIAL

## 2024-01-10 ENCOUNTER — APPOINTMENT (OUTPATIENT)
Dept: HOME HEALTH SERVICES | Facility: HOME HEALTH | Age: 44
End: 2024-01-10
Payer: COMMERCIAL

## 2024-01-10 DIAGNOSIS — E78.1 HYPERTRIGLYCERIDEMIA: ICD-10-CM

## 2024-01-10 DIAGNOSIS — R74.8 ELEVATED LIVER ENZYMES: Primary | ICD-10-CM

## 2024-01-10 DIAGNOSIS — E55.9 VITAMIN D DEFICIENCY: ICD-10-CM

## 2024-01-10 LAB
ALBUMIN SERPL-MCNC: 4.6 G/DL (ref 3.4–5)
ALP SERPL-CCNC: 118 U/L (ref 40–129)
ALT SERPL-CCNC: 83 U/L (ref 10–40)
AST SERPL-CCNC: 46 U/L (ref 15–37)
BILIRUB DIRECT SERPL-MCNC: <0.2 MG/DL (ref 0–0.3)
BILIRUB INDIRECT SERPL-MCNC: ABNORMAL MG/DL (ref 0–1)
BILIRUB SERPL-MCNC: 0.6 MG/DL (ref 0–1)
CHOLEST SERPL-MCNC: 277 MG/DL (ref 0–199)
HDLC SERPL-MCNC: 32 MG/DL (ref 40–60)
LDLC SERPL CALC-MCNC: ABNORMAL MG/DL
LDLC SERPL-MCNC: 170 MG/DL
PROT SERPL-MCNC: 7.8 G/DL (ref 6.4–8.2)
TRIGL SERPL-MCNC: 428 MG/DL (ref 0–150)
VLDLC SERPL CALC-MCNC: ABNORMAL MG/DL

## 2024-01-10 PROCEDURE — 1090000002 HH PPS REVENUE DEBIT

## 2024-01-10 PROCEDURE — 1090000001 HH PPS REVENUE CREDIT

## 2024-01-10 RX ORDER — OMEGA-3-ACID ETHYL ESTERS 1 G/1
CAPSULE, LIQUID FILLED ORAL
Qty: 120 CAPSULE | Refills: 3 | Status: SHIPPED | OUTPATIENT
Start: 2024-01-10

## 2024-01-10 RX ORDER — CHOLECALCIFEROL (VITAMIN D3) 125 MCG
1 CAPSULE ORAL
Qty: 90 TABLET | Refills: 3 | Status: SHIPPED | OUTPATIENT
Start: 2024-01-10

## 2024-01-10 NOTE — RESULT ENCOUNTER NOTE
Please let patient know that her liver enzymes are elevated.  I would like patient to refrain from drinking alcoholic beverages, using any over-the-counter vitamins or supplements other than what we specifically want them to take.  I would like patient to eat low-fat diet.     I will order liver ultrasound, the results  will determine whether the patient may need to see a liver doctor.  Please provide telephone number so patient could schedule their ultrasound.     Please let patient know his triglycerides elevated, of starting him on omega-3 he could  the prescription from his pharmacy.  If he is having a fatty liver which is causing elevation in his liver numbers the omega-3 may also help this.    His vitamin D was a little bit low.  Sent in a prescription for vitamin D.

## 2024-01-10 NOTE — TELEPHONE ENCOUNTER
----- Message from Pete Hylton MD sent at 1/10/2024  2:06 PM EST -----      Please let patient know that her liver enzymes are elevated.  I would like patient to refrain from drinking alcoholic beverages, using any over-the-counter vitamins or supplements other than what we specifically want them to take.  I would like patie  nt to eat low-fat diet.     I will order liver ultrasound, the results  will determine whether the patient may need to see a liver doctor.  Please provide telephone number so patient could schedule their ultrasound.     Please let patient know his triglycerides elevated, of starting him on omega-3 he could  the prescription from his pharmacy.  If he is having a fatty liver which is causing elevation in his liver numbers the omega-3 may also help this.    His vitamin D was a little bit low.  Sent in a prescription for vitamin D.      1/10/24  - Called patient to advise and Patient made an acute visit for today at 4:30pm and would like to discuss test results with provider at that time.

## 2024-01-11 LAB
EST. AVERAGE GLUCOSE BLD GHB EST-MCNC: 125.5 MG/DL
HBA1C MFR BLD: 6 %

## 2024-01-11 PROCEDURE — 1090000001 HH PPS REVENUE CREDIT

## 2024-01-11 PROCEDURE — 1090000002 HH PPS REVENUE DEBIT

## 2024-01-12 ENCOUNTER — APPOINTMENT (OUTPATIENT)
Dept: LAB | Facility: LAB | Age: 44
End: 2024-01-12
Payer: COMMERCIAL

## 2024-01-12 ENCOUNTER — HOME CARE VISIT (OUTPATIENT)
Dept: HOME HEALTH SERVICES | Facility: HOME HEALTH | Age: 44
End: 2024-01-12
Payer: COMMERCIAL

## 2024-01-12 DIAGNOSIS — D84.9 IMMUNOSUPPRESSION (MULTI): ICD-10-CM

## 2024-01-12 DIAGNOSIS — Z94.0 KIDNEY REPLACED BY TRANSPLANT (HHS-HCC): ICD-10-CM

## 2024-01-12 LAB
ALBUMIN SERPL BCP-MCNC: 3.4 G/DL (ref 3.4–5)
ANION GAP SERPL CALC-SCNC: 10 MMOL/L (ref 10–20)
BASOPHILS # BLD AUTO: 0.02 X10*3/UL (ref 0–0.1)
BASOPHILS NFR BLD AUTO: 0.5 %
BUN SERPL-MCNC: 17 MG/DL (ref 6–23)
CALCIUM SERPL-MCNC: 8.5 MG/DL (ref 8.6–10.3)
CHLORIDE SERPL-SCNC: 110 MMOL/L (ref 98–107)
CO2 SERPL-SCNC: 22 MMOL/L (ref 21–32)
CREAT SERPL-MCNC: 1.64 MG/DL (ref 0.5–1.05)
EGFRCR SERPLBLD CKD-EPI 2021: 39 ML/MIN/1.73M*2
EOSINOPHIL # BLD AUTO: 0.03 X10*3/UL (ref 0–0.7)
EOSINOPHIL NFR BLD AUTO: 0.8 %
ERYTHROCYTE [DISTWIDTH] IN BLOOD BY AUTOMATED COUNT: 14.6 % (ref 11.5–14.5)
GLUCOSE SERPL-MCNC: 92 MG/DL (ref 74–99)
HCT VFR BLD AUTO: 28.6 % (ref 36–46)
HGB BLD-MCNC: 8.5 G/DL (ref 12–16)
IMM GRANULOCYTES # BLD AUTO: 0.09 X10*3/UL (ref 0–0.7)
IMM GRANULOCYTES NFR BLD AUTO: 2.5 % (ref 0–0.9)
LYMPHOCYTES # BLD AUTO: 0.45 X10*3/UL (ref 1.2–4.8)
LYMPHOCYTES NFR BLD AUTO: 12.3 %
MAGNESIUM SERPL-MCNC: 1.78 MG/DL (ref 1.6–2.4)
MCH RBC QN AUTO: 30.4 PG (ref 26–34)
MCHC RBC AUTO-ENTMCNC: 29.7 G/DL (ref 32–36)
MCV RBC AUTO: 102 FL (ref 80–100)
MONOCYTES # BLD AUTO: 0.32 X10*3/UL (ref 0.1–1)
MONOCYTES NFR BLD AUTO: 8.7 %
NEUTROPHILS # BLD AUTO: 2.75 X10*3/UL (ref 1.2–7.7)
NEUTROPHILS NFR BLD AUTO: 75.2 %
NRBC BLD-RTO: 0 /100 WBCS (ref 0–0)
PHOSPHATE SERPL-MCNC: 3.4 MG/DL (ref 2.5–4.9)
PLATELET # BLD AUTO: 225 X10*3/UL (ref 150–450)
POTASSIUM SERPL-SCNC: 4 MMOL/L (ref 3.5–5.3)
RBC # BLD AUTO: 2.8 X10*6/UL (ref 4–5.2)
SODIUM SERPL-SCNC: 138 MMOL/L (ref 136–145)
WBC # BLD AUTO: 3.7 X10*3/UL (ref 4.4–11.3)

## 2024-01-12 PROCEDURE — 80069 RENAL FUNCTION PANEL: CPT

## 2024-01-12 PROCEDURE — 85025 COMPLETE CBC W/AUTO DIFF WBC: CPT

## 2024-01-12 PROCEDURE — 1090000002 HH PPS REVENUE DEBIT

## 2024-01-12 PROCEDURE — 36415 COLL VENOUS BLD VENIPUNCTURE: CPT

## 2024-01-12 PROCEDURE — 1090000001 HH PPS REVENUE CREDIT

## 2024-01-12 PROCEDURE — 83735 ASSAY OF MAGNESIUM: CPT

## 2024-01-13 PROCEDURE — 1090000002 HH PPS REVENUE DEBIT

## 2024-01-13 PROCEDURE — 1090000001 HH PPS REVENUE CREDIT

## 2024-01-14 PROCEDURE — 1090000002 HH PPS REVENUE DEBIT

## 2024-01-14 PROCEDURE — 1090000001 HH PPS REVENUE CREDIT

## 2024-01-15 ENCOUNTER — PHARMACY VISIT (OUTPATIENT)
Dept: PHARMACY | Facility: CLINIC | Age: 44
End: 2024-01-15
Payer: COMMERCIAL

## 2024-01-15 ENCOUNTER — OFFICE VISIT (OUTPATIENT)
Dept: TRANSPLANT | Facility: HOSPITAL | Age: 44
End: 2024-01-15
Payer: COMMERCIAL

## 2024-01-15 VITALS
TEMPERATURE: 97.3 F | WEIGHT: 162.5 LBS | HEART RATE: 83 BPM | SYSTOLIC BLOOD PRESSURE: 121 MMHG | BODY MASS INDEX: 27.89 KG/M2 | OXYGEN SATURATION: 100 % | DIASTOLIC BLOOD PRESSURE: 79 MMHG

## 2024-01-15 DIAGNOSIS — R39.9 UTI SYMPTOMS: ICD-10-CM

## 2024-01-15 DIAGNOSIS — R31.9 URINARY TRACT INFECTION WITH HEMATURIA, SITE UNSPECIFIED: ICD-10-CM

## 2024-01-15 DIAGNOSIS — Z94.0 KIDNEY REPLACED BY TRANSPLANT (HHS-HCC): ICD-10-CM

## 2024-01-15 DIAGNOSIS — N39.0 URINARY TRACT INFECTION WITH HEMATURIA, SITE UNSPECIFIED: ICD-10-CM

## 2024-01-15 DIAGNOSIS — R39.89 SUSPECTED UTI: ICD-10-CM

## 2024-01-15 LAB
APPEARANCE UR: CLEAR
BACTERIA #/AREA URNS AUTO: ABNORMAL /HPF
BILIRUB UR STRIP.AUTO-MCNC: NEGATIVE MG/DL
COLOR UR: ABNORMAL
GLUCOSE UR STRIP.AUTO-MCNC: NEGATIVE MG/DL
HOLD SPECIMEN: NORMAL
KETONES UR STRIP.AUTO-MCNC: NEGATIVE MG/DL
LEUKOCYTE ESTERASE UR QL STRIP.AUTO: ABNORMAL
NITRITE UR QL STRIP.AUTO: NEGATIVE
PH UR STRIP.AUTO: 6 [PH]
PROT UR STRIP.AUTO-MCNC: NEGATIVE MG/DL
RBC # UR STRIP.AUTO: ABNORMAL /UL
RBC #/AREA URNS AUTO: ABNORMAL /HPF
SP GR UR STRIP.AUTO: 1
SQUAMOUS #/AREA URNS AUTO: ABNORMAL /HPF
UROBILINOGEN UR STRIP.AUTO-MCNC: <2 MG/DL
WBC #/AREA URNS AUTO: ABNORMAL /HPF

## 2024-01-15 PROCEDURE — 3078F DIAST BP <80 MM HG: CPT | Performed by: SURGERY

## 2024-01-15 PROCEDURE — 1090000001 HH PPS REVENUE CREDIT

## 2024-01-15 PROCEDURE — 1036F TOBACCO NON-USER: CPT | Performed by: SURGERY

## 2024-01-15 PROCEDURE — RXMED WILLOW AMBULATORY MEDICATION CHARGE

## 2024-01-15 PROCEDURE — 3074F SYST BP LT 130 MM HG: CPT | Performed by: SURGERY

## 2024-01-15 PROCEDURE — 99213 OFFICE O/P EST LOW 20 MIN: CPT | Performed by: SURGERY

## 2024-01-15 PROCEDURE — 1090000002 HH PPS REVENUE DEBIT

## 2024-01-15 PROCEDURE — 81001 URINALYSIS AUTO W/SCOPE: CPT | Performed by: SURGERY

## 2024-01-15 PROCEDURE — 87086 URINE CULTURE/COLONY COUNT: CPT | Performed by: SURGERY

## 2024-01-15 RX ORDER — VALGANCICLOVIR 450 MG/1
450 TABLET, FILM COATED ORAL DAILY
Qty: 30 TABLET | Refills: 1 | Status: SHIPPED | OUTPATIENT
Start: 2024-01-15 | End: 2024-02-02 | Stop reason: SDUPTHER

## 2024-01-15 RX ORDER — LANOLIN ALCOHOL/MO/W.PET/CERES
400 CREAM (GRAM) TOPICAL DAILY
Qty: 30 TABLET | Refills: 0 | Status: SHIPPED | OUTPATIENT
Start: 2024-01-15 | End: 2024-02-02 | Stop reason: ALTCHOICE

## 2024-01-15 RX ORDER — METHOCARBAMOL 500 MG/1
500 TABLET, FILM COATED ORAL EVERY 6 HOURS PRN
Qty: 10 TABLET | Refills: 0 | Status: SHIPPED | OUTPATIENT
Start: 2024-01-15 | End: 2024-02-02 | Stop reason: ALTCHOICE

## 2024-01-15 RX ORDER — CIPROFLOXACIN 500 MG/1
500 TABLET ORAL DAILY
Qty: 7 TABLET | Refills: 0 | Status: SHIPPED | OUTPATIENT
Start: 2024-01-15 | End: 2024-01-22

## 2024-01-15 RX ORDER — MYCOPHENOLATE MOFETIL 250 MG/1
1000 CAPSULE ORAL 2 TIMES DAILY
Qty: 240 CAPSULE | Refills: 0 | Status: SHIPPED | OUTPATIENT
Start: 2024-01-15 | End: 2024-02-02 | Stop reason: ALTCHOICE

## 2024-01-15 RX ORDER — CALCIUM CARBONATE 200(500)MG
500 TABLET,CHEWABLE ORAL 2 TIMES DAILY
Qty: 60 TABLET | Refills: 2 | Status: SHIPPED | OUTPATIENT
Start: 2024-01-15 | End: 2024-04-14

## 2024-01-15 RX ORDER — PREDNISONE 5 MG/1
10 TABLET ORAL DAILY
Qty: 60 TABLET | Refills: 0 | Status: SHIPPED | OUTPATIENT
Start: 2024-01-15 | End: 2024-02-02 | Stop reason: ALTCHOICE

## 2024-01-15 ASSESSMENT — PAIN SCALES - GENERAL: PAINLEVEL: 0-NO PAIN

## 2024-01-15 NOTE — PROGRESS NOTES
Herber Leblanc is a 44 y.o. female POD#46 from DDKT.    - Drain output 1-2 oz daily  - Appetite better in past week  - Cr down to 1.6  - Missed  for stent removal    Objective   Gen: A+OX3; NAD  HEENT: PERRL, sclera anicteric, MMM  Cardiac: RRR  Chest: Normal inspiratory effort  Abdomen: S/NT/ND. Incision C/D/I.  Ext: No LE edema  Drain: serosanguinous    Last Recorded Vitals  Visit Vitals  /79   Pulse 83   Temp 36.3 °C (97.3 °F) (Temporal)      Vitals:    01/15/24 0916   Weight: 73.7 kg (162 lb 8 oz)      Assessment/Plan   Principal Problem:    Kidney transplant recipient  Active Problems:  Patient Active Problem List   Diagnosis    ESRD (end stage renal disease) (CMS/[facility])    HTN (hypertension)    Gastroesophageal reflux disease    Kidney replaced by transplant    Immunosuppression (CMS/[facility])    Transplant      - Removed IR drain today  - Refill robaxin  - Month supply meds that missed pharm refill  - Labs Thursday weekly  - Reschedule  for stent next week; will sync transplant clinic    I spent 25 minutes in the professional and overall care of this patient, including immunosuppression management.    Magi Lambert MD, PHD, FACS  Chief Transplant and Hepatobiliary Surgery

## 2024-01-15 NOTE — PATIENT INSTRUCTIONS
Have labs drawn at  [place] every Thursday  We will call you with the appointments for urology and transplant for next week   prescriptions today at [facility]   No medication changes today

## 2024-01-16 ENCOUNTER — HOME CARE VISIT (OUTPATIENT)
Dept: HOME HEALTH SERVICES | Facility: HOME HEALTH | Age: 44
End: 2024-01-16
Payer: COMMERCIAL

## 2024-01-16 VITALS
OXYGEN SATURATION: 100 % | HEART RATE: 93 BPM | RESPIRATION RATE: 18 BRPM | SYSTOLIC BLOOD PRESSURE: 110 MMHG | DIASTOLIC BLOOD PRESSURE: 80 MMHG | TEMPERATURE: 98.3 F

## 2024-01-16 LAB — BACTERIA UR CULT: NORMAL

## 2024-01-16 PROCEDURE — 1090000002 HH PPS REVENUE DEBIT

## 2024-01-16 PROCEDURE — 1090000003 HH PPS REVENUE ADJ

## 2024-01-16 PROCEDURE — G0299 HHS/HOSPICE OF RN EA 15 MIN: HCPCS | Mod: HHH

## 2024-01-16 PROCEDURE — 1090000001 HH PPS REVENUE CREDIT

## 2024-01-16 ASSESSMENT — ENCOUNTER SYMPTOMS
PAIN SEVERITY GOAL: 0/10
SUBJECTIVE PAIN PROGRESSION: WAXING AND WANING
PAIN LOCATION - PAIN FREQUENCY: INTERMITTENT
PAIN LOCATION - PAIN QUALITY: ACHE
PAIN LOCATION - RELIEVING FACTORS: REST, MEDICATION
PAIN LOCATION: ABDOMEN
PAIN: 1
LOWEST PAIN SEVERITY IN PAST 24 HOURS: 2/10
PAIN LOCATION - EXACERBATING FACTORS: MOVEMENT
HIGHEST PAIN SEVERITY IN PAST 24 HOURS: 5/10
PAIN LOCATION - PAIN SEVERITY: 5/10
PAIN LOCATION - PAIN DURATION: VARIABLE

## 2024-01-17 PROCEDURE — 1090000002 HH PPS REVENUE DEBIT

## 2024-01-17 PROCEDURE — 1090000001 HH PPS REVENUE CREDIT

## 2024-01-17 SDOH — ECONOMIC STABILITY: GENERAL: NECESSITIES UNABLE TO AFFORD: MEDICAL EXPENSES

## 2024-01-17 ASSESSMENT — ACTIVITIES OF DAILY LIVING (ADL)
PREPARING MEALS: DEPENDENT
FEEDING ASSESSED: 1
TOILETING: MODERATE ASSIST
LIGHT HOUSEKEEPING: DEPENDENT
GROOMING ASSESSED: 1
AMBULATION ASSISTANCE: MODERATE ASSIST
CURRENT_FUNCTION: MODERATE ASSIST
FEEDING: MODERATE ASSIST
HOME_HEALTH_OASIS: 01
HOUSEKEEPING ASSESSED: 1
DRESSING_UB_CURRENT_FUNCTION: MODERATE ASSIST
TOILETING: 1
OASIS_M1830: 02
GROOMING_CURRENT_FUNCTION: MODERATE ASSIST
BATHING_CURRENT_FUNCTION: MODERATE ASSIST
AMBULATION ASSISTANCE: 1
PHYSICAL TRANSFERS ASSESSED: 1
BATHING ASSESSED: 1
MONEY MANAGEMENT (EXPENSES/BILLS): TOTALLY DEPENDENT
DRESSING_LB_CURRENT_FUNCTION: MODERATE ASSIST

## 2024-01-18 ENCOUNTER — LAB (OUTPATIENT)
Dept: LAB | Facility: LAB | Age: 44
End: 2024-01-18
Payer: COMMERCIAL

## 2024-01-18 DIAGNOSIS — Z94.0 KIDNEY TRANSPLANT STATUS (HHS-HCC): ICD-10-CM

## 2024-01-18 DIAGNOSIS — D84.9 IMMUNODEFICIENCY, UNSPECIFIED (MULTI): Primary | ICD-10-CM

## 2024-01-18 LAB
ALBUMIN SERPL BCP-MCNC: 3.6 G/DL (ref 3.4–5)
ANION GAP SERPL CALC-SCNC: 13 MMOL/L (ref 10–20)
BASOPHILS # BLD AUTO: 0.04 X10*3/UL (ref 0–0.1)
BASOPHILS NFR BLD AUTO: 0.8 %
BUN SERPL-MCNC: 10 MG/DL (ref 6–23)
CALCIUM SERPL-MCNC: 8.2 MG/DL (ref 8.6–10.3)
CHLORIDE SERPL-SCNC: 111 MMOL/L (ref 98–107)
CO2 SERPL-SCNC: 19 MMOL/L (ref 21–32)
CREAT SERPL-MCNC: 1.39 MG/DL (ref 0.5–1.05)
EGFRCR SERPLBLD CKD-EPI 2021: 47 ML/MIN/1.73M*2
EOSINOPHIL # BLD AUTO: 0.06 X10*3/UL (ref 0–0.7)
EOSINOPHIL NFR BLD AUTO: 1.1 %
ERYTHROCYTE [DISTWIDTH] IN BLOOD BY AUTOMATED COUNT: 15 % (ref 11.5–14.5)
GLUCOSE SERPL-MCNC: 98 MG/DL (ref 74–99)
HCT VFR BLD AUTO: 31.9 % (ref 36–46)
HGB BLD-MCNC: 9.7 G/DL (ref 12–16)
IMM GRANULOCYTES # BLD AUTO: 0.13 X10*3/UL (ref 0–0.7)
IMM GRANULOCYTES NFR BLD AUTO: 2.5 % (ref 0–0.9)
LYMPHOCYTES # BLD AUTO: 0.67 X10*3/UL (ref 1.2–4.8)
LYMPHOCYTES NFR BLD AUTO: 12.7 %
MAGNESIUM SERPL-MCNC: 1.61 MG/DL (ref 1.6–2.4)
MCH RBC QN AUTO: 30.6 PG (ref 26–34)
MCHC RBC AUTO-ENTMCNC: 30.4 G/DL (ref 32–36)
MCV RBC AUTO: 101 FL (ref 80–100)
MONOCYTES # BLD AUTO: 0.41 X10*3/UL (ref 0.1–1)
MONOCYTES NFR BLD AUTO: 7.8 %
NEUTROPHILS # BLD AUTO: 3.98 X10*3/UL (ref 1.2–7.7)
NEUTROPHILS NFR BLD AUTO: 75.1 %
NRBC BLD-RTO: 0 /100 WBCS (ref 0–0)
PHOSPHATE SERPL-MCNC: 3.4 MG/DL (ref 2.5–4.9)
PLATELET # BLD AUTO: 295 X10*3/UL (ref 150–450)
POTASSIUM SERPL-SCNC: 3.8 MMOL/L (ref 3.5–5.3)
RBC # BLD AUTO: 3.17 X10*6/UL (ref 4–5.2)
SODIUM SERPL-SCNC: 139 MMOL/L (ref 136–145)
WBC # BLD AUTO: 5.3 X10*3/UL (ref 4.4–11.3)

## 2024-01-18 PROCEDURE — 1090000002 HH PPS REVENUE DEBIT

## 2024-01-18 PROCEDURE — 85025 COMPLETE CBC W/AUTO DIFF WBC: CPT

## 2024-01-18 PROCEDURE — 1090000001 HH PPS REVENUE CREDIT

## 2024-01-18 PROCEDURE — 80069 RENAL FUNCTION PANEL: CPT

## 2024-01-18 PROCEDURE — 83735 ASSAY OF MAGNESIUM: CPT

## 2024-01-18 PROCEDURE — 36415 COLL VENOUS BLD VENIPUNCTURE: CPT

## 2024-01-25 ENCOUNTER — TELEPHONE (OUTPATIENT)
Dept: TRANSPLANT | Facility: HOSPITAL | Age: 44
End: 2024-01-25

## 2024-01-25 ENCOUNTER — LAB (OUTPATIENT)
Dept: LAB | Facility: LAB | Age: 44
End: 2024-01-25
Payer: COMMERCIAL

## 2024-01-25 DIAGNOSIS — Z94.0 KIDNEY TRANSPLANT STATUS (HHS-HCC): ICD-10-CM

## 2024-01-25 DIAGNOSIS — D84.9 IMMUNODEFICIENCY, UNSPECIFIED (MULTI): Primary | ICD-10-CM

## 2024-01-25 LAB
ALBUMIN SERPL BCP-MCNC: 3.6 G/DL (ref 3.4–5)
ANION GAP SERPL CALC-SCNC: 11 MMOL/L (ref 10–20)
BASOPHILS # BLD MANUAL: 0.05 X10*3/UL (ref 0–0.1)
BASOPHILS NFR BLD MANUAL: 1 %
BUN SERPL-MCNC: 14 MG/DL (ref 6–23)
CALCIUM SERPL-MCNC: 8.1 MG/DL (ref 8.6–10.3)
CHLORIDE SERPL-SCNC: 109 MMOL/L (ref 98–107)
CO2 SERPL-SCNC: 20 MMOL/L (ref 21–32)
CREAT SERPL-MCNC: 1.28 MG/DL (ref 0.5–1.05)
EGFRCR SERPLBLD CKD-EPI 2021: 52 ML/MIN/1.73M*2
EOSINOPHIL # BLD MANUAL: 0.2 X10*3/UL (ref 0–0.7)
EOSINOPHIL NFR BLD MANUAL: 4 %
ERYTHROCYTE [DISTWIDTH] IN BLOOD BY AUTOMATED COUNT: 15.2 % (ref 11.5–14.5)
GLUCOSE SERPL-MCNC: 81 MG/DL (ref 74–99)
HCT VFR BLD AUTO: 31.1 % (ref 36–46)
HGB BLD-MCNC: 9.3 G/DL (ref 12–16)
IMM GRANULOCYTES # BLD AUTO: 0.37 X10*3/UL (ref 0–0.7)
IMM GRANULOCYTES NFR BLD AUTO: 7.3 % (ref 0–0.9)
LYMPHOCYTES # BLD MANUAL: 0.77 X10*3/UL (ref 1.2–4.8)
LYMPHOCYTES NFR BLD MANUAL: 15 %
MAGNESIUM SERPL-MCNC: 1.73 MG/DL (ref 1.6–2.4)
MCH RBC QN AUTO: 30.9 PG (ref 26–34)
MCHC RBC AUTO-ENTMCNC: 29.9 G/DL (ref 32–36)
MCV RBC AUTO: 103 FL (ref 80–100)
METAMYELOCYTES # BLD MANUAL: 0.36 X10*3/UL
METAMYELOCYTES NFR BLD MANUAL: 7 %
MONOCYTES # BLD MANUAL: 0.05 X10*3/UL (ref 0.1–1)
MONOCYTES NFR BLD MANUAL: 1 %
NEUTS SEG # BLD MANUAL: 3.67 X10*3/UL (ref 1.2–7)
NEUTS SEG NFR BLD MANUAL: 72 %
NRBC BLD-RTO: 0 /100 WBCS (ref 0–0)
PHOSPHATE SERPL-MCNC: 4.1 MG/DL (ref 2.5–4.9)
PLATELET # BLD AUTO: 282 X10*3/UL (ref 150–450)
POTASSIUM SERPL-SCNC: 4.2 MMOL/L (ref 3.5–5.3)
RBC # BLD AUTO: 3.01 X10*6/UL (ref 4–5.2)
RBC MORPH BLD: ABNORMAL
SODIUM SERPL-SCNC: 136 MMOL/L (ref 136–145)
TOTAL CELLS COUNTED BLD: 100
WBC # BLD AUTO: 5.1 X10*3/UL (ref 4.4–11.3)

## 2024-01-25 PROCEDURE — 36415 COLL VENOUS BLD VENIPUNCTURE: CPT

## 2024-01-25 PROCEDURE — 85027 COMPLETE CBC AUTOMATED: CPT

## 2024-01-25 PROCEDURE — 85007 BL SMEAR W/DIFF WBC COUNT: CPT

## 2024-01-25 PROCEDURE — 80069 RENAL FUNCTION PANEL: CPT

## 2024-01-25 PROCEDURE — 83735 ASSAY OF MAGNESIUM: CPT

## 2024-01-25 NOTE — TELEPHONE ENCOUNTER
Returned call to [facility], they are requesting clinic appt to coordinate with stent removal. Can schedule surgeon appt 2/2 @ 11:30 and then stent removal at 3 pm. They verbalized understanding.

## 2024-01-30 ENCOUNTER — INFUSION (OUTPATIENT)
Dept: INFUSION THERAPY | Facility: HOSPITAL | Age: 44
End: 2024-01-30
Payer: COMMERCIAL

## 2024-01-30 VITALS
OXYGEN SATURATION: 100 % | WEIGHT: 166.67 LBS | RESPIRATION RATE: 20 BRPM | BODY MASS INDEX: 28.61 KG/M2 | DIASTOLIC BLOOD PRESSURE: 86 MMHG | SYSTOLIC BLOOD PRESSURE: 124 MMHG | HEART RATE: 68 BPM | TEMPERATURE: 97.6 F

## 2024-01-30 DIAGNOSIS — Z94.0 KIDNEY REPLACED BY TRANSPLANT (HHS-HCC): ICD-10-CM

## 2024-01-30 DIAGNOSIS — D84.9 IMMUNOSUPPRESSION (MULTI): ICD-10-CM

## 2024-01-30 PROCEDURE — 96365 THER/PROPH/DIAG IV INF INIT: CPT | Mod: INF

## 2024-01-30 PROCEDURE — 2500000004 HC RX 250 GENERAL PHARMACY W/ HCPCS (ALT 636 FOR OP/ED): Mod: JZ | Performed by: STUDENT IN AN ORGANIZED HEALTH CARE EDUCATION/TRAINING PROGRAM

## 2024-01-30 RX ORDER — EPINEPHRINE 0.3 MG/.3ML
0.3 INJECTION SUBCUTANEOUS EVERY 5 MIN PRN
OUTPATIENT
Start: 2024-02-26

## 2024-01-30 RX ORDER — FAMOTIDINE 10 MG/ML
20 INJECTION INTRAVENOUS ONCE AS NEEDED
OUTPATIENT
Start: 2024-02-26

## 2024-01-30 RX ORDER — ALBUTEROL SULFATE 0.83 MG/ML
3 SOLUTION RESPIRATORY (INHALATION) AS NEEDED
OUTPATIENT
Start: 2024-02-26

## 2024-01-30 RX ORDER — DIPHENHYDRAMINE HYDROCHLORIDE 50 MG/ML
50 INJECTION INTRAMUSCULAR; INTRAVENOUS AS NEEDED
OUTPATIENT
Start: 2024-02-26

## 2024-01-30 RX ADMIN — DEXTROSE MONOHYDRATE 725 MG: 50 INJECTION, SOLUTION INTRAVENOUS at 09:59

## 2024-01-30 ASSESSMENT — PATIENT HEALTH QUESTIONNAIRE - PHQ9
SUM OF ALL RESPONSES TO PHQ9 QUESTIONS 1 AND 2: 2
1. LITTLE INTEREST OR PLEASURE IN DOING THINGS: SEVERAL DAYS
2. FEELING DOWN, DEPRESSED OR HOPELESS: SEVERAL DAYS

## 2024-01-30 ASSESSMENT — ENCOUNTER SYMPTOMS
DEPRESSION: 0
OCCASIONAL FEELINGS OF UNSTEADINESS: 0
LOSS OF SENSATION IN FEET: 0

## 2024-01-30 ASSESSMENT — COLUMBIA-SUICIDE SEVERITY RATING SCALE - C-SSRS
2. HAVE YOU ACTUALLY HAD ANY THOUGHTS OF KILLING YOURSELF?: NO
6. HAVE YOU EVER DONE ANYTHING, STARTED TO DO ANYTHING, OR PREPARED TO DO ANYTHING TO END YOUR LIFE?: NO
1. IN THE PAST MONTH, HAVE YOU WISHED YOU WERE DEAD OR WISHED YOU COULD GO TO SLEEP AND NOT WAKE UP?: NO

## 2024-01-30 ASSESSMENT — PAIN SCALES - GENERAL: PAINLEVEL: 3

## 2024-02-01 ENCOUNTER — APPOINTMENT (OUTPATIENT)
Dept: LAB | Facility: LAB | Age: 44
End: 2024-02-01
Payer: COMMERCIAL

## 2024-02-02 ENCOUNTER — OFFICE VISIT (OUTPATIENT)
Dept: TRANSPLANT | Facility: HOSPITAL | Age: 44
End: 2024-02-02
Payer: COMMERCIAL

## 2024-02-02 ENCOUNTER — PROCEDURE VISIT (OUTPATIENT)
Dept: UROLOGY | Facility: HOSPITAL | Age: 44
End: 2024-02-02
Payer: COMMERCIAL

## 2024-02-02 VITALS — DIASTOLIC BLOOD PRESSURE: 81 MMHG | HEART RATE: 85 BPM | SYSTOLIC BLOOD PRESSURE: 132 MMHG

## 2024-02-02 VITALS
BODY MASS INDEX: 28.67 KG/M2 | WEIGHT: 167 LBS | TEMPERATURE: 97.6 F | DIASTOLIC BLOOD PRESSURE: 83 MMHG | SYSTOLIC BLOOD PRESSURE: 136 MMHG | OXYGEN SATURATION: 100 % | HEART RATE: 87 BPM

## 2024-02-02 DIAGNOSIS — Z94.0 KIDNEY REPLACED BY TRANSPLANT (HHS-HCC): ICD-10-CM

## 2024-02-02 DIAGNOSIS — Z94.9 TRANSPLANT: Primary | ICD-10-CM

## 2024-02-02 PROCEDURE — 3079F DIAST BP 80-89 MM HG: CPT | Performed by: STUDENT IN AN ORGANIZED HEALTH CARE EDUCATION/TRAINING PROGRAM

## 2024-02-02 PROCEDURE — 1036F TOBACCO NON-USER: CPT | Performed by: STUDENT IN AN ORGANIZED HEALTH CARE EDUCATION/TRAINING PROGRAM

## 2024-02-02 PROCEDURE — 99214 OFFICE O/P EST MOD 30 MIN: CPT | Performed by: STUDENT IN AN ORGANIZED HEALTH CARE EDUCATION/TRAINING PROGRAM

## 2024-02-02 PROCEDURE — 99214 OFFICE O/P EST MOD 30 MIN: CPT | Mod: 24 | Performed by: STUDENT IN AN ORGANIZED HEALTH CARE EDUCATION/TRAINING PROGRAM

## 2024-02-02 PROCEDURE — 52000 CYSTOURETHROSCOPY: CPT | Performed by: STUDENT IN AN ORGANIZED HEALTH CARE EDUCATION/TRAINING PROGRAM

## 2024-02-02 PROCEDURE — 3075F SYST BP GE 130 - 139MM HG: CPT | Performed by: STUDENT IN AN ORGANIZED HEALTH CARE EDUCATION/TRAINING PROGRAM

## 2024-02-02 RX ORDER — VALGANCICLOVIR 450 MG/1
900 TABLET, FILM COATED ORAL DAILY
Qty: 60 TABLET | Refills: 1 | Status: SHIPPED | OUTPATIENT
Start: 2024-02-02 | End: 2024-04-11

## 2024-02-02 RX ORDER — TRAZODONE HYDROCHLORIDE 50 MG/1
50 TABLET ORAL NIGHTLY
COMMUNITY
End: 2024-04-25 | Stop reason: WASHOUT

## 2024-02-02 RX ORDER — SERTRALINE HYDROCHLORIDE 50 MG/1
50 TABLET, FILM COATED ORAL DAILY
COMMUNITY

## 2024-02-02 ASSESSMENT — PAIN SCALES - GENERAL
PAINLEVEL: 0-NO PAIN
PAINLEVEL: 0-NO PAIN

## 2024-02-02 NOTE — PROGRESS NOTES
Subjective   Herber Leblanc is a 44 y.o. female who underwent DDKT on 11/30/2023 (Kidney). Here today for follow-up.    Doing well. Some pain at superior aspect of incision.   Pending stent removal today    Review of Systems     Objective    Exam  Gen: AAOx3, NAD  Card: Regular rate and rhythm  Resp: sat well room air, equal chest rise  Abd: incision c/d/I, no erythema or induration. Stitch from drain site  Ext: no lower extremity edema  Lab Results   Component Value Date    CREATININE 1.28 (H) 01/25/2024    K 4.2 01/25/2024    GLUCOSE 81 01/25/2024    HCT 31.1 (L) 01/25/2024    WBC 5.1 01/25/2024     01/25/2024    CALCIUM 8.1 (L) 01/25/2024     Assessment/Plan      S/p DDKT on 11/30/2023 (Kidney)  Course notable for DGF, washout, IR drain  DGF resolved, excellent renal function  Stent pending removal today  If abdominal pain persists consider CT     Immunosuppression   Belatacept next 2/27/24 (transitioned for hemolytic anemia POD 6)  MMF 1000/1000  Pred 10 mg    3. Heb B core recipient +  Tenofovir 1 year    4. Depression  Started on sertraline and trazadone per her psych    5.   Follow up   1 month with nephrology  Labs weekly

## 2024-02-02 NOTE — PATIENT INSTRUCTIONS
Stop magnesium oxide  Increase Valcyte 900 mg (2 tablets) daily  Labs weekly  RTC 4 weeks with Nephrology  If the pain continues within the next 2 weeks, please call and let us know - we can order a CT scan of the abdomen to investigate if needed.

## 2024-02-02 NOTE — PROGRESS NOTES
PROCEDURE NOTE:    PREOPERATIVE DIAGNOSIS:  Kidney transplant     POSTOPERATIVE DIAGNOSIS:  Same    OPERATION:  Flexible Cystourethroscopy  Stent removal     SURGEON:  Elvira Nevarez MD    ANESTHESIA:  2%  lidocaine jelly    COMPLICATIONS:  None    EBL: Minimal    SPECIMEN:  Voided urine was collected and submitted for cytology.    DISPOSITION:  The patient was discharged home after the procedure, per routine.    INDICATIONS: :  Ms. Leblanc is a 44 y.o. patient with a history of kidney transplant who presents today for Cystoscopy.     The indications, risks and benefits of this procedure were discussed with the patient, consent was obtained prior to the procedure, and to the best of my judgement the patient seemed to understand and agree to the procedure.    PROCEDURE:  The patient  was brought into the procedure suite and informed consent was reviewed and confirmed. Vital signs were obtained prior to the procedure: /81   Pulse 85 .   The patient was escorted onto the stretcher, placed supine and froglegged, prepped with betadine and draped in the usual standard surgical fashion.  Intraurethral 2% viscous lidocaine jelly was used for local analgesia.  A 16 [place] flexible cystourethroscope was inserted into the urethra.     Upon entering the bladder the entire bladder was surveyed in a 360 degree fashion.  The left and right ureteral orifices were in normal orthotopic position effluxing clear yellow urine, bilaterally.   There was no evidence of any bladder lesions, foreign objects, stones or evidence of any mucosal changes. The cystoscope was then retroflexed.  The bladder neck was then further examined without any evidence of lesions. The scope was then removed and in an antegrade fashion, the urethra and bladder were again resurveyed with no evidence of additional lesions.  The stent was grasped and brought out per urethra.  The cystoscope was then fully removed.   The patient tolerated the procedure well.   Vitals were stable after the procedure.  The patient was able to void and was discharged home.  Verbal and written Post procedure instructions were reviewed with the patient.    IMPRESSION:  Stent removal     PLAN:  Follow up with transplant medicine

## 2024-02-05 PROCEDURE — RXMED WILLOW AMBULATORY MEDICATION CHARGE

## 2024-02-06 ENCOUNTER — PHARMACY VISIT (OUTPATIENT)
Dept: PHARMACY | Facility: CLINIC | Age: 44
End: 2024-02-06
Payer: COMMERCIAL

## 2024-02-06 ENCOUNTER — SPECIALTY PHARMACY (OUTPATIENT)
Dept: PHARMACY | Facility: CLINIC | Age: 44
End: 2024-02-06

## 2024-02-06 DIAGNOSIS — Z94.0 KIDNEY REPLACED BY TRANSPLANT (HHS-HCC): ICD-10-CM

## 2024-02-06 PROCEDURE — RXMED WILLOW AMBULATORY MEDICATION CHARGE

## 2024-02-08 ENCOUNTER — LAB (OUTPATIENT)
Dept: LAB | Facility: LAB | Age: 44
End: 2024-02-08
Payer: COMMERCIAL

## 2024-02-08 DIAGNOSIS — Z94.0 KIDNEY REPLACED BY TRANSPLANT (HHS-HCC): ICD-10-CM

## 2024-02-08 PROBLEM — Z00.00 ENCOUNTER FOR WELL ADULT EXAM WITHOUT ABNORMAL FINDINGS: Status: RESOLVED | Noted: 2024-01-09 | Resolved: 2024-02-08

## 2024-02-08 LAB
ALBUMIN SERPL BCP-MCNC: 3.8 G/DL (ref 3.4–5)
ANION GAP SERPL CALC-SCNC: 12 MMOL/L (ref 10–20)
BUN SERPL-MCNC: 20 MG/DL (ref 6–23)
CALCIUM SERPL-MCNC: 8.7 MG/DL (ref 8.6–10.3)
CHLORIDE SERPL-SCNC: 110 MMOL/L (ref 98–107)
CO2 SERPL-SCNC: 20 MMOL/L (ref 21–32)
CREAT SERPL-MCNC: 1.16 MG/DL (ref 0.5–1.05)
EGFRCR SERPLBLD CKD-EPI 2021: 59 ML/MIN/1.73M*2
ERYTHROCYTE [DISTWIDTH] IN BLOOD BY AUTOMATED COUNT: 14.9 % (ref 11.5–14.5)
GLUCOSE SERPL-MCNC: 84 MG/DL (ref 74–99)
HCT VFR BLD AUTO: 35.3 % (ref 36–46)
HGB BLD-MCNC: 10.4 G/DL (ref 12–16)
MAGNESIUM SERPL-MCNC: 1.85 MG/DL (ref 1.6–2.4)
MCH RBC QN AUTO: 30.8 PG (ref 26–34)
MCHC RBC AUTO-ENTMCNC: 29.5 G/DL (ref 32–36)
MCV RBC AUTO: 104 FL (ref 80–100)
NRBC BLD-RTO: 0 /100 WBCS (ref 0–0)
PHOSPHATE SERPL-MCNC: 5.1 MG/DL (ref 2.5–4.9)
PLATELET # BLD AUTO: 205 X10*3/UL (ref 150–450)
POTASSIUM SERPL-SCNC: 3.9 MMOL/L (ref 3.5–5.3)
RBC # BLD AUTO: 3.38 X10*6/UL (ref 4–5.2)
SODIUM SERPL-SCNC: 138 MMOL/L (ref 136–145)
WBC # BLD AUTO: 5.9 X10*3/UL (ref 4.4–11.3)

## 2024-02-08 PROCEDURE — 87799 DETECT AGENT NOS DNA QUANT: CPT

## 2024-02-08 PROCEDURE — 85027 COMPLETE CBC AUTOMATED: CPT

## 2024-02-08 PROCEDURE — 36415 COLL VENOUS BLD VENIPUNCTURE: CPT

## 2024-02-08 PROCEDURE — 80069 RENAL FUNCTION PANEL: CPT

## 2024-02-08 PROCEDURE — 83735 ASSAY OF MAGNESIUM: CPT

## 2024-02-09 LAB
BKV DNA SERPL NAA+PROBE-LOG#: NORMAL {LOG_COPIES}/ML
CMV DNA SERPL NAA+PROBE-LOG IU: NORMAL {LOG_IU}/ML
EBV DNA SPEC NAA+PROBE-LOG#: NORMAL {LOG_COPIES}/ML
LABORATORY COMMENT REPORT: NOT DETECTED

## 2024-02-12 ENCOUNTER — SPECIALTY PHARMACY (OUTPATIENT)
Dept: PHARMACY | Facility: CLINIC | Age: 44
End: 2024-02-12

## 2024-02-14 ENCOUNTER — SPECIALTY PHARMACY (OUTPATIENT)
Dept: PHARMACY | Facility: CLINIC | Age: 44
End: 2024-02-14

## 2024-02-14 NOTE — PROGRESS NOTES
[facility] Clinical Note    Herber Leblanc is a 44 y.o. female, who is on the specialty pharmacy service for management of: Hepatitis B Core with status of: (Enrolled)     Herber was contacted on 2/14/2024.    Refer to the encounter summary report for documentation details about patient counseling and education.      Medication Adherence  The importance of adherence was discussed with the patient and they were advised to take the medication as prescribed by their provider. Herber was encouraged to call her physician's office if they have a question regarding a missed dose.     Conclusion  Rate your quality of life on scale of 1-10: 10 - Completely satisfied  Rate your satisfaction with  Specialty Pharmacy on scale of 1-10: 10 - Completely satisfied      Patient advised to contact the pharmacy if there are any changes to her medication list, including prescriptions, OTC medications, herbal products, or supplements. Patient was advised of [facility]’s dispensing process, refill timeline, contact information ([phone]), and patient management follow up. Patient confirmed understanding of education conducted during assessment. All patient questions and concerns were addressed to the best of my ability. Patient was encouraged to contact the specialty pharmacy with any questions or concerns.    Confirmed follow-up outreaches are properly scheduled. Reviewed goals of therapy in the program targets.    Lab Results   Component Value Date     02/08/2024    K 3.9 02/08/2024     (H) 02/08/2024    CO2 20 (L) 02/08/2024    BUN 20 02/08/2024    CREATININE 1.16 (H) 02/08/2024    EGFR 59 (L) 02/08/2024    CALCIUM 8.7 02/08/2024    ALBUMIN 3.8 02/08/2024    PROT 6.6 12/20/2023    BILITOT 0.7 12/20/2023    ALKPHOS 155 (H) 12/20/2023    ALT 9 12/20/2023    AST 34 12/20/2023    GLUCOSE 84 02/08/2024    HEPBSAG Nonreactive 11/30/2023      Autumn Flores, PharmD

## 2024-02-15 ENCOUNTER — LAB (OUTPATIENT)
Dept: LAB | Facility: LAB | Age: 44
End: 2024-02-15
Payer: COMMERCIAL

## 2024-02-15 DIAGNOSIS — Z94.0 KIDNEY REPLACED BY TRANSPLANT (HHS-HCC): ICD-10-CM

## 2024-02-15 LAB
ALBUMIN SERPL BCP-MCNC: 4.1 G/DL (ref 3.4–5)
ANION GAP SERPL CALC-SCNC: 11 MMOL/L (ref 10–20)
BUN SERPL-MCNC: 17 MG/DL (ref 6–23)
CALCIUM SERPL-MCNC: 8.3 MG/DL (ref 8.6–10.3)
CHLORIDE SERPL-SCNC: 108 MMOL/L (ref 98–107)
CO2 SERPL-SCNC: 21 MMOL/L (ref 21–32)
CREAT SERPL-MCNC: 1.37 MG/DL (ref 0.5–1.05)
EGFRCR SERPLBLD CKD-EPI 2021: 48 ML/MIN/1.73M*2
ERYTHROCYTE [DISTWIDTH] IN BLOOD BY AUTOMATED COUNT: 14.6 % (ref 11.5–14.5)
GLUCOSE SERPL-MCNC: 102 MG/DL (ref 74–99)
HCT VFR BLD AUTO: 38 % (ref 36–46)
HGB BLD-MCNC: 11.3 G/DL (ref 12–16)
MAGNESIUM SERPL-MCNC: 1.64 MG/DL (ref 1.6–2.4)
MCH RBC QN AUTO: 31 PG (ref 26–34)
MCHC RBC AUTO-ENTMCNC: 29.7 G/DL (ref 32–36)
MCV RBC AUTO: 104 FL (ref 80–100)
NRBC BLD-RTO: 0 /100 WBCS (ref 0–0)
PHOSPHATE SERPL-MCNC: 4 MG/DL (ref 2.5–4.9)
PLATELET # BLD AUTO: 219 X10*3/UL (ref 150–450)
POTASSIUM SERPL-SCNC: 3.4 MMOL/L (ref 3.5–5.3)
RBC # BLD AUTO: 3.64 X10*6/UL (ref 4–5.2)
SODIUM SERPL-SCNC: 137 MMOL/L (ref 136–145)
WBC # BLD AUTO: 6.5 X10*3/UL (ref 4.4–11.3)

## 2024-02-15 PROCEDURE — 85027 COMPLETE CBC AUTOMATED: CPT

## 2024-02-15 PROCEDURE — 36415 COLL VENOUS BLD VENIPUNCTURE: CPT

## 2024-02-15 PROCEDURE — 80069 RENAL FUNCTION PANEL: CPT

## 2024-02-15 PROCEDURE — 83735 ASSAY OF MAGNESIUM: CPT

## 2024-02-21 RX ORDER — MIRTAZAPINE 7.5 MG/1
15 TABLET, FILM COATED ORAL NIGHTLY
Qty: 60 TABLET | Refills: 0 | Status: SHIPPED | OUTPATIENT
Start: 2024-02-21 | End: 2024-04-25 | Stop reason: WASHOUT

## 2024-02-22 ENCOUNTER — LAB (OUTPATIENT)
Dept: LAB | Facility: LAB | Age: 44
End: 2024-02-22
Payer: COMMERCIAL

## 2024-02-22 DIAGNOSIS — Z94.0 KIDNEY REPLACED BY TRANSPLANT (HHS-HCC): ICD-10-CM

## 2024-02-22 LAB
ALBUMIN SERPL BCP-MCNC: 3.9 G/DL (ref 3.4–5)
ANION GAP SERPL CALC-SCNC: 11 MMOL/L (ref 10–20)
BUN SERPL-MCNC: 13 MG/DL (ref 6–23)
CALCIUM SERPL-MCNC: 9 MG/DL (ref 8.6–10.3)
CHLORIDE SERPL-SCNC: 108 MMOL/L (ref 98–107)
CO2 SERPL-SCNC: 22 MMOL/L (ref 21–32)
CREAT SERPL-MCNC: 1.14 MG/DL (ref 0.5–1.05)
EGFRCR SERPLBLD CKD-EPI 2021: 60 ML/MIN/1.73M*2
ERYTHROCYTE [DISTWIDTH] IN BLOOD BY AUTOMATED COUNT: 14.1 % (ref 11.5–14.5)
GLUCOSE SERPL-MCNC: 80 MG/DL (ref 74–99)
HCT VFR BLD AUTO: 38.5 % (ref 36–46)
HGB BLD-MCNC: 12 G/DL (ref 12–16)
MAGNESIUM SERPL-MCNC: 1.68 MG/DL (ref 1.6–2.4)
MCH RBC QN AUTO: 31.7 PG (ref 26–34)
MCHC RBC AUTO-ENTMCNC: 31.2 G/DL (ref 32–36)
MCV RBC AUTO: 102 FL (ref 80–100)
NRBC BLD-RTO: 0 /100 WBCS (ref 0–0)
PHOSPHATE SERPL-MCNC: 3.7 MG/DL (ref 2.5–4.9)
PLATELET # BLD AUTO: 188 X10*3/UL (ref 150–450)
POTASSIUM SERPL-SCNC: 3.8 MMOL/L (ref 3.5–5.3)
RBC # BLD AUTO: 3.79 X10*6/UL (ref 4–5.2)
SODIUM SERPL-SCNC: 137 MMOL/L (ref 136–145)
WBC # BLD AUTO: 6.5 X10*3/UL (ref 4.4–11.3)

## 2024-02-22 PROCEDURE — 36415 COLL VENOUS BLD VENIPUNCTURE: CPT

## 2024-02-22 PROCEDURE — 85027 COMPLETE CBC AUTOMATED: CPT

## 2024-02-22 PROCEDURE — 83735 ASSAY OF MAGNESIUM: CPT

## 2024-02-22 PROCEDURE — 80069 RENAL FUNCTION PANEL: CPT

## 2024-02-27 ENCOUNTER — INFUSION (OUTPATIENT)
Dept: INFUSION THERAPY | Facility: HOSPITAL | Age: 44
End: 2024-02-27
Payer: COMMERCIAL

## 2024-02-27 VITALS
SYSTOLIC BLOOD PRESSURE: 133 MMHG | OXYGEN SATURATION: 100 % | TEMPERATURE: 97.8 F | WEIGHT: 170.19 LBS | HEART RATE: 68 BPM | DIASTOLIC BLOOD PRESSURE: 86 MMHG | BODY MASS INDEX: 29.21 KG/M2 | RESPIRATION RATE: 20 BRPM

## 2024-02-27 DIAGNOSIS — Z94.0 KIDNEY REPLACED BY TRANSPLANT (HHS-HCC): ICD-10-CM

## 2024-02-27 DIAGNOSIS — D84.9 IMMUNOSUPPRESSION (MULTI): ICD-10-CM

## 2024-02-27 PROCEDURE — 2500000004 HC RX 250 GENERAL PHARMACY W/ HCPCS (ALT 636 FOR OP/ED): Mod: JZ | Performed by: STUDENT IN AN ORGANIZED HEALTH CARE EDUCATION/TRAINING PROGRAM

## 2024-02-27 PROCEDURE — 96365 THER/PROPH/DIAG IV INF INIT: CPT | Mod: INF

## 2024-02-27 RX ORDER — FAMOTIDINE 10 MG/ML
20 INJECTION INTRAVENOUS ONCE AS NEEDED
OUTPATIENT
Start: 2024-02-27

## 2024-02-27 RX ORDER — EPINEPHRINE 0.3 MG/.3ML
0.3 INJECTION SUBCUTANEOUS EVERY 5 MIN PRN
OUTPATIENT
Start: 2024-02-27

## 2024-02-27 RX ORDER — DIPHENHYDRAMINE HYDROCHLORIDE 50 MG/ML
50 INJECTION INTRAMUSCULAR; INTRAVENOUS AS NEEDED
OUTPATIENT
Start: 2024-02-27

## 2024-02-27 RX ORDER — ALBUTEROL SULFATE 0.83 MG/ML
3 SOLUTION RESPIRATORY (INHALATION) AS NEEDED
OUTPATIENT
Start: 2024-02-27

## 2024-02-27 RX ADMIN — DEXTROSE MONOHYDRATE 725 MG: 50 INJECTION, SOLUTION INTRAVENOUS at 10:30

## 2024-02-27 ASSESSMENT — ENCOUNTER SYMPTOMS
OCCASIONAL FEELINGS OF UNSTEADINESS: 0
DEPRESSION: 0
LOSS OF SENSATION IN FEET: 1

## 2024-02-27 ASSESSMENT — PATIENT HEALTH QUESTIONNAIRE - PHQ9
1. LITTLE INTEREST OR PLEASURE IN DOING THINGS: NOT AT ALL
2. FEELING DOWN, DEPRESSED OR HOPELESS: NOT AT ALL
SUM OF ALL RESPONSES TO PHQ9 QUESTIONS 1 AND 2: 0

## 2024-02-27 ASSESSMENT — PAIN SCALES - GENERAL: PAINLEVEL: 4

## 2024-02-29 ENCOUNTER — LAB (OUTPATIENT)
Dept: LAB | Facility: LAB | Age: 44
End: 2024-02-29
Payer: COMMERCIAL

## 2024-02-29 DIAGNOSIS — D84.9 IMMUNOSUPPRESSION (MULTI): ICD-10-CM

## 2024-02-29 DIAGNOSIS — Z94.0 KIDNEY REPLACED BY TRANSPLANT (HHS-HCC): ICD-10-CM

## 2024-02-29 LAB
ALBUMIN SERPL BCP-MCNC: 3.8 G/DL (ref 3.4–5)
ANION GAP SERPL CALC-SCNC: 12 MMOL/L (ref 10–20)
BASOPHILS # BLD AUTO: 0.05 X10*3/UL (ref 0–0.1)
BASOPHILS NFR BLD AUTO: 0.8 %
BUN SERPL-MCNC: 14 MG/DL (ref 6–23)
CALCIUM SERPL-MCNC: 8.6 MG/DL (ref 8.6–10.3)
CHLORIDE SERPL-SCNC: 108 MMOL/L (ref 98–107)
CO2 SERPL-SCNC: 21 MMOL/L (ref 21–32)
CREAT SERPL-MCNC: 1.12 MG/DL (ref 0.5–1.05)
EGFRCR SERPLBLD CKD-EPI 2021: 62 ML/MIN/1.73M*2
EOSINOPHIL # BLD AUTO: 0.06 X10*3/UL (ref 0–0.7)
EOSINOPHIL NFR BLD AUTO: 1 %
ERYTHROCYTE [DISTWIDTH] IN BLOOD BY AUTOMATED COUNT: 14 % (ref 11.5–14.5)
GLUCOSE SERPL-MCNC: 87 MG/DL (ref 74–99)
HCT VFR BLD AUTO: 39.5 % (ref 36–46)
HGB BLD-MCNC: 12.2 G/DL (ref 12–16)
IMM GRANULOCYTES # BLD AUTO: 0.24 X10*3/UL (ref 0–0.7)
IMM GRANULOCYTES NFR BLD AUTO: 4 % (ref 0–0.9)
LYMPHOCYTES # BLD AUTO: 0.69 X10*3/UL (ref 1.2–4.8)
LYMPHOCYTES NFR BLD AUTO: 11.5 %
MAGNESIUM SERPL-MCNC: 1.5 MG/DL (ref 1.6–2.4)
MCH RBC QN AUTO: 31.4 PG (ref 26–34)
MCHC RBC AUTO-ENTMCNC: 30.9 G/DL (ref 32–36)
MCV RBC AUTO: 102 FL (ref 80–100)
MONOCYTES # BLD AUTO: 0.34 X10*3/UL (ref 0.1–1)
MONOCYTES NFR BLD AUTO: 5.7 %
NEUTROPHILS # BLD AUTO: 4.63 X10*3/UL (ref 1.2–7.7)
NEUTROPHILS NFR BLD AUTO: 77 %
NRBC BLD-RTO: 0 /100 WBCS (ref 0–0)
PHOSPHATE SERPL-MCNC: 3.7 MG/DL (ref 2.5–4.9)
PLATELET # BLD AUTO: 239 X10*3/UL (ref 150–450)
POTASSIUM SERPL-SCNC: 4.1 MMOL/L (ref 3.5–5.3)
RBC # BLD AUTO: 3.88 X10*6/UL (ref 4–5.2)
SODIUM SERPL-SCNC: 137 MMOL/L (ref 136–145)
WBC # BLD AUTO: 6 X10*3/UL (ref 4.4–11.3)

## 2024-02-29 PROCEDURE — 85025 COMPLETE CBC W/AUTO DIFF WBC: CPT

## 2024-02-29 PROCEDURE — 36415 COLL VENOUS BLD VENIPUNCTURE: CPT

## 2024-02-29 PROCEDURE — 80069 RENAL FUNCTION PANEL: CPT

## 2024-02-29 PROCEDURE — 83735 ASSAY OF MAGNESIUM: CPT

## 2024-03-01 ENCOUNTER — OFFICE VISIT (OUTPATIENT)
Dept: TRANSPLANT | Facility: HOSPITAL | Age: 44
End: 2024-03-01
Payer: COMMERCIAL

## 2024-03-01 VITALS
WEIGHT: 172.3 LBS | SYSTOLIC BLOOD PRESSURE: 141 MMHG | BODY MASS INDEX: 29.58 KG/M2 | HEART RATE: 79 BPM | OXYGEN SATURATION: 96 % | TEMPERATURE: 97.1 F | DIASTOLIC BLOOD PRESSURE: 86 MMHG

## 2024-03-01 DIAGNOSIS — Z94.0 KIDNEY REPLACED BY TRANSPLANT (HHS-HCC): Primary | ICD-10-CM

## 2024-03-01 DIAGNOSIS — N18.31 STAGE 3A CHRONIC KIDNEY DISEASE (MULTI): ICD-10-CM

## 2024-03-01 DIAGNOSIS — I10 ESSENTIAL HYPERTENSION: ICD-10-CM

## 2024-03-01 DIAGNOSIS — Z94.0 KIDNEY REPLACED BY TRANSPLANT (HHS-HCC): ICD-10-CM

## 2024-03-01 DIAGNOSIS — Z79.899 IMMUNOSUPPRESSIVE MANAGEMENT ENCOUNTER FOLLOWING KIDNEY TRANSPLANT (HHS-HCC): Primary | ICD-10-CM

## 2024-03-01 DIAGNOSIS — Z94.0 IMMUNOSUPPRESSIVE MANAGEMENT ENCOUNTER FOLLOWING KIDNEY TRANSPLANT (HHS-HCC): Primary | ICD-10-CM

## 2024-03-01 LAB
APPEARANCE UR: CLEAR
BACTERIA #/AREA URNS AUTO: ABNORMAL /HPF
BILIRUB UR STRIP.AUTO-MCNC: NEGATIVE MG/DL
COLOR UR: COLORLESS
GLUCOSE UR STRIP.AUTO-MCNC: NORMAL MG/DL
KETONES UR STRIP.AUTO-MCNC: NEGATIVE MG/DL
LEUKOCYTE ESTERASE UR QL STRIP.AUTO: NEGATIVE
NITRITE UR QL STRIP.AUTO: NEGATIVE
PH UR STRIP.AUTO: 5.5 [PH]
PROT UR STRIP.AUTO-MCNC: NEGATIVE MG/DL
RBC # UR STRIP.AUTO: ABNORMAL /UL
RBC #/AREA URNS AUTO: ABNORMAL /HPF
SP GR UR STRIP.AUTO: 1
SQUAMOUS #/AREA URNS AUTO: ABNORMAL /HPF
UROBILINOGEN UR STRIP.AUTO-MCNC: NORMAL MG/DL
WBC #/AREA URNS AUTO: ABNORMAL /HPF

## 2024-03-01 PROCEDURE — 99214 OFFICE O/P EST MOD 30 MIN: CPT | Performed by: INTERNAL MEDICINE

## 2024-03-01 PROCEDURE — 1036F TOBACCO NON-USER: CPT | Performed by: STUDENT IN AN ORGANIZED HEALTH CARE EDUCATION/TRAINING PROGRAM

## 2024-03-01 PROCEDURE — 99213 OFFICE O/P EST LOW 20 MIN: CPT

## 2024-03-01 PROCEDURE — 3079F DIAST BP 80-89 MM HG: CPT | Performed by: INTERNAL MEDICINE

## 2024-03-01 PROCEDURE — 99213 OFFICE O/P EST LOW 20 MIN: CPT | Mod: 24

## 2024-03-01 PROCEDURE — 81001 URINALYSIS AUTO W/SCOPE: CPT | Performed by: INTERNAL MEDICINE

## 2024-03-01 PROCEDURE — 3077F SYST BP >= 140 MM HG: CPT | Performed by: INTERNAL MEDICINE

## 2024-03-01 PROCEDURE — 87086 URINE CULTURE/COLONY COUNT: CPT | Performed by: INTERNAL MEDICINE

## 2024-03-01 RX ORDER — GABAPENTIN 100 MG/1
100 CAPSULE ORAL 3 TIMES DAILY
Qty: 90 CAPSULE | Refills: 11 | Status: SHIPPED | OUTPATIENT
Start: 2024-03-01 | End: 2024-04-25

## 2024-03-01 ASSESSMENT — PAIN SCALES - GENERAL: PAINLEVEL: 0-NO PAIN

## 2024-03-01 NOTE — PATIENT INSTRUCTIONS
CT scan ordered, tasked SA to schedule  Email to pharm to check on Mary Ellen dose  Allosure box was given in clinic  UPC today with UA and culture  Gabapentin 100mg daily as needed   Labs q weekly  RTC in 3 weeks

## 2024-03-01 NOTE — PROGRESS NOTES
Subjective   Herber PINEDA Rai is a 44 y.o. female who underwent DDKT on 11/30/2023 (Kidney). Here today for follow-up.    Continuing with pain at the upper portion of the incision.  Described as burning.  No burning with urination.  No fevers    Review of Systems     Objective    Exam  Gen: AAOx3, NAD  Card: Regular rate and rhythm  Resp: sat well room air, equal chest rise  Abd: incision c/d/I, no erythema or induration.  Incision appears well-healed.  An area of hypersensitivity at the superior border.  No hernia palpable  Ext: no lower extremity edema  Lab Results   Component Value Date    CREATININE 1.12 (H) 02/29/2024    K 4.1 02/29/2024    GLUCOSE 87 02/29/2024    HCT 39.5 02/29/2024    WBC 6.0 02/29/2024     02/29/2024    CALCIUM 8.6 02/29/2024     Assessment/Plan      S/p DDKT on 11/30/2023 (Kidney)  Course notable for DGF, washout, IR drain  DGF resolved, excellent renal function  Will obtain CT scan to evaluate persistent pain  There is a component that sounds nerve pain -trial gabapentin

## 2024-03-02 LAB
BACTERIA UR CULT: NORMAL
HOLD SPECIMEN: NORMAL

## 2024-03-04 ENCOUNTER — HOSPITAL ENCOUNTER (OUTPATIENT)
Dept: RADIOLOGY | Facility: CLINIC | Age: 44
Discharge: HOME | End: 2024-03-04
Payer: COMMERCIAL

## 2024-03-04 ENCOUNTER — DOCUMENTATION (OUTPATIENT)
Dept: TRANSPLANT | Facility: HOSPITAL | Age: 44
End: 2024-03-04
Payer: COMMERCIAL

## 2024-03-04 DIAGNOSIS — Z94.0 KIDNEY REPLACED BY TRANSPLANT (HHS-HCC): ICD-10-CM

## 2024-03-04 PROCEDURE — 74176 CT ABD & PELVIS W/O CONTRAST: CPT

## 2024-03-04 PROCEDURE — 74176 CT ABD & PELVIS W/O CONTRAST: CPT | Mod: FOREIGN READ | Performed by: RADIOLOGY

## 2024-03-04 NOTE — PROGRESS NOTES
CT results reviewed by Dr. Louis:     CT scan completed. looks very benign. will see how gabapentin helps but I don't see any overt cause of her pain on scan. she does have a 6 mm stone in tx kidney but this is unchanged from prior. thanks!     Addendum  Per Dr. Lawanda Kilgore

## 2024-03-05 ENCOUNTER — TELEPHONE (OUTPATIENT)
Dept: TRANSPLANT | Facility: HOSPITAL | Age: 44
End: 2024-03-05
Payer: COMMERCIAL

## 2024-03-05 NOTE — TELEPHONE ENCOUNTER
Called Danuta and PATO for call back.  CT results where reviewed by surgeon, kidney stone noted, Per Dr. Lawanda villa litheduardo.       [employer] order form faxed to [facility]

## 2024-03-07 ENCOUNTER — LAB (OUTPATIENT)
Dept: LAB | Facility: LAB | Age: 44
End: 2024-03-07
Payer: COMMERCIAL

## 2024-03-07 DIAGNOSIS — Z94.0 KIDNEY REPLACED BY TRANSPLANT (HHS-HCC): ICD-10-CM

## 2024-03-07 DIAGNOSIS — N18.31 STAGE 3A CHRONIC KIDNEY DISEASE (MULTI): ICD-10-CM

## 2024-03-07 LAB
25(OH)D3 SERPL-MCNC: 18 NG/ML (ref 30–100)
ALBUMIN SERPL BCP-MCNC: 4 G/DL (ref 3.4–5)
ANION GAP SERPL CALC-SCNC: 11 MMOL/L (ref 10–20)
BUN SERPL-MCNC: 19 MG/DL (ref 6–23)
CALCIUM SERPL-MCNC: 8.8 MG/DL (ref 8.6–10.3)
CHLORIDE SERPL-SCNC: 108 MMOL/L (ref 98–107)
CO2 SERPL-SCNC: 22 MMOL/L (ref 21–32)
CREAT SERPL-MCNC: 1.2 MG/DL (ref 0.5–1.05)
CREAT UR-MCNC: 37.3 MG/DL (ref 20–320)
EGFRCR SERPLBLD CKD-EPI 2021: 57 ML/MIN/1.73M*2
ERYTHROCYTE [DISTWIDTH] IN BLOOD BY AUTOMATED COUNT: 13.7 % (ref 11.5–14.5)
GLUCOSE SERPL-MCNC: 83 MG/DL (ref 74–99)
HCT VFR BLD AUTO: 40.5 % (ref 36–46)
HGB BLD-MCNC: 12.7 G/DL (ref 12–16)
MAGNESIUM SERPL-MCNC: 1.63 MG/DL (ref 1.6–2.4)
MCH RBC QN AUTO: 31.9 PG (ref 26–34)
MCHC RBC AUTO-ENTMCNC: 31.4 G/DL (ref 32–36)
MCV RBC AUTO: 102 FL (ref 80–100)
NRBC BLD-RTO: 0 /100 WBCS (ref 0–0)
PHOSPHATE SERPL-MCNC: 3.4 MG/DL (ref 2.5–4.9)
PLATELET # BLD AUTO: 273 X10*3/UL (ref 150–450)
POTASSIUM SERPL-SCNC: 4.1 MMOL/L (ref 3.5–5.3)
PROT UR-ACNC: 15 MG/DL (ref 5–24)
PROT/CREAT UR: 0.4 MG/MG CREAT (ref 0–0.17)
PTH-INTACT SERPL-MCNC: 77 PG/ML (ref 18.5–88)
RBC # BLD AUTO: 3.98 X10*6/UL (ref 4–5.2)
SODIUM SERPL-SCNC: 137 MMOL/L (ref 136–145)
TACROLIMUS BLD-MCNC: <2 NG/ML
WBC # BLD AUTO: 5.8 X10*3/UL (ref 4.4–11.3)

## 2024-03-07 PROCEDURE — 36415 COLL VENOUS BLD VENIPUNCTURE: CPT

## 2024-03-07 PROCEDURE — 83735 ASSAY OF MAGNESIUM: CPT

## 2024-03-07 PROCEDURE — 84156 ASSAY OF PROTEIN URINE: CPT

## 2024-03-07 PROCEDURE — 80069 RENAL FUNCTION PANEL: CPT

## 2024-03-07 PROCEDURE — 83970 ASSAY OF PARATHORMONE: CPT

## 2024-03-07 PROCEDURE — 82570 ASSAY OF URINE CREATININE: CPT

## 2024-03-07 PROCEDURE — 87799 DETECT AGENT NOS DNA QUANT: CPT

## 2024-03-07 PROCEDURE — 82306 VITAMIN D 25 HYDROXY: CPT

## 2024-03-07 PROCEDURE — 85027 COMPLETE CBC AUTOMATED: CPT

## 2024-03-07 PROCEDURE — 80197 ASSAY OF TACROLIMUS: CPT

## 2024-03-07 NOTE — TELEPHONE ENCOUNTER
Returned call to family member Danuta, gave update on [employer].   Asked for her to relay info to patient.

## 2024-03-11 ENCOUNTER — DOCUMENTATION (OUTPATIENT)
Dept: TRANSPLANT | Facility: HOSPITAL | Age: 44
End: 2024-03-11
Payer: COMMERCIAL

## 2024-03-11 ENCOUNTER — SPECIALTY PHARMACY (OUTPATIENT)
Dept: PHARMACY | Facility: CLINIC | Age: 44
End: 2024-03-11

## 2024-03-11 PROCEDURE — RXMED WILLOW AMBULATORY MEDICATION CHARGE

## 2024-03-11 NOTE — PROGRESS NOTES
Reviewed labs with Dr. Wise:   Vit D 18  PTH 77.0  Ca 8.8    PLAN:  Start ergocalciferol 50,000 weekly for 8 weeks  Direct message to patient with update and request for pharmacy to send script.

## 2024-03-12 ENCOUNTER — PHARMACY VISIT (OUTPATIENT)
Dept: PHARMACY | Facility: CLINIC | Age: 44
End: 2024-03-12
Payer: COMMERCIAL

## 2024-03-13 DIAGNOSIS — Z94.0 KIDNEY REPLACED BY TRANSPLANT (HHS-HCC): ICD-10-CM

## 2024-03-14 ENCOUNTER — LAB (OUTPATIENT)
Dept: LAB | Facility: LAB | Age: 44
End: 2024-03-14
Payer: COMMERCIAL

## 2024-03-14 DIAGNOSIS — Z94.0 KIDNEY REPLACED BY TRANSPLANT (HHS-HCC): ICD-10-CM

## 2024-03-14 LAB
ALBUMIN SERPL BCP-MCNC: 4.2 G/DL (ref 3.4–5)
ANION GAP SERPL CALC-SCNC: 12 MMOL/L (ref 10–20)
BUN SERPL-MCNC: 12 MG/DL (ref 6–23)
CALCIUM SERPL-MCNC: 8.4 MG/DL (ref 8.6–10.3)
CHLORIDE SERPL-SCNC: 108 MMOL/L (ref 98–107)
CO2 SERPL-SCNC: 20 MMOL/L (ref 21–32)
CREAT SERPL-MCNC: 1.01 MG/DL (ref 0.5–1.05)
EGFRCR SERPLBLD CKD-EPI 2021: 70 ML/MIN/1.73M*2
ERYTHROCYTE [DISTWIDTH] IN BLOOD BY AUTOMATED COUNT: 13.4 % (ref 11.5–14.5)
GLUCOSE SERPL-MCNC: 83 MG/DL (ref 74–99)
HCT VFR BLD AUTO: 42.2 % (ref 36–46)
HGB BLD-MCNC: 12.8 G/DL (ref 12–16)
MAGNESIUM SERPL-MCNC: 1.72 MG/DL (ref 1.6–2.4)
MCH RBC QN AUTO: 30.8 PG (ref 26–34)
MCHC RBC AUTO-ENTMCNC: 30.3 G/DL (ref 32–36)
MCV RBC AUTO: 101 FL (ref 80–100)
NRBC BLD-RTO: 0 /100 WBCS (ref 0–0)
PHOSPHATE SERPL-MCNC: 3.1 MG/DL (ref 2.5–4.9)
PLATELET # BLD AUTO: 254 X10*3/UL (ref 150–450)
POTASSIUM SERPL-SCNC: 4 MMOL/L (ref 3.5–5.3)
RBC # BLD AUTO: 4.16 X10*6/UL (ref 4–5.2)
SODIUM SERPL-SCNC: 136 MMOL/L (ref 136–145)
WBC # BLD AUTO: 6.1 X10*3/UL (ref 4.4–11.3)

## 2024-03-14 PROCEDURE — 36415 COLL VENOUS BLD VENIPUNCTURE: CPT

## 2024-03-14 PROCEDURE — 85027 COMPLETE CBC AUTOMATED: CPT

## 2024-03-14 PROCEDURE — 83735 ASSAY OF MAGNESIUM: CPT

## 2024-03-14 PROCEDURE — 80069 RENAL FUNCTION PANEL: CPT

## 2024-03-15 ENCOUNTER — APPOINTMENT (OUTPATIENT)
Dept: RADIOLOGY | Facility: CLINIC | Age: 44
End: 2024-03-15
Payer: COMMERCIAL

## 2024-03-20 LAB
ALLOSURE SCORE - KIDNEY: 0.28 %
CAREDX_ORDER_ID: NORMAL
CENTER_ORDER_ID: NORMAL
CLIENT SPECIMEN ID - ALLOSURE: NORMAL
DONOR RELATION - ALLOSURE: NORMAL
NOTES - ALLOSURE: NORMAL
RELATIVE CHANGE VALUE - KIDNEY: NORMAL
TEST COMMENTS - ALLOSURE: NORMAL
TIME POST TX - ALLOSURE: NORMAL
TRANSPLANTED ORGAN - ALLOSURE: NORMAL
TX DATE - ALLOSURE/ALLOMAP: NORMAL
WP_ORDER_ID: NORMAL

## 2024-03-21 ENCOUNTER — LAB (OUTPATIENT)
Dept: LAB | Facility: LAB | Age: 44
End: 2024-03-21
Payer: COMMERCIAL

## 2024-03-21 DIAGNOSIS — Z94.0 KIDNEY REPLACED BY TRANSPLANT (HHS-HCC): ICD-10-CM

## 2024-03-21 LAB
ALBUMIN SERPL BCP-MCNC: 3.8 G/DL (ref 3.4–5)
ANION GAP SERPL CALC-SCNC: 13 MMOL/L (ref 10–20)
BUN SERPL-MCNC: 12 MG/DL (ref 6–23)
CALCIUM SERPL-MCNC: 8.8 MG/DL (ref 8.6–10.3)
CHLORIDE SERPL-SCNC: 105 MMOL/L (ref 98–107)
CO2 SERPL-SCNC: 21 MMOL/L (ref 21–32)
CREAT SERPL-MCNC: 1.25 MG/DL (ref 0.5–1.05)
EGFRCR SERPLBLD CKD-EPI 2021: 54 ML/MIN/1.73M*2
ERYTHROCYTE [DISTWIDTH] IN BLOOD BY AUTOMATED COUNT: 13.2 % (ref 11.5–14.5)
GLUCOSE SERPL-MCNC: 81 MG/DL (ref 74–99)
HCT VFR BLD AUTO: 43.5 % (ref 36–46)
HGB BLD-MCNC: 13.6 G/DL (ref 12–16)
MAGNESIUM SERPL-MCNC: 1.5 MG/DL (ref 1.6–2.4)
MCH RBC QN AUTO: 31.8 PG (ref 26–34)
MCHC RBC AUTO-ENTMCNC: 31.3 G/DL (ref 32–36)
MCV RBC AUTO: 102 FL (ref 80–100)
NRBC BLD-RTO: 0 /100 WBCS (ref 0–0)
PHOSPHATE SERPL-MCNC: 4.1 MG/DL (ref 2.5–4.9)
PLATELET # BLD AUTO: 204 X10*3/UL (ref 150–450)
POTASSIUM SERPL-SCNC: 3.8 MMOL/L (ref 3.5–5.3)
RBC # BLD AUTO: 4.28 X10*6/UL (ref 4–5.2)
SODIUM SERPL-SCNC: 135 MMOL/L (ref 136–145)
WBC # BLD AUTO: 8.4 X10*3/UL (ref 4.4–11.3)

## 2024-03-21 PROCEDURE — 80069 RENAL FUNCTION PANEL: CPT

## 2024-03-21 PROCEDURE — 85027 COMPLETE CBC AUTOMATED: CPT

## 2024-03-21 PROCEDURE — 36415 COLL VENOUS BLD VENIPUNCTURE: CPT

## 2024-03-21 PROCEDURE — 83735 ASSAY OF MAGNESIUM: CPT

## 2024-03-22 ENCOUNTER — OFFICE VISIT (OUTPATIENT)
Dept: TRANSPLANT | Facility: HOSPITAL | Age: 44
End: 2024-03-22
Payer: COMMERCIAL

## 2024-03-22 VITALS
HEART RATE: 79 BPM | DIASTOLIC BLOOD PRESSURE: 104 MMHG | WEIGHT: 174.5 LBS | SYSTOLIC BLOOD PRESSURE: 156 MMHG | BODY MASS INDEX: 29.95 KG/M2 | OXYGEN SATURATION: 99 % | TEMPERATURE: 97.7 F

## 2024-03-22 DIAGNOSIS — I10 ESSENTIAL HYPERTENSION: ICD-10-CM

## 2024-03-22 DIAGNOSIS — E55.9 VITAMIN D DEFICIENCY: ICD-10-CM

## 2024-03-22 DIAGNOSIS — M79.10 MUSCLE ACHE: ICD-10-CM

## 2024-03-22 DIAGNOSIS — Z79.899 IMMUNOSUPPRESSIVE MANAGEMENT ENCOUNTER FOLLOWING KIDNEY TRANSPLANT (HHS-HCC): ICD-10-CM

## 2024-03-22 DIAGNOSIS — Z94.0 IMMUNOSUPPRESSIVE MANAGEMENT ENCOUNTER FOLLOWING KIDNEY TRANSPLANT (HHS-HCC): ICD-10-CM

## 2024-03-22 DIAGNOSIS — Z94.0 KIDNEY REPLACED BY TRANSPLANT (HHS-HCC): Primary | ICD-10-CM

## 2024-03-22 DIAGNOSIS — H10.11 ALLERGIC CONJUNCTIVITIS OF RIGHT EYE: ICD-10-CM

## 2024-03-22 PROCEDURE — 3077F SYST BP >= 140 MM HG: CPT | Performed by: INTERNAL MEDICINE

## 2024-03-22 PROCEDURE — 99214 OFFICE O/P EST MOD 30 MIN: CPT | Performed by: INTERNAL MEDICINE

## 2024-03-22 PROCEDURE — 3080F DIAST BP >= 90 MM HG: CPT | Performed by: INTERNAL MEDICINE

## 2024-03-22 PROCEDURE — 1036F TOBACCO NON-USER: CPT | Performed by: INTERNAL MEDICINE

## 2024-03-22 RX ORDER — CYCLOBENZAPRINE HCL 10 MG
10 TABLET ORAL 3 TIMES DAILY PRN
Qty: 30 TABLET | Refills: 1 | Status: SHIPPED | OUTPATIENT
Start: 2024-03-22 | End: 2024-04-25 | Stop reason: WASHOUT

## 2024-03-22 RX ORDER — AZELASTINE HYDROCHLORIDE 0.5 MG/ML
1 SOLUTION/ DROPS OPHTHALMIC 2 TIMES DAILY
Qty: 6 ML | Refills: 3 | Status: SHIPPED | OUTPATIENT
Start: 2024-03-22 | End: 2025-03-22

## 2024-03-22 RX ORDER — CHOLECALCIFEROL (VITAMIN D3) 50 MCG
50 TABLET ORAL DAILY
Qty: 30 TABLET | Refills: 11 | Status: SHIPPED | OUTPATIENT
Start: 2024-03-22 | End: 2024-04-25 | Stop reason: SDUPTHER

## 2024-03-22 RX ORDER — AMLODIPINE BESYLATE 5 MG/1
5 TABLET ORAL DAILY
Qty: 30 TABLET | Refills: 11 | Status: SHIPPED | OUTPATIENT
Start: 2024-03-22 | End: 2024-04-25

## 2024-03-22 RX ORDER — MINERAL OIL
180 ENEMA (ML) RECTAL DAILY
Qty: 30 TABLET | Refills: 11 | Status: SHIPPED | OUTPATIENT
Start: 2024-03-22 | End: 2025-03-22

## 2024-03-22 ASSESSMENT — PAIN SCALES - GENERAL: PAINLEVEL: 0-NO PAIN

## 2024-03-22 NOTE — PATIENT INSTRUCTIONS
Flexeril for muscle relaxant.  Eye drop and allegra for itching eyes.  Start amlodipine 5 mg daily  Start vitamin D 2000 once a day  Ok to take hair, skin, nail Biotin today.  Blood test every 2 weeks  Next clinic visit 1 month

## 2024-03-22 NOTE — PROGRESS NOTES
TRANSPLANT NEPHROLOGY :   OUTPATIENT CLINIC NOTE      SERVICE DATE : 2024    REASON FOR VISIT/CHIEF COMPLAINT:  S/P  TRANSPLANT SURGERY  IMMUNOSUPPRESSIVE MEDICATION MANAGEMENT  BLOOD PRESSURE MANAGEMENT    HPI:    Ms. Leblanc is a 44 y.o. female with past medical history significant for ESRD secondary to HTN/NSAID whom received a  donor kidney transplant on 23 by Dr. Marbin Lambert & Dr. Louis with a KDPI of 56% and PRA of 48%.  HCV -/- and donor has not met risk factors. EBV +/+. 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 Dry weight is 81.1 kg (discharge weight is 76.2kg ).  Pt received a total of 4.5 mg/kg total of thymoglobulin induction therapy in conjunction with 500mg IV solumedrol.  Pt was initiated on Tacrolimus & Cellcept immunosuppression in conjunction with IV solumedrol taper.  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 Pt was started on valcyte (CMV D + / R + ) and bactrim for CMV & PCP prophylaxis per protocol. She was started on clotrimazole as antifungal coverage per protocol. Operative course was complicated by return to OR for exploration of transplanted kidney and washout of hematoma. Hospital course was complicated by post-operative pain and constipation, which has resolved .    Patient is doing well overall. No new complaints. Denied chest pain, SOB, FORREST, Palpitation. Normal urination and bowel movement. Normal gait and no weakness of arms/legs. No cough, runny nose, sore throat, cold symptoms, or rash. No hearing loss. Normal vision.No problems with his sleep, mood and function. No recent infection, hospitalization, surgery or ER visits.      ROS:  Review of  14 systems was performed system by system. See HPI. Otherwise, the symptoms were negative.    PAST MEDICAL HISTORY:  Past Medical History:   Diagnosis Date    Chronic kidney disease     Chronic kidney disease, unspecified     CKD, patient interested in  transplantation    GERD (gastroesophageal reflux disease)     Personal history of COVID-19 07/20/2022    History of COVID-19        PAST SURGICAL HISTORY:  Past Surgical History:   Procedure Laterality Date    MR HEAD ANGIO W AND WO IV CONTRAST  01/26/2021    MR HEAD ANGIO W AND WO IV CONTRAST    MR HEAD ANGIO WO IV CONTRAST  01/26/2021    MR HEAD ANGIO WO IV CONTRAST    OTHER SURGICAL HISTORY  07/20/2022    Arteriovenous fistula creation procedure    TRANSPLANT, KIDNEY, OPEN Right 11/30/2023    US GUIDED PERCUTANEOUS PERITONEAL OR RETROPERITONEAL FLUID COLLECTION DRAINAGE  12/21/2023    US GUIDED PERCUTANEOUS PERITONEAL OR RETROPERITONEAL FLUID COLLECTION DRAINAGE 12/21/2023 Batsheva Shanks MD [facility]        SOCIAL HISTORY:  Social History     Socioeconomic History    Marital status:      Spouse name: Not on file    Number of children: Not on file    Years of education: Not on file    Highest education level: Not on file   Occupational History    Not on file   Tobacco Use    Smoking status: Never    Smokeless tobacco: Former     Quit date: 2021    Tobacco comments:     Chewing tobacco, quit 1 year ago   Vaping Use    Vaping Use: Never used   Substance and Sexual Activity    Alcohol use: Never    Drug use: Never    Sexual activity: Defer   Other Topics Concern    Not on file   Social History Narrative    Not on file     Social Determinants of Health     Financial Resource Strain: Low Risk  (11/30/2023)    Overall Financial Resource Strain (CARDIA)     Difficulty of Paying Living Expenses: Not hard at all   Food Insecurity: Not on file   Transportation Needs: No Transportation Needs (1/16/2024)    OASIS : Transportation     Lack of Transportation (Medical): No     Lack of Transportation (Non-Medical): No     Patient Unable or Declines to Respond: No   Physical Activity: Not on file   Stress: Not on file   Social Connections: Feeling Somewhat Isolated (1/16/2024)    OASIS : Social Isolation     Frequency  of experiencing loneliness or isolation: Sometimes   Intimate Partner Violence: Not on file   Housing Stability: Low Risk  (11/30/2023)    Housing Stability Vital Sign     Unable to Pay for Housing in the Last Year: No     Number of Places Lived in the Last Year: 1     Unstable Housing in the Last Year: No       FAMILY HISTORY:  No family history on file.    MEDICATION LIST:  Current Outpatient Medications   Medication Instructions    amLODIPine (NORVASC) 5 mg, oral, Daily    azelastine (Optivar) 0.05 % ophthalmic solution 1 drop, Both Eyes, 2 times daily    calcitriol (ROCALTROL) 0.25 mcg, oral, Daily    calcium carbonate (Tums) 200 mg calcium chewable tablet Chew and swallow one (1) tablet (500 mg) by mouth 2 times a day.    cholecalciferol (VITAMIN D-3) 50 mcg, oral, Daily    cyclobenzaprine (FLEXERIL) 10 mg, oral, 3 times daily PRN    fexofenadine (ALLEGRA) 180 mg, oral, Daily    gabapentin (NEURONTIN) 100 mg, oral, 3 times daily    levothyroxine (SYNTHROID, LEVOXYL) 25 mcg, oral, Daily before breakfast    mirtazapine (REMERON) 15 mg, oral, Nightly    mycophenolate (CELLCEPT) 1,000 mg, oral, Every 12 hours    pantoprazole (PROTONIX) 40 mg, oral, Daily before breakfast, Do not crush, chew, or split.    predniSONE (DELTASONE) 10 mg, oral, Daily    sertraline (ZOLOFT) 50 mg, oral, Daily    sulfamethoxazole-trimethoprim (Bactrim) 400-80 mg tablet 1 tablet, oral, Daily    traZODone (DESYREL) 50 mg, oral, Nightly, As needed    valGANciclovir (VALCYTE) 900 mg, oral, Daily, Do not crush or chew.    Vemlidy 25 mg, oral, Daily       ALLERGY  No Known Allergies    PHYSICAL EXAM:    Visit Vitals  BP (!) 156/104   Pulse 79   Temp 36.5 °C (97.7 °F) (Temporal)   Wt 79.2 kg (174 lb 8 oz)   SpO2 99%   BMI 29.95 kg/m²   Smoking Status Never   BSA 1.89 m²          Vital signs - reviewed. Acceptable BP at this office visit.   General Appearance - NAD, Good speech, oriented and alert  HEENT - Supple. Not pale. No jaundice. No  cervical lymphadenopathy. Pharynx and tonsils are not injected.  CVS - RRR. Normal S1/S2. No murmur, click , rub or gallop  Lungs- clear to auscultation bilaterally  Abdomen - soft , not tender, no guarding, no rigidity. No hepatosplenomegaly. Normal bowel sounds. No masses and ascites. S/P Kidney transplant .  Transplanted kidney is not tender.   Musculoskeletal /Extremities - no edema. Full ROM. No joint tenderness.   Neuro/Psych - appropriate mood and affect. Motor power V/V all extremities. CN I -XII were grossly intact.  Skin - No visible rash      LABS:    Lab Results   Component Value Date    WBC 8.4 03/21/2024    HGB 13.6 03/21/2024    HCT 43.5 03/21/2024     03/21/2024    ALT 9 12/20/2023    AST 34 12/20/2023     (L) 03/21/2024    K 3.8 03/21/2024     03/21/2024    CREATININE 1.25 (H) 03/21/2024    BUN 12 03/21/2024    CO2 21 03/21/2024    INR 1.2 (H) 12/20/2023    HGBA1C 4.8 05/09/2023     par    ASSESSMENT AND PLAN:    Ms. Leblanc is a 44 y.o. female  who is here for follow up s/p kidney transplant.    TRANSPLANT DATE: 11/30/2023 (Kidney)      1. ESRD S/P kidney transplant   - Creatinine last check was :  Lab Results   Component Value Date    CREATININE 1.25 (H) 03/21/2024     Serum creatinine: 1.25 mg/dL (H) 03/21/24 0806  Estimated creatinine clearance: 57.3 mL/min (A)    - Renal allograft function is GOOD  -Random urine protein/creatinine ratio is NORMAL  -Allosure last check was NORMAL  -Ensure adequate hydration  - Avoid nephrotoxic medications, NSAIDs, and IV contrast.    2. Immunosuppression  -On belatacept, MMF and prednisone  - We discussed with clinic tx pharm d and dosing of belatacept is appropriate 3/22/24  -Continue current immunosuppression regimen.    3. Electrolytes  Lab Results   Component Value Date    GLUCOSE 81 03/21/2024    CALCIUM 8.8 03/21/2024     (L) 03/21/2024    K 3.8 03/21/2024    CO2 21 03/21/2024     03/21/2024    BUN 12 03/21/2024    CREATININE  1.25 (H) 03/21/2024     -Acceptable from last lab drawn    4. Hypertension  Blood Pressures         3/22/2024  1105             BP: 156/104          -Home  BP had been acceptable  -Encourage to monitor home BP  -Continue current anti hypertensive medication    5. Bone Mineral Disease/Osteoporosis  Lab Results   Component Value Date    PTH 77.0 03/07/2024    CALCIUM 8.8 03/21/2024    CAION 0.75 (LL) 12/14/2023    PHOS 4.1 03/21/2024    VITD25 18 (L) 03/07/2024     -check VIT D, PTH with next lab  - Consider DEXA every 2-3 years , defer to PCP    6.Anemia  Lab Results   Component Value Date    WBC 8.4 03/21/2024    HGB 13.6 03/21/2024    HCT 43.5 03/21/2024     (H) 03/21/2024     03/21/2024     -asymptomatic  - Continue to monitor  -check iron studies and ferritin. Will consider DENIZ as needed.  - No indications for blood transfusion     7.Health maintenance and vaccination  - Flu shot during flu season annually  - Cancer screening is up to date per the patient    Lab : Routine transplant lab ( CBC, RFP, and anti-rejection trough level ) every 2 months  Additional labs:  VIT D, PTH with next lab  Viral screening PCR, Allosure and UPC per protocol.    Additional Plan :  Flexeril for muscle relaxant.  Eye drop and allegra for itching eyes.  Start amlodipine 5 mg daily  Start vitamin D 2000 once a day  Ok to take hair, skin, nail Biotin today.  Updated Dr. Louis about ongoing chronic pain. She saw surgery and already got CT done. To me, she doesn't look like she is in pain as at last clinic.  Blood test every 2 weeks  Next clinic visit 1 month    Marcia Webber    Transplant Nephrology

## 2024-03-26 ENCOUNTER — INFUSION (OUTPATIENT)
Dept: INFUSION THERAPY | Facility: HOSPITAL | Age: 44
End: 2024-03-26
Payer: COMMERCIAL

## 2024-03-26 ENCOUNTER — SPECIALTY PHARMACY (OUTPATIENT)
Dept: PHARMACY | Facility: CLINIC | Age: 44
End: 2024-03-26

## 2024-03-26 VITALS
RESPIRATION RATE: 20 BRPM | BODY MASS INDEX: 30.12 KG/M2 | HEART RATE: 72 BPM | SYSTOLIC BLOOD PRESSURE: 141 MMHG | TEMPERATURE: 98.3 F | DIASTOLIC BLOOD PRESSURE: 91 MMHG | WEIGHT: 175.49 LBS | OXYGEN SATURATION: 99 %

## 2024-03-26 DIAGNOSIS — D84.9 IMMUNOSUPPRESSION (MULTI): ICD-10-CM

## 2024-03-26 DIAGNOSIS — Z94.0 KIDNEY REPLACED BY TRANSPLANT (HHS-HCC): ICD-10-CM

## 2024-03-26 PROCEDURE — 96365 THER/PROPH/DIAG IV INF INIT: CPT | Mod: INF

## 2024-03-26 PROCEDURE — 2500000004 HC RX 250 GENERAL PHARMACY W/ HCPCS (ALT 636 FOR OP/ED): Performed by: STUDENT IN AN ORGANIZED HEALTH CARE EDUCATION/TRAINING PROGRAM

## 2024-03-26 RX ORDER — ALBUTEROL SULFATE 0.83 MG/ML
3 SOLUTION RESPIRATORY (INHALATION) AS NEEDED
Status: CANCELLED | OUTPATIENT
Start: 2024-04-22

## 2024-03-26 RX ORDER — EPINEPHRINE 0.3 MG/.3ML
0.3 INJECTION SUBCUTANEOUS EVERY 5 MIN PRN
Status: CANCELLED | OUTPATIENT
Start: 2024-04-22

## 2024-03-26 RX ORDER — FAMOTIDINE 10 MG/ML
20 INJECTION INTRAVENOUS ONCE AS NEEDED
Status: CANCELLED | OUTPATIENT
Start: 2024-04-22

## 2024-03-26 RX ORDER — DIPHENHYDRAMINE HYDROCHLORIDE 50 MG/ML
50 INJECTION INTRAMUSCULAR; INTRAVENOUS AS NEEDED
Status: CANCELLED | OUTPATIENT
Start: 2024-04-22

## 2024-03-26 RX ADMIN — DEXTROSE MONOHYDRATE 362.5 MG: 50 INJECTION, SOLUTION INTRAVENOUS at 10:05

## 2024-03-26 ASSESSMENT — PATIENT HEALTH QUESTIONNAIRE - PHQ9
3. TROUBLE FALLING OR STAYING ASLEEP OR SLEEPING TOO MUCH: MORE THAN HALF THE DAYS
7. TROUBLE CONCENTRATING ON THINGS, SUCH AS READING THE NEWSPAPER OR WATCHING TELEVISION: NOT AT ALL
5. POOR APPETITE OR OVEREATING: NOT AT ALL
1. LITTLE INTEREST OR PLEASURE IN DOING THINGS: SEVERAL DAYS
2. FEELING DOWN, DEPRESSED OR HOPELESS: SEVERAL DAYS
SUM OF ALL RESPONSES TO PHQ QUESTIONS 1-9: 7
6. FEELING BAD ABOUT YOURSELF - OR THAT YOU ARE A FAILURE OR HAVE LET YOURSELF OR YOUR FAMILY DOWN: SEVERAL DAYS
8. MOVING OR SPEAKING SO SLOWLY THAT OTHER PEOPLE COULD HAVE NOTICED. OR THE OPPOSITE, BEING SO FIGETY OR RESTLESS THAT YOU HAVE BEEN MOVING AROUND A LOT MORE THAN USUAL: NOT AT ALL
4. FEELING TIRED OR HAVING LITTLE ENERGY: MORE THAN HALF THE DAYS
9. THOUGHTS THAT YOU WOULD BE BETTER OFF DEAD, OR OF HURTING YOURSELF: NOT AT ALL
SUM OF ALL RESPONSES TO PHQ9 QUESTIONS 1 AND 2: 2
10. IF YOU CHECKED OFF ANY PROBLEMS, HOW DIFFICULT HAVE THESE PROBLEMS MADE IT FOR YOU TO DO YOUR WORK, TAKE CARE OF THINGS AT HOME, OR GET ALONG WITH OTHER PEOPLE: SOMEWHAT DIFFICULT

## 2024-03-26 ASSESSMENT — ENCOUNTER SYMPTOMS
OCCASIONAL FEELINGS OF UNSTEADINESS: 1
DEPRESSION: 1
LOSS OF SENSATION IN FEET: 1

## 2024-03-26 ASSESSMENT — PAIN SCALES - GENERAL: PAINLEVEL: 6

## 2024-04-04 DIAGNOSIS — Z94.0 KIDNEY REPLACED BY TRANSPLANT (HHS-HCC): ICD-10-CM

## 2024-04-04 RX ORDER — MIRTAZAPINE 7.5 MG/1
15 TABLET, FILM COATED ORAL NIGHTLY
Qty: 60 TABLET | Refills: 0 | Status: CANCELLED | OUTPATIENT
Start: 2024-04-04

## 2024-04-08 DIAGNOSIS — Z94.0 KIDNEY REPLACED BY TRANSPLANT (HHS-HCC): ICD-10-CM

## 2024-04-10 RX ORDER — MIRTAZAPINE 7.5 MG/1
15 TABLET, FILM COATED ORAL NIGHTLY
Qty: 60 TABLET | Refills: 0 | Status: SHIPPED | OUTPATIENT
Start: 2024-04-10 | End: 2024-04-25 | Stop reason: SDUPTHER

## 2024-04-11 ENCOUNTER — TELEPHONE (OUTPATIENT)
Dept: TRANSPLANT | Facility: HOSPITAL | Age: 44
End: 2024-04-11

## 2024-04-11 ENCOUNTER — LAB (OUTPATIENT)
Dept: LAB | Facility: LAB | Age: 44
End: 2024-04-11
Payer: COMMERCIAL

## 2024-04-11 DIAGNOSIS — Z94.0 KIDNEY REPLACED BY TRANSPLANT (HHS-HCC): ICD-10-CM

## 2024-04-11 DIAGNOSIS — D84.9 IMMUNOSUPPRESSION (MULTI): ICD-10-CM

## 2024-04-11 LAB
ALBUMIN SERPL BCP-MCNC: 4.1 G/DL (ref 3.4–5)
ANION GAP SERPL CALC-SCNC: 14 MMOL/L (ref 10–20)
BASOPHILS # BLD AUTO: 0.05 X10*3/UL (ref 0–0.1)
BASOPHILS NFR BLD AUTO: 1 %
BUN SERPL-MCNC: 9 MG/DL (ref 6–23)
CALCIUM SERPL-MCNC: 8.8 MG/DL (ref 8.6–10.3)
CHLORIDE SERPL-SCNC: 105 MMOL/L (ref 98–107)
CO2 SERPL-SCNC: 20 MMOL/L (ref 21–32)
CREAT SERPL-MCNC: 1.01 MG/DL (ref 0.5–1.05)
EGFRCR SERPLBLD CKD-EPI 2021: 70 ML/MIN/1.73M*2
EOSINOPHIL # BLD AUTO: 0.19 X10*3/UL (ref 0–0.7)
EOSINOPHIL NFR BLD AUTO: 3.9 %
ERYTHROCYTE [DISTWIDTH] IN BLOOD BY AUTOMATED COUNT: 12.5 % (ref 11.5–14.5)
GLUCOSE SERPL-MCNC: 78 MG/DL (ref 74–99)
HCT VFR BLD AUTO: 48 % (ref 36–46)
HGB BLD-MCNC: 14.7 G/DL (ref 12–16)
IMM GRANULOCYTES # BLD AUTO: 0.09 X10*3/UL (ref 0–0.7)
IMM GRANULOCYTES NFR BLD AUTO: 1.8 % (ref 0–0.9)
LYMPHOCYTES # BLD AUTO: 0.56 X10*3/UL (ref 1.2–4.8)
LYMPHOCYTES NFR BLD AUTO: 11.4 %
MAGNESIUM SERPL-MCNC: 1.73 MG/DL (ref 1.6–2.4)
MCH RBC QN AUTO: 30.4 PG (ref 26–34)
MCHC RBC AUTO-ENTMCNC: 30.6 G/DL (ref 32–36)
MCV RBC AUTO: 99 FL (ref 80–100)
MONOCYTES # BLD AUTO: 0.68 X10*3/UL (ref 0.1–1)
MONOCYTES NFR BLD AUTO: 13.8 %
NEUTROPHILS # BLD AUTO: 3.36 X10*3/UL (ref 1.2–7.7)
NEUTROPHILS NFR BLD AUTO: 68.1 %
NRBC BLD-RTO: 0 /100 WBCS (ref 0–0)
PHOSPHATE SERPL-MCNC: 3.1 MG/DL (ref 2.5–4.9)
PLATELET # BLD AUTO: 168 X10*3/UL (ref 150–450)
POTASSIUM SERPL-SCNC: 3.9 MMOL/L (ref 3.5–5.3)
RBC # BLD AUTO: 4.83 X10*6/UL (ref 4–5.2)
SODIUM SERPL-SCNC: 135 MMOL/L (ref 136–145)
WBC # BLD AUTO: 4.9 X10*3/UL (ref 4.4–11.3)

## 2024-04-11 PROCEDURE — 83735 ASSAY OF MAGNESIUM: CPT

## 2024-04-11 PROCEDURE — 87799 DETECT AGENT NOS DNA QUANT: CPT

## 2024-04-11 PROCEDURE — 36415 COLL VENOUS BLD VENIPUNCTURE: CPT

## 2024-04-11 PROCEDURE — 85025 COMPLETE CBC W/AUTO DIFF WBC: CPT

## 2024-04-11 PROCEDURE — 80069 RENAL FUNCTION PANEL: CPT

## 2024-04-11 NOTE — TELEPHONE ENCOUNTER
Returned call to [facility], he wanted to confirm date and time of next infusion, 4/23/24.

## 2024-04-13 DIAGNOSIS — Z94.0 KIDNEY REPLACED BY TRANSPLANT (HHS-HCC): ICD-10-CM

## 2024-04-15 DIAGNOSIS — I10 BENIGN ESSENTIAL HTN: ICD-10-CM

## 2024-04-15 RX ORDER — VALSARTAN 160 MG/1
160 TABLET ORAL DAILY
Qty: 30 TABLET | Refills: 5 | Status: SHIPPED | OUTPATIENT
Start: 2024-04-15

## 2024-04-15 RX ORDER — CALCITRIOL 0.25 UG/1
CAPSULE ORAL DAILY
Qty: 30 CAPSULE | Refills: 2 | Status: SHIPPED | OUTPATIENT
Start: 2024-04-15

## 2024-04-16 LAB
ALLOSURE SCORE - KIDNEY: 0.29 %
CAREDX_ORDER_ID: NORMAL
CENTER_ORDER_ID: NORMAL
CLIENT SPECIMEN ID - ALLOSURE: NORMAL
DONOR RELATION - ALLOSURE: NORMAL
NOTES - ALLOSURE: NORMAL
RELATIVE CHANGE VALUE - KIDNEY: 4 %
TEST COMMENTS - ALLOSURE: NORMAL
TIME POST TX - ALLOSURE: NORMAL
TRANSPLANTED ORGAN - ALLOSURE: NORMAL
TX DATE - ALLOSURE/ALLOMAP: NORMAL
WP_ORDER_ID: NORMAL

## 2024-04-18 ENCOUNTER — SPECIALTY PHARMACY (OUTPATIENT)
Dept: PHARMACY | Facility: CLINIC | Age: 44
End: 2024-04-18

## 2024-04-23 ENCOUNTER — APPOINTMENT (OUTPATIENT)
Dept: INFUSION THERAPY | Facility: HOSPITAL | Age: 44
End: 2024-04-23
Payer: COMMERCIAL

## 2024-04-24 ENCOUNTER — INFUSION (OUTPATIENT)
Dept: INFUSION THERAPY | Facility: HOSPITAL | Age: 44
End: 2024-04-24
Payer: COMMERCIAL

## 2024-04-24 VITALS
TEMPERATURE: 97.4 F | HEART RATE: 80 BPM | RESPIRATION RATE: 18 BRPM | BODY MASS INDEX: 30.46 KG/M2 | WEIGHT: 177.47 LBS | SYSTOLIC BLOOD PRESSURE: 134 MMHG | DIASTOLIC BLOOD PRESSURE: 89 MMHG | OXYGEN SATURATION: 100 %

## 2024-04-24 DIAGNOSIS — Z94.0 KIDNEY REPLACED BY TRANSPLANT (HHS-HCC): ICD-10-CM

## 2024-04-24 DIAGNOSIS — D84.9 IMMUNOSUPPRESSION (MULTI): ICD-10-CM

## 2024-04-24 PROCEDURE — 96365 THER/PROPH/DIAG IV INF INIT: CPT | Mod: INF

## 2024-04-24 PROCEDURE — 2500000004 HC RX 250 GENERAL PHARMACY W/ HCPCS (ALT 636 FOR OP/ED): Mod: JZ | Performed by: STUDENT IN AN ORGANIZED HEALTH CARE EDUCATION/TRAINING PROGRAM

## 2024-04-24 RX ORDER — FAMOTIDINE 10 MG/ML
20 INJECTION INTRAVENOUS ONCE AS NEEDED
Status: CANCELLED | OUTPATIENT
Start: 2024-05-20

## 2024-04-24 RX ORDER — ALBUTEROL SULFATE 0.83 MG/ML
3 SOLUTION RESPIRATORY (INHALATION) AS NEEDED
Status: CANCELLED | OUTPATIENT
Start: 2024-05-20

## 2024-04-24 RX ORDER — DIPHENHYDRAMINE HYDROCHLORIDE 50 MG/ML
50 INJECTION INTRAMUSCULAR; INTRAVENOUS AS NEEDED
Status: CANCELLED | OUTPATIENT
Start: 2024-05-20

## 2024-04-24 RX ORDER — EPINEPHRINE 0.3 MG/.3ML
0.3 INJECTION SUBCUTANEOUS EVERY 5 MIN PRN
Status: CANCELLED | OUTPATIENT
Start: 2024-05-20

## 2024-04-24 RX ADMIN — DEXTROSE MONOHYDRATE 362.5 MG: 50 INJECTION, SOLUTION INTRAVENOUS at 10:52

## 2024-04-24 ASSESSMENT — PATIENT HEALTH QUESTIONNAIRE - PHQ9
SUM OF ALL RESPONSES TO PHQ9 QUESTIONS 1 AND 2: 2
1. LITTLE INTEREST OR PLEASURE IN DOING THINGS: SEVERAL DAYS
2. FEELING DOWN, DEPRESSED OR HOPELESS: SEVERAL DAYS
10. IF YOU CHECKED OFF ANY PROBLEMS, HOW DIFFICULT HAVE THESE PROBLEMS MADE IT FOR YOU TO DO YOUR WORK, TAKE CARE OF THINGS AT HOME, OR GET ALONG WITH OTHER PEOPLE: SOMEWHAT DIFFICULT
SUM OF ALL RESPONSES TO PHQ QUESTIONS 1-9: 9
9. THOUGHTS THAT YOU WOULD BE BETTER OFF DEAD, OR OF HURTING YOURSELF: NOT AT ALL
8. MOVING OR SPEAKING SO SLOWLY THAT OTHER PEOPLE COULD HAVE NOTICED. OR THE OPPOSITE, BEING SO FIGETY OR RESTLESS THAT YOU HAVE BEEN MOVING AROUND A LOT MORE THAN USUAL: SEVERAL DAYS
5. POOR APPETITE OR OVEREATING: NOT AT ALL
4. FEELING TIRED OR HAVING LITTLE ENERGY: NEARLY EVERY DAY
6. FEELING BAD ABOUT YOURSELF - OR THAT YOU ARE A FAILURE OR HAVE LET YOURSELF OR YOUR FAMILY DOWN: NOT AT ALL
3. TROUBLE FALLING OR STAYING ASLEEP OR SLEEPING TOO MUCH: NEARLY EVERY DAY
7. TROUBLE CONCENTRATING ON THINGS, SUCH AS READING THE NEWSPAPER OR WATCHING TELEVISION: NOT AT ALL

## 2024-04-24 ASSESSMENT — PAIN SCALES - GENERAL: PAINLEVEL: 0-NO PAIN

## 2024-04-24 ASSESSMENT — ENCOUNTER SYMPTOMS
LOSS OF SENSATION IN FEET: 0
OCCASIONAL FEELINGS OF UNSTEADINESS: 1
DEPRESSION: 1

## 2024-04-24 ASSESSMENT — COLUMBIA-SUICIDE SEVERITY RATING SCALE - C-SSRS
1. IN THE PAST MONTH, HAVE YOU WISHED YOU WERE DEAD OR WISHED YOU COULD GO TO SLEEP AND NOT WAKE UP?: NO
2. HAVE YOU ACTUALLY HAD ANY THOUGHTS OF KILLING YOURSELF?: NO
6. HAVE YOU EVER DONE ANYTHING, STARTED TO DO ANYTHING, OR PREPARED TO DO ANYTHING TO END YOUR LIFE?: NO

## 2024-04-25 ENCOUNTER — OFFICE VISIT (OUTPATIENT)
Dept: TRANSPLANT | Facility: HOSPITAL | Age: 44
End: 2024-04-25
Payer: COMMERCIAL

## 2024-04-25 VITALS
DIASTOLIC BLOOD PRESSURE: 95 MMHG | BODY MASS INDEX: 30.9 KG/M2 | TEMPERATURE: 97.4 F | SYSTOLIC BLOOD PRESSURE: 145 MMHG | OXYGEN SATURATION: 100 % | HEART RATE: 88 BPM | WEIGHT: 180 LBS

## 2024-04-25 DIAGNOSIS — E55.9 VITAMIN D DEFICIENCY: ICD-10-CM

## 2024-04-25 DIAGNOSIS — M79.10 MUSCLE ACHE: ICD-10-CM

## 2024-04-25 DIAGNOSIS — Z94.0 KIDNEY REPLACED BY TRANSPLANT (HHS-HCC): Primary | ICD-10-CM

## 2024-04-25 DIAGNOSIS — Z79.899 IMMUNOSUPPRESSIVE MANAGEMENT ENCOUNTER FOLLOWING KIDNEY TRANSPLANT (HHS-HCC): ICD-10-CM

## 2024-04-25 DIAGNOSIS — I10 ESSENTIAL HYPERTENSION: ICD-10-CM

## 2024-04-25 DIAGNOSIS — Z94.0 IMMUNOSUPPRESSIVE MANAGEMENT ENCOUNTER FOLLOWING KIDNEY TRANSPLANT (HHS-HCC): ICD-10-CM

## 2024-04-25 DIAGNOSIS — H10.11 ALLERGIC CONJUNCTIVITIS OF RIGHT EYE: ICD-10-CM

## 2024-04-25 PROCEDURE — 3077F SYST BP >= 140 MM HG: CPT | Performed by: INTERNAL MEDICINE

## 2024-04-25 PROCEDURE — 99214 OFFICE O/P EST MOD 30 MIN: CPT | Performed by: INTERNAL MEDICINE

## 2024-04-25 PROCEDURE — 3080F DIAST BP >= 90 MM HG: CPT | Performed by: INTERNAL MEDICINE

## 2024-04-25 RX ORDER — SUMATRIPTAN SUCCINATE 100 MG/1
100 TABLET ORAL ONCE AS NEEDED
Qty: 9 TABLET | Refills: 11 | Status: SHIPPED | OUTPATIENT
Start: 2024-04-25 | End: 2025-04-26

## 2024-04-25 RX ORDER — GABAPENTIN 100 MG/1
300 CAPSULE ORAL NIGHTLY
Qty: 270 CAPSULE | Refills: 3 | Status: SHIPPED | OUTPATIENT
Start: 2024-04-25 | End: 2025-04-25

## 2024-04-25 RX ORDER — AMLODIPINE BESYLATE 5 MG/1
5 TABLET ORAL 2 TIMES DAILY
Qty: 180 TABLET | Refills: 3 | Status: SHIPPED | OUTPATIENT
Start: 2024-04-25 | End: 2025-04-25

## 2024-04-25 RX ORDER — AMLODIPINE BESYLATE 5 MG/1
5 TABLET ORAL 2 TIMES DAILY
Qty: 180 TABLET | Refills: 3 | Status: SHIPPED | OUTPATIENT
Start: 2024-04-25 | End: 2024-04-25 | Stop reason: SDUPTHER

## 2024-04-25 RX ORDER — CHOLECALCIFEROL (VITAMIN D3) 50 MCG
100 TABLET ORAL DAILY
Qty: 180 TABLET | Refills: 3 | Status: SHIPPED | OUTPATIENT
Start: 2024-04-25 | End: 2024-05-22 | Stop reason: SDUPTHER

## 2024-04-25 RX ORDER — SUMATRIPTAN SUCCINATE 100 MG/1
100 TABLET ORAL ONCE AS NEEDED
Qty: 9 TABLET | Refills: 11 | Status: SHIPPED | OUTPATIENT
Start: 2024-04-25 | End: 2024-04-25 | Stop reason: SDUPTHER

## 2024-04-25 RX ORDER — GABAPENTIN 100 MG/1
300 CAPSULE ORAL NIGHTLY
Qty: 270 CAPSULE | Refills: 3 | Status: SHIPPED | OUTPATIENT
Start: 2024-04-25 | End: 2024-04-25 | Stop reason: SDUPTHER

## 2024-04-25 ASSESSMENT — PAIN SCALES - GENERAL: PAINLEVEL: 0-NO PAIN

## 2024-04-25 NOTE — PATIENT INSTRUCTIONS
May take gabapentin 200 mg at night time x 1-2 weeks. If neuropathy is still bad, can do 300 mg at night time.  STOP Remeron , Trazodone, Flexeril.  Can take Imitrex as needed for migraine.  Increase Vitamin D to 2 TABS daily.  Increase amlodipine to 5 mg twice a day for high blood pressure  Continue blood test every 2 weeks  Next follow up in 4-6 weeks

## 2024-04-25 NOTE — PROGRESS NOTES
TRANSPLANT NEPHROLOGY :   OUTPATIENT CLINIC NOTE      SERVICE DATE : 2024    REASON FOR VISIT/CHIEF COMPLAINT:  S/P  TRANSPLANT SURGERY  IMMUNOSUPPRESSIVE MEDICATION MANAGEMENT  BLOOD PRESSURE MANAGEMENT    HPI:    Ms. Leblanc is a 44 y.o. female with past medical history significant for ESRD secondary to HTN/NSAID whom received a  donor kidney transplant on 23 by Dr. Marbin Lambert & Dr. Louis with a KDPI of 56% and PRA of 48%. HCV -/- and donor has not met risk factors. EBV +/+. 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 Dry weight is 81.1 kg (discharge weight is 76.2kg ). Pt received a total of 4.5 mg/kg total of thymoglobulin induction therapy in conjunction with 500mg IV solumedrol. Pt was initiated on Tacrolimus & Cellcept immunosuppression in conjunction with IV solumedrol taper. 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 Pt was started on valcyte (CMV D + / R + ) and bactrim for CMV & PCP prophylaxis per protocol. She was started on clotrimazole as antifungal coverage per protocol. Operative course was complicated by return to OR for exploration of transplanted kidney and washout of hematoma. Hospital course was complicated by post-operative pain and constipation, which has resolved.    Patient is here for follow up s/p kidney transplant.    Received belatacept yesterday.     Patient is doing well overall. No new complaints. Denied chest pain, SOB, FORREST, Palpitation. Normal urination and bowel movement. Normal gait and no weakness of arms/legs. No cough, runny nose, sore throat, cold symptoms, or rash. No hearing loss. Normal vision.No problems with his sleep, mood and function. No recent infection, hospitalization, surgery or ER visits.      ROS:  Review of  14 systems was performed system by system. See HPI. Otherwise, the symptoms were negative.    PAST MEDICAL HISTORY:  Past Medical History:   Diagnosis Date    Anxiety      Chronic kidney disease     Chronic kidney disease, unspecified     CKD, patient interested in transplantation    Depression     Disease of thyroid gland     GERD (gastroesophageal reflux disease)     Hypertension     Personal history of COVID-19 07/20/2022    History of COVID-19        PAST SURGICAL HISTORY:  Past Surgical History:   Procedure Laterality Date    MR HEAD ANGIO W AND WO IV CONTRAST  01/26/2021    MR HEAD ANGIO W AND WO IV CONTRAST    MR HEAD ANGIO WO IV CONTRAST  01/26/2021    MR HEAD ANGIO WO IV CONTRAST    OTHER SURGICAL HISTORY  07/20/2022    Arteriovenous fistula creation procedure    TRANSPLANT, KIDNEY, OPEN Right 11/30/2023    US GUIDED PERCUTANEOUS PERITONEAL OR RETROPERITONEAL FLUID COLLECTION DRAINAGE  12/21/2023    US GUIDED PERCUTANEOUS PERITONEAL OR RETROPERITONEAL FLUID COLLECTION DRAINAGE 12/21/2023 Batsheva Shanks MD [facility]        SOCIAL HISTORY:  Social History     Socioeconomic History    Marital status:      Spouse name: Not on file    Number of children: Not on file    Years of education: Not on file    Highest education level: Not on file   Occupational History    Not on file   Tobacco Use    Smoking status: Never    Smokeless tobacco: Former     Quit date: 2021    Tobacco comments:     Chewing tobacco, quit 1 year ago   Vaping Use    Vaping status: Never Used   Substance and Sexual Activity    Alcohol use: Never    Drug use: Never    Sexual activity: Defer   Other Topics Concern    Not on file   Social History Narrative    Not on file     Social Determinants of Health     Financial Resource Strain: Low Risk  (1/22/2024)    Received from [employer]    Overall Financial Resource Strain (CARDIA)     Difficulty of Paying Living Expenses: Not hard at all   Food Insecurity: No Food Insecurity (1/22/2024)    Received from [employer]    Hunger Vital Sign     Worried About Running Out of Food in the Last Year: Never true     Ran Out of Food in the Last Year: Never true    Transportation Needs: No Transportation Needs (1/22/2024)    Received from [employer]    PRAPARE - Transportation     Lack of Transportation (Medical): No     Lack of Transportation (Non-Medical): No   Physical Activity: Not on File (8/20/2019)    Received from [facility]     Physical Activity     Physical Activity: 0   Stress: Not on File (8/20/2019)    Received from [employer]     Stress     Stress: 0   Social Connections: Feeling Somewhat Isolated (1/16/2024)    OASIS : Social Isolation     Frequency of experiencing loneliness or isolation: Sometimes   Intimate Partner Violence: Not on file   Housing Stability: Unknown (1/22/2024)    Received from [employer]    Housing Stability Vital Sign     Unable to Pay for Housing in the Last Year: No     Number of Places Lived in the Last Year: Not on file     Unstable Housing in the Last Year: No       FAMILY HISTORY:  No family history on file.    MEDICATION LIST:  Current Outpatient Medications   Medication Instructions    amLODIPine (NORVASC) 5 mg, oral, Daily    azelastine (Optivar) 0.05 % ophthalmic solution 1 drop, Both Eyes, 2 times daily    BELATACEPT IV intravenous, Every 28 days, Maintenance infusion      calcitriol (Rocaltrol) 0.25 mcg capsule oral, Daily    cholecalciferol (VITAMIN D-3) 50 mcg, oral, Daily    cyclobenzaprine (FLEXERIL) 10 mg, oral, 3 times daily PRN    fexofenadine (ALLEGRA) 180 mg, oral, Daily    gabapentin (NEURONTIN) 100 mg, oral, 3 times daily    levothyroxine (SYNTHROID, LEVOXYL) 25 mcg, oral, Daily before breakfast    mirtazapine (REMERON) 15 mg, oral, Nightly    mycophenolate (CELLCEPT) 1,000 mg, oral, Every 12 hours    pantoprazole (PROTONIX) 40 mg, oral, Daily before breakfast, Do not crush, chew, or split.    predniSONE (DELTASONE) 10 mg, oral, Daily    sertraline (ZOLOFT) 50 mg, oral, Daily    tenofovir alafenamide (VEMLIDY) 25 mg, oral, Daily    traZODone (DESYREL) 50 mg, oral, Nightly, As needed       ALLERGY  No Known  Allergies    PHYSICAL EXAM:    Visit Vitals  BP (!) 145/95   Pulse 88   Temp 36.3 °C (97.4 °F) (Temporal)   Wt 81.6 kg (180 lb)   SpO2 100%   BMI 30.90 kg/m²   Smoking Status Never   BSA 1.92 m²          Vital signs - reviewed. Acceptable BP at this office visit.   General Appearance - NAD, Good speech, oriented and alert  HEENT - Supple. Not pale. No jaundice. No cervical lymphadenopathy. Pharynx and tonsils are not injected.  CVS - RRR. Normal S1/S2. No murmur, click , rub or gallop  Lungs- clear to auscultation bilaterally  Abdomen - soft , not tender, no guarding, no rigidity. No hepatosplenomegaly. Normal bowel sounds. No masses and ascites. S/P Kidney transplant .  Transplanted kidney is not tender.   Musculoskeletal /Extremities - no edema. Full ROM. No joint tenderness.   Neuro/Psych - appropriate mood and affect. Motor power V/V all extremities. CN I -XII were grossly intact.  Skin - No visible rash      LABS:    Lab Results   Component Value Date    WBC 4.9 04/11/2024    HGB 14.7 04/11/2024    HCT 48.0 (H) 04/11/2024     04/11/2024    ALT 9 12/20/2023    AST 34 12/20/2023     (L) 04/11/2024    K 3.9 04/11/2024     04/11/2024    CREATININE 1.01 04/11/2024    BUN 9 04/11/2024    CO2 20 (L) 04/11/2024    INR 1.2 (H) 12/20/2023    HGBA1C 4.8 05/09/2023     par    ASSESSMENT AND PLAN:    Ms. Leblanc is a 44 y.o. female  who is here for follow up s/p kidney transplant.    TRANSPLANT DATE: 11/30/2023 (Kidney)      1. ESRD S/P kidney transplant   - Creatinine last check was :  Lab Results   Component Value Date    CREATININE 1.01 04/11/2024     Creatinine clearance cannot be calculated (Patient's most recent lab result is older than the maximum 7 days allowed.)    - Renal allograft function is good.   -Random urine protein/creatinine ratio is normal  -Allosure last check was normal  -Ensure adequate hydration  - Avoid nephrotoxic medications, NSAIDs, and IV contrast.    2. Immunosuppression  -On  Mary Ellen, MMF, Pred 10 mg daily  -Continue current immunosuppression regimen.    3. Electrolytes  Lab Results   Component Value Date    GLUCOSE 78 04/11/2024    CALCIUM 8.8 04/11/2024     (L) 04/11/2024    K 3.9 04/11/2024    CO2 20 (L) 04/11/2024     04/11/2024    BUN 9 04/11/2024    CREATININE 1.01 04/11/2024     -Acceptable from last lab drawn    4. Hypertension  Blood Pressures         4/25/2024  0922             BP: 145/95          -Home  BP had been HIGH, will increaser amlodipine.  -Encourage to monitor home BP  -Continue current anti hypertensive medication    5. Bone Mineral Disease/Osteoporosis  Lab Results   Component Value Date    PTH 77.0 03/07/2024    CALCIUM 8.8 04/11/2024    CAION 0.75 (LL) 12/14/2023    PHOS 3.1 04/11/2024    VITD25 18 (L) 03/07/2024     -check VIT D, PTH with next lab  - Consider DEXA every 2-3 years , defer to PCP    6.Anemia  Lab Results   Component Value Date    WBC 4.9 04/11/2024    HGB 14.7 04/11/2024    HCT 48.0 (H) 04/11/2024    MCV 99 04/11/2024     04/11/2024     -asymptomatic  - Continue to monitor  -check iron studies and ferritin. Will consider DENIZ as needed.  - No indications for blood transfusion     7.Health maintenance and vaccination  - Flu shot during flu season annually  - Cancer screening is up to date per the patient    Additional Plan :  May take gabapentin 200 mg at night time x 1-2 weeks. If neuropathy is still bad, can do 300 mg at night time.  STOP Remeron , Trazodone, Flexeril.  Can take Imitrex as needed for migraine.  Increase Vitamin D to 2 TABS daily.  Increase amlodipine to 5 mg twice a day for high blood pressure  Continue blood test every 2 weeks  Next follow up in 4-6 weeks      Marcia Webber    Transplant Nephrology

## 2024-04-26 PROCEDURE — RXMED WILLOW AMBULATORY MEDICATION CHARGE

## 2024-05-07 DIAGNOSIS — Z92.25 PERSONAL HISTORY OF IMMUNOSUPRESSION THERAPY: Primary | ICD-10-CM

## 2024-05-07 DIAGNOSIS — Z94.0 KIDNEY REPLACED BY TRANSPLANT (HHS-HCC): ICD-10-CM

## 2024-05-08 RX ORDER — PANTOPRAZOLE SODIUM 40 MG/1
TABLET, DELAYED RELEASE ORAL
Qty: 30 TABLET | Refills: 1 | Status: SHIPPED | OUTPATIENT
Start: 2024-05-08 | End: 2024-05-29 | Stop reason: HOSPADM

## 2024-05-08 RX ORDER — PANTOPRAZOLE SODIUM 40 MG/1
40 TABLET, DELAYED RELEASE ORAL
Qty: 30 TABLET | Refills: 1 | Status: SHIPPED | OUTPATIENT
Start: 2024-05-08 | End: 2024-07-07

## 2024-05-08 RX ORDER — SULFAMETHOXAZOLE AND TRIMETHOPRIM 400; 80 MG/1; MG/1
1 TABLET ORAL DAILY
Qty: 30 TABLET | Refills: 0 | Status: SHIPPED | OUTPATIENT
Start: 2024-05-08 | End: 2024-05-29 | Stop reason: HOSPADM

## 2024-05-09 ENCOUNTER — LAB (OUTPATIENT)
Dept: LAB | Facility: LAB | Age: 44
End: 2024-05-09
Payer: COMMERCIAL

## 2024-05-09 ENCOUNTER — TELEPHONE (OUTPATIENT)
Dept: TRANSPLANT | Facility: HOSPITAL | Age: 44
End: 2024-05-09

## 2024-05-09 DIAGNOSIS — E55.9 VITAMIN D DEFICIENCY: ICD-10-CM

## 2024-05-09 DIAGNOSIS — D84.9 IMMUNOSUPPRESSION (MULTI): ICD-10-CM

## 2024-05-09 DIAGNOSIS — I10 ESSENTIAL HYPERTENSION: ICD-10-CM

## 2024-05-09 DIAGNOSIS — M79.10 MUSCLE ACHE: ICD-10-CM

## 2024-05-09 DIAGNOSIS — Z94.0 IMMUNOSUPPRESSIVE MANAGEMENT ENCOUNTER FOLLOWING KIDNEY TRANSPLANT (HHS-HCC): ICD-10-CM

## 2024-05-09 DIAGNOSIS — Z94.0 KIDNEY REPLACED BY TRANSPLANT (HHS-HCC): ICD-10-CM

## 2024-05-09 DIAGNOSIS — Z79.899 IMMUNOSUPPRESSIVE MANAGEMENT ENCOUNTER FOLLOWING KIDNEY TRANSPLANT (HHS-HCC): ICD-10-CM

## 2024-05-09 DIAGNOSIS — H10.11 ALLERGIC CONJUNCTIVITIS OF RIGHT EYE: ICD-10-CM

## 2024-05-09 LAB
ALBUMIN SERPL BCP-MCNC: 4 G/DL (ref 3.4–5)
ANION GAP SERPL CALC-SCNC: 11 MMOL/L (ref 10–20)
BASOPHILS # BLD AUTO: 0.04 X10*3/UL (ref 0–0.1)
BASOPHILS NFR BLD AUTO: 1 %
BUN SERPL-MCNC: 7 MG/DL (ref 6–23)
CALCIUM SERPL-MCNC: 9.1 MG/DL (ref 8.6–10.3)
CHLORIDE SERPL-SCNC: 106 MMOL/L (ref 98–107)
CO2 SERPL-SCNC: 22 MMOL/L (ref 21–32)
CREAT SERPL-MCNC: 0.89 MG/DL (ref 0.5–1.05)
EGFRCR SERPLBLD CKD-EPI 2021: 81 ML/MIN/1.73M*2
EOSINOPHIL # BLD AUTO: 0.1 X10*3/UL (ref 0–0.7)
EOSINOPHIL NFR BLD AUTO: 2.6 %
ERYTHROCYTE [DISTWIDTH] IN BLOOD BY AUTOMATED COUNT: 11.9 % (ref 11.5–14.5)
GLUCOSE SERPL-MCNC: 94 MG/DL (ref 74–99)
HCT VFR BLD AUTO: 44.6 % (ref 36–46)
HGB BLD-MCNC: 14.3 G/DL (ref 12–16)
IMM GRANULOCYTES # BLD AUTO: 0.09 X10*3/UL (ref 0–0.7)
IMM GRANULOCYTES NFR BLD AUTO: 2.3 % (ref 0–0.9)
LYMPHOCYTES # BLD AUTO: 0.25 X10*3/UL (ref 1.2–4.8)
LYMPHOCYTES NFR BLD AUTO: 6.4 %
MAGNESIUM SERPL-MCNC: 1.65 MG/DL (ref 1.6–2.4)
MCH RBC QN AUTO: 29.9 PG (ref 26–34)
MCHC RBC AUTO-ENTMCNC: 32.1 G/DL (ref 32–36)
MCV RBC AUTO: 93 FL (ref 80–100)
MONOCYTES # BLD AUTO: 0.44 X10*3/UL (ref 0.1–1)
MONOCYTES NFR BLD AUTO: 11.3 %
NEUTROPHILS # BLD AUTO: 2.98 X10*3/UL (ref 1.2–7.7)
NEUTROPHILS NFR BLD AUTO: 76.4 %
NRBC BLD-RTO: 0 /100 WBCS (ref 0–0)
PHOSPHATE SERPL-MCNC: 2.7 MG/DL (ref 2.5–4.9)
PLATELET # BLD AUTO: 187 X10*3/UL (ref 150–450)
POTASSIUM SERPL-SCNC: 3.3 MMOL/L (ref 3.5–5.3)
RBC # BLD AUTO: 4.78 X10*6/UL (ref 4–5.2)
SODIUM SERPL-SCNC: 136 MMOL/L (ref 136–145)
WBC # BLD AUTO: 3.9 X10*3/UL (ref 4.4–11.3)

## 2024-05-09 PROCEDURE — 83735 ASSAY OF MAGNESIUM: CPT

## 2024-05-09 PROCEDURE — RXMED WILLOW AMBULATORY MEDICATION CHARGE

## 2024-05-09 PROCEDURE — 36415 COLL VENOUS BLD VENIPUNCTURE: CPT

## 2024-05-09 PROCEDURE — 80069 RENAL FUNCTION PANEL: CPT

## 2024-05-09 PROCEDURE — 85025 COMPLETE CBC W/AUTO DIFF WBC: CPT

## 2024-05-11 ENCOUNTER — PHARMACY VISIT (OUTPATIENT)
Dept: PHARMACY | Facility: CLINIC | Age: 44
End: 2024-05-11
Payer: COMMERCIAL

## 2024-05-21 ENCOUNTER — APPOINTMENT (OUTPATIENT)
Dept: INFUSION THERAPY | Facility: HOSPITAL | Age: 44
End: 2024-05-21
Payer: COMMERCIAL

## 2024-05-22 ENCOUNTER — APPOINTMENT (OUTPATIENT)
Dept: INFUSION THERAPY | Facility: HOSPITAL | Age: 44
End: 2024-05-22
Payer: COMMERCIAL

## 2024-05-22 ENCOUNTER — INFUSION (OUTPATIENT)
Dept: INFUSION THERAPY | Facility: HOSPITAL | Age: 44
End: 2024-05-22
Payer: COMMERCIAL

## 2024-05-22 VITALS
SYSTOLIC BLOOD PRESSURE: 111 MMHG | DIASTOLIC BLOOD PRESSURE: 73 MMHG | OXYGEN SATURATION: 97 % | TEMPERATURE: 97.8 F | BODY MASS INDEX: 30.46 KG/M2 | HEART RATE: 92 BPM | WEIGHT: 177.47 LBS | RESPIRATION RATE: 16 BRPM

## 2024-05-22 DIAGNOSIS — Z94.0 KIDNEY REPLACED BY TRANSPLANT (HHS-HCC): ICD-10-CM

## 2024-05-22 DIAGNOSIS — D84.9 IMMUNOSUPPRESSION (MULTI): ICD-10-CM

## 2024-05-22 PROCEDURE — 2500000004 HC RX 250 GENERAL PHARMACY W/ HCPCS (ALT 636 FOR OP/ED): Performed by: STUDENT IN AN ORGANIZED HEALTH CARE EDUCATION/TRAINING PROGRAM

## 2024-05-22 PROCEDURE — 96365 THER/PROPH/DIAG IV INF INIT: CPT | Mod: INF

## 2024-05-22 RX ORDER — DIPHENHYDRAMINE HYDROCHLORIDE 50 MG/ML
50 INJECTION INTRAMUSCULAR; INTRAVENOUS AS NEEDED
OUTPATIENT
Start: 2024-06-17

## 2024-05-22 RX ORDER — FAMOTIDINE 10 MG/ML
20 INJECTION INTRAVENOUS ONCE AS NEEDED
OUTPATIENT
Start: 2024-06-17

## 2024-05-22 RX ORDER — EPINEPHRINE 0.3 MG/.3ML
0.3 INJECTION SUBCUTANEOUS EVERY 5 MIN PRN
OUTPATIENT
Start: 2024-06-17

## 2024-05-22 RX ORDER — ALBUTEROL SULFATE 0.83 MG/ML
3 SOLUTION RESPIRATORY (INHALATION) AS NEEDED
OUTPATIENT
Start: 2024-06-17

## 2024-05-22 RX ADMIN — DEXTROSE MONOHYDRATE 362.5 MG: 50 INJECTION, SOLUTION INTRAVENOUS at 12:35

## 2024-05-22 ASSESSMENT — PATIENT HEALTH QUESTIONNAIRE - PHQ9
10. IF YOU CHECKED OFF ANY PROBLEMS, HOW DIFFICULT HAVE THESE PROBLEMS MADE IT FOR YOU TO DO YOUR WORK, TAKE CARE OF THINGS AT HOME, OR GET ALONG WITH OTHER PEOPLE: NOT DIFFICULT AT ALL
SUM OF ALL RESPONSES TO PHQ QUESTIONS 1-9: 7
3. TROUBLE FALLING OR STAYING ASLEEP OR SLEEPING TOO MUCH: SEVERAL DAYS
8. MOVING OR SPEAKING SO SLOWLY THAT OTHER PEOPLE COULD HAVE NOTICED. OR THE OPPOSITE, BEING SO FIGETY OR RESTLESS THAT YOU HAVE BEEN MOVING AROUND A LOT MORE THAN USUAL: SEVERAL DAYS
9. THOUGHTS THAT YOU WOULD BE BETTER OFF DEAD, OR OF HURTING YOURSELF: NOT AT ALL
5. POOR APPETITE OR OVEREATING: SEVERAL DAYS
4. FEELING TIRED OR HAVING LITTLE ENERGY: SEVERAL DAYS
2. FEELING DOWN, DEPRESSED OR HOPELESS: SEVERAL DAYS
7. TROUBLE CONCENTRATING ON THINGS, SUCH AS READING THE NEWSPAPER OR WATCHING TELEVISION: SEVERAL DAYS
1. LITTLE INTEREST OR PLEASURE IN DOING THINGS: SEVERAL DAYS
SUM OF ALL RESPONSES TO PHQ9 QUESTIONS 1 AND 2: 2
6. FEELING BAD ABOUT YOURSELF - OR THAT YOU ARE A FAILURE OR HAVE LET YOURSELF OR YOUR FAMILY DOWN: NOT AT ALL

## 2024-05-22 ASSESSMENT — ENCOUNTER SYMPTOMS
OCCASIONAL FEELINGS OF UNSTEADINESS: 1
DEPRESSION: 1
LOSS OF SENSATION IN FEET: 0

## 2024-05-22 ASSESSMENT — PAIN SCALES - GENERAL: PAINLEVEL: 8

## 2024-05-22 NOTE — TELEPHONE ENCOUNTER
Returned call, went to pharmacy had questions in regards to scripts, reviewed med list   And sent Vit D to [facility] pharmacy.

## 2024-05-23 ENCOUNTER — LAB (OUTPATIENT)
Dept: LAB | Facility: LAB | Age: 44
End: 2024-05-23
Payer: COMMERCIAL

## 2024-05-23 DIAGNOSIS — D84.9 IMMUNOSUPPRESSION (MULTI): ICD-10-CM

## 2024-05-23 DIAGNOSIS — Z94.0 KIDNEY REPLACED BY TRANSPLANT (HHS-HCC): ICD-10-CM

## 2024-05-23 LAB
ALBUMIN SERPL BCP-MCNC: 3.4 G/DL (ref 3.4–5)
ANION GAP SERPL CALC-SCNC: 10 MMOL/L (ref 10–20)
BASOPHILS # BLD MANUAL: 0 X10*3/UL (ref 0–0.1)
BASOPHILS NFR BLD MANUAL: 0 %
BUN SERPL-MCNC: 8 MG/DL (ref 6–23)
CALCIUM SERPL-MCNC: 8.2 MG/DL (ref 8.6–10.3)
CHLORIDE SERPL-SCNC: 104 MMOL/L (ref 98–107)
CO2 SERPL-SCNC: 23 MMOL/L (ref 21–32)
CREAT SERPL-MCNC: 0.95 MG/DL (ref 0.5–1.05)
EGFRCR SERPLBLD CKD-EPI 2021: 75 ML/MIN/1.73M*2
EOSINOPHIL # BLD MANUAL: 0 X10*3/UL (ref 0–0.7)
EOSINOPHIL NFR BLD MANUAL: 0 %
ERYTHROCYTE [DISTWIDTH] IN BLOOD BY AUTOMATED COUNT: 12.4 % (ref 11.5–14.5)
GLUCOSE SERPL-MCNC: 85 MG/DL (ref 74–99)
HCT VFR BLD AUTO: 37.8 % (ref 36–46)
HGB BLD-MCNC: 12.4 G/DL (ref 12–16)
IMM GRANULOCYTES # BLD AUTO: 0.15 X10*3/UL (ref 0–0.7)
IMM GRANULOCYTES NFR BLD AUTO: 6.4 % (ref 0–0.9)
LYMPHOCYTES # BLD MANUAL: 0.19 X10*3/UL (ref 1.2–4.8)
LYMPHOCYTES NFR BLD MANUAL: 8 %
MAGNESIUM SERPL-MCNC: 1.62 MG/DL (ref 1.6–2.4)
MCH RBC QN AUTO: 29.8 PG (ref 26–34)
MCHC RBC AUTO-ENTMCNC: 32.8 G/DL (ref 32–36)
MCV RBC AUTO: 91 FL (ref 80–100)
METAMYELOCYTES # BLD MANUAL: 0.07 X10*3/UL
METAMYELOCYTES NFR BLD MANUAL: 3 %
MONOCYTES # BLD MANUAL: 0.24 X10*3/UL (ref 0.1–1)
MONOCYTES NFR BLD MANUAL: 10 %
NEUTROPHILS # BLD MANUAL: 1.9 X10*3/UL (ref 1.2–7.7)
NEUTS BAND # BLD MANUAL: 0.17 X10*3/UL (ref 0–0.7)
NEUTS BAND NFR BLD MANUAL: 7 %
NEUTS SEG # BLD MANUAL: 1.73 X10*3/UL (ref 1.2–7)
NEUTS SEG NFR BLD MANUAL: 72 %
NRBC BLD-RTO: 0 /100 WBCS (ref 0–0)
PHOSPHATE SERPL-MCNC: 2.4 MG/DL (ref 2.5–4.9)
PLATELET # BLD AUTO: 151 X10*3/UL (ref 150–450)
POTASSIUM SERPL-SCNC: 3.3 MMOL/L (ref 3.5–5.3)
RBC # BLD AUTO: 4.16 X10*6/UL (ref 4–5.2)
RBC MORPH BLD: ABNORMAL
SODIUM SERPL-SCNC: 134 MMOL/L (ref 136–145)
TOTAL CELLS COUNTED BLD: 100
WBC # BLD AUTO: 2.4 X10*3/UL (ref 4.4–11.3)

## 2024-05-23 PROCEDURE — 36415 COLL VENOUS BLD VENIPUNCTURE: CPT

## 2024-05-23 PROCEDURE — 85027 COMPLETE CBC AUTOMATED: CPT

## 2024-05-23 PROCEDURE — 85007 BL SMEAR W/DIFF WBC COUNT: CPT

## 2024-05-23 PROCEDURE — 80069 RENAL FUNCTION PANEL: CPT

## 2024-05-23 PROCEDURE — 83735 ASSAY OF MAGNESIUM: CPT

## 2024-05-23 RX ORDER — CALCIUM CARBONATE 500 MG/1
TABLET, CHEWABLE ORAL
Qty: 60 TABLET | Refills: 3 | Status: SHIPPED | OUTPATIENT
Start: 2024-05-23 | End: 2025-05-23

## 2024-05-23 RX ORDER — CHOLECALCIFEROL (VITAMIN D3) 50 MCG
100 TABLET ORAL DAILY
Qty: 180 TABLET | Refills: 3 | Status: SHIPPED | OUTPATIENT
Start: 2024-05-23 | End: 2025-05-23

## 2024-05-24 ENCOUNTER — APPOINTMENT (OUTPATIENT)
Dept: CARDIOLOGY | Facility: HOSPITAL | Age: 44
DRG: 699 | End: 2024-05-24
Payer: COMMERCIAL

## 2024-05-24 ENCOUNTER — HOSPITAL ENCOUNTER (INPATIENT)
Facility: HOSPITAL | Age: 44
LOS: 5 days | Discharge: HOME | DRG: 699 | End: 2024-05-29
Attending: STUDENT IN AN ORGANIZED HEALTH CARE EDUCATION/TRAINING PROGRAM | Admitting: SURGERY
Payer: COMMERCIAL

## 2024-05-24 ENCOUNTER — OFFICE VISIT (OUTPATIENT)
Dept: TRANSPLANT | Facility: HOSPITAL | Age: 44
DRG: 699 | End: 2024-05-24
Payer: COMMERCIAL

## 2024-05-24 ENCOUNTER — APPOINTMENT (OUTPATIENT)
Dept: RADIOLOGY | Facility: HOSPITAL | Age: 44
DRG: 699 | End: 2024-05-24
Payer: COMMERCIAL

## 2024-05-24 VITALS
OXYGEN SATURATION: 99 % | BODY MASS INDEX: 30.43 KG/M2 | TEMPERATURE: 97.9 F | SYSTOLIC BLOOD PRESSURE: 125 MMHG | WEIGHT: 177.3 LBS | DIASTOLIC BLOOD PRESSURE: 81 MMHG | HEART RATE: 93 BPM

## 2024-05-24 DIAGNOSIS — Z48.298 AFTERCARE FOLLOWING ORGAN TRANSPLANT: Primary | ICD-10-CM

## 2024-05-24 DIAGNOSIS — Z94.0 KIDNEY REPLACED BY TRANSPLANT (HHS-HCC): ICD-10-CM

## 2024-05-24 DIAGNOSIS — R50.9 FEVER OF UNKNOWN ORIGIN: Primary | ICD-10-CM

## 2024-05-24 DIAGNOSIS — N39.0 URINARY TRACT INFECTION WITHOUT HEMATURIA, SITE UNSPECIFIED: ICD-10-CM

## 2024-05-24 DIAGNOSIS — B25.9 CYTOMEGALOVIRUS (CMV) VIREMIA (MULTI): ICD-10-CM

## 2024-05-24 DIAGNOSIS — D72.819 LEUKOPENIA, UNSPECIFIED TYPE: ICD-10-CM

## 2024-05-24 DIAGNOSIS — I15.1 HYPERTENSION SECONDARY TO OTHER RENAL DISORDERS: ICD-10-CM

## 2024-05-24 LAB
ALBUMIN SERPL BCP-MCNC: 3.5 G/DL (ref 3.4–5)
ANION GAP SERPL CALC-SCNC: 13 MMOL/L (ref 10–20)
APPEARANCE UR: CLEAR
BILIRUB UR STRIP.AUTO-MCNC: NEGATIVE MG/DL
BUN SERPL-MCNC: 10 MG/DL (ref 6–23)
CALCIUM SERPL-MCNC: 8.6 MG/DL (ref 8.6–10.6)
CHLORIDE SERPL-SCNC: 106 MMOL/L (ref 98–107)
CO2 SERPL-SCNC: 21 MMOL/L (ref 21–32)
COLOR UR: COLORLESS
CREAT SERPL-MCNC: 1.08 MG/DL (ref 0.5–1.05)
EGFRCR SERPLBLD CKD-EPI 2021: 64 ML/MIN/1.73M*2
ERYTHROCYTE [DISTWIDTH] IN BLOOD BY AUTOMATED COUNT: 11.9 % (ref 11.5–14.5)
GLUCOSE SERPL-MCNC: 172 MG/DL (ref 74–99)
GLUCOSE UR STRIP.AUTO-MCNC: NORMAL MG/DL
HCT VFR BLD AUTO: 36.2 % (ref 36–46)
HGB BLD-MCNC: 11.9 G/DL (ref 12–16)
HOLD SPECIMEN: NORMAL
KETONES UR STRIP.AUTO-MCNC: NEGATIVE MG/DL
LEUKOCYTE ESTERASE UR QL STRIP.AUTO: NEGATIVE
MAGNESIUM SERPL-MCNC: 1.92 MG/DL (ref 1.6–2.4)
MCH RBC QN AUTO: 29.1 PG (ref 26–34)
MCHC RBC AUTO-ENTMCNC: 32.9 G/DL (ref 32–36)
MCV RBC AUTO: 89 FL (ref 80–100)
NITRITE UR QL STRIP.AUTO: NEGATIVE
NRBC BLD-RTO: 0 /100 WBCS (ref 0–0)
PH UR STRIP.AUTO: 6 [PH]
PHOSPHATE SERPL-MCNC: 2.6 MG/DL (ref 2.5–4.9)
PLATELET # BLD AUTO: 124 X10*3/UL (ref 150–450)
POTASSIUM SERPL-SCNC: 3.5 MMOL/L (ref 3.5–5.3)
PROT UR STRIP.AUTO-MCNC: NEGATIVE MG/DL
RBC # BLD AUTO: 4.09 X10*6/UL (ref 4–5.2)
RBC # UR STRIP.AUTO: NEGATIVE /UL
SARS-COV-2 RNA RESP QL NAA+PROBE: NOT DETECTED
SODIUM SERPL-SCNC: 136 MMOL/L (ref 136–145)
SP GR UR STRIP.AUTO: 1
UROBILINOGEN UR STRIP.AUTO-MCNC: NORMAL MG/DL
WBC # BLD AUTO: 2.1 X10*3/UL (ref 4.4–11.3)

## 2024-05-24 PROCEDURE — 2500000002 HC RX 250 W HCPCS SELF ADMINISTERED DRUGS (ALT 637 FOR MEDICARE OP, ALT 636 FOR OP/ED)

## 2024-05-24 PROCEDURE — 99215 OFFICE O/P EST HI 40 MIN: CPT | Performed by: INTERNAL MEDICINE

## 2024-05-24 PROCEDURE — 93005 ELECTROCARDIOGRAM TRACING: CPT

## 2024-05-24 PROCEDURE — 84100 ASSAY OF PHOSPHORUS: CPT

## 2024-05-24 PROCEDURE — 74176 CT ABD & PELVIS W/O CONTRAST: CPT

## 2024-05-24 PROCEDURE — 71045 X-RAY EXAM CHEST 1 VIEW: CPT | Performed by: RADIOLOGY

## 2024-05-24 PROCEDURE — 2500000004 HC RX 250 GENERAL PHARMACY W/ HCPCS (ALT 636 FOR OP/ED)

## 2024-05-24 PROCEDURE — 81003 URINALYSIS AUTO W/O SCOPE: CPT

## 2024-05-24 PROCEDURE — 71045 X-RAY EXAM CHEST 1 VIEW: CPT

## 2024-05-24 PROCEDURE — 87799 DETECT AGENT NOS DNA QUANT: CPT

## 2024-05-24 PROCEDURE — 74176 CT ABD & PELVIS W/O CONTRAST: CPT | Performed by: RADIOLOGY

## 2024-05-24 PROCEDURE — 36415 COLL VENOUS BLD VENIPUNCTURE: CPT

## 2024-05-24 PROCEDURE — 87635 SARS-COV-2 COVID-19 AMP PRB: CPT

## 2024-05-24 PROCEDURE — 85027 COMPLETE CBC AUTOMATED: CPT

## 2024-05-24 PROCEDURE — 83735 ASSAY OF MAGNESIUM: CPT

## 2024-05-24 PROCEDURE — 3079F DIAST BP 80-89 MM HG: CPT | Performed by: INTERNAL MEDICINE

## 2024-05-24 PROCEDURE — 1100000001 HC PRIVATE ROOM DAILY

## 2024-05-24 PROCEDURE — 3074F SYST BP LT 130 MM HG: CPT | Performed by: INTERNAL MEDICINE

## 2024-05-24 PROCEDURE — 87040 BLOOD CULTURE FOR BACTERIA: CPT

## 2024-05-24 PROCEDURE — 2500000001 HC RX 250 WO HCPCS SELF ADMINISTERED DRUGS (ALT 637 FOR MEDICARE OP)

## 2024-05-24 PROCEDURE — 76776 US EXAM K TRANSPL W/DOPPLER: CPT | Performed by: RADIOLOGY

## 2024-05-24 PROCEDURE — 80069 RENAL FUNCTION PANEL: CPT

## 2024-05-24 PROCEDURE — 76776 US EXAM K TRANSPL W/DOPPLER: CPT

## 2024-05-24 PROCEDURE — 99222 1ST HOSP IP/OBS MODERATE 55: CPT | Performed by: INTERNAL MEDICINE

## 2024-05-24 RX ORDER — LORATADINE 10 MG/1
10 TABLET ORAL DAILY
Status: DISCONTINUED | OUTPATIENT
Start: 2024-05-24 | End: 2024-05-29 | Stop reason: HOSPADM

## 2024-05-24 RX ORDER — PANTOPRAZOLE SODIUM 40 MG/1
40 TABLET, DELAYED RELEASE ORAL
Status: DISCONTINUED | OUTPATIENT
Start: 2024-05-25 | End: 2024-05-29 | Stop reason: HOSPADM

## 2024-05-24 RX ORDER — GABAPENTIN 300 MG/1
300 CAPSULE ORAL NIGHTLY
Status: DISCONTINUED | OUTPATIENT
Start: 2024-05-24 | End: 2024-05-29 | Stop reason: HOSPADM

## 2024-05-24 RX ORDER — CHOLECALCIFEROL (VITAMIN D3) 25 MCG
4000 TABLET ORAL DAILY
Status: DISCONTINUED | OUTPATIENT
Start: 2024-05-24 | End: 2024-05-29 | Stop reason: HOSPADM

## 2024-05-24 RX ORDER — CALCITRIOL 0.25 UG/1
0.25 CAPSULE ORAL DAILY
Status: DISCONTINUED | OUTPATIENT
Start: 2024-05-24 | End: 2024-05-29 | Stop reason: HOSPADM

## 2024-05-24 RX ORDER — AMLODIPINE BESYLATE 5 MG/1
5 TABLET ORAL 2 TIMES DAILY
Status: DISCONTINUED | OUTPATIENT
Start: 2024-05-24 | End: 2024-05-29 | Stop reason: HOSPADM

## 2024-05-24 RX ORDER — PREDNISONE 10 MG/1
10 TABLET ORAL DAILY
Status: DISCONTINUED | OUTPATIENT
Start: 2024-05-24 | End: 2024-05-29 | Stop reason: HOSPADM

## 2024-05-24 RX ORDER — VANCOMYCIN HYDROCHLORIDE 1 G/200ML
1000 INJECTION, SOLUTION INTRAVENOUS EVERY 12 HOURS
Status: DISCONTINUED | OUTPATIENT
Start: 2024-05-24 | End: 2024-05-25

## 2024-05-24 RX ORDER — KETOTIFEN FUMARATE 0.35 MG/ML
1 SOLUTION/ DROPS OPHTHALMIC 2 TIMES DAILY
Status: DISCONTINUED | OUTPATIENT
Start: 2024-05-24 | End: 2024-05-29 | Stop reason: HOSPADM

## 2024-05-24 RX ORDER — POLYETHYLENE GLYCOL 3350 17 G/17G
17 POWDER, FOR SOLUTION ORAL DAILY
Status: DISCONTINUED | OUTPATIENT
Start: 2024-05-24 | End: 2024-05-29 | Stop reason: HOSPADM

## 2024-05-24 RX ORDER — MYCOPHENOLATE MOFETIL 250 MG/1
1000 CAPSULE ORAL EVERY 12 HOURS
Status: DISCONTINUED | OUTPATIENT
Start: 2024-05-24 | End: 2024-05-26

## 2024-05-24 RX ORDER — HEPARIN SODIUM 5000 [USP'U]/ML
5000 INJECTION, SOLUTION INTRAVENOUS; SUBCUTANEOUS EVERY 8 HOURS SCHEDULED
Status: DISCONTINUED | OUTPATIENT
Start: 2024-05-24 | End: 2024-05-29 | Stop reason: HOSPADM

## 2024-05-24 RX ORDER — LEVOTHYROXINE SODIUM 50 UG/1
25 TABLET ORAL
Status: DISCONTINUED | OUTPATIENT
Start: 2024-05-25 | End: 2024-05-29 | Stop reason: HOSPADM

## 2024-05-24 RX ORDER — VANCOMYCIN HYDROCHLORIDE 1 G/20ML
INJECTION, POWDER, LYOPHILIZED, FOR SOLUTION INTRAVENOUS DAILY PRN
Status: DISCONTINUED | OUTPATIENT
Start: 2024-05-24 | End: 2024-05-25

## 2024-05-24 RX ORDER — CALCIUM CARBONATE 200(500)MG
500 TABLET,CHEWABLE ORAL 2 TIMES DAILY
Status: DISCONTINUED | OUTPATIENT
Start: 2024-05-24 | End: 2024-05-27

## 2024-05-24 RX ORDER — SUMATRIPTAN SUCCINATE 100 MG/1
100 TABLET ORAL ONCE AS NEEDED
Status: DISCONTINUED | OUTPATIENT
Start: 2024-05-24 | End: 2024-05-29 | Stop reason: HOSPADM

## 2024-05-24 RX ORDER — SERTRALINE HYDROCHLORIDE 50 MG/1
50 TABLET, FILM COATED ORAL DAILY
Status: DISCONTINUED | OUTPATIENT
Start: 2024-05-24 | End: 2024-05-29 | Stop reason: HOSPADM

## 2024-05-24 RX ADMIN — HEPARIN SODIUM 5000 UNITS: 5000 INJECTION INTRAVENOUS; SUBCUTANEOUS at 13:56

## 2024-05-24 RX ADMIN — LORATADINE 10 MG: 10 TABLET ORAL at 15:59

## 2024-05-24 RX ADMIN — Medication 4000 UNITS: at 13:52

## 2024-05-24 RX ADMIN — PIPERACILLIN SODIUM AND TAZOBACTAM SODIUM 3.38 G: 3; .375 INJECTION, SOLUTION INTRAVENOUS at 22:37

## 2024-05-24 RX ADMIN — POLYETHYLENE GLYCOL 3350 17 G: 17 POWDER, FOR SOLUTION ORAL at 16:20

## 2024-05-24 RX ADMIN — CALCITRIOL CAPSULES 0.25 MCG 0.25 MCG: 0.25 CAPSULE ORAL at 15:56

## 2024-05-24 RX ADMIN — KETOTIFEN FUMARATE 1 DROP: 0.25 SOLUTION/ DROPS OPHTHALMIC at 15:56

## 2024-05-24 RX ADMIN — SERTRALINE HYDROCHLORIDE 50 MG: 50 TABLET ORAL at 15:56

## 2024-05-24 RX ADMIN — CALCIUM CARBONATE (ANTACID) CHEW TAB 500 MG 500 MG: 500 CHEW TAB at 20:16

## 2024-05-24 RX ADMIN — CALCIUM CARBONATE (ANTACID) CHEW TAB 500 MG 500 MG: 500 CHEW TAB at 13:52

## 2024-05-24 RX ADMIN — PREDNISONE 10 MG: 10 TABLET ORAL at 13:52

## 2024-05-24 RX ADMIN — MYCOPHENOLATE MOFETIL 1000 MG: 250 CAPSULE ORAL at 13:51

## 2024-05-24 RX ADMIN — VANCOMYCIN HYDROCHLORIDE 1000 MG: 1 INJECTION, SOLUTION INTRAVENOUS at 13:56

## 2024-05-24 RX ADMIN — PIPERACILLIN SODIUM AND TAZOBACTAM SODIUM 3.38 G: 3; .375 INJECTION, SOLUTION INTRAVENOUS at 16:03

## 2024-05-24 RX ADMIN — GABAPENTIN 300 MG: 300 CAPSULE ORAL at 20:16

## 2024-05-24 RX ADMIN — KETOTIFEN FUMARATE 1 DROP: 0.25 SOLUTION/ DROPS OPHTHALMIC at 20:23

## 2024-05-24 RX ADMIN — HEPARIN SODIUM 5000 UNITS: 5000 INJECTION INTRAVENOUS; SUBCUTANEOUS at 22:37

## 2024-05-24 RX ADMIN — TENOFOVIR ALAFENAMIDE 25 MG: 25 TABLET ORAL at 15:56

## 2024-05-24 RX ADMIN — AMLODIPINE BESYLATE 5 MG: 5 TABLET ORAL at 13:51

## 2024-05-24 RX ADMIN — AMLODIPINE BESYLATE 5 MG: 5 TABLET ORAL at 20:16

## 2024-05-24 SDOH — SOCIAL STABILITY: SOCIAL INSECURITY: HAVE YOU HAD THOUGHTS OF HARMING ANYONE ELSE?: NO

## 2024-05-24 SDOH — SOCIAL STABILITY: SOCIAL INSECURITY: HAS ANYONE EVER THREATENED TO HURT YOUR FAMILY OR YOUR PETS?: NO

## 2024-05-24 SDOH — SOCIAL STABILITY: SOCIAL INSECURITY: ARE THERE ANY APPARENT SIGNS OF INJURIES/BEHAVIORS THAT COULD BE RELATED TO ABUSE/NEGLECT?: NO

## 2024-05-24 SDOH — SOCIAL STABILITY: SOCIAL INSECURITY: DO YOU FEEL ANYONE HAS EXPLOITED OR TAKEN ADVANTAGE OF YOU FINANCIALLY OR OF YOUR PERSONAL PROPERTY?: NO

## 2024-05-24 SDOH — SOCIAL STABILITY: SOCIAL INSECURITY: ABUSE: ADULT

## 2024-05-24 SDOH — SOCIAL STABILITY: SOCIAL INSECURITY: HAVE YOU HAD ANY THOUGHTS OF HARMING ANYONE ELSE?: NO

## 2024-05-24 SDOH — SOCIAL STABILITY: SOCIAL INSECURITY: WERE YOU ABLE TO COMPLETE ALL THE BEHAVIORAL HEALTH SCREENINGS?: YES

## 2024-05-24 SDOH — SOCIAL STABILITY: SOCIAL INSECURITY: ARE YOU OR HAVE YOU BEEN THREATENED OR ABUSED PHYSICALLY, EMOTIONALLY, OR SEXUALLY BY ANYONE?: NO

## 2024-05-24 SDOH — SOCIAL STABILITY: SOCIAL INSECURITY: DO YOU FEEL UNSAFE GOING BACK TO THE PLACE WHERE YOU ARE LIVING?: NO

## 2024-05-24 SDOH — SOCIAL STABILITY: SOCIAL INSECURITY: DOES ANYONE TRY TO KEEP YOU FROM HAVING/CONTACTING OTHER FRIENDS OR DOING THINGS OUTSIDE YOUR HOME?: NO

## 2024-05-24 ASSESSMENT — COGNITIVE AND FUNCTIONAL STATUS - GENERAL
DAILY ACTIVITIY SCORE: 24
DAILY ACTIVITIY SCORE: 24
MOBILITY SCORE: 24
MOBILITY SCORE: 24
PATIENT BASELINE BEDBOUND: NO

## 2024-05-24 ASSESSMENT — COLUMBIA-SUICIDE SEVERITY RATING SCALE - C-SSRS
6. HAVE YOU EVER DONE ANYTHING, STARTED TO DO ANYTHING, OR PREPARED TO DO ANYTHING TO END YOUR LIFE?: NO
1. IN THE PAST MONTH, HAVE YOU WISHED YOU WERE DEAD OR WISHED YOU COULD GO TO SLEEP AND NOT WAKE UP?: NO
2. HAVE YOU ACTUALLY HAD ANY THOUGHTS OF KILLING YOURSELF?: NO

## 2024-05-24 ASSESSMENT — PATIENT HEALTH QUESTIONNAIRE - PHQ9
SUM OF ALL RESPONSES TO PHQ9 QUESTIONS 1 & 2: 0
2. FEELING DOWN, DEPRESSED OR HOPELESS: NOT AT ALL
1. LITTLE INTEREST OR PLEASURE IN DOING THINGS: NOT AT ALL

## 2024-05-24 ASSESSMENT — ACTIVITIES OF DAILY LIVING (ADL)
WALKS IN HOME: INDEPENDENT
GROOMING: INDEPENDENT
HEARING - LEFT EAR: FUNCTIONAL
ADEQUATE_TO_COMPLETE_ADL: YES
HEARING - RIGHT EAR: FUNCTIONAL
PATIENT'S MEMORY ADEQUATE TO SAFELY COMPLETE DAILY ACTIVITIES?: YES
TOILETING: INDEPENDENT
BATHING: INDEPENDENT
JUDGMENT_ADEQUATE_SAFELY_COMPLETE_DAILY_ACTIVITIES: YES
DRESSING YOURSELF: INDEPENDENT
FEEDING YOURSELF: INDEPENDENT
LACK_OF_TRANSPORTATION: NO

## 2024-05-24 ASSESSMENT — LIFESTYLE VARIABLES
AUDIT-C TOTAL SCORE: 0
HOW OFTEN DO YOU HAVE A DRINK CONTAINING ALCOHOL: NEVER
HOW OFTEN DO YOU HAVE 6 OR MORE DRINKS ON ONE OCCASION: NEVER
SKIP TO QUESTIONS 9-10: 1
HOW MANY STANDARD DRINKS CONTAINING ALCOHOL DO YOU HAVE ON A TYPICAL DAY: PATIENT DOES NOT DRINK
AUDIT-C TOTAL SCORE: 0

## 2024-05-24 ASSESSMENT — PAIN SCALES - GENERAL
PAINLEVEL_OUTOF10: 0 - NO PAIN
PAINLEVEL: 0-NO PAIN

## 2024-05-24 NOTE — H&P
Transplant Surgery History and Physical    Subjective   Chief Complaint/Reason for Admission: Fevers, pain over kidney transplant site     HPI:  Ms. Leblanc is a 44 y.o. female with past medical history significant for ESRD secondary to HTN/NSAID whom received a  donor kidney transplant on 23 by Dr. Marbin Lambert & Dr. Louis, KDPI 56%, PRA 48%. HCV -/- and donor has not met risk factors. EBV +/+, CMV D+/R+. Operative course was complicated by return to OR for exploration of transplanted kidney and washout of hematoma. Patient was seen in clinic today  with Dr. Stanford and concerned for fevers and pain at the RLQ DDKT transplantation site. Transplant surgery admitting for work up.     Patient said that she felt warm, dizzy/lightheaded, vomited twice prior to clinic this morning. Patient has not felt well since  but today felt particularly worse. Of note - patient felt chest pain and sharp shooting pain in her joints, and a burning sensation particularly on the bottom of her feet.     On examination, Patient currently feels 6/10 pain in her right RLQ DDKT site, and mild back pain. She still feels warm, dizzy, and cannot support herself currently when she is ambulating. Patient denies diarrhea symptoms.       A 12-point ROS was performed and was unremarkable except as above.    PMH:  Past Medical History:   Diagnosis Date    Anxiety     Chronic kidney disease     Chronic kidney disease, unspecified     CKD, patient interested in transplantation    Depression     Disease of thyroid gland     GERD (gastroesophageal reflux disease)     Hypertension     Personal history of COVID-19 2022    History of COVID-19     PSH:  Past Surgical History:   Procedure Laterality Date    MR HEAD ANGIO W AND WO IV CONTRAST  2021    MR HEAD ANGIO W AND WO IV CONTRAST    MR HEAD ANGIO WO IV CONTRAST  2021    MR HEAD ANGIO WO IV CONTRAST    OTHER SURGICAL HISTORY  2022     Arteriovenous fistula creation procedure    TRANSPLANT, KIDNEY, OPEN Right 11/30/2023    US GUIDED PERCUTANEOUS PERITONEAL OR RETROPERITONEAL FLUID COLLECTION DRAINAGE  12/21/2023    US GUIDED PERCUTANEOUS PERITONEAL OR RETROPERITONEAL FLUID COLLECTION DRAINAGE 12/21/2023 B[facility]heva Shanks MD [facility]     Soc Hx:  Social History     Socioeconomic History    Marital status:      Spouse name: Not on file    Number of children: Not on file    Years of education: Not on file    Highest education level: Not on file   Occupational History    Not on file   Tobacco Use    Smoking status: Never    Smokeless tobacco: Former     Quit date: 2021    Tobacco comments:     Chewing tobacco, quit 1 year ago   Vaping Use    Vaping status: Never Used   Substance and Sexual Activity    Alcohol use: Never    Drug use: Never    Sexual activity: Defer   Other Topics Concern    Not on file   Social History Narrative    Not on file     Social Determinants of Health     Financial Resource Strain: Low Risk  (1/22/2024)    Received from [facility]    Overall Financial Resource Strain (CARDIA)     Difficulty of Paying Living Expenses: Not hard at all   Food Insecurity: No Food Insecurity (1/22/2024)    Received from [facility]    Hunger Vital Sign     Worried About Running Out of Food in the Last Year: Never true     Ran Out of Food in the Last Year: Never true   Transportation Needs: No Transportation Needs (1/22/2024)    Received from [facility]    PRAPARE - Transportation     Lack of Transportation (Medical): No     Lack of Transportation (Non-Medical): No   Physical Activity: Not on File (8/20/2019)    Received from [facility]     Physical Activity     Physical Activity: 0   Stress: Not on File (8/20/2019)    Received from [facility]     Stress     Stress: 0   Social Connections: Feeling Somewhat Isolated (1/16/2024)    OASIS : Social Isolation     Frequency of experiencing loneliness or isolation: Sometimes   Intimate Partner Violence:  Not on file   Housing Stability: Unknown (1/22/2024)    Received from [facility]    Housing Stability Vital Sign     Unable to Pay for Housing in the Last Year: No     Number of Places Lived in the Last Year: Not on file     Unstable Housing in the Last Year: No     Fam Hx:  No family history on file.   Allergies:  No Known Allergies  Current Medications:  No current facility-administered medications on file prior to encounter.     Current Outpatient Medications on File Prior to Encounter   Medication Sig Dispense Refill    amLODIPine (Norvasc) 5 mg tablet Take 1 tablet (5 mg) by mouth 2 times a day. 180 tablet 3    azelastine (Optivar) 0.05 % ophthalmic solution Administer 1 drop into both eyes 2 times a day. 6 mL 3    BELATACEPT IV Infuse into a venous catheter every 28 (twenty-eight) days. Maintenance infusion      calcitriol (Rocaltrol) 0.25 mcg capsule TAKE 1 TABLET BY MOUTH ONCE DAILY 30 capsule 2    Calcium Antacid 200 mg calcium (500 mg) chewable tablet CHEW AND SWALLOW 1 TABLET BY MOUTH 2 TIMES DAILY 60 tablet 3    cholecalciferol (Vitamin D-3) 50 MCG (2000 UT) tablet Take 2 tablets (100 mcg) by mouth once daily. 180 tablet 3    fexofenadine (Allegra) 180 mg tablet Take 1 tablet (180 mg) by mouth once daily. 30 tablet 11    gabapentin (Neurontin) 100 mg capsule Take 3 capsules (300 mg) by mouth once daily at bedtime. 270 capsule 3    levothyroxine (Synthroid, Levoxyl) 25 mcg tablet Take 1 tablet (25 mcg) by mouth once daily in the morning. Take before meals.      mycophenolate (Cellcept) 250 mg capsule Take 4 capsules (1,000 mg) by mouth every 12 hours. 240 capsule 11    pantoprazole (ProtoNix) 40 mg EC tablet TAKE 1 TABLET (40 MG) BY MOUTH EVERY MORNING (BEFORE BREAKFAST). 30 tablet 1    pantoprazole (ProtoNix) 40 mg EC tablet Take 1 tablet (40 mg) by mouth once daily in the morning. Take before meals. Do not crush, chew, or split. (Patient not taking: Reported on 5/22/2024) 30 tablet 1    predniSONE  (Deltasone) 5 mg tablet Take 2 tablets (10 mg) by mouth once daily. 60 tablet 11    sertraline (Zoloft) 50 mg tablet Take 1 tablet (50 mg) by mouth once daily.      sulfamethoxazole-trimethoprim (Bactrim) 400-80 mg tablet TAKE 1 TABLET BY MOUTH ONCE EVERY DAY (Patient not taking: Reported on 5/22/2024) 30 tablet 0    sulfamethoxazole-trimethoprim (Bactrim) 400-80 mg tablet Take 1 tablet by mouth once daily. (Patient not taking: Reported on 5/22/2024) 30 tablet 0    SUMAtriptan (Imitrex) 100 mg tablet Take 1 tablet (100 mg) by mouth 1 time if needed for migraine. 9 tablet 11    tenofovir alafenamide (Vemlidy) 25 mg tablet tablet Take 1 tablet (25 mg) by mouth once daily. 30 tablet 11    [DISCONTINUED] cholecalciferol (Vitamin D-3) 50 MCG (2000 UT) tablet Take 2 tablets (100 mcg) by mouth once daily. 180 tablet 3         Objective   Vitals:  Visit Vitals  /73 (BP Location: Left arm, Patient Position: Lying)   Pulse 83   Temp 36.3 °C (97.3 °F) (Temporal)   Resp 18       Physical Exam:  GEN: No acute distress. Alert, awake and conversive.  HEENT: Sclera anicteric. Moist mucous membranes.  RESP: Breathing non-labored, equal chest rise. On RA.  CV: Regular rate, normotensive  GI: Abdomen soft, nondistended,  Mild tenderness to RLQ DDKT site  Prior surgical incision RLQ.   : Voiding spontaneously.  MSK: No gross deformities. Moves all extremities spontaneously.  NEURO: Alert and oriented x3. No focal deficits.  PSYCH: Appropriate mood and affect.  SKIN: No rashes or lesions.    Labs within past 24h:  Results for orders placed or performed during the hospital encounter of 05/24/24 (from the past 24 hour(s))   Sars-CoV-2 PCR   Result Value Ref Range    Coronavirus 2019, PCR Not Detected Not Detected   Blood Culture    Specimen: Peripheral Venipuncture; Blood culture   Result Value Ref Range    Blood Culture Loaded on Instrument - Culture in progress        Imaging within past 24h:  XR chest 1 view    Result Date:  2024  Interpreted By:  Dylan Cruz, STUDY: XR CHEST 1 VIEW;  2024 11:31 am   INDICATION: Signs/Symptoms:r/o infection.   COMPARISON: Chest radiograph dated 2020   ACCESSION NUMBER(S): [id number]   ORDERING CLINICIAN: SALMA FRAGA   FINDINGS: AP radiograph of the chest   Limitations: Decreased lung volumes. Patient's head and chin overlies the upper thorax   The heart is magnified. No acute pulmonary infiltrates are seen. Osseous and articular anatomy where visualized appears normal for age.       1. No evidence of acute cardiopulmonary process.       Signed by: Dylan Cruz 2024 11:51 AM Dictation workstation:   [id number]     No pertinent imaging to review.     ASSESSMENT  Ms. Leblanc is a 44 y.o. female with past medical history significant for ESRD secondary to HTN/NSAID whom received a  donor kidney transplant on 23 by Dr. Marbin Lambert & Dr. Louis, KDPI 56%, PRA 48%. HCV -/- and donor has not met risk factors. EBV +/+, CMV D+/R+. Operative course was complicated by return to OR for exploration of transplanted kidney and washout of hematoma. Patient was seen in clinic today  with Dr. Stanford and concerned for fevers and pain at the [facility] DDKT transplantation site. Transplant surgery admitting for work up.     PLAN:  - Admit to transplant surgery   - Infectious work up: BK, CMV, EBV, covid   - EKG  - UA,, blood cultures, urine cultures  - Transplant Kidney ultrasound  - CT A/P w/contrast for abdominal pain     Patient's exam, labs, and findings discussed with Dr. Bhatt, who agrees with plan as above.    Monster Jacobson MD  PGY-1 General Surgery  Transplant Surgery [id number]

## 2024-05-24 NOTE — PROGRESS NOTES
Vancomycin Dosing by Pharmacy- INITIAL    Herber Danii Leblanc is a 44 y.o. year old female who Pharmacy has been consulted for vancomycin dosing for other nausea, vomiting, fever and pain over kidney transplant site(unspecified yet) . Based on the patient's indication and renal status this patient will be dosed based on a goal AUC of 400-600.     Renal function is currently stable.    Visit Vitals  /73 (BP Location: Left arm, Patient Position: Lying)   Pulse 83   Temp 36.3 °C (97.3 °F) (Temporal)   Resp 18        Lab Results   Component Value Date    CREATININE 0.95 2024    CREATININE 0.89 2024    CREATININE 1.01 2024    CREATININE 1.25 (H) 2024        Patient weight is as follows:   Vitals:    24 1034   Weight: 79.9 kg (176 lb 1.6 oz)       Cultures:  No results found for the encounter in last 14 days.        No intake/output data recorded.  I/O during current shift:  I/O this shift:  In: -   Out: 210 [Urine:210]    Temp (24hrs), Av.4 °C (97.6 °F), Min:36.3 °C (97.3 °F), Max:36.6 °C (97.9 °F)         Assessment/Plan     Patient will not be given a loading dose.  Will initiate vancomycin maintenance,  1000 mg every 12 hours.  This dosing regimen is predicted by [employer] to result in the following pharmacokinetic parameters:  Loading dose: N/A  Regimen: 1000 mg IV every 12 hours.  Start time: 13:08 on 2024  Exposure target: AUC24 (range)400-600 mg/L.hr   AUC24,ss: 522 mg/L.hr  Probability of AUC24 > 400: 78 %  Ctrough,ss: 16.7 mg/L  Probability of Ctrough,ss > 20: 33 %  Probability of nephrotoxicity (Lodise LUZMA ): 12 %  Follow-up level will be ordered on  at am lab unless clinically indicated sooner.  Will continue to monitor renal function daily while on vancomycin and order serum creatinine at least every 48 hours if not already ordered.  Follow for continued vancomycin needs, clinical response, and signs/symptoms of toxicity.       ROSS PATEL

## 2024-05-24 NOTE — PROGRESS NOTES
TRANSPLANT NEPHROLOGY :   OUTPATIENT CLINIC NOTE      SERVICE DATE : 2024     REASON FOR VISIT/CHIEF COMPLAINT:  S/P  TRANSPLANT SURGERY  IMMUNOSUPPRESSIVE MEDICATION MANAGEMENT  BLOOD PRESSURE MANAGEMENT    HPI:    Ms. Leblanc is a 44 y.o. female with past medical history significant for ESRD secondary to HTN/NSAID whom received a  donor kidney transplant on 23 by Dr. Marbin Lambert & Dr. Louis, KDPI 56%, PRA 48%. HCV -/- and donor has not met risk factors. EBV +/+, CMV D+/R+. 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 Pt received a total of 4.5 mg/kg thymoglobulin induction therapy with solumedrol.   Pt was initiated on Tacrolimus & Cellcept immunosuppression in conjunction with IV solumedrol taper. 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 Pt was started on valcyte, Bactrim, clotrimazole for prophyalxis. 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 Hospital course was complicated by post-operative pain and constipation, which has resolved. H/o DGF (hyperkalemia) with eventual renal recovery.  H/o flu B 2023.     History obtained via caregiver translation.    Last seen a month ago. Here for follow up.     Last belatacept dose 24.    C/o fevers at home. Tmax 100.5. c/o pain over txp kidney. Denies any dysuria, gross hematuria or other LUTS. C/o mild cough w/o sputum.     /81 today, 120-130s/60s at home per pt. Tachypneic.    ROS:  Review of  14 systems was performed system by system. See HPI. Otherwise, the symptoms were negative.    PAST MEDICAL HISTORY:  Past Medical History:   Diagnosis Date    Anxiety     Chronic kidney disease     Chronic kidney disease, unspecified     CKD, patient interested in transplantation    Depression     Disease of thyroid gland     GERD (gastroesophageal reflux disease)      Hypertension     Personal history of COVID-19 07/20/2022    History of COVID-19        PAST SURGICAL HISTORY:  Past Surgical History:   Procedure Laterality Date    MR HEAD ANGIO W AND WO IV CONTRAST  01/26/2021    MR HEAD ANGIO W AND WO IV CONTRAST    MR HEAD ANGIO WO IV CONTRAST  01/26/2021    MR HEAD ANGIO WO IV CONTRAST    OTHER SURGICAL HISTORY  07/20/2022    Arteriovenous fistula creation procedure    TRANSPLANT, KIDNEY, OPEN Right 11/30/2023    US GUIDED PERCUTANEOUS PERITONEAL OR RETROPERITONEAL FLUID COLLECTION DRAINAGE  12/21/2023    US GUIDED PERCUTANEOUS PERITONEAL OR RETROPERITONEAL FLUID COLLECTION DRAINAGE 12/21/2023 Batsheva Shanks MD [facility]        SOCIAL HISTORY:  Social History     Socioeconomic History    Marital status:      Spouse name: Not on file    Number of children: Not on file    Years of education: Not on file    Highest education level: Not on file   Occupational History    Not on file   Tobacco Use    Smoking status: Never    Smokeless tobacco: Former     Quit date: 2021    Tobacco comments:     Chewing tobacco, quit 1 year ago   Vaping Use    Vaping status: Never Used   Substance and Sexual Activity    Alcohol use: Never    Drug use: Never    Sexual activity: Defer   Other Topics Concern    Not on file   Social History Narrative    Not on file     Social Determinants of Health     Financial Resource Strain: Low Risk  (1/22/2024)    Received from [facility]    Overall Financial Resource Strain (CARDIA)     Difficulty of Paying Living Expenses: Not hard at all   Food Insecurity: No Food Insecurity (1/22/2024)    Received from [facility]    Hunger Vital Sign     Worried About Running Out of Food in the Last Year: Never true     Ran Out of Food in the Last Year: Never true   Transportation Needs: No Transportation Needs (1/22/2024)    Received from [facility]    PRAPARE - Transportation     Lack of Transportation (Medical): No     Lack of Transportation (Non-Medical): No    Physical Activity: Not on File (8/20/2019)    Received from [facility]     Physical Activity     Physical Activity: 0   Stress: Not on File (8/20/2019)    Received from [facility]     Stress     Stress: 0   Social Connections: Feeling Somewhat Isolated (1/16/2024)    OASIS : Social Isolation     Frequency of experiencing loneliness or isolation: Sometimes   Intimate Partner Violence: Not on file   Housing Stability: Unknown (1/22/2024)    Received from [employer] [facility]    Housing Stability Vital Sign     Unable to Pay for Housing in the Last Year: No     Number of Places Lived in the Last Year: Not on file     Unstable Housing in the Last Year: No       FAMILY HISTORY:  No family history on file.    MEDICATION LIST:  Current Outpatient Medications   Medication Instructions    amLODIPine (NORVASC) 5 mg, oral, 2 times daily    azelastine (Optivar) 0.05 % ophthalmic solution 1 drop, Both Eyes, 2 times daily    BELATACEPT IV intravenous, Every 28 days, Maintenance infusion      calcitriol (Rocaltrol) 0.25 mcg capsule oral, Daily    Calcium Antacid 200 mg calcium (500 mg) chewable tablet CHEW AND SWALLOW 1 TABLET BY MOUTH 2 TIMES DAILY    cholecalciferol (VITAMIN D-3) 100 mcg, oral, Daily    fexofenadine (ALLEGRA) 180 mg, oral, Daily    gabapentin (NEURONTIN) 300 mg, oral, Nightly    levothyroxine (SYNTHROID, LEVOXYL) 25 mcg, oral, Daily before breakfast    mycophenolate (CELLCEPT) 1,000 mg, oral, Every 12 hours    pantoprazole (ProtoNix) 40 mg EC tablet TAKE 1 TABLET (40 MG) BY MOUTH EVERY MORNING (BEFORE BREAKFAST).    pantoprazole (PROTONIX) 40 mg, oral, Daily before breakfast, Do not crush, chew, or split.    predniSONE (DELTASONE) 10 mg, oral, Daily    sertraline (ZOLOFT) 50 mg, oral, Daily    sulfamethoxazole-trimethoprim (Bactrim) 400-80 mg tablet 1 tablet, oral, Daily    sulfamethoxazole-trimethoprim (Bactrim) 400-80 mg tablet 1 tablet, oral, Daily    SUMAtriptan (IMITREX) 100 mg, oral, Once as needed    tenofovir  alafenamide (VEMLIDY) 25 mg, oral, Daily       ALLERGY  No Known Allergies    PHYSICAL EXAM:    Visit Vitals  /81   Pulse 93   Temp 36.6 °C (97.9 °F) (Temporal)   Wt 80.4 kg (177 lb 4.8 oz)   SpO2 99%   BMI 30.43 kg/m²   Smoking Status Never   BSA 1.91 m²          General Appearance - mild distress, A&Ox3, warm to touch  HEENT - Supple. Not pale. No jaundice. No JVD or LAD  CVS - RRR. Normal S1/S2. No murmur, rub or gallop  Lungs- scattered crackles t bases, tachypneic, stable sats on room air  Abdomen - soft , NT, ND, no guarding. No hepatosplenomegaly. Mild TTP over RLQ allograft   Musculoskeletal /Extremities - no edema. Full ROM. No joint tenderness.   Neuro/Psych - appropriate mood and affect. Motor power V/V all extremities. CN I -XII were grossly intact.  Skin - No visible rash      LABS:    Lab Results   Component Value Date    CREATININE 0.95 05/23/2024    BUN 8 05/23/2024     (L) 05/23/2024    K 3.3 (L) 05/23/2024     05/23/2024    CO2 23 05/23/2024     Lab Results   Component Value Date    PTH 77.0 03/07/2024    CALCIUM 8.2 (L) 05/23/2024    CAION 0.75 (LL) 12/14/2023    PHOS 2.4 (L) 05/23/2024     Lab Results   Component Value Date    WBC 2.4 (L) 05/23/2024    HGB 12.4 05/23/2024    HCT 37.8 05/23/2024    MCV 91 05/23/2024     05/23/2024     Lab Results   Component Value Date    IRON 89 11/30/2023    TIBC 223 (L) 11/30/2023    FERRITIN 1,298 (H) 11/30/2023     Lab Results   Component Value Date    TACROLIMUS <2.0 03/07/2024    EBVDNAPCR Not Detected 04/11/2024     Lab Results   Component Value Date    CMVDNAPCR Not Detected 04/11/2024    BKVDNAPCR Not Detected 04/11/2024    EBVDNAPCR Not Detected 04/11/2024         ASSESSMENT AND PLAN:    Ms. Leblanc is a 44 y.o. female  who is here for follow up s/p kidney transplant.    TRANSPLANT DATE: 11/30/2023 (Kidney)      1. ESRD S/P kidney transplant   - Creatinine last check was 0.95, stable allograft function  -Random urine  protein/creatinine ratio is 400 mg/g.   -Allosure last check was 0.29% 4/11/24  -Ensure adequate hydration  - Avoid nephrotoxic medications, NSAIDs, and IV contrast.    2. Immunosuppression  - CNI sparing regimen (MAHA). On belatacept, last dose 5/22/24  -on MMF 1000 mg q12, Pred 10 mg/d   - EBV, CMV, BK PCR neg 4/11/24    3. Electrolytes  - recent labs notable for mild hypoK, no acidosis.   - recheck today and supplement    4. Hypertension  - stable hemodynamics today.   -cont to monitor home BP, call if trends concerning  -Continue current anti hypertensive medication    5. Bone Mineral Disease/Osteoporosis  - mild hypoCa, hypophos noted. On calcitriool, titrate dose as indicated  - PTH 77 3/2024  - Consider DEXA every 2-3 years , defer to PCP    6.Anemia/Leukopenia  - Hb 12.4, acceptable  - WBC 2.4k, reduce MMF to 750 mg bid.    7.Health maintenance and vaccination  - Flu shot during flu season annually  - Cancer screening is up to date per the patient    Pt to be admitted for further eval of fever. R/o UTI vs other. Check CMV pcr.    RTC 1 month(s)

## 2024-05-24 NOTE — CONSULTS
INPATIENT INITIAL TRANSPLANT NEPHROLOGY CONSULT          SERVICE DATE: 2024   SERVICE TIME:  3:49 PM    REASON FOR CONSULT:  Immunosuppressive medication management and nephrology related issues.    REQUESTING PHYSICIAN: Maryam Bhatt MD  PRIMARY CARE PHYSICIAN: No primary care provider on file.    ADMISSION DIAGNOSIS:   1. Fever of unknown origin        TRANSPLANT DATE: 2023 (Kidney)    BLOOD TYPE: B    HPI:    Ms. Leblanc is a 44 y.o. female with past medical history significant for ESRD secondary to HTN/NSAID whom received a  donor kidney transplant on 23 by Dr. Marbin Lambert & Dr. Louis with a KDPI of 56% and PRA of 48%. HCV -/- and donor has not met risk factors. EBV +/+. 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 Dry weight is 81.1 kg (discharge weight is 76.2kg ). Pt received a total of 4.5 mg/kg total of thymoglobulin induction therapy in conjunction with 500mg IV solumedrol. Pt was initiated on Tacrolimus & Cellcept immunosuppression in conjunction with IV solumedrol taper. 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 Pt was started on valcyte (CMV D + / R + ) and bactrim for CMV & PCP prophylaxis per protocol. She was started on clotrimazole as antifungal coverage per protocol. Operative course was complicated by return to OR for exploration of transplanted kidney and washout of hematoma. Hospital course was complicated by post-operative pain and constipation, which has resolved.     Direct admission for fever, nausea, vomiting and abdominal pain (chronic).     REVIEW OF SYSTEM:  Review of system was done system by system (10/14). Apart from HPI, other symptoms were negative.    PAST MEDICAL HISTORY:  Past Medical History:   Diagnosis Date    Anxiety     Chronic kidney disease     Chronic kidney disease, unspecified     CKD, patient interested in transplantation    Depression     Disease of thyroid gland     GERD  (gastroesophageal reflux disease)     Hypertension     Personal history of COVID-19 07/20/2022    History of COVID-19        PAST SURGICAL HISTORY:  Past Surgical History:   Procedure Laterality Date    MR HEAD ANGIO W AND WO IV CONTRAST  01/26/2021    MR HEAD ANGIO W AND WO IV CONTRAST    MR HEAD ANGIO WO IV CONTRAST  01/26/2021    MR HEAD ANGIO WO IV CONTRAST    OTHER SURGICAL HISTORY  07/20/2022    Arteriovenous fistula creation procedure    TRANSPLANT, KIDNEY, OPEN Right 11/30/2023    US GUIDED PERCUTANEOUS PERITONEAL OR RETROPERITONEAL FLUID COLLECTION DRAINAGE  12/21/2023    US GUIDED PERCUTANEOUS PERITONEAL OR RETROPERITONEAL FLUID COLLECTION DRAINAGE 12/21/2023 Batshvea Shanks MD [facility]        SOCIAL HISTORY:  Social History     Socioeconomic History    Marital status:      Spouse name: Not on file    Number of children: Not on file    Years of education: Not on file    Highest education level: Not on file   Occupational History    Not on file   Tobacco Use    Smoking status: Never    Smokeless tobacco: Former     Quit date: 2021    Tobacco comments:     Chewing tobacco, quit 1 year ago   Vaping Use    Vaping status: Never Used   Substance and Sexual Activity    Alcohol use: Never    Drug use: Never    Sexual activity: Defer   Other Topics Concern    Not on file   Social History Narrative    Not on file     Social Determinants of Health     Financial Resource Strain: Low Risk  (1/22/2024)    Received from [facility]    Overall Financial Resource Strain (CARDIA)     Difficulty of Paying Living Expenses: Not hard at all   Food Insecurity: No Food Insecurity (1/22/2024)    Received from [facility]    Hunger Vital Sign     Worried About Running Out of Food in the Last Year: Never true     Ran Out of Food in the Last Year: Never true   Transportation Needs: No Transportation Needs (1/22/2024)    Received from [facility]    PRAPARE - Transportation     Lack of Transportation (Medical): No     Lack of  "Transportation (Non-Medical): No   Physical Activity: Not on File (8/20/2019)    Received from [facility]     Physical Activity     Physical Activity: 0   Stress: Not on File (8/20/2019)    Received from [facility]     Stress     Stress: 0   Social Connections: Feeling Somewhat Isolated (1/16/2024)    OASIS : Social Isolation     Frequency of experiencing loneliness or isolation: Sometimes   Intimate Partner Violence: Not on file   Housing Stability: Unknown (1/22/2024)    Received from [facility]    Housing Stability Vital Sign     Unable to Pay for Housing in the Last Year: No     Number of Places Lived in the Last Year: Not on file     Unstable Housing in the Last Year: No       FAMILY HISTORY:  No family history on file.    MEDICATION LIST:  amLODIPine, 5 mg, BID  calcitriol, 0.25 mcg, Daily  calcium carbonate, 500 mg, BID  cholecalciferol, 4,000 Units, Daily  gabapentin, 300 mg, Nightly  heparin (porcine), 5,000 Units, q8h JANIE  ketotifen, 1 drop, BID  [START ON 5/25/2024] levothyroxine, 25 mcg, Daily before breakfast  loratadine, 10 mg, Daily  mycophenolate, 1,000 mg, q12h  [START ON 5/25/2024] pantoprazole, 40 mg, Daily before breakfast  piperacillin-tazobactam, 3.375 g, q6h  polyethylene glycol, 17 g, Daily  predniSONE, 10 mg, Daily  sertraline, 50 mg, Daily  tenofovir alafenamide, 25 mg, Daily  vancomycin, 1,000 mg, q12h         SUMAtriptan, 100 mg, Once PRN  vancomycin, , Daily PRN        ALLERGY:  No Known Allergies    PHYCISCAL EXAMINATION:  Visit Vitals  /73 (BP Location: Left arm, Patient Position: Lying)   Pulse 83   Temp 36.3 °C (97.3 °F) (Temporal)   Resp 18   Ht 1.651 m (5' 5\")   Wt 79.9 kg (176 lb 1.6 oz)   SpO2 97%   BMI 29.30 kg/m²   Smoking Status Never   BSA 1.91 m²        No intake/output data recorded.       General Appearance - NAD, Good speech, oriented and alert  HEENT - Supple. Not pale. No jaundice. No cervical lymphadenopathy. Pharynx and tonsils are not injected.  CVS - RRR. Normal " S1/S2. No murmur, click , rub or gallop  Lungs- clear to auscultation bilaterally  Abdomen - soft , not tender, no guarding, no rigidity. No hepatosplenomegaly. Normal bowel sounds. No masses and ascites. S/P Kidney transplant .  Transplanted kidney is not tender.   Musculoskeletal /Extremities - no edema. Full ROM. No joint tenderness.   Neuro/Psych - appropriate mood and affect. Motor power V/V all extremities. CN I -XII were grossly intact.  Skin - No visible rash    LABS:  Results for orders placed or performed during the hospital encounter of 05/24/24 (from the past 24 hour(s))   Sars-CoV-2 PCR   Result Value Ref Range    Coronavirus 2019, PCR Not Detected Not Detected   Urinalysis with Reflex Culture and Microscopic   Result Value Ref Range    Color, Urine Colorless (N) Light-Yellow, Yellow, Dark-Yellow    Appearance, Urine Clear Clear    Specific Gravity, Urine 1.001 (N) 1.005 - 1.035    pH, Urine 6.0 5.0, 5.5, 6.0, 6.5, 7.0, 7.5, 8.0    Protein, Urine NEGATIVE NEGATIVE, 10 (TRACE), 20 (TRACE) mg/dL    Glucose, Urine Normal Normal mg/dL    Blood, Urine NEGATIVE NEGATIVE    Ketones, Urine NEGATIVE NEGATIVE mg/dL    Bilirubin, Urine NEGATIVE NEGATIVE    Urobilinogen, Urine Normal Normal mg/dL    Nitrite, Urine NEGATIVE NEGATIVE    Leukocyte Esterase, Urine NEGATIVE NEGATIVE   Blood Culture    Specimen: Peripheral Venipuncture; Blood culture   Result Value Ref Range    Blood Culture Loaded on Instrument - Culture in progress         ASSESSMENT AND PLAN:  Ms. Leblanc is a 44 y.o. female who underwent a kidney transplant surgery on [unfilled] and was hospitalized for:    Principal Problem:    Fever of unknown origin      1. ESRD S/P Kidney transplant.   - Renal allograft function:   Lab Results   Component Value Date    CREATININE 0.95 05/23/2024     Estimated Creatinine Clearance: 77.3 mL/min (by C-G formula based on SCr of 0.95 mg/dL).    Intake/Output Summary (Last 24 hours) at 5/24/2024 1541  Last data filed at  5/24/2024 1400  Gross per 24 hour   Intake --   Output 1010 ml   Net -1010 ml       - Hydrate  - Continue to monitor UOP and Serum creatinine closely.   - Avoid nephrotoxic agents, NSAIDs and IV contrast   - Strict I/O.   - Renally dose all medications by the most recent CrCl from Cockcroft-Gault formula.    2. Immunosuppression   -          Belatacept 5 mg/kg (362.5 mg) q28 days; last dose given 5/22; next dose scheduled 6/24  -          MMF 1000 mg BID  -          Prednisone 10 mg daily    3. Anemia and WBC   Lab Results   Component Value Date    WBC 2.4 (L) 05/23/2024    HGB 12.4 05/23/2024    HCT 37.8 05/23/2024    MCV 91 05/23/2024     05/23/2024     -Continue to monitor Hgb   -No indications for PRBC transfusion     4. Electrolyte   Lab Results   Component Value Date    GLUCOSE 85 05/23/2024    CALCIUM 8.2 (L) 05/23/2024     (L) 05/23/2024    K 3.3 (L) 05/23/2024    CO2 23 05/23/2024     05/23/2024    BUN 8 05/23/2024    CREATININE 0.95 05/23/2024     - Reviewed renal profile.     6. Hypertension   Blood Pressures         5/24/2024  1057             BP: 113/73          - BP had been around   -Goal BP < 140/90 mmHg   -continue current management     7. Fever/tachycardia  -we will get septic work up and urine/blood cultures for fever  -EKG for tachycardia  - recheck viral panels :BK CMV EBV COVID respiratory panel.  -will go ahead with CT A/P without contrast for worsening N/V and chronic abdominal pain    8.  GI prophylaxis   - On PPI     9. DVT Prophylaxis  -Defer to primary team    * Case was discussed with primary team.  For questions, please contact transplant nephrology page x [phone].      Marcia Webber    Transplant Nephrologist

## 2024-05-24 NOTE — CARE PLAN
The patient's goals for the shift include      The clinical goals for the shift include      Over the shift, the patient did not make progress toward the following goals.

## 2024-05-25 PROBLEM — T86.10 KIDNEY TRANSPLANT COMPLICATION (HHS-HCC): Status: ACTIVE | Noted: 2024-05-25

## 2024-05-25 LAB
ALBUMIN SERPL BCP-MCNC: 3.2 G/DL (ref 3.4–5)
ANION GAP SERPL CALC-SCNC: 13 MMOL/L (ref 10–20)
BUN SERPL-MCNC: 9 MG/DL (ref 6–23)
CALCIUM SERPL-MCNC: 8.6 MG/DL (ref 8.6–10.6)
CHLORIDE SERPL-SCNC: 106 MMOL/L (ref 98–107)
CO2 SERPL-SCNC: 21 MMOL/L (ref 21–32)
CREAT SERPL-MCNC: 0.99 MG/DL (ref 0.5–1.05)
EGFRCR SERPLBLD CKD-EPI 2021: 71 ML/MIN/1.73M*2
ERYTHROCYTE [DISTWIDTH] IN BLOOD BY AUTOMATED COUNT: 11.9 % (ref 11.5–14.5)
GLUCOSE SERPL-MCNC: 96 MG/DL (ref 74–99)
HCT VFR BLD AUTO: 35.5 % (ref 36–46)
HGB BLD-MCNC: 11.5 G/DL (ref 12–16)
MAGNESIUM SERPL-MCNC: 1.76 MG/DL (ref 1.6–2.4)
MCH RBC QN AUTO: 29.1 PG (ref 26–34)
MCHC RBC AUTO-ENTMCNC: 32.4 G/DL (ref 32–36)
MCV RBC AUTO: 90 FL (ref 80–100)
NRBC BLD-RTO: 0 /100 WBCS (ref 0–0)
PHOSPHATE SERPL-MCNC: 2.7 MG/DL (ref 2.5–4.9)
PLATELET # BLD AUTO: 126 X10*3/UL (ref 150–450)
POTASSIUM SERPL-SCNC: 3.6 MMOL/L (ref 3.5–5.3)
RBC # BLD AUTO: 3.95 X10*6/UL (ref 4–5.2)
SODIUM SERPL-SCNC: 136 MMOL/L (ref 136–145)
VANCOMYCIN SERPL-MCNC: 19.2 UG/ML (ref 5–20)
WBC # BLD AUTO: 2.9 X10*3/UL (ref 4.4–11.3)

## 2024-05-25 PROCEDURE — 2500000002 HC RX 250 W HCPCS SELF ADMINISTERED DRUGS (ALT 637 FOR MEDICARE OP, ALT 636 FOR OP/ED)

## 2024-05-25 PROCEDURE — G0378 HOSPITAL OBSERVATION PER HR: HCPCS

## 2024-05-25 PROCEDURE — 85027 COMPLETE CBC AUTOMATED: CPT

## 2024-05-25 PROCEDURE — 2500000004 HC RX 250 GENERAL PHARMACY W/ HCPCS (ALT 636 FOR OP/ED)

## 2024-05-25 PROCEDURE — 83735 ASSAY OF MAGNESIUM: CPT

## 2024-05-25 PROCEDURE — 99233 SBSQ HOSP IP/OBS HIGH 50: CPT | Performed by: INTERNAL MEDICINE

## 2024-05-25 PROCEDURE — 80202 ASSAY OF VANCOMYCIN: CPT

## 2024-05-25 PROCEDURE — 84100 ASSAY OF PHOSPHORUS: CPT

## 2024-05-25 PROCEDURE — 1100000001 HC PRIVATE ROOM DAILY

## 2024-05-25 PROCEDURE — 36415 COLL VENOUS BLD VENIPUNCTURE: CPT

## 2024-05-25 PROCEDURE — 82436 ASSAY OF URINE CHLORIDE: CPT | Mod: 91

## 2024-05-25 PROCEDURE — 80069 RENAL FUNCTION PANEL: CPT

## 2024-05-25 PROCEDURE — 2500000001 HC RX 250 WO HCPCS SELF ADMINISTERED DRUGS (ALT 637 FOR MEDICARE OP)

## 2024-05-25 PROCEDURE — 99232 SBSQ HOSP IP/OBS MODERATE 35: CPT | Performed by: SURGERY

## 2024-05-25 PROCEDURE — 96372 THER/PROPH/DIAG INJ SC/IM: CPT

## 2024-05-25 RX ORDER — ACETAMINOPHEN 325 MG/1
650 TABLET ORAL 4 TIMES DAILY
Status: DISCONTINUED | OUTPATIENT
Start: 2024-05-26 | End: 2024-05-29 | Stop reason: HOSPADM

## 2024-05-25 RX ORDER — SODIUM CHLORIDE 9 MG/ML
100 INJECTION, SOLUTION INTRAVENOUS CONTINUOUS
Status: DISCONTINUED | OUTPATIENT
Start: 2024-05-26 | End: 2024-05-29 | Stop reason: HOSPADM

## 2024-05-25 RX ADMIN — LORATADINE 10 MG: 10 TABLET ORAL at 08:37

## 2024-05-25 RX ADMIN — AMLODIPINE BESYLATE 5 MG: 5 TABLET ORAL at 20:32

## 2024-05-25 RX ADMIN — MYCOPHENOLATE MOFETIL 1000 MG: 250 CAPSULE ORAL at 07:07

## 2024-05-25 RX ADMIN — POLYETHYLENE GLYCOL 3350 17 G: 17 POWDER, FOR SOLUTION ORAL at 08:23

## 2024-05-25 RX ADMIN — HEPARIN SODIUM 5000 UNITS: 5000 INJECTION INTRAVENOUS; SUBCUTANEOUS at 07:06

## 2024-05-25 RX ADMIN — CALCIUM CARBONATE (ANTACID) CHEW TAB 500 MG 500 MG: 500 CHEW TAB at 08:23

## 2024-05-25 RX ADMIN — TENOFOVIR ALAFENAMIDE 25 MG: 25 TABLET ORAL at 08:23

## 2024-05-25 RX ADMIN — LEVOTHYROXINE SODIUM 25 MCG: 0.05 TABLET ORAL at 07:06

## 2024-05-25 RX ADMIN — PIPERACILLIN SODIUM AND TAZOBACTAM SODIUM 3.38 G: 3; .375 INJECTION, SOLUTION INTRAVENOUS at 04:42

## 2024-05-25 RX ADMIN — HEPARIN SODIUM 5000 UNITS: 5000 INJECTION INTRAVENOUS; SUBCUTANEOUS at 22:10

## 2024-05-25 RX ADMIN — VANCOMYCIN HYDROCHLORIDE 1000 MG: 1 INJECTION, SOLUTION INTRAVENOUS at 01:43

## 2024-05-25 RX ADMIN — CALCITRIOL CAPSULES 0.25 MCG 0.25 MCG: 0.25 CAPSULE ORAL at 08:23

## 2024-05-25 RX ADMIN — CALCIUM CARBONATE (ANTACID) CHEW TAB 500 MG 500 MG: 500 CHEW TAB at 20:32

## 2024-05-25 RX ADMIN — KETOTIFEN FUMARATE 1 DROP: 0.25 SOLUTION/ DROPS OPHTHALMIC at 20:32

## 2024-05-25 RX ADMIN — PANTOPRAZOLE SODIUM 40 MG: 40 TABLET, DELAYED RELEASE ORAL at 07:06

## 2024-05-25 RX ADMIN — SERTRALINE HYDROCHLORIDE 50 MG: 50 TABLET ORAL at 13:51

## 2024-05-25 RX ADMIN — AMLODIPINE BESYLATE 5 MG: 5 TABLET ORAL at 08:23

## 2024-05-25 RX ADMIN — MYCOPHENOLATE MOFETIL 1000 MG: 250 CAPSULE ORAL at 18:09

## 2024-05-25 RX ADMIN — HEPARIN SODIUM 5000 UNITS: 5000 INJECTION INTRAVENOUS; SUBCUTANEOUS at 13:51

## 2024-05-25 RX ADMIN — PIPERACILLIN SODIUM AND TAZOBACTAM SODIUM 3.38 G: 3; .375 INJECTION, SOLUTION INTRAVENOUS at 10:17

## 2024-05-25 RX ADMIN — GABAPENTIN 300 MG: 300 CAPSULE ORAL at 20:32

## 2024-05-25 RX ADMIN — PREDNISONE 10 MG: 10 TABLET ORAL at 08:23

## 2024-05-25 RX ADMIN — Medication 4000 UNITS: at 08:23

## 2024-05-25 RX ADMIN — KETOTIFEN FUMARATE 1 DROP: 0.25 SOLUTION/ DROPS OPHTHALMIC at 08:23

## 2024-05-25 ASSESSMENT — COGNITIVE AND FUNCTIONAL STATUS - GENERAL
CLIMB 3 TO 5 STEPS WITH RAILING: A LITTLE
DRESSING REGULAR LOWER BODY CLOTHING: A LITTLE
DRESSING REGULAR LOWER BODY CLOTHING: A LITTLE
CLIMB 3 TO 5 STEPS WITH RAILING: A LITTLE
CLIMB 3 TO 5 STEPS WITH RAILING: A LITTLE
DRESSING REGULAR LOWER BODY CLOTHING: A LITTLE
DRESSING REGULAR UPPER BODY CLOTHING: A LITTLE
DRESSING REGULAR UPPER BODY CLOTHING: A LITTLE
DRESSING REGULAR LOWER BODY CLOTHING: A LITTLE
DRESSING REGULAR UPPER BODY CLOTHING: A LITTLE
CLIMB 3 TO 5 STEPS WITH RAILING: A LITTLE
DAILY ACTIVITIY SCORE: 24
HELP NEEDED FOR BATHING: A LITTLE
MOBILITY SCORE: 24
HELP NEEDED FOR BATHING: A LITTLE
DRESSING REGULAR UPPER BODY CLOTHING: A LITTLE

## 2024-05-25 ASSESSMENT — PAIN SCALES - GENERAL
PAINLEVEL_OUTOF10: 0 - NO PAIN
PAINLEVEL_OUTOF10: 0 - NO PAIN

## 2024-05-25 NOTE — PROGRESS NOTES
Vancomycin Dosing by Pharmacy- Cessation of Therapy    Consult to pharmacy for vancomycin dosing has been discontinued by the prescriber, pharmacy will sign off at this time.    Please call pharmacy if there are further questions or re-enter a consult if vancomycin is resumed.     Jeane Cruz, PharmD

## 2024-05-25 NOTE — PROGRESS NOTES
Vancomycin Dosing by Pharmacy- FOLLOW UP    Herber Foy Rai is a 44 y.o. year old female who Pharmacy has been consulted for vancomycin dosing for other Nausea, vomiting, fever, kidney pain . Based on the patient's indication and renal status this patient is being dosed based on a goal AUC of 400-600.     Renal function is currently stable.    Current vancomycin dose: 1000 mg given every 12 hours    Estimated vancomycin AUC on current dose: 622 mg/L.hr     Visit Vitals  /76 (BP Location: Left arm, Patient Position: Lying)   Pulse 87   Temp 36.1 °C (97 °F) (Temporal)   Resp 18        Lab Results   Component Value Date    CREATININE 0.99 2024    CREATININE 1.08 (H) 2024    CREATININE 0.95 2024    CREATININE 0.89 2024        Patient weight is as follows:   Vitals:    24 1034   Weight: 79.9 kg (176 lb 1.6 oz)       Cultures:  No results found for the encounter in last 14 days.       I/O last 3 completed shifts:  In: 790 (9.9 mL/kg) [P.O.:240; IV Piggyback:550]  Out: 2510 (31.4 mL/kg) [Urine:2510 (0.9 mL/kg/hr)]  Weight: 79.9 kg   I/O during current shift:  No intake/output data recorded.    Temp (24hrs), Av.3 °C (97.4 °F), Min:36.1 °C (97 °F), Max:36.6 °C (97.9 °F)      Assessment/Plan    Above goal AUC. Orders placed for new vancomcyin regimen of 1500 every 24 hours to begin at  1400.    This dosing regimen is predicted by [employer] to result in the following pharmacokinetic parameters:  Regimen: 1500 mg IV every 24 hours.  Start time: 13:43 on 2024  Exposure target: AUC24 (range)400-600 mg/L.hr   AUC24,ss: 472 mg/L.hr  Probability of AUC24 > 400: 78 %  Ctrough,ss: 11.5 mg/L  Probability of Ctrough,ss > 20: 6 %  Probability of nephrotoxicity (Lodise LUZMA ): 7 %    The next level will be obtained on  at AM draw. May be obtained sooner if clinically indicated.   Will continue to monitor renal function daily while on vancomycin and order serum creatinine at least every  48 hours if not already ordered.  Follow for continued vancomycin needs, clinical response, and signs/symptoms of toxicity.       Jeane Cruz, PharmD

## 2024-05-25 NOTE — CARE PLAN
The patient's goals for the shift include none    The clinical goals for the shift include Patient will remain safe and free from injury throughout shift.

## 2024-05-25 NOTE — PROGRESS NOTES
Herber Foy Rai is a 44 y.o. female s/p DDKT readmit with fever, incisional pain. Imaging notable for some new hydronephrosis when bladder full.    - No acute events; seen with Hebrew    - Reports bilateral knee pain present at rest, no swelling     Objective   Gen: A+OX3; NAD  HEENT: PERRL, sclera anicteric, MMM  Cardiac: RRR  Chest: Normal inspiratory effort  Abdomen: S/NT/ND. Incision well-healed.  Ext: No LE edema    Last Recorded Vitals  Visit Vitals  /73 (BP Location: Left arm, Patient Position: Lying)   Pulse 81   Temp 36.1 °C (97 °F) (Temporal)   Resp 18      Intake/Output last 3 Shifts:    Intake/Output Summary (Last 24 hours) at 5/25/2024 1427  Last data filed at 5/25/2024 1245  Gross per 24 hour   Intake 910 ml   Output 3013 ml   Net -2103 ml      Vitals:    05/24/24 1034   Weight: 79.9 kg (176 lb 1.6 oz)      Scheduled medications  amLODIPine, 5 mg, oral, BID  calcitriol, 0.25 mcg, oral, Daily  calcium carbonate, 500 mg, oral, BID  cholecalciferol, 4,000 Units, oral, Daily  gabapentin, 300 mg, oral, Nightly  heparin (porcine), 5,000 Units, subcutaneous, q8h JANIE  ketotifen, 1 drop, Both Eyes, BID  levothyroxine, 25 mcg, oral, Daily before breakfast  loratadine, 10 mg, oral, Daily  mycophenolate, 1,000 mg, oral, q12h  pantoprazole, 40 mg, oral, Daily before breakfast  polyethylene glycol, 17 g, oral, Daily  predniSONE, 10 mg, oral, Daily  sertraline, 50 mg, oral, Daily  tenofovir alafenamide, 25 mg, oral, Daily    Assessment/Plan   Principal Problem:    Kidney transplant recipient  Active Problems:  Patient Active Problem List   Diagnosis    ESRD (end stage renal disease) (Multi)    HTN (hypertension)    Gastroesophageal reflux disease    Kidney replaced by transplant ([facility])    Immunosuppression (Multi)    Transplant    Fever of unknown origin    Kidney transplant complication ([facility])      - Follow-up cultures; will discontinue antibiotics  - Bladder scan for PVRs; may consider nuc  med renal scan with lasix  - Next Belatacept 6/24    I spent 35 minutes in the professional and overall care of this patient, including immunosuppression management.    Magi Lambert MD, PHD, MPH, FACS  Chief Transplant and Hepatobiliary Surgery

## 2024-05-26 ENCOUNTER — APPOINTMENT (OUTPATIENT)
Dept: RADIOLOGY | Facility: HOSPITAL | Age: 44
DRG: 699 | End: 2024-05-26
Payer: COMMERCIAL

## 2024-05-26 LAB
ALBUMIN SERPL BCP-MCNC: 3.5 G/DL (ref 3.4–5)
ALLOSURE SCORE - KIDNEY: 0.15 %
ANION GAP SERPL CALC-SCNC: 13 MMOL/L (ref 10–20)
APPEARANCE UR: CLEAR
APTT PPP: 32 SECONDS (ref 27–38)
BILIRUB UR STRIP.AUTO-MCNC: NEGATIVE MG/DL
BKV DNA SERPL NAA+PROBE-LOG#: NORMAL {LOG_COPIES}/ML
BUN SERPL-MCNC: 8 MG/DL (ref 6–23)
CALCIUM SERPL-MCNC: 8.5 MG/DL (ref 8.6–10.6)
CAREDX_ORDER_ID: NORMAL
CENTER_ORDER_ID: NORMAL
CHLORIDE SERPL-SCNC: 105 MMOL/L (ref 98–107)
CLIENT SPECIMEN ID - ALLOSURE: NORMAL
CMV DNA SERPL NAA+PROBE-ACNC: ABNORMAL IU/ML
CMV DNA SERPL NAA+PROBE-LOG IU: 4.37 LOG IU/ML
CO2 SERPL-SCNC: 22 MMOL/L (ref 21–32)
COLOR UR: COLORLESS
CREAT SERPL-MCNC: 1.16 MG/DL (ref 0.5–1.05)
DONOR RELATION - ALLOSURE: NORMAL
EGFRCR SERPLBLD CKD-EPI 2021: 59 ML/MIN/1.73M*2
ERYTHROCYTE [DISTWIDTH] IN BLOOD BY AUTOMATED COUNT: 12 % (ref 11.5–14.5)
GLUCOSE SERPL-MCNC: 77 MG/DL (ref 74–99)
GLUCOSE UR STRIP.AUTO-MCNC: NORMAL MG/DL
HCT VFR BLD AUTO: 39.5 % (ref 36–46)
HGB BLD-MCNC: 12.7 G/DL (ref 12–16)
HOLD SPECIMEN: NORMAL
INR PPP: 1.2 (ref 0.9–1.1)
KETONES UR STRIP.AUTO-MCNC: NEGATIVE MG/DL
LABORATORY COMMENT REPORT: DETECTED
LABORATORY COMMENT REPORT: NOT DETECTED
LEUKOCYTE ESTERASE UR QL STRIP.AUTO: NEGATIVE
MAGNESIUM SERPL-MCNC: 1.65 MG/DL (ref 1.6–2.4)
MCH RBC QN AUTO: 29.1 PG (ref 26–34)
MCHC RBC AUTO-ENTMCNC: 32.2 G/DL (ref 32–36)
MCV RBC AUTO: 90 FL (ref 80–100)
MUCOUS THREADS #/AREA URNS AUTO: NORMAL /LPF
NITRITE UR QL STRIP.AUTO: NEGATIVE
NOTES - ALLOSURE: NORMAL
NRBC BLD-RTO: 0 /100 WBCS (ref 0–0)
PH UR STRIP.AUTO: 6.5 [PH]
PHOSPHATE SERPL-MCNC: 2.8 MG/DL (ref 2.5–4.9)
PLATELET # BLD AUTO: 133 X10*3/UL (ref 150–450)
POTASSIUM SERPL-SCNC: 3 MMOL/L (ref 3.5–5.3)
PROT UR STRIP.AUTO-MCNC: NEGATIVE MG/DL
PROTHROMBIN TIME: 13 SECONDS (ref 9.8–12.8)
RBC # BLD AUTO: 4.37 X10*6/UL (ref 4–5.2)
RBC # UR STRIP.AUTO: ABNORMAL /UL
RBC #/AREA URNS AUTO: NORMAL /HPF
RELATIVE CHANGE VALUE - KIDNEY: NORMAL %
SODIUM SERPL-SCNC: 137 MMOL/L (ref 136–145)
SP GR UR STRIP.AUTO: 1.01
SQUAMOUS #/AREA URNS AUTO: NORMAL /HPF
TEST COMMENTS - ALLOSURE: NORMAL
TIME POST TX - ALLOSURE: NORMAL
TRANSPLANTED ORGAN - ALLOSURE: NORMAL
TX DATE - ALLOSURE/ALLOMAP: NORMAL
UROBILINOGEN UR STRIP.AUTO-MCNC: NORMAL MG/DL
WBC # BLD AUTO: 2.7 X10*3/UL (ref 4.4–11.3)
WBC #/AREA URNS AUTO: NORMAL /HPF
WP_ORDER_ID: NORMAL

## 2024-05-26 PROCEDURE — 2500000002 HC RX 250 W HCPCS SELF ADMINISTERED DRUGS (ALT 637 FOR MEDICARE OP, ALT 636 FOR OP/ED): Mod: MUE

## 2024-05-26 PROCEDURE — 70450 CT HEAD/BRAIN W/O DYE: CPT | Performed by: RADIOLOGY

## 2024-05-26 PROCEDURE — 87385 HISTOPLASMA CAPSUL AG IA: CPT | Performed by: STUDENT IN AN ORGANIZED HEALTH CARE EDUCATION/TRAINING PROGRAM

## 2024-05-26 PROCEDURE — 99233 SBSQ HOSP IP/OBS HIGH 50: CPT | Performed by: INTERNAL MEDICINE

## 2024-05-26 PROCEDURE — 83735 ASSAY OF MAGNESIUM: CPT

## 2024-05-26 PROCEDURE — 81001 URINALYSIS AUTO W/SCOPE: CPT

## 2024-05-26 PROCEDURE — 85610 PROTHROMBIN TIME: CPT | Performed by: STUDENT IN AN ORGANIZED HEALTH CARE EDUCATION/TRAINING PROGRAM

## 2024-05-26 PROCEDURE — 96372 THER/PROPH/DIAG INJ SC/IM: CPT

## 2024-05-26 PROCEDURE — 00JU3ZZ INSPECTION OF SPINAL CANAL, PERCUTANEOUS APPROACH: ICD-10-PCS | Performed by: PSYCHIATRY & NEUROLOGY

## 2024-05-26 PROCEDURE — 86592 SYPHILIS TEST NON-TREP QUAL: CPT | Performed by: PSYCHIATRY & NEUROLOGY

## 2024-05-26 PROCEDURE — 87305 ASPERGILLUS AG IA: CPT | Performed by: STUDENT IN AN ORGANIZED HEALTH CARE EDUCATION/TRAINING PROGRAM

## 2024-05-26 PROCEDURE — 87449 NOS EACH ORGANISM AG IA: CPT | Performed by: STUDENT IN AN ORGANIZED HEALTH CARE EDUCATION/TRAINING PROGRAM

## 2024-05-26 PROCEDURE — 87799 DETECT AGENT NOS DNA QUANT: CPT | Performed by: PSYCHIATRY & NEUROLOGY

## 2024-05-26 PROCEDURE — 81050 URINALYSIS VOLUME MEASURE: CPT

## 2024-05-26 PROCEDURE — 86780 TREPONEMA PALLIDUM: CPT | Performed by: PSYCHIATRY & NEUROLOGY

## 2024-05-26 PROCEDURE — A4217 STERILE WATER/SALINE, 500 ML: HCPCS | Performed by: STUDENT IN AN ORGANIZED HEALTH CARE EDUCATION/TRAINING PROGRAM

## 2024-05-26 PROCEDURE — 87529 HSV DNA AMP PROBE: CPT | Performed by: PSYCHIATRY & NEUROLOGY

## 2024-05-26 PROCEDURE — 86618 LYME DISEASE ANTIBODY: CPT | Performed by: PSYCHIATRY & NEUROLOGY

## 2024-05-26 PROCEDURE — 82945 GLUCOSE OTHER FLUID: CPT | Performed by: PSYCHIATRY & NEUROLOGY

## 2024-05-26 PROCEDURE — 83605 ASSAY OF LACTIC ACID: CPT | Performed by: PSYCHIATRY & NEUROLOGY

## 2024-05-26 PROCEDURE — 80069 RENAL FUNCTION PANEL: CPT

## 2024-05-26 PROCEDURE — 86651 ENCEPHALITIS CALIFORN ANTBDY: CPT | Performed by: PSYCHIATRY & NEUROLOGY

## 2024-05-26 PROCEDURE — 36415 COLL VENOUS BLD VENIPUNCTURE: CPT

## 2024-05-26 PROCEDURE — 86403 PARTICLE AGGLUT ANTBDY SCRN: CPT | Performed by: PSYCHIATRY & NEUROLOGY

## 2024-05-26 PROCEDURE — 2500000004 HC RX 250 GENERAL PHARMACY W/ HCPCS (ALT 636 FOR OP/ED)

## 2024-05-26 PROCEDURE — 70450 CT HEAD/BRAIN W/O DYE: CPT

## 2024-05-26 PROCEDURE — 84157 ASSAY OF PROTEIN OTHER: CPT | Performed by: PSYCHIATRY & NEUROLOGY

## 2024-05-26 PROCEDURE — 81003 URINALYSIS AUTO W/O SCOPE: CPT

## 2024-05-26 PROCEDURE — 87798 DETECT AGENT NOS DNA AMP: CPT | Performed by: PSYCHIATRY & NEUROLOGY

## 2024-05-26 PROCEDURE — 1100000001 HC PRIVATE ROOM DAILY

## 2024-05-26 PROCEDURE — 85730 THROMBOPLASTIN TIME PARTIAL: CPT | Mod: 91 | Performed by: STUDENT IN AN ORGANIZED HEALTH CARE EDUCATION/TRAINING PROGRAM

## 2024-05-26 PROCEDURE — 2500000004 HC RX 250 GENERAL PHARMACY W/ HCPCS (ALT 636 FOR OP/ED): Performed by: STUDENT IN AN ORGANIZED HEALTH CARE EDUCATION/TRAINING PROGRAM

## 2024-05-26 PROCEDURE — 2500000002 HC RX 250 W HCPCS SELF ADMINISTERED DRUGS (ALT 637 FOR MEDICARE OP, ALT 636 FOR OP/ED)

## 2024-05-26 PROCEDURE — 99223 1ST HOSP IP/OBS HIGH 75: CPT | Performed by: INTERNAL MEDICINE

## 2024-05-26 PROCEDURE — 86609 BACTERIUM ANTIBODY: CPT | Performed by: PSYCHIATRY & NEUROLOGY

## 2024-05-26 PROCEDURE — 85027 COMPLETE CBC AUTOMATED: CPT

## 2024-05-26 PROCEDURE — 87040 BLOOD CULTURE FOR BACTERIA: CPT

## 2024-05-26 PROCEDURE — 87070 CULTURE OTHR SPECIMN AEROBIC: CPT | Performed by: PSYCHIATRY & NEUROLOGY

## 2024-05-26 PROCEDURE — 86618 LYME DISEASE ANTIBODY: CPT | Performed by: SURGERY

## 2024-05-26 PROCEDURE — 83615 LACTATE (LD) (LDH) ENZYME: CPT | Performed by: PSYCHIATRY & NEUROLOGY

## 2024-05-26 PROCEDURE — 87385 HISTOPLASMA CAPSUL AG IA: CPT | Mod: 91

## 2024-05-26 PROCEDURE — 87483 CNS DNA AMP PROBE TYPE 12-25: CPT | Performed by: PSYCHIATRY & NEUROLOGY

## 2024-05-26 PROCEDURE — 87102 FUNGUS ISOLATION CULTURE: CPT | Performed by: SURGERY

## 2024-05-26 PROCEDURE — 2500000002 HC RX 250 W HCPCS SELF ADMINISTERED DRUGS (ALT 637 FOR MEDICARE OP, ALT 636 FOR OP/ED): Performed by: INTERNAL MEDICINE

## 2024-05-26 PROCEDURE — 99232 SBSQ HOSP IP/OBS MODERATE 35: CPT | Performed by: SURGERY

## 2024-05-26 PROCEDURE — 89051 BODY FLUID CELL COUNT: CPT | Mod: 91 | Performed by: PSYCHIATRY & NEUROLOGY

## 2024-05-26 PROCEDURE — 89050 BODY FLUID CELL COUNT: CPT | Performed by: PSYCHIATRY & NEUROLOGY

## 2024-05-26 PROCEDURE — 2500000001 HC RX 250 WO HCPCS SELF ADMINISTERED DRUGS (ALT 637 FOR MEDICARE OP)

## 2024-05-26 RX ORDER — MYCOPHENOLATE MOFETIL 250 MG/1
500 CAPSULE ORAL EVERY 12 HOURS
Status: DISCONTINUED | OUTPATIENT
Start: 2024-05-26 | End: 2024-05-29 | Stop reason: HOSPADM

## 2024-05-26 RX ORDER — CEFTRIAXONE 2 G/50ML
2 INJECTION, SOLUTION INTRAVENOUS EVERY 12 HOURS
Status: DISCONTINUED | OUTPATIENT
Start: 2024-05-26 | End: 2024-05-27

## 2024-05-26 RX ORDER — POTASSIUM CHLORIDE 20 MEQ/1
40 TABLET, EXTENDED RELEASE ORAL ONCE
Status: COMPLETED | OUTPATIENT
Start: 2024-05-26 | End: 2024-05-26

## 2024-05-26 RX ORDER — MAGNESIUM SULFATE HEPTAHYDRATE 40 MG/ML
2 INJECTION, SOLUTION INTRAVENOUS ONCE
Status: COMPLETED | OUTPATIENT
Start: 2024-05-26 | End: 2024-05-26

## 2024-05-26 RX ORDER — POTASSIUM CHLORIDE 20 MEQ/1
20 TABLET, EXTENDED RELEASE ORAL ONCE
Status: COMPLETED | OUTPATIENT
Start: 2024-05-26 | End: 2024-05-26

## 2024-05-26 RX ORDER — VANCOMYCIN HYDROCHLORIDE 1 G/20ML
INJECTION, POWDER, LYOPHILIZED, FOR SOLUTION INTRAVENOUS DAILY PRN
Status: DISCONTINUED | OUTPATIENT
Start: 2024-05-26 | End: 2024-05-27

## 2024-05-26 RX ORDER — POTASSIUM CHLORIDE 14.9 MG/ML
20 INJECTION INTRAVENOUS
Status: COMPLETED | OUTPATIENT
Start: 2024-05-26 | End: 2024-05-26

## 2024-05-26 RX ADMIN — SODIUM CHLORIDE 100 ML/HR: 9 INJECTION, SOLUTION INTRAVENOUS at 00:56

## 2024-05-26 RX ADMIN — CALCIUM CARBONATE (ANTACID) CHEW TAB 500 MG 500 MG: 500 CHEW TAB at 20:27

## 2024-05-26 RX ADMIN — Medication 4000 UNITS: at 09:27

## 2024-05-26 RX ADMIN — HEPARIN SODIUM 5000 UNITS: 5000 INJECTION INTRAVENOUS; SUBCUTANEOUS at 14:21

## 2024-05-26 RX ADMIN — LEVOTHYROXINE SODIUM 25 MCG: 0.05 TABLET ORAL at 06:40

## 2024-05-26 RX ADMIN — MYCOPHENOLATE MOFETIL 1000 MG: 250 CAPSULE ORAL at 06:40

## 2024-05-26 RX ADMIN — CEFTRIAXONE SODIUM 2 G: 2 INJECTION, SOLUTION INTRAVENOUS at 23:04

## 2024-05-26 RX ADMIN — LORATADINE 10 MG: 10 TABLET ORAL at 09:39

## 2024-05-26 RX ADMIN — VANCOMYCIN HYDROCHLORIDE 1250 MG: 5 INJECTION, POWDER, LYOPHILIZED, FOR SOLUTION INTRAVENOUS at 12:45

## 2024-05-26 RX ADMIN — CALCIUM CARBONATE (ANTACID) CHEW TAB 500 MG 500 MG: 500 CHEW TAB at 09:27

## 2024-05-26 RX ADMIN — KETOTIFEN FUMARATE 1 DROP: 0.25 SOLUTION/ DROPS OPHTHALMIC at 09:39

## 2024-05-26 RX ADMIN — KETOTIFEN FUMARATE 1 DROP: 0.25 SOLUTION/ DROPS OPHTHALMIC at 21:00

## 2024-05-26 RX ADMIN — AMPICILLIN SODIUM 2 G: 2 INJECTION, POWDER, FOR SOLUTION INTRAMUSCULAR; INTRAVENOUS at 21:45

## 2024-05-26 RX ADMIN — TENOFOVIR ALAFENAMIDE 25 MG: 25 TABLET ORAL at 09:27

## 2024-05-26 RX ADMIN — MYCOPHENOLATE MOFETIL 500 MG: 250 CAPSULE ORAL at 18:35

## 2024-05-26 RX ADMIN — CEFTRIAXONE SODIUM 2 G: 2 INJECTION, SOLUTION INTRAVENOUS at 14:22

## 2024-05-26 RX ADMIN — ACETAMINOPHEN 650 MG: 325 TABLET ORAL at 01:08

## 2024-05-26 RX ADMIN — CALCITRIOL CAPSULES 0.25 MCG 0.25 MCG: 0.25 CAPSULE ORAL at 09:27

## 2024-05-26 RX ADMIN — POTASSIUM CHLORIDE 20 MEQ: 14.9 INJECTION, SOLUTION INTRAVENOUS at 09:28

## 2024-05-26 RX ADMIN — POTASSIUM CHLORIDE 40 MEQ: 1500 TABLET, EXTENDED RELEASE ORAL at 16:38

## 2024-05-26 RX ADMIN — AMPICILLIN SODIUM 2 G: 2 INJECTION, POWDER, FOR SOLUTION INTRAMUSCULAR; INTRAVENOUS at 16:33

## 2024-05-26 RX ADMIN — ACETAMINOPHEN 650 MG: 325 TABLET ORAL at 18:34

## 2024-05-26 RX ADMIN — POTASSIUM CHLORIDE 20 MEQ: 14.9 INJECTION, SOLUTION INTRAVENOUS at 10:47

## 2024-05-26 RX ADMIN — PREDNISONE 10 MG: 10 TABLET ORAL at 09:27

## 2024-05-26 RX ADMIN — HEPARIN SODIUM 5000 UNITS: 5000 INJECTION INTRAVENOUS; SUBCUTANEOUS at 21:01

## 2024-05-26 RX ADMIN — ACYCLOVIR SODIUM 800 MG: 50 INJECTION, SOLUTION INTRAVENOUS at 18:34

## 2024-05-26 RX ADMIN — PANTOPRAZOLE SODIUM 40 MG: 40 TABLET, DELAYED RELEASE ORAL at 06:40

## 2024-05-26 RX ADMIN — SODIUM CHLORIDE 500 ML: 9 INJECTION, SOLUTION INTRAVENOUS at 00:00

## 2024-05-26 RX ADMIN — AMPICILLIN SODIUM 2 G: 2 INJECTION, POWDER, FOR SOLUTION INTRAMUSCULAR; INTRAVENOUS at 11:27

## 2024-05-26 RX ADMIN — ACETAMINOPHEN 650 MG: 325 TABLET ORAL at 06:40

## 2024-05-26 RX ADMIN — HEPARIN SODIUM 5000 UNITS: 5000 INJECTION INTRAVENOUS; SUBCUTANEOUS at 06:40

## 2024-05-26 RX ADMIN — POTASSIUM CHLORIDE 20 MEQ: 1500 TABLET, EXTENDED RELEASE ORAL at 10:46

## 2024-05-26 RX ADMIN — AMPICILLIN SODIUM 2 G: 2 INJECTION, POWDER, FOR SOLUTION INTRAMUSCULAR; INTRAVENOUS at 18:34

## 2024-05-26 RX ADMIN — MAGNESIUM SULFATE HEPTAHYDRATE 2 G: 40 INJECTION, SOLUTION INTRAVENOUS at 09:26

## 2024-05-26 RX ADMIN — AMLODIPINE BESYLATE 5 MG: 5 TABLET ORAL at 20:27

## 2024-05-26 RX ADMIN — AMLODIPINE BESYLATE 5 MG: 5 TABLET ORAL at 09:27

## 2024-05-26 RX ADMIN — SERTRALINE HYDROCHLORIDE 50 MG: 50 TABLET ORAL at 14:23

## 2024-05-26 RX ADMIN — GABAPENTIN 300 MG: 300 CAPSULE ORAL at 20:27

## 2024-05-26 RX ADMIN — ACYCLOVIR SODIUM 800 MG: 50 INJECTION, SOLUTION INTRAVENOUS at 11:27

## 2024-05-26 RX ADMIN — POLYETHYLENE GLYCOL 3350 17 G: 17 POWDER, FOR SOLUTION ORAL at 09:26

## 2024-05-26 RX ADMIN — SUMATRIPTAN SUCCINATE 100 MG: 100 TABLET ORAL at 00:29

## 2024-05-26 ASSESSMENT — COGNITIVE AND FUNCTIONAL STATUS - GENERAL
DRESSING REGULAR UPPER BODY CLOTHING: A LITTLE
CLIMB 3 TO 5 STEPS WITH RAILING: A LITTLE
DRESSING REGULAR LOWER BODY CLOTHING: A LITTLE
DRESSING REGULAR UPPER BODY CLOTHING: A LITTLE
CLIMB 3 TO 5 STEPS WITH RAILING: A LITTLE
DRESSING REGULAR UPPER BODY CLOTHING: A LITTLE
DRESSING REGULAR UPPER BODY CLOTHING: A LITTLE
HELP NEEDED FOR BATHING: A LITTLE
DRESSING REGULAR LOWER BODY CLOTHING: A LITTLE
HELP NEEDED FOR BATHING: A LITTLE
DRESSING REGULAR LOWER BODY CLOTHING: A LITTLE
HELP NEEDED FOR BATHING: A LITTLE
CLIMB 3 TO 5 STEPS WITH RAILING: A LITTLE
HELP NEEDED FOR BATHING: A LITTLE
DRESSING REGULAR LOWER BODY CLOTHING: A LITTLE
HELP NEEDED FOR BATHING: A LITTLE
DRESSING REGULAR UPPER BODY CLOTHING: A LITTLE
DRESSING REGULAR LOWER BODY CLOTHING: A LITTLE
CLIMB 3 TO 5 STEPS WITH RAILING: A LITTLE
CLIMB 3 TO 5 STEPS WITH RAILING: A LITTLE

## 2024-05-26 ASSESSMENT — PAIN - FUNCTIONAL ASSESSMENT
PAIN_FUNCTIONAL_ASSESSMENT: 0-10

## 2024-05-26 ASSESSMENT — PAIN SCALES - GENERAL
PAINLEVEL_OUTOF10: 5 - MODERATE PAIN
PAINLEVEL_OUTOF10: 4
PAINLEVEL_OUTOF10: 10 - WORST POSSIBLE PAIN
PAINLEVEL_OUTOF10: 5 - MODERATE PAIN
PAINLEVEL_OUTOF10: 0 - NO PAIN

## 2024-05-26 ASSESSMENT — PAIN DESCRIPTION - DESCRIPTORS
DESCRIPTORS: ACHING

## 2024-05-26 NOTE — PROGRESS NOTES
Herber Foy Rai is a 44 y.o. female s/p DDKT. Readmit for FUO.    - Febrile w/ headache o/n  - Seen w/ Greek     Objective   Gen: A+OX3; NAD  HEENT: PERRL, sclera anicteric, MMM  Cardiac: RRR  Chest: Normal inspiratory effort  Abdomen: S/NT/ND. Incision C/D/I.  Ext: No LE edema    Last Recorded Vitals  Visit Vitals  /79 (BP Location: Left arm, Patient Position: Lying)   Pulse 97   Temp 36.2 °C (97.2 °F) (Temporal)   Resp 18      Intake/Output last 3 Shifts:    Intake/Output Summary (Last 24 hours) at 5/26/2024 1223  Last data filed at 5/26/2024 1046  Gross per 24 hour   Intake 827.83 ml   Output 3714 ml   Net -2886.17 ml      Vitals:    05/24/24 1034   Weight: 79.9 kg (176 lb 1.6 oz)    Scheduled medications  acetaminophen, 650 mg, oral, 4x daily  acyclovir, 10 mg/kg, intravenous, q8h  amLODIPine, 5 mg, oral, BID  ampicillin, 2 g, intravenous, q4h  calcitriol, 0.25 mcg, oral, Daily  calcium carbonate, 500 mg, oral, BID  cefTRIAXone, 2 g, intravenous, q12h  cholecalciferol, 4,000 Units, oral, Daily  gabapentin, 300 mg, oral, Nightly  heparin (porcine), 5,000 Units, subcutaneous, q8h JANIE  ketotifen, 1 drop, Both Eyes, BID  levothyroxine, 25 mcg, oral, Daily before breakfast  loratadine, 10 mg, oral, Daily  mycophenolate, 1,000 mg, oral, q12h  pantoprazole, 40 mg, oral, Daily before breakfast  polyethylene glycol, 17 g, oral, Daily  potassium chloride, 20 mEq, intravenous, q2h  predniSONE, 10 mg, oral, Daily  sertraline, 50 mg, oral, Daily  tenofovir alafenamide, 25 mg, oral, Daily  vancomycin, 15 mg/kg, intravenous, q24h    Assessment/Plan   Principal Problem:    Kidney transplant recipient  Active Problems:  Patient Active Problem List   Diagnosis    ESRD (end stage renal disease) (Multi)    HTN (hypertension)    Gastroesophageal reflux disease    Kidney replaced by transplant ([facility])    Immunosuppression (Multi)    Transplant    Fever of unknown origin    Kidney transplant complication  ([facility])      - Neurology for LP today; will obtain coags and head CT  - Transplant ID consultation  - Abx per ID    I spent 35 minutes in the professional and overall care of this patient, including immunosuppression management.    Magi Lambert MD, PHD, MPH, FACS  Chief Transplant and Hepatobiliary Surgery

## 2024-05-26 NOTE — CONSULTS
Vancomycin Dosing by Pharmacy- INITIAL    Herber Danii Leblanc is a 44 y.o. year old female who Pharmacy has been consulted for vancomycin dosing for other unknown source . Based on the patient's indication and renal status this patient will be dosed based on a goal AUC of 400-600.     Renal function is currently declining.    Visit Vitals  /73 (BP Location: Left arm, Patient Position: Lying)   Pulse 91   Temp 36.7 °C (98.1 °F) (Temporal)   Resp 18        Lab Results   Component Value Date    CREATININE 1.16 (H) 2024    CREATININE 0.99 2024    CREATININE 1.08 (H) 2024    CREATININE 0.95 2024        Patient weight is as follows:   Vitals:    24 1034   Weight: 79.9 kg (176 lb 1.6 oz)       Cultures:  No results found for the encounter in last 14 days.        I/O last 3 completed shifts:  In: 1737.8 (21.8 mL/kg) [P.O.:360; I.V.:328.3 (4.1 mL/kg); IV Piggyback:1049.5]  Out: 5564 (69.7 mL/kg) [Urine:5564 (1.9 mL/kg/hr)]  Weight: 79.9 kg   I/O during current shift:  No intake/output data recorded.    Temp (24hrs), Av.5 °C (99.5 °F), Min:36.1 °C (97 °F), Max:39.4 °C (102.9 °F)         Assessment/Plan     Patient will not be given a loading dose. She received 2 doses of vancomycin 1g Q12H on previous request for pharmacy to dose ( & early AM ).  Will initiate vancomycin maintenance,  1250 mg every 24 hours.    This dosing regimen is predicted by [employer] to result in the following pharmacokinetic parameters:  Loading dose: N/A  Regimen: 1250 mg IV every 24 hours.  Start time: 13:43 on 2024  Exposure target: AUC24 (range)400-600 mg/L.hr   AUC24,ss: 437 mg/L.hr  Probability of AUC24 > 400: 68 %  Ctrough,ss: 11.3 mg/L  Probability of Ctrough,ss > 20: 3 %  Probability of nephrotoxicity (Lodise LUZMA 2009): 7 %  Follow-up level will be ordered on 24 with morning labs, unless clinically indicated sooner.  Will continue to monitor renal function daily while on vancomycin and order  serum creatinine at least every 48 hours if not already ordered.  Follow for continued vancomycin needs, clinical response, and signs/symptoms of toxicity.       Meagan Canela, PharmD

## 2024-05-26 NOTE — CONSULTS
Inpatient consult to Infectious Diseases  Consult performed by: Meagan Chaparro MD PhD  Consult ordered by: Magi Lambert MD        Reason For Consult  Fever, headache in immunocompromised patient.    Patient speaks Mohawk so we used the tablet bedside .    History Of Present Illness  Herber Foy Rai is a 44 y.o. female s/p DDKT in 11/23 and maintained on mycophenolate and prednisone who presents with headache, photophobia, fever.  Symptoms came on a week ago.  Patient describes headache as severe and associates it with photophobia and tearing of the eyes.  No neck stiffness.  No sick contacts at home.  No cough, SOB, chest pain.  No diarrhea.  Had emesis x 2.  No rashes or joint pain but does note recent mosquito exposures.  She has no pets and no recent travel.  From [facility].  Negative T spot, negative HIV.    Her donor was EBV+ and CMV+ and so was she prior to transplant.   She does have a history of hematoma and had a washout of the DDKT site in the right pelvis, but endorses no complaints there right now.  She says she takes all her meds normally without challenges.  Does eat fresh yogurt at home along with usual rice diet.      Past Medical History  She has a past medical history of Anxiety, Chronic kidney disease, Chronic kidney disease, unspecified, Depression, Disease of thyroid gland, GERD (gastroesophageal reflux disease), Hypertension, and Personal history of COVID-19 (07/20/2022).    Surgical History  She has a past surgical history that includes Other surgical history (07/20/2022); MR angio head w and wo IV contrast (01/26/2021); MR angio head wo IV contrast (01/26/2021); transplant, kidney, open (Right, 11/30/2023); and US guided percutaneous peritoneal or retroperitoneal fluid collection drainage (12/21/2023).     Social History     Occupational History    Not on file   Tobacco Use    Smoking status: Never    Smokeless tobacco: Former     Quit date: 2021    Tobacco comments:     Chewing  tobacco, quit 1 year ago   Vaping Use    Vaping status: Never Used   Substance and Sexual Activity    Alcohol use: Never    Drug use: Never    Sexual activity: Defer     Travel History   Travel since 04/26/24    No documented travel since 04/26/24            Pets: no    Family History  No family history on file.  Allergies  Patient has no known allergies.     Immunization History   Administered Date(s) Administered    Moderna SARS-CoV-2 Vaccination 04/02/2021, 04/30/2021    Pfizer Purple Cap SARS-CoV-2 03/15/2022     Medications  Home medications:  Medications Prior to Admission   Medication Sig Dispense Refill Last Dose    amLODIPine (Norvasc) 5 mg tablet Take 1 tablet (5 mg) by mouth 2 times a day. 180 tablet 3 Unknown    azelastine (Optivar) 0.05 % ophthalmic solution Administer 1 drop into both eyes 2 times a day. 6 mL 3 Unknown    BELATACEPT IV Infuse into a venous catheter every 28 (twenty-eight) days. Maintenance infusion   Unknown    calcitriol (Rocaltrol) 0.25 mcg capsule TAKE 1 TABLET BY MOUTH ONCE DAILY 30 capsule 2 Unknown    Calcium Antacid 200 mg calcium (500 mg) chewable tablet CHEW AND SWALLOW 1 TABLET BY MOUTH 2 TIMES DAILY 60 tablet 3 Unknown    cholecalciferol (Vitamin D-3) 50 MCG (2000 UT) tablet Take 2 tablets (100 mcg) by mouth once daily. 180 tablet 3 Unknown    fexofenadine (Allegra) 180 mg tablet Take 1 tablet (180 mg) by mouth once daily. 30 tablet 11 Unknown    gabapentin (Neurontin) 100 mg capsule Take 3 capsules (300 mg) by mouth once daily at bedtime. 270 capsule 3 Unknown    levothyroxine (Synthroid, Levoxyl) 25 mcg tablet Take 1 tablet (25 mcg) by mouth once daily in the morning. Take before meals.   Unknown    mycophenolate (Cellcept) 250 mg capsule Take 4 capsules (1,000 mg) by mouth every 12 hours. 240 capsule 11 Unknown    pantoprazole (ProtoNix) 40 mg EC tablet TAKE 1 TABLET (40 MG) BY MOUTH EVERY MORNING (BEFORE BREAKFAST). 30 tablet 1 Unknown    pantoprazole (ProtoNix) 40 mg EC  tablet Take 1 tablet (40 mg) by mouth once daily in the morning. Take before meals. Do not crush, chew, or split. (Patient not taking: Reported on 5/22/2024) 30 tablet 1 Unknown    predniSONE (Deltasone) 5 mg tablet Take 2 tablets (10 mg) by mouth once daily. 60 tablet 11 Unknown    sertraline (Zoloft) 50 mg tablet Take 1 tablet (50 mg) by mouth once daily.   Unknown    sulfamethoxazole-trimethoprim (Bactrim) 400-80 mg tablet TAKE 1 TABLET BY MOUTH ONCE EVERY DAY (Patient not taking: Reported on 5/22/2024) 30 tablet 0 Unknown    sulfamethoxazole-trimethoprim (Bactrim) 400-80 mg tablet Take 1 tablet by mouth once daily. (Patient not taking: Reported on 5/22/2024) 30 tablet 0 Unknown    SUMAtriptan (Imitrex) 100 mg tablet Take 1 tablet (100 mg) by mouth 1 time if needed for migraine. 9 tablet 11 Unknown    tenofovir alafenamide (Vemlidy) 25 mg tablet tablet Take 1 tablet (25 mg) by mouth once daily. 30 tablet 11 Unknown     Current medications:  Scheduled medications  acetaminophen, 650 mg, oral, 4x daily  acyclovir, 10 mg/kg, intravenous, q8h  amLODIPine, 5 mg, oral, BID  ampicillin, 2 g, intravenous, q4h  calcitriol, 0.25 mcg, oral, Daily  calcium carbonate, 500 mg, oral, BID  cholecalciferol, 4,000 Units, oral, Daily  gabapentin, 300 mg, oral, Nightly  heparin (porcine), 5,000 Units, subcutaneous, q8h JANIE  ketotifen, 1 drop, Both Eyes, BID  levothyroxine, 25 mcg, oral, Daily before breakfast  loratadine, 10 mg, oral, Daily  magnesium sulfate, 2 g, intravenous, Once  mycophenolate, 1,000 mg, oral, q12h  pantoprazole, 40 mg, oral, Daily before breakfast  polyethylene glycol, 17 g, oral, Daily  potassium chloride, 20 mEq, intravenous, q2h  potassium chloride CR, 20 mEq, oral, Once  predniSONE, 10 mg, oral, Daily  sertraline, 50 mg, oral, Daily  tenofovir alafenamide, 25 mg, oral, Daily  vancomycin, 15 mg/kg, intravenous, q24h      Continuous medications  sodium chloride 0.9%, 100 mL/hr, Last Rate: 100 mL/hr (05/26/24  "4693)      PRN medications  PRN medications: SUMAtriptan, vancomycin    Review of Systems as per HPI     Objective  Range of Vitals (last 24 hours)  Heart Rate:  []   Temp:  [36.1 °C (97 °F)-39.4 °C (102.9 °F)]   Resp:  [16-18]   BP: (118-153)/(73-98)   SpO2:  [93 %-100 %]   Daily Weight  05/24/24 : 79.9 kg (176 lb 1.6 oz)    Body mass index is 29.3 kg/m².     Physical Exam:  NAD but warm to touch  Conj pink, no icterus, o/p dry no thrush  No neck stiffness  Cor is tachy, s1s2, 1/6 SUZAN  Lungs CTA bilaterally  Abd is soft, NTND, +BS, well-head right pelvic incision, non-tender, no erythema  Skin with faint red lacy rash over face and cheeks but no other rashes  Joints without erythema or effusion  No spinal tenderness       Relevant Results    Labs  Results from last 72 hours   Lab Units 05/26/24  0510 05/25/24  0613 05/24/24  1459   WBC AUTO x10*3/uL 2.7* 2.9* 2.1*   HEMOGLOBIN g/dL 12.7 11.5* 11.9*   HEMATOCRIT % 39.5 35.5* 36.2   PLATELETS AUTO x10*3/uL 133* 126* 124*     Results from last 72 hours   Lab Units 05/26/24  0511 05/25/24  0613 05/24/24  1459   SODIUM mmol/L 137 136 136   POTASSIUM mmol/L 3.0* 3.6 3.5   CHLORIDE mmol/L 105 106 106   CO2 mmol/L 22 21 21   BUN mg/dL 8 9 10   CREATININE mg/dL 1.16* 0.99 1.08*   GLUCOSE mg/dL 77 96 172*   CALCIUM mg/dL 8.5* 8.6 8.6   ANION GAP mmol/L 13 13 13   EGFR mL/min/1.73m*2 59* 71 64   PHOSPHORUS mg/dL 2.8 2.7 2.6     Results from last 72 hours   Lab Units 05/26/24  0511 05/25/24  0613 05/24/24  1459   ALBUMIN g/dL 3.5 3.2* 3.5     Estimated Creatinine Clearance: 63.3 mL/min (A) (by C-G formula based on SCr of 1.16 mg/dL (H)).  No results found for: \"CRP\", \"SEDRATE\"  HIV 1/2 Antigen/Antibody Screen with Reflex to Confirmation   Date Value Ref Range Status   11/30/2023 Nonreactive Nonreactive Final     Hepatitis C AB   Date Value Ref Range Status   11/30/2023 Nonreactive Nonreactive Final     Comment:     Results from patients taking biotin supplements or " receiving high-dose biotin therapy should be interpreted with caution due to possible interference with this test. Providers may contact their local laboratory for further information.     Microbiology       Blood cultures pending  Tspot negative  CMV viral load pending      Imaging  US kidney transplant    Result Date: 5/24/2024  Interpreted By:  Modesto Woo and Afshari Mirak Sohrab STUDY: US KIDNEY TRANSPLANT;  5/24/2024 1:15 pm   INDICATION: Signs/Symptoms:s/p DDKT.  Transplant on 11/30/2023.   COMPARISON: CT abdomen pelvis 03/24/2024. ultrasound 12/08/2023.   ACCESSION NUMBER(S): [id number]   ORDERING CLINICIAN: SALMA FRAGA   TECHNIQUE: Grayscale, color, and spectral Doppler of the kidney transplant were performed.  This examination was interpreted at [facility].   FINDINGS: TRANSPLANT KIDNEY: Transplant kidney location:  The renal transplant is located in the right iliac fossa. The transplant kidney [id number] cm in craniocaudal dimension. No perinephric fluid collections are seen. There are multiple punctate stones in the lower pole of the transplant kidney, measuring up to 0.5 cm.  Mild-to-moderate hydronephrosis. No evidence of hydroureter.   DOPPLER EVALUATION:   RESISTIVE INDICES: The resistive indices were as follows: 0.67 / 0.71 in the superior pole (previously 0.82), 0.73 /0.80  in the midpole (previously 0.85), and .68 / 0.79 in the inferior pole (previously 0.81). Wave forms are normal.   TRANSPLANT RENAL ARTERY AND VEIN: The main renal artery branches velocities are 78.5 cm/s / 218.7 cm/s (previously 47 cm/s and 109.9 cm/s). The arterial anastomosis velocity is 58.8 cm/s / 257.5 cm/s (previously 164.4 cm/s). The adjacent iliac artery velocity is 197.0 cm/s Transplant renal vein is patent. The peak velocity in the main renal vein is 67.3 cm/s, in the adjacent iliac vein 35.4 cm/s.  The venous anastomosis velocity is 34.6 cm/s.        Nephrolithiasis with mild-to-moderate hydronephrosis, without evidence of hydroureter. Otherwise, unremarkable Doppler evaluation of the transplant kidney as detailed above. There has been interval improved RI's of the transplant kidney when compared to the prior exam on 12/08/2023.   I personally reviewed the images/study and I agree with the findings as stated by resident physician Dr. Filiberto Stout . This study was interpreted at [facility], [place], [place].   MACRO: None   Signed by: Modesto Reyes 5/24/2024 7:28 PM Dictation workstation:   [id number]    CT abdomen pelvis wo IV contrast    Result Date: 5/24/2024  Interpreted By:  Hardeep Pardo and Beyersdorf Conner STUDY: CT ABDOMEN PELVIS WO IV CONTRAST;  5/24/2024 3:13 pm   INDICATION: Signs/Symptoms:abdominal pain.   COMPARISON: CT abdomen pelvis 03/04/2024   ACCESSION NUMBER(S): [id number]   ORDERING CLINICIAN: CHARISSE DENNIS   TECHNIQUE: CT of the abdomen and pelvis was performed. Contiguous axial images were obtained at 3 mm slice thickness through the abdomen and pelvis. Coronal and sagittal reconstructions at 3 mm slice thickness were performed.  No intravenous contrast was administered; positive oral contrast was given.   FINDINGS: Please note that the evaluation of vessels, lymph nodes and organs is limited without intravenous contrast.   LOWER CHEST: Suboptimal evaluation secondary to respiratory motion. Trace left effusion and atelectasis. Small calcification representing granuloma in the left lower lobe.   ABDOMEN:   LIVER: The liver is normal in size without evidence of focal liver lesions.   BILE DUCTS: The intrahepatic and extrahepatic ducts are not dilated.   GALLBLADDER: The gallbladder is nondistended and without evidence of radiopaque stones.   PANCREAS: The pancreas appears unremarkable without evidence of ductal dilatation or masses.   SPLEEN: The spleen is normal in size  without focal lesions.   ADRENAL GLANDS: Bilateral adrenal glands appear normal.   KIDNEYS AND URETERS: Bilateral atrophic native kidneys. Right lower quadrant renal allograft is noted. Slight worsening hydronephrosis as demonstrated by loss visualization of the para calyceal fat in the superior pole. There is a 0.7 cm nonobstructing calculus in the inferior pole of the kidney, unchanged.   PELVIS:   BLADDER: The urinary bladder appears normal without abnormal wall thickening.   REPRODUCTIVE ORGANS: The uterus is present.   BOWEL: The stomach is unremarkable.  The small and large bowel are normal in caliber and demonstrate no wall thickening.  The appendix appears normal.   VESSELS: There is no aneurysmal dilatation of the abdominal aorta. The IVC appears normal.   PERITONEUM/RETROPERITONEUM/LYMPH NODES: There is no free or loculated fluid collection, no free intraperitoneal air. The retroperitoneum appears normal.  No abdominopelvic lymphadenopathy is present.   ABDOMINAL WALL: The abdominal wall soft tissues appear normal.   BONES: No suspicious osseous lesions are identified.       1. Right lower quadrant renal allograft demonstrates slight interval worsening of hydronephrosis. There is a persistent 0.7 cm calculus in the inferior pole that appears nonobstructive. 2. Otherwise no acute findings within the abdomen and pelvis.   I personally reviewed the image(s)/study and resident interpretation. I agree with the findings as stated by resident Mikael Malagon. Data analyzed and images interpreted at [facility], [place], [place].   MACRO: None   Signed by: Hardeep Pardo 5/24/2024 4:47 PM Dictation workstation:   [id number]    XR chest 1 view    Result Date: 5/24/2024  Interpreted By:  Dylan Cruz, STUDY: XR CHEST 1 VIEW;  5/24/2024 11:31 am   INDICATION: Signs/Symptoms:r/o infection.   COMPARISON: Chest radiograph dated 12/29/2020   ACCESSION NUMBER(S): [id number]    ORDERING CLINICIAN: SALMA FRAGA   FINDINGS: AP radiograph of the chest   Limitations: Decreased lung volumes. Patient's head and chin overlies the upper thorax   The heart is magnified. No acute pulmonary infiltrates are seen. Osseous and articular anatomy where visualized appears normal for age.       1. No evidence of acute cardiopulmonary process.       Signed by: Dylan Cruz 5/24/2024 11:51 AM Dictation workstation:   [id number]         Assessment/Plan     Fever, headache, and leukopenia in this immunocompromised patient (chronic prednisone and mycophenolate in setting of DDKT) is concerning for meningitis.  She may not exhibit the typical signs and symptoms of meningitis given her immunosuppression.  She also has no other localizing signs or imaging to suggest an infectious source elsewhere.    Recommend:    1) Urgent LP in this immunocompromised patient, as this will change our management.  2) While awaiting LP, recommend empiric coverage for meningitis with ampicillin, vanco, ceftriaxone, and acyclovir.  Pharmacy to assist with renally adjusted dosing.  Can discontinue her zosyn.  3) In addition to usual LP work up with CSF sent for cell count, protein, glucose, and microbiology, we would recommend a CSF cryptococcal antigen and a Biofire rapid panel.  This Biofire panel should be done even if the cell counts are normal as this is an immuncompromised patient.  Please ask the lab to hold any CSF in case we need to send additional studies (I.e. if initial workup is negative, we may need to send for west Nile, BILLY virus, AFB, etc.)  4) Please check serum cryptococcal antigen  5) Please check serum CMV viral load  6) please check urine histo antigen  7) Please check serum glucan and galactomannan  8) Please obtain brain MRI with contrast.  9 Repeat blood cultures and urine culture with temp spikes.       The most important and vital test for her to have today is an LP.  If this LP cannot be done here, we  would recommend primary team consider transfer to a facility where an LP can be done, as she is a highly immunocompromised patient with fever and symptoms of meningitis who needs urgent work up for this.    ID is available to discuss her care at any time.    Reviewed with primary team while theyw ere on rounds.     I spent 90 minutes in the professional and overall care of this patient.      Meagan Chaparro MD PhD      Notified by primary team around 1 PM that CMV viral load was 23,000,  We added renally dosed ganciclovir to her regimen and asked the primary team to consider holding her mycophenolate.  Emphasized that she still needs to have her LP done.

## 2024-05-26 NOTE — CARE PLAN
Problem: Pain  Goal: My pain/discomfort is manageable  Outcome: Progressing     Problem: Safety  Goal: Patient will be injury free during hospitalization  Outcome: Progressing  Goal: I will remain free of falls  Outcome: Progressing     Problem: Daily Care  Goal: Daily care needs are met  Outcome: Progressing     Problem: Psychosocial Needs  Goal: Demonstrates ability to cope with hospitalization/illness  Outcome: Progressing  Goal: Collaborate with me, my family, and caregiver to identify my specific goals  Outcome: Progressing     Problem: Discharge Barriers  Goal: My discharge needs are met  Outcome: Progressing     Problem: Nutrition  Goal: Oral intake greater than 50%  Outcome: Progressing  Goal: Adequate PO fluid intake  Outcome: Progressing  Goal: Maintain stable weight  Outcome: Progressing     Problem: Pain - Adult  Goal: Verbalizes/displays adequate comfort level or baseline comfort level  Outcome: Progressing     Problem: Safety - Adult  Goal: Free from fall injury  Outcome: Progressing     Problem: Discharge Planning  Goal: Discharge to home or other facility with appropriate resources  Outcome: Progressing     Problem: Chronic Conditions and Co-morbidities  Goal: Patient's chronic conditions and co-morbidity symptoms are monitored and maintained or improved  Outcome: Progressing     Problem: Skin  Goal: Promote skin healing  Outcome: Progressing   The patient's goals for the shift include none    The clinical goals for the shift include Patient will remain safe and free from injury throughout shift.

## 2024-05-26 NOTE — SIGNIFICANT EVENT
Called to bedside by patient's RN for concerns of fevers to 102 as well as tremors, headache, and vision changes.  On arrival to bedside, patient with  iPad at bedside, as well as nurse.  Patient states that she has had a headache all day, that is continued to worsen.  States her face feels very warm, and that she feels very cold elsewhere.  Is currently undergoing infectious workup.  Concern for viral illness at this time including meningitis.  Discussed with Dr. Zazueta.  Will plan to redraw cultures at this time, will also give a 500 cc NS bolus as well as start on maintenance fluids.  Will defer antibiotics at this time given the etiology likely viral.  Also started Tylenol scheduled.    Radames Murphy MD  PGY-1 General Surgery  Transplant Surgery [id number]

## 2024-05-26 NOTE — CARE PLAN
The patient's goals for the shift include none    The clinical goals for the shift include patient will maintain temp  WNL throughout this shift.    Over the shift, the patient did make progress towards her goals and remained afebrile throughout the shift.

## 2024-05-26 NOTE — SIGNIFICANT EVENT
Rapid Response RN Note    Rapid response RN at bedside for RADAR score 6 due to the recent VS listed below:     Vitals:    05/25/24 2013 05/25/24 2321 05/26/24 0029 05/26/24 0104   BP: 141/87 (!) 152/98 (!) 153/92    BP Location: Right arm Left arm Right arm    Patient Position: Lying Lying Lying    Pulse: 80 (!) 113 (!) 114    Resp: 16 18 18    Temp: 36.6 °C (97.9 °F) (!) 38.9 °C (102 °F) (!) 39.4 °C (102.9 °F) (!) 38.6 °C (101.4 °F)   TempSrc: Temporal Temporal Oral Oral   SpO2: 100% 99% 93%    Weight:       Height:            Reviewed above VS with bedside RN.  Per RN, pt was febrile earlier on vital signs and team was notified.  Blood cultures were obtained x 2 and 500 ml fluid bolus given.  Team did not want to restart antibiotics or obtain a lactate.  Plan to monitor and medicate for fever.  Dr Magaña stated that attending was aware and felt this was likely viral.   Pt Non English speaking.  Resting in bed.  No interventions by rapid response team indicated at this time.  Staff to page rapid response for any concerns or acute change in condition/VS.

## 2024-05-27 ENCOUNTER — APPOINTMENT (OUTPATIENT)
Dept: RADIOLOGY | Facility: HOSPITAL | Age: 44
DRG: 699 | End: 2024-05-27
Payer: COMMERCIAL

## 2024-05-27 LAB
ALBUMIN SERPL BCP-MCNC: 3 G/DL (ref 3.4–5)
ANION GAP SERPL CALC-SCNC: 13 MMOL/L (ref 10–20)
APPEARANCE CSF: CLEAR
APPEARANCE CSF: CLEAR
BASOPHILS # BLD MANUAL: 0.02 X10*3/UL (ref 0–0.1)
BASOPHILS NFR BLD MANUAL: 0.9 %
BASOPHILS NFR CSF MANUAL: 0 %
BASOPHILS NFR CSF MANUAL: 0 %
BLASTS CSF MANUAL: 0 %
BLASTS CSF MANUAL: 0 %
BUN SERPL-MCNC: 9 MG/DL (ref 6–23)
C GATTII+NEOFOR DNA CSF QL NAA+NON-PROBE: NOT DETECTED
CALCIUM SERPL-MCNC: 7.5 MG/DL (ref 8.6–10.6)
CHLORIDE SERPL-SCNC: 107 MMOL/L (ref 98–107)
CMV DNA CSF QL NAA+NON-PROBE: NOT DETECTED
CO2 SERPL-SCNC: 20 MMOL/L (ref 21–32)
COLLECT DURATION TIME SPEC: 24 HRS
COLOR CSF: COLORLESS
COLOR SPUN CSF: COLORLESS
COLOR SPUN CSF: COLORLESS
CREAT 24H UR-MCNC: 23.7 MG/DL (ref 20–320)
CREAT 24H UR-MRATE: 0.95 G/24 H (ref 0.67–1.59)
CREAT CL 24H UR+SERPL-VRATE: 57 ML/MIN
CREAT SERPL-MCNC: 0.85 MG/DL (ref 0.5–1.05)
CREAT UR-MCNC: 18.5 MG/DL (ref 20–320)
E COLI K1 DNA CSF QL NAA+NON-PROBE: NOT DETECTED
EBV DNA BLD NAA+PROBE-LOG IU: NORMAL {LOG_IU}/ML
EGFRCR SERPLBLD CKD-EPI 2021: 86 ML/MIN/1.73M*2
EOSINOPHIL # BLD MANUAL: 0.05 X10*3/UL (ref 0–0.7)
EOSINOPHIL NFR BLD MANUAL: 1.7 %
EOSINOPHIL NFR CSF MANUAL: 0 %
EOSINOPHIL NFR CSF MANUAL: 0 %
ERYTHROCYTE [DISTWIDTH] IN BLOOD BY AUTOMATED COUNT: 12.1 % (ref 11.5–14.5)
ERYTHROCYTE [DISTWIDTH] IN BLOOD BY AUTOMATED COUNT: 12.2 % (ref 11.5–14.5)
EV RNA CSF QL NAA+NON-PROBE: NOT DETECTED
GLUCOSE CSF-MCNC: 40 MG/DL (ref 40–70)
GLUCOSE SERPL-MCNC: 73 MG/DL (ref 74–99)
GP B STREP DNA CSF QL NAA+NON-PROBE: NOT DETECTED
HAEM INFLU DNA CSF QL NAA+NON-PROBE: NOT DETECTED
HCT VFR BLD AUTO: 33.5 % (ref 36–46)
HCT VFR BLD AUTO: 37.2 % (ref 36–46)
HGB BLD-MCNC: 11.1 G/DL (ref 12–16)
HGB BLD-MCNC: 12 G/DL (ref 12–16)
HHV6 DNA CSF QL NAA+NON-PROBE: NOT DETECTED
HSV1 DNA CSF QL NAA+NON-PROBE: NOT DETECTED
HSV1 DNA CSF QL NAA+PROBE: NOT DETECTED
HSV2 DNA CSF QL NAA+NON-PROBE: NOT DETECTED
HSV2 DNA CSF QL NAA+PROBE: NOT DETECTED
IMM GRANULOCYTES # BLD AUTO: 0.25 X10*3/UL (ref 0–0.7)
IMM GRANULOCYTES NFR BLD AUTO: 9.4 % (ref 0–0.9)
IMM GRANULOCYTES NFR CSF: 0 %
IMM GRANULOCYTES NFR CSF: 0 %
L MONOCYTOG DNA CSF QL NAA+NON-PROBE: NOT DETECTED
LABORATORY COMMENT REPORT: NOT DETECTED
LACTATE CSF-SCNC: 1.3 MMOL/L (ref 0.6–2.2)
LDH CSF L TO P-CCNC: <25 U/L
LYMPHOCYTES # BLD MANUAL: 0.02 X10*3/UL (ref 1.2–4.8)
LYMPHOCYTES NFR BLD MANUAL: 0.9 %
LYMPHOCYTES NFR CSF MANUAL: 0 % (ref 28–96)
LYMPHOCYTES NFR CSF MANUAL: 0 % (ref 28–96)
MAGNESIUM SERPL-MCNC: 1.99 MG/DL (ref 1.6–2.4)
MCH RBC QN AUTO: 29.1 PG (ref 26–34)
MCH RBC QN AUTO: 29.2 PG (ref 26–34)
MCHC RBC AUTO-ENTMCNC: 32.3 G/DL (ref 32–36)
MCHC RBC AUTO-ENTMCNC: 33.1 G/DL (ref 32–36)
MCV RBC AUTO: 88 FL (ref 80–100)
MCV RBC AUTO: 90 FL (ref 80–100)
MONOCYTES # BLD MANUAL: 0.19 X10*3/UL (ref 0.1–1)
MONOCYTES NFR BLD MANUAL: 7 %
MONOS+MACROS NFR CSF MANUAL: 0 % (ref 16–56)
MONOS+MACROS NFR CSF MANUAL: 0 % (ref 16–56)
MYELOCYTES # BLD MANUAL: 0.05 X10*3/UL
MYELOCYTES NFR BLD MANUAL: 1.7 %
N MEN DNA CSF QL NAA+NON-PROBE: NOT DETECTED
NEUTROPHILS # BLD MANUAL: 2.37 X10*3/UL (ref 1.2–7.7)
NEUTS BAND # BLD MANUAL: 0.05 X10*3/UL (ref 0–0.7)
NEUTS BAND NFR BLD MANUAL: 1.7 %
NEUTS SEG # BLD MANUAL: 2.32 X10*3/UL (ref 1.2–7)
NEUTS SEG NFR BLD MANUAL: 86.1 %
NEUTS SEG NFR CSF MANUAL: 0 % (ref 0–5)
NEUTS SEG NFR CSF MANUAL: 0 % (ref 0–5)
NRBC BLD-RTO: 0 /100 WBCS (ref 0–0)
NRBC BLD-RTO: 0 /100 WBCS (ref 0–0)
OTHER CELLS NFR CSF MANUAL: 0 %
OTHER CELLS NFR CSF MANUAL: 0 %
PARECHOVIRUS A RNA CSF QL NAA+NON-PROBE: NOT DETECTED
PHOSPHATE SERPL-MCNC: 2 MG/DL (ref 2.5–4.9)
PLASMA CELLS NFR CSF MICRO: 0 %
PLASMA CELLS NFR CSF MICRO: 0 %
PLATELET # BLD AUTO: 123 X10*3/UL (ref 150–450)
PLATELET # BLD AUTO: 142 X10*3/UL (ref 150–450)
POTASSIUM SERPL-SCNC: 3.6 MMOL/L (ref 3.5–5.3)
PROT CSF-MCNC: 36 MG/DL (ref 15–45)
PROT UR-ACNC: 16 MG/DL (ref 5–24)
PROT/CREAT UR: 0.86 MG/MG CREAT (ref 0–0.17)
RBC # BLD AUTO: 3.8 X10*6/UL (ref 4–5.2)
RBC # BLD AUTO: 4.13 X10*6/UL (ref 4–5.2)
RBC # CSF AUTO: 1 /UL (ref 0–5)
RBC # CSF AUTO: 1 /UL (ref 0–5)
RBC MORPH BLD: ABNORMAL
S PNEUM DNA CSF QL NAA+NON-PROBE: NOT DETECTED
SODIUM SERPL-SCNC: 136 MMOL/L (ref 136–145)
SPECIMEN VOL 24H UR: 4000 ML
TOTAL CELLS COUNTED BLD: 115
TOTAL CELLS COUNTED CSF: 1
TOTAL CELLS COUNTED CSF: 1
TUBE # CSF: ABNORMAL
TUBE # CSF: ABNORMAL
VANCOMYCIN SERPL-MCNC: 7.7 UG/ML (ref 5–20)
VDRL CSF-ACNC: NONREACTIVE
VZV DNA CSF QL NAA+NON-PROBE: NOT DETECTED
WBC # BLD AUTO: 2.1 X10*3/UL (ref 4.4–11.3)
WBC # BLD AUTO: 2.7 X10*3/UL (ref 4.4–11.3)
WBC # CSF AUTO: <1 /UL (ref 1–5)
WBC # CSF AUTO: <1 /UL (ref 1–5)

## 2024-05-27 PROCEDURE — 86403 PARTICLE AGGLUT ANTBDY SCRN: CPT | Performed by: STUDENT IN AN ORGANIZED HEALTH CARE EDUCATION/TRAINING PROGRAM

## 2024-05-27 PROCEDURE — 36415 COLL VENOUS BLD VENIPUNCTURE: CPT | Performed by: STUDENT IN AN ORGANIZED HEALTH CARE EDUCATION/TRAINING PROGRAM

## 2024-05-27 PROCEDURE — 2500000004 HC RX 250 GENERAL PHARMACY W/ HCPCS (ALT 636 FOR OP/ED): Performed by: STUDENT IN AN ORGANIZED HEALTH CARE EDUCATION/TRAINING PROGRAM

## 2024-05-27 PROCEDURE — 2500000001 HC RX 250 WO HCPCS SELF ADMINISTERED DRUGS (ALT 637 FOR MEDICARE OP)

## 2024-05-27 PROCEDURE — 2500000004 HC RX 250 GENERAL PHARMACY W/ HCPCS (ALT 636 FOR OP/ED)

## 2024-05-27 PROCEDURE — 80069 RENAL FUNCTION PANEL: CPT

## 2024-05-27 PROCEDURE — 85007 BL SMEAR W/DIFF WBC COUNT: CPT | Performed by: STUDENT IN AN ORGANIZED HEALTH CARE EDUCATION/TRAINING PROGRAM

## 2024-05-27 PROCEDURE — 62328 DX LMBR SPI PNXR W/FLUOR/CT: CPT

## 2024-05-27 PROCEDURE — 2500000004 HC RX 250 GENERAL PHARMACY W/ HCPCS (ALT 636 FOR OP/ED): Mod: JZ | Performed by: STUDENT IN AN ORGANIZED HEALTH CARE EDUCATION/TRAINING PROGRAM

## 2024-05-27 PROCEDURE — 1100000001 HC PRIVATE ROOM DAILY

## 2024-05-27 PROCEDURE — 2500000005 HC RX 250 GENERAL PHARMACY W/O HCPCS: Performed by: STUDENT IN AN ORGANIZED HEALTH CARE EDUCATION/TRAINING PROGRAM

## 2024-05-27 PROCEDURE — 80202 ASSAY OF VANCOMYCIN: CPT | Performed by: STUDENT IN AN ORGANIZED HEALTH CARE EDUCATION/TRAINING PROGRAM

## 2024-05-27 PROCEDURE — 85027 COMPLETE CBC AUTOMATED: CPT | Mod: 91 | Performed by: STUDENT IN AN ORGANIZED HEALTH CARE EDUCATION/TRAINING PROGRAM

## 2024-05-27 PROCEDURE — 85027 COMPLETE CBC AUTOMATED: CPT

## 2024-05-27 PROCEDURE — 2500000001 HC RX 250 WO HCPCS SELF ADMINISTERED DRUGS (ALT 637 FOR MEDICARE OP): Performed by: STUDENT IN AN ORGANIZED HEALTH CARE EDUCATION/TRAINING PROGRAM

## 2024-05-27 PROCEDURE — 83735 ASSAY OF MAGNESIUM: CPT

## 2024-05-27 PROCEDURE — 009U3ZX DRAINAGE OF SPINAL CANAL, PERCUTANEOUS APPROACH, DIAGNOSTIC: ICD-10-PCS | Performed by: STUDENT IN AN ORGANIZED HEALTH CARE EDUCATION/TRAINING PROGRAM

## 2024-05-27 PROCEDURE — 99232 SBSQ HOSP IP/OBS MODERATE 35: CPT | Performed by: STUDENT IN AN ORGANIZED HEALTH CARE EDUCATION/TRAINING PROGRAM

## 2024-05-27 PROCEDURE — 82570 ASSAY OF URINE CREATININE: CPT | Performed by: INTERNAL MEDICINE

## 2024-05-27 PROCEDURE — 2500000002 HC RX 250 W HCPCS SELF ADMINISTERED DRUGS (ALT 637 FOR MEDICARE OP, ALT 636 FOR OP/ED)

## 2024-05-27 PROCEDURE — 2500000004 HC RX 250 GENERAL PHARMACY W/ HCPCS (ALT 636 FOR OP/ED): Performed by: INTERNAL MEDICINE

## 2024-05-27 PROCEDURE — 99233 SBSQ HOSP IP/OBS HIGH 50: CPT | Performed by: INTERNAL MEDICINE

## 2024-05-27 PROCEDURE — 62328 DX LMBR SPI PNXR W/FLUOR/CT: CPT | Performed by: RADIOLOGY

## 2024-05-27 RX ORDER — ACETAMINOPHEN 500 MG
5 TABLET ORAL ONCE
Status: COMPLETED | OUTPATIENT
Start: 2024-05-27 | End: 2024-05-27

## 2024-05-27 RX ORDER — LIDOCAINE HYDROCHLORIDE 10 MG/ML
11 INJECTION INFILTRATION; PERINEURAL ONCE
Status: DISCONTINUED | OUTPATIENT
Start: 2024-05-27 | End: 2024-05-29 | Stop reason: HOSPADM

## 2024-05-27 RX ORDER — CALCIUM GLUCONATE 20 MG/ML
2 INJECTION, SOLUTION INTRAVENOUS ONCE
Status: COMPLETED | OUTPATIENT
Start: 2024-05-27 | End: 2024-05-27

## 2024-05-27 RX ORDER — VANCOMYCIN HYDROCHLORIDE 1 G/200ML
1000 INJECTION, SOLUTION INTRAVENOUS EVERY 12 HOURS
Status: DISCONTINUED | OUTPATIENT
Start: 2024-05-27 | End: 2024-05-27

## 2024-05-27 RX ORDER — HYDROMORPHONE HYDROCHLORIDE 1 MG/ML
0.4 INJECTION, SOLUTION INTRAMUSCULAR; INTRAVENOUS; SUBCUTANEOUS ONCE
Status: DISCONTINUED | OUTPATIENT
Start: 2024-05-27 | End: 2024-05-29 | Stop reason: HOSPADM

## 2024-05-27 RX ORDER — FERROUS SULFATE, DRIED 160(50) MG
1 TABLET, EXTENDED RELEASE ORAL 2 TIMES DAILY
Status: DISCONTINUED | OUTPATIENT
Start: 2024-05-27 | End: 2024-05-29 | Stop reason: HOSPADM

## 2024-05-27 RX ADMIN — CEFTRIAXONE SODIUM 2 G: 2 INJECTION, SOLUTION INTRAVENOUS at 11:14

## 2024-05-27 RX ADMIN — KETOTIFEN FUMARATE 1 DROP: 0.25 SOLUTION/ DROPS OPHTHALMIC at 21:00

## 2024-05-27 RX ADMIN — GANCICLOVIR SODIUM 400 MG: 50 INJECTION, POWDER, LYOPHILIZED, FOR SOLUTION INTRAVENTRICULAR at 13:29

## 2024-05-27 RX ADMIN — AMLODIPINE BESYLATE 5 MG: 5 TABLET ORAL at 08:04

## 2024-05-27 RX ADMIN — Medication 5 MG: at 02:10

## 2024-05-27 RX ADMIN — CALCITRIOL CAPSULES 0.25 MCG 0.25 MCG: 0.25 CAPSULE ORAL at 08:03

## 2024-05-27 RX ADMIN — GANCICLOVIR SODIUM 200 MG: 50 INJECTION, POWDER, LYOPHILIZED, FOR SOLUTION INTRAVENTRICULAR at 01:06

## 2024-05-27 RX ADMIN — ACETAMINOPHEN 650 MG: 325 TABLET ORAL at 12:25

## 2024-05-27 RX ADMIN — Medication 1 TABLET: at 20:41

## 2024-05-27 RX ADMIN — PREDNISONE 10 MG: 10 TABLET ORAL at 08:03

## 2024-05-27 RX ADMIN — GABAPENTIN 300 MG: 300 CAPSULE ORAL at 20:41

## 2024-05-27 RX ADMIN — KETOTIFEN FUMARATE 1 DROP: 0.25 SOLUTION/ DROPS OPHTHALMIC at 08:12

## 2024-05-27 RX ADMIN — CALCIUM GLUCONATE 2 G: 20 INJECTION, SOLUTION INTRAVENOUS at 12:23

## 2024-05-27 RX ADMIN — ACETAMINOPHEN 650 MG: 325 TABLET ORAL at 01:06

## 2024-05-27 RX ADMIN — LEVOTHYROXINE SODIUM 25 MCG: 0.05 TABLET ORAL at 06:04

## 2024-05-27 RX ADMIN — CALCIUM CARBONATE (ANTACID) CHEW TAB 500 MG 500 MG: 500 CHEW TAB at 08:04

## 2024-05-27 RX ADMIN — AMPICILLIN SODIUM 2 G: 2 INJECTION, POWDER, FOR SOLUTION INTRAMUSCULAR; INTRAVENOUS at 14:23

## 2024-05-27 RX ADMIN — POLYETHYLENE GLYCOL 3350 17 G: 17 POWDER, FOR SOLUTION ORAL at 08:04

## 2024-05-27 RX ADMIN — ACETAMINOPHEN 650 MG: 325 TABLET ORAL at 18:15

## 2024-05-27 RX ADMIN — MYCOPHENOLATE MOFETIL 500 MG: 250 CAPSULE ORAL at 06:04

## 2024-05-27 RX ADMIN — TENOFOVIR ALAFENAMIDE 25 MG: 25 TABLET ORAL at 08:03

## 2024-05-27 RX ADMIN — VANCOMYCIN HYDROCHLORIDE 1000 MG: 1 INJECTION, SOLUTION INTRAVENOUS at 11:16

## 2024-05-27 RX ADMIN — PANTOPRAZOLE SODIUM 40 MG: 40 TABLET, DELAYED RELEASE ORAL at 06:04

## 2024-05-27 RX ADMIN — MYCOPHENOLATE MOFETIL 500 MG: 250 CAPSULE ORAL at 18:06

## 2024-05-27 RX ADMIN — POTASSIUM PHOSPHATE, MONOBASIC 1000 MG: 500 TABLET, SOLUBLE ORAL at 20:41

## 2024-05-27 RX ADMIN — ACETAMINOPHEN 650 MG: 325 TABLET ORAL at 06:09

## 2024-05-27 RX ADMIN — Medication 1 TABLET: at 11:28

## 2024-05-27 RX ADMIN — AMPICILLIN SODIUM 2 G: 2 INJECTION, POWDER, FOR SOLUTION INTRAMUSCULAR; INTRAVENOUS at 06:03

## 2024-05-27 RX ADMIN — POTASSIUM PHOSPHATE, MONOBASIC 1000 MG: 500 TABLET, SOLUBLE ORAL at 12:58

## 2024-05-27 RX ADMIN — AMLODIPINE BESYLATE 5 MG: 5 TABLET ORAL at 20:41

## 2024-05-27 RX ADMIN — SERTRALINE HYDROCHLORIDE 50 MG: 50 TABLET ORAL at 12:57

## 2024-05-27 RX ADMIN — ACYCLOVIR SODIUM 800 MG: 50 INJECTION, SOLUTION INTRAVENOUS at 02:45

## 2024-05-27 RX ADMIN — AMPICILLIN SODIUM 2 G: 2 INJECTION, POWDER, FOR SOLUTION INTRAMUSCULAR; INTRAVENOUS at 11:28

## 2024-05-27 RX ADMIN — AMPICILLIN SODIUM 2 G: 2 INJECTION, POWDER, FOR SOLUTION INTRAMUSCULAR; INTRAVENOUS at 02:10

## 2024-05-27 RX ADMIN — Medication 4000 UNITS: at 08:04

## 2024-05-27 RX ADMIN — LORATADINE 10 MG: 10 TABLET ORAL at 08:03

## 2024-05-27 RX ADMIN — SODIUM PHOSPHATE, MONOBASIC, MONOHYDRATE AND SODIUM PHOSPHATE, DIBASIC, ANHYDROUS 30 MMOL: 142; 276 INJECTION, SOLUTION INTRAVENOUS at 12:55

## 2024-05-27 RX ADMIN — ACYCLOVIR SODIUM 800 MG: 50 INJECTION, SOLUTION INTRAVENOUS at 10:20

## 2024-05-27 ASSESSMENT — COGNITIVE AND FUNCTIONAL STATUS - GENERAL
MOBILITY SCORE: 24
DAILY ACTIVITIY SCORE: 24
DAILY ACTIVITIY SCORE: 24
MOBILITY SCORE: 24

## 2024-05-27 ASSESSMENT — PAIN SCALES - GENERAL
PAINLEVEL_OUTOF10: 0 - NO PAIN
PAINLEVEL_OUTOF10: 3

## 2024-05-27 NOTE — POST-PROCEDURE NOTE
Fluoroscopic Guided Lumbar Puncture Postprocedure Note    Resident: Elizabeth Pisano MD    Diagnosis:   1. Fever of unknown origin            Description of procedure: Written and verbal informed consent was obtained from the patient, using Korean interpretor service. A timeout was performed prior to the procedure. Using fluoroscopic guidance, appropriate anatomic landmarks were identified and surgical site was marked. Site was prepped and draped in the usual sterile fashion. Local anesthesia was obtained using approximately 10 mL 1% Lidocaine.  Under intermittent fluoroscopic guidance, a 20 Gauge  3.5 inch spinal needle was advanced into the thecal sac at the L3-L4 until return of cerebral spinal fluid.    A total of 12 mL clear cerebral spinal fluid was collected and submitted to the lab for analysis.    Complications: No immediate complications.    Estimated Blood Loss: None.    IMPRESSION:   Successful fluoroscopic guided lumbar puncture. See detailed result report with images in PACS.    The patient tolerated the procedure well without incident or complication and is in stable condition.     The attending physician was not present during the procedure.

## 2024-05-27 NOTE — CARE PLAN
The patient's goals for the shift include none    The clinical goals for the shift include patient will maintain temp  WNL throughout this shift.      Problem: Pain  Goal: My pain/discomfort is manageable  Outcome: Progressing     Problem: Safety  Goal: Patient will be injury free during hospitalization  Outcome: Progressing  Goal: I will remain free of falls  Outcome: Progressing     Problem: Daily Care  Goal: Daily care needs are met  Outcome: Progressing     Problem: Psychosocial Needs  Goal: Demonstrates ability to cope with hospitalization/illness  Outcome: Progressing  Goal: Collaborate with me, my family, and caregiver to identify my specific goals  Outcome: Progressing     Problem: Discharge Barriers  Goal: My discharge needs are met  Outcome: Progressing     Problem: Nutrition  Goal: Oral intake greater than 50%  Outcome: Progressing  Goal: Adequate PO fluid intake  Outcome: Progressing  Goal: Maintain stable weight  Outcome: Progressing     Problem: Pain - Adult  Goal: Verbalizes/displays adequate comfort level or baseline comfort level  Outcome: Progressing     Problem: Safety - Adult  Goal: Free from fall injury  Outcome: Progressing     Problem: Discharge Planning  Goal: Discharge to home or other facility with appropriate resources  Outcome: Progressing     Problem: Chronic Conditions and Co-morbidities  Goal: Patient's chronic conditions and co-morbidity symptoms are monitored and maintained or improved  Outcome: Progressing     Problem: Skin  Goal: Promote skin healing  Outcome: Progressing     Problem: Pain  Goal: Takes deep breaths with improved pain control throughout the shift  Outcome: Progressing  Goal: Turns in bed with improved pain control throughout the shift  Outcome: Progressing  Goal: Walks with improved pain control throughout the shift  Outcome: Progressing  Goal: Performs ADL's with improved pain control throughout shift  Outcome: Progressing  Goal: Participates in PT with improved  pain control throughout the shift  Outcome: Progressing  Goal: Free from opioid side effects throughout the shift  Outcome: Progressing  Goal: Free from acute confusion related to pain meds throughout the shift  Outcome: Progressing

## 2024-05-27 NOTE — PROGRESS NOTES
Vancomycin Dosing by Pharmacy- FOLLOW UP    Herber Foy Rai is a 44 y.o. year old female who Pharmacy has been consulted for vancomycin dosing for other fever of unknown origin . Based on the patient's indication and renal status this patient is being dosed based on a goal AUC of 400-600.     Renal function is currently improving.    Current vancomycin dose: 1250 mg given every 24 hours    Estimated vancomycin AUC on current dose: 319 mg/L.hr     Visit Vitals  /78 (BP Location: Right arm, Patient Position: Lying)   Pulse 96   Temp 36.9 °C (98.4 °F) (Temporal)   Resp 18        Lab Results   Component Value Date    CREATININE 0.85 2024    CREATININE 1.16 (H) 2024    CREATININE 0.99 2024    CREATININE 1.08 (H) 2024        Patient weight is as follows:   Vitals:    24 1034   Weight: 79.9 kg (176 lb 1.6 oz)       Cultures:  No results found for the encounter in last 14 days.       I/O last 3 completed shifts:  In: 2101.2 (26.3 mL/kg) [I.V.:1601.7 (20.1 mL/kg); IV Piggyback:499.5]  Out: 2901 (36.3 mL/kg) [Urine:2901 (1 mL/kg/hr)]  Weight: 79.9 kg   I/O during current shift:  I/O this shift:  In: 50 [I.V.:50]  Out: 0     Temp (24hrs), Av.3 °C (97.4 °F), Min:36 °C (96.8 °F), Max:36.9 °C (98.4 °F)      Assessment/Plan    Below goal AUC. Orders placed for new vancomcyin regimen of 1000 every 12 hours to begin at  1000.     This dosing regimen is predicted by [employer] [employer] to result in the following pharmacokinetic parameters:  Loading dose: N/A  Regimen: 1000 mg IV every 12 hours.  Start time: 00:45 on 2024  Exposure target: AUC24 (range)400-600 mg/L.hr   AUC24,ss: 509 mg/L.hr  Probability of AUC24 > 400: 98 %  Ctrough,ss: 14.4 mg/L  Probability of Ctrough,ss > 20: 2 %  Probability of nephrotoxicity (Lodise LUZMA ): 10 %      The next level will be obtained on  at AM draw. May be obtained sooner if clinically indicated.   Will continue to monitor renal function daily while on  vancomycin and order serum creatinine at least every 48 hours if not already ordered.  Follow for continued vancomycin needs, clinical response, and signs/symptoms of toxicity.       Jeane Cruz, PharmD

## 2024-05-27 NOTE — SIGNIFICANT EVENT
Date: 5/26/2024  Time of Procedure: 2300  Proceduralist(s): Edson Butelr MD, Yolanda Parmar MD  Indication(s): c/f meningitis   Consented?: Yes  Pre-Procedure Verification?: Yes    The patient was positioned in lateral decubitus. The lower back was prepped and draped in the usual sterile fashion. A solution of 1% lidocaine was used to numb the region. Using landmarks, a 22-gauge spinal needle was inserted in the L4-L5 innerspace and advanced into the subarachnoid space on 4 attempt(s). Proceduralists were not able to obtain CSF as the patient could not tolerate the procedure due to pain, so procedure was aborted.     Edson Butler MD  PGY-4 Neurology

## 2024-05-27 NOTE — PROGRESS NOTES
"        INPATIENT TRANSPLANT NEPHROLOGY PROGRESS NOTE          REASON FOR CONSULT:  Immunosuppressive medication management and nephrology related issues.    SUBJECTION:     Afebrile  Diarrhea, N/V stopped  Blood culture and urine culture -NGTD    No acute event overnight.    PHYCISCAL EXAMINATION:    Visit Vitals  /87 (BP Location: Right arm, Patient Position: Lying)   Pulse 100   Temp 36 °C (96.8 °F) (Temporal)   Resp 18   Ht 1.651 m (5' 5\")   Wt 79.9 kg (176 lb 1.6 oz)   SpO2 97%   BMI 29.30 kg/m²   Smoking Status Never   BSA 1.91 m²        05/25 1900 - 05/27 0659  In: 2101.2 [I.V.:1601.7]  Out: 2901 [Urine:2901]     Weight change:     General Appearance - NAD, Good speech, oriented and alert  HEENT - Supple. Not pale. No jaundice. No cervical lymphadenopathy. Pharynx and tonsils are not injected.  CVS - RRR. Normal S1/S2. No murmur, click , rub or gallop  Lungs- clear to auscultation bilaterally  Abdomen - soft , not tender, no guarding, no rigidity. No hepatosplenomegaly. Normal bowel sounds. No masses and ascites. S/P Kidney transplant .  Transplanted kidney is not tender.   Musculoskeletal /Extremities - no edema. Full ROM. No joint tenderness.   Neuro/Psych - appropriate mood and affect. Motor power V/V all extremities. CN I -XII were grossly intact.  Skin - No visible rash    MEDICATION LIST: REVIEWED    acetaminophen, 650 mg, 4x daily  amLODIPine, 5 mg, BID  calcitriol, 0.25 mcg, Daily  calcium carbonate-vitamin D3, 1 tablet, BID  cholecalciferol, 4,000 Units, Daily  gabapentin, 300 mg, Nightly  ganciclovir, 5 mg/kg, q12h  [Held by provider] heparin (porcine), 5,000 Units, q8h JANIE  HYDROmorphone, 0.4 mg, Once  ketotifen, 1 drop, BID  levothyroxine, 25 mcg, Daily before breakfast  lidocaine, 11 mL, Once  loratadine, 10 mg, Daily  mycophenolate, 500 mg, q12h  pantoprazole, 40 mg, Daily before breakfast  polyethylene glycol, 17 g, Daily  potassium phosphate (monobasic), 1,000 mg, BID  predniSONE, 10 mg, " Daily  sertraline, 50 mg, Daily  sodium phosphate, 30 mmol, Once  tenofovir alafenamide, 25 mg, Daily      sodium chloride 0.9%, Last Rate: 100 mL/hr (05/27/24 0904)      SUMAtriptan, 100 mg, Once PRN  vancomycin, , Daily PRN        ALLERGY:  No Known Allergies    LABS:  Results for orders placed or performed during the hospital encounter of 05/24/24 (from the past 24 hour(s))   Glucose, CSF   Result Value Ref Range    Glucose, CSF 40 40 - 70 mg/dL   Protein, CSF   Result Value Ref Range    Total Protein, CSF 36 15 - 45 mg/dL   CSF Culture/Smear    Specimen: Lumbar Puncture; Cerebrospinal Fluid   Result Value Ref Range    Gram Stain No polymorphonuclear leukocytes seen     Gram Stain No organisms seen    HSV PCR, CSF    Specimen: Lumbar Puncture; Cerebrospinal Fluid   Result Value Ref Range    Herpes simplex virus Type 1 PCR, Qual, CSF Not Detected Not Detected    HSV 2 PCR, QuaL, CSF Not Detected Not Detected   CSF VDRL   Result Value Ref Range    VDRL, CSF Nonreactive Nonreactive   Lactate Dehydrogenase, CSF   Result Value Ref Range    LD, CSF <25 Not established U/L   Lactic Acid, CSF   Result Value Ref Range    Lactate, CSF 1.3 0.6 - 2.2 mmol/L   Meningitis Pathogen Panel, PCR    Specimen: Lumbar Puncture; Cerebrospinal Fluid   Result Value Ref Range    Color, CSF Colorless     Escherichia coli K1, CSF, PCR Not Detected Not Detected    Haemophilus influenzae, CSF, PCR Not Detected Not Detected    Listeria monocytogenes, CSF, PCR Not Detected Not Detected    Neisseria meningitidis, CSF, PCR Not Detected Not Detected    Strep. agalactiae, CSF, PCR Not Detected Not Detected    Strep.pneumoniae, CSF, PCR Not Detected Not Detected    Cytomegalovirus, CSF, PCR Not Detected Not Detected    Enterovirus, CSF, PCR Not Detected Not Detected    Human Herpesvirus 6, CSF, PCR Not Detected Not Detected    Herpes Simplex 1, CSF, PCR Not Detected Not Detected    Herpes Simplex 2, CSF, PCR Not Detected Not Detected    Human  Parechovirus, CSF, PCR Not Detected Not Detected    Varicella Zoster CSF PCR Not Detected Not Detected    Cryptococcus, CSF, PCR Not Detected Not Detected   CSF Cell Count   Result Value Ref Range    Tube Number, CSF Tube 1       Color, CSF Colorless Colorless    Clarity, CSF Clear Clear    Color, Supernatant CSF Colorless      WBC, CSF <1 (L) 1 - 5 /uL    RBC, CSF 1 0 - 5 /uL   CSF Differential   Result Value Ref Range    Neutrophils %, Manual, CSF 0 0 - 5 %    Lymphocytes %, Manual, CSF 0 (L) 28 - 96 %    Mono/Macrophages %, Manual, CSF 0 (L) 16 - 56 %    Eosinophils %, Manual, CSF 0 Rare %    Basophils %, Manual, CSF 0 Not Established %    Immature Granulocytes %, Manual, CSF 0 Not Established %    Blasts %, Manual, CSF 0 Not Established %    Unclassified Cells %, Manual, CSF 0 Not Established %    Plasma Cells %, Manual, CSF 0 Not Established %    Total Cells Counted, CSF 1    CSF Cell Count   Result Value Ref Range    Tube Number, CSF Tube 2       Color, CSF Colorless Colorless    Clarity, CSF Clear Clear    Color, Supernatant CSF Colorless      WBC, CSF <1 (L) 1 - 5 /uL    RBC, CSF 1 0 - 5 /uL   CSF Differential   Result Value Ref Range    Neutrophils %, Manual, CSF 0 0 - 5 %    Lymphocytes %, Manual, CSF 0 (L) 28 - 96 %    Mono/Macrophages %, Manual, CSF 0 (L) 16 - 56 %    Eosinophils %, Manual, CSF 0 Rare %    Basophils %, Manual, CSF 0 Not Established %    Immature Granulocytes %, Manual, CSF 0 Not Established %    Blasts %, Manual, CSF 0 Not Established %    Unclassified Cells %, Manual, CSF 0 Not Established %    Plasma Cells %, Manual, CSF 0 Not Established %    Total Cells Counted, CSF 1    CBC   Result Value Ref Range    WBC 2.1 (L) 4.4 - 11.3 x10*3/uL    nRBC 0.0 0.0 - 0.0 /100 WBCs    RBC 3.80 (L) 4.00 - 5.20 x10*6/uL    Hemoglobin 11.1 (L) 12.0 - 16.0 g/dL    Hematocrit 33.5 (L) 36.0 - 46.0 %    MCV 88 80 - 100 fL    MCH 29.2 26.0 - 34.0 pg    MCHC 33.1 32.0 - 36.0 g/dL    RDW 12.1 11.5 - 14.5 %     Platelets 123 (L) 150 - 450 x10*3/uL   Renal function panel   Result Value Ref Range    Glucose 73 (L) 74 - 99 mg/dL    Sodium 136 136 - 145 mmol/L    Potassium 3.6 3.5 - 5.3 mmol/L    Chloride 107 98 - 107 mmol/L    Bicarbonate 20 (L) 21 - 32 mmol/L    Anion Gap 13 10 - 20 mmol/L    Urea Nitrogen 9 6 - 23 mg/dL    Creatinine 0.85 0.50 - 1.05 mg/dL    eGFR 86 >60 mL/min/1.73m*2    Calcium 7.5 (L) 8.6 - 10.6 mg/dL    Phosphorus 2.0 (L) 2.5 - 4.9 mg/dL    Albumin 3.0 (L) 3.4 - 5.0 g/dL   Magnesium   Result Value Ref Range    Magnesium 1.99 1.60 - 2.40 mg/dL   Vancomycin   Result Value Ref Range    Vancomycin 7.7 5.0 - 20.0 ug/mL   CBC and Auto Differential   Result Value Ref Range    WBC 2.7 (L) 4.4 - 11.3 x10*3/uL    nRBC 0.0 0.0 - 0.0 /100 WBCs    RBC 4.13 4.00 - 5.20 x10*6/uL    Hemoglobin 12.0 12.0 - 16.0 g/dL    Hematocrit 37.2 36.0 - 46.0 %    MCV 90 80 - 100 fL    MCH 29.1 26.0 - 34.0 pg    MCHC 32.3 32.0 - 36.0 g/dL    RDW 12.2 11.5 - 14.5 %    Platelets 142 (L) 150 - 450 x10*3/uL    Immature Granulocytes %, Automated 9.4 (H) 0.0 - 0.9 %    Immature Granulocytes Absolute, Automated 0.25 0.00 - 0.70 x10*3/uL   Manual Differential   Result Value Ref Range    Neutrophils %, Manual 86.1 40.0 - 80.0 %    Bands %, Manual 1.7 0.0 - 5.0 %    Lymphocytes %, Manual 0.9 13.0 - 44.0 %    Monocytes %, Manual 7.0 2.0 - 10.0 %    Eosinophils %, Manual 1.7 0.0 - 6.0 %    Basophils %, Manual 0.9 0.0 - 2.0 %    Myelocytes %, Manual 1.7 0.0 - 0.0 %    Seg Neutrophils Absolute, Manual 2.32 1.20 - 7.00 x10*3/uL    Bands Absolute, Manual 0.05 0.00 - 0.70 x10*3/uL    Lymphocytes Absolute, Manual 0.02 (L) 1.20 - 4.80 x10*3/uL    Monocytes Absolute, Manual 0.19 0.10 - 1.00 x10*3/uL    Eosinophils Absolute, Manual 0.05 0.00 - 0.70 x10*3/uL    Basophils Absolute, Manual 0.02 0.00 - 0.10 x10*3/uL    Myelocytes Absolute, Manual 0.05 0.00 - 0.00 x10*3/uL    Total Cells Counted 115     Neutrophils Absolute, Manual 2.37 1.20 - 7.70 x10*3/uL     RBC Morphology No significant RBC morphology present    Protein, Urine Random   Result Value Ref Range    Total Protein, Urine Random 16 5 - 24 mg/dL    Creatinine, Urine Random 18.5 (L) 20.0 - 320.0 mg/dL    T. Protein/Creatinine Ratio 0.86 (H) 0.00 - 0.17 mg/mg Creat        ASSESSMENT AND PLAN:    Ms. Leblanc is a 44 y.o. female with past medical history significant for ESRD secondary to HTN/NSAID whom received a  donor kidney transplant on 23 by Dr. Marbin Lambert & Dr. Louis with a KDPI of 56% and PRA of 48%. HCV -/- and donor has not met risk factors. EBV +/+. 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 Dry weight is 81.1 kg (discharge weight is 76.2kg ). Pt received a total of 4.5 mg/kg total of thymoglobulin induction therapy in conjunction with 500mg IV solumedrol. Pt was initiated on Tacrolimus & Cellcept immunosuppression in conjunction with IV solumedrol taper. 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 Pt was started on valcyte (CMV D + / R + ) and bactrim for CMV & PCP prophylaxis per protocol. She was started on clotrimazole as antifungal coverage per protocol. Operative course was complicated by return to OR for exploration of transplanted kidney and washout of hematoma. Hospital course was complicated by post-operative pain and constipation, which has resolved.     Direct admission for fever, nausea, vomiting and abdominal pain (chronic).       1. ESRD S/P Kidney transplant.   - Renal allograft function: STABLE    Cr 0.99     - UPC ratio 0.86    - Continue to monitor UOP and Serum creatinine closely.   - Avoid nephrotoxic agents, NSAIDs and IV contrast   - Strict I/O.   - Renally dose all medications by the most recent CrCl from Cockcroft-Gault formula.    2. Immunosuppression   - continue current immunosuppression   - Belatacept  - MMF, reduced to 500 mg bid for CMV disease 24    3. Anemia and WBC   Lab Results   Component  Value Date    WBC 2.7 (L) 05/27/2024    HGB 12.0 05/27/2024    HCT 37.2 05/27/2024    MCV 90 05/27/2024     (L) 05/27/2024     -Continue to monitor Hgb   -No indications for PRBC transfusion   -Need CBC with Diff tomorrow, follow up ANC.    4. Electrolyte   Lab Results   Component Value Date    GLUCOSE 73 (L) 05/27/2024    CALCIUM 7.5 (L) 05/27/2024     05/27/2024    K 3.6 05/27/2024    CO2 20 (L) 05/27/2024     05/27/2024    BUN 9 05/27/2024    CREATININE 0.85 05/27/2024     - Reviewed renal profile.   - Replace Ca/Phos PRN    6. Hypertension   Blood Pressures         5/26/2024  1900 5/26/2024  2301 5/27/2024  0542 5/27/2024  0708 5/27/2024  1055    BP: 108/68 115/71 131/75 132/78 137/87          -Goal BP < 140/90 mmHg   -continue current management     7. Stone, with hydro  -MAG 3 scan Tuesday  -[facility] -completed     8.  GI prophylaxis   - On PPI     9. DVT Prophylaxis  -Defer to primary team    10. Fever  Septic work up  Blood /urine culture NGTD  Hold antibiotics  Send viral CMV/EBV/BK panel  COVID neg      * Case was discussed with primary team.  For questions, please contact transplant nephrology page x [phone]    Marcia Webber    Transplant Nephrologist

## 2024-05-27 NOTE — SIGNIFICANT EVENT
Patient had LP today.  CSF with normal cell count, glucose, protein.  HSV and viral PCRs all negative.    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    Recommend:    1) Stop acyclovir, ampicillin, ceftriaxone, and vancomycin  2) Continue ganciclovir: will need at least 21 days of therapy, but we can transition to oral valganciclovir (900mg twice a day, to be dose adjusted for renal function) when she is ready for discharge and is able to take oral medications.    We will arrange for ID follow up for her with Dr. Lou.

## 2024-05-27 NOTE — PROGRESS NOTES
Herber Foy Rai is a 44 y.o. female on day 2 of admission presenting with Fever of unknown origin.    Subjective   Interval History: Spoke w/ pt this AM via . States she was able to sleep overnight, her headache and neck pain are much better today. Reports pain Afebrile this AM. Remains w/ some pain at her graft site mostly at upper edge of incision site. No dysuria. Reports having a lot of difficulty w/ the LP overnight and is wary of the Radiology procedure today, but understands its necessity.       Review of Systems    Objective   Range of Vitals (last 24 hours)  Heart Rate:  []   Temp:  [36 °C (96.8 °F)-36.9 °C (98.4 °F)]   Resp:  [17-18]   BP: (108-132)/(68-78)   SpO2:  [96 %-98 %]   Daily Weight  05/24/24 : 79.9 kg (176 lb 1.6 oz)    Body mass index is 29.3 kg/m².    Physical Exam    Comfortable but fatigued appearing, non-toxic middle aged woman, laying in bed, NAD  MMM, no oral lesions   Neck supple, no restriction in neck flexion, no LAD  Lungs CTA anteriorly, no wheezes  RRR, S1/S2, no murmurs  Abd soft, nontender, graft site in RLQ w/ mild hypersensitivity at proximal incision site  No LE edema   Fatigued but seems to answer questions appropriately via        Relevant Results  Labs  Results from last 72 hours   Lab Units 05/27/24  0718 05/26/24  0510 05/25/24  0613   WBC AUTO x10*3/uL 2.1* 2.7* 2.9*   HEMOGLOBIN g/dL 11.1* 12.7 11.5*   HEMATOCRIT % 33.5* 39.5 35.5*   PLATELETS AUTO x10*3/uL 123* 133* 126*     Results from last 72 hours   Lab Units 05/27/24  0718 05/26/24  0511 05/25/24  0613   SODIUM mmol/L 136 137 136   POTASSIUM mmol/L 3.6 3.0* 3.6   CHLORIDE mmol/L 107 105 106   CO2 mmol/L 20* 22 21   BUN mg/dL 9 8 9   CREATININE mg/dL 0.85 1.16* 0.99   GLUCOSE mg/dL 73* 77 96   CALCIUM mg/dL 7.5* 8.5* 8.6   ANION GAP mmol/L 13 13 13   EGFR mL/min/1.73m*2 86 59* 71   PHOSPHORUS mg/dL 2.0* 2.8 2.7     Results from last 72 hours   Lab Units 05/27/24  0718 05/26/24  0511  "05/25/24  0613   ALBUMIN g/dL 3.0* 3.5 3.2*     Estimated Creatinine Clearance: 86.4 mL/min (by C-G formula based on SCr of 0.85 mg/dL).  No results found for: \"CRP\"  Microbiology        Imaging              Assessment/Plan     Herber Leblanc is a 45 y/o woman pmhx sig for DDKT (EBV +/+, CMV +/+) on 11/30/23, post-op c/b hematoma requiring return to OR for exploration and washout, current immunosuppression MMF, prednisone 10mg qd, and belatacept q28d (hemolytic anemia w/ tacrolimus)  now admitted 5/24 for fevers, headache, and feeling poorly at home. Febrile here to 39.4, otherwise HDS on room air. Leukopenic and lymphopenic on admission, total WBC 2.4. Initially on vanc/pip-tazo. Due to concern for meningitis was changed to empiric vanc/CTX/ampicillin/acyclovir. CMV viral load returned >20k (last neg 4/11/24) and ganciclovir was added. LP attempted but unsuccessful on 5/26 PM, and able to go this AM w/ IR with meningitis PCR panel and other testing pending. Not clear at this time whether her CMV VL is indicative of the primary process or if this reactivation is from another infection; will await CSF PCR while treating w/ ganciclovir.     Other infectious ppx:  -HepB core Ab positive and on TAF.   -Post-transplant had been on TMP-SMX 1 DS tab daily, clotrimazole samia TID, and valganciclovir 450mg qd>>900mg daily; completed 4/25?    Office visit 4/25 stopped remeron, flexeril and trazodone, w/ recommendation for Imitrex prn. Last belatacept 5/22/24.       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 Chaparro, will follow       Bernadine M [place], DO  Infectious Disease Fellow, PGY5  Epic Chat until " 5pm/Team A pager [phone]

## 2024-05-27 NOTE — SIGNIFICANT EVENT
ID Follow Up    Patient was afebrile overnight.  On acyclovir, ganciclovir, ampicillin, ceftriaxone, vancomycin.    Neurology attempted LP but was unsuccessful.    Patient will need to go to IR for LP.  In addition to the fluid studies we recommended yesterday, would also send CSF for CMV PCR as well.  This will help us establish whether or not the patient has CMV syndrome or CMV meningitis (I.e. invasive disease).  This LP will change our management as it is possible the patient has reactivated CMV in the setting of another infection.     In patients with CMV disease who have kidney transplants, it is typical to hold the antimetabolite immunosuppression, and we would therefore recommend that the mycophenolate be held.   Reactivation of CMV can be a sign of excessive immunosuppression.    ID will see the patient later today but are available for discussion at any time (team A pager [phone]).

## 2024-05-27 NOTE — PROGRESS NOTES
"        INPATIENT TRANSPLANT NEPHROLOGY PROGRESS NOTE          REASON FOR CONSULT:  Immunosuppressive medication management and nephrology related issues.    SUBJECTION:     Afebrile this am.    Diarrhea recurs after getting multiple antibiotic this am.  LP done today, per ID \" CSF with normal cell count, glucose, protein.  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 Continue ganciclovir: will need at least 21 days of CMV therapy.    No acute event overnight.    PHYCISCAL EXAMINATION:    Visit Vitals  /87 (BP Location: Right arm, Patient Position: Lying)   Pulse 100   Temp 36 °C (96.8 °F) (Temporal)   Resp 18   Ht 1.651 m (5' 5\")   Wt 79.9 kg (176 lb 1.6 oz)   SpO2 97%   BMI 29.30 kg/m²   Smoking Status Never   BSA 1.91 m²        05/25 1900 - 05/27 0659  In: 2101.2 [I.V.:1601.7]  Out: 2901 [Urine:2901]     Weight change:     General Appearance - NAD, Good speech, oriented and alert  HEENT - Supple. Not pale. No jaundice. No cervical lymphadenopathy. Pharynx and tonsils are not injected.  CVS - RRR. Normal S1/S2. No murmur, click , rub or gallop  Lungs- clear to auscultation bilaterally  Abdomen - soft , not tender, no guarding, no rigidity. No hepatosplenomegaly. Normal bowel sounds. No masses and ascites. S/P Kidney transplant .  Transplanted kidney is not tender.   Musculoskeletal /Extremities - no edema. Full ROM. No joint tenderness.   Neuro/Psych - appropriate mood and affect. Motor power V/V all extremities. CN I -XII were grossly intact.  Skin - No visible rash    MEDICATION LIST: REVIEWED    acetaminophen, 650 mg, 4x daily  acyclovir, 10 mg/kg, q8h  amLODIPine, 5 mg, BID  ampicillin, 2 g, q4h  calcitriol, 0.25 mcg, Daily  calcium carbonate-vitamin D3, 1 tablet, BID  cefTRIAXone, 2 g, q12h  cholecalciferol, 4,000 Units, Daily  gabapentin, 300 mg, Nightly  ganciclovir, 5 mg/kg, q12h  [Held " by provider] heparin (porcine), 5,000 Units, q8h JANIE  HYDROmorphone, 0.4 mg, Once  ketotifen, 1 drop, BID  levothyroxine, 25 mcg, Daily before breakfast  lidocaine, 11 mL, Once  loratadine, 10 mg, Daily  [Held by provider] mycophenolate, 500 mg, q12h  pantoprazole, 40 mg, Daily before breakfast  polyethylene glycol, 17 g, Daily  potassium phosphate (monobasic), 1,000 mg, BID  predniSONE, 10 mg, Daily  sertraline, 50 mg, Daily  sodium phosphate, 30 mmol, Once  tenofovir alafenamide, 25 mg, Daily  vancomycin, 1,000 mg, q12h      sodium chloride 0.9%, Last Rate: 100 mL/hr (05/27/24 0904)      SUMAtriptan, 100 mg, Once PRN  vancomycin, , Daily PRN        ALLERGY:  No Known Allergies    LABS:  Results for orders placed or performed during the hospital encounter of 05/24/24 (from the past 24 hour(s))   Glucose, CSF   Result Value Ref Range    Glucose, CSF 40 40 - 70 mg/dL   Protein, CSF   Result Value Ref Range    Total Protein, CSF 36 15 - 45 mg/dL   CSF Culture/Smear    Specimen: Lumbar Puncture; Cerebrospinal Fluid   Result Value Ref Range    Gram Stain No polymorphonuclear leukocytes seen     Gram Stain No organisms seen    HSV PCR, CSF    Specimen: Lumbar Puncture; Cerebrospinal Fluid   Result Value Ref Range    Herpes simplex virus Type 1 PCR, Qual, CSF Not Detected Not Detected    HSV 2 PCR, QuaL, CSF Not Detected Not Detected   CSF VDRL   Result Value Ref Range    VDRL, CSF Nonreactive Nonreactive   Lactate Dehydrogenase, CSF   Result Value Ref Range    LD, CSF <25 Not established U/L   Lactic Acid, CSF   Result Value Ref Range    Lactate, CSF 1.3 0.6 - 2.2 mmol/L   Meningitis Pathogen Panel, PCR    Specimen: Lumbar Puncture; Cerebrospinal Fluid   Result Value Ref Range    Color, CSF Colorless     Escherichia coli K1, CSF, PCR Not Detected Not Detected    Haemophilus influenzae, CSF, PCR Not Detected Not Detected    Listeria monocytogenes, CSF, PCR Not Detected Not Detected    Neisseria meningitidis, CSF, PCR Not  Detected Not Detected    Strep. agalactiae, CSF, PCR Not Detected Not Detected    Strep.pneumoniae, CSF, PCR Not Detected Not Detected    Cytomegalovirus, CSF, PCR Not Detected Not Detected    Enterovirus, CSF, PCR Not Detected Not Detected    Human Herpesvirus 6, CSF, PCR Not Detected Not Detected    Herpes Simplex 1, CSF, PCR Not Detected Not Detected    Herpes Simplex 2, CSF, PCR Not Detected Not Detected    Human Parechovirus, CSF, PCR Not Detected Not Detected    Varicella Zoster CSF PCR Not Detected Not Detected    Cryptococcus, CSF, PCR Not Detected Not Detected   CSF Cell Count   Result Value Ref Range    Tube Number, CSF Tube 1       Color, CSF Colorless Colorless    Clarity, CSF Clear Clear    Color, Supernatant CSF Colorless      WBC, CSF <1 (L) 1 - 5 /uL    RBC, CSF 1 0 - 5 /uL   CSF Differential   Result Value Ref Range    Neutrophils %, Manual, CSF 0 0 - 5 %    Lymphocytes %, Manual, CSF 0 (L) 28 - 96 %    Mono/Macrophages %, Manual, CSF 0 (L) 16 - 56 %    Eosinophils %, Manual, CSF 0 Rare %    Basophils %, Manual, CSF 0 Not Established %    Immature Granulocytes %, Manual, CSF 0 Not Established %    Blasts %, Manual, CSF 0 Not Established %    Unclassified Cells %, Manual, CSF 0 Not Established %    Plasma Cells %, Manual, CSF 0 Not Established %    Total Cells Counted, CSF 1    CSF Cell Count   Result Value Ref Range    Tube Number, CSF Tube 2       Color, CSF Colorless Colorless    Clarity, CSF Clear Clear    Color, Supernatant CSF Colorless      WBC, CSF <1 (L) 1 - 5 /uL    RBC, CSF 1 0 - 5 /uL   CSF Differential   Result Value Ref Range    Neutrophils %, Manual, CSF 0 0 - 5 %    Lymphocytes %, Manual, CSF 0 (L) 28 - 96 %    Mono/Macrophages %, Manual, CSF 0 (L) 16 - 56 %    Eosinophils %, Manual, CSF 0 Rare %    Basophils %, Manual, CSF 0 Not Established %    Immature Granulocytes %, Manual, CSF 0 Not Established %    Blasts %, Manual, CSF 0 Not Established %    Unclassified Cells %, Manual, CSF 0  Not Established %    Plasma Cells %, Manual, CSF 0 Not Established %    Total Cells Counted, CSF 1    CBC   Result Value Ref Range    WBC 2.1 (L) 4.4 - 11.3 x10*3/uL    nRBC 0.0 0.0 - 0.0 /100 WBCs    RBC 3.80 (L) 4.00 - 5.20 x10*6/uL    Hemoglobin 11.1 (L) 12.0 - 16.0 g/dL    Hematocrit 33.5 (L) 36.0 - 46.0 %    MCV 88 80 - 100 fL    MCH 29.2 26.0 - 34.0 pg    MCHC 33.1 32.0 - 36.0 g/dL    RDW 12.1 11.5 - 14.5 %    Platelets 123 (L) 150 - 450 x10*3/uL   Renal function panel   Result Value Ref Range    Glucose 73 (L) 74 - 99 mg/dL    Sodium 136 136 - 145 mmol/L    Potassium 3.6 3.5 - 5.3 mmol/L    Chloride 107 98 - 107 mmol/L    Bicarbonate 20 (L) 21 - 32 mmol/L    Anion Gap 13 10 - 20 mmol/L    Urea Nitrogen 9 6 - 23 mg/dL    Creatinine 0.85 0.50 - 1.05 mg/dL    eGFR 86 >60 mL/min/1.73m*2    Calcium 7.5 (L) 8.6 - 10.6 mg/dL    Phosphorus 2.0 (L) 2.5 - 4.9 mg/dL    Albumin 3.0 (L) 3.4 - 5.0 g/dL   Magnesium   Result Value Ref Range    Magnesium 1.99 1.60 - 2.40 mg/dL   Vancomycin   Result Value Ref Range    Vancomycin 7.7 5.0 - 20.0 ug/mL   CBC and Auto Differential   Result Value Ref Range    WBC 2.7 (L) 4.4 - 11.3 x10*3/uL    nRBC 0.0 0.0 - 0.0 /100 WBCs    RBC 4.13 4.00 - 5.20 x10*6/uL    Hemoglobin 12.0 12.0 - 16.0 g/dL    Hematocrit 37.2 36.0 - 46.0 %    MCV 90 80 - 100 fL    MCH 29.1 26.0 - 34.0 pg    MCHC 32.3 32.0 - 36.0 g/dL    RDW 12.2 11.5 - 14.5 %    Platelets 142 (L) 150 - 450 x10*3/uL    Immature Granulocytes %, Automated 9.4 (H) 0.0 - 0.9 %    Immature Granulocytes Absolute, Automated 0.25 0.00 - 0.70 x10*3/uL   Manual Differential   Result Value Ref Range    Neutrophils %, Manual 86.1 40.0 - 80.0 %    Bands %, Manual 1.7 0.0 - 5.0 %    Lymphocytes %, Manual 0.9 13.0 - 44.0 %    Monocytes %, Manual 7.0 2.0 - 10.0 %    Eosinophils %, Manual 1.7 0.0 - 6.0 %    Basophils %, Manual 0.9 0.0 - 2.0 %    Myelocytes %, Manual 1.7 0.0 - 0.0 %    Seg Neutrophils Absolute, Manual 2.32 1.20 - 7.00 x10*3/uL     Bands Absolute, Manual 0.05 0.00 - 0.70 x10*3/uL    Lymphocytes Absolute, Manual 0.02 (L) 1.20 - 4.80 x10*3/uL    Monocytes Absolute, Manual 0.19 0.10 - 1.00 x10*3/uL    Eosinophils Absolute, Manual 0.05 0.00 - 0.70 x10*3/uL    Basophils Absolute, Manual 0.02 0.00 - 0.10 x10*3/uL    Myelocytes Absolute, Manual 0.05 0.00 - 0.00 x10*3/uL    Total Cells Counted 115     Neutrophils Absolute, Manual 2.37 1.20 - 7.70 x10*3/uL    RBC Morphology No significant RBC morphology present    Protein, Urine Random   Result Value Ref Range    Total Protein, Urine Random 16 5 - 24 mg/dL    Creatinine, Urine Random 18.5 (L) 20.0 - 320.0 mg/dL    T. Protein/Creatinine Ratio 0.86 (H) 0.00 - 0.17 mg/mg Creat        ASSESSMENT AND PLAN:    Ms. Leblanc is a 44 y.o. female with past medical history significant for ESRD secondary to HTN/NSAID whom received a  donor kidney transplant on 23 by Dr. Marbin Lambert & Dr. Louis with a KDPI of 56% and PRA of 48%. HCV -/- and donor has not met risk factors. EBV +/+. 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 Dry weight is 81.1 kg (discharge weight is 76.2kg ). Pt received a total of 4.5 mg/kg total of thymoglobulin induction therapy in conjunction with 500mg IV solumedrol. Pt was initiated on Tacrolimus & Cellcept immunosuppression in conjunction with IV solumedrol taper. 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 Pt was started on valcyte (CMV D + / R + ) and bactrim for CMV & PCP prophylaxis per protocol. She was started on clotrimazole as antifungal coverage per protocol. Operative course was complicated by return to OR for exploration of transplanted kidney and washout of hematoma. Hospital course was complicated by post-operative pain and constipation, which has resolved.     Direct admission for fever, nausea, vomiting and abdominal pain (chronic).       1. ESRD S/P Kidney transplant.   - Renal allograft function:  STABLE    Lab Results   Component Value Date    CREATININE 0.85 05/27/2024     Estimated Creatinine Clearance: 86.4 mL/min (by C-G formula based on SCr of 0.85 mg/dL).    Intake/Output Summary (Last 24 hours) at 5/27/2024 1516  Last data filed at 5/27/2024 1055  Gross per 24 hour   Intake 1323.34 ml   Output 100 ml   Net 1223.34 ml     - UPC ratio 0.86    - Continue to monitor UOP and Serum creatinine closely.   - Avoid nephrotoxic agents, NSAIDs and IV contrast   - Strict I/O.   - Renally dose all medications by the most recent CrCl from Cockcroft-Gault formula.    2. Immunosuppression   - continue current immunosuppression   - Belatacept  - MMF, reduced to 500 mg bid for CMV disease 5/26/24    3. Anemia and WBC   Lab Results   Component Value Date    WBC 2.7 (L) 05/27/2024    HGB 12.0 05/27/2024    HCT 37.2 05/27/2024    MCV 90 05/27/2024     (L) 05/27/2024     -Continue to monitor Hgb   -No indications for PRBC transfusion   -Need CBC with Diff tomorrow, follow up ANC.    4. Electrolyte   Lab Results   Component Value Date    GLUCOSE 73 (L) 05/27/2024    CALCIUM 7.5 (L) 05/27/2024     05/27/2024    K 3.6 05/27/2024    CO2 20 (L) 05/27/2024     05/27/2024    BUN 9 05/27/2024    CREATININE 0.85 05/27/2024     - Reviewed renal profile.   - Replace Ca/Phos    6. Hypertension   Blood Pressures         5/26/2024  1900 5/26/2024  2301 5/27/2024  0542 5/27/2024  0708 5/27/2024  1055    BP: 108/68 115/71 131/75 132/78 137/87          -Goal BP < 140/90 mmHg   -continue current management     7. Stone, with hydro  -MAG 3 scan tomorrow  -Litholink -completed     8.  GI prophylaxis   - On PPI     9. 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  For questions, please contact transplant nephrology page x [phone]    Marcia Webber    Transplant Nephrologist

## 2024-05-27 NOTE — PROGRESS NOTES
"        INPATIENT TRANSPLANT NEPHROLOGY PROGRESS NOTE          REASON FOR CONSULT:  Immunosuppressive medication management and nephrology related issues.    SUBJECTION:     Fever  Headache  CMV PCR positive  ID consulted  Stable kidney allograft function    No acute event overnight.    PHYCISCAL EXAMINATION:    Visit Vitals  /87 (BP Location: Right arm, Patient Position: Lying)   Pulse 100   Temp 36 °C (96.8 °F) (Temporal)   Resp 18   Ht 1.651 m (5' 5\")   Wt 79.9 kg (176 lb 1.6 oz)   SpO2 97%   BMI 29.30 kg/m²   Smoking Status Never   BSA 1.91 m²        05/25 1900 - 05/27 0659  In: 2101.2 [I.V.:1601.7]  Out: 2901 [Urine:2901]     Weight change:     General Appearance - NAD, Good speech, oriented and alert  HEENT - Supple. Not pale. No jaundice. No cervical lymphadenopathy. Pharynx and tonsils are not injected.  CVS - RRR. Normal S1/S2. No murmur, click , rub or gallop  Lungs- clear to auscultation bilaterally  Abdomen - soft , not tender, no guarding, no rigidity. No hepatosplenomegaly. Normal bowel sounds. No masses and ascites. S/P Kidney transplant .  Transplanted kidney is not tender.   Musculoskeletal /Extremities - no edema. Full ROM. No joint tenderness.   Neuro/Psych - appropriate mood and affect. Motor power V/V all extremities. CN I -XII were grossly intact.  Skin - No visible rash    MEDICATION LIST: REVIEWED    acetaminophen, 650 mg, 4x daily  amLODIPine, 5 mg, BID  calcitriol, 0.25 mcg, Daily  calcium carbonate-vitamin D3, 1 tablet, BID  cholecalciferol, 4,000 Units, Daily  gabapentin, 300 mg, Nightly  ganciclovir, 5 mg/kg, q12h  [Held by provider] heparin (porcine), 5,000 Units, q8h JANIE  HYDROmorphone, 0.4 mg, Once  ketotifen, 1 drop, BID  levothyroxine, 25 mcg, Daily before breakfast  lidocaine, 11 mL, Once  loratadine, 10 mg, Daily  mycophenolate, 500 mg, q12h  pantoprazole, 40 mg, Daily before breakfast  polyethylene glycol, 17 g, Daily  potassium phosphate (monobasic), 1,000 mg, " BID  predniSONE, 10 mg, Daily  sertraline, 50 mg, Daily  sodium phosphate, 30 mmol, Once  tenofovir alafenamide, 25 mg, Daily      sodium chloride 0.9%, Last Rate: 100 mL/hr (05/27/24 0904)      SUMAtriptan, 100 mg, Once PRN  vancomycin, , Daily PRN        ALLERGY:  No Known Allergies    LABS:  Results for orders placed or performed during the hospital encounter of 05/24/24 (from the past 24 hour(s))   Glucose, CSF   Result Value Ref Range    Glucose, CSF 40 40 - 70 mg/dL   Protein, CSF   Result Value Ref Range    Total Protein, CSF 36 15 - 45 mg/dL   CSF Culture/Smear    Specimen: Lumbar Puncture; Cerebrospinal Fluid   Result Value Ref Range    Gram Stain No polymorphonuclear leukocytes seen     Gram Stain No organisms seen    HSV PCR, CSF    Specimen: Lumbar Puncture; Cerebrospinal Fluid   Result Value Ref Range    Herpes simplex virus Type 1 PCR, Qual, CSF Not Detected Not Detected    HSV 2 PCR, QuaL, CSF Not Detected Not Detected   CSF VDRL   Result Value Ref Range    VDRL, CSF Nonreactive Nonreactive   Lactate Dehydrogenase, CSF   Result Value Ref Range    LD, CSF <25 Not established U/L   Lactic Acid, CSF   Result Value Ref Range    Lactate, CSF 1.3 0.6 - 2.2 mmol/L   Meningitis Pathogen Panel, PCR    Specimen: Lumbar Puncture; Cerebrospinal Fluid   Result Value Ref Range    Color, CSF Colorless     Escherichia coli K1, CSF, PCR Not Detected Not Detected    Haemophilus influenzae, CSF, PCR Not Detected Not Detected    Listeria monocytogenes, CSF, PCR Not Detected Not Detected    Neisseria meningitidis, CSF, PCR Not Detected Not Detected    Strep. agalactiae, CSF, PCR Not Detected Not Detected    Strep.pneumoniae, CSF, PCR Not Detected Not Detected    Cytomegalovirus, CSF, PCR Not Detected Not Detected    Enterovirus, CSF, PCR Not Detected Not Detected    Human Herpesvirus 6, CSF, PCR Not Detected Not Detected    Herpes Simplex 1, CSF, PCR Not Detected Not Detected    Herpes Simplex 2, CSF, PCR Not Detected Not  Detected    Human Parechovirus, CSF, PCR Not Detected Not Detected    Varicella Zoster CSF PCR Not Detected Not Detected    Cryptococcus, CSF, PCR Not Detected Not Detected   CSF Cell Count   Result Value Ref Range    Tube Number, CSF Tube 1       Color, CSF Colorless Colorless    Clarity, CSF Clear Clear    Color, Supernatant CSF Colorless      WBC, CSF <1 (L) 1 - 5 /uL    RBC, CSF 1 0 - 5 /uL   CSF Differential   Result Value Ref Range    Neutrophils %, Manual, CSF 0 0 - 5 %    Lymphocytes %, Manual, CSF 0 (L) 28 - 96 %    Mono/Macrophages %, Manual, CSF 0 (L) 16 - 56 %    Eosinophils %, Manual, CSF 0 Rare %    Basophils %, Manual, CSF 0 Not Established %    Immature Granulocytes %, Manual, CSF 0 Not Established %    Blasts %, Manual, CSF 0 Not Established %    Unclassified Cells %, Manual, CSF 0 Not Established %    Plasma Cells %, Manual, CSF 0 Not Established %    Total Cells Counted, CSF 1    CSF Cell Count   Result Value Ref Range    Tube Number, CSF Tube 2       Color, CSF Colorless Colorless    Clarity, CSF Clear Clear    Color, Supernatant CSF Colorless      WBC, CSF <1 (L) 1 - 5 /uL    RBC, CSF 1 0 - 5 /uL   CSF Differential   Result Value Ref Range    Neutrophils %, Manual, CSF 0 0 - 5 %    Lymphocytes %, Manual, CSF 0 (L) 28 - 96 %    Mono/Macrophages %, Manual, CSF 0 (L) 16 - 56 %    Eosinophils %, Manual, CSF 0 Rare %    Basophils %, Manual, CSF 0 Not Established %    Immature Granulocytes %, Manual, CSF 0 Not Established %    Blasts %, Manual, CSF 0 Not Established %    Unclassified Cells %, Manual, CSF 0 Not Established %    Plasma Cells %, Manual, CSF 0 Not Established %    Total Cells Counted, CSF 1    CBC   Result Value Ref Range    WBC 2.1 (L) 4.4 - 11.3 x10*3/uL    nRBC 0.0 0.0 - 0.0 /100 WBCs    RBC 3.80 (L) 4.00 - 5.20 x10*6/uL    Hemoglobin 11.1 (L) 12.0 - 16.0 g/dL    Hematocrit 33.5 (L) 36.0 - 46.0 %    MCV 88 80 - 100 fL    MCH 29.2 26.0 - 34.0 pg    MCHC 33.1 32.0 - 36.0 g/dL    RDW 12.1  11.5 - 14.5 %    Platelets 123 (L) 150 - 450 x10*3/uL   Renal function panel   Result Value Ref Range    Glucose 73 (L) 74 - 99 mg/dL    Sodium 136 136 - 145 mmol/L    Potassium 3.6 3.5 - 5.3 mmol/L    Chloride 107 98 - 107 mmol/L    Bicarbonate 20 (L) 21 - 32 mmol/L    Anion Gap 13 10 - 20 mmol/L    Urea Nitrogen 9 6 - 23 mg/dL    Creatinine 0.85 0.50 - 1.05 mg/dL    eGFR 86 >60 mL/min/1.73m*2    Calcium 7.5 (L) 8.6 - 10.6 mg/dL    Phosphorus 2.0 (L) 2.5 - 4.9 mg/dL    Albumin 3.0 (L) 3.4 - 5.0 g/dL   Magnesium   Result Value Ref Range    Magnesium 1.99 1.60 - 2.40 mg/dL   Vancomycin   Result Value Ref Range    Vancomycin 7.7 5.0 - 20.0 ug/mL   CBC and Auto Differential   Result Value Ref Range    WBC 2.7 (L) 4.4 - 11.3 x10*3/uL    nRBC 0.0 0.0 - 0.0 /100 WBCs    RBC 4.13 4.00 - 5.20 x10*6/uL    Hemoglobin 12.0 12.0 - 16.0 g/dL    Hematocrit 37.2 36.0 - 46.0 %    MCV 90 80 - 100 fL    MCH 29.1 26.0 - 34.0 pg    MCHC 32.3 32.0 - 36.0 g/dL    RDW 12.2 11.5 - 14.5 %    Platelets 142 (L) 150 - 450 x10*3/uL    Immature Granulocytes %, Automated 9.4 (H) 0.0 - 0.9 %    Immature Granulocytes Absolute, Automated 0.25 0.00 - 0.70 x10*3/uL   Manual Differential   Result Value Ref Range    Neutrophils %, Manual 86.1 40.0 - 80.0 %    Bands %, Manual 1.7 0.0 - 5.0 %    Lymphocytes %, Manual 0.9 13.0 - 44.0 %    Monocytes %, Manual 7.0 2.0 - 10.0 %    Eosinophils %, Manual 1.7 0.0 - 6.0 %    Basophils %, Manual 0.9 0.0 - 2.0 %    Myelocytes %, Manual 1.7 0.0 - 0.0 %    Seg Neutrophils Absolute, Manual 2.32 1.20 - 7.00 x10*3/uL    Bands Absolute, Manual 0.05 0.00 - 0.70 x10*3/uL    Lymphocytes Absolute, Manual 0.02 (L) 1.20 - 4.80 x10*3/uL    Monocytes Absolute, Manual 0.19 0.10 - 1.00 x10*3/uL    Eosinophils Absolute, Manual 0.05 0.00 - 0.70 x10*3/uL    Basophils Absolute, Manual 0.02 0.00 - 0.10 x10*3/uL    Myelocytes Absolute, Manual 0.05 0.00 - 0.00 x10*3/uL    Total Cells Counted 115     Neutrophils Absolute, Manual 2.37 1.20  - 7.70 x10*3/uL    RBC Morphology No significant RBC morphology present    Protein, Urine Random   Result Value Ref Range    Total Protein, Urine Random 16 5 - 24 mg/dL    Creatinine, Urine Random 18.5 (L) 20.0 - 320.0 mg/dL    T. Protein/Creatinine Ratio 0.86 (H) 0.00 - 0.17 mg/mg Creat        ASSESSMENT AND PLAN:    Ms. Leblanc is a 44 y.o. female with past medical history significant for ESRD secondary to HTN/NSAID whom received a  donor kidney transplant on 23 by Dr. Marbin Lambert & Dr. Louis with a KDPI of 56% and PRA of 48%. HCV -/- and donor has not met risk factors. EBV +/+. 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 Dry weight is 81.1 kg (discharge weight is 76.2kg ). Pt received a total of 4.5 mg/kg total of thymoglobulin induction therapy in conjunction with 500mg IV solumedrol. Pt was initiated on Tacrolimus & Cellcept immunosuppression in conjunction with IV solumedrol taper. 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 Pt was started on valcyte (CMV D + / R + ) and bactrim for CMV & PCP prophylaxis per protocol. She was started on clotrimazole as antifungal coverage per protocol. Operative course was complicated by return to OR for exploration of transplanted kidney and washout of hematoma. Hospital course was complicated by post-operative pain and constipation, which has resolved.     Direct admission for fever, nausea, vomiting and abdominal pain (chronic).       1. ESRD S/P Kidney transplant.   - Renal allograft function: STABLE    Cr 1.16; start IV fluid for fever    - UPC ratio 0.86    - Continue to monitor UOP and Serum creatinine closely.   - Avoid nephrotoxic agents, NSAIDs and IV contrast   - Strict I/O.   - Renally dose all medications by the most recent CrCl from Cockcroft-Gault formula.    2. Immunosuppression   - continue current immunosuppression   - Belatacept  - MMF, reduced to 500 mg bid for CMV disease 24    3.  Anemia and WBC   Lab Results   Component Value Date    WBC 2.7 (L) 05/27/2024    HGB 12.0 05/27/2024    HCT 37.2 05/27/2024    MCV 90 05/27/2024     (L) 05/27/2024     -Continue to monitor Hgb   -No indications for PRBC transfusion   -Need CBC with Diff tomorrow, follow up ANC.    4. Electrolyte   Lab Results   Component Value Date    GLUCOSE 73 (L) 05/27/2024    CALCIUM 7.5 (L) 05/27/2024     05/27/2024    K 3.6 05/27/2024    CO2 20 (L) 05/27/2024     05/27/2024    BUN 9 05/27/2024    CREATININE 0.85 05/27/2024     - Reviewed renal profile.   - Replace Ca/Phos    6. Hypertension   Blood Pressures         5/26/2024  1900 5/26/2024  2301 5/27/2024  0542 5/27/2024  0708 5/27/2024  1055    BP: 108/68 115/71 131/75 132/78 137/87          -Goal BP < 140/90 mmHg   -continue current management     7. Stone, with hydro  -MAG 3 scan tomorrow  -Litholink -completed     8.  GI prophylaxis   - On PPI     9. 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  For questions, please contact transplant nephrology page x [phone]    Marcia Webber    Transplant Nephrologist

## 2024-05-27 NOTE — PROGRESS NOTES
Vancomycin Dosing by Pharmacy- Cessation of Therapy    Consult to pharmacy for vancomycin dosing has been discontinued by the prescriber, pharmacy will sign off at this time.    Please call pharmacy if there are further questions or re-enter a consult if vancomycin is resumed.     Mary Mcgregor, PharmD

## 2024-05-28 ENCOUNTER — APPOINTMENT (OUTPATIENT)
Dept: RADIOLOGY | Facility: HOSPITAL | Age: 44
DRG: 699 | End: 2024-05-28
Payer: COMMERCIAL

## 2024-05-28 LAB
ALBUMIN SERPL BCP-MCNC: 2.9 G/DL (ref 3.4–5)
ALBUMIN SERPL BCP-MCNC: 2.9 G/DL (ref 3.4–5)
ALP SERPL-CCNC: 50 U/L (ref 33–110)
ALT SERPL W P-5'-P-CCNC: 14 U/L (ref 7–45)
ANION GAP SERPL CALC-SCNC: 12 MMOL/L (ref 10–20)
AST SERPL W P-5'-P-CCNC: 19 U/L (ref 9–39)
BACTERIA BLD CULT: NORMAL
BASOPHILS # BLD MANUAL: 0.05 X10*3/UL (ref 0–0.1)
BASOPHILS NFR BLD MANUAL: 2.5 %
BILIRUB DIRECT SERPL-MCNC: 0.1 MG/DL (ref 0–0.3)
BILIRUB SERPL-MCNC: 0.5 MG/DL (ref 0–1.2)
BUN SERPL-MCNC: 7 MG/DL (ref 6–23)
BURR CELLS BLD QL SMEAR: ABNORMAL
CALCIUM SERPL-MCNC: 8 MG/DL (ref 8.6–10.6)
CHLORIDE SERPL-SCNC: 110 MMOL/L (ref 98–107)
CO2 SERPL-SCNC: 21 MMOL/L (ref 21–32)
CREAT SERPL-MCNC: 0.88 MG/DL (ref 0.5–1.05)
CRYPTOC AG SPEC QL LA: NEGATIVE
CRYPTOC AG SPEC QL LA: NEGATIVE
EGFRCR SERPLBLD CKD-EPI 2021: 82 ML/MIN/1.73M*2
EOSINOPHIL # BLD MANUAL: 0.02 X10*3/UL (ref 0–0.7)
EOSINOPHIL NFR BLD MANUAL: 0.8 %
ERYTHROCYTE [DISTWIDTH] IN BLOOD BY AUTOMATED COUNT: 12.1 % (ref 11.5–14.5)
GLUCOSE SERPL-MCNC: 78 MG/DL (ref 74–99)
HCT VFR BLD AUTO: 39 % (ref 36–46)
HGB BLD-MCNC: 11.8 G/DL (ref 12–16)
IMM GRANULOCYTES # BLD AUTO: 0.17 X10*3/UL (ref 0–0.7)
IMM GRANULOCYTES NFR BLD AUTO: 8 % (ref 0–0.9)
LYMPHOCYTES # BLD MANUAL: 0.07 X10*3/UL (ref 1.2–4.8)
LYMPHOCYTES NFR BLD MANUAL: 3.4 %
MAGNESIUM SERPL-MCNC: 1.69 MG/DL (ref 1.6–2.4)
MCH RBC QN AUTO: 29.1 PG (ref 26–34)
MCHC RBC AUTO-ENTMCNC: 30.3 G/DL (ref 32–36)
MCV RBC AUTO: 96 FL (ref 80–100)
METAMYELOCYTES # BLD MANUAL: 0.02 X10*3/UL
METAMYELOCYTES NFR BLD MANUAL: 0.8 %
MONOCYTES # BLD MANUAL: 0.11 X10*3/UL (ref 0.1–1)
MONOCYTES NFR BLD MANUAL: 5 %
MYELOCYTES # BLD MANUAL: 0.02 X10*3/UL
MYELOCYTES NFR BLD MANUAL: 0.8 %
NEUTROPHILS # BLD MANUAL: 1.83 X10*3/UL (ref 1.2–7.7)
NEUTS BAND # BLD MANUAL: 0.04 X10*3/UL (ref 0–0.7)
NEUTS BAND NFR BLD MANUAL: 1.7 %
NEUTS SEG # BLD MANUAL: 1.79 X10*3/UL (ref 1.2–7)
NEUTS SEG NFR BLD MANUAL: 85 %
NRBC BLD-RTO: 0 /100 WBCS (ref 0–0)
OVALOCYTES BLD QL SMEAR: ABNORMAL
PHOSPHATE SERPL-MCNC: 3.3 MG/DL (ref 2.5–4.9)
PLATELET # BLD AUTO: 125 X10*3/UL (ref 150–450)
POTASSIUM SERPL-SCNC: 3.6 MMOL/L (ref 3.5–5.3)
PROT SERPL-MCNC: 5.5 G/DL (ref 6.4–8.2)
RBC # BLD AUTO: 4.06 X10*6/UL (ref 4–5.2)
RBC MORPH BLD: ABNORMAL
SODIUM SERPL-SCNC: 139 MMOL/L (ref 136–145)
TOTAL CELLS COUNTED BLD: 120
WBC # BLD AUTO: 2.1 X10*3/UL (ref 4.4–11.3)

## 2024-05-28 PROCEDURE — 36415 COLL VENOUS BLD VENIPUNCTURE: CPT | Performed by: STUDENT IN AN ORGANIZED HEALTH CARE EDUCATION/TRAINING PROGRAM

## 2024-05-28 PROCEDURE — 3430000001 HC RX 343 DIAGNOSTIC RADIOPHARMACEUTICALS: Performed by: SURGERY

## 2024-05-28 PROCEDURE — 2500000004 HC RX 250 GENERAL PHARMACY W/ HCPCS (ALT 636 FOR OP/ED)

## 2024-05-28 PROCEDURE — 99233 SBSQ HOSP IP/OBS HIGH 50: CPT | Performed by: INTERNAL MEDICINE

## 2024-05-28 PROCEDURE — 85027 COMPLETE CBC AUTOMATED: CPT | Performed by: STUDENT IN AN ORGANIZED HEALTH CARE EDUCATION/TRAINING PROGRAM

## 2024-05-28 PROCEDURE — 85007 BL SMEAR W/DIFF WBC COUNT: CPT | Performed by: STUDENT IN AN ORGANIZED HEALTH CARE EDUCATION/TRAINING PROGRAM

## 2024-05-28 PROCEDURE — 2500000004 HC RX 250 GENERAL PHARMACY W/ HCPCS (ALT 636 FOR OP/ED): Performed by: STUDENT IN AN ORGANIZED HEALTH CARE EDUCATION/TRAINING PROGRAM

## 2024-05-28 PROCEDURE — 82040 ASSAY OF SERUM ALBUMIN: CPT

## 2024-05-28 PROCEDURE — 78708 K FLOW/FUNCT IMAGE W/DRUG: CPT | Performed by: RADIOLOGY

## 2024-05-28 PROCEDURE — 2500000001 HC RX 250 WO HCPCS SELF ADMINISTERED DRUGS (ALT 637 FOR MEDICARE OP)

## 2024-05-28 PROCEDURE — 2500000001 HC RX 250 WO HCPCS SELF ADMINISTERED DRUGS (ALT 637 FOR MEDICARE OP): Performed by: STUDENT IN AN ORGANIZED HEALTH CARE EDUCATION/TRAINING PROGRAM

## 2024-05-28 PROCEDURE — 1100000001 HC PRIVATE ROOM DAILY

## 2024-05-28 PROCEDURE — 2500000004 HC RX 250 GENERAL PHARMACY W/ HCPCS (ALT 636 FOR OP/ED): Performed by: INTERNAL MEDICINE

## 2024-05-28 PROCEDURE — 84075 ASSAY ALKALINE PHOSPHATASE: CPT | Performed by: STUDENT IN AN ORGANIZED HEALTH CARE EDUCATION/TRAINING PROGRAM

## 2024-05-28 PROCEDURE — 2500000004 HC RX 250 GENERAL PHARMACY W/ HCPCS (ALT 636 FOR OP/ED): Performed by: SURGERY

## 2024-05-28 PROCEDURE — 99232 SBSQ HOSP IP/OBS MODERATE 35: CPT | Performed by: INTERNAL MEDICINE

## 2024-05-28 PROCEDURE — A9562 TC99M MERTIATIDE: HCPCS | Performed by: SURGERY

## 2024-05-28 PROCEDURE — 78708 K FLOW/FUNCT IMAGE W/DRUG: CPT

## 2024-05-28 PROCEDURE — 2500000002 HC RX 250 W HCPCS SELF ADMINISTERED DRUGS (ALT 637 FOR MEDICARE OP, ALT 636 FOR OP/ED)

## 2024-05-28 PROCEDURE — 83735 ASSAY OF MAGNESIUM: CPT

## 2024-05-28 PROCEDURE — 99232 SBSQ HOSP IP/OBS MODERATE 35: CPT | Performed by: TRANSPLANT SURGERY

## 2024-05-28 RX ORDER — GANCICLOVIR 500 MG/10ML
5 INJECTION, POWDER, LYOPHILIZED, FOR SOLUTION INTRAVENOUS EVERY 12 HOURS
Qty: 24000 MG | Refills: 0 | Status: SHIPPED | OUTPATIENT
Start: 2024-05-28 | End: 2024-05-29 | Stop reason: HOSPADM

## 2024-05-28 RX ORDER — POTASSIUM CHLORIDE 14.9 MG/ML
20 INJECTION INTRAVENOUS
Status: COMPLETED | OUTPATIENT
Start: 2024-05-28 | End: 2024-05-28

## 2024-05-28 RX ORDER — BETIATIDE 1 MG/1
11.52 INJECTION, POWDER, LYOPHILIZED, FOR SOLUTION INTRAVENOUS
Status: COMPLETED | OUTPATIENT
Start: 2024-05-28 | End: 2024-05-28

## 2024-05-28 RX ORDER — FUROSEMIDE 10 MG/ML
40 INJECTION INTRAMUSCULAR; INTRAVENOUS ONCE
Status: COMPLETED | OUTPATIENT
Start: 2024-05-28 | End: 2024-05-28

## 2024-05-28 RX ORDER — LIDOCAINE HYDROCHLORIDE 10 MG/ML
5 INJECTION INFILTRATION; PERINEURAL ONCE
Status: DISCONTINUED | OUTPATIENT
Start: 2024-05-28 | End: 2024-05-29 | Stop reason: HOSPADM

## 2024-05-28 RX ORDER — GANCICLOVIR 500 MG/10ML
5 INJECTION, POWDER, LYOPHILIZED, FOR SOLUTION INTRAVENOUS EVERY 12 HOURS
Qty: 24000 MG | Refills: 0 | Status: SHIPPED | OUTPATIENT
Start: 2024-05-28 | End: 2024-05-28

## 2024-05-28 RX ORDER — MAGNESIUM SULFATE 1 G/100ML
1 INJECTION INTRAVENOUS ONCE
Qty: 100 ML | Refills: 0 | Status: COMPLETED | OUTPATIENT
Start: 2024-05-28 | End: 2024-05-28

## 2024-05-28 RX ORDER — MAGNESIUM SULFATE HEPTAHYDRATE 40 MG/ML
2 INJECTION, SOLUTION INTRAVENOUS
Qty: 100 ML | Refills: 0 | Status: DISCONTINUED | OUTPATIENT
Start: 2024-05-28 | End: 2024-05-28

## 2024-05-28 RX ADMIN — MAGNESIUM SULFATE HEPTAHYDRATE 1 G: 1 INJECTION, SOLUTION INTRAVENOUS at 21:08

## 2024-05-28 RX ADMIN — KETOTIFEN FUMARATE 1 DROP: 0.25 SOLUTION/ DROPS OPHTHALMIC at 12:37

## 2024-05-28 RX ADMIN — Medication 4000 UNITS: at 08:52

## 2024-05-28 RX ADMIN — ACETAMINOPHEN 650 MG: 325 TABLET ORAL at 06:24

## 2024-05-28 RX ADMIN — POTASSIUM CHLORIDE 20 MEQ: 14.9 INJECTION, SOLUTION INTRAVENOUS at 16:13

## 2024-05-28 RX ADMIN — PANTOPRAZOLE SODIUM 40 MG: 40 TABLET, DELAYED RELEASE ORAL at 06:24

## 2024-05-28 RX ADMIN — GANCICLOVIR SODIUM 400 MG: 50 INJECTION, POWDER, LYOPHILIZED, FOR SOLUTION INTRAVENTRICULAR at 23:22

## 2024-05-28 RX ADMIN — Medication 1 TABLET: at 08:53

## 2024-05-28 RX ADMIN — AMLODIPINE BESYLATE 5 MG: 5 TABLET ORAL at 08:53

## 2024-05-28 RX ADMIN — GANCICLOVIR SODIUM 400 MG: 50 INJECTION, POWDER, LYOPHILIZED, FOR SOLUTION INTRAVENTRICULAR at 00:43

## 2024-05-28 RX ADMIN — SERTRALINE HYDROCHLORIDE 50 MG: 50 TABLET ORAL at 12:38

## 2024-05-28 RX ADMIN — Medication 1 TABLET: at 21:07

## 2024-05-28 RX ADMIN — POTASSIUM CHLORIDE 20 MEQ: 14.9 INJECTION, SOLUTION INTRAVENOUS at 18:24

## 2024-05-28 RX ADMIN — LORATADINE 10 MG: 10 TABLET ORAL at 08:53

## 2024-05-28 RX ADMIN — TENOFOVIR ALAFENAMIDE 25 MG: 25 TABLET ORAL at 08:53

## 2024-05-28 RX ADMIN — MYCOPHENOLATE MOFETIL 500 MG: 250 CAPSULE ORAL at 18:16

## 2024-05-28 RX ADMIN — FUROSEMIDE 40 MG: 10 INJECTION, SOLUTION INTRAMUSCULAR; INTRAVENOUS at 11:33

## 2024-05-28 RX ADMIN — AMLODIPINE BESYLATE 5 MG: 5 TABLET ORAL at 21:07

## 2024-05-28 RX ADMIN — PREDNISONE 10 MG: 10 TABLET ORAL at 08:53

## 2024-05-28 RX ADMIN — HEPARIN SODIUM 5000 UNITS: 5000 INJECTION INTRAVENOUS; SUBCUTANEOUS at 21:07

## 2024-05-28 RX ADMIN — GABAPENTIN 300 MG: 300 CAPSULE ORAL at 21:07

## 2024-05-28 RX ADMIN — BETIATIDE 11.52 MILLICURIE: 1 INJECTION, POWDER, LYOPHILIZED, FOR SOLUTION INTRAVENOUS at 10:36

## 2024-05-28 RX ADMIN — LEVOTHYROXINE SODIUM 25 MCG: 0.05 TABLET ORAL at 06:24

## 2024-05-28 RX ADMIN — MAGNESIUM SULFATE HEPTAHYDRATE 2 G: 40 INJECTION, SOLUTION INTRAVENOUS at 14:28

## 2024-05-28 RX ADMIN — ACETAMINOPHEN 650 MG: 325 TABLET ORAL at 18:29

## 2024-05-28 RX ADMIN — CALCITRIOL CAPSULES 0.25 MCG 0.25 MCG: 0.25 CAPSULE ORAL at 08:53

## 2024-05-28 RX ADMIN — ACETAMINOPHEN 650 MG: 325 TABLET ORAL at 00:52

## 2024-05-28 RX ADMIN — POTASSIUM PHOSPHATE, MONOBASIC 1000 MG: 500 TABLET, SOLUBLE ORAL at 21:07

## 2024-05-28 RX ADMIN — ACETAMINOPHEN 650 MG: 325 TABLET ORAL at 12:36

## 2024-05-28 RX ADMIN — HEPARIN SODIUM 5000 UNITS: 5000 INJECTION INTRAVENOUS; SUBCUTANEOUS at 14:44

## 2024-05-28 RX ADMIN — GANCICLOVIR SODIUM 400 MG: 50 INJECTION, POWDER, LYOPHILIZED, FOR SOLUTION INTRAVENTRICULAR at 12:38

## 2024-05-28 RX ADMIN — POTASSIUM PHOSPHATE, MONOBASIC 1000 MG: 500 TABLET, SOLUBLE ORAL at 08:53

## 2024-05-28 RX ADMIN — KETOTIFEN FUMARATE 1 DROP: 0.25 SOLUTION/ DROPS OPHTHALMIC at 21:00

## 2024-05-28 RX ADMIN — MYCOPHENOLATE MOFETIL 500 MG: 250 CAPSULE ORAL at 06:24

## 2024-05-28 ASSESSMENT — PAIN SCALES - GENERAL
PAINLEVEL_OUTOF10: 0 - NO PAIN
PAINLEVEL_OUTOF10: 0 - NO PAIN

## 2024-05-28 ASSESSMENT — PAIN - FUNCTIONAL ASSESSMENT
PAIN_FUNCTIONAL_ASSESSMENT: 0-10
PAIN_FUNCTIONAL_ASSESSMENT: 0-10

## 2024-05-28 ASSESSMENT — PAIN SCALES - WONG BAKER: WONGBAKER_NUMERICALRESPONSE: HURTS LITTLE BIT

## 2024-05-28 NOTE — PROGRESS NOTES
"Herber Foy Rai is a 44 y.o. female on day 3 of admission presenting with Fever of unknown origin.    Subjective   No more fever       Objective   Vitals:    05/28/24 0808   BP: 108/71   Pulse: 86   Resp: 18   Temp: 36.8 °C (98.2 °F)   SpO2: 96%       Physical Exam  Constitutional:       Appearance: Normal appearance.   Eyes:      Pupils: Pupils are equal, round, and reactive to light.   Cardiovascular:      Rate and Rhythm: Normal rate.   Pulmonary:      Effort: Pulmonary effort is normal. No respiratory distress.   Abdominal:      General: There is no distension.      Palpations: Abdomen is soft.      Comments: Incision clean, dry, intact   Musculoskeletal:         General: Normal range of motion.      Right lower leg: No edema.      Left lower leg: No edema.   Skin:     General: Skin is warm and dry.   Neurological:      General: No focal deficit present.      Mental Status: She is alert and oriented to person, place, and time.   Psychiatric:         Mood and Affect: Mood normal.         Behavior: Behavior normal.         Last Recorded Vitals  Blood pressure 108/71, pulse 86, temperature 36.8 °C (98.2 °F), temperature source Temporal, resp. rate 18, height 1.651 m (5' 5\"), weight 79.9 kg (176 lb 1.6 oz), SpO2 96%.  Intake/Output last 3 Shifts:  I/O last 3 completed shifts:  In: 1431.3 (17.9 mL/kg) [I.V.:1323.3 (16.6 mL/kg); IV Piggyback:108]  Out: 0 (0 mL/kg)   Weight: 79.9 kg     Relevant Results  Lab Results   Component Value Date    WBC 2.1 (L) 05/28/2024    HGB 11.8 (L) 05/28/2024    HCT 39.0 05/28/2024    MCV 96 05/28/2024     (L) 05/28/2024     Lab Results   Component Value Date    GLUCOSE 78 05/28/2024    CALCIUM 8.0 (L) 05/28/2024     05/28/2024    K 3.6 05/28/2024    CO2 21 05/28/2024     (H) 05/28/2024    BUN 7 05/28/2024    CREATININE 0.88 05/28/2024     acetaminophen, 650 mg, oral, 4x daily  amLODIPine, 5 mg, oral, BID  calcitriol, 0.25 mcg, oral, Daily  calcium carbonate-vitamin D3, 1 " tablet, oral, BID  cholecalciferol, 4,000 Units, oral, Daily  gabapentin, 300 mg, oral, Nightly  ganciclovir, 5 mg/kg, intravenous, q12h  [Held by provider] heparin (porcine), 5,000 Units, subcutaneous, q8h JANIE  HYDROmorphone, 0.4 mg, intravenous, Once  ketotifen, 1 drop, Both Eyes, BID  levothyroxine, 25 mcg, oral, Daily before breakfast  lidocaine, 11 mL, subcutaneous, Once  loratadine, 10 mg, oral, Daily  mycophenolate, 500 mg, oral, q12h  pantoprazole, 40 mg, oral, Daily before breakfast  polyethylene glycol, 17 g, oral, Daily  potassium phosphate (monobasic), 1,000 mg, oral, BID  predniSONE, 10 mg, oral, Daily  sertraline, 50 mg, oral, Daily  tenofovir alafenamide, 25 mg, oral, Daily               Assessment/Plan   Principal Problem:    Fever of unknown origin  Active Problems:    Kidney transplant complication ([facility]-HCC)    Kidney allograft function   Goods   Nephrolithiasis on transplant kidney < 1cm, with mild hydro, nuclear scan today    Immunosuppression   Belatacept, keep for now (hemolytic anemia)    /500   Pred 10      Fever unknown Origin   LP done, negative   Off antibiotics now   Only CMV+, IV ganciclovir, need PICC line     Dispo   tomorrow    I spent 35 minutes in the professional and overall care of this patient.      Don Lopez MD

## 2024-05-28 NOTE — PROGRESS NOTES
"        INPATIENT TRANSPLANT NEPHROLOGY PROGRESS NOTE          REASON FOR CONSULT:  Immunosuppressive medication management and nephrology related issues.    SUBJECTION:     Afebrile   On RA  Diarrhea  - stopped after we dc antibiotics  Replacing electrolytes, K prn  No acute event overnight.    PHYCISCAL EXAMINATION:    Visit Vitals  /71 (BP Location: Right arm, Patient Position: Lying)   Pulse 86   Temp 36.8 °C (98.2 °F) (Temporal)   Resp 18   Ht 1.651 m (5' 5\")   Wt 79.9 kg (176 lb 1.6 oz)   SpO2 96%   BMI 29.30 kg/m²   Smoking Status Never   BSA 1.91 m²        05/26 1900 - 05/28 0659  In: 1431.3 [I.V.:1323.3]  Out: 0      Weight change:     General Appearance - NAD, Good speech, oriented and alert  HEENT - Supple. Not pale. No jaundice. No cervical lymphadenopathy. Pharynx and tonsils are not injected.  CVS - RRR. Normal S1/S2. No murmur, click , rub or gallop  Lungs- clear to auscultation bilaterally  Abdomen - soft , not tender, no guarding, no rigidity. No hepatosplenomegaly. Normal bowel sounds. No masses and ascites. S/P Kidney transplant .  Transplanted kidney is not tender.   Musculoskeletal /Extremities - no edema. Full ROM. No joint tenderness.   Neuro/Psych - appropriate mood and affect. Motor power V/V all extremities. CN I -XII were grossly intact.  Skin - No visible rash    MEDICATION LIST: REVIEWED    acetaminophen, 650 mg, 4x daily  amLODIPine, 5 mg, BID  calcitriol, 0.25 mcg, Daily  calcium carbonate-vitamin D3, 1 tablet, BID  cholecalciferol, 4,000 Units, Daily  gabapentin, 300 mg, Nightly  ganciclovir, 5 mg/kg, q12h  [Held by provider] heparin (porcine), 5,000 Units, q8h JANIE  HYDROmorphone, 0.4 mg, Once  ketotifen, 1 drop, BID  levothyroxine, 25 mcg, Daily before breakfast  lidocaine, 11 mL, Once  loratadine, 10 mg, Daily  magnesium sulfate, 2 g, q2h  mycophenolate, 500 mg, q12h  pantoprazole, 40 mg, Daily before breakfast  polyethylene glycol, 17 g, Daily  potassium chloride, 20 mEq, " q2h  potassium phosphate (monobasic), 1,000 mg, BID  predniSONE, 10 mg, Daily  sertraline, 50 mg, Daily  tenofovir alafenamide, 25 mg, Daily      sodium chloride 0.9%, Last Rate: 100 mL/hr (05/27/24 0904)      SUMAtriptan, 100 mg, Once PRN        ALLERGY:  No Known Allergies    LABS:  Results for orders placed or performed during the hospital encounter of 05/24/24 (from the past 24 hour(s))   CBC and Auto Differential   Result Value Ref Range    WBC 2.7 (L) 4.4 - 11.3 x10*3/uL    nRBC 0.0 0.0 - 0.0 /100 WBCs    RBC 4.13 4.00 - 5.20 x10*6/uL    Hemoglobin 12.0 12.0 - 16.0 g/dL    Hematocrit 37.2 36.0 - 46.0 %    MCV 90 80 - 100 fL    MCH 29.1 26.0 - 34.0 pg    MCHC 32.3 32.0 - 36.0 g/dL    RDW 12.2 11.5 - 14.5 %    Platelets 142 (L) 150 - 450 x10*3/uL    Immature Granulocytes %, Automated 9.4 (H) 0.0 - 0.9 %    Immature Granulocytes Absolute, Automated 0.25 0.00 - 0.70 x10*3/uL   Manual Differential   Result Value Ref Range    Neutrophils %, Manual 86.1 40.0 - 80.0 %    Bands %, Manual 1.7 0.0 - 5.0 %    Lymphocytes %, Manual 0.9 13.0 - 44.0 %    Monocytes %, Manual 7.0 2.0 - 10.0 %    Eosinophils %, Manual 1.7 0.0 - 6.0 %    Basophils %, Manual 0.9 0.0 - 2.0 %    Myelocytes %, Manual 1.7 0.0 - 0.0 %    Seg Neutrophils Absolute, Manual 2.32 1.20 - 7.00 x10*3/uL    Bands Absolute, Manual 0.05 0.00 - 0.70 x10*3/uL    Lymphocytes Absolute, Manual 0.02 (L) 1.20 - 4.80 x10*3/uL    Monocytes Absolute, Manual 0.19 0.10 - 1.00 x10*3/uL    Eosinophils Absolute, Manual 0.05 0.00 - 0.70 x10*3/uL    Basophils Absolute, Manual 0.02 0.00 - 0.10 x10*3/uL    Myelocytes Absolute, Manual 0.05 0.00 - 0.00 x10*3/uL    Total Cells Counted 115     Neutrophils Absolute, Manual 2.37 1.20 - 7.70 x10*3/uL    RBC Morphology No significant RBC morphology present    Protein, Urine Random   Result Value Ref Range    Total Protein, Urine Random 16 5 - 24 mg/dL    Creatinine, Urine Random 18.5 (L) 20.0 - 320.0 mg/dL    T. Protein/Creatinine Ratio  0.86 (H) 0.00 - 0.17 mg/mg Creat   Renal function panel   Result Value Ref Range    Glucose 78 74 - 99 mg/dL    Sodium 139 136 - 145 mmol/L    Potassium 3.6 3.5 - 5.3 mmol/L    Chloride 110 (H) 98 - 107 mmol/L    Bicarbonate 21 21 - 32 mmol/L    Anion Gap 12 10 - 20 mmol/L    Urea Nitrogen 7 6 - 23 mg/dL    Creatinine 0.88 0.50 - 1.05 mg/dL    eGFR 82 >60 mL/min/1.73m*2    Calcium 8.0 (L) 8.6 - 10.6 mg/dL    Phosphorus 3.3 2.5 - 4.9 mg/dL    Albumin 2.9 (L) 3.4 - 5.0 g/dL   Magnesium   Result Value Ref Range    Magnesium 1.69 1.60 - 2.40 mg/dL   CBC and Auto Differential   Result Value Ref Range    WBC 2.1 (L) 4.4 - 11.3 x10*3/uL    nRBC 0.0 0.0 - 0.0 /100 WBCs    RBC 4.06 4.00 - 5.20 x10*6/uL    Hemoglobin 11.8 (L) 12.0 - 16.0 g/dL    Hematocrit 39.0 36.0 - 46.0 %    MCV 96 80 - 100 fL    MCH 29.1 26.0 - 34.0 pg    MCHC 30.3 (L) 32.0 - 36.0 g/dL    RDW 12.1 11.5 - 14.5 %    Platelets 125 (L) 150 - 450 x10*3/uL    Neutrophils %      Immature Granulocytes %, Automated      Lymphocytes %      Monocytes %      Eosinophils %      Basophils %      Neutrophils Absolute      Lymphocytes Absolute      Monocytes Absolute      Eosinophils Absolute      Basophils Absolute          ASSESSMENT AND PLAN:    Ms. Leblanc is a 44 y.o. female with past medical history significant for ESRD secondary to HTN/NSAID whom received a  donor kidney transplant on 23 by Dr. Marbin Lambert & Dr. Louis with a KDPI of 56% and PRA of 48%. HCV -/- and donor has not met risk factors. EBV +/+. 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 Dry weight is 81.1 kg (discharge weight is 76.2kg ). Pt received a total of 4.5 mg/kg total of thymoglobulin induction therapy in conjunction with 500mg IV solumedrol. Pt was initiated on Tacrolimus & Cellcept immunosuppression in conjunction with IV solumedrol taper. 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 Pt was started on valcyte (CMV D + / R + )  and bactrim for CMV & PCP prophylaxis per protocol. She was started on clotrimazole as antifungal coverage per protocol. Operative course was complicated by return to OR for exploration of transplanted kidney and washout of hematoma. Hospital course was complicated by post-operative pain and constipation, which has resolved.     Direct admission for fever, nausea, vomiting and abdominal pain (chronic).       1. ESRD S/P Kidney transplant.     - Renal allograft function: STABLE    Lab Results   Component Value Date    CREATININE 0.88 05/28/2024     Estimated Creatinine Clearance: 83.5 mL/min (by C-G formula based on SCr of 0.88 mg/dL).    Intake/Output Summary (Last 24 hours) at 5/28/2024 0935  Last data filed at 5/28/2024 0500  Gross per 24 hour   Intake 108 ml   Output 0 ml   Net 108 ml     - UPC ratio 0.86    - Continue to monitor UOP and Serum creatinine closely.   - Avoid nephrotoxic agents, NSAIDs and IV contrast   - Strict I/O.   - Renally dose all medications by the most recent CrCl from Cockcroft-Gault formula.    2. Immunosuppression   - continue current immunosuppression   - Belatacept  - MMF, reduced to 500 mg bid for CMV disease 5/26/24  -Prednisone daily     3. Anemia and WBC   Lab Results   Component Value Date    WBC 2.1 (L) 05/28/2024    HGB 11.8 (L) 05/28/2024    HCT 39.0 05/28/2024    MCV 96 05/28/2024     (L) 05/28/2024     -Continue to monitor Hgb   -No indications for PRBC transfusion   -Need CBC with Diff tomorrow, follow up ANC.    4. Electrolyte   Lab Results   Component Value Date    GLUCOSE 78 05/28/2024    CALCIUM 8.0 (L) 05/28/2024     05/28/2024    K 3.6 05/28/2024    CO2 21 05/28/2024     (H) 05/28/2024    BUN 7 05/28/2024    CREATININE 0.88 05/28/2024     - Reviewed renal profile.   - Replace k/Ca/Phos    6. Hypertension   Blood Pressures         5/27/2024  1500 5/27/2024  2039 5/27/2024  2300 5/28/2024  0500 5/28/2024  0808    BP: 125/74 122/71 127/82 141/86 108/71           -Goal BP < 140/90 mmHg   -continue current management     7. Stone, with hydro  -MAG 3 scan today  -[facility] -completed ; PENDING    8.  GI prophylaxis   - On PPI     9. 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  For questions, please contact transplant nephrology page x [phone]    Marcia Webber    Transplant Nephrologist

## 2024-05-28 NOTE — CARE PLAN
Problem: Safety  Goal: Patient will be injury free during hospitalization  Outcome: Progressing     The patient's goals for the shift include safety.    The clinical goals for the shift include Safety.    Over the shift, the patient did make progress toward the following goals.

## 2024-05-28 NOTE — PROGRESS NOTES
Herber Foy Rai is a 44 y.o. female s/p DDKT. Readmit for FUO.    - Feeling better today  - Afebrile  - LP today    Objective   Gen: A+OX3; NAD  HEENT: PERRL, sclera anicteric, MMM  Cardiac: RRR  Chest: Normal inspiratory effort  Abdomen: S/NT/ND. Incision C/D/I.  Ext: No LE edema    Last Recorded Vitals  Visit Vitals  /71 (BP Location: Right arm, Patient Position: Lying)   Pulse 86   Temp 36.8 °C (98.2 °F) (Temporal)   Resp 18      Intake/Output last 3 Shifts:    Intake/Output Summary (Last 24 hours) at 5/28/2024 1147  Last data filed at 5/28/2024 1013  Gross per 24 hour   Intake 348 ml   Output 400 ml   Net -52 ml      Vitals:    05/24/24 1034   Weight: 79.9 kg (176 lb 1.6 oz)    Scheduled medications  acetaminophen, 650 mg, oral, 4x daily  amLODIPine, 5 mg, oral, BID  calcitriol, 0.25 mcg, oral, Daily  calcium carbonate-vitamin D3, 1 tablet, oral, BID  cholecalciferol, 4,000 Units, oral, Daily  gabapentin, 300 mg, oral, Nightly  ganciclovir, 5 mg/kg, intravenous, q12h  heparin (porcine), 5,000 Units, subcutaneous, q8h JANIE  HYDROmorphone, 0.4 mg, intravenous, Once  ketotifen, 1 drop, Both Eyes, BID  levothyroxine, 25 mcg, oral, Daily before breakfast  lidocaine, 11 mL, subcutaneous, Once  lidocaine, 5 mL, infiltration, Once  loratadine, 10 mg, oral, Daily  magnesium sulfate, 2 g, intravenous, q2h  mycophenolate, 500 mg, oral, q12h  pantoprazole, 40 mg, oral, Daily before breakfast  polyethylene glycol, 17 g, oral, Daily  potassium chloride, 20 mEq, intravenous, q2h  potassium phosphate (monobasic), 1,000 mg, oral, BID  predniSONE, 10 mg, oral, Daily  sertraline, 50 mg, oral, Daily  tenofovir alafenamide, 25 mg, oral, Daily    Assessment/Plan   Principal Problem:    Kidney transplant recipient  Active Problems:  Patient Active Problem List   Diagnosis    ESRD (end stage renal disease) (Multi)    HTN (hypertension)    Gastroesophageal reflux disease    Kidney replaced by transplant ([facility]-H[facility])     Immunosuppression (Multi)    Transplant    Fever of unknown origin    Kidney transplant complication ([facility])      - Neurology/LP today - initial unremarkable  - Transplant ID consultation  - Abx per ID  - CMV/ Ganciclovir  - NM/ Lasix scan for transplant nephrolithiasis    I spent 35 minutes in the professional and overall care of this patient, including immunosuppression management.    Maryam Bhatt MD, [facility]  Transplant & Hepatobiliary Surgery

## 2024-05-28 NOTE — CARE PLAN
The patient's goals for the shift include Comfort    The clinical goals for the shift include pt will remain comfortable throughout shift     Problem: Pain  Goal: My pain/discomfort is manageable  Outcome: Progressing     Problem: Safety  Goal: Patient will be injury free during hospitalization  Outcome: Progressing  Goal: I will remain free of falls  Outcome: Progressing     Problem: Daily Care  Goal: Daily care needs are met  Outcome: Progressing     Problem: Psychosocial Needs  Goal: Demonstrates ability to cope with hospitalization/illness  Outcome: Progressing  Goal: Collaborate with me, my family, and caregiver to identify my specific goals  Outcome: Progressing     Problem: Discharge Barriers  Goal: My discharge needs are met  Outcome: Progressing     Problem: Nutrition  Goal: Oral intake greater than 50%  Outcome: Progressing  Goal: Adequate PO fluid intake  Outcome: Progressing  Goal: Maintain stable weight  Outcome: Progressing     Problem: Pain - Adult  Goal: Verbalizes/displays adequate comfort level or baseline comfort level  Outcome: Progressing     Problem: Safety - Adult  Goal: Free from fall injury  Outcome: Progressing     Problem: Discharge Planning  Goal: Discharge to home or other facility with appropriate resources  Outcome: Progressing     Problem: Chronic Conditions and Co-morbidities  Goal: Patient's chronic conditions and co-morbidity symptoms are monitored and maintained or improved  Outcome: Progressing     Problem: Skin  Goal: Promote skin healing  Outcome: Progressing     Problem: Pain  Goal: Takes deep breaths with improved pain control throughout the shift  Outcome: Progressing  Goal: Turns in bed with improved pain control throughout the shift  Outcome: Progressing  Goal: Walks with improved pain control throughout the shift  Outcome: Progressing  Goal: Performs ADL's with improved pain control throughout shift  Outcome: Progressing  Goal: Participates in PT with improved pain  control throughout the shift  Outcome: Progressing  Goal: Free from opioid side effects throughout the shift  Outcome: Progressing  Goal: Free from acute confusion related to pain meds throughout the shift  Outcome: Progressing

## 2024-05-28 NOTE — CONSULTS
Nutrition Note:     Pt JOHANNY will attempt MST when able    Time Spent/Follow-up Reminder:   Time Spent (min): 15 minutes  Last Date of Nutrition Visit: 05/28/24  Nutrition Follow-Up Needed?: Dietitian to reassess per policy  Follow up Comment: pt JOHANNY MST =3 attempt when able

## 2024-05-28 NOTE — PROGRESS NOTES
Herber Foy Rai is a 44 y.o. female on day 3 of admission presenting with Fever of unknown origin.      DC Plannin/28:   Went in and met with the pt, confirmed demographics.   Pt livea at home with her  and 2 sons. Brother, Danuta ([phone]) lives near and also helps.   PT/OT rec'd [facility] for Home Care.     Home Care choice for home going needs. [facility].   Pt needs: RN for IV ATB.   [facility] made aware.     :   Pt going home with PO ATB.   No home going needs anticipated for this pt at this time.    [facility] updated.       ADOD: -    This TCC will continue to follow for home going needs and safe DC plan.      CRYS LEDBETTER

## 2024-05-28 NOTE — PROGRESS NOTES
Herber Foy Rai is a 44 y.o. female on day 3 of admission presenting with Fever of unknown origin. 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    Subjective   Interval History: Some dizziness but otherwise headache better.  Afebrile.  All CSF studies negative. On ganciclovir.          Objective   Range of Vitals (last 24 hours)  Heart Rate:  [82-89]   Temp:  [36.4 °C (97.5 °F)-36.8 °C (98.2 °F)]   Resp:  [18]   BP: (104-141)/(67-86)   SpO2:  [96 %-99 %]   Daily Weight  05/24/24 : 79.9 kg (176 lb 1.6 oz)    Body mass index is 29.3 kg/m².    Physical Exam  NAD  Conj pink, no icterus, o/p clear  Cor is RRR, s1s2, no m/r/g  Lungs CTA  Abd is soft, NTND, +Bs    Antibiotics  ganciclovir (Cytovene) 500 mg vial  ganciclovir (Cytovene) 500 mg vial      Relevant Results  Labs  Results from last 72 hours   Lab Units 05/28/24  0639 05/27/24  1015 05/27/24  0718   WBC AUTO x10*3/uL 2.1* 2.7* 2.1*   HEMOGLOBIN g/dL 11.8* 12.0 11.1*   HEMATOCRIT % 39.0 37.2 33.5*   PLATELETS AUTO x10*3/uL 125* 142* 123*   LYMPHO PCT MAN % 3.4 0.9  --    MONO PCT MAN % 5.0 7.0  --    EOSINO PCT MAN % 0.8 1.7  --      Results from last 72 hours   Lab Units 05/28/24  0639 05/27/24  0718 05/26/24  0511   SODIUM mmol/L 139 136 137   POTASSIUM mmol/L 3.6 3.6 3.0*   CHLORIDE mmol/L 110* 107 105   CO2 mmol/L 21 20* 22   BUN mg/dL 7 9 8   CREATININE mg/dL 0.88 0.85 1.16*   GLUCOSE mg/dL 78 73* 77   CALCIUM mg/dL 8.0* 7.5* 8.5*   ANION GAP mmol/L 12 13 13   EGFR mL/min/1.73m*2 82 86 59*   PHOSPHORUS mg/dL 3.3 2.0* 2.8     Results from last 72 hours   Lab Units 05/28/24  0639 05/27/24  0718 05/26/24  0511   ALK PHOS U/L 50  --   --    BILIRUBIN TOTAL mg/dL 0.5  --   --    BILIRUBIN DIRECT mg/dL 0.1  --   --    PROTEIN TOTAL g/dL 5.5*  --   --    ALT U/L 14  --   --    AST U/L 19  --   --    ALBUMIN g/dL 2.9*  2.9* 3.0* 3.5          Assessment/Plan   CMV syndrome, improving.      Recommendations:  Continue with IV ganciclovir, but when patient stable for discharge, can  change to oral valganciclovir 900 mg twice a day.  Will need at least 21 days of therapy.  Check LFTs.  Would have ophthalmology come to assess for CMV retinitis given her visual symptoms (messaged team about this)  WE have arranged for ID follow up with Dr. Lou on 6/14.  Please check a weekly CMV viral load, CBC, and creatinine and fax to Dr. Lou at [phone]     ID is signing off.  Please page team A ([phone]) with questions.     I spent 25 minutes in the professional and overall care of this patient.      Meagan Chaparro MD PhD

## 2024-05-29 ENCOUNTER — HOME HEALTH ADMISSION (OUTPATIENT)
Dept: HOME HEALTH SERVICES | Facility: HOME HEALTH | Age: 44
End: 2024-05-29
Payer: COMMERCIAL

## 2024-05-29 ENCOUNTER — PHARMACY VISIT (OUTPATIENT)
Dept: PHARMACY | Facility: CLINIC | Age: 44
End: 2024-05-29
Payer: COMMERCIAL

## 2024-05-29 ENCOUNTER — DOCUMENTATION (OUTPATIENT)
Dept: HOME HEALTH SERVICES | Facility: HOME HEALTH | Age: 44
End: 2024-05-29
Payer: COMMERCIAL

## 2024-05-29 VITALS
RESPIRATION RATE: 18 BRPM | HEART RATE: 79 BPM | WEIGHT: 176.1 LBS | DIASTOLIC BLOOD PRESSURE: 86 MMHG | BODY MASS INDEX: 29.34 KG/M2 | TEMPERATURE: 97.9 F | HEIGHT: 65 IN | SYSTOLIC BLOOD PRESSURE: 130 MMHG | OXYGEN SATURATION: 97 %

## 2024-05-29 DIAGNOSIS — Z94.0 KIDNEY REPLACED BY TRANSPLANT (HHS-HCC): ICD-10-CM

## 2024-05-29 LAB
ALBUMIN SERPL BCP-MCNC: 3.4 G/DL (ref 3.4–5)
AMMONIA 24H UR-SRATE: NORMAL
ANION GAP SERPL CALC-SCNC: 13 MMOL/L (ref 10–20)
ASPERGILLUS GALACTOMANNAN EIA,SERUM: 0.05
BASOPHILS # BLD MANUAL: 0 X10*3/UL (ref 0–0.1)
BASOPHILS NFR BLD MANUAL: 0 %
BK VIRUS PCR , QUANT, VIRC: NOT DETECTED COPIES/ML
BRUSHITE CRYSTAL(MAYO): NORMAL
BSA: NORMAL M2
BUN SERPL-MCNC: 7 MG/DL (ref 6–23)
CALCIUM 24H UR-MRATE: NORMAL MG/(24.H)
CALCIUM SERPL-MCNC: 8.7 MG/DL (ref 8.6–10.6)
CAOX 24H ENGDIFF UR: NORMAL
CHLORIDE 24H UR-SRATE: NORMAL MMOL/(24.H)
CHLORIDE SERPL-SCNC: 107 MMOL/L (ref 98–107)
CITRATE 24H UR-MRATE: NORMAL MG/(24.H)
CO2 SERPL-SCNC: 22 MMOL/L (ref 21–32)
COLLECT DURATION TIME UR: NORMAL H
CREAT 24H UR-MRATE: NORMAL G/(24.H)
CREAT SERPL-MCNC: 0.95 MG/DL (ref 0.5–1.05)
EGFRCR SERPLBLD CKD-EPI 2021: 75 ML/MIN/1.73M*2
ENTEROVIRUS,RNA PCR, QUANTITATIVE (NON-BLOOD/NON-CSF SPECIMEN): NOT DETECTED COPIES/ML
EOSINOPHIL # BLD MANUAL: 0.08 X10*3/UL (ref 0–0.7)
EOSINOPHIL NFR BLD MANUAL: 2.6 %
EPSTEIN-BARR VIRUS DNA PCR, QUANTITATIVE  (NON-BLOOD SPECIMEN): NOT DETECTED IU/ML
ERYTHROCYTE [DISTWIDTH] IN BLOOD BY AUTOMATED COUNT: 12.4 % (ref 11.5–14.5)
FUNGITELL BETA-D GLUCAN,SERUM: <31 PG/ML
GLUCOSE SERPL-MCNC: 86 MG/DL (ref 74–99)
HCT VFR BLD AUTO: 39.2 % (ref 36–46)
HGB BLD-MCNC: 13 G/DL (ref 12–16)
HYDROXYAPATITE 24H ENGDIFF UR: NORMAL
IMM GRANULOCYTES # BLD AUTO: 0.3 X10*3/UL (ref 0–0.7)
IMM GRANULOCYTES NFR BLD AUTO: 10 % (ref 0–0.9)
JC VIRUS QPCR, VIRC: NOT DETECTED COPIES/ML
LYMPHOCYTES # BLD MANUAL: 0.23 X10*3/UL (ref 1.2–4.8)
LYMPHOCYTES NFR BLD MANUAL: 7.7 %
MAGNESIUM 24H UR-MRATE: NORMAL MG/(24.H)
MAGNESIUM SERPL-MCNC: 2.06 MG/DL (ref 1.6–2.4)
MCH RBC QN AUTO: 29.4 PG (ref 26–34)
MCHC RBC AUTO-ENTMCNC: 33.2 G/DL (ref 32–36)
MCV RBC AUTO: 89 FL (ref 80–100)
METAMYELOCYTES # BLD MANUAL: 0.08 X10*3/UL
METAMYELOCYTES NFR BLD MANUAL: 2.6 %
MONOCYTES # BLD MANUAL: 0.13 X10*3/UL (ref 0.1–1)
MONOCYTES NFR BLD MANUAL: 4.3 %
MYELOCYTES # BLD MANUAL: 0.02 X10*3/UL
MYELOCYTES NFR BLD MANUAL: 0.8 %
NEUTROPHILS # BLD MANUAL: 2.46 X10*3/UL (ref 1.2–7.7)
NEUTS BAND # BLD MANUAL: 0.15 X10*3/UL (ref 0–0.7)
NEUTS BAND NFR BLD MANUAL: 5.1 %
NEUTS SEG # BLD MANUAL: 2.31 X10*3/UL (ref 1.2–7)
NEUTS SEG NFR BLD MANUAL: 76.9 %
NRBC BLD-RTO: 0 /100 WBCS (ref 0–0)
OSMOLALITY 24H UR: NORMAL MOSM/KG
OXALATE 24H UR-MRATE: NORMAL MG/(24.H)
OXALATE 24H UR-SRATE: NORMAL
PH 24H UR: NORMAL [PH]
PHOSPHATE 24H UR-MRATE: NORMAL MG/(24.H)
PHOSPHATE SERPL-MCNC: 2.6 MG/DL (ref 2.5–4.9)
PLATELET # BLD AUTO: 163 X10*3/UL (ref 150–450)
POTASSIUM 24H UR-SRATE: NORMAL MMOL/(24.H)
POTASSIUM SERPL-SCNC: 4 MMOL/L (ref 3.5–5.3)
PROTEIN CATABOLIC RATE 24H UR-MRATE: NORMAL G/(24.H)
RBC # BLD AUTO: 4.42 X10*6/UL (ref 4–5.2)
RBC MORPH BLD: ABNORMAL
SODIUM 24H UR-SRATE: NORMAL MMOL/(24.H)
SODIUM SERPL-SCNC: 138 MMOL/L (ref 136–145)
SPECIMEN VOL 24H UR: NORMAL L
SULFATE 24H UR-SRATE: NORMAL
TOTAL CELLS COUNTED BLD: 117
URATE 24H UR-MRATE: NORMAL G/(24.H)
URIC ACID CRYSTAL(MAYO): NORMAL
URINALYSIS SPECIALIST REVIEW: NORMAL
UUN 24H UR-MRATE: NORMAL G/(24.H)
WBC # BLD AUTO: 3 X10*3/UL (ref 4.4–11.3)

## 2024-05-29 PROCEDURE — 80069 RENAL FUNCTION PANEL: CPT

## 2024-05-29 PROCEDURE — 2500000001 HC RX 250 WO HCPCS SELF ADMINISTERED DRUGS (ALT 637 FOR MEDICARE OP): Performed by: STUDENT IN AN ORGANIZED HEALTH CARE EDUCATION/TRAINING PROGRAM

## 2024-05-29 PROCEDURE — 85007 BL SMEAR W/DIFF WBC COUNT: CPT | Performed by: STUDENT IN AN ORGANIZED HEALTH CARE EDUCATION/TRAINING PROGRAM

## 2024-05-29 PROCEDURE — 2500000002 HC RX 250 W HCPCS SELF ADMINISTERED DRUGS (ALT 637 FOR MEDICARE OP, ALT 636 FOR OP/ED)

## 2024-05-29 PROCEDURE — 2500000004 HC RX 250 GENERAL PHARMACY W/ HCPCS (ALT 636 FOR OP/ED)

## 2024-05-29 PROCEDURE — 99238 HOSP IP/OBS DSCHRG MGMT 30/<: CPT | Performed by: TRANSPLANT SURGERY

## 2024-05-29 PROCEDURE — 2500000004 HC RX 250 GENERAL PHARMACY W/ HCPCS (ALT 636 FOR OP/ED): Performed by: INTERNAL MEDICINE

## 2024-05-29 PROCEDURE — 36415 COLL VENOUS BLD VENIPUNCTURE: CPT | Performed by: STUDENT IN AN ORGANIZED HEALTH CARE EDUCATION/TRAINING PROGRAM

## 2024-05-29 PROCEDURE — 85027 COMPLETE CBC AUTOMATED: CPT | Performed by: STUDENT IN AN ORGANIZED HEALTH CARE EDUCATION/TRAINING PROGRAM

## 2024-05-29 PROCEDURE — RXMED WILLOW AMBULATORY MEDICATION CHARGE

## 2024-05-29 PROCEDURE — 2500000002 HC RX 250 W HCPCS SELF ADMINISTERED DRUGS (ALT 637 FOR MEDICARE OP, ALT 636 FOR OP/ED): Mod: MUE | Performed by: STUDENT IN AN ORGANIZED HEALTH CARE EDUCATION/TRAINING PROGRAM

## 2024-05-29 PROCEDURE — 2500000004 HC RX 250 GENERAL PHARMACY W/ HCPCS (ALT 636 FOR OP/ED): Performed by: STUDENT IN AN ORGANIZED HEALTH CARE EDUCATION/TRAINING PROGRAM

## 2024-05-29 PROCEDURE — 2500000001 HC RX 250 WO HCPCS SELF ADMINISTERED DRUGS (ALT 637 FOR MEDICARE OP)

## 2024-05-29 PROCEDURE — 99233 SBSQ HOSP IP/OBS HIGH 50: CPT | Performed by: INTERNAL MEDICINE

## 2024-05-29 PROCEDURE — 83735 ASSAY OF MAGNESIUM: CPT

## 2024-05-29 RX ORDER — ACETAMINOPHEN 325 MG/1
650 TABLET ORAL EVERY 6 HOURS PRN
Start: 2024-05-29 | End: 2024-06-08

## 2024-05-29 RX ORDER — VALGANCICLOVIR 450 MG/1
900 TABLET, FILM COATED ORAL 2 TIMES DAILY
Qty: 72 TABLET | Refills: 0 | Status: SHIPPED | OUTPATIENT
Start: 2024-05-29 | End: 2024-05-29 | Stop reason: SDUPTHER

## 2024-05-29 RX ORDER — VALGANCICLOVIR 450 MG/1
900 TABLET, FILM COATED ORAL 2 TIMES DAILY
Status: DISCONTINUED | OUTPATIENT
Start: 2024-05-29 | End: 2024-05-29 | Stop reason: HOSPADM

## 2024-05-29 RX ORDER — MYCOPHENOLATE MOFETIL 250 MG/1
500 CAPSULE ORAL EVERY 12 HOURS
Start: 2024-05-29 | End: 2024-06-05 | Stop reason: SDUPTHER

## 2024-05-29 RX ORDER — FERROUS SULFATE, DRIED 160(50) MG
1 TABLET, EXTENDED RELEASE ORAL 2 TIMES DAILY
Start: 2024-05-29 | End: 2024-05-29 | Stop reason: SDUPTHER

## 2024-05-29 RX ADMIN — ACETAMINOPHEN 650 MG: 325 TABLET ORAL at 01:15

## 2024-05-29 RX ADMIN — Medication 4000 UNITS: at 09:15

## 2024-05-29 RX ADMIN — POTASSIUM PHOSPHATE, MONOBASIC 1000 MG: 500 TABLET, SOLUBLE ORAL at 09:15

## 2024-05-29 RX ADMIN — PREDNISONE 10 MG: 10 TABLET ORAL at 09:15

## 2024-05-29 RX ADMIN — VALGANCICLOVIR 900 MG: 450 TABLET, FILM COATED ORAL at 11:32

## 2024-05-29 RX ADMIN — TENOFOVIR ALAFENAMIDE 25 MG: 25 TABLET ORAL at 09:15

## 2024-05-29 RX ADMIN — LEVOTHYROXINE SODIUM 25 MCG: 0.05 TABLET ORAL at 06:03

## 2024-05-29 RX ADMIN — HEPARIN SODIUM 5000 UNITS: 5000 INJECTION INTRAVENOUS; SUBCUTANEOUS at 06:03

## 2024-05-29 RX ADMIN — Medication 1 TABLET: at 09:15

## 2024-05-29 RX ADMIN — LORATADINE 10 MG: 10 TABLET ORAL at 09:15

## 2024-05-29 RX ADMIN — PANTOPRAZOLE SODIUM 40 MG: 40 TABLET, DELAYED RELEASE ORAL at 06:03

## 2024-05-29 RX ADMIN — MYCOPHENOLATE MOFETIL 500 MG: 250 CAPSULE ORAL at 06:03

## 2024-05-29 RX ADMIN — CALCITRIOL CAPSULES 0.25 MCG 0.25 MCG: 0.25 CAPSULE ORAL at 09:15

## 2024-05-29 RX ADMIN — ACETAMINOPHEN 650 MG: 325 TABLET ORAL at 06:03

## 2024-05-29 RX ADMIN — AMLODIPINE BESYLATE 5 MG: 5 TABLET ORAL at 09:15

## 2024-05-29 RX ADMIN — KETOTIFEN FUMARATE 1 DROP: 0.25 SOLUTION/ DROPS OPHTHALMIC at 09:15

## 2024-05-29 RX ADMIN — SODIUM CHLORIDE 100 ML/HR: 9 INJECTION, SOLUTION INTRAVENOUS at 09:21

## 2024-05-29 ASSESSMENT — PAIN - FUNCTIONAL ASSESSMENT: PAIN_FUNCTIONAL_ASSESSMENT: 0-10

## 2024-05-29 ASSESSMENT — PAIN SCALES - GENERAL: PAINLEVEL_OUTOF10: 0 - NO PAIN

## 2024-05-29 NOTE — DISCHARGE INSTRUCTIONS
If you develop any new or worsening symptoms, please return to the nearest ED.    Please call with any questions or concerns.    You will take Valcyte (Valganciclovir) until 6/16. Please make sure that you take the entire course of this medication.    Please call with questions or concerns.

## 2024-05-29 NOTE — NURSING NOTE
PIV removed. AVS printed and reviewed with patient and brother, all questions answered. Meds to beds delivered to patient at bedside. All belongings collected.

## 2024-05-29 NOTE — PROGRESS NOTES
"Herber Foy Rai is a 44 y.o. female on day 4 of admission presenting with Fever of unknown origin.    Subjective   No more fever       Objective   Vitals:    05/29/24 0700   BP: 118/72   Pulse: 90   Resp: 17   Temp: 36.9 °C (98.4 °F)   SpO2: 98%       Physical Exam  Constitutional:       Appearance: Normal appearance.   Eyes:      Pupils: Pupils are equal, round, and reactive to light.   Cardiovascular:      Rate and Rhythm: Normal rate.   Pulmonary:      Effort: Pulmonary effort is normal. No respiratory distress.   Abdominal:      General: There is no distension.      Palpations: Abdomen is soft.      Comments: Incision clean, dry, intact   Musculoskeletal:         General: Normal range of motion.      Right lower leg: No edema.      Left lower leg: No edema.   Skin:     General: Skin is warm and dry.   Neurological:      General: No focal deficit present.      Mental Status: She is alert and oriented to person, place, and time.   Psychiatric:         Mood and Affect: Mood normal.         Behavior: Behavior normal.         Last Recorded Vitals  Blood pressure 118/72, pulse 90, temperature 36.9 °C (98.4 °F), temperature source Temporal, resp. rate 17, height 1.651 m (5' 5\"), weight 79.9 kg (176 lb 1.6 oz), SpO2 98%.  Intake/Output last 3 Shifts:  I/O last 3 completed shifts:  In: 5644.7 (70.7 mL/kg) [P.O.:930; I.V.:4606.7 (57.7 mL/kg); IV Piggyback:108]  Out: 400 (5 mL/kg) [Urine:400 (0.1 mL/kg/hr)]  Weight: 79.9 kg     Relevant Results  Lab Results   Component Value Date    WBC 3.0 (L) 05/29/2024    HGB 13.0 05/29/2024    HCT 39.2 05/29/2024    MCV 89 05/29/2024     05/29/2024     Lab Results   Component Value Date    GLUCOSE 86 05/29/2024    CALCIUM 8.7 05/29/2024     05/29/2024    K 4.0 05/29/2024    CO2 22 05/29/2024     05/29/2024    BUN 7 05/29/2024    CREATININE 0.95 05/29/2024     acetaminophen, 650 mg, oral, 4x daily  amLODIPine, 5 mg, oral, BID  calcitriol, 0.25 mcg, oral, Daily  calcium " carbonate-vitamin D3, 1 tablet, oral, BID  cholecalciferol, 4,000 Units, oral, Daily  gabapentin, 300 mg, oral, Nightly  ganciclovir, 5 mg/kg, intravenous, q12h  heparin (porcine), 5,000 Units, subcutaneous, q8h JANIE  HYDROmorphone, 0.4 mg, intravenous, Once  ketotifen, 1 drop, Both Eyes, BID  levothyroxine, 25 mcg, oral, Daily before breakfast  lidocaine, 11 mL, subcutaneous, Once  lidocaine, 5 mL, infiltration, Once  loratadine, 10 mg, oral, Daily  mycophenolate, 500 mg, oral, q12h  pantoprazole, 40 mg, oral, Daily before breakfast  polyethylene glycol, 17 g, oral, Daily  potassium phosphate (monobasic), 1,000 mg, oral, BID  predniSONE, 10 mg, oral, Daily  sertraline, 50 mg, oral, Daily  tenofovir alafenamide, 25 mg, oral, Daily               Assessment/Plan   Principal Problem:    Fever of unknown origin  Active Problems:    Kidney transplant complication ([facility])    Kidney allograft function   Good   Nephrolithiasis on transplant kidney < 1cm, with mild hydro, NM scan without obstruction    Immunosuppression   Belatacept, keep for now (switched for hemolytic anemia early post op)     Can consider switching to tac   /500   Pred 10      Fever unknown Origin   LP done, negative   Culture negative   Off antibiotics now   Only CMV+, IV ganciclovir, need PICC line     Dispo   today    I spent 35 minutes in the professional and overall care of this patient.      Don Lopez MD

## 2024-05-29 NOTE — HH CARE COORDINATION
Home Care received a referral for Infusion and Nursing. Unfortunately, we are unable to accept and process the referral at this time.    Patients, please reach out to the referring provider or your PCP to assist in obtaining an alternative home care agency and/or guidance to meet your needs.    Providers, please reach out to  Home Care with any questions regarding the declined referral.

## 2024-05-29 NOTE — CARE PLAN
Problem: Pain  Goal: My pain/discomfort is manageable  Outcome: Progressing     Problem: Safety  Goal: Patient will be injury free during hospitalization  Outcome: Progressing  Goal: I will remain free of falls  Outcome: Progressing     Problem: Daily Care  Goal: Daily care needs are met  Outcome: Progressing     Problem: Psychosocial Needs  Goal: Demonstrates ability to cope with hospitalization/illness  Outcome: Progressing  Goal: Collaborate with me, my family, and caregiver to identify my specific goals  Outcome: Progressing     Problem: Discharge Barriers  Goal: My discharge needs are met  Outcome: Progressing     Problem: Nutrition  Goal: Oral intake greater than 50%  Outcome: Progressing  Goal: Adequate PO fluid intake  Outcome: Progressing  Goal: Maintain stable weight  Outcome: Progressing     Problem: Pain - Adult  Goal: Verbalizes/displays adequate comfort level or baseline comfort level  Outcome: Progressing     Problem: Safety - Adult  Goal: Free from fall injury  Outcome: Progressing     Problem: Discharge Planning  Goal: Discharge to home or other facility with appropriate resources  Outcome: Progressing     Problem: Chronic Conditions and Co-morbidities  Goal: Patient's chronic conditions and co-morbidity symptoms are monitored and maintained or improved  Outcome: Progressing     Problem: Skin  Goal: Promote skin healing  Outcome: Progressing     Problem: Pain  Goal: Takes deep breaths with improved pain control throughout the shift  Outcome: Progressing  Goal: Turns in bed with improved pain control throughout the shift  Outcome: Progressing  Goal: Walks with improved pain control throughout the shift  Outcome: Progressing  Goal: Performs ADL's with improved pain control throughout shift  Outcome: Progressing  Goal: Participates in PT with improved pain control throughout the shift  Outcome: Progressing  Goal: Free from opioid side effects throughout the shift  Outcome: Progressing  Goal: Free  from acute confusion related to pain meds throughout the shift  Outcome: Progressing   The patient's goals for the shift include none    The clinical goals for the shift include Safety.

## 2024-05-29 NOTE — DISCHARGE SUMMARY
Discharge Diagnosis  Fever of unknown origin    Issues Requiring Follow-Up  Opthmology  Routine transplant follow up    Test Results Pending At Discharge  Pending Labs       Order Current Status    Blood Culture Collected (24 1148)    Arbovirus IgG, IgM Antibodies,CSF In process    Aspergillus galactomannan antigen In process    BK virus, DNA, urine, quantitative In process    Enterovirus PCR Quantitative (Non-Blood/Non-CSF Specimen) In process    Stacy-Barr Virus PCR Quantitative (Non-Blood Specimen) In process    Fungitell Beta-D Glucan Serum In process    Histoplasma Antigen, Non-Blood In process    Histoplasma Antigen, Non-Blood In process    BILLY Virus by PCR In process    Listeria Antibody,IgG,CSF In process    Lyme AB, CSF Standard 2-Tier Testing with Reflex to Immunoblot In process    Stone risk profile In process    Treponema AB,IgG CSF In process    West Nile virus, CSF In process    Blood Culture Preliminary result    CSF Culture/Smear Preliminary result    Fungal Culture/Smear Preliminary result            Hospital Course  44 y.o. female with past medical history significant for ESRD secondary to HTN/NSAID whom received a  donor kidney transplant on 23,  concerned for fevers and pain at the [facility] DDKT transplantation site.    She underwent a thorough infectious workup, including blood cultures, urinalysis, viral PCR, and lumbar puncture. The only significant finding was CMV+. Consequently, the empiric antibiotics that she was on were discontinued and she was continued on Valcyte for CMV. Her MMF was also decreased to 500mg BID give her CMV.     She remained afebrile since , and was otherwise hemodynamically stable.     She did complain of some blurred vision, for which optho was consulted. They evaluated her for CMV retinitis on  and they were not concerned for CMR retinitis. They recommended that she follow up outpatient with optho.    She was discharged home with home  healthcare and also a prescription for Valcyte to be taken until 6/16.    Pertinent Physical Exam At Time of Discharge  Physical Exam  Constitutional:       Appearance: Normal appearance.   Eyes:      Pupils: Pupils are equal, round, and reactive to light.   Cardiovascular:      Rate and Rhythm: Normal rate.   Pulmonary:      Effort: Pulmonary effort is normal. No respiratory distress.   Abdominal:      General: There is no distension.      Palpations: Abdomen is soft.      Comments: Incision clean, dry, intact   Musculoskeletal:         General: Normal range of motion.      Right lower leg: No edema.      Left lower leg: No edema.   Skin:     General: Skin is warm and dry.   Neurological:      General: No focal deficit present.      Mental Status: She is alert and oriented to person, place, and time.   Psychiatric:         Mood and Affect: Mood normal.         Behavior: Behavior normal.     Home Medications     Medication List      START taking these medications     acetaminophen 325 mg tablet; Commonly known as: Tylenol; Take 2 tablets   (650 mg) by mouth every 6 hours if needed for mild pain (1 - 3) for up to   10 days.   calcium carbonate-vitamin D3 500 mg-5 mcg (200 unit) tablet; Take 1   tablet by mouth 2 times a day.   pantoprazole 40 mg EC tablet; Commonly known as: ProtoNix; Take 1 tablet   (40 mg) by mouth once daily in the morning. Take before meals. Do not   crush, chew, or split.   valGANciclovir 450 mg tablet; Commonly known as: Valcyte; Take 2 tablets   (900 mg) by mouth 2 times a day for 18 days. Do not crush or chew.     CHANGE how you take these medications     mycophenolate 250 mg capsule; Commonly known as: Cellcept; Take 2   capsules (500 mg) by mouth every 12 hours.; What changed: how much to take     CONTINUE taking these medications     amLODIPine 5 mg tablet; Commonly known as: Norvasc; Take 1 tablet (5 mg)   by mouth 2 times a day.   azelastine 0.05 % ophthalmic solution; Commonly known as:  Optivar;   Administer 1 drop into both eyes 2 times a day.   BELATACEPT IV   calcitriol 0.25 mcg capsule; Commonly known as: Rocaltrol; TAKE 1 TABLET   BY MOUTH ONCE DAILY   Calcium Antacid 200 mg calcium (500 mg) chewable tablet; Generic drug:   calcium carbonate; CHEW AND SWALLOW 1 TABLET BY MOUTH 2 TIMES DAILY   cholecalciferol 50 MCG (2000 UT) tablet; Commonly known as: Vitamin D-3;   Take 2 tablets (100 mcg) by mouth once daily.   fexofenadine 180 mg tablet; Commonly known as: Allegra; Take 1 tablet   (180 mg) by mouth once daily.   gabapentin 100 mg capsule; Commonly known as: Neurontin; Take 3 capsules   (300 mg) by mouth once daily at bedtime.   levothyroxine 25 mcg tablet; Commonly known as: Synthroid, Levoxyl   predniSONE 5 mg tablet; Commonly known as: Deltasone; Take 2 tablets (10   mg) by mouth once daily.   sertraline 50 mg tablet; Commonly known as: Zoloft   SUMAtriptan 100 mg tablet; Commonly known as: Imitrex; Take 1 tablet   (100 mg) by mouth 1 time if needed for migraine.   tenofovir alafenamide 25 mg tablet tablet; Commonly known as: Vemlidy;   Take 1 tablet (25 mg) by mouth once daily.     STOP taking these medications     sulfamethoxazole-trimethoprim 400-80 mg tablet; Commonly known as:   Bactrim     ASK your doctor about these medications     alteplase 2 mg injection; Commonly known as: Cathflo Activase; 2 mL (2   mg) by intra-catheter route 1 time for 1 dose.; Ask about: Should I take   this medication?       Outpatient Follow-Up  Future Appointments   Date Time Provider Department [place]   6/5/2024  2:00 PM [id number]   6/19/2024  2:00 PM Juaquin Lou MD [id number] [facility]   6/24/2024  9:30 AM POR  INFUSION [id number] [facility]       Law John MD

## 2024-05-29 NOTE — CONSULTS
Reason For Consult  C/f cytomegalovirus (CMV) retinitis    History Of Present Illness  Interview obtained via [employer] .    Herber Foy Rai is a 44 y.o. female with POHx of refractive error, presbyopia and PMHx of status post (s/p) DDKT now with cytomegalovirus (CMV) syndrome presenting with chronic headaches worsened over past month with accompanying vision changes. She states she has had headaches chronically but they have worsened over the past month. When she gets the headaches, her vision gets blurry and she will often see a patterned white ring of light like flames with dots/lines in vision. No diplopia, no sudden loss of vision.     She denies any vision changes or blurring outside of the times she gets headaches.      Past Medical History  She has a past medical history of Anxiety, Chronic kidney disease, Chronic kidney disease, unspecified, Depression, Disease of thyroid gland, GERD (gastroesophageal reflux disease), Hypertension, and Personal history of COVID-19 (07/20/2022).    Surgical History  She has a past surgical history that includes Other surgical history (07/20/2022); MR angio head w and wo IV contrast (01/26/2021); MR angio head wo IV contrast (01/26/2021); transplant, kidney, open (Right, 11/30/2023); and US guided percutaneous peritoneal or retroperitoneal fluid collection drainage (12/21/2023).    Social History  She reports that she has never smoked. She quit smokeless tobacco use about 3 years ago. She reports that she does not drink alcohol and does not use drugs.    Family History  No family history on file.     Allergies  Patient has no known allergies.    Review of Systems  +headaches   +patterned lights one or both eyes accompanying headaches     Physical Exam  Base Eye Exam       Visual Acuity (Snellen - Linear)         Right Left    Near sc 20/30- PHNI 20/25-1              Tonometry (Tonopen, 12:08 PM)         Right Left    Pressure 13 14              Pupils         Dark Light  "Shape React APD    Right 5 3 Round Brisk None    Left 5 3 Round Brisk None              Visual Fields         Left Right     Full Full              Extraocular Movement         Right Left     Full Full                  Slit Lamp and Fundus Exam       External Exam         Right Left    External Normal Normal              Slit Lamp Exam         Right Left    Lids/Lashes Normal Normal    Conjunctiva/Sclera White and quiet White and quiet    Cornea Clear Clear    Anterior Chamber Deep and quiet Deep and quiet    Iris Round and reactive Round and reactive    Lens 1+ milky NS 1+ milky NS    Anterior Vitreous Normal Normal              Fundus Exam         Right Left    Disc Normal Normal    C/D Ratio 0.3 0.3    Macula Normal Normal    Vessels Normal Normal    Periphery Normal Normal                     Last Recorded Vitals  Blood pressure 130/86, pulse 79, temperature 36.6 °C (97.9 °F), temperature source Temporal, resp. rate 18, height 1.651 m (5' 5\"), weight 79.9 kg (176 lb 1.6 oz), SpO2 97%.    Relevant Results    5/24/24 cytomegalovirus (CMV) PCR venous blood 4.37 log international units /mL     Assessment/Plan     This is a 44 year old female with POHx of refractive error, presbyopia status post (s/p) DDKT now under treatment for cytomegalovirus (CMV) syndrome presenting with chronic headaches worsened over past month with accompanying vision changes without vision changes outside of headache phenomena. Both eyes are structurally normal to examination without retinal hemorrhage or whitening or vitritis. The patient's symptoms appear most consistent with migraine with visual aura and recommend headache workup and management per primary team. Plan for outpatient retina specialist follow up for cytomegalovirus (CMV) viremia for imaging.    Recommendations  -Outpatient retina follow up, ophthalmology secretaries to contact patient's brother for scheduling per her preference  -Workup for headache, likely migraine with " visual aura, per primary  -Please recontact ophthalmology with any questions or acute change in symptoms    Discussed with vitreoretinal surgery fellow Dr Sherri Montana MD  Ophthalmology PGY3    Ophthalmology Adult Pager - [phone]  Ophthalmology Pediatrics Pager - [phone]    For adult follow-up appointments, call: [phone]  For pediatric follow-up appointments, call: [phone]      NOTE: This note is not finalized until attending reviews and signs.

## 2024-05-29 NOTE — INDIVIDUALIZED OVERALL PLAN OF CARE NOTE
Transplant Coordinator Note - Transition to Outpatient Care    Patient admitted 5/24-5/29 with fever of unknown origin and pain over transplant site. Non-obstructing kidney stone identified on imaging studies. Full infectious workup completed including LP. Only positive result was CMV PCR of 23,400 international units/mL. Patient was given IV ganciclovir until stable to return home; was changed to PO valcyte for continued outpatient treatment. She did c/o blurry vision for which ophthalmology was consulted and ruled out CMV retinitis.    Outpatient plan:  Bactrim stopped as patient is now 6 months post transplant  Valcyte treatment started 900mg bid - per ID, must receive a minimum of 21 days of treatment  Patient was provided an 18-day supply. Rx for refills pended for signature during her transplant nephrology FUV  MMF decreased to 500mg bid d/t leukopenia and infection  WBC 3.0 and trending up at discharge  Follow up pending blood cultures (NGTD), remaining viral studies, and remaining CSF tests  [facility]  Weekly labs with CMV PCR - orders placed and cc'd CMV results to Dr. Lou  Transplant neph appt 6/5 in [place] office  Transplant ID appt 6/19   Belatacept infusion 6/24  Patient can follow up with outpatient ophthalmology for blurry vision

## 2024-05-29 NOTE — PROGRESS NOTES
"        INPATIENT TRANSPLANT NEPHROLOGY PROGRESS NOTE          REASON FOR CONSULT:  Immunosuppressive medication management and nephrology related issues.    SUBJECTION:     Afebrile   On RA  Ophthalmology consulted to check for CMV retinitis  No acute event overnight.    PHYCISCAL EXAMINATION:    Visit Vitals  /86 (BP Location: Right arm, Patient Position: Lying)   Pulse 79   Temp 36.6 °C (97.9 °F) (Temporal)   Resp 18   Ht 1.651 m (5' 5\")   Wt 79.9 kg (176 lb 1.6 oz)   SpO2 97%   BMI 29.30 kg/m²   Smoking Status Never   BSA 1.91 m²        05/27 1900 - 05/29 0659  In: 5644.7 [P.O.:930; I.V.:4606.7]  Out: 400 [Urine:400]     Weight change:     General Appearance - NAD, Good speech, oriented and alert  HEENT - Supple. Not pale. No jaundice. No cervical lymphadenopathy. Pharynx and tonsils are not injected.  CVS - RRR. Normal S1/S2. No murmur, click , rub or gallop  Lungs- clear to auscultation bilaterally  Abdomen - soft , not tender, no guarding, no rigidity. No hepatosplenomegaly. Normal bowel sounds. No masses and ascites. S/P Kidney transplant .  Transplanted kidney is not tender.   Musculoskeletal /Extremities - no edema. Full ROM. No joint tenderness.   Neuro/Psych - appropriate mood and affect. Motor power V/V all extremities. CN I -XII were grossly intact.  Skin - No visible rash    MEDICATION LIST: REVIEWED    acetaminophen, 650 mg, 4x daily  amLODIPine, 5 mg, BID  calcitriol, 0.25 mcg, Daily  calcium carbonate-vitamin D3, 1 tablet, BID  cholecalciferol, 4,000 Units, Daily  gabapentin, 300 mg, Nightly  heparin (porcine), 5,000 Units, q8h JANIE  HYDROmorphone, 0.4 mg, Once  ketotifen, 1 drop, BID  levothyroxine, 25 mcg, Daily before breakfast  lidocaine, 11 mL, Once  lidocaine, 5 mL, Once  loratadine, 10 mg, Daily  mycophenolate, 500 mg, q12h  pantoprazole, 40 mg, Daily before breakfast  polyethylene glycol, 17 g, Daily  potassium phosphate (monobasic), 1,000 mg, BID  predniSONE, 10 mg, Daily  sertraline, " 50 mg, Daily  tenofovir alafenamide, 25 mg, Daily  valGANciclovir, 900 mg, BID      sodium chloride 0.9%, Last Rate: 100 mL/hr (05/29/24 0921)      SUMAtriptan, 100 mg, Once PRN        ALLERGY:  No Known Allergies    LABS:  Results for orders placed or performed during the hospital encounter of 05/24/24 (from the past 24 hour(s))   Renal function panel   Result Value Ref Range    Glucose 86 74 - 99 mg/dL    Sodium 138 136 - 145 mmol/L    Potassium 4.0 3.5 - 5.3 mmol/L    Chloride 107 98 - 107 mmol/L    Bicarbonate 22 21 - 32 mmol/L    Anion Gap 13 10 - 20 mmol/L    Urea Nitrogen 7 6 - 23 mg/dL    Creatinine 0.95 0.50 - 1.05 mg/dL    eGFR 75 >60 mL/min/1.73m*2    Calcium 8.7 8.6 - 10.6 mg/dL    Phosphorus 2.6 2.5 - 4.9 mg/dL    Albumin 3.4 3.4 - 5.0 g/dL   Magnesium   Result Value Ref Range    Magnesium 2.06 1.60 - 2.40 mg/dL   CBC and Auto Differential   Result Value Ref Range    WBC 3.0 (L) 4.4 - 11.3 x10*3/uL    nRBC 0.0 0.0 - 0.0 /100 WBCs    RBC 4.42 4.00 - 5.20 x10*6/uL    Hemoglobin 13.0 12.0 - 16.0 g/dL    Hematocrit 39.2 36.0 - 46.0 %    MCV 89 80 - 100 fL    MCH 29.4 26.0 - 34.0 pg    MCHC 33.2 32.0 - 36.0 g/dL    RDW 12.4 11.5 - 14.5 %    Platelets 163 150 - 450 x10*3/uL    Immature Granulocytes %, Automated 10.0 (H) 0.0 - 0.9 %    Immature Granulocytes Absolute, Automated 0.30 0.00 - 0.70 x10*3/uL   Manual Differential   Result Value Ref Range    Neutrophils %, Manual 76.9 40.0 - 80.0 %    Bands %, Manual 5.1 0.0 - 5.0 %    Lymphocytes %, Manual 7.7 13.0 - 44.0 %    Monocytes %, Manual 4.3 2.0 - 10.0 %    Eosinophils %, Manual 2.6 0.0 - 6.0 %    Basophils %, Manual 0.0 0.0 - 2.0 %    Metamyelocytes %, Manual 2.6 0.0 - 0.0 %    Myelocytes %, Manual 0.8 0.0 - 0.0 %    Seg Neutrophils Absolute, Manual 2.31 1.20 - 7.00 x10*3/uL    Bands Absolute, Manual 0.15 0.00 - 0.70 x10*3/uL    Lymphocytes Absolute, Manual 0.23 (L) 1.20 - 4.80 x10*3/uL    Monocytes Absolute, Manual 0.13 0.10 - 1.00 x10*3/uL    Eosinophils  Absolute, Manual 0.08 0.00 - 0.70 x10*3/uL    Basophils Absolute, Manual 0.00 0.00 - 0.10 x10*3/uL    Metamyelocytes Absolute, Manual 0.08 0.00 - 0.00 x10*3/uL    Myelocytes Absolute, Manual 0.02 0.00 - 0.00 x10*3/uL    Total Cells Counted 117     Neutrophils Absolute, Manual 2.46 1.20 - 7.70 x10*3/uL    RBC Morphology No significant RBC morphology present         ASSESSMENT AND PLAN:    Ms. Leblanc is a 44 y.o. female with past medical history significant for ESRD secondary to HTN/NSAID whom received a  donor kidney transplant on 23 by Dr. Marbin Lambert & Dr. Louis with a KDPI of 56% and PRA of 48%. HCV -/- and donor has not met risk factors. EBV +/+. 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 Dry weight is 81.1 kg (discharge weight is 76.2kg ). Pt received a total of 4.5 mg/kg total of thymoglobulin induction therapy in conjunction with 500mg IV solumedrol. Pt was initiated on Tacrolimus & Cellcept immunosuppression in conjunction with IV solumedrol taper. 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 Pt was started on valcyte (CMV D + / R + ) and bactrim for CMV & PCP prophylaxis per protocol. She was started on clotrimazole as antifungal coverage per protocol. Operative course was complicated by return to OR for exploration of transplanted kidney and washout of hematoma. Hospital course was complicated by post-operative pain and constipation, which has resolved.     Direct admission for fever, nausea, vomiting and abdominal pain (chronic).       1. ESRD S/P Kidney transplant.     - Renal allograft function: STABLE    Lab Results   Component Value Date    CREATININE 0.95 2024     Estimated Creatinine Clearance: 77.3 mL/min (by C-G formula based on SCr of 0.95 mg/dL).    Intake/Output Summary (Last 24 hours) at 2024 1243  Last data filed at 2024 1133  Gross per 24 hour   Intake 6136.66 ml   Output 650 ml   Net 5486.66 ml     - UPC  "ratio 0.86    - Continue to monitor UOP and Serum creatinine closely.   - Avoid nephrotoxic agents, NSAIDs and IV contrast   - Strict I/O.   - Renally dose all medications by the most recent CrCl from Cockcroft-Gault formula.    2. Immunosuppression   - continue current immunosuppression   - Belatacept  - MMF, reduced to 500 mg bid for CMV disease 5/26/24  -Prednisone daily     3. Anemia and WBC   Lab Results   Component Value Date    WBC 3.0 (L) 05/29/2024    HGB 13.0 05/29/2024    HCT 39.2 05/29/2024    MCV 89 05/29/2024     05/29/2024     -Continue to monitor Hgb   -No indications for PRBC transfusion   -Need CBC with Diff tomorrow, follow up ANC.    4. Electrolyte   Lab Results   Component Value Date    GLUCOSE 86 05/29/2024    CALCIUM 8.7 05/29/2024     05/29/2024    K 4.0 05/29/2024    CO2 22 05/29/2024     05/29/2024    BUN 7 05/29/2024    CREATININE 0.95 05/29/2024     - Reviewed renal profile.   - Replace k/Ca/Phos    6. Hypertension   Blood Pressures         5/28/2024  1900 5/28/2024  2348 5/29/2024  0500 5/29/2024  0700 5/29/2024  1100    BP: 134/80 135/83 124/77 118/72 130/86          -Goal BP < 140/90 mmHg   -continue current management     7. Stone, with hydro  -MAG 3 scan today  -[facility] -completed ; PENDING    8.  GI prophylaxis   - On PPI     9. DVT Prophylaxis  -Defer to primary team    10. CMV Disease  PCR 20K + Viral load  -Defer to ID  -Need weekly CMV PLASMA PCR  -IV Ganciclovir \"Continue with IV ganciclovir, but when patient stable for discharge, can change to oral valganciclovir 900 mg twice a day. Will need at least 21 days of therapy. \"    * Case was discussed with primary team.  For questions, please contact transplant nephrology page x [phone]    Marcia Webber    Transplant Nephrologist    "

## 2024-05-30 LAB
BACTERIA BLD CULT: NORMAL
H CAPSUL AG UR QL: NOT DETECTED
H CAPSUL AG UR QL: NOT DETECTED
SCAN RESULT: NORMAL
SCAN RESULT: NORMAL

## 2024-05-31 LAB — T PALLIDUM AB CSF QL IF: NON REACTIVE

## 2024-06-01 LAB
CEV IGG TITR CSF IF: NORMAL {TITER}
CEV IGM TITR CSF IF: NORMAL {TITER}
EEEV IGG TITR CSF IF: NORMAL {TITER}
EEEV IGM TITR CSF IF: NORMAL {TITER}
SLEV IGG TITR CSF IF: NORMAL {TITER}
SLEV IGM TITR CSF IF: NORMAL {TITER}
WEEV IGG TITR CSF IF: NORMAL {TITER}
WEEV IGM TITR CSF IF: NORMAL {TITER}
WEST NILE IGG ANTIBODY CSF: 0.38
WEST NILE IGM ANTIBODY CSF: 0
WNV IGG CSF IA-ACNC: 0.38 IV
WNV IGM CSF IA-ACNC: 0 IV

## 2024-06-03 LAB
BACTERIA CSF CULT: NORMAL
GRAM STN SPEC: NORMAL
GRAM STN SPEC: NORMAL

## 2024-06-04 ENCOUNTER — TELEPHONE (OUTPATIENT)
Dept: TRANSPLANT | Facility: HOSPITAL | Age: 44
End: 2024-06-04
Payer: COMMERCIAL

## 2024-06-05 ENCOUNTER — LAB (OUTPATIENT)
Dept: LAB | Facility: LAB | Age: 44
End: 2024-06-05
Payer: COMMERCIAL

## 2024-06-05 ENCOUNTER — OFFICE VISIT (OUTPATIENT)
Dept: TRANSPLANT | Facility: CLINIC | Age: 44
End: 2024-06-05
Payer: COMMERCIAL

## 2024-06-05 VITALS
OXYGEN SATURATION: 96 % | WEIGHT: 176.1 LBS | HEART RATE: 87 BPM | BODY MASS INDEX: 29.3 KG/M2 | TEMPERATURE: 97.6 F | SYSTOLIC BLOOD PRESSURE: 121 MMHG | DIASTOLIC BLOOD PRESSURE: 75 MMHG

## 2024-06-05 DIAGNOSIS — B25.9 CYTOMEGALOVIRUS (CMV) VIREMIA (MULTI): ICD-10-CM

## 2024-06-05 DIAGNOSIS — Z09 HOSPITAL DISCHARGE FOLLOW-UP: Primary | ICD-10-CM

## 2024-06-05 DIAGNOSIS — R50.9 FEVER OF UNKNOWN ORIGIN: ICD-10-CM

## 2024-06-05 DIAGNOSIS — Z94.0 KIDNEY REPLACED BY TRANSPLANT (HHS-HCC): ICD-10-CM

## 2024-06-05 DIAGNOSIS — N18.31 STAGE 3A CHRONIC KIDNEY DISEASE (MULTI): ICD-10-CM

## 2024-06-05 LAB
25(OH)D3 SERPL-MCNC: 35 NG/ML (ref 30–100)
ALBUMIN SERPL BCP-MCNC: 3.4 G/DL (ref 3.4–5)
ANION GAP SERPL CALC-SCNC: 13 MMOL/L (ref 10–20)
BUN SERPL-MCNC: 11 MG/DL (ref 6–23)
CALCIUM SERPL-MCNC: 9 MG/DL (ref 8.6–10.3)
CHLORIDE SERPL-SCNC: 107 MMOL/L (ref 98–107)
CO2 SERPL-SCNC: 18 MMOL/L (ref 21–32)
CREAT SERPL-MCNC: 0.97 MG/DL (ref 0.5–1.05)
CREAT UR-MCNC: 16.9 MG/DL (ref 20–320)
EGFRCR SERPLBLD CKD-EPI 2021: 73 ML/MIN/1.73M*2
ERYTHROCYTE [DISTWIDTH] IN BLOOD BY AUTOMATED COUNT: 14.1 % (ref 11.5–14.5)
GLUCOSE SERPL-MCNC: 122 MG/DL (ref 74–99)
HCT VFR BLD AUTO: 39.1 % (ref 36–46)
HGB BLD-MCNC: 12.2 G/DL (ref 12–16)
MAGNESIUM SERPL-MCNC: 1.71 MG/DL (ref 1.6–2.4)
MCH RBC QN AUTO: 29.8 PG (ref 26–34)
MCHC RBC AUTO-ENTMCNC: 31.2 G/DL (ref 32–36)
MCV RBC AUTO: 95 FL (ref 80–100)
NRBC BLD-RTO: 0 /100 WBCS (ref 0–0)
PHOSPHATE SERPL-MCNC: 2.9 MG/DL (ref 2.5–4.9)
PLATELET # BLD AUTO: 218 X10*3/UL (ref 150–450)
POTASSIUM SERPL-SCNC: 4.3 MMOL/L (ref 3.5–5.3)
PROT UR-ACNC: 7 MG/DL (ref 5–24)
PROT/CREAT UR: 0.41 MG/MG CREAT (ref 0–0.17)
RBC # BLD AUTO: 4.1 X10*6/UL (ref 4–5.2)
SODIUM SERPL-SCNC: 134 MMOL/L (ref 136–145)
WBC # BLD AUTO: 5.2 X10*3/UL (ref 4.4–11.3)

## 2024-06-05 PROCEDURE — 99215 OFFICE O/P EST HI 40 MIN: CPT

## 2024-06-05 PROCEDURE — 85027 COMPLETE CBC AUTOMATED: CPT

## 2024-06-05 PROCEDURE — 82306 VITAMIN D 25 HYDROXY: CPT

## 2024-06-05 PROCEDURE — 82570 ASSAY OF URINE CREATININE: CPT

## 2024-06-05 PROCEDURE — 84156 ASSAY OF PROTEIN URINE: CPT

## 2024-06-05 PROCEDURE — 3078F DIAST BP <80 MM HG: CPT | Performed by: INTERNAL MEDICINE

## 2024-06-05 PROCEDURE — 80069 RENAL FUNCTION PANEL: CPT

## 2024-06-05 PROCEDURE — 3074F SYST BP LT 130 MM HG: CPT | Performed by: INTERNAL MEDICINE

## 2024-06-05 PROCEDURE — 36415 COLL VENOUS BLD VENIPUNCTURE: CPT

## 2024-06-05 PROCEDURE — 83735 ASSAY OF MAGNESIUM: CPT

## 2024-06-05 PROCEDURE — 83970 ASSAY OF PARATHORMONE: CPT

## 2024-06-05 RX ORDER — VALGANCICLOVIR 450 MG/1
900 TABLET, FILM COATED ORAL EVERY 12 HOURS
Qty: 120 TABLET | Refills: 11 | Status: SHIPPED | OUTPATIENT
Start: 2024-06-05 | End: 2025-06-05

## 2024-06-05 RX ORDER — MYCOPHENOLATE MOFETIL 250 MG/1
500 CAPSULE ORAL EVERY 12 HOURS
Qty: 120 CAPSULE | Refills: 11 | Status: CANCELLED | OUTPATIENT
Start: 2024-06-05 | End: 2025-06-05

## 2024-06-05 RX ORDER — MYCOPHENOLATE MOFETIL 250 MG/1
500 CAPSULE ORAL EVERY 12 HOURS
Qty: 120 CAPSULE | Refills: 11 | Status: SHIPPED | OUTPATIENT
Start: 2024-06-05 | End: 2025-06-05

## 2024-06-05 ASSESSMENT — PAIN SCALES - GENERAL: PAINLEVEL: 0-NO PAIN

## 2024-06-05 NOTE — PROGRESS NOTES
TRANSPLANT NEPHROLOGY :   OUTPATIENT CLINIC NOTE      SERVICE DATE : 2024    REASON FOR VISIT/CHIEF COMPLAINT:  HOSPITAL DISCHARGE FOLLOW UP  S/P  TRANSPLANT SURGERY  IMMUNOSUPPRESSIVE MEDICATION MANAGEMENT  BLOOD PRESSURE MANAGEMENT    HPI:    Ms. Leblanc is a 44 y.o. female with past medical history significant for ESRD secondary to HTN/NSAID whom received a  donor kidney transplant on 23 by Dr. Marbin Lambert & Dr. Louis with a KDPI of 56% and PRA of 48%. HCV -/- and donor has not met risk factors. EBV +/+. 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 Dry weight is 81.1 kg (discharge weight is 76.2kg ). Pt received a total of 4.5 mg/kg total of thymoglobulin induction therapy in conjunction with 500mg IV solumedrol. Pt was initiated on Tacrolimus & Cellcept immunosuppression in conjunction with IV solumedrol taper. 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 Pt was started on valcyte (CMV D + / R + ) and bactrim for CMV & PCP prophylaxis per protocol. She was started on clotrimazole as antifungal coverage per protocol. Operative course was complicated by return to OR for exploration of transplanted kidney and washout of hematoma. Hospital course was complicated by post-operative pain and constipation, which has resolved.     24 Direct admission for fever, nausea, vomiting and abdominal pain (chronic).  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 ID consulted. She is on Valcyte treatment dose.    Patient is here for follow up s/p kidney transplant.    Patient is doing well overall. No new complaints. Denied chest pain, SOB, FORREST, Palpitation. Normal urination and bowel movement. Normal gait and no weakness of arms/legs. No cough, runny nose, sore throat, cold symptoms, or rash. No hearing loss. Normal vision.No problems with his sleep, mood and function. No recent infection,  hospitalization, surgery or ER visits.      ROS:  Review of  14 systems was performed system by system. See HPI. Otherwise, the symptoms were negative.    PAST MEDICAL HISTORY:  Past Medical History:   Diagnosis Date    Anxiety     Chronic kidney disease     Chronic kidney disease, unspecified     CKD, patient interested in transplantation    Depression     Disease of thyroid gland     GERD (gastroesophageal reflux disease)     Hypertension     Personal history of COVID-19 07/20/2022    History of COVID-19        PAST SURGICAL HISTORY:  Past Surgical History:   Procedure Laterality Date    MR HEAD ANGIO W AND WO IV CONTRAST  01/26/2021    MR HEAD ANGIO W AND WO IV CONTRAST    MR HEAD ANGIO WO IV CONTRAST  01/26/2021    MR HEAD ANGIO WO IV CONTRAST    OTHER SURGICAL HISTORY  07/20/2022    Arteriovenous fistula creation procedure    TRANSPLANT, KIDNEY, OPEN Right 11/30/2023    US GUIDED PERCUTANEOUS PERITONEAL OR RETROPERITONEAL FLUID COLLECTION DRAINAGE  12/21/2023    US GUIDED PERCUTANEOUS PERITONEAL OR RETROPERITONEAL FLUID COLLECTION DRAINAGE 12/21/2023 Batsheva Shanks MD [facility]        SOCIAL HISTORY:  Social History     Socioeconomic History    Marital status:      Spouse name: Not on file    Number of children: Not on file    Years of education: Not on file    Highest education level: Not on file   Occupational History    Not on file   Tobacco Use    Smoking status: Never    Smokeless tobacco: Former     Quit date: 2021    Tobacco comments:     Chewing tobacco, quit 1 year ago   Vaping Use    Vaping status: Never Used   Substance and Sexual Activity    Alcohol use: Never    Drug use: Never    Sexual activity: Defer   Other Topics Concern    Not on file   Social History Narrative    Not on file     Social Determinants of Health     Financial Resource Strain: Low Risk  (5/24/2024)    Overall Financial Resource Strain (CARDIA)     Difficulty of Paying Living Expenses: Not very hard   Food Insecurity: No Food  Insecurity (1/22/2024)    Received from [employer]    Hunger Vital Sign     Worried About Running Out of Food in the Last Year: Never true     Ran Out of Food in the Last Year: Never true   Transportation Needs: No Transportation Needs (5/24/2024)    PRAPARE - Transportation     Lack of Transportation (Medical): No     Lack of Transportation (Non-Medical): No   Physical Activity: Not on File (8/20/2019)    Received from [employer]     Physical Activity     Physical Activity: 0   Stress: No Stress Concern Present (5/29/2024)    [employer] - Occupational Stress Questionnaire     Feeling of Stress : Not at all   Social Connections: Feeling Somewhat Isolated (1/16/2024)    OASIS : Social Isolation     Frequency of experiencing loneliness or isolation: Sometimes   Intimate Partner Violence: Not on file   Housing Stability: Low Risk  (5/24/2024)    Housing Stability Vital Sign     Unable to Pay for Housing in the Last Year: No     Number of Places Lived in the Last Year: 1     Unstable Housing in the Last Year: No       FAMILY HISTORY:  No family history on file.    MEDICATION LIST:  Current Outpatient Medications   Medication Instructions    acetaminophen (TYLENOL) 650 mg, oral, Every 6 hours PRN    amLODIPine (NORVASC) 5 mg, oral, 2 times daily    azelastine (Optivar) 0.05 % ophthalmic solution 1 drop, Both Eyes, 2 times daily    BELATACEPT IV intravenous, Every 28 days, Maintenance infusion      calcitriol (Rocaltrol) 0.25 mcg capsule oral, Daily    Calcium Antacid 200 mg calcium (500 mg) chewable tablet CHEW AND SWALLOW 1 TABLET BY MOUTH 2 TIMES DAILY    cholecalciferol (VITAMIN D-3) 100 mcg, oral, Daily    fexofenadine (ALLEGRA) 180 mg, oral, Daily    gabapentin (NEURONTIN) 300 mg, oral, Nightly    levothyroxine (SYNTHROID, LEVOXYL) 25 mcg, oral, Daily before breakfast    mycophenolate (CELLCEPT) 500 mg, oral, Every 12 hours    pantoprazole (PROTONIX) 40 mg, oral, Daily before  breakfast, Do not crush, chew, or split.    predniSONE (DELTASONE) 10 mg, oral, Daily    sertraline (ZOLOFT) 50 mg, oral, Daily    SUMAtriptan (IMITREX) 100 mg, oral, Once as needed    tenofovir alafenamide (VEMLIDY) 25 mg, oral, Daily    valGANciclovir (VALCYTE) 900 mg, oral, 2 times daily, Do not crush or chew.       ALLERGY  No Known Allergies    PHYSICAL EXAM:    Visit Vitals  /75   Pulse 87   Temp 36.4 °C (97.6 °F) (Temporal)   Wt 79.9 kg (176 lb 1.6 oz)   SpO2 96%   BMI 29.30 kg/m²   Smoking Status Never   BSA 1.91 m²          Vital signs - reviewed. Acceptable BP at this office visit.   General Appearance - NAD, Good speech, oriented and alert  HEENT - Supple. Not pale. No jaundice. No cervical lymphadenopathy. Pharynx and tonsils are not injected.  CVS - RRR. Normal S1/S2. No murmur, click , rub or gallop  Lungs- clear to auscultation bilaterally  Abdomen - soft , not tender, no guarding, no rigidity. No hepatosplenomegaly. Normal bowel sounds. No masses and ascites. S/P Kidney transplant .  Transplanted kidney is not tender.   Musculoskeletal /Extremities - no edema. Full ROM. No joint tenderness.   Neuro/Psych - appropriate mood and affect. Motor power V/V all extremities. CN I -XII were grossly intact.  Skin - No visible rash      LABS:    Lab Results   Component Value Date    WBC 3.0 (L) 05/29/2024    HGB 13.0 05/29/2024    HCT 39.2 05/29/2024     05/29/2024    ALT 14 05/28/2024    AST 19 05/28/2024     05/29/2024    K 4.0 05/29/2024     05/29/2024    CREATININE 0.95 05/29/2024    BUN 7 05/29/2024    CO2 22 05/29/2024    INR 1.2 (H) 05/26/2024    HGBA1C 4.8 05/09/2023     par    ASSESSMENT AND PLAN:    Ms. Leblanc is a 44 y.o. female  who is here for follow up s/p kidney transplant.    TRANSPLANT DATE: 11/30/2023 (Kidney)      1. ESRD S/P kidney transplant   - Creatinine last check was :  Lab Results   Component Value Date    CREATININE 0.95 05/29/2024     Creatinine clearance cannot  be calculated (Patient's most recent lab result is older than the maximum 7 days allowed.)    - Renal allograft function is good  -Random urine protein/creatinine ratio is WNL  -Allosure last check was WNL  -Ensure adequate hydration  - Avoid nephrotoxic medications, NSAIDs, and IV contrast.    2. Immunosuppression  - Belatacept,  MG BID, PREDNISONE  -Continue current immunosuppression regimen.    3. Electrolytes  Lab Results   Component Value Date    GLUCOSE 86 05/29/2024    CALCIUM 8.7 05/29/2024     05/29/2024    K 4.0 05/29/2024    CO2 22 05/29/2024     05/29/2024    BUN 7 05/29/2024    CREATININE 0.95 05/29/2024     -Acceptable from last lab drawn    4. Hypertension  Blood Pressures         6/5/2024  1449             BP: 121/75          -Home  BP had been acceptable  -Encourage to monitor home BP  -Continue current anti hypertensive medication    5. Bone Mineral Disease/Osteoporosis  Lab Results   Component Value Date    PTH 77.0 03/07/2024    CALCIUM 8.7 05/29/2024    CAION 0.75 (LL) 12/14/2023    PHOS 2.6 05/29/2024    VITD25 18 (L) 03/07/2024     -check VIT D, PTH with next lab  - Consider DEXA every 2-3 years , defer to PCP    6.Anemia  Lab Results   Component Value Date    WBC 3.0 (L) 05/29/2024    HGB 13.0 05/29/2024    HCT 39.2 05/29/2024    MCV 89 05/29/2024     05/29/2024     -asymptomatic  - Continue to monitor  -check iron studies and ferritin. Will consider DENIZ as needed.  - No indications for blood transfusion     7.Health maintenance and vaccination  - Flu shot during flu season annually  - Cancer screening is up to date per the patient    Summary  Send her to do lab today and check CMV PCR to determine IS adjustment.     Marcia Webber    Transplant Nephrology

## 2024-06-05 NOTE — TELEPHONE ENCOUNTER
Returned call to Danuta, states he figured out where he is taking Herber for appt. Today.   No further questions.

## 2024-06-05 NOTE — PATIENT INSTRUCTIONS
Blood test today to check level of CMV virus  Continue current management for now  Continue taking Valganciclovir and Myfortic. If the virus grows, we may need to adjust medications again.

## 2024-06-07 LAB
LISTERIA SP AB [TITER] IN CEREBRAL SPINAL FLUID: NORMAL {TITER}
PTH-INTACT SERPL-MCNC: 18.5 PG/ML (ref 18.5–88)

## 2024-06-08 DIAGNOSIS — Z94.0 KIDNEY REPLACED BY TRANSPLANT (HHS-HCC): ICD-10-CM

## 2024-06-10 LAB
FUNGUS SPEC CULT: NORMAL
FUNGUS SPEC FUNGUS STN: NORMAL

## 2024-06-13 ENCOUNTER — LAB (OUTPATIENT)
Dept: LAB | Facility: LAB | Age: 44
End: 2024-06-13
Payer: COMMERCIAL

## 2024-06-13 DIAGNOSIS — R50.9 FEVER OF UNKNOWN ORIGIN: ICD-10-CM

## 2024-06-13 DIAGNOSIS — Z09 HOSPITAL DISCHARGE FOLLOW-UP: ICD-10-CM

## 2024-06-13 DIAGNOSIS — Z94.0 KIDNEY REPLACED BY TRANSPLANT (HHS-HCC): ICD-10-CM

## 2024-06-13 DIAGNOSIS — B25.9 CYTOMEGALOVIRUS (CMV) VIREMIA (MULTI): ICD-10-CM

## 2024-06-13 LAB
ALBUMIN SERPL BCP-MCNC: 3.6 G/DL (ref 3.4–5)
ANION GAP SERPL CALC-SCNC: 9 MMOL/L (ref 10–20)
BUN SERPL-MCNC: 14 MG/DL (ref 6–23)
CALCIUM SERPL-MCNC: 9.4 MG/DL (ref 8.6–10.3)
CHLORIDE SERPL-SCNC: 106 MMOL/L (ref 98–107)
CO2 SERPL-SCNC: 24 MMOL/L (ref 21–32)
CREAT SERPL-MCNC: 1 MG/DL (ref 0.5–1.05)
EGFRCR SERPLBLD CKD-EPI 2021: 71 ML/MIN/1.73M*2
ERYTHROCYTE [DISTWIDTH] IN BLOOD BY AUTOMATED COUNT: 15.6 % (ref 11.5–14.5)
GLUCOSE SERPL-MCNC: 97 MG/DL (ref 74–99)
HCT VFR BLD AUTO: 36.7 % (ref 36–46)
HGB BLD-MCNC: 11.8 G/DL (ref 12–16)
MAGNESIUM SERPL-MCNC: 1.45 MG/DL (ref 1.6–2.4)
MCH RBC QN AUTO: 30.5 PG (ref 26–34)
MCHC RBC AUTO-ENTMCNC: 32.2 G/DL (ref 32–36)
MCV RBC AUTO: 95 FL (ref 80–100)
NRBC BLD-RTO: 0 /100 WBCS (ref 0–0)
PHOSPHATE SERPL-MCNC: 3.4 MG/DL (ref 2.5–4.9)
PLATELET # BLD AUTO: 233 X10*3/UL (ref 150–450)
POTASSIUM SERPL-SCNC: 3.5 MMOL/L (ref 3.5–5.3)
RBC # BLD AUTO: 3.87 X10*6/UL (ref 4–5.2)
SODIUM SERPL-SCNC: 135 MMOL/L (ref 136–145)
WBC # BLD AUTO: 3.8 X10*3/UL (ref 4.4–11.3)

## 2024-06-13 PROCEDURE — 85027 COMPLETE CBC AUTOMATED: CPT

## 2024-06-13 PROCEDURE — 87799 DETECT AGENT NOS DNA QUANT: CPT

## 2024-06-13 PROCEDURE — 83735 ASSAY OF MAGNESIUM: CPT

## 2024-06-13 PROCEDURE — 36415 COLL VENOUS BLD VENIPUNCTURE: CPT

## 2024-06-13 PROCEDURE — 80069 RENAL FUNCTION PANEL: CPT

## 2024-06-14 LAB
BKV DNA SERPL NAA+PROBE-LOG#: NORMAL {LOG_COPIES}/ML
CMV DNA SERPL NAA+PROBE-ACNC: 539 IU/ML
CMV DNA SERPL NAA+PROBE-LOG IU: 2.73 LOG IU/ML
EBV DNA SPEC NAA+PROBE-LOG#: NORMAL {LOG_COPIES}/ML
LABORATORY COMMENT REPORT: DETECTED
LABORATORY COMMENT REPORT: NOT DETECTED
LABORATORY COMMENT REPORT: NOT DETECTED

## 2024-06-14 RX ORDER — CALCITRIOL 0.25 UG/1
CAPSULE ORAL DAILY
Qty: 30 CAPSULE | Refills: 2 | Status: SHIPPED | OUTPATIENT
Start: 2024-06-14

## 2024-06-17 ENCOUNTER — TELEPHONE (OUTPATIENT)
Dept: TRANSPLANT | Facility: HOSPITAL | Age: 44
End: 2024-06-17
Payer: COMMERCIAL

## 2024-06-17 DIAGNOSIS — R79.0 LOW MAGNESIUM LEVEL: ICD-10-CM

## 2024-06-17 DIAGNOSIS — Z94.0 KIDNEY REPLACED BY TRANSPLANT (HHS-HCC): ICD-10-CM

## 2024-06-17 RX ORDER — CALCIUM CARBONATE 300MG(750)
400 TABLET,CHEWABLE ORAL DAILY
Qty: 30 TABLET | Refills: 11 | Status: SHIPPED | OUTPATIENT
Start: 2024-06-17 | End: 2025-06-17

## 2024-06-17 RX ORDER — POTASSIUM CITRATE 10 MEQ/1
20 TABLET, EXTENDED RELEASE ORAL DAILY
Qty: 60 TABLET | Refills: 11 | Status: SHIPPED | OUTPATIENT
Start: 2024-06-17 | End: 2025-06-17

## 2024-06-17 NOTE — TELEPHONE ENCOUNTER
Reviewed labs with Dr. Webber on 6/14/24    from 23,400  mg 1.45 start mag? Eat mag?   WBC 3.8 from 5.2   Valcyte, mmf 500 BID     PLAN:   Start mag 400 daily with food    Called and spoke with rohini  With updates, mag and k citrate     [facility] reviewed with Dr. Wise on 6/17/24  Marked hypocalcitraturia   Start 20 mEq k citrate daily

## 2024-06-19 ENCOUNTER — APPOINTMENT (OUTPATIENT)
Dept: INFUSION THERAPY | Facility: HOSPITAL | Age: 44
End: 2024-06-19
Payer: COMMERCIAL

## 2024-06-19 ENCOUNTER — APPOINTMENT (OUTPATIENT)
Dept: INFECTIOUS DISEASES | Facility: CLINIC | Age: 44
End: 2024-06-19
Payer: COMMERCIAL

## 2024-06-19 LAB — B BURGDOR AB CSF IA-ACNC: NORMAL

## 2024-06-24 ENCOUNTER — INFUSION (OUTPATIENT)
Dept: INFUSION THERAPY | Facility: HOSPITAL | Age: 44
End: 2024-06-24
Payer: COMMERCIAL

## 2024-06-24 VITALS
DIASTOLIC BLOOD PRESSURE: 77 MMHG | SYSTOLIC BLOOD PRESSURE: 121 MMHG | HEART RATE: 77 BPM | OXYGEN SATURATION: 99 % | RESPIRATION RATE: 18 BRPM | TEMPERATURE: 98 F | WEIGHT: 175.27 LBS | BODY MASS INDEX: 29.17 KG/M2

## 2024-06-24 DIAGNOSIS — Z94.0 KIDNEY REPLACED BY TRANSPLANT (HHS-HCC): ICD-10-CM

## 2024-06-24 DIAGNOSIS — D84.9 IMMUNOSUPPRESSION (MULTI): ICD-10-CM

## 2024-06-24 PROCEDURE — 2500000004 HC RX 250 GENERAL PHARMACY W/ HCPCS (ALT 636 FOR OP/ED): Performed by: STUDENT IN AN ORGANIZED HEALTH CARE EDUCATION/TRAINING PROGRAM

## 2024-06-24 PROCEDURE — 96365 THER/PROPH/DIAG IV INF INIT: CPT | Mod: INF

## 2024-06-24 RX ORDER — EPINEPHRINE 0.3 MG/.3ML
0.3 INJECTION SUBCUTANEOUS EVERY 5 MIN PRN
OUTPATIENT
Start: 2024-07-15

## 2024-06-24 RX ORDER — ALBUTEROL SULFATE 0.83 MG/ML
3 SOLUTION RESPIRATORY (INHALATION) AS NEEDED
OUTPATIENT
Start: 2024-07-15

## 2024-06-24 RX ORDER — DIPHENHYDRAMINE HYDROCHLORIDE 50 MG/ML
50 INJECTION INTRAMUSCULAR; INTRAVENOUS AS NEEDED
OUTPATIENT
Start: 2024-07-15

## 2024-06-24 RX ORDER — FAMOTIDINE 10 MG/ML
20 INJECTION INTRAVENOUS ONCE AS NEEDED
OUTPATIENT
Start: 2024-07-15

## 2024-06-24 ASSESSMENT — COLUMBIA-SUICIDE SEVERITY RATING SCALE - C-SSRS
6. HAVE YOU EVER DONE ANYTHING, STARTED TO DO ANYTHING, OR PREPARED TO DO ANYTHING TO END YOUR LIFE?: NO
1. IN THE PAST MONTH, HAVE YOU WISHED YOU WERE DEAD OR WISHED YOU COULD GO TO SLEEP AND NOT WAKE UP?: NO

## 2024-06-24 ASSESSMENT — ENCOUNTER SYMPTOMS
LOSS OF SENSATION IN FEET: 0
OCCASIONAL FEELINGS OF UNSTEADINESS: 1
DEPRESSION: 0

## 2024-06-24 ASSESSMENT — PAIN SCALES - GENERAL: PAINLEVEL: 3

## 2024-06-24 ASSESSMENT — PATIENT HEALTH QUESTIONNAIRE - PHQ9
2. FEELING DOWN, DEPRESSED OR HOPELESS: NOT AT ALL
1. LITTLE INTEREST OR PLEASURE IN DOING THINGS: NOT AT ALL
SUM OF ALL RESPONSES TO PHQ9 QUESTIONS 1 AND 2: 0

## 2024-06-27 ENCOUNTER — LAB (OUTPATIENT)
Dept: LAB | Facility: LAB | Age: 44
End: 2024-06-27
Payer: COMMERCIAL

## 2024-06-27 DIAGNOSIS — D84.9 IMMUNOSUPPRESSION (MULTI): ICD-10-CM

## 2024-06-27 DIAGNOSIS — Z94.0 KIDNEY REPLACED BY TRANSPLANT (HHS-HCC): ICD-10-CM

## 2024-06-27 LAB
ALBUMIN SERPL BCP-MCNC: 3.4 G/DL (ref 3.4–5)
ANION GAP SERPL CALC-SCNC: 11 MMOL/L (ref 10–20)
BASOPHILS # BLD AUTO: 0.09 X10*3/UL (ref 0–0.1)
BASOPHILS NFR BLD AUTO: 1.9 %
BUN SERPL-MCNC: 11 MG/DL (ref 6–23)
BURR CELLS BLD QL SMEAR: NORMAL
CALCIUM SERPL-MCNC: 8.4 MG/DL (ref 8.6–10.3)
CHLORIDE SERPL-SCNC: 109 MMOL/L (ref 98–107)
CO2 SERPL-SCNC: 21 MMOL/L (ref 21–32)
CREAT SERPL-MCNC: 1.17 MG/DL (ref 0.5–1.05)
EGFRCR SERPLBLD CKD-EPI 2021: 58 ML/MIN/1.73M*2
EOSINOPHIL # BLD AUTO: 0.02 X10*3/UL (ref 0–0.7)
EOSINOPHIL NFR BLD AUTO: 0.4 %
ERYTHROCYTE [DISTWIDTH] IN BLOOD BY AUTOMATED COUNT: 17.2 % (ref 11.5–14.5)
GIANT PLATELETS BLD QL SMEAR: NORMAL
GLUCOSE SERPL-MCNC: 81 MG/DL (ref 74–99)
HCT VFR BLD AUTO: 36 % (ref 36–46)
HGB BLD-MCNC: 11.6 G/DL (ref 12–16)
IMM GRANULOCYTES # BLD AUTO: 0.3 X10*3/UL (ref 0–0.7)
IMM GRANULOCYTES NFR BLD AUTO: 6.5 % (ref 0–0.9)
LYMPHOCYTES # BLD AUTO: 0.38 X10*3/UL (ref 1.2–4.8)
LYMPHOCYTES NFR BLD AUTO: 8.2 %
MAGNESIUM SERPL-MCNC: 1.5 MG/DL (ref 1.6–2.4)
MCH RBC QN AUTO: 31.2 PG (ref 26–34)
MCHC RBC AUTO-ENTMCNC: 32.2 G/DL (ref 32–36)
MCV RBC AUTO: 97 FL (ref 80–100)
MONOCYTES # BLD AUTO: 0.24 X10*3/UL (ref 0.1–1)
MONOCYTES NFR BLD AUTO: 5.2 %
NEUTROPHILS # BLD AUTO: 3.62 X10*3/UL (ref 1.2–7.7)
NEUTROPHILS NFR BLD AUTO: 77.8 %
NRBC BLD-RTO: 0 /100 WBCS (ref 0–0)
OVALOCYTES BLD QL SMEAR: NORMAL
PHOSPHATE SERPL-MCNC: 3.2 MG/DL (ref 2.5–4.9)
PLATELET # BLD AUTO: 302 X10*3/UL (ref 150–450)
PLATELET CLUMP BLD QL SMEAR: PRESENT
POLYCHROMASIA BLD QL SMEAR: NORMAL
POTASSIUM SERPL-SCNC: 3.6 MMOL/L (ref 3.5–5.3)
RBC # BLD AUTO: 3.72 X10*6/UL (ref 4–5.2)
RBC MORPH BLD: NORMAL
SODIUM SERPL-SCNC: 137 MMOL/L (ref 136–145)
WBC # BLD AUTO: 4.7 X10*3/UL (ref 4.4–11.3)

## 2024-06-27 PROCEDURE — 85025 COMPLETE CBC W/AUTO DIFF WBC: CPT

## 2024-06-27 PROCEDURE — 36415 COLL VENOUS BLD VENIPUNCTURE: CPT

## 2024-06-27 PROCEDURE — 83735 ASSAY OF MAGNESIUM: CPT

## 2024-06-27 PROCEDURE — 87799 DETECT AGENT NOS DNA QUANT: CPT

## 2024-06-27 PROCEDURE — 80069 RENAL FUNCTION PANEL: CPT

## 2024-06-30 LAB
ALLOSURE SCORE - KIDNEY: > 16 %
CAREDX_ORDER_ID: NORMAL
CENTER_ORDER_ID: NORMAL
CLIENT SPECIMEN ID - ALLOSURE: NORMAL
DONOR RELATION - ALLOSURE: NORMAL
NOTES - ALLOSURE: NORMAL
RELATIVE CHANGE VALUE - KIDNEY: NORMAL
TEST COMMENTS - ALLOSURE: NORMAL
TIME POST TX - ALLOSURE: NORMAL
TRANSPLANTED ORGAN - ALLOSURE: NORMAL
TX DATE - ALLOSURE/ALLOMAP: NORMAL
WP_ORDER_ID: NORMAL

## 2024-07-01 ENCOUNTER — DOCUMENTATION (OUTPATIENT)
Dept: TRANSPLANT | Facility: HOSPITAL | Age: 44
End: 2024-07-01
Payer: COMMERCIAL

## 2024-07-01 NOTE — PROGRESS NOTES
Reviewed labs with Dr. Webber:   Allosure >16 from 0.15  CMV neg from 539   BK/EBV neg  cr 1.17 from 1.0  no recent DSA     PLAN:  See if we can draw another sample from lab to repeat    Email sent to [facility], Caitlin Sterling responded she is having another rep help, Tressa Villagomez.

## 2024-07-02 ENCOUNTER — APPOINTMENT (OUTPATIENT)
Dept: RADIOLOGY | Facility: HOSPITAL | Age: 44
End: 2024-07-02
Payer: COMMERCIAL

## 2024-07-02 ENCOUNTER — TELEPHONE (OUTPATIENT)
Dept: TRANSPLANT | Facility: HOSPITAL | Age: 44
End: 2024-07-02

## 2024-07-02 ENCOUNTER — HOSPITAL ENCOUNTER (INPATIENT)
Facility: HOSPITAL | Age: 44
End: 2024-07-02
Attending: EMERGENCY MEDICINE | Admitting: TRANSPLANT SURGERY
Payer: COMMERCIAL

## 2024-07-02 ENCOUNTER — CLINICAL SUPPORT (OUTPATIENT)
Dept: EMERGENCY MEDICINE | Facility: HOSPITAL | Age: 44
DRG: 699 | End: 2024-07-02
Payer: COMMERCIAL

## 2024-07-02 ENCOUNTER — TELEPHONE (OUTPATIENT)
Dept: TRANSPLANT | Facility: HOSPITAL | Age: 44
End: 2024-07-02
Payer: COMMERCIAL

## 2024-07-02 ENCOUNTER — CLINICAL SUPPORT (OUTPATIENT)
Dept: EMERGENCY MEDICINE | Facility: HOSPITAL | Age: 44
End: 2024-07-02
Payer: COMMERCIAL

## 2024-07-02 ENCOUNTER — DOCUMENTATION (OUTPATIENT)
Dept: SURGERY | Facility: HOSPITAL | Age: 44
End: 2024-07-02

## 2024-07-02 DIAGNOSIS — M79.10 MUSCLE ACHE: ICD-10-CM

## 2024-07-02 DIAGNOSIS — D84.9 IMMUNOSUPPRESSION (MULTI): ICD-10-CM

## 2024-07-02 DIAGNOSIS — H10.11 ALLERGIC CONJUNCTIVITIS OF RIGHT EYE: ICD-10-CM

## 2024-07-02 DIAGNOSIS — Z94.0 KIDNEY REPLACED BY TRANSPLANT (HHS-HCC): ICD-10-CM

## 2024-07-02 DIAGNOSIS — R60.0 BILATERAL EDEMA OF LOWER EXTREMITY: ICD-10-CM

## 2024-07-02 DIAGNOSIS — B25.9 CYTOMEGALOVIRUS (CMV) VIREMIA (MULTI): ICD-10-CM

## 2024-07-02 DIAGNOSIS — I10 ESSENTIAL HYPERTENSION: ICD-10-CM

## 2024-07-02 DIAGNOSIS — Z94.0 IMMUNOSUPPRESSIVE MANAGEMENT ENCOUNTER FOLLOWING KIDNEY TRANSPLANT (HHS-HCC): ICD-10-CM

## 2024-07-02 DIAGNOSIS — R79.89 ELEVATED BRAIN NATRIURETIC PEPTIDE (BNP) LEVEL: ICD-10-CM

## 2024-07-02 DIAGNOSIS — T86.10 COMPLICATION OF TRANSPLANTED KIDNEY, UNSPECIFIED COMPLICATION (HHS-HCC): ICD-10-CM

## 2024-07-02 DIAGNOSIS — R06.02 SHORTNESS OF BREATH: Primary | ICD-10-CM

## 2024-07-02 DIAGNOSIS — Z79.899 IMMUNOSUPPRESSIVE MANAGEMENT ENCOUNTER FOLLOWING KIDNEY TRANSPLANT (HHS-HCC): ICD-10-CM

## 2024-07-02 LAB
ALBUMIN SERPL BCP-MCNC: 3.7 G/DL (ref 3.4–5)
ALP SERPL-CCNC: 52 U/L (ref 33–110)
ALT SERPL W P-5'-P-CCNC: 12 U/L (ref 7–45)
ANION GAP BLDV CALCULATED.4IONS-SCNC: 11 MMOL/L (ref 10–25)
ANION GAP SERPL CALC-SCNC: 11 MMOL/L (ref 10–20)
APPEARANCE UR: CLEAR
AST SERPL W P-5'-P-CCNC: 15 U/L (ref 9–39)
BASE EXCESS BLDV CALC-SCNC: -5.7 MMOL/L (ref -2–3)
BASOPHILS # BLD AUTO: 0.07 X10*3/UL (ref 0–0.1)
BASOPHILS NFR BLD AUTO: 0.8 %
BILIRUB SERPL-MCNC: 0.7 MG/DL (ref 0–1.2)
BILIRUB UR STRIP.AUTO-MCNC: NEGATIVE MG/DL
BNP SERPL-MCNC: 272 PG/ML (ref 0–99)
BODY TEMPERATURE: 37 DEGREES CELSIUS
BUN SERPL-MCNC: 19 MG/DL (ref 6–23)
CA-I BLDV-SCNC: 1.27 MMOL/L (ref 1.1–1.33)
CALCIUM SERPL-MCNC: 9 MG/DL (ref 8.6–10.6)
CARDIAC TROPONIN I PNL SERPL HS: 5 NG/L (ref 0–34)
CARDIAC TROPONIN I PNL SERPL HS: 5 NG/L (ref 0–34)
CHLORIDE BLDV-SCNC: 108 MMOL/L (ref 98–107)
CHLORIDE SERPL-SCNC: 107 MMOL/L (ref 98–107)
CO2 SERPL-SCNC: 18 MMOL/L (ref 21–32)
COLOR UR: COLORLESS
CREAT SERPL-MCNC: 1.6 MG/DL (ref 0.5–1.05)
EGFRCR SERPLBLD CKD-EPI 2021: 40 ML/MIN/1.73M*2
EOSINOPHIL # BLD AUTO: 0.01 X10*3/UL (ref 0–0.7)
EOSINOPHIL NFR BLD AUTO: 0.1 %
ERYTHROCYTE [DISTWIDTH] IN BLOOD BY AUTOMATED COUNT: 16.5 % (ref 11.5–14.5)
GLUCOSE BLDV-MCNC: 119 MG/DL (ref 74–99)
GLUCOSE SERPL-MCNC: 117 MG/DL (ref 74–99)
GLUCOSE UR STRIP.AUTO-MCNC: NORMAL MG/DL
HCO3 BLDV-SCNC: 18 MMOL/L (ref 22–26)
HCT VFR BLD AUTO: 32.5 % (ref 36–46)
HCT VFR BLD EST: 35 % (ref 36–46)
HGB BLD-MCNC: 11.4 G/DL (ref 12–16)
HGB BLDV-MCNC: 11.8 G/DL (ref 12–16)
IMM GRANULOCYTES # BLD AUTO: 0.39 X10*3/UL (ref 0–0.7)
IMM GRANULOCYTES NFR BLD AUTO: 4.7 % (ref 0–0.9)
KETONES UR STRIP.AUTO-MCNC: NEGATIVE MG/DL
LACTATE BLDV-SCNC: 1.1 MMOL/L (ref 0.4–2)
LEUKOCYTE ESTERASE UR QL STRIP.AUTO: NEGATIVE
LYMPHOCYTES # BLD AUTO: 0.28 X10*3/UL (ref 1.2–4.8)
LYMPHOCYTES NFR BLD AUTO: 3.4 %
MAGNESIUM SERPL-MCNC: 1.73 MG/DL (ref 1.6–2.4)
MCH RBC QN AUTO: 31.2 PG (ref 26–34)
MCHC RBC AUTO-ENTMCNC: 35.1 G/DL (ref 32–36)
MCV RBC AUTO: 89 FL (ref 80–100)
MONOCYTES # BLD AUTO: 0.33 X10*3/UL (ref 0.1–1)
MONOCYTES NFR BLD AUTO: 4 %
NEUTROPHILS # BLD AUTO: 7.23 X10*3/UL (ref 1.2–7.7)
NEUTROPHILS NFR BLD AUTO: 87 %
NITRITE UR QL STRIP.AUTO: NEGATIVE
NRBC BLD-RTO: 0 /100 WBCS (ref 0–0)
OXYHGB MFR BLDV: 95.3 % (ref 45–75)
PCO2 BLDV: 29 MM HG (ref 41–51)
PH BLDV: 7.4 PH (ref 7.33–7.43)
PH UR STRIP.AUTO: 6.5 [PH]
PLATELET # BLD AUTO: 202 X10*3/UL (ref 150–450)
PO2 BLDV: 83 MM HG (ref 35–45)
POTASSIUM BLDV-SCNC: 4.7 MMOL/L (ref 3.5–5.3)
POTASSIUM SERPL-SCNC: 4.4 MMOL/L (ref 3.5–5.3)
PROT SERPL-MCNC: 7.1 G/DL (ref 6.4–8.2)
PROT UR STRIP.AUTO-MCNC: ABNORMAL MG/DL
RBC # BLD AUTO: 3.65 X10*6/UL (ref 4–5.2)
RBC # UR STRIP.AUTO: ABNORMAL /UL
RBC #/AREA URNS AUTO: NORMAL /HPF
SAO2 % BLDV: 98 % (ref 45–75)
SODIUM BLDV-SCNC: 132 MMOL/L (ref 136–145)
SODIUM SERPL-SCNC: 132 MMOL/L (ref 136–145)
SP GR UR STRIP.AUTO: 1
SQUAMOUS #/AREA URNS AUTO: NORMAL /HPF
UROBILINOGEN UR STRIP.AUTO-MCNC: NORMAL MG/DL
WBC # BLD AUTO: 8.3 X10*3/UL (ref 4.4–11.3)
WBC #/AREA URNS AUTO: NORMAL /HPF

## 2024-07-02 PROCEDURE — 2500000001 HC RX 250 WO HCPCS SELF ADMINISTERED DRUGS (ALT 637 FOR MEDICARE OP): Performed by: STUDENT IN AN ORGANIZED HEALTH CARE EDUCATION/TRAINING PROGRAM

## 2024-07-02 PROCEDURE — 93005 ELECTROCARDIOGRAM TRACING: CPT

## 2024-07-02 PROCEDURE — 84132 ASSAY OF SERUM POTASSIUM: CPT | Performed by: EMERGENCY MEDICINE

## 2024-07-02 PROCEDURE — 76776 US EXAM K TRANSPL W/DOPPLER: CPT

## 2024-07-02 PROCEDURE — 81001 URINALYSIS AUTO W/SCOPE: CPT

## 2024-07-02 PROCEDURE — 84484 ASSAY OF TROPONIN QUANT: CPT | Performed by: EMERGENCY MEDICINE

## 2024-07-02 PROCEDURE — 71045 X-RAY EXAM CHEST 1 VIEW: CPT | Mod: FOREIGN READ | Performed by: RADIOLOGY

## 2024-07-02 PROCEDURE — 2500000002 HC RX 250 W HCPCS SELF ADMINISTERED DRUGS (ALT 637 FOR MEDICARE OP, ALT 636 FOR OP/ED): Performed by: STUDENT IN AN ORGANIZED HEALTH CARE EDUCATION/TRAINING PROGRAM

## 2024-07-02 PROCEDURE — 36415 COLL VENOUS BLD VENIPUNCTURE: CPT

## 2024-07-02 PROCEDURE — 93010 ELECTROCARDIOGRAM REPORT: CPT | Performed by: EMERGENCY MEDICINE

## 2024-07-02 PROCEDURE — 2500000004 HC RX 250 GENERAL PHARMACY W/ HCPCS (ALT 636 FOR OP/ED): Performed by: STUDENT IN AN ORGANIZED HEALTH CARE EDUCATION/TRAINING PROGRAM

## 2024-07-02 PROCEDURE — 87040 BLOOD CULTURE FOR BACTERIA: CPT

## 2024-07-02 PROCEDURE — 83880 ASSAY OF NATRIURETIC PEPTIDE: CPT | Performed by: EMERGENCY MEDICINE

## 2024-07-02 PROCEDURE — 93010 ELECTROCARDIOGRAM REPORT: CPT | Performed by: INTERNAL MEDICINE

## 2024-07-02 PROCEDURE — 83735 ASSAY OF MAGNESIUM: CPT

## 2024-07-02 PROCEDURE — 1200000002 HC GENERAL ROOM WITH TELEMETRY DAILY

## 2024-07-02 PROCEDURE — 99285 EMERGENCY DEPT VISIT HI MDM: CPT | Performed by: EMERGENCY MEDICINE

## 2024-07-02 PROCEDURE — 36415 COLL VENOUS BLD VENIPUNCTURE: CPT | Performed by: EMERGENCY MEDICINE

## 2024-07-02 PROCEDURE — 71045 X-RAY EXAM CHEST 1 VIEW: CPT

## 2024-07-02 PROCEDURE — 84132 ASSAY OF SERUM POTASSIUM: CPT

## 2024-07-02 PROCEDURE — 93970 EXTREMITY STUDY: CPT

## 2024-07-02 PROCEDURE — 85025 COMPLETE CBC W/AUTO DIFF WBC: CPT | Performed by: EMERGENCY MEDICINE

## 2024-07-02 PROCEDURE — 93971 EXTREMITY STUDY: CPT | Performed by: STUDENT IN AN ORGANIZED HEALTH CARE EDUCATION/TRAINING PROGRAM

## 2024-07-02 PROCEDURE — 99285 EMERGENCY DEPT VISIT HI MDM: CPT | Mod: 25

## 2024-07-02 PROCEDURE — 99222 1ST HOSP IP/OBS MODERATE 55: CPT | Performed by: TRANSPLANT SURGERY

## 2024-07-02 PROCEDURE — 76776 US EXAM K TRANSPL W/DOPPLER: CPT | Performed by: STUDENT IN AN ORGANIZED HEALTH CARE EDUCATION/TRAINING PROGRAM

## 2024-07-02 RX ORDER — NALOXONE HYDROCHLORIDE 0.4 MG/ML
0.2 INJECTION, SOLUTION INTRAMUSCULAR; INTRAVENOUS; SUBCUTANEOUS EVERY 5 MIN PRN
Status: DISCONTINUED | OUTPATIENT
Start: 2024-07-02 | End: 2024-07-19 | Stop reason: HOSPADM

## 2024-07-02 RX ORDER — SERTRALINE HYDROCHLORIDE 50 MG/1
50 TABLET, FILM COATED ORAL DAILY
Status: DISCONTINUED | OUTPATIENT
Start: 2024-07-02 | End: 2024-07-19 | Stop reason: HOSPADM

## 2024-07-02 RX ORDER — AMOXICILLIN 250 MG
2 CAPSULE ORAL 2 TIMES DAILY
Status: DISCONTINUED | OUTPATIENT
Start: 2024-07-02 | End: 2024-07-19 | Stop reason: HOSPADM

## 2024-07-02 RX ORDER — CHOLECALCIFEROL (VITAMIN D3) 25 MCG
4000 TABLET ORAL DAILY
Status: DISCONTINUED | OUTPATIENT
Start: 2024-07-02 | End: 2024-07-19 | Stop reason: HOSPADM

## 2024-07-02 RX ORDER — ACETAMINOPHEN 325 MG/1
650 TABLET ORAL EVERY 4 HOURS PRN
Status: DISCONTINUED | OUTPATIENT
Start: 2024-07-02 | End: 2024-07-19 | Stop reason: HOSPADM

## 2024-07-02 RX ORDER — LANOLIN ALCOHOL/MO/W.PET/CERES
400 CREAM (GRAM) TOPICAL DAILY
Status: DISCONTINUED | OUTPATIENT
Start: 2024-07-02 | End: 2024-07-19 | Stop reason: HOSPADM

## 2024-07-02 RX ORDER — SUMATRIPTAN SUCCINATE 100 MG/1
100 TABLET ORAL ONCE AS NEEDED
Status: DISCONTINUED | OUTPATIENT
Start: 2024-07-02 | End: 2024-07-14

## 2024-07-02 RX ORDER — CALCITRIOL 0.25 UG/1
0.25 CAPSULE ORAL DAILY
Status: DISCONTINUED | OUTPATIENT
Start: 2024-07-02 | End: 2024-07-19 | Stop reason: HOSPADM

## 2024-07-02 RX ORDER — LEVOTHYROXINE SODIUM 50 UG/1
25 TABLET ORAL
Status: DISCONTINUED | OUTPATIENT
Start: 2024-07-03 | End: 2024-07-19 | Stop reason: HOSPADM

## 2024-07-02 RX ORDER — GABAPENTIN 300 MG/1
300 CAPSULE ORAL NIGHTLY
Status: DISCONTINUED | OUTPATIENT
Start: 2024-07-02 | End: 2024-07-19 | Stop reason: HOSPADM

## 2024-07-02 RX ORDER — POTASSIUM CITRATE 10 MEQ/1
20 TABLET, EXTENDED RELEASE ORAL DAILY
Status: DISCONTINUED | OUTPATIENT
Start: 2024-07-02 | End: 2024-07-15

## 2024-07-02 RX ORDER — MYCOPHENOLATE MOFETIL 250 MG/1
500 CAPSULE ORAL EVERY 12 HOURS
Status: DISCONTINUED | OUTPATIENT
Start: 2024-07-02 | End: 2024-07-04

## 2024-07-02 RX ORDER — LORATADINE 10 MG/1
10 TABLET ORAL DAILY
Status: DISCONTINUED | OUTPATIENT
Start: 2024-07-02 | End: 2024-07-19 | Stop reason: HOSPADM

## 2024-07-02 RX ORDER — PREDNISONE 10 MG/1
10 TABLET ORAL DAILY
Status: DISCONTINUED | OUTPATIENT
Start: 2024-07-02 | End: 2024-07-03

## 2024-07-02 RX ORDER — PANTOPRAZOLE SODIUM 40 MG/1
40 TABLET, DELAYED RELEASE ORAL
Status: DISCONTINUED | OUTPATIENT
Start: 2024-07-03 | End: 2024-07-09

## 2024-07-02 RX ORDER — HEPARIN SODIUM 5000 [USP'U]/ML
5000 INJECTION, SOLUTION INTRAVENOUS; SUBCUTANEOUS EVERY 8 HOURS SCHEDULED
Status: COMPLETED | OUTPATIENT
Start: 2024-07-02 | End: 2024-07-04

## 2024-07-02 RX ORDER — SODIUM CHLORIDE 9 MG/ML
100 INJECTION, SOLUTION INTRAVENOUS CONTINUOUS
Status: DISCONTINUED | OUTPATIENT
Start: 2024-07-02 | End: 2024-07-03

## 2024-07-02 RX ORDER — OXYCODONE HYDROCHLORIDE 5 MG/1
5 TABLET ORAL EVERY 4 HOURS PRN
Status: DISCONTINUED | OUTPATIENT
Start: 2024-07-02 | End: 2024-07-19 | Stop reason: HOSPADM

## 2024-07-02 RX ORDER — CALCIUM CARBONATE 200(500)MG
250 TABLET,CHEWABLE ORAL DAILY
Status: DISCONTINUED | OUTPATIENT
Start: 2024-07-02 | End: 2024-07-19 | Stop reason: HOSPADM

## 2024-07-02 RX ORDER — AMLODIPINE BESYLATE 5 MG/1
5 TABLET ORAL 2 TIMES DAILY
Status: DISCONTINUED | OUTPATIENT
Start: 2024-07-02 | End: 2024-07-04

## 2024-07-02 RX ORDER — ONDANSETRON HYDROCHLORIDE 2 MG/ML
4 INJECTION, SOLUTION INTRAVENOUS EVERY 8 HOURS PRN
Status: DISCONTINUED | OUTPATIENT
Start: 2024-07-02 | End: 2024-07-19 | Stop reason: HOSPADM

## 2024-07-02 RX ORDER — ONDANSETRON 4 MG/1
4 TABLET, ORALLY DISINTEGRATING ORAL EVERY 8 HOURS PRN
Status: DISCONTINUED | OUTPATIENT
Start: 2024-07-02 | End: 2024-07-19 | Stop reason: HOSPADM

## 2024-07-02 ASSESSMENT — PAIN DESCRIPTION - DESCRIPTORS
DESCRIPTORS: SHARP
DESCRIPTORS: SHARP

## 2024-07-02 ASSESSMENT — PAIN SCALES - GENERAL
PAINLEVEL_OUTOF10: 6
PAINLEVEL_OUTOF10: 8

## 2024-07-02 ASSESSMENT — PAIN - FUNCTIONAL ASSESSMENT
PAIN_FUNCTIONAL_ASSESSMENT: 0-10
PAIN_FUNCTIONAL_ASSESSMENT: 0-10

## 2024-07-02 ASSESSMENT — PAIN DESCRIPTION - PAIN TYPE: TYPE: ACUTE PAIN

## 2024-07-02 ASSESSMENT — PAIN DESCRIPTION - ORIENTATION: ORIENTATION: RIGHT

## 2024-07-02 ASSESSMENT — PAIN DESCRIPTION - LOCATION
LOCATION: HEAD
LOCATION: BACK

## 2024-07-02 ASSESSMENT — PAIN DESCRIPTION - PROGRESSION: CLINICAL_PROGRESSION: NOT CHANGED

## 2024-07-02 NOTE — H&P
[facility]  ABDOMINAL TRANSPLANT SURGERY  HISTORY AND PHYSICAL / CONSULT    Patient Name: Herber Foy Rai  MRN: [id number]  Admit Date: 702  : 1980  AGE: 44 y.o.   GENDER: female  =============================================================    TODAY'S ASSESSMENT AND PLAN OF CARE:  Patient is a 45y/o female with PMH significant for ESRD 2/2 HTN and NSAIDs, that received a DDKT from 23 c/b washout on 23 and IR drain for perirenal fluid collection, now presenting to ED with several day history of anasarca and shortness of breath.     Patient has 1+ pitting edema of her ankles and some mild swelling of UEs. Her UOP is unchanged, however she's endorsing some R flank tenderness that could be consistent with her known R sided kidney stone. At this time, this is likely an acute MARK, however an infectious workup was started in the ED. Will admit the patient for hydration, and monitoring of kidney function in the setting of Cr 1.6 (baseline 0.8), mild LE edema. Imaging pending to rule out Ddx of DVTs, transplant failure/rejection, infection.    PLAN:  Admit to transplant surgery  Bilateral LE duplex US to check for DVT  R kidney US to follow up on flank pain  Will follow up on infectious disease workup per ED  NS @ 100cc/hr  Renal diet    Discussed with attending Dr. Lopez.    Mariel Peck MD  PGY1 Abdominal Transplant Surgery  Pager [phone]  Available via secure chat    ==============================================================================  CHIEF COMPLAINT/REASON FOR CONSULT:  Patient is a 43 y/o female with PMH significant for ESRD 2/2 HTN and NSAIDs, that received a DDKT from 23 c/b washout on 23 and IR drain for perirenal fluid collection, now presenting to ED with several day history of anasarca and shortness of breath. Of note, she was recently admitted on - with fevers and pain at her RLQ DDKT site, infectious workup yielded a positive CMV  PCR (negative EK, EBV) and she was also found to have a 7mm kidney stone in the allograft on CT.     She recently saw Dr. Webber in clinic on 6/5/24  Her baseline Cr is 0.95 and GFR is 70-80.  Her immunosuppression regimen is  BID, Belacept monthly, prednisone 10mg every day, and she is also on Valcyte 900mg BID for her CMV.     She endorses making the same amount of urine but having profuse swelling across her limbs x4 and face and worsening shortness of breath. She does not take any diuretics at home. She also endorses a sharp pain in her R flank that radiates to her shoulder.    Pt is [place]-speaking only with her brother interpretting in the room for her.    ROS:  Remainder of 12 point ROS are negative unless explicitly stated above.        PAST MEDICAL HISTORY:   PMH:   Past Medical History:   Diagnosis Date    Anxiety     Chronic kidney disease     Chronic kidney disease, unspecified     CKD, patient interested in transplantation    Depression     Disease of thyroid gland     GERD (gastroesophageal reflux disease)     Hypertension     Personal history of COVID-19 07/20/2022    History of COVID-19     PSH:   Past Surgical History:   Procedure Laterality Date    MR HEAD ANGIO W AND WO IV CONTRAST  01/26/2021    MR HEAD ANGIO W AND WO IV CONTRAST    MR HEAD ANGIO WO IV CONTRAST  01/26/2021    MR HEAD ANGIO WO IV CONTRAST    OTHER SURGICAL HISTORY  07/20/2022    Arteriovenous fistula creation procedure    TRANSPLANT, KIDNEY, OPEN Right 11/30/2023    US GUIDED PERCUTANEOUS PERITONEAL OR RETROPERITONEAL FLUID COLLECTION DRAINAGE  12/21/2023    US GUIDED PERCUTANEOUS PERITONEAL OR RETROPERITONEAL FLUID COLLECTION DRAINAGE 12/21/2023 Batsheva Shanks MD [facility] [place] [facility]       ALLERGIES:   No Known Allergies    MEDICATIONS:   Prior to Admission medications    Medication Sig Start Date End Date Taking? Authorizing Provider   amLODIPine (Norvasc) 5 mg tablet Take 1 tablet (5 mg) by mouth 2 times a day. 4/25/24 4/25/25   Marcia Webber MD   azelastine (Optivar) 0.05 % ophthalmic solution Administer 1 drop into both eyes 2 times a day. 3/22/24 3/22/25  Marcia Webber MD   BELATACEPT IV Infuse into a venous catheter every 28 (twenty-eight) days. Maintenance infusion    Historical Provider, MD   calcitriol (Rocaltrol) 0.25 mcg capsule TAKE 1 TABLET BY MOUTH ONCE DAILY 6/14/24   Marcia Webber MD   Calcium Antacid 200 mg calcium (500 mg) chewable tablet CHEW AND SWALLOW 1 TABLET BY MOUTH 2 TIMES DAILY 5/23/24 5/23/25  Marcia Webber MD   cholecalciferol (Vitamin D-3) 50 MCG (2000 UT) tablet Take 2 tablets (100 mcg) by mouth once daily. 5/23/24 5/23/25  Marcia Webber MD   fexofenadine (Allegra) 180 mg tablet Take 1 tablet (180 mg) by mouth once daily. 3/22/24 3/22/25  Marcia Webber MD   gabapentin (Neurontin) 100 mg capsule Take 3 capsules (300 mg) by mouth once daily at bedtime. 4/25/24 4/25/25  Marcia Webber MD   levothyroxine (Synthroid, Levoxyl) 25 mcg tablet Take 1 tablet (25 mcg) by mouth once daily in the morning. Take before meals.    Historical Provider, MD   magnesium oxide (Mag-Ox) 400 mg tablet Take 1 tablet (400 mg) by mouth once daily. Take with food at lunch time 6/17/24 6/17/25  Bruna Wise MD   mycophenolate (Cellcept) 250 mg capsule Take 2 capsules (500 mg) by mouth every 12 hours. 6/5/24 6/5/25  Marcia Webber MD   pantoprazole (ProtoNix) 40 mg EC tablet Take 1 tablet (40 mg) by mouth once daily in the morning. Take before meals. Do not crush, chew, or split. 5/8/24 7/7/24  Bruna Wies MD   potassium citrate CR (Urocit-K-10) 10 mEq ER tablet Take 2 tablets (20 mEq) by mouth once daily. Do not crush, chew, or split. 6/17/24 6/17/25  Bruna Wise MD   predniSONE (Deltasone) 5 mg tablet Take 2 tablets (10 mg) by mouth once daily. 12/29/23 12/28/24  Anita Louis MD   sertraline (Zoloft) 50 mg tablet Take 1 tablet (50 mg) by mouth once daily.     Historical Provider, MD   SUMAtriptan (Imitrex) 100 mg tablet Take 1 tablet (100 mg) by mouth 1 time if needed for migraine. 4/25/24 4/26/25  Marcia Webber MD   tenofovir alafenamide (Vemlidy) 25 mg tablet tablet Take 1 tablet (25 mg) by mouth once daily. 12/19/23 12/18/24  Maryam Bhatt MD   valGANciclovir (Valcyte) 450 mg tablet Take 2 tablets (900 mg) by mouth every 12 hours. Do not crush or chew.  Patient not taking: Reported on 6/24/2024 6/5/24 6/5/25  Marcia Webber MD       PHYSICAL EXAM:   General: diffusely swollen, NAD, AOx4, conversive  HEENT: Atraumatic, no scleral icterus, PERRL  Cardiovascular: RRR on tele, capillary refill <3sec, 2+ palpable radial and DP pulses bilaterally  Pulmonary: satting 98% on room air; slightly tachypnic  Abdomen: soft, nondistended, nonperitonitic, flank tenderness on the right side  Extremities: 1+ pitting edema throughout of feet/ankles bilaterally  Neuro: A/O x4, no focal deficits  Psych: normal mood and behavior    IMAGING SUMMARY:    CXR performed in ED showed moderate pulmonary infiltrates, mild blunting of the R costophrenic angle, cardiomegaly (unchanged from prior)    LABS:  Results from last 7 days   Lab Units 07/02/24  1559 06/27/24  0823   WBC AUTO x10*3/uL 8.3 4.7   HEMOGLOBIN g/dL 11.4* 11.6*   HEMATOCRIT % 32.5* 36.0   PLATELETS AUTO x10*3/uL 202 302   NEUTROS PCT AUTO % 87.0 77.8   LYMPHS PCT AUTO % 3.4 8.2   MONOS PCT AUTO % 4.0 5.2   EOS PCT AUTO % 0.1 0.4         Results from last 7 days   Lab Units 07/02/24  1559 06/27/24  0823   SODIUM mmol/L 132* 137   POTASSIUM mmol/L 4.4 3.6   CHLORIDE mmol/L 107 109*   CO2 mmol/L 18* 21   BUN mg/dL 19 11   CREATININE mg/dL 1.60* 1.17*   CALCIUM mg/dL 9.0 8.4*   PROTEIN TOTAL g/dL 7.1  --    BILIRUBIN TOTAL mg/dL 0.7  --    ALK PHOS U/L 52  --    ALT U/L 12  --    AST U/L 15  --    GLUCOSE mg/dL 117* 81     Results from last 7 days   Lab Units 07/02/24  1559   BILIRUBIN TOTAL mg/dL 0.7             I have  reviewed all laboratory and imaging results ordered/pertinent for this encounter.    Mariel Peck MD  PGY1 Abdominal Transplant Surgery  Pager [phone]  Available via secure chat

## 2024-07-02 NOTE — ED TRIAGE NOTES
Pt to ED with c/o SOB x a couple of days but worsening yseterday, bilateral leg swelling x3-4 days. Pt has dry cough. PMH kidney transplant (Nov 2023). Hx of fluid overload when on dialysis.

## 2024-07-02 NOTE — Clinical Note
Tx Renal bx complete. 2 mg of versed and 100 mcg of fentanyl given throughout procedure. VSS. Dressing C/D/I. Report given to RPCU RN. Recovery for 1 hr per MD

## 2024-07-02 NOTE — TELEPHONE ENCOUNTER
? States patient does not look well. Difficulty in breathing, shortness of breath. Doesn't know what to do. Been going on the past few days

## 2024-07-02 NOTE — ED PROVIDER NOTES
CC: Shortness of Breath and Leg Swelling     HPI:   Patient is a 44-year-old female with history of ESRD secondary to hypertension and NSAID status post DDKT 11/30/2023 presenting due to 3 to 4 days of bilateral lower extremity swelling and shortness of breath.  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  Over the past 2 to 3 days patient has had increased swelling from her knees down and has had generalized weakness, decreased appetite.  Patient is also felt shortness of breath with a dry cough.  Patient has not missed any doses of her immunosuppressive regimen and last got a transfusion at the end of June as part of her immunosuppressive regimen.  Patient has been taking her mycophenolate and prednisone as directed.  She has not had any episodes of nausea or vomiting and denies any fevers or chills.  Patient's brother who is at bedside provides most of the history as she is primarily Mosotho speaking.  Patient is noted to be slightly tachypneic on initial evaluation and minimally tachycardic.  Patient also was told that she has a kidney stone in her transplant and has sharp shooting pain every time she coughs.  She denies any hematuria, urinary frequency or urgency but does report decreased urine output as a result of her taking in less p.o.    Limitations to History: Mosotho speaking  Additional History Obtained from: Brother    PMHx/PSHx:  Per HPI.   - has a past medical history of Anxiety, Chronic kidney disease, Chronic kidney disease, unspecified, Depression, Disease of thyroid gland, GERD (gastroesophageal reflux disease), Hypertension, and Personal history of COVID-19 (07/20/2022).  - has a past surgical history that includes Other surgical history (07/20/2022); MR angio head w and wo IV contrast (01/26/2021); MR angio head wo IV contrast (01/26/2021); transplant, kidney, open (Right, 11/30/2023); and US guided  percutaneous peritoneal or retroperitoneal fluid collection drainage (12/21/2023).    Social History:  - Tobacco:  reports that she has never smoked. She quit smokeless tobacco use about 3 years ago.   - Alcohol:  reports no history of alcohol use.   - Drugs:  reports no history of drug use.     Medications: Reviewed in EMR.     Allergies:  Patient has no known allergies.    ???????????????????????????????????????????????????????????????  Triage Vitals:  T 36.6 °C (97.8 °F)  HR 93  /87  RR 17  O2 96 % None (Room air)    Physical Exam  Vitals and nursing note reviewed.   Constitutional:       General: She is not in acute distress.     Appearance: She is well-developed.   HENT:      Head: Normocephalic and atraumatic.      Mouth/Throat:      Mouth: Mucous membranes are dry.      Pharynx: Oropharynx is clear.   Eyes:      Conjunctiva/sclera: Conjunctivae normal.   Cardiovascular:      Rate and Rhythm: Normal rate and regular rhythm.      Heart sounds: No murmur heard.  Pulmonary:      Effort: Tachypnea (slight tachypnea) present. No respiratory distress.      Breath sounds: Normal breath sounds. No wheezing, rhonchi or rales.   Abdominal:      General: There is no distension.      Palpations: Abdomen is soft.      Tenderness: There is abdominal tenderness (over Trasnplant site that is c/d/i). There is no guarding or rebound.   Musculoskeletal:         General: No swelling or tenderness.      Cervical back: Neck supple.   Skin:     General: Skin is warm and dry.      Capillary Refill: Capillary refill takes less than 2 seconds.   Neurological:      Mental Status: She is alert and oriented to person, place, and time.      Sensory: No sensory deficit.      Motor: No weakness.      Gait: Gait normal.       ???????????????????????????????????????????????????????????????  EKG (per my interpretation):  Sinus rhythm with a rate of 88.  No PA or QRS prolongation.  QTc 430.  No acute ST or T wave changes noted.  No  LELA.     ED Course  Diagnoses as of 24 1711   Shortness of breath   Elevated brain natriuretic peptide (BNP) level   Complication of transplanted kidney, unspecified complication (HHS-HCC)   Bilateral edema of lower extremity       Medical Decision Making:  Patient is a 44-year-old female with past medical history of ESRD status post  donor kidney transplant 2023 presenting due to shortness of breath and increased bilateral lower extremity swelling.  Differentials considered but not limited to dehydration, MARK, UTI, transplant rejection, heart failure, ACS.    Based on patient's history and examination patient initiated and transferred to medicine was consulted immediately.  Patient's lab work was significant for mildly decreased bicarb without an anion gap and elevated creatinine.  BNP is also elevated at 272 without any prior records to compare it to.  Given the fact that patient is coming in due to concerns about her kidney transplant, blood cultures were also obtained. Patient does not have a white count at this time and transplant surgery recommended getting bilateral lower extremity DVT ultrasounds along with ultrasound of the patient's kidney.  Patient was placed on telemetry monitoring with appropriate blood pressure and pulse ox.  EKG is unremarkable.  Urinalysis does not show any acute signs of infection at this time.  Chest x-ray showed some amount of vascular congestion I did ask if we should diurese the patient.  Will defer to inpatient team at this time.  Patient was admitted in hemodynamically stable condition brother was updated at bedside.  Patient care was overseen by attending physician agrees with the plan and disposition.    External records reviewed: recent inpatient, clinic, and prior ED notes  Diagnostic imaging independently reviewed/interpreted by me (as reflected in MDM) includes: DVT US, Kidney US  Social Determinants Affecting Care:  language barrier  Discussion of  management with other providers: attending, transplant surgery  Prescription Drug Consideration: none  Escalation of Care: admission    Impression:   Transplant complication  SOB  LE edema  Elevated Cr    Disposition: Admitted      Procedures ? SmartLinks last updated 7/2/2024 5:11 PM     Karen Sandhu  PGY-2 Emergency Medicine  [facility]     Karen Sandhu MD  Resident  07/02/24 4074

## 2024-07-02 NOTE — TELEPHONE ENCOUNTER
Called and spoke with patient.  She stated shortness of breath not emergent.  Confirmed swelling.  Encouraged patient to come to our ED.  Dr. Webber and Dr. Lopez notified.

## 2024-07-02 NOTE — Clinical Note
Tx Renal bx complete. 2 mg of versed and 100 mcg of fentanyl given throughout procedure. 2 cores taken. VSS. Dressing C/D/I. Report given to RPCU RN. Pt in transport back to room

## 2024-07-03 ENCOUNTER — APPOINTMENT (OUTPATIENT)
Dept: CARDIOLOGY | Facility: HOSPITAL | Age: 44
DRG: 699 | End: 2024-07-03
Payer: COMMERCIAL

## 2024-07-03 DIAGNOSIS — R06.02 SHORTNESS OF BREATH: Primary | ICD-10-CM

## 2024-07-03 LAB
ALBUMIN SERPL BCP-MCNC: 3.4 G/DL (ref 3.4–5)
ANION GAP SERPL CALC-SCNC: 11 MMOL/L (ref 10–20)
AORTIC VALVE MEAN GRADIENT: 8 MMHG
AORTIC VALVE PEAK VELOCITY: 2.18 M/S
ATRIAL RATE: 88 BPM
AV PEAK GRADIENT: 19 MMHG
AVA (PEAK VEL): 1.76 CM2
AVA (VTI): 2.25 CM2
BUN SERPL-MCNC: 18 MG/DL (ref 6–23)
CALCIUM SERPL-MCNC: 8.7 MG/DL (ref 8.6–10.6)
CHLORIDE SERPL-SCNC: 110 MMOL/L (ref 98–107)
CO2 SERPL-SCNC: 20 MMOL/L (ref 21–32)
CREAT SERPL-MCNC: 1.62 MG/DL (ref 0.5–1.05)
EGFRCR SERPLBLD CKD-EPI 2021: 40 ML/MIN/1.73M*2
EJECTION FRACTION APICAL 4 CHAMBER: 70.7
EJECTION FRACTION: 68 %
ERYTHROCYTE [DISTWIDTH] IN BLOOD BY AUTOMATED COUNT: 16.5 % (ref 11.5–14.5)
GLUCOSE SERPL-MCNC: 91 MG/DL (ref 74–99)
HCT VFR BLD AUTO: 29.9 % (ref 36–46)
HGB BLD-MCNC: 10.5 G/DL (ref 12–16)
HLA RESULTS: NORMAL
HLA-A+B+C AB NFR SER: NORMAL %
HLA-DP+DQ+DR AB NFR SER: NORMAL %
HOLD SPECIMEN: NORMAL
LEFT VENTRICLE INTERNAL DIMENSION DIASTOLE: 5.2 CM (ref 3.5–6)
LEFT VENTRICULAR OUTFLOW TRACT DIAMETER: 1.9 CM
MAGNESIUM SERPL-MCNC: 1.8 MG/DL (ref 1.6–2.4)
MCH RBC QN AUTO: 30.7 PG (ref 26–34)
MCHC RBC AUTO-ENTMCNC: 35.1 G/DL (ref 32–36)
MCV RBC AUTO: 87 FL (ref 80–100)
MITRAL VALVE E/A RATIO: 1.55
MITRAL VALVE E/E' RATIO: 14.21
NRBC BLD-RTO: 0 /100 WBCS (ref 0–0)
P AXIS: 62 DEGREES
P OFFSET: 198 MS
P ONSET: 159 MS
PHOSPHATE SERPL-MCNC: 3.6 MG/DL (ref 2.5–4.9)
PLATELET # BLD AUTO: 202 X10*3/UL (ref 150–450)
POTASSIUM SERPL-SCNC: 3.8 MMOL/L (ref 3.5–5.3)
PR INTERVAL: 130 MS
Q ONSET: 224 MS
QRS COUNT: 14 BEATS
QRS DURATION: 78 MS
QT INTERVAL: 356 MS
QTC CALCULATION(BAZETT): 430 MS
QTC FREDERICIA: 404 MS
R AXIS: 47 DEGREES
RBC # BLD AUTO: 3.42 X10*6/UL (ref 4–5.2)
RIGHT VENTRICLE FREE WALL PEAK S': 20.1 CM/S
SODIUM SERPL-SCNC: 137 MMOL/L (ref 136–145)
T AXIS: 53 DEGREES
T OFFSET: 402 MS
TRICUSPID ANNULAR PLANE SYSTOLIC EXCURSION: 2.6 CM
VENTRICULAR RATE: 88 BPM
WBC # BLD AUTO: 8.5 X10*3/UL (ref 4.4–11.3)

## 2024-07-03 PROCEDURE — 36415 COLL VENOUS BLD VENIPUNCTURE: CPT | Performed by: STUDENT IN AN ORGANIZED HEALTH CARE EDUCATION/TRAINING PROGRAM

## 2024-07-03 PROCEDURE — 99232 SBSQ HOSP IP/OBS MODERATE 35: CPT | Performed by: TRANSPLANT SURGERY

## 2024-07-03 PROCEDURE — 2500000002 HC RX 250 W HCPCS SELF ADMINISTERED DRUGS (ALT 637 FOR MEDICARE OP, ALT 636 FOR OP/ED): Performed by: STUDENT IN AN ORGANIZED HEALTH CARE EDUCATION/TRAINING PROGRAM

## 2024-07-03 PROCEDURE — 99222 1ST HOSP IP/OBS MODERATE 55: CPT | Performed by: INTERNAL MEDICINE

## 2024-07-03 PROCEDURE — 83735 ASSAY OF MAGNESIUM: CPT | Performed by: STUDENT IN AN ORGANIZED HEALTH CARE EDUCATION/TRAINING PROGRAM

## 2024-07-03 PROCEDURE — 80069 RENAL FUNCTION PANEL: CPT | Performed by: STUDENT IN AN ORGANIZED HEALTH CARE EDUCATION/TRAINING PROGRAM

## 2024-07-03 PROCEDURE — 85027 COMPLETE CBC AUTOMATED: CPT | Performed by: STUDENT IN AN ORGANIZED HEALTH CARE EDUCATION/TRAINING PROGRAM

## 2024-07-03 PROCEDURE — 2500000004 HC RX 250 GENERAL PHARMACY W/ HCPCS (ALT 636 FOR OP/ED): Performed by: STUDENT IN AN ORGANIZED HEALTH CARE EDUCATION/TRAINING PROGRAM

## 2024-07-03 PROCEDURE — 2500000004 HC RX 250 GENERAL PHARMACY W/ HCPCS (ALT 636 FOR OP/ED)

## 2024-07-03 PROCEDURE — 2500000001 HC RX 250 WO HCPCS SELF ADMINISTERED DRUGS (ALT 637 FOR MEDICARE OP)

## 2024-07-03 PROCEDURE — 93306 TTE W/DOPPLER COMPLETE: CPT

## 2024-07-03 PROCEDURE — 93306 TTE W/DOPPLER COMPLETE: CPT | Performed by: INTERNAL MEDICINE

## 2024-07-03 PROCEDURE — 2500000001 HC RX 250 WO HCPCS SELF ADMINISTERED DRUGS (ALT 637 FOR MEDICARE OP): Performed by: STUDENT IN AN ORGANIZED HEALTH CARE EDUCATION/TRAINING PROGRAM

## 2024-07-03 PROCEDURE — 2500000004 HC RX 250 GENERAL PHARMACY W/ HCPCS (ALT 636 FOR OP/ED): Performed by: INTERNAL MEDICINE

## 2024-07-03 PROCEDURE — 1200000002 HC GENERAL ROOM WITH TELEMETRY DAILY

## 2024-07-03 PROCEDURE — 36415 COLL VENOUS BLD VENIPUNCTURE: CPT

## 2024-07-03 PROCEDURE — 86832 HLA CLASS I HIGH DEFIN QUAL: CPT

## 2024-07-03 RX ORDER — ALBUTEROL SULFATE 90 UG/1
2 AEROSOL, METERED RESPIRATORY (INHALATION) EVERY 4 HOURS PRN
Status: ON HOLD | COMMUNITY

## 2024-07-03 RX ORDER — ACETAMINOPHEN 325 MG/1
650 TABLET ORAL EVERY 6 HOURS PRN
Status: ON HOLD | COMMUNITY

## 2024-07-03 RX ORDER — MAGNESIUM SULFATE HEPTAHYDRATE 40 MG/ML
2 INJECTION, SOLUTION INTRAVENOUS ONCE
Status: COMPLETED | OUTPATIENT
Start: 2024-07-03 | End: 2024-07-03

## 2024-07-03 RX ORDER — FUROSEMIDE 10 MG/ML
40 INJECTION INTRAMUSCULAR; INTRAVENOUS ONCE
Status: COMPLETED | OUTPATIENT
Start: 2024-07-03 | End: 2024-07-03

## 2024-07-03 RX ORDER — TALC
3 POWDER (GRAM) TOPICAL NIGHTLY PRN
Status: DISCONTINUED | OUTPATIENT
Start: 2024-07-03 | End: 2024-07-19 | Stop reason: HOSPADM

## 2024-07-03 RX ORDER — HEPARIN SODIUM 5000 [USP'U]/ML
5000 INJECTION, SOLUTION INTRAVENOUS; SUBCUTANEOUS ONCE
Status: DISCONTINUED | OUTPATIENT
Start: 2024-07-03 | End: 2024-07-03 | Stop reason: SDUPTHER

## 2024-07-03 SDOH — HEALTH STABILITY: MENTAL HEALTH
HOW OFTEN DO YOU NEED TO HAVE SOMEONE HELP YOU WHEN YOU READ INSTRUCTIONS, PAMPHLETS, OR OTHER WRITTEN MATERIAL FROM YOUR DOCTOR OR PHARMACY?: NEVER

## 2024-07-03 SDOH — SOCIAL STABILITY: SOCIAL INSECURITY
WITHIN THE LAST YEAR, HAVE YOU BEEN KICKED, HIT, SLAPPED, OR OTHERWISE PHYSICALLY HURT BY YOUR PARTNER OR EX-PARTNER?: NO

## 2024-07-03 SDOH — SOCIAL STABILITY: SOCIAL INSECURITY
WITHIN THE LAST YEAR, HAVE TO BEEN RAPED OR FORCED TO HAVE ANY KIND OF SEXUAL ACTIVITY BY YOUR PARTNER OR EX-PARTNER?: NO

## 2024-07-03 SDOH — SOCIAL STABILITY: SOCIAL INSECURITY: DO YOU FEEL ANYONE HAS EXPLOITED OR TAKEN ADVANTAGE OF YOU FINANCIALLY OR OF YOUR PERSONAL PROPERTY?: NO

## 2024-07-03 SDOH — ECONOMIC STABILITY: FOOD INSECURITY: WITHIN THE PAST 12 MONTHS, THE FOOD YOU BOUGHT JUST DIDN'T LAST AND YOU DIDN'T HAVE MONEY TO GET MORE.: NEVER TRUE

## 2024-07-03 SDOH — SOCIAL STABILITY: SOCIAL NETWORK: HOW OFTEN DO YOU GET TOGETHER WITH FRIENDS OR RELATIVES?: MORE THAN THREE TIMES A WEEK

## 2024-07-03 SDOH — SOCIAL STABILITY: SOCIAL INSECURITY: ARE THERE ANY APPARENT SIGNS OF INJURIES/BEHAVIORS THAT COULD BE RELATED TO ABUSE/NEGLECT?: NO

## 2024-07-03 SDOH — SOCIAL STABILITY: SOCIAL INSECURITY: DO YOU FEEL UNSAFE GOING BACK TO THE PLACE WHERE YOU ARE LIVING?: NO

## 2024-07-03 SDOH — ECONOMIC STABILITY: INCOME INSECURITY: IN THE PAST 12 MONTHS, HAS THE ELECTRIC, GAS, OIL, OR WATER COMPANY THREATENED TO SHUT OFF SERVICE IN YOUR HOME?: NO

## 2024-07-03 SDOH — SOCIAL STABILITY: SOCIAL INSECURITY: WITHIN THE LAST YEAR, HAVE YOU BEEN HUMILIATED OR EMOTIONALLY ABUSED IN OTHER WAYS BY YOUR PARTNER OR EX-PARTNER?: NO

## 2024-07-03 SDOH — SOCIAL STABILITY: SOCIAL NETWORK
DO YOU BELONG TO ANY CLUBS OR ORGANIZATIONS SUCH AS CHURCH GROUPS UNIONS, FRATERNAL OR ATHLETIC GROUPS, OR SCHOOL GROUPS?: PATIENT DECLINED

## 2024-07-03 SDOH — SOCIAL STABILITY: SOCIAL INSECURITY: WITHIN THE LAST YEAR, HAVE YOU BEEN AFRAID OF YOUR PARTNER OR EX-PARTNER?: NO

## 2024-07-03 SDOH — HEALTH STABILITY: MENTAL HEALTH
STRESS IS WHEN SOMEONE FEELS TENSE, NERVOUS, ANXIOUS, OR CAN'T SLEEP AT NIGHT BECAUSE THEIR MIND IS TROUBLED. HOW STRESSED ARE YOU?: NOT AT ALL

## 2024-07-03 SDOH — SOCIAL STABILITY: SOCIAL INSECURITY: HAS ANYONE EVER THREATENED TO HURT YOUR FAMILY OR YOUR PETS?: NO

## 2024-07-03 SDOH — SOCIAL STABILITY: SOCIAL NETWORK: ARE YOU MARRIED, WIDOWED, DIVORCED, SEPARATED, NEVER MARRIED, OR LIVING WITH A PARTNER?: MARRIED

## 2024-07-03 SDOH — SOCIAL STABILITY: SOCIAL INSECURITY: HAVE YOU HAD ANY THOUGHTS OF HARMING ANYONE ELSE?: NO

## 2024-07-03 SDOH — SOCIAL STABILITY: SOCIAL INSECURITY: ABUSE: ADULT

## 2024-07-03 SDOH — ECONOMIC STABILITY: FOOD INSECURITY: WITHIN THE PAST 12 MONTHS, YOU WORRIED THAT YOUR FOOD WOULD RUN OUT BEFORE YOU GOT MONEY TO BUY MORE.: NEVER TRUE

## 2024-07-03 SDOH — SOCIAL STABILITY: SOCIAL NETWORK: HOW OFTEN DO YOU ATTENT MEETINGS OF THE CLUB OR ORGANIZATION YOU BELONG TO?: PATIENT DECLINED

## 2024-07-03 SDOH — SOCIAL STABILITY: SOCIAL INSECURITY: HAVE YOU HAD THOUGHTS OF HARMING ANYONE ELSE?: NO

## 2024-07-03 SDOH — HEALTH STABILITY: PHYSICAL HEALTH: ON AVERAGE, HOW MANY MINUTES DO YOU ENGAGE IN EXERCISE AT THIS LEVEL?: 0 MIN

## 2024-07-03 SDOH — HEALTH STABILITY: PHYSICAL HEALTH: ON AVERAGE, HOW MANY DAYS PER WEEK DO YOU ENGAGE IN MODERATE TO STRENUOUS EXERCISE (LIKE A BRISK WALK)?: 0 DAYS

## 2024-07-03 SDOH — SOCIAL STABILITY: SOCIAL INSECURITY: DOES ANYONE TRY TO KEEP YOU FROM HAVING/CONTACTING OTHER FRIENDS OR DOING THINGS OUTSIDE YOUR HOME?: NO

## 2024-07-03 SDOH — SOCIAL STABILITY: SOCIAL NETWORK
IN A TYPICAL WEEK, HOW MANY TIMES DO YOU TALK ON THE PHONE WITH FAMILY, FRIENDS, OR NEIGHBORS?: MORE THAN THREE TIMES A WEEK

## 2024-07-03 SDOH — SOCIAL STABILITY: SOCIAL NETWORK: HOW OFTEN DO YOU ATTEND CHURCH OR RELIGIOUS SERVICES?: PATIENT DECLINED

## 2024-07-03 SDOH — SOCIAL STABILITY: SOCIAL INSECURITY: ARE YOU OR HAVE YOU BEEN THREATENED OR ABUSED PHYSICALLY, EMOTIONALLY, OR SEXUALLY BY ANYONE?: NO

## 2024-07-03 ASSESSMENT — COGNITIVE AND FUNCTIONAL STATUS - GENERAL
DAILY ACTIVITIY SCORE: 24
PATIENT BASELINE BEDBOUND: NO
DAILY ACTIVITIY SCORE: 24
MOBILITY SCORE: 24
MOBILITY SCORE: 24

## 2024-07-03 ASSESSMENT — ACTIVITIES OF DAILY LIVING (ADL)
DRESSING YOURSELF: INDEPENDENT
HEARING - RIGHT EAR: FUNCTIONAL
PATIENT'S MEMORY ADEQUATE TO SAFELY COMPLETE DAILY ACTIVITIES?: YES
LACK_OF_TRANSPORTATION: NO
HEARING - LEFT EAR: FUNCTIONAL
FEEDING YOURSELF: INDEPENDENT
TOILETING: INDEPENDENT
BATHING: INDEPENDENT
GROOMING: INDEPENDENT
JUDGMENT_ADEQUATE_SAFELY_COMPLETE_DAILY_ACTIVITIES: YES
ADEQUATE_TO_COMPLETE_ADL: YES
WALKS IN HOME: INDEPENDENT

## 2024-07-03 ASSESSMENT — PAIN SCALES - GENERAL
PAINLEVEL_OUTOF10: 5 - MODERATE PAIN
PAINLEVEL_OUTOF10: 0 - NO PAIN

## 2024-07-03 ASSESSMENT — PATIENT HEALTH QUESTIONNAIRE - PHQ9
1. LITTLE INTEREST OR PLEASURE IN DOING THINGS: NOT AT ALL
2. FEELING DOWN, DEPRESSED OR HOPELESS: NOT AT ALL
SUM OF ALL RESPONSES TO PHQ9 QUESTIONS 1 & 2: 0

## 2024-07-03 ASSESSMENT — PAIN - FUNCTIONAL ASSESSMENT
PAIN_FUNCTIONAL_ASSESSMENT: 0-10
PAIN_FUNCTIONAL_ASSESSMENT: 0-10

## 2024-07-03 ASSESSMENT — LIFESTYLE VARIABLES
SUBSTANCE_ABUSE_PAST_12_MONTHS: NO
PRESCIPTION_ABUSE_PAST_12_MONTHS: NO
HOW OFTEN DO YOU HAVE 6 OR MORE DRINKS ON ONE OCCASION: NEVER
SKIP TO QUESTIONS 9-10: 1
AUDIT-C TOTAL SCORE: 0
HOW MANY STANDARD DRINKS CONTAINING ALCOHOL DO YOU HAVE ON A TYPICAL DAY: PATIENT DOES NOT DRINK
AUDIT-C TOTAL SCORE: 0
HOW OFTEN DO YOU HAVE A DRINK CONTAINING ALCOHOL: NEVER

## 2024-07-03 NOTE — PROGRESS NOTES
Pharmacy Admission Order Reconciliation Review    Herber Foy Rai is a 44 y.o. female admitted for Shortness of breath. Pharmacy reviewed the patient's unreconciled admission medications.    Prior to admission medications that were reviewed and acted on by the pharmacist include:  acetaminophen  These medications have been reconciled.     Any other unreconcilied medications have been addressed and will be ordered or held by the patient's medical team. Medications addressed by the pharmacist may be added or changed by the patient's medical team at any time.    Breann Abbott, PharmD  Transitions of Care Pharmacist  [facility]  Please reach out via Secure Chat for questions

## 2024-07-03 NOTE — CONSULTS
INPATIENT INITIAL TRANSPLANT NEPHROLOGY CONSULT          SERVICE DATE: 7/3/2024   SERVICE TIME:  9:51 AM    REASON FOR CONSULT:  Immunosuppressive medication management and nephrology related issues.    REQUESTING PHYSICIAN: Don Lopez MD;Gwyn Jackson*  PRIMARY CARE PHYSICIAN: No primary care provider on file.    ADMISSION DIAGNOSIS:   1. Shortness of breath    2. Elevated brain natriuretic peptide (BNP) level    3. Complication of transplanted kidney, unspecified complication (HHS-HCC)    4. Bilateral edema of lower extremity        TRANSPLANT DATE: 2023 (Kidney)    BLOOD TYPE: B    HPI:    Ms. Leblanc is a 44 y.o. female with past medical history significant for ESRD secondary to HTN/NSAID whom received a  donor kidney transplant on 23 by Dr. Marbin Lambert & Dr. Louis with a KDPI of 56% and PRA of 48%. HCV -/- and donor has not met risk factors. EBV +/+. 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 Dry weight is 81.1 kg (discharge weight is 76.2kg ). Pt received a total of 4.5 mg/kg total of thymoglobulin induction therapy in conjunction with 500mg IV solumedrol. Pt was initiated on Tacrolimus & Cellcept immunosuppression in conjunction with IV solumedrol taper. 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 Pt was started on valcyte (CMV D + / R + ) and bactrim for CMV & PCP prophylaxis per protocol. She was started on clotrimazole as antifungal coverage per protocol. Operative course was complicated by return to OR for exploration of transplanted kidney and washout of hematoma. Hospital course was complicated by post-operative pain and constipation, which has resolved.     24 Direct admission for fever, nausea, vomiting and abdominal pain (chronic).  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 ID consulted. She is on Valcyte treatment dose.    Patient has 1+ pitting edema of her ankles and  some mild swelling of UEs. Her UOP is unchanged, however she's endorsing some R flank tenderness that could be consistent with her known R sided kidney stone. At this time, this is likely an acute MARK, however an infectious workup was started in the ED. Will admit the patient for hydration, and monitoring of kidney function in the setting of Cr 1.6 (baseline 0.8), mild LE edema. Imaging pending to rule out Ddx of DVTs, transplant failure/rejection, infection.     Transplant nephrology is consulted to assist with immunosuppressive medication management and nephrology related issues.      REVIEW OF SYSTEM:  Review of system was done system by system (10/14). Apart from HPI, other symptoms were negative.    PAST MEDICAL HISTORY:  Past Medical History:   Diagnosis Date    Anxiety     Chronic kidney disease     Chronic kidney disease, unspecified     CKD, patient interested in transplantation    Depression     Disease of thyroid gland     GERD (gastroesophageal reflux disease)     Hypertension     Personal history of COVID-19 07/20/2022    History of COVID-19        PAST SURGICAL HISTORY:  Past Surgical History:   Procedure Laterality Date    MR HEAD ANGIO W AND WO IV CONTRAST  01/26/2021    MR HEAD ANGIO W AND WO IV CONTRAST    MR HEAD ANGIO WO IV CONTRAST  01/26/2021    MR HEAD ANGIO WO IV CONTRAST    OTHER SURGICAL HISTORY  07/20/2022    Arteriovenous fistula creation procedure    TRANSPLANT, KIDNEY, OPEN Right 11/30/2023    US GUIDED PERCUTANEOUS PERITONEAL OR RETROPERITONEAL FLUID COLLECTION DRAINAGE  12/21/2023    US GUIDED PERCUTANEOUS PERITONEAL OR RETROPERITONEAL FLUID COLLECTION DRAINAGE 12/21/2023 Batsheva Shanks MD [facility]        SOCIAL HISTORY:  Social History     Socioeconomic History    Marital status:      Spouse name: Not on file    Number of children: Not on file    Years of education: Not on file    Highest education level: Not on file   Occupational History    Not on file   Tobacco Use    Smoking  status: Never    Smokeless tobacco: Former     Quit date: 2021    Tobacco comments:     Chewing tobacco, quit 1 year ago   Vaping Use    Vaping status: Never Used   Substance and Sexual Activity    Alcohol use: Never    Drug use: Never    Sexual activity: Defer   Other Topics Concern    Not on file   Social History Narrative    Not on file     Social Determinants of Health     Financial Resource Strain: Low Risk  (5/24/2024)    Overall Financial Resource Strain (CARDIA)     Difficulty of Paying Living Expenses: Not very hard   Food Insecurity: No Food Insecurity (1/22/2024)    Received from [employer]    Hunger Vital Sign     Worried About Running Out of Food in the Last Year: Never true     Ran Out of Food in the Last Year: Never true   Transportation Needs: No Transportation Needs (5/24/2024)    PRAPARE - Transportation     Lack of Transportation (Medical): No     Lack of Transportation (Non-Medical): No   Physical Activity: Not on File (8/20/2019)    Received from [employer]     Physical Activity     Physical Activity: 0   Stress: No Stress Concern Present (5/29/2024)    [employer] - Occupational Stress Questionnaire     Feeling of Stress : Not at all   Social Connections: Feeling Somewhat Isolated (1/16/2024)    OASIS : Social Isolation     Frequency of experiencing loneliness or isolation: Sometimes   Intimate Partner Violence: Not on file   Housing Stability: Low Risk  (5/24/2024)    Housing Stability Vital Sign     Unable to Pay for Housing in the Last Year: No     Number of Places Lived in the Last Year: 1     Unstable Housing in the Last Year: No       FAMILY HISTORY:  No family history on file.    MEDICATION LIST:  amLODIPine, 5 mg, BID  calcitriol, 0.25 mcg, Daily  calcium carbonate, 250 mg, Daily  cholecalciferol, 4,000 Units, Daily  gabapentin, 300 mg, Nightly  heparin (porcine), 5,000 Units, q8h JANIE  levothyroxine, 25 mcg, Daily before breakfast  loratadine, 10 mg,  "Daily  magnesium oxide, 400 mg, Daily  magnesium sulfate, 2 g, Once  mycophenolate, 500 mg, q12h  pantoprazole, 40 mg, Daily before breakfast  [Held by provider] potassium citrate CR, 20 mEq, Daily  predniSONE, 10 mg, Daily  sennosides-docusate sodium, 2 tablet, BID  sertraline, 50 mg, Daily  tenofovir alafenamide, 25 mg, Daily      sodium chloride 0.9%, Last Rate: Stopped (07/03/24 0904)      acetaminophen, 650 mg, q4h PRN  naloxone, 0.2 mg, q5 min PRN  ondansetron ODT, 4 mg, q8h PRN   Or  ondansetron, 4 mg, q8h PRN  oxyCODONE, 5 mg, q4h PRN  SUMAtriptan, 100 mg, Once PRN        ALLERGY:  No Known Allergies    PHYCISCAL EXAMINATION:  Visit Vitals  /83   Pulse 87   Temp 36.6 °C (97.8 °F) (Temporal)   Resp 19   Ht 1.651 m (5' 5\")   Wt 79.8 kg (176 lb)   SpO2 97%   BMI 29.29 kg/m²   Smoking Status Never   BSA 1.91 m²        No intake/output data recorded.       General Appearance - NAD, Good speech, oriented and alert  HEENT - Supple. Not pale. No jaundice. No cervical lymphadenopathy. Pharynx and tonsils are not injected.  CVS - RRR. Normal S1/S2. No murmur, click , rub or gallop  Lungs- clear to auscultation bilaterally  Abdomen - soft , not tender, no guarding, no rigidity. No hepatosplenomegaly. Normal bowel sounds. No masses and ascites. S/P Kidney transplant .  Transplanted kidney is not tender.   Musculoskeletal /Extremities - no edema. Full ROM. No joint tenderness.   Neuro/Psych - appropriate mood and affect. Motor power V/V all extremities. CN I -XII were grossly intact.  Skin - No visible rash    LABS:  Results for orders placed or performed during the hospital encounter of 07/02/24 (from the past 24 hour(s))   Blood Gas Venous Full Panel Unsolicited   Result Value Ref Range    POCT pH, Venous 7.40 7.33 - 7.43 pH    POCT pCO2, Venous 29 (L) 41 - 51 mm Hg    POCT pO2, Venous 83 (H) 35 - 45 mm Hg    POCT SO2, Venous 98 (H) 45 - 75 %    POCT Oxy Hemoglobin, Venous 95.3 (H) 45.0 - 75.0 %    POCT " Hematocrit Calculated, Venous 35.0 (L) 36.0 - 46.0 %    POCT Sodium, Venous 132 (L) 136 - 145 mmol/L    POCT Potassium, Venous 4.7 3.5 - 5.3 mmol/L    POCT Chloride, Venous 108 (H) 98 - 107 mmol/L    POCT Ionized Calicum, Venous 1.27 1.10 - 1.33 mmol/L    POCT Glucose, Venous 119 (H) 74 - 99 mg/dL    POCT Lactate, Venous 1.1 0.4 - 2.0 mmol/L    POCT Base Excess, Venous -5.7 (L) -2.0 - 3.0 mmol/L    POCT HCO3 Calculated, Venous 18.0 (L) 22.0 - 26.0 mmol/L    POCT Hemoglobin, Venous 11.8 (L) 12.0 - 16.0 g/dL    POCT Anion Gap, Venous 11.0 10.0 - 25.0 mmol/L    Patient Temperature 37.0 degrees Celsius   CBC with Differential   Result Value Ref Range    WBC 8.3 4.4 - 11.3 x10*3/uL    nRBC 0.0 0.0 - 0.0 /100 WBCs    RBC 3.65 (L) 4.00 - 5.20 x10*6/uL    Hemoglobin 11.4 (L) 12.0 - 16.0 g/dL    Hematocrit 32.5 (L) 36.0 - 46.0 %    MCV 89 80 - 100 fL    MCH 31.2 26.0 - 34.0 pg    MCHC 35.1 32.0 - 36.0 g/dL    RDW 16.5 (H) 11.5 - 14.5 %    Platelets 202 150 - 450 x10*3/uL    Neutrophils % 87.0 40.0 - 80.0 %    Immature Granulocytes %, Automated 4.7 (H) 0.0 - 0.9 %    Lymphocytes % 3.4 13.0 - 44.0 %    Monocytes % 4.0 2.0 - 10.0 %    Eosinophils % 0.1 0.0 - 6.0 %    Basophils % 0.8 0.0 - 2.0 %    Neutrophils Absolute 7.23 1.20 - 7.70 x10*3/uL    Immature Granulocytes Absolute, Automated 0.39 0.00 - 0.70 x10*3/uL    Lymphocytes Absolute 0.28 (L) 1.20 - 4.80 x10*3/uL    Monocytes Absolute 0.33 0.10 - 1.00 x10*3/uL    Eosinophils Absolute 0.01 0.00 - 0.70 x10*3/uL    Basophils Absolute 0.07 0.00 - 0.10 x10*3/uL   Comprehensive Metabolic Panel   Result Value Ref Range    Glucose 117 (H) 74 - 99 mg/dL    Sodium 132 (L) 136 - 145 mmol/L    Potassium 4.4 3.5 - 5.3 mmol/L    Chloride 107 98 - 107 mmol/L    Bicarbonate 18 (L) 21 - 32 mmol/L    Anion Gap 11 10 - 20 mmol/L    Urea Nitrogen 19 6 - 23 mg/dL    Creatinine 1.60 (H) 0.50 - 1.05 mg/dL    eGFR 40 (L) >60 mL/min/1.73m*2    Calcium 9.0 8.6 - 10.6 mg/dL    Albumin 3.7 3.4 - 5.0  g/dL    Alkaline Phosphatase 52 33 - 110 U/L    Total Protein 7.1 6.4 - 8.2 g/dL    AST 15 9 - 39 U/L    Bilirubin, Total 0.7 0.0 - 1.2 mg/dL    ALT 12 7 - 45 U/L   Brain Natriuretic Peptide   Result Value Ref Range     (H) 0 - 99 pg/mL   Troponin I, High Sensitivity, Initial   Result Value Ref Range    Troponin I, High Sensitivity (CMC) 5 0 - 34 ng/L   Urinalysis with Reflex Culture and Microscopic   Result Value Ref Range    Color, Urine Colorless (N) Light-Yellow, Yellow, Dark-Yellow    Appearance, Urine Clear Clear    Specific Gravity, Urine 1.004 (N) 1.005 - 1.035    pH, Urine 6.5 5.0, 5.5, 6.0, 6.5, 7.0, 7.5, 8.0    Protein, Urine 30 (1+) (A) NEGATIVE, 10 (TRACE), 20 (TRACE) mg/dL    Glucose, Urine Normal Normal mg/dL    Blood, Urine 0.1 (1+) (A) NEGATIVE    Ketones, Urine NEGATIVE NEGATIVE mg/dL    Bilirubin, Urine NEGATIVE NEGATIVE    Urobilinogen, Urine Normal Normal mg/dL    Nitrite, Urine NEGATIVE NEGATIVE    Leukocyte Esterase, Urine NEGATIVE NEGATIVE   Extra Urine Gray Tube   Result Value Ref Range    Extra Tube Hold for add-ons.    Blood Culture    Specimen: Peripheral Venipuncture; Blood culture   Result Value Ref Range    Blood Culture Loaded on Instrument - Culture in progress    Blood Culture    Specimen: Peripheral Venipuncture; Blood culture   Result Value Ref Range    Blood Culture Loaded on Instrument - Culture in progress    Urinalysis Microscopic   Result Value Ref Range    WBC, Urine 1-5 1-5, NONE /HPF    RBC, Urine 1-2 NONE, 1-2, 3-5 /HPF    Squamous Epithelial Cells, Urine 1-9 (SPARSE) Reference range not established. /HPF   Magnesium   Result Value Ref Range    Magnesium 1.73 1.60 - 2.40 mg/dL   Troponin, High Sensitivity, 1 Hour   Result Value Ref Range    Troponin I, High Sensitivity (CMC) 5 0 - 34 ng/L   CBC   Result Value Ref Range    WBC 8.5 4.4 - 11.3 x10*3/uL    nRBC 0.0 0.0 - 0.0 /100 WBCs    RBC 3.42 (L) 4.00 - 5.20 x10*6/uL    Hemoglobin 10.5 (L) 12.0 - 16.0 g/dL     Hematocrit 29.9 (L) 36.0 - 46.0 %    MCV 87 80 - 100 fL    MCH 30.7 26.0 - 34.0 pg    MCHC 35.1 32.0 - 36.0 g/dL    RDW 16.5 (H) 11.5 - 14.5 %    Platelets 202 150 - 450 x10*3/uL   Magnesium   Result Value Ref Range    Magnesium 1.80 1.60 - 2.40 mg/dL   Renal function panel   Result Value Ref Range    Glucose 91 74 - 99 mg/dL    Sodium 137 136 - 145 mmol/L    Potassium 3.8 3.5 - 5.3 mmol/L    Chloride 110 (H) 98 - 107 mmol/L    Bicarbonate 20 (L) 21 - 32 mmol/L    Anion Gap 11 10 - 20 mmol/L    Urea Nitrogen 18 6 - 23 mg/dL    Creatinine 1.62 (H) 0.50 - 1.05 mg/dL    eGFR 40 (L) >60 mL/min/1.73m*2    Calcium 8.7 8.6 - 10.6 mg/dL    Phosphorus 3.6 2.5 - 4.9 mg/dL    Albumin 3.4 3.4 - 5.0 g/dL        ASSESSMENT AND PLAN:  Ms. Leblanc is a 44 y.o. female who underwent a kidney transplant surgery on [unfilled] and was hospitalized for:    Principal Problem:    Shortness of breath      1. ESRD S/P Kidney transplant.   - Renal allograft function:   Lab Results   Component Value Date    CREATININE 1.62 (H) 07/03/2024     Estimated Creatinine Clearance: 45.3 mL/min (A) (by C-G formula based on SCr of 1.62 mg/dL (H)).  No intake or output data in the 24 hours ending 07/03/24 0951    - Biopsy on Friday  - DSA ordered  - stop  IV fluid. CXR looks wet.  - Lasix 40 mg iv x once    - Continue to monitor UOP and Serum creatinine closely.   - Avoid nephrotoxic agents, NSAIDs and IV contrast   - Strict I/O.   - Renally dose all medications by the most recent CrCl from Cockcroft-Gault formula.    2. Immunosuppression   - continue current immunosuppression   - Monitor tacrolimus trough level       3. Anemia and WBC   Lab Results   Component Value Date    WBC 8.5 07/03/2024    HGB 10.5 (L) 07/03/2024    HCT 29.9 (L) 07/03/2024    MCV 87 07/03/2024     07/03/2024     -Continue to monitor Hgb   -No indications for PRBC transfusion     4. Electrolyte   Lab Results   Component Value Date    GLUCOSE 91 07/03/2024    CALCIUM 8.7 07/03/2024      07/03/2024    K 3.8 07/03/2024    CO2 20 (L) 07/03/2024     (H) 07/03/2024    BUN 18 07/03/2024    CREATININE 1.62 (H) 07/03/2024     - Reviewed renal profile.     6. 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  -monitor Plasma PCR  - Inform Dr. Lou, pt is in the hospital.     8.  GI prophylaxis   - On PPI     9. DVT Prophylaxis  -Defer to primary team    10. Hydronephrosis  US  7/2/24  - Will discuss with tx surgery if we need to repeat MAG -3    11. Edema  -Duplex neg for DVT on admission      * Case was discussed with primary team.  For questions, please contact transplant nephrology page x [phone].      Marcia Webber    Transplant Nephrologist

## 2024-07-03 NOTE — PROGRESS NOTES
Pharmacy Medication History Review    Herber Foy Rai is a 44 y.o. female admitted for Shortness of breath. Pharmacy reviewed the patient's qijaz-wa-hwlvvepnm medications and allergies for accuracy.    The list below reflects the updated PTA list. Comments regarding how patient may be taking medications differently can be found in the Admit Orders Activity  Prior to Admission Medications   Prescriptions Last Dose Informant   BELATACEPT IV Past Month Brother   Sig: Infuse into a venous catheter every 28 (twenty-eight) days. Maintenance infusion   Calcium Antacid 200 mg calcium (500 mg) chewable tablet 7/2/2024 at AM Brother   Sig: CHEW AND SWALLOW 1 TABLET BY MOUTH 2 TIMES DAILY   Patient taking differently: Chew 1 tablet (500 mg) 2 times a day.   SUMAtriptan (Imitrex) 100 mg tablet Past Week Brother   Sig: Take 1 tablet (100 mg) by mouth 1 time if needed for migraine.   acetaminophen (Tylenol) 325 mg tablet Past Month Brother   Sig: Take 2 tablets (650 mg) by mouth every 6 hours if needed for mild pain (1 - 3).   albuterol 90 mcg/actuation inhaler 7/2/2024 Brother   Sig: Inhale 2 puffs every 4 hours if needed for wheezing.   amLODIPine (Norvasc) 5 mg tablet 7/2/2024 at AM Brother   Sig: Take 1 tablet (5 mg) by mouth 2 times a day.   azelastine (Optivar) 0.05 % ophthalmic solution 7/2/2024 at AM Brother   Sig: Administer 1 drop into both eyes 2 times a day.   calcitriol (Rocaltrol) 0.25 mcg capsule 7/2/2024 at AM Brother   Sig: TAKE 1 TABLET BY MOUTH ONCE DAILY   Patient taking differently: Take 1 capsule (0.25 mcg) by mouth once daily.   cholecalciferol (Vitamin D-3) 50 MCG (2000 UT) tablet 7/2/2024 at AM Brother   Sig: Take 2 tablets (100 mcg) by mouth once daily.   fexofenadine (Allegra) 180 mg tablet Not Taking Brother   Sig: Take 1 tablet (180 mg) by mouth once daily.   Patient not taking: Reported on 7/3/2024   gabapentin (Neurontin) 100 mg capsule Past Week at Monday Brother   Sig: Take 3 capsules (300 mg) by  mouth once daily at bedtime.   levothyroxine (Synthroid, Levoxyl) 25 mcg tablet 7/2/2024 at AM Brother   Sig: Take 1 tablet (25 mcg) by mouth once daily in the morning. Take before meals.   magnesium oxide (Mag-Ox) 400 mg tablet 7/2/2024 at AM Brother   Sig: Take 1 tablet (400 mg) by mouth once daily. Take with food at lunch time   mycophenolate (Cellcept) 250 mg capsule 7/2/2024 at AM Brother   Sig: Take 2 capsules (500 mg) by mouth every 12 hours.   pantoprazole (ProtoNix) 40 mg EC tablet 7/2/2024 at AM Brother   Sig: Take 1 tablet (40 mg) by mouth once daily in the morning. Take before meals. Do not crush, chew, or split.   potassium citrate CR (Urocit-K-10) 10 mEq ER tablet 7/2/2024 at AM Brother   Sig: Take 2 tablets (20 mEq) by mouth once daily. Do not crush, chew, or split.   predniSONE (Deltasone) 5 mg tablet 7/2/2024 at AM Brother   Sig: Take 2 tablets (10 mg) by mouth once daily.   sertraline (Zoloft) 50 mg tablet  Brother   Sig: Take 1 tablet (50 mg) by mouth once daily.   tenofovir alafenamide (Vemlidy) 25 mg tablet tablet 7/2/2024 at AM Brother   Sig: Take 1 tablet (25 mg) by mouth once daily.   valGANciclovir (Valcyte) 450 mg tablet  Brother   Sig: Take 2 tablets (900 mg) by mouth every 12 hours. Do not crush or chew.   Patient not taking: Reported on 6/24/2024      Facility-Administered Medications: None        The list below reflects the updated allergy list. Please review each documented allergy for additional clarification and justification.  Allergies  Reviewed by Breanna Phoenix on 7/3/2024   No Known Allergies         Patient accepts M2B at discharge. Pharmacy has been updated to [facility].    Sources used to complete the med history include out patient fill history, OARRS, and patient interview (patients brother was a great medical historian.).    Below are additional concerns with the patient's PTA list.  ~Discontinued:    *Fexofenadine    *Valcyte    *Atorvastatin     *Renal Caps     *Trulicity     *Furosemide     *Lisinopril     *Medroxyprogesterone     *Metformin    *Mirtazapine    *Omeprazole    *Psyllium Husk    *Januvia    *Simvastatin     *Trazodone     *Sulfamethoxazole     ~Added Medications:    *Albuterol Inhaler     *Acetaminophen     ~Patients last Belatacept IV infusion was on the 24 th of June, next infusion is scheduled for the 22 nd of July.     ~Brother stated patient had an appointment yesterday with Juaquin Lou.     BreAnna Phoenix, CPhT  Transitions of Care  [facility]  Please reach out via Secure Chat for questions, or if no response call [phone] or [facility]

## 2024-07-03 NOTE — HOSPITAL COURSE
Patient is a 45 y/o female with PMH significant for ESRD 2/2 HTN and NSAIDs, that received a DDKT from 11/30/23 c/b washout on 12/13/23 and IR drain for perirenal fluid collection, now presenting to ED on 7/2/24 with several day history of anasarca and shortness of breath. She had 1+ pitting edema in her feet/ankles and was also diffusely swollen throughout her limbs and face per her report. She also endorsed sharp R flank pain and was tender on palpation.     A R kidney US showed mild hydronephrosis of the transplanted kidney and a bilateral LE US showed no DVT. An infectious workup including blood culture and urinalysis were negative. For her chest pain, she had no troponin elevation but had a BNP of 272. She was admitted to the transplant surgery service and started on normal saline for her MARK.    Based on her rising Cr and positive DSA, patient was assumed have a high likelihood of rejection. As such, while an IR biopsy was pending for 7/5, patient was started on high dose methylprednisone 250 x 3 days, given 2 doses of thymo, and all prophylactic antibiotics and antivirals were started. Patient underwent IR biopsy on 7/5, without complication. 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 On 7/9 she was scheduled for first round of PLEX through her LUE radiocephalic fistula. She remained on the following schedule:  --PLEX followed by IVIG on Tues 7/9/24, Thursday, Friday, and Monday   -- 2 additional doses of thymo on Wed 7/10/24 and Sat (total of 4.5 mg/kg).     On 7/17, she had a repeat biopsy that resulted   -- Diffuse interstitial inflammatory infiltrate with focal moderate tubulitis, consistent with ACR Banff IA   --Endothelialitis, consistent with ACR Banff IIA   --Microvascular inflammation and C4d positivity (focal), consistent with persistent ABMR   -- No immune complex deposits    Given aggressive treatment this hospital course and  clinical stability, she was discharged on 7/19/24 with tacro 2 mg BID, pred 20 QD, MMF 1g BID with plan to follow up on labs next Tuesday 7/23/24.

## 2024-07-03 NOTE — PROGRESS NOTES
"Herber Foy Rai is a 44 y.o. female on day 1 of admission presenting with Shortness of breath.    Subjective   No acute event       Objective   Vitals:    07/03/24 1115   BP: 133/87   Pulse: 89   Resp: 17   Temp:    SpO2: 98%       Physical Exam  Constitutional:       Appearance: Normal appearance.   Eyes:      Pupils: Pupils are equal, round, and reactive to light.   Cardiovascular:      Rate and Rhythm: Normal rate.   Pulmonary:      Effort: Pulmonary effort is normal. No respiratory distress.   Abdominal:      General: There is no distension.      Palpations: Abdomen is soft.      Comments: Incision clean, dry, intact   Musculoskeletal:         General: Normal range of motion.      Right lower leg: No edema.      Left lower leg: No edema.   Skin:     General: Skin is warm and dry.   Neurological:      General: No focal deficit present.      Mental Status: She is alert and oriented to person, place, and time.   Psychiatric:         Mood and Affect: Mood normal.         Behavior: Behavior normal.         Last Recorded Vitals  Blood pressure 133/87, pulse 89, temperature 36.6 °C (97.8 °F), temperature source Temporal, resp. rate 17, height 1.651 m (5' 5\"), weight 79.8 kg (176 lb), SpO2 98%.  Intake/Output last 3 Shifts:  No intake/output data recorded.    Relevant Results  Lab Results   Component Value Date    WBC 8.5 07/03/2024    HGB 10.5 (L) 07/03/2024    HCT 29.9 (L) 07/03/2024    MCV 87 07/03/2024     07/03/2024     Lab Results   Component Value Date    GLUCOSE 91 07/03/2024    CALCIUM 8.7 07/03/2024     07/03/2024    K 3.8 07/03/2024    CO2 20 (L) 07/03/2024     (H) 07/03/2024    BUN 18 07/03/2024    CREATININE 1.62 (H) 07/03/2024     amLODIPine, 5 mg, oral, BID  calcitriol, 0.25 mcg, oral, Daily  calcium carbonate, 250 mg, oral, Daily  cholecalciferol, 4,000 Units, oral, Daily  gabapentin, 300 mg, oral, Nightly  heparin (porcine), 5,000 Units, subcutaneous, q8h JANIE  levothyroxine, 25 mcg, " oral, Daily before breakfast  loratadine, 10 mg, oral, Daily  magnesium oxide, 400 mg, oral, Daily  mycophenolate, 500 mg, oral, q12h  pantoprazole, 40 mg, oral, Daily before breakfast  potassium citrate CR, 20 mEq, oral, Daily  predniSONE, 10 mg, oral, Daily  sennosides-docusate sodium, 2 tablet, oral, BID  sertraline, 50 mg, oral, Daily  tenofovir alafenamide, 25 mg, oral, Daily        Assessment/Plan   Principal Problem:    Shortness of breath    DDKT 11/2023    Kidney allograft function   MARK, creatinine 1.6, baseline < 1   Kidney duplex: multiple calculi, mild-mod hydro, which is similar to previous   Received lasix today for edema   [ ] obtain Echo    Immunosuppression   Belatacept      Pred 10    LE swelling   No DVT         I spent 35 minutes in the professional and overall care of this patient.      Don Lopez MD

## 2024-07-03 NOTE — PROGRESS NOTES
Pharmacy Admission Order Reconciliation Review    Herber Foy Rai is a 44 y.o. female admitted for Shortness of breath. Pharmacy reviewed the patient's unreconciled admission medications.    Prior to admission medications that were reviewed and acted on by the pharmacist include:  Definity   These medications have been reconciled.     Any other unreconcilied medications have been addressed and will be ordered or held by the patient's medical team. Medications addressed by the pharmacist may be added or changed by the patient's medical team at any time.    Breann Abbott, PharmD  Transitions of Care Pharmacist  [facility]  Please reach out via Secure Chat for questions

## 2024-07-04 LAB
ALBUMIN SERPL BCP-MCNC: 3.5 G/DL (ref 3.4–5)
ANION GAP SERPL CALC-SCNC: 15 MMOL/L (ref 10–20)
APTT PPP: 30 SECONDS (ref 27–38)
BUN SERPL-MCNC: 20 MG/DL (ref 6–23)
CALCIUM SERPL-MCNC: 9.4 MG/DL (ref 8.6–10.6)
CHLORIDE SERPL-SCNC: 107 MMOL/L (ref 98–107)
CO2 SERPL-SCNC: 18 MMOL/L (ref 21–32)
CREAT SERPL-MCNC: 2.14 MG/DL (ref 0.5–1.05)
EGFRCR SERPLBLD CKD-EPI 2021: 28 ML/MIN/1.73M*2
ERYTHROCYTE [DISTWIDTH] IN BLOOD BY AUTOMATED COUNT: 16.5 % (ref 11.5–14.5)
GLUCOSE BLD MANUAL STRIP-MCNC: 109 MG/DL (ref 74–99)
GLUCOSE SERPL-MCNC: 66 MG/DL (ref 74–99)
HCT VFR BLD AUTO: 38.7 % (ref 36–46)
HGB BLD-MCNC: 12.1 G/DL (ref 12–16)
INR PPP: 1 (ref 0.9–1.1)
MAGNESIUM SERPL-MCNC: 2.13 MG/DL (ref 1.6–2.4)
MCH RBC QN AUTO: 30.8 PG (ref 26–34)
MCHC RBC AUTO-ENTMCNC: 31.3 G/DL (ref 32–36)
MCV RBC AUTO: 99 FL (ref 80–100)
NRBC BLD-RTO: 0 /100 WBCS (ref 0–0)
PHOSPHATE SERPL-MCNC: 4.2 MG/DL (ref 2.5–4.9)
PLATELET # BLD AUTO: 176 X10*3/UL (ref 150–450)
POTASSIUM SERPL-SCNC: 3.4 MMOL/L (ref 3.5–5.3)
PROTHROMBIN TIME: 11.4 SECONDS (ref 9.8–12.8)
RBC # BLD AUTO: 3.93 X10*6/UL (ref 4–5.2)
SARS-COV-2 RNA RESP QL NAA+PROBE: NOT DETECTED
SODIUM SERPL-SCNC: 137 MMOL/L (ref 136–145)
WBC # BLD AUTO: 9.7 X10*3/UL (ref 4.4–11.3)

## 2024-07-04 PROCEDURE — 85027 COMPLETE CBC AUTOMATED: CPT | Performed by: STUDENT IN AN ORGANIZED HEALTH CARE EDUCATION/TRAINING PROGRAM

## 2024-07-04 PROCEDURE — 2500000001 HC RX 250 WO HCPCS SELF ADMINISTERED DRUGS (ALT 637 FOR MEDICARE OP): Performed by: STUDENT IN AN ORGANIZED HEALTH CARE EDUCATION/TRAINING PROGRAM

## 2024-07-04 PROCEDURE — 85610 PROTHROMBIN TIME: CPT | Performed by: STUDENT IN AN ORGANIZED HEALTH CARE EDUCATION/TRAINING PROGRAM

## 2024-07-04 PROCEDURE — 2500000002 HC RX 250 W HCPCS SELF ADMINISTERED DRUGS (ALT 637 FOR MEDICARE OP, ALT 636 FOR OP/ED)

## 2024-07-04 PROCEDURE — 87635 SARS-COV-2 COVID-19 AMP PRB: CPT

## 2024-07-04 PROCEDURE — 82947 ASSAY GLUCOSE BLOOD QUANT: CPT

## 2024-07-04 PROCEDURE — 1200000002 HC GENERAL ROOM WITH TELEMETRY DAILY

## 2024-07-04 PROCEDURE — 99232 SBSQ HOSP IP/OBS MODERATE 35: CPT | Performed by: TRANSPLANT SURGERY

## 2024-07-04 PROCEDURE — 2500000004 HC RX 250 GENERAL PHARMACY W/ HCPCS (ALT 636 FOR OP/ED): Performed by: INTERNAL MEDICINE

## 2024-07-04 PROCEDURE — 2500000001 HC RX 250 WO HCPCS SELF ADMINISTERED DRUGS (ALT 637 FOR MEDICARE OP)

## 2024-07-04 PROCEDURE — 2500000004 HC RX 250 GENERAL PHARMACY W/ HCPCS (ALT 636 FOR OP/ED)

## 2024-07-04 PROCEDURE — 99233 SBSQ HOSP IP/OBS HIGH 50: CPT | Performed by: INTERNAL MEDICINE

## 2024-07-04 PROCEDURE — 2500000004 HC RX 250 GENERAL PHARMACY W/ HCPCS (ALT 636 FOR OP/ED): Performed by: STUDENT IN AN ORGANIZED HEALTH CARE EDUCATION/TRAINING PROGRAM

## 2024-07-04 PROCEDURE — 83735 ASSAY OF MAGNESIUM: CPT | Performed by: STUDENT IN AN ORGANIZED HEALTH CARE EDUCATION/TRAINING PROGRAM

## 2024-07-04 PROCEDURE — 84100 ASSAY OF PHOSPHORUS: CPT | Performed by: STUDENT IN AN ORGANIZED HEALTH CARE EDUCATION/TRAINING PROGRAM

## 2024-07-04 PROCEDURE — 2500000002 HC RX 250 W HCPCS SELF ADMINISTERED DRUGS (ALT 637 FOR MEDICARE OP, ALT 636 FOR OP/ED): Performed by: STUDENT IN AN ORGANIZED HEALTH CARE EDUCATION/TRAINING PROGRAM

## 2024-07-04 PROCEDURE — 36415 COLL VENOUS BLD VENIPUNCTURE: CPT | Performed by: STUDENT IN AN ORGANIZED HEALTH CARE EDUCATION/TRAINING PROGRAM

## 2024-07-04 RX ORDER — CLOTRIMAZOLE 10 MG/1
10 LOZENGE ORAL
Status: DISCONTINUED | OUTPATIENT
Start: 2024-07-04 | End: 2024-07-19 | Stop reason: HOSPADM

## 2024-07-04 RX ORDER — MYCOPHENOLATE MOFETIL 250 MG/1
750 CAPSULE ORAL EVERY 12 HOURS
Status: DISCONTINUED | OUTPATIENT
Start: 2024-07-04 | End: 2024-07-06

## 2024-07-04 RX ORDER — AMLODIPINE BESYLATE 5 MG/1
5 TABLET ORAL ONCE
Status: COMPLETED | OUTPATIENT
Start: 2024-07-04 | End: 2024-07-04

## 2024-07-04 RX ORDER — HYDRALAZINE HYDROCHLORIDE 20 MG/ML
10 INJECTION INTRAMUSCULAR; INTRAVENOUS EVERY 8 HOURS PRN
Status: DISCONTINUED | OUTPATIENT
Start: 2024-07-04 | End: 2024-07-19 | Stop reason: HOSPADM

## 2024-07-04 RX ORDER — SODIUM CHLORIDE, SODIUM LACTATE, POTASSIUM CHLORIDE, CALCIUM CHLORIDE 600; 310; 30; 20 MG/100ML; MG/100ML; MG/100ML; MG/100ML
75 INJECTION, SOLUTION INTRAVENOUS CONTINUOUS
Status: DISCONTINUED | OUTPATIENT
Start: 2024-07-05 | End: 2024-07-05

## 2024-07-04 RX ORDER — AMLODIPINE BESYLATE 10 MG/1
10 TABLET ORAL 2 TIMES DAILY
Status: DISCONTINUED | OUTPATIENT
Start: 2024-07-04 | End: 2024-07-04

## 2024-07-04 RX ORDER — POTASSIUM CHLORIDE 20 MEQ/1
40 TABLET, EXTENDED RELEASE ORAL ONCE
Status: COMPLETED | OUTPATIENT
Start: 2024-07-04 | End: 2024-07-04

## 2024-07-04 RX ORDER — AMLODIPINE BESYLATE 10 MG/1
10 TABLET ORAL DAILY
Status: DISCONTINUED | OUTPATIENT
Start: 2024-07-05 | End: 2024-07-19 | Stop reason: HOSPADM

## 2024-07-04 RX ORDER — VALGANCICLOVIR 450 MG/1
450 TABLET, FILM COATED ORAL
Status: DISCONTINUED | OUTPATIENT
Start: 2024-07-04 | End: 2024-07-19 | Stop reason: HOSPADM

## 2024-07-04 RX ORDER — SULFAMETHOXAZOLE AND TRIMETHOPRIM 400; 80 MG/1; MG/1
1 TABLET ORAL DAILY
Status: DISCONTINUED | OUTPATIENT
Start: 2024-07-04 | End: 2024-07-19 | Stop reason: HOSPADM

## 2024-07-04 RX ORDER — SODIUM BICARBONATE 650 MG/1
650 TABLET ORAL 2 TIMES DAILY
Status: DISCONTINUED | OUTPATIENT
Start: 2024-07-04 | End: 2024-07-06

## 2024-07-04 RX ORDER — GUAIFENESIN 600 MG/1
600 TABLET, EXTENDED RELEASE ORAL 2 TIMES DAILY PRN
Status: DISCONTINUED | OUTPATIENT
Start: 2024-07-04 | End: 2024-07-19 | Stop reason: HOSPADM

## 2024-07-04 RX ORDER — FUROSEMIDE 10 MG/ML
40 INJECTION INTRAMUSCULAR; INTRAVENOUS ONCE
Status: COMPLETED | OUTPATIENT
Start: 2024-07-04 | End: 2024-07-04

## 2024-07-04 ASSESSMENT — COGNITIVE AND FUNCTIONAL STATUS - GENERAL
DAILY ACTIVITIY SCORE: 24
MOBILITY SCORE: 24

## 2024-07-04 ASSESSMENT — PAIN SCALES - GENERAL
PAINLEVEL_OUTOF10: 6
PAINLEVEL_OUTOF10: 3
PAINLEVEL_OUTOF10: 0 - NO PAIN

## 2024-07-04 ASSESSMENT — PAIN - FUNCTIONAL ASSESSMENT
PAIN_FUNCTIONAL_ASSESSMENT: 0-10

## 2024-07-04 NOTE — PROGRESS NOTES
"Herber Foy Rai is a 44 y.o. female on day 2 of admission presenting with Shortness of breath.    Subjective   No acute event       Objective   Vitals:    07/04/24 1119   BP: (!) 168/97   Pulse: 97   Resp: 18   Temp: 36.5 °C (97.7 °F)   SpO2: 97%       Physical Exam  Constitutional:       Appearance: Normal appearance.   Eyes:      Pupils: Pupils are equal, round, and reactive to light.   Cardiovascular:      Rate and Rhythm: Normal rate.   Pulmonary:      Effort: Pulmonary effort is normal. No respiratory distress.   Abdominal:      General: There is no distension.      Palpations: Abdomen is soft.      Comments: Incision clean, dry, intact   Musculoskeletal:         General: Normal range of motion.      Right lower leg: No edema.      Left lower leg: No edema.   Skin:     General: Skin is warm and dry.   Neurological:      General: No focal deficit present.      Mental Status: She is alert and oriented to person, place, and time.   Psychiatric:         Mood and Affect: Mood normal.         Behavior: Behavior normal.         Last Recorded Vitals  Blood pressure (!) 168/97, pulse 97, temperature 36.5 °C (97.7 °F), temperature source Temporal, resp. rate 18, height 1.651 m (5' 5\"), weight 79.4 kg (175 lb 0.7 oz), SpO2 97%.  Intake/Output last 3 Shifts:  I/O last 3 completed shifts:  In: 300 (3.8 mL/kg) [P.O.:300]  Out: 1650 (20.8 mL/kg) [Urine:1650 (0.6 mL/kg/hr)]  Weight: 79.4 kg     Relevant Results  Lab Results   Component Value Date    WBC 9.7 07/04/2024    HGB 12.1 07/04/2024    HCT 38.7 07/04/2024    MCV 99 07/04/2024     07/04/2024     Lab Results   Component Value Date    GLUCOSE 66 (L) 07/04/2024    CALCIUM 9.4 07/04/2024     07/04/2024    K 3.4 (L) 07/04/2024    CO2 18 (L) 07/04/2024     07/04/2024    BUN 20 07/04/2024    CREATININE 2.14 (H) 07/04/2024     amLODIPine, 10 mg, oral, BID  amLODIPine, 5 mg, oral, Once  calcitriol, 0.25 mcg, oral, Daily  calcium carbonate, 250 mg, oral, " Daily  cholecalciferol, 4,000 Units, oral, Daily  furosemide, 40 mg, intravenous, Once  gabapentin, 300 mg, oral, Nightly  heparin (porcine), 5,000 Units, subcutaneous, q8h JANIE  levothyroxine, 25 mcg, oral, Daily before breakfast  loratadine, 10 mg, oral, Daily  magnesium oxide, 400 mg, oral, Daily  methylPREDNISolone sodium succinate (PF), 500 mg, intravenous, Once  mycophenolate, 500 mg, oral, q12h  pantoprazole, 40 mg, oral, Daily before breakfast  potassium citrate CR, 20 mEq, oral, Daily  sennosides-docusate sodium, 2 tablet, oral, BID  sertraline, 50 mg, oral, Daily  sulfur hexafluoride microsphr, 2 mL, intravenous, Once in imaging  tenofovir alafenamide, 25 mg, oral, Daily        Assessment/Plan   Principal Problem:    Shortness of breath    DDKT 11/2023    Kidney allograft function   MARK, creatinine 2.1, baseline < 1   Kidney duplex: multiple calculi, mild-mod hydro, which is similar to previous   Echo unremarkable   New DSA, Plan biopsy in am    Steroid bolus 500 x 3, dose 2 today      Immunosuppression   Belatacept, consider adding Tac      Pred IV bolus    LE swelling   No DVT       I spent 35 minutes in the professional and overall care of this patient.      Don Lopez MD

## 2024-07-04 NOTE — NURSING NOTE
Nursing and ancillary staff have encouraged patient to order food trays, patient states she does not like the food here.

## 2024-07-04 NOTE — PROGRESS NOTES
"        INPATIENT TRANSPLANT NEPHROLOGY PROGRESS NOTE          REASON FOR CONSULT:  Immunosuppressive medication management and nephrology related issues.    SUBJECTION:     DSA was positive with high MFI; Increase MMF to 750 mg bid  Started solumedrol pulse last night  This am, will give solumedrol #2/3, 500 mg  Decrease lasix to 40 mg iv daily  Edema improved  SOB improved  UOP 1.6 [7 pm yesterday - 7 am this morning]  Cr continue to rise  Biopsy tomorrow  Bp was high, will increase amlodipine to 10 mg daily  Send CMV PLASMA PCR weekly  Replace KCL  No acute event overnight.    PHYCISCAL EXAMINATION:    Visit Vitals  BP (!) 168/97 (BP Location: Right arm, Patient Position: Lying)   Pulse 97   Temp 36.5 °C (97.7 °F) (Temporal)   Resp 18   Ht 1.651 m (5' 5\")   Wt 79.4 kg (175 lb 0.7 oz)   LMP  (LMP Unknown)   SpO2 97%   BMI 29.13 kg/m²   OB Status Unknown   Smoking Status Never   BSA 1.91 m²        07/02 1900 - 07/04 0659  In: 300 [P.O.:300]  Out: 1650 [Urine:1650]     Weight change: 0.867 kg (1 lb 14.6 oz)    General Appearance - NAD, Good speech, oriented and alert  HEENT - Supple. Not pale. No jaundice. No cervical lymphadenopathy. Pharynx and tonsils are not injected.  CVS - RRR. Normal S1/S2. No murmur, click , rub or gallop  Lungs- clear to auscultation bilaterally  Abdomen - soft , not tender, no guarding, no rigidity. No hepatosplenomegaly. Normal bowel sounds. No masses and ascites. S/P Kidney transplant .  Transplanted kidney is not tender.   Musculoskeletal /Extremities - no edema. Full ROM. No joint tenderness.   Neuro/Psych - appropriate mood and affect. Motor power V/V all extremities. CN I -XII were grossly intact.  Skin - No visible rash    MEDICATION LIST: REVIEWED    amLODIPine, 10 mg, BID  amLODIPine, 5 mg, Once  calcitriol, 0.25 mcg, Daily  calcium carbonate, 250 mg, Daily  cholecalciferol, 4,000 Units, Daily  furosemide, 40 mg, Once  gabapentin, 300 mg, Nightly  heparin (porcine), 5,000 Units, " q8h JANIE  levothyroxine, 25 mcg, Daily before breakfast  loratadine, 10 mg, Daily  magnesium oxide, 400 mg, Daily  methylPREDNISolone sodium succinate (PF), 500 mg, Once  mycophenolate, 500 mg, q12h  pantoprazole, 40 mg, Daily before breakfast  potassium citrate CR, 20 mEq, Daily  sennosides-docusate sodium, 2 tablet, BID  sertraline, 50 mg, Daily  sulfur hexafluoride microsphr, 2 mL, Once in imaging  tenofovir alafenamide, 25 mg, Daily         acetaminophen, 650 mg, q4h PRN  guaiFENesin, 600 mg, BID PRN  hydrALAZINE, 10 mg, q8h PRN  melatonin, 3 mg, Nightly PRN  naloxone, 0.2 mg, q5 min PRN  ondansetron ODT, 4 mg, q8h PRN   Or  ondansetron, 4 mg, q8h PRN  oxyCODONE, 5 mg, q4h PRN  SUMAtriptan, 100 mg, Once PRN        ALLERGY:  No Known Allergies    LABS:  Results for orders placed or performed during the hospital encounter of 07/02/24 (from the past 24 hour(s))   HLA Transplant Antibody Panel   Result Value Ref Range    HLA Class I SP AB ID, HD      HLA Class II SP AB ID, HD      HLA Results See Attached    Transthoracic Echo (TTE) Complete   Result Value Ref Range    AV pk cas 2.18 m/s    AV mn grad 8.0 mmHg    LVOT diam 1.90 cm    MV avg E/e' ratio 14.21     MV E/A ratio 1.55     Tricuspid annular plane systolic excursion 2.6 cm    LV EF 68 %    RV free wall pk S' 20.10 cm/s    LVIDd 5.20 cm    Aortic Valve Area by Continuity of VTI 2.25 cm2    Aortic Valve Area by Continuity of Peak Velocity 1.76 cm2    AV pk grad 19.0 mmHg    LV A4C EF 70.7    CBC   Result Value Ref Range    WBC 9.7 4.4 - 11.3 x10*3/uL    nRBC 0.0 0.0 - 0.0 /100 WBCs    RBC 3.93 (L) 4.00 - 5.20 x10*6/uL    Hemoglobin 12.1 12.0 - 16.0 g/dL    Hematocrit 38.7 36.0 - 46.0 %    MCV 99 80 - 100 fL    MCH 30.8 26.0 - 34.0 pg    MCHC 31.3 (L) 32.0 - 36.0 g/dL    RDW 16.5 (H) 11.5 - 14.5 %    Platelets 176 150 - 450 x10*3/uL   Coagulation Screen   Result Value Ref Range    Protime 11.4 9.8 - 12.8 seconds    INR 1.0 0.9 - 1.1    aPTT 30 27 - 38 seconds    Magnesium   Result Value Ref Range    Magnesium 2.13 1.60 - 2.40 mg/dL   Renal function panel   Result Value Ref Range    Glucose 66 (L) 74 - 99 mg/dL    Sodium 137 136 - 145 mmol/L    Potassium 3.4 (L) 3.5 - 5.3 mmol/L    Chloride 107 98 - 107 mmol/L    Bicarbonate 18 (L) 21 - 32 mmol/L    Anion Gap 15 10 - 20 mmol/L    Urea Nitrogen 20 6 - 23 mg/dL    Creatinine 2.14 (H) 0.50 - 1.05 mg/dL    eGFR 28 (L) >60 mL/min/1.73m*2    Calcium 9.4 8.6 - 10.6 mg/dL    Phosphorus 4.2 2.5 - 4.9 mg/dL    Albumin 3.5 3.4 - 5.0 g/dL   POCT GLUCOSE   Result Value Ref Range    POCT Glucose 109 (H) 74 - 99 mg/dL        ASSESSMENT AND PLAN:    Ms. Leblanc is a 44 y.o. female with past medical history significant for ESRD secondary to HTN/NSAID whom received a  donor kidney transplant on 23 by Dr. Marbin Lambert & Dr. Louis with a KDPI of 56% and PRA of 48%. HCV -/- and donor has not met risk factors. EBV +/+. 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 Dry weight is 81.1 kg (discharge weight is 76.2kg ). Pt received a total of 4.5 mg/kg total of thymoglobulin induction therapy in conjunction with 500mg IV solumedrol. Pt was initiated on Tacrolimus & Cellcept immunosuppression in conjunction with IV solumedrol taper. 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 Pt was started on valcyte (CMV D + / R + ) and bactrim for CMV & PCP prophylaxis per protocol. She was started on clotrimazole as antifungal coverage per protocol. Operative course was complicated by return to OR for exploration of transplanted kidney and washout of hematoma. Hospital course was complicated by post-operative pain and constipation, which has resolved.     24 Direct admission for fever, nausea, vomiting and abdominal pain (chronic).  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 ID consulted. She is on Valcyte treatment dose.     Patient has 1+ pitting  edema of her ankles and some mild swelling of UEs. Her UOP is unchanged, however she's endorsing some R flank tenderness that could be consistent with her known R sided kidney stone. At this time, this is likely an acute MARK, however an infectious workup was started in the ED. Will admit the patient for hydration, and monitoring of kidney function in the setting of Cr 1.6 (baseline 0.8), mild LE edema. Imaging pending to rule out Ddx of DVTs, transplant failure/rejection, infection.      Transplant nephrology is consulted to assist with immunosuppressive medication management and nephrology related issues.        1. ESRD S/P Kidney transplant.   - Renal allograft function:   Lab Results   Component Value Date    CREATININE 2.14 (H) 07/04/2024     Estimated Creatinine Clearance: 34.2 mL/min (A) (by C-G formula based on SCr of 2.14 mg/dL (H)).    Intake/Output Summary (Last 24 hours) at 7/4/2024 1129  Last data filed at 7/4/2024 0400  Gross per 24 hour   Intake 300 ml   Output 1650 ml   Net -1350 ml     MFI DSA  DQ2 10,158     - Bx tomorrow  -Lasix 40 mg iv daily    - Continue to monitor UOP and Serum creatinine closely.   - Avoid nephrotoxic agents, NSAIDs and IV contrast   - Strict I/O.   - Renally dose all medications by the most recent CrCl from Cockcroft-Gault formula.    2. Immunosuppression   -          Belatacept 5 mg/kg (362.5 mg) q28 days; last dose received 6/24, next dose scheduled outpatient for 7/22  -           mg BID-->750 mg bid [7/4/24]  -          Prednisone 10 mg daily    3. Anemia and WBC   Lab Results   Component Value Date    WBC 9.7 07/04/2024    HGB 12.1 07/04/2024    HCT 38.7 07/04/2024    MCV 99 07/04/2024     07/04/2024     -Continue to monitor Hgb   -No indications for PRBC transfusion     4. Electrolyte   Lab Results   Component Value Date    GLUCOSE 66 (L) 07/04/2024    CALCIUM 9.4 07/04/2024     07/04/2024    K 3.4 (L) 07/04/2024    CO2 18 (L) 07/04/2024      07/04/2024    BUN 20 07/04/2024    CREATININE 2.14 (H) 07/04/2024     - Reviewed renal profile.     6. Hypertension   Blood Pressures         7/3/2024  2128 7/4/2024  0031 7/4/2024  0423 7/4/2024  0733 7/4/2024  1119    BP: 156/87 169/101 143/88 161/96 168/97          -Goal BP < 140/90 mmHg   -continue current management     7. Hydronephrosis  US  7/2/24-stable  - Discussed with tx surgery no need to repeat MAG -3    8.  GI prophylaxis   - On PPI     9. 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  -monitor Plasma PCR  - Ask team to inform Dr. Lou, pt is in the hospital    * Case was discussed with primary team.  For questions, please contact transplant nephrology page x [phone]    Marcia Webber    Transplant Nephrologist

## 2024-07-04 NOTE — CARE PLAN
The patient's goals for the shift include Labs WNL    The clinical goals for the shift include Labs WNL      Problem: Pain - Adult  Goal: Verbalizes/displays adequate comfort level or baseline comfort level  Outcome: Progressing     Problem: Safety - Adult  Goal: Free from fall injury  Outcome: Progressing     Problem: Discharge Planning  Goal: Discharge to home or other facility with appropriate resources  Outcome: Progressing     Problem: Chronic Conditions and Co-morbidities  Goal: Patient's chronic conditions and co-morbidity symptoms are monitored and maintained or improved  Outcome: Progressing     Problem: Fall/Injury  Goal: Not fall by end of shift  Outcome: Progressing  Goal: Be free from injury by end of the shift  Outcome: Progressing  Goal: Verbalize understanding of personal risk factors for fall in the hospital  Outcome: Progressing  Goal: Verbalize understanding of risk factor reduction measures to prevent injury from fall in the home  Outcome: Progressing  Goal: Use assistive devices by end of the shift  Outcome: Progressing  Goal: Pace activities to prevent fatigue by end of the shift  Outcome: Progressing     Problem: Skin  Goal: Participates in plan/prevention/treatment measures  7/4/2024 0641 by Meagan Hendrickson RN  Outcome: Progressing  7/3/2024 2152 by Meagan Hendrickson RN  Flowsheets (Taken 7/3/2024 2152)  Participates in plan/prevention/treatment measures:   Increase activity/out of bed for meals   Discuss with provider PT/OT consult   Elevate heels  Goal: Prevent/manage excess moisture  7/4/2024 0641 by Meagan Hendrickson RN  Outcome: Progressing  7/3/2024 2152 by Meagan Hendrickson RN  Flowsheets (Taken 7/3/2024 2152)  Prevent/manage excess moisture:   Use wicking fabric (obtain order)   Moisturize dry skin   Cleanse incontinence/protect with barrier cream   Monitor for/manage infection if present   Follow provider orders for dressing changes  Goal: Prevent/minimize sheer/friction  injuries  7/4/2024 0641 by Meagan Hendrickson RN  Outcome: Progressing  7/3/2024 2152 by Meagan Hendrickson RN  Flowsheets (Taken 7/3/2024 2152)  Prevent/minimize sheer/friction injuries:   Utilize specialty bed per algorithm   Use pull sheet   Turn/reposition every 2 hours/use positioning/transfer devices   Increase activity/out of bed for meals   HOB 30 degrees or less  Goal: Promote/optimize nutrition  7/4/2024 0641 by Meagan Hendrickson RN  Outcome: Progressing  7/3/2024 2152 by Meagan Hendrickson RN  Flowsheets (Taken 7/3/2024 2152)  Promote/optimize nutrition:   Offer water/supplements/favorite foods   Discuss with provider if NPO > 2 days   Assist with feeding   Reassess MST if dietician not consulted   Monitor/record intake including meals   Consume > 50% meals/supplements  Goal: Promote skin healing  7/4/2024 0641 by Meagan Hendrickson RN  Outcome: Progressing  7/3/2024 2152 by Meagan Hendrickson RN  Flowsheets (Taken 7/3/2024 2152)  Promote skin healing:   Turn/reposition every 2 hours/use positioning/transfer devices   Rotate device position/do not position patient on device   Protective dressings over bony prominences   Ensure correct size (line/device) and apply per  instructions

## 2024-07-04 NOTE — CARE PLAN
The patient's goals for the shift include Labs WNL    The clinical goals for the shift include Labs WNL    Over the shift, the patient did not make progress toward the following goals.

## 2024-07-05 ENCOUNTER — APPOINTMENT (OUTPATIENT)
Dept: RADIOLOGY | Facility: HOSPITAL | Age: 44
DRG: 699 | End: 2024-07-05
Payer: COMMERCIAL

## 2024-07-05 ENCOUNTER — APPOINTMENT (OUTPATIENT)
Dept: RADIOLOGY | Facility: HOSPITAL | Age: 44
End: 2024-07-05
Payer: COMMERCIAL

## 2024-07-05 LAB
ALBUMIN SERPL BCP-MCNC: 3.6 G/DL (ref 3.4–5)
ANION GAP SERPL CALC-SCNC: 16 MMOL/L (ref 10–20)
APTT PPP: 31 SECONDS (ref 27–38)
BUN SERPL-MCNC: 25 MG/DL (ref 6–23)
CALCIUM SERPL-MCNC: 8.9 MG/DL (ref 8.6–10.6)
CHLORIDE SERPL-SCNC: 97 MMOL/L (ref 98–107)
CO2 SERPL-SCNC: 19 MMOL/L (ref 21–32)
CREAT SERPL-MCNC: 2.72 MG/DL (ref 0.5–1.05)
EGFRCR SERPLBLD CKD-EPI 2021: 21 ML/MIN/1.73M*2
ERYTHROCYTE [DISTWIDTH] IN BLOOD BY AUTOMATED COUNT: 16.2 % (ref 11.5–14.5)
GLUCOSE SERPL-MCNC: 124 MG/DL (ref 74–99)
HCT VFR BLD AUTO: 33.7 % (ref 36–46)
HGB BLD-MCNC: 11.1 G/DL (ref 12–16)
INR PPP: 1.1 (ref 0.9–1.1)
MAGNESIUM SERPL-MCNC: 1.86 MG/DL (ref 1.6–2.4)
MCH RBC QN AUTO: 30.7 PG (ref 26–34)
MCHC RBC AUTO-ENTMCNC: 32.9 G/DL (ref 32–36)
MCV RBC AUTO: 93 FL (ref 80–100)
NRBC BLD-RTO: 0 /100 WBCS (ref 0–0)
PHOSPHATE SERPL-MCNC: 3.7 MG/DL (ref 2.5–4.9)
PLATELET # BLD AUTO: 153 X10*3/UL (ref 150–450)
POTASSIUM SERPL-SCNC: 4 MMOL/L (ref 3.5–5.3)
PROTHROMBIN TIME: 12.2 SECONDS (ref 9.8–12.8)
RBC # BLD AUTO: 3.61 X10*6/UL (ref 4–5.2)
SODIUM SERPL-SCNC: 128 MMOL/L (ref 136–145)
WBC # BLD AUTO: 15.1 X10*3/UL (ref 4.4–11.3)

## 2024-07-05 PROCEDURE — 80069 RENAL FUNCTION PANEL: CPT | Performed by: STUDENT IN AN ORGANIZED HEALTH CARE EDUCATION/TRAINING PROGRAM

## 2024-07-05 PROCEDURE — 88313 SPECIAL STAINS GROUP 2: CPT | Performed by: PATHOLOGY

## 2024-07-05 PROCEDURE — 99233 SBSQ HOSP IP/OBS HIGH 50: CPT | Performed by: INTERNAL MEDICINE

## 2024-07-05 PROCEDURE — 2780000003 HC OR 278 NO HCPCS

## 2024-07-05 PROCEDURE — 2500000004 HC RX 250 GENERAL PHARMACY W/ HCPCS (ALT 636 FOR OP/ED): Performed by: INTERNAL MEDICINE

## 2024-07-05 PROCEDURE — C1751 CATH, INF, PER/CENT/MIDLINE: HCPCS

## 2024-07-05 PROCEDURE — 85027 COMPLETE CBC AUTOMATED: CPT | Performed by: STUDENT IN AN ORGANIZED HEALTH CARE EDUCATION/TRAINING PROGRAM

## 2024-07-05 PROCEDURE — 1200000002 HC GENERAL ROOM WITH TELEMETRY DAILY

## 2024-07-05 PROCEDURE — 99232 SBSQ HOSP IP/OBS MODERATE 35: CPT | Performed by: TRANSPLANT SURGERY

## 2024-07-05 PROCEDURE — 85610 PROTHROMBIN TIME: CPT | Performed by: STUDENT IN AN ORGANIZED HEALTH CARE EDUCATION/TRAINING PROGRAM

## 2024-07-05 PROCEDURE — 88346 IMFLUOR 1ST 1ANTB STAIN PX: CPT | Performed by: PATHOLOGY

## 2024-07-05 PROCEDURE — 99152 MOD SED SAME PHYS/QHP 5/>YRS: CPT

## 2024-07-05 PROCEDURE — 36415 COLL VENOUS BLD VENIPUNCTURE: CPT | Performed by: STUDENT IN AN ORGANIZED HEALTH CARE EDUCATION/TRAINING PROGRAM

## 2024-07-05 PROCEDURE — 88350 IMFLUOR EA ADDL 1ANTB STN PX: CPT | Performed by: PATHOLOGY

## 2024-07-05 PROCEDURE — 2500000002 HC RX 250 W HCPCS SELF ADMINISTERED DRUGS (ALT 637 FOR MEDICARE OP, ALT 636 FOR OP/ED)

## 2024-07-05 PROCEDURE — 88305 TISSUE EXAM BY PATHOLOGIST: CPT | Mod: TC,SUR

## 2024-07-05 PROCEDURE — 2500000001 HC RX 250 WO HCPCS SELF ADMINISTERED DRUGS (ALT 637 FOR MEDICARE OP): Performed by: STUDENT IN AN ORGANIZED HEALTH CARE EDUCATION/TRAINING PROGRAM

## 2024-07-05 PROCEDURE — 7100000009 HC PHASE TWO TIME - INITIAL BASE CHARGE

## 2024-07-05 PROCEDURE — 83735 ASSAY OF MAGNESIUM: CPT | Performed by: STUDENT IN AN ORGANIZED HEALTH CARE EDUCATION/TRAINING PROGRAM

## 2024-07-05 PROCEDURE — 88342 IMHCHEM/IMCYTCHM 1ST ANTB: CPT | Performed by: PATHOLOGY

## 2024-07-05 PROCEDURE — 02HV33Z INSERTION OF INFUSION DEVICE INTO SUPERIOR VENA CAVA, PERCUTANEOUS APPROACH: ICD-10-PCS | Performed by: EMERGENCY MEDICINE

## 2024-07-05 PROCEDURE — 2500000004 HC RX 250 GENERAL PHARMACY W/ HCPCS (ALT 636 FOR OP/ED): Performed by: RADIOLOGY

## 2024-07-05 PROCEDURE — 2500000004 HC RX 250 GENERAL PHARMACY W/ HCPCS (ALT 636 FOR OP/ED): Performed by: STUDENT IN AN ORGANIZED HEALTH CARE EDUCATION/TRAINING PROGRAM

## 2024-07-05 PROCEDURE — 7100000010 HC PHASE TWO TIME - EACH INCREMENTAL 1 MINUTE

## 2024-07-05 PROCEDURE — 2500000004 HC RX 250 GENERAL PHARMACY W/ HCPCS (ALT 636 FOR OP/ED)

## 2024-07-05 PROCEDURE — 50200 RENAL BIOPSY PERQ: CPT

## 2024-07-05 PROCEDURE — 2720000007 HC OR 272 NO HCPCS

## 2024-07-05 PROCEDURE — 88305 TISSUE EXAM BY PATHOLOGIST: CPT | Performed by: PATHOLOGY

## 2024-07-05 PROCEDURE — 36569 INSJ PICC 5 YR+ W/O IMAGING: CPT

## 2024-07-05 PROCEDURE — 2500000002 HC RX 250 W HCPCS SELF ADMINISTERED DRUGS (ALT 637 FOR MEDICARE OP, ALT 636 FOR OP/ED): Performed by: STUDENT IN AN ORGANIZED HEALTH CARE EDUCATION/TRAINING PROGRAM

## 2024-07-05 PROCEDURE — 88341 IMHCHEM/IMCYTCHM EA ADD ANTB: CPT | Performed by: PATHOLOGY

## 2024-07-05 PROCEDURE — 2500000001 HC RX 250 WO HCPCS SELF ADMINISTERED DRUGS (ALT 637 FOR MEDICARE OP)

## 2024-07-05 RX ORDER — LIDOCAINE HYDROCHLORIDE 10 MG/ML
5 INJECTION INFILTRATION; PERINEURAL ONCE
Status: DISCONTINUED | OUTPATIENT
Start: 2024-07-05 | End: 2024-07-06

## 2024-07-05 RX ORDER — FENTANYL CITRATE 50 UG/ML
INJECTION, SOLUTION INTRAMUSCULAR; INTRAVENOUS
Status: COMPLETED | OUTPATIENT
Start: 2024-07-05 | End: 2024-07-05

## 2024-07-05 RX ORDER — MIDAZOLAM HYDROCHLORIDE 1 MG/ML
INJECTION INTRAMUSCULAR; INTRAVENOUS
Status: COMPLETED | OUTPATIENT
Start: 2024-07-05 | End: 2024-07-05

## 2024-07-05 RX ORDER — MAGNESIUM SULFATE HEPTAHYDRATE 40 MG/ML
2 INJECTION, SOLUTION INTRAVENOUS ONCE
Status: COMPLETED | OUTPATIENT
Start: 2024-07-05 | End: 2024-07-05

## 2024-07-05 RX ORDER — HEPARIN SODIUM 5000 [USP'U]/ML
5000 INJECTION, SOLUTION INTRAVENOUS; SUBCUTANEOUS EVERY 8 HOURS SCHEDULED
Status: DISCONTINUED | OUTPATIENT
Start: 2024-07-05 | End: 2024-07-08

## 2024-07-05 ASSESSMENT — ENCOUNTER SYMPTOMS
ABDOMINAL DISTENTION: 0
HEADACHES: 0
DIZZINESS: 0
CHILLS: 0
ABDOMINAL PAIN: 0
FEVER: 0
SHORTNESS OF BREATH: 0
NAUSEA: 1

## 2024-07-05 ASSESSMENT — COGNITIVE AND FUNCTIONAL STATUS - GENERAL
DAILY ACTIVITIY SCORE: 24
MOBILITY SCORE: 24

## 2024-07-05 ASSESSMENT — PAIN - FUNCTIONAL ASSESSMENT
PAIN_FUNCTIONAL_ASSESSMENT: 0-10

## 2024-07-05 ASSESSMENT — PAIN SCALES - GENERAL
PAINLEVEL_OUTOF10: 0 - NO PAIN

## 2024-07-05 NOTE — POST-PROCEDURE NOTE
Pre-Procedure Checklist:  Emergent Line Insertion: No  Type of Line to be Placed: PICC  Consent Obtained: Yes  Emergency Medication Necessary: No  Patient Identified with 2 Independent Identifiers: Yes  Review of Allergies, Anticoagulation, Relevant Labs, ECG/Telemetry: Yes  Risks/Benefits/Alternatives Discussed with Patient/POA/Legal Representative: Yes  Stop Sign on Door: Yes  Time Out Performed: Yes  Catheter Exchange: No    Positioning Checklist:  All People, Including Patient, in the Room with Cap and Mask: Yes  Fluoroscopy Used to Identify Vessel and Guide Insertion: No   Sterile Cover Used: Yes  Full Barrier Precautions Followed (Mask, Cap, Gown, Gloves): Yes  Hands Washed: Yes  Monitors Attached with Sound Alarms On: No  Full Body Sterile Drape (Head-to-Toe) Used to Cover Patient: Yes  Trendelenburg Position (For IJ and Subclavian): No  CHG Skin Prep Used and Allowed to Air Dry to Skin Procedure: Yes    Procedure Checklist:  Blood Aspirated From All Lumens, All Ports Subsequently Flushed: Yes  Catheter Caps Placed on All Lumens; Lumens Clamped: Yes  Maintain Guidewire Control Throughout, Ensuring Guidewire Removal: Yes  Maintain Sterile Field Throughout Insertion: Yes  Catheter Secured: Yes  Confirmatory Test of Venous Placement: Non-Pulsatile Blood    Post Procedure Checklist:  Date and Time Written on Dressing: Yes  Sharp and Wire Count and Safe Disposal of all Sharps/Wires: Yes  Sterile Dressing Applied Per Protocol: Yes  X-ray Ordered or ECG Image: Yes    PICC Insertion Details:  Size (Fr): 4  Lumen Type: single  Catheter to Vein Ratio Less Than 50%: Yes  Total Length (cm): 42    External Length (cm): 0  Orientation: right upper arm   Location:  cephalic vein  Site Prep: Chlorohexidine; Usual sterile procedure followed  Local Anesthetic: Injectable/Subcutaneous  Indication: Transplant patient  Insertion Team Members in the Room: Nurse, LPN  Initial Extremity Circumference (cm): 36  Insertion Attempts:  0  Patient Tolerance: Tolerated Well, Age Appropriate  Comfort Measures: Subcutaneous anesthetic; Verbal  Procedure Location: Bedside  Safety Measures: Patient specific safety measures addressed with RN  Estimated Blood Loss (mL): 1  Vessel Fully Compressible Proximally and Distally to Insertion Site: Yes  Brisk Blood Return Obtained and Line Draws Easily: Yes  Tip Location: SVC  Line Confirmation: ECG  Lot #: [id number]  : BD  PICC Line Exp Date: 09/30/2025  Securement: Stat Lock  Post Procedure Checklist: Handoff with RN; Obtain all new IV tubing prior to use; Bed at lowest level and wheels locked; Line discharge information at bedside.  Additional Details: Line was inserted using Modified Seldinger's Technique.   Placed by: Ariane López RN-[facility]

## 2024-07-05 NOTE — CARE PLAN
The patient's goals for the shift include Labs WNL    The clinical goals for the shift include Will maintained safety measures    Over the shift, the patient did not make progress toward the following goals. Barriers to progression includ. Recommendations to address these barriers include.

## 2024-07-05 NOTE — CARE PLAN
The patient's goals for the shift include Labs WNL    The clinical goals for the shift include Will maintained safety measures    Over the shift, the patient did not make progress toward the following goals.

## 2024-07-05 NOTE — POST-PROCEDURE NOTE
Interventional Radiology Brief Postprocedure Note    Attending: Dr. Batsheva Shanks MD    Assistant: Ned Baldwin DO, PGY-3    Diagnosis: rising allosure s/p DDKT    Description of procedure: Successful ultrasound guided biopsy of right renal transplant via 18g core needle biopsy. 2 specimens obtained and sent for pathology review.     Anesthesia:  Local / fentanyl / versed    Complications: None    Estimated Blood Loss: none    Medications  As of 07/05/24 1233      heparin (porcine) injection 5,000 Units (Units) Total dose:  40,000 Units Dosing weight:  79.8      Date/Time Rate/Dose/Volume Action       07/02/24  1916 5,000 Units Given     07/03/24  0346 5,000 Units Given      1030 5,000 Units Given      1620 *5,000 Units Missed      1832 5,000 Units Given      2239 5,000 Units Given     07/04/24  0535 5,000 Units Given      1306 5,000 Units Given      2136 5,000 Units Given               sodium chloride 0.9% infusion (mL/hr) Total volume:  Not documented* Dosing weight:  79.8   *Total volume has not been documented. View each administration to see the amount administered.     Date/Time Rate/Dose/Volume Action       07/02/24  1926 100 mL/hr New Bag      2326 100 mL/hr Rate Verify     07/03/24  0350 100 mL/hr New Bag      0604 100 mL/hr Rate Verify      0904  Stopped               acetaminophen (Tylenol) tablet 650 mg (mg) Total dose:  650 mg Dosing weight:  79.8      Date/Time Rate/Dose/Volume Action       07/03/24  0854 650 mg Given               oxyCODONE (Roxicodone) immediate release tablet 5 mg (mg) Total dose:  5 mg Dosing weight:  79.8      Date/Time Rate/Dose/Volume Action       07/04/24  0624 5 mg Given               sennosides-docusate sodium (Rose-Colace) 8.6-50 mg per tablet 2 tablet (tablet) Total dose:  10 tablet* Dosing weight:  79.8   *Administration not included in total     Date/Time Rate/Dose/Volume Action       07/02/24  2101 *2 tablet Missed     07/03/24  0855 2 tablet Given      2240 2 tablet  Given     07/04/24  0908 2 tablet Given      2135 2 tablet Given     07/05/24  0804 2 tablet Given               amLODIPine (Norvasc) tablet 5 mg (mg) Total dose:  20 mg Dosing weight:  79.8      Date/Time Rate/Dose/Volume Action       07/02/24  2101 5 mg Given     07/03/24  0854 5 mg Given      2240 5 mg Given     07/04/24  0907 5 mg Given               amLODIPine (Norvasc) tablet 5 mg (mg) Total dose:  5 mg Dosing weight:  79.4      Date/Time Rate/Dose/Volume Action       07/04/24  1139 5 mg Given               calcitriol (Rocaltrol) capsule 0.25 mcg (mcg) Total dose:  0.75 mcg*   *Administration not included in total     Date/Time Rate/Dose/Volume Action       07/02/24  1910 0.25 mcg Given     07/03/24 0900 *0.25 mcg Missed     07/04/24  0909 0.25 mcg Given     07/05/24  0804 0.25 mcg Given               calcium carbonate (Tums) chewable tablet 250 mg (mg) Total dose:  1,000 mg      Date/Time Rate/Dose/Volume Action       07/02/24  1909 250 mg Given     07/03/24  0855 250 mg Given     07/04/24  0908 250 mg Given     07/05/24  0803 250 mg Given               cholecalciferol (Vitamin D-3) tablet 4,000 Units (Units) Total dose:  16,000 Units Dosing weight:  79.8      Date/Time Rate/Dose/Volume Action       07/02/24  1909 4,000 Units Given     07/03/24  0854 4,000 Units Given     07/04/24  0906 4,000 Units Given     07/05/24  0802 4,000 Units Given               loratadine (Claritin) tablet 10 mg (mg) Total dose:  30 mg* Dosing weight:  79.8   *Administration not included in total     Date/Time Rate/Dose/Volume Action       07/02/24  1909 10 mg Given     07/03/24  0900 *10 mg Missed     07/04/24  0906 10 mg Given     07/05/24  0801 10 mg Given               gabapentin (Neurontin) capsule 300 mg (mg) Total dose:  900 mg Dosing weight:  79.8      Date/Time Rate/Dose/Volume Action       07/02/24  2101 300 mg Given     07/03/24  2241 300 mg Given     07/04/24  2136 300 mg Given               levothyroxine (Synthroid,  Levoxyl) tablet 25 mcg (mcg) Total dose:  75 mcg Dosing weight:  79.8      Date/Time Rate/Dose/Volume Action       07/03/24  0727 25 mcg Given     07/04/24  0531 25 mcg Given     07/05/24  0655 25 mcg Given               magnesium oxide (Mag-Ox) tablet 400 mg (mg) Total dose:  1,600 mg Dosing weight:  79.8      Date/Time Rate/Dose/Volume Action       07/02/24  1909 400 mg Given     07/03/24  0854 400 mg Given     07/04/24  0905 400 mg Given     07/05/24  0804 400 mg Given               mycophenolate (Cellcept) capsule 500 mg (mg) Total dose:  2,000 mg Dosing weight:  79.8      Date/Time Rate/Dose/Volume Action       07/02/24  1909 500 mg Given     07/03/24  0726 500 mg Given      2010 500 mg Given     07/04/24  0604 500 mg Given               mycophenolate (Cellcept) capsule 750 mg (mg) Total dose:  1,500 mg Dosing weight:  79.4      Date/Time Rate/Dose/Volume Action       07/04/24  1812 750 mg Given     07/05/24  0655 750 mg Given               pantoprazole (ProtoNix) EC tablet 40 mg (mg) Total dose:  120 mg Dosing weight:  79.8      Date/Time Rate/Dose/Volume Action       07/03/24  0727 40 mg Given     07/04/24  0604 40 mg Given     07/05/24  0655 40 mg Given               potassium citrate CR (Urocit-K-10) ER tablet 20 mEq (mEq) Total dose:  40 mEq Dosing weight:  79.8      Date/Time Rate/Dose/Volume Action       07/02/24  1742 *Not included in total Held by provider      1745 *Not included in total Automatically Held     07/03/24  0900 *Not included in total Automatically Held      0954 *Not included in total Unheld by provider     07/04/24  0900 20 mEq Given     07/05/24  0900 20 mEq Given               predniSONE (Deltasone) tablet 10 mg (mg) Total dose:  20 mg Dosing weight:  79.8      Date/Time Rate/Dose/Volume Action       07/02/24  1920 10 mg Given     07/03/24  0855 10 mg Given               sertraline (Zoloft) tablet 50 mg (mg) Total dose:  150 mg Dosing weight:  79.8      Date/Time Rate/Dose/Volume  Action       07/02/24  1909 50 mg Given     07/03/24  0855 50 mg Given     07/04/24  0909 50 mg Given               SUMAtriptan (Imitrex) tablet 100 mg (mg) Total dose:  100 mg Dosing weight:  79.8      Date/Time Rate/Dose/Volume Action       07/03/24  2241 100 mg Given               tenofovir alafenamide (Vemlidy) tablet 25 mg (mg) Total dose:  75 mg* Dosing weight:  79.8   *Administration not included in total     Date/Time Rate/Dose/Volume Action       07/02/24  1910 25 mg Given     07/03/24  0900 *25 mg Missed     07/04/24  0909 25 mg Given     07/05/24  0801 25 mg Given               magnesium sulfate IV 2 g (mL/hr) Total volume:  Not documented* Dosing weight:  79.8   *Total volume has not been documented. View each administration to see the amount administered.     Date/Time Rate/Dose/Volume Action       07/03/24  0955 2 g - 25 mL/hr (over 120 min) New Bag      1155  (over 120 min) Stopped               furosemide (Lasix) injection 40 mg (mg) Total dose:  80 mg Dosing weight:  79.8      Date/Time Rate/Dose/Volume Action       07/03/24  1030 40 mg Given      2239 40 mg Given               furosemide (Lasix) injection 40 mg (mg) Total dose:  40 mg Dosing weight:  79.4      Date/Time Rate/Dose/Volume Action       07/04/24  1141 40 mg Given               perflutren lipid microspheres (Definity) injection 0.5-10 mL of dilution (mL of dilution) Total dose:  1.5 mL of dilution Dosing weight:  79.8      Date/Time Rate/Dose/Volume Action       07/03/24  1631 1.5 mL of dilution Given               methylPREDNISolone sodium succinate (SOLU-Medrol) 500 mg in dextrose 5% 100 mL IV (mg) Total dose:  0 mg* Dosing weight:  79.8   *Administration not included in total     Date/Time Rate/Dose/Volume Action       07/03/24  2035 *500 mg - 108 mL/hr (over 60 min) Missed               methylPREDNISolone sodium succinate (SOLU-Medrol) 500 mg in dextrose 5% 100 mL IV (mL/hr) Total volume:  Not documented* Dosing weight:  79.4    *Total volume has not been documented. View each administration to see the amount administered.     Date/Time Rate/Dose/Volume Action       07/04/24  1153 500 mg - 108 mL/hr (over 60 min) New Bag      1253  (over 60 min) Stopped     07/05/24  0809 500 mg - 108 mL/hr (over 60 min) New Bag      0909  (over 60 min) Stopped               melatonin tablet 3 mg (mg) Total dose:  3 mg Dosing weight:  80.7      Date/Time Rate/Dose/Volume Action       07/03/24  2241 3 mg Given               guaiFENesin (Mucinex) 12 hr tablet 600 mg (mg) Total dose:  600 mg Dosing weight:  79.4      Date/Time Rate/Dose/Volume Action       07/04/24  1139 600 mg Given               potassium chloride CR (Klor-Con M20) ER tablet 40 mEq (mEq) Total dose:  40 mEq Dosing weight:  79.4      Date/Time Rate/Dose/Volume Action       07/04/24  1025 40 mEq Given               sulfamethoxazole-trimethoprim (Bactrim) 400-80 mg per tablet 1 tablet (tablet) Total dose:  2 tablet Dosing weight:  79.4      Date/Time Rate/Dose/Volume Action       07/04/24  1305 1 tablet Given     07/05/24  0803 1 tablet Given               clotrimazole (Mycelex) samia 10 mg (mg) Total dose:  30 mg Dosing weight:  79.4      Date/Time Rate/Dose/Volume Action       07/04/24  1308 10 mg Given      1813 10 mg Given     07/05/24  0804 10 mg Given               valGANciclovir (Valcyte) tablet 450 mg (mg) Total dose:  450 mg Dosing weight:  79.4      Date/Time Rate/Dose/Volume Action       07/04/24  1305 450 mg Given               amLODIPine (Norvasc) tablet 10 mg (mg) Total dose:  10 mg Dosing weight:  79.4      Date/Time Rate/Dose/Volume Action       07/05/24  0801 10 mg Given               sodium bicarbonate tablet 650 mg (mg) Total dose:  1,950 mg Dosing weight:  79.4      Date/Time Rate/Dose/Volume Action       07/04/24  1306 650 mg Given      2135 650 mg Given     07/05/24  0803 650 mg Given               lactated Ringer's infusion (mL/hr) Total volume:  346.67 mL* Dosing  weight:  79.4   *From user-documented volume     Date/Time Rate/Dose/Volume Action       07/05/24  0030 100 mL/hr New Bag      0358 100 mL/hr - 346.67 mL Rate Verify      0611 75 mL/hr Rate Change               fentaNYL PF (Sublimaze) injection (mcg) Total dose:  100 mcg      Date/Time Rate/Dose/Volume Action       07/05/24  1220 50 mcg Given      1225 50 mcg Given               midazolam (Versed) injection (mg) Total dose:  2 mg      Date/Time Rate/Dose/Volume Action       07/05/24  1220 1 mg Given      1225 1 mg Given                   Two specimens collected      See detailed result report with images in PACS.    The patient tolerated the procedure well without incident or complication and is in stable condition.

## 2024-07-05 NOTE — PROGRESS NOTES
"        INPATIENT TRANSPLANT NEPHROLOGY PROGRESS NOTE          REASON FOR CONSULT:  Immunosuppressive medication management and nephrology related issues.    SUBJECTION:     This am, will give solumedrol # 3/3, 500 mg  Edema improved  SOB improved  UOP 2.4 liter  Midline today for difficult IV access   RFP/CBC =Pending  Biopsy today  Send CMV PLASMA PCR weekly  No acute event overnight.    PHYCISCAL EXAMINATION:    Visit Vitals  /80 (BP Location: Right arm, Patient Position: Lying)   Pulse 84   Temp 36.1 °C (97 °F) (Temporal)   Resp 18   Ht 1.651 m (5' 5\")   Wt 80.1 kg (176 lb 9.4 oz)   LMP  (LMP Unknown)   SpO2 99%   BMI 29.39 kg/m²   OB Status Unknown   Smoking Status Never   BSA 1.92 m²        07/03 1900 - 07/05 0659  In: 646.7 [P.O.:300; I.V.:346.7]  Out: 4050 [Urine:4050]     Weight change: -0.6 kg (-1 lb 5.2 oz)    General Appearance - NAD, Good speech, oriented and alert  HEENT - Supple. Not pale. No jaundice. No cervical lymphadenopathy. Pharynx and tonsils are not injected.  CVS - RRR. Normal S1/S2. No murmur, click , rub or gallop  Lungs- clear to auscultation bilaterally  Abdomen - soft , not tender, no guarding, no rigidity. No hepatosplenomegaly. Normal bowel sounds. No masses and ascites. S/P Kidney transplant .  Transplanted kidney is not tender.   Musculoskeletal /Extremities - no edema. Full ROM. No joint tenderness.   Neuro/Psych - appropriate mood and affect. Motor power V/V all extremities. CN I -XII were grossly intact.  Skin - No visible rash    MEDICATION LIST: REVIEWED    amLODIPine, 10 mg, Daily  calcitriol, 0.25 mcg, Daily  calcium carbonate, 250 mg, Daily  cholecalciferol, 4,000 Units, Daily  clotrimazole, 10 mg, TID after meals  gabapentin, 300 mg, Nightly  levothyroxine, 25 mcg, Daily before breakfast  lidocaine, 5 mL, Once  loratadine, 10 mg, Daily  magnesium oxide, 400 mg, Daily  mycophenolate, 750 mg, q12h  pantoprazole, 40 mg, Daily before breakfast  potassium citrate CR, 20 " mEq, Daily  sennosides-docusate sodium, 2 tablet, BID  sertraline, 50 mg, Daily  sodium bicarbonate, 650 mg, BID  sulfamethoxazole-trimethoprim, 1 tablet, Daily  sulfur hexafluoride microsphr, 2 mL, Once in imaging  tenofovir alafenamide, 25 mg, Daily  valGANciclovir, 450 mg, q48h      lactated Ringer's, Last Rate: 75 mL/hr (24 0611)      acetaminophen, 650 mg, q4h PRN  guaiFENesin, 600 mg, BID PRN  hydrALAZINE, 10 mg, q8h PRN  melatonin, 3 mg, Nightly PRN  naloxone, 0.2 mg, q5 min PRN  ondansetron ODT, 4 mg, q8h PRN   Or  ondansetron, 4 mg, q8h PRN  oxyCODONE, 5 mg, q4h PRN  SUMAtriptan, 100 mg, Once PRN        ALLERGY:  No Known Allergies    LABS:  Results for orders placed or performed during the hospital encounter of 24 (from the past 24 hour(s))   Sars-CoV-2 PCR   Result Value Ref Range    Coronavirus 2019, PCR Not Detected Not Detected        ASSESSMENT AND PLAN:    Ms. Leblanc is a 44 y.o. female with past medical history significant for ESRD secondary to HTN/NSAID whom received a  donor kidney transplant on 23 by Dr. Marbin Lambert & Dr. Louis with a KDPI of 56% and PRA of 48%. HCV -/- and donor has not met risk factors. EBV +/+. 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 Dry weight is 81.1 kg (discharge weight is 76.2kg ). Pt received a total of 4.5 mg/kg total of thymoglobulin induction therapy in conjunction with 500mg IV solumedrol. Pt was initiated on Tacrolimus & Cellcept immunosuppression in conjunction with IV solumedrol taper. 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 Pt was started on valcyte (CMV D + / R + ) and bactrim for CMV & PCP prophylaxis per protocol. She was started on clotrimazole as antifungal coverage per protocol. Operative course was complicated by return to OR for exploration of transplanted kidney and washout of hematoma. Hospital course was complicated by post-operative pain and constipation, which has  resolved.     5/29/24 Direct admission for fever, nausea, vomiting and abdominal pain (chronic).  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 ID consulted. She is on Valcyte treatment dose.     Patient has 1+ pitting edema of her ankles and some mild swelling of UEs. Her UOP is unchanged, however she's endorsing some R flank tenderness that could be consistent with her known R sided kidney stone. At this time, this is likely an acute MARK, however an infectious workup was started in the ED. Will admit the patient for hydration, and monitoring of kidney function in the setting of Cr 1.6 (baseline 0.8), mild LE edema. Imaging pending to rule out Ddx of DVTs, transplant failure/rejection, infection.      Transplant nephrology is consulted to assist with immunosuppressive medication management and nephrology related issues.        1. ESRD S/P Kidney transplant.   - Renal allograft function:   Lab Results   Component Value Date    CREATININE 2.14 (H) 07/04/2024     Estimated Creatinine Clearance: 34.3 mL/min (A) (by C-G formula based on SCr of 2.14 mg/dL (H)).    Intake/Output Summary (Last 24 hours) at 7/5/2024 1012  Last data filed at 7/5/2024 0809  Gross per 24 hour   Intake 346.67 ml   Output 2900 ml   Net -2553.33 ml     MFI DSA  DQ2 10,158     - Bx today  -Lasix 40 mg iv daily  - Solumedrol 500 mg iv daily x 3 days; completed today.  - Further Rx : pending path    - Continue to monitor UOP and Serum creatinine closely.   - Avoid nephrotoxic agents, NSAIDs and IV contrast   - Strict I/O.   - Renally dose all medications by the most recent CrCl from Cockcroft-Gault formula.    2. Immunosuppression   -          Belatacept 5 mg/kg (362.5 mg) q28 days; last dose received 6/24, next dose scheduled outpatient for 7/22  -           mg BID-->750 mg bid [7/4/24]  -          Prednisone 10 mg daily    3. Anemia and WBC   Lab Results   Component Value Date    WBC 9.7 07/04/2024     HGB 12.1 07/04/2024    HCT 38.7 07/04/2024    MCV 99 07/04/2024     07/04/2024     -Continue to monitor Hgb   -No indications for PRBC transfusion     4. Electrolyte   Lab Results   Component Value Date    GLUCOSE 66 (L) 07/04/2024    CALCIUM 9.4 07/04/2024     07/04/2024    K 3.4 (L) 07/04/2024    CO2 18 (L) 07/04/2024     07/04/2024    BUN 20 07/04/2024    CREATININE 2.14 (H) 07/04/2024     - Reviewed renal profile.     6. Hypertension   Blood Pressures         7/4/2024  1119 7/4/2024  1512 7/4/2024  2115 7/5/2024  0100 7/5/2024  0756    BP: 168/97 130/76 164/84 150/84 143/80          -Goal BP < 140/90 mmHg   -continue current management     7. Hydronephrosis  US  7/2/24-stable  - Discussed with tx surgery no need to repeat MAG -3    8.  GI prophylaxis   - On PPI     9. 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  -monitor Plasma PCR  - Ask team to inform Dr. Lou, pt is in the hospital    * Case was discussed with primary team.  For questions, please contact transplant nephrology page x [phone]    Marcia Webber    Transplant Nephrologist

## 2024-07-05 NOTE — PROGRESS NOTES
"Herber Foy Rai is a 44 y.o. female on day 3 of admission presenting with Shortness of breath.    Subjective   Biopsy today       Objective   Vitals:    07/05/24 0756   BP: 143/80   Pulse: 84   Resp: 18   Temp: 36.1 °C (97 °F)   SpO2: 99%       Physical Exam  Constitutional:       Appearance: Normal appearance.   Eyes:      Pupils: Pupils are equal, round, and reactive to light.   Cardiovascular:      Rate and Rhythm: Normal rate.   Pulmonary:      Effort: Pulmonary effort is normal. No respiratory distress.   Abdominal:      General: There is no distension.      Palpations: Abdomen is soft.      Comments: Incision clean, dry, intact   Musculoskeletal:         General: Normal range of motion.      Right lower leg: No edema.      Left lower leg: No edema.   Skin:     General: Skin is warm and dry.   Neurological:      General: No focal deficit present.      Mental Status: She is alert and oriented to person, place, and time.   Psychiatric:         Mood and Affect: Mood normal.         Behavior: Behavior normal.         Last Recorded Vitals  Blood pressure 143/80, pulse 84, temperature 36.1 °C (97 °F), temperature source Temporal, resp. rate 18, height 1.651 m (5' 5\"), weight 80.1 kg (176 lb 9.4 oz), SpO2 99%.  Intake/Output last 3 Shifts:  I/O last 3 completed shifts:  In: 646.7 (8.1 mL/kg) [P.O.:300; I.V.:346.7 (4.4 mL/kg)]  Out: 4050 (51 mL/kg) [Urine:4050 (1.4 mL/kg/hr)]  Weight: 79.4 kg     Relevant Results  Lab Results   Component Value Date    WBC 9.7 07/04/2024    HGB 12.1 07/04/2024    HCT 38.7 07/04/2024    MCV 99 07/04/2024     07/04/2024     Lab Results   Component Value Date    GLUCOSE 66 (L) 07/04/2024    CALCIUM 9.4 07/04/2024     07/04/2024    K 3.4 (L) 07/04/2024    CO2 18 (L) 07/04/2024     07/04/2024    BUN 20 07/04/2024    CREATININE 2.14 (H) 07/04/2024     amLODIPine, 10 mg, oral, Daily  calcitriol, 0.25 mcg, oral, Daily  calcium carbonate, 250 mg, oral, Daily  cholecalciferol, " 4,000 Units, oral, Daily  clotrimazole, 10 mg, oral, TID after meals  gabapentin, 300 mg, oral, Nightly  levothyroxine, 25 mcg, oral, Daily before breakfast  lidocaine, 5 mL, infiltration, Once  loratadine, 10 mg, oral, Daily  magnesium oxide, 400 mg, oral, Daily  mycophenolate, 750 mg, oral, q12h  pantoprazole, 40 mg, oral, Daily before breakfast  potassium citrate CR, 20 mEq, oral, Daily  sennosides-docusate sodium, 2 tablet, oral, BID  sertraline, 50 mg, oral, Daily  sodium bicarbonate, 650 mg, oral, BID  sulfamethoxazole-trimethoprim, 1 tablet, oral, Daily  sulfur hexafluoride microsphr, 2 mL, intravenous, Once in imaging  tenofovir alafenamide, 25 mg, oral, Daily  valGANciclovir, 450 mg, oral, q48h        Assessment/Plan   Principal Problem:    Shortness of breath    DDKT 11/2023    Kidney allograft function   MARK, creatinine 2.1, baseline < 1   Kidney duplex: multiple calculi, mild-mod hydro, which is similar to previous   Echo unremarkable   New DSA, Plan biopsy in am    Steroid bolus 500 x 3, dose 3 today      Immunosuppression   Belatacept, consider adding Tac      Pred IV bolus    LE swelling   No DVT       I spent 35 minutes in the professional and overall care of this patient.      Don Lopez MD

## 2024-07-05 NOTE — SIGNIFICANT EVENT
Per Dr. Webber, it is ok for PICC placement for lab drawn & infusions due to patient being a difficult stick.

## 2024-07-05 NOTE — CONSULTS
Reason For Consult  Midline placement        Assessment/Plan     At bedside for placement, informed by bedside nurse pt was off the floor in IR.         Alfreda Pappas LPN

## 2024-07-05 NOTE — PRE-PROCEDURE NOTE
Interventional Radiology Preprocedure Note - US guided transplant biopsy    Indication for procedure: The primary encounter diagnosis was Shortness of breath. Diagnoses of Elevated brain natriuretic peptide (BNP) level, Complication of transplanted kidney, unspecified complication (HHS-HCC), and Bilateral edema of lower extremity were also pertinent to this visit.    Relevant review of systems: Review of Systems   Constitutional:  Negative for chills and fever.   Respiratory:  Negative for shortness of breath.    Cardiovascular:  Negative for chest pain.   Gastrointestinal:  Positive for nausea. Negative for abdominal distention and abdominal pain.   Skin: Negative.    Neurological:  Negative for dizziness and headaches.        Relevant Labs:   Lab Results   Component Value Date    CREATININE 2.72 (H) 07/05/2024    EGFR 21 (L) 07/05/2024    INR 1.1 07/05/2024    PROTIME 12.2 07/05/2024       Planned Sedation/Anesthesia: Moderate    Airway assessment: normal    Directed physical examination:    Physical Exam  Constitutional:       Appearance: Normal appearance. She is normal weight.   Cardiovascular:      Rate and Rhythm: Normal rate.      Pulses: Normal pulses.   Pulmonary:      Effort: Pulmonary effort is normal.   Skin:     General: Skin is warm and dry.   Neurological:      Mental Status: She is oriented to person, place, and time. Mental status is at baseline.          Mallampati: II (hard and soft palate, upper portion of tonsils and uvula visible)    ASA Score: ASA 2 - Patient with mild systemic disease with no functional limitations    Benefits, risks and alternatives of procedure and planned sedation have been discussed with the patient and/or their representative. All questions answered and they agree to proceed.

## 2024-07-05 NOTE — PROGRESS NOTES
Herber Foy Rai is a 44 y.o. female on day 3 of admission presenting with Shortness of breath.      DC Plannin/5:   Went in and met with the pt, confirmed demographics.   No home going needs anticipated for this pt at this time.        ADOD:     This TCC will continue to follow for home going needs and safe DC plan.        CRYS LEDBETTER

## 2024-07-06 LAB
ALBUMIN SERPL BCP-MCNC: 3.5 G/DL (ref 3.4–5)
ANION GAP SERPL CALC-SCNC: 15 MMOL/L (ref 10–20)
APTT PPP: 30 SECONDS (ref 27–38)
BACTERIA BLD CULT: NORMAL
BACTERIA BLD CULT: NORMAL
BUN SERPL-MCNC: 36 MG/DL (ref 6–23)
CALCIUM SERPL-MCNC: 8.3 MG/DL (ref 8.6–10.6)
CHLORIDE SERPL-SCNC: 95 MMOL/L (ref 98–107)
CO2 SERPL-SCNC: 19 MMOL/L (ref 21–32)
CREAT SERPL-MCNC: 3 MG/DL (ref 0.5–1.05)
EGFRCR SERPLBLD CKD-EPI 2021: 19 ML/MIN/1.73M*2
ERYTHROCYTE [DISTWIDTH] IN BLOOD BY AUTOMATED COUNT: 16 % (ref 11.5–14.5)
GLUCOSE SERPL-MCNC: 121 MG/DL (ref 74–99)
HCT VFR BLD AUTO: 33.5 % (ref 36–46)
HGB BLD-MCNC: 11.6 G/DL (ref 12–16)
INR PPP: 1 (ref 0.9–1.1)
MAGNESIUM SERPL-MCNC: 2.37 MG/DL (ref 1.6–2.4)
MCH RBC QN AUTO: 31.5 PG (ref 26–34)
MCHC RBC AUTO-ENTMCNC: 34.6 G/DL (ref 32–36)
MCV RBC AUTO: 91 FL (ref 80–100)
NRBC BLD-RTO: 0 /100 WBCS (ref 0–0)
PHOSPHATE SERPL-MCNC: 3.9 MG/DL (ref 2.5–4.9)
PLATELET # BLD AUTO: 157 X10*3/UL (ref 150–450)
POTASSIUM SERPL-SCNC: 4 MMOL/L (ref 3.5–5.3)
PROTHROMBIN TIME: 10.9 SECONDS (ref 9.8–12.8)
RBC # BLD AUTO: 3.68 X10*6/UL (ref 4–5.2)
SODIUM SERPL-SCNC: 125 MMOL/L (ref 136–145)
WBC # BLD AUTO: 19.8 X10*3/UL (ref 4.4–11.3)

## 2024-07-06 PROCEDURE — 2500000002 HC RX 250 W HCPCS SELF ADMINISTERED DRUGS (ALT 637 FOR MEDICARE OP, ALT 636 FOR OP/ED)

## 2024-07-06 PROCEDURE — 82565 ASSAY OF CREATININE: CPT | Performed by: STUDENT IN AN ORGANIZED HEALTH CARE EDUCATION/TRAINING PROGRAM

## 2024-07-06 PROCEDURE — 2500000002 HC RX 250 W HCPCS SELF ADMINISTERED DRUGS (ALT 637 FOR MEDICARE OP, ALT 636 FOR OP/ED): Performed by: STUDENT IN AN ORGANIZED HEALTH CARE EDUCATION/TRAINING PROGRAM

## 2024-07-06 PROCEDURE — 2500000001 HC RX 250 WO HCPCS SELF ADMINISTERED DRUGS (ALT 637 FOR MEDICARE OP): Performed by: STUDENT IN AN ORGANIZED HEALTH CARE EDUCATION/TRAINING PROGRAM

## 2024-07-06 PROCEDURE — 85610 PROTHROMBIN TIME: CPT | Performed by: STUDENT IN AN ORGANIZED HEALTH CARE EDUCATION/TRAINING PROGRAM

## 2024-07-06 PROCEDURE — 1200000002 HC GENERAL ROOM WITH TELEMETRY DAILY

## 2024-07-06 PROCEDURE — 2500000004 HC RX 250 GENERAL PHARMACY W/ HCPCS (ALT 636 FOR OP/ED): Performed by: INTERNAL MEDICINE

## 2024-07-06 PROCEDURE — 2500000001 HC RX 250 WO HCPCS SELF ADMINISTERED DRUGS (ALT 637 FOR MEDICARE OP)

## 2024-07-06 PROCEDURE — 2500000004 HC RX 250 GENERAL PHARMACY W/ HCPCS (ALT 636 FOR OP/ED): Performed by: STUDENT IN AN ORGANIZED HEALTH CARE EDUCATION/TRAINING PROGRAM

## 2024-07-06 PROCEDURE — 36415 COLL VENOUS BLD VENIPUNCTURE: CPT | Performed by: STUDENT IN AN ORGANIZED HEALTH CARE EDUCATION/TRAINING PROGRAM

## 2024-07-06 PROCEDURE — 99232 SBSQ HOSP IP/OBS MODERATE 35: CPT | Performed by: STUDENT IN AN ORGANIZED HEALTH CARE EDUCATION/TRAINING PROGRAM

## 2024-07-06 PROCEDURE — 99233 SBSQ HOSP IP/OBS HIGH 50: CPT | Performed by: INTERNAL MEDICINE

## 2024-07-06 PROCEDURE — 85027 COMPLETE CBC AUTOMATED: CPT | Performed by: STUDENT IN AN ORGANIZED HEALTH CARE EDUCATION/TRAINING PROGRAM

## 2024-07-06 PROCEDURE — 2500000004 HC RX 250 GENERAL PHARMACY W/ HCPCS (ALT 636 FOR OP/ED)

## 2024-07-06 PROCEDURE — 83735 ASSAY OF MAGNESIUM: CPT | Performed by: STUDENT IN AN ORGANIZED HEALTH CARE EDUCATION/TRAINING PROGRAM

## 2024-07-06 RX ORDER — FUROSEMIDE 10 MG/ML
40 INJECTION INTRAMUSCULAR; INTRAVENOUS DAILY
Status: DISCONTINUED | OUTPATIENT
Start: 2024-07-06 | End: 2024-07-07

## 2024-07-06 RX ORDER — PREDNISONE 20 MG/1
20 TABLET ORAL DAILY
Status: DISCONTINUED | OUTPATIENT
Start: 2024-07-07 | End: 2024-07-19 | Stop reason: HOSPADM

## 2024-07-06 RX ORDER — MYCOPHENOLATE MOFETIL 250 MG/1
1000 CAPSULE ORAL EVERY 12 HOURS
Status: DISCONTINUED | OUTPATIENT
Start: 2024-07-06 | End: 2024-07-19 | Stop reason: HOSPADM

## 2024-07-06 RX ORDER — FUROSEMIDE 10 MG/ML
40 INJECTION INTRAMUSCULAR; INTRAVENOUS ONCE
Status: COMPLETED | OUTPATIENT
Start: 2024-07-06 | End: 2024-07-06

## 2024-07-06 RX ORDER — SODIUM BICARBONATE 650 MG/1
1300 TABLET ORAL 2 TIMES DAILY
Status: DISCONTINUED | OUTPATIENT
Start: 2024-07-06 | End: 2024-07-13

## 2024-07-06 ASSESSMENT — COGNITIVE AND FUNCTIONAL STATUS - GENERAL
CLIMB 3 TO 5 STEPS WITH RAILING: A LITTLE
PERSONAL GROOMING: A LITTLE
TURNING FROM BACK TO SIDE WHILE IN FLAT BAD: A LITTLE
STANDING UP FROM CHAIR USING ARMS: A LITTLE
MOBILITY SCORE: 24
MOBILITY SCORE: 21
DAILY ACTIVITIY SCORE: 19
DAILY ACTIVITIY SCORE: 24
DRESSING REGULAR LOWER BODY CLOTHING: A LITTLE
TOILETING: A LITTLE
HELP NEEDED FOR BATHING: A LITTLE
DRESSING REGULAR UPPER BODY CLOTHING: A LITTLE

## 2024-07-06 ASSESSMENT — PAIN - FUNCTIONAL ASSESSMENT
PAIN_FUNCTIONAL_ASSESSMENT: 0-10
PAIN_FUNCTIONAL_ASSESSMENT: 0-10

## 2024-07-06 ASSESSMENT — PAIN SCALES - GENERAL
PAINLEVEL_OUTOF10: 5 - MODERATE PAIN
PAINLEVEL_OUTOF10: 0 - NO PAIN

## 2024-07-06 NOTE — PROGRESS NOTES
"        INPATIENT TRANSPLANT NEPHROLOGY PROGRESS NOTE          REASON FOR CONSULT:  Immunosuppressive medication management and nephrology related issues.    SUBJECTION:     This am, will give solumedrol # 3/3, 500 mg  Biopsy 7/5/24 = pending  CMV -pending  Cr increased to 3  Increase MMF to 1000 mg bid  Replaced bicarb   No acute event overnight.    PHYCISCAL EXAMINATION:    Visit Vitals  /80 (BP Location: Right arm, Patient Position: Sitting) Comment (BP Location): LOWER PART OF THE ARM   Pulse 82   Temp 36.6 °C (97.9 °F) (Temporal)   Resp 18   Ht 1.651 m (5' 5\")   Wt 83.2 kg (183 lb 5 oz)   LMP  (LMP Unknown)   SpO2 97%   BMI 30.50 kg/m²   OB Status Unknown   Smoking Status Never   BSA 1.95 m²        07/04 1900 - 07/06 0659  In: 346.7 [I.V.:346.7]  Out: 2310 [Urine:2310]     Weight change:     General Appearance - NAD, Good speech, oriented and alert  HEENT - Supple. Not pale. No jaundice. No cervical lymphadenopathy. Pharynx and tonsils are not injected.  CVS - RRR. Normal S1/S2. No murmur, click , rub or gallop  Lungs- clear to auscultation bilaterally  Abdomen - soft , not tender, no guarding, no rigidity. No hepatosplenomegaly. Normal bowel sounds. No masses and ascites. S/P Kidney transplant .  Transplanted kidney is not tender.   Musculoskeletal /Extremities - no edema. Full ROM. No joint tenderness.   Neuro/Psych - appropriate mood and affect. Motor power V/V all extremities. CN I -XII were grossly intact.  Skin - No visible rash    MEDICATION LIST: REVIEWED    amLODIPine, 10 mg, Daily  calcitriol, 0.25 mcg, Daily  calcium carbonate, 250 mg, Daily  cholecalciferol, 4,000 Units, Daily  clotrimazole, 10 mg, TID after meals  gabapentin, 300 mg, Nightly  heparin (porcine), 5,000 Units, q8h JANIE  levothyroxine, 25 mcg, Daily before breakfast  loratadine, 10 mg, Daily  magnesium oxide, 400 mg, Daily  mycophenolate, 750 mg, q12h  pantoprazole, 40 mg, Daily before breakfast  potassium citrate CR, 20 mEq, " Daily  sennosides-docusate sodium, 2 tablet, BID  sertraline, 50 mg, Daily  sodium bicarbonate, 1,300 mg, BID  sulfamethoxazole-trimethoprim, 1 tablet, Daily  sulfur hexafluoride microsphr, 2 mL, Once in imaging  tenofovir alafenamide, 25 mg, Daily  valGANciclovir, 450 mg, q48h           acetaminophen, 650 mg, q4h PRN  alteplase, 2 mg, Once PRN  guaiFENesin, 600 mg, BID PRN  hydrALAZINE, 10 mg, q8h PRN  melatonin, 3 mg, Nightly PRN  naloxone, 0.2 mg, q5 min PRN  ondansetron ODT, 4 mg, q8h PRN   Or  ondansetron, 4 mg, q8h PRN  oxyCODONE, 5 mg, q4h PRN  SUMAtriptan, 100 mg, Once PRN        ALLERGY:  No Known Allergies    LABS:  Results for orders placed or performed during the hospital encounter of 24 (from the past 24 hour(s))   CBC   Result Value Ref Range    WBC 19.8 (H) 4.4 - 11.3 x10*3/uL    nRBC 0.0 0.0 - 0.0 /100 WBCs    RBC 3.68 (L) 4.00 - 5.20 x10*6/uL    Hemoglobin 11.6 (L) 12.0 - 16.0 g/dL    Hematocrit 33.5 (L) 36.0 - 46.0 %    MCV 91 80 - 100 fL    MCH 31.5 26.0 - 34.0 pg    MCHC 34.6 32.0 - 36.0 g/dL    RDW 16.0 (H) 11.5 - 14.5 %    Platelets 157 150 - 450 x10*3/uL   Coagulation Screen   Result Value Ref Range    Protime 10.9 9.8 - 12.8 seconds    INR 1.0 0.9 - 1.1    aPTT 30 27 - 38 seconds   Magnesium   Result Value Ref Range    Magnesium 2.37 1.60 - 2.40 mg/dL   Renal function panel   Result Value Ref Range    Glucose 121 (H) 74 - 99 mg/dL    Sodium 125 (L) 136 - 145 mmol/L    Potassium 4.0 3.5 - 5.3 mmol/L    Chloride 95 (L) 98 - 107 mmol/L    Bicarbonate 19 (L) 21 - 32 mmol/L    Anion Gap 15 10 - 20 mmol/L    Urea Nitrogen 36 (H) 6 - 23 mg/dL    Creatinine 3.00 (H) 0.50 - 1.05 mg/dL    eGFR 19 (L) >60 mL/min/1.73m*2    Calcium 8.3 (L) 8.6 - 10.6 mg/dL    Phosphorus 3.9 2.5 - 4.9 mg/dL    Albumin 3.5 3.4 - 5.0 g/dL        ASSESSMENT AND PLAN:    Ms. Leblanc is a 44 y.o. female with past medical history significant for ESRD secondary to HTN/NSAID whom received a  donor kidney transplant on  11/30/23 by Dr. Marbin Lambert & Dr. Louis with a KDPI of 56% and PRA of 48%. HCV -/- and donor has not met risk factors. EBV +/+. 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 Dry weight is 81.1 kg (discharge weight is 76.2kg ). Pt received a total of 4.5 mg/kg total of thymoglobulin induction therapy in conjunction with 500mg IV solumedrol. Pt was initiated on Tacrolimus & Cellcept immunosuppression in conjunction with IV solumedrol taper. 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 Pt was started on valcyte (CMV D + / R + ) and bactrim for CMV & PCP prophylaxis per protocol. She was started on clotrimazole as antifungal coverage per protocol. Operative course was complicated by return to OR for exploration of transplanted kidney and washout of hematoma. Hospital course was complicated by post-operative pain and constipation, which has resolved.     5/29/24 Direct admission for fever, nausea, vomiting and abdominal pain (chronic).  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 ID consulted. She is on Valcyte treatment dose.     Patient has 1+ pitting edema of her ankles and some mild swelling of UEs. Her UOP is unchanged, however she's endorsing some R flank tenderness that could be consistent with her known R sided kidney stone. At this time, this is likely an acute MARK, however an infectious workup was started in the ED. Will admit the patient for hydration, and monitoring of kidney function in the setting of Cr 1.6 (baseline 0.8), mild LE edema. Imaging pending to rule out Ddx of DVTs, transplant failure/rejection, infection.      Transplant nephrology is consulted to assist with immunosuppressive medication management and nephrology related issues.        1. ESRD S/P Kidney transplant.   - Renal allograft function:   Lab Results   Component Value Date    CREATININE 3.00 (H) 07/06/2024     Estimated Creatinine  Clearance: 25 mL/min (A) (by C-G formula based on SCr of 3 mg/dL (H)).    Intake/Output Summary (Last 24 hours) at 7/6/2024 1010  Last data filed at 7/6/2024 0845  Gross per 24 hour   Intake --   Output 1110 ml   Net -1110 ml     MFI DSA  DQ2 10,158     - Bx7/5- PENDING PATH  - Solumedrol 500 mg iv daily x 3 days; completed today.  - Further Rx : pending path  -Lasix 40 mg IV daily    - Continue to monitor UOP and Serum creatinine closely.   - Avoid nephrotoxic agents, NSAIDs and IV contrast   - Strict I/O.   - Renally dose all medications by the most recent CrCl from Cockcroft-Gault formula.    2. Immunosuppression   -          Belatacept 5 mg/kg (362.5 mg) q28 days; last dose received 6/24, next dose scheduled outpatient for 7/22  -           mg BID  -          Prednisone 10 mg daily à methylpred 500 mg daily x 3 days 7/4 - 7/6 à prednisone 20 mg daily starting 7/7    3. Anemia and WBC   Lab Results   Component Value Date    WBC 19.8 (H) 07/06/2024    HGB 11.6 (L) 07/06/2024    HCT 33.5 (L) 07/06/2024    MCV 91 07/06/2024     07/06/2024     -Continue to monitor Hgb   -No indications for PRBC transfusion     4. Electrolyte   Lab Results   Component Value Date    GLUCOSE 121 (H) 07/06/2024    CALCIUM 8.3 (L) 07/06/2024     (L) 07/06/2024    K 4.0 07/06/2024    CO2 19 (L) 07/06/2024    CL 95 (L) 07/06/2024    BUN 36 (H) 07/06/2024    CREATININE 3.00 (H) 07/06/2024     - Reviewed renal profile.     6. Hypertension   Blood Pressures         7/5/2024  1545 7/5/2024  1946 7/5/2024  2243 7/6/2024  0548 7/6/2024  0845    BP: 144/70 138/81 137/80 145/90 143/80          -Goal BP < 140/90 mmHg   -continue current management     7. Hydronephrosis  US  7/2/24-stable  - Discussed with tx surgery no need to repeat MAG -3    8.  GI prophylaxis   - On PPI     9. 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  -monitor Plasma PCR  -  Asked team to inform Dr. Lou, pt is in the hospital    * Case was discussed with primary team.  For questions, please contact transplant nephrology page x [phone]    Marcia Webber    Transplant Nephrologist

## 2024-07-06 NOTE — PROGRESS NOTES
"Herber Foy Rai is a 44 y.o. female on day 4 of admission presenting with Shortness of breath.    Subjective   Biopsy yesterday       Objective   Vitals:    07/06/24 0845   BP: 143/80   Pulse: 82   Resp: 18   Temp: 36.6 °C (97.9 °F)   SpO2: 97%       Physical Exam  Constitutional:       Appearance: Normal appearance.   Eyes:      Pupils: Pupils are equal, round, and reactive to light.   Cardiovascular:      Rate and Rhythm: Normal rate.   Pulmonary:      Effort: Pulmonary effort is normal. No respiratory distress.   Abdominal:      General: There is no distension.      Palpations: Abdomen is soft.      Comments: Incision clean, dry, intact   Musculoskeletal:         General: Normal range of motion.      Right lower leg: No edema.      Left lower leg: No edema.   Skin:     General: Skin is warm and dry.   Neurological:      General: No focal deficit present.      Mental Status: She is alert and oriented to person, place, and time.   Psychiatric:         Mood and Affect: Mood normal.         Behavior: Behavior normal.         Last Recorded Vitals  Blood pressure 143/80, pulse 82, temperature 36.6 °C (97.9 °F), temperature source Temporal, resp. rate 18, height 1.651 m (5' 5\"), weight 83.2 kg (183 lb 5 oz), SpO2 97%.  Intake/Output last 3 Shifts:  I/O last 3 completed shifts:  In: 346.7 (4.2 mL/kg) [I.V.:346.7 (4.2 mL/kg)]  Out: 2310 (27.8 mL/kg) [Urine:2310 (0.8 mL/kg/hr)]  Weight: 83.1 kg     Relevant Results  Lab Results   Component Value Date    WBC 19.8 (H) 07/06/2024    HGB 11.6 (L) 07/06/2024    HCT 33.5 (L) 07/06/2024    MCV 91 07/06/2024     07/06/2024     Lab Results   Component Value Date    GLUCOSE 121 (H) 07/06/2024    CALCIUM 8.3 (L) 07/06/2024     (L) 07/06/2024    K 4.0 07/06/2024    CO2 19 (L) 07/06/2024    CL 95 (L) 07/06/2024    BUN 36 (H) 07/06/2024    CREATININE 3.00 (H) 07/06/2024     amLODIPine, 10 mg, oral, Daily  calcitriol, 0.25 mcg, oral, Daily  calcium carbonate, 250 mg, oral, " Daily  cholecalciferol, 4,000 Units, oral, Daily  clotrimazole, 10 mg, oral, TID after meals  furosemide, 40 mg, intravenous, Daily  gabapentin, 300 mg, oral, Nightly  heparin (porcine), 5,000 Units, subcutaneous, q8h JANIE  levothyroxine, 25 mcg, oral, Daily before breakfast  loratadine, 10 mg, oral, Daily  magnesium oxide, 400 mg, oral, Daily  mycophenolate, 1,000 mg, oral, q12h  pantoprazole, 40 mg, oral, Daily before breakfast  potassium citrate CR, 20 mEq, oral, Daily  [START ON 7/7/2024] predniSONE, 20 mg, oral, Daily  sennosides-docusate sodium, 2 tablet, oral, BID  sertraline, 50 mg, oral, Daily  sodium bicarbonate, 1,300 mg, oral, BID  sulfamethoxazole-trimethoprim, 1 tablet, oral, Daily  sulfur hexafluoride microsphr, 2 mL, intravenous, Once in imaging  tenofovir alafenamide, 25 mg, oral, Daily  valGANciclovir, 450 mg, oral, q48h        Assessment/Plan   Principal Problem:    Shortness of breath    DDKT 11/2023    Kidney allograft function   MARK, creatinine 2.1, baseline < 1   Kidney duplex: multiple calculi, mild-mod hydro, which is similar to previous   Echo unremarkable   New DSA, sp biopsy 7/5/24 - waiting on prelim    Steroid bolus 500 x 3, dose 3 today      Immunosuppression   Belatacept, consider adding Tac      Steroid IV bolus    LE swelling   No DVT       I spent 35 minutes in the professional and overall care of this patient.      Anita Louis MD

## 2024-07-07 VITALS
TEMPERATURE: 98.1 F | DIASTOLIC BLOOD PRESSURE: 80 MMHG | WEIGHT: 185.63 LBS | BODY MASS INDEX: 31.69 KG/M2 | SYSTOLIC BLOOD PRESSURE: 144 MMHG | HEIGHT: 64 IN | RESPIRATION RATE: 18 BRPM | OXYGEN SATURATION: 93 % | HEART RATE: 89 BPM

## 2024-07-07 LAB
ALBUMIN SERPL BCP-MCNC: 3.2 G/DL (ref 3.4–5)
ALBUMIN SERPL BCP-MCNC: 3.3 G/DL (ref 3.4–5)
ANION GAP SERPL CALC-SCNC: 13 MMOL/L (ref 10–20)
ANION GAP SERPL CALC-SCNC: 15 MMOL/L (ref 10–20)
BUN SERPL-MCNC: 46 MG/DL (ref 6–23)
BUN SERPL-MCNC: 49 MG/DL (ref 6–23)
CALCIUM SERPL-MCNC: 7.6 MG/DL (ref 8.6–10.6)
CALCIUM SERPL-MCNC: 8.3 MG/DL (ref 8.6–10.6)
CHLORIDE SERPL-SCNC: 92 MMOL/L (ref 98–107)
CHLORIDE SERPL-SCNC: 94 MMOL/L (ref 98–107)
CMV DNA SERPL NAA+PROBE-LOG IU: NORMAL {LOG_IU}/ML
CO2 SERPL-SCNC: 20 MMOL/L (ref 21–32)
CO2 SERPL-SCNC: 21 MMOL/L (ref 21–32)
CREAT SERPL-MCNC: 3.18 MG/DL (ref 0.5–1.05)
CREAT SERPL-MCNC: 3.26 MG/DL (ref 0.5–1.05)
CREAT SERPL-MCNC: 3.38 MG/DL (ref 0.5–1.05)
CREAT UR-MCNC: 30.2 MG/DL (ref 20–320)
EGFRCR SERPLBLD CKD-EPI 2021: 16 ML/MIN/1.73M*2
EGFRCR SERPLBLD CKD-EPI 2021: 17 ML/MIN/1.73M*2
EGFRCR SERPLBLD CKD-EPI 2021: 18 ML/MIN/1.73M*2
ERYTHROCYTE [DISTWIDTH] IN BLOOD BY AUTOMATED COUNT: 15.6 % (ref 11.5–14.5)
GLUCOSE SERPL-MCNC: 117 MG/DL (ref 74–99)
GLUCOSE SERPL-MCNC: 131 MG/DL (ref 74–99)
HCT VFR BLD AUTO: 32.5 % (ref 36–46)
HGB BLD-MCNC: 11.4 G/DL (ref 12–16)
LABORATORY COMMENT REPORT: NOT DETECTED
MAGNESIUM SERPL-MCNC: 2.07 MG/DL (ref 1.6–2.4)
MCH RBC QN AUTO: 31.3 PG (ref 26–34)
MCHC RBC AUTO-ENTMCNC: 35.1 G/DL (ref 32–36)
MCV RBC AUTO: 89 FL (ref 80–100)
NRBC BLD-RTO: 0 /100 WBCS (ref 0–0)
OSMOLALITY SERPL: 275 MOSM/KG (ref 280–300)
OSMOLALITY UR: 206 MOSM/KG (ref 200–1200)
PHOSPHATE SERPL-MCNC: 3.6 MG/DL (ref 2.5–4.9)
PHOSPHATE SERPL-MCNC: 4.7 MG/DL (ref 2.5–4.9)
PLATELET # BLD AUTO: 140 X10*3/UL (ref 150–450)
POTASSIUM SERPL-SCNC: 4.1 MMOL/L (ref 3.5–5.3)
POTASSIUM SERPL-SCNC: 4.4 MMOL/L (ref 3.5–5.3)
POTASSIUM UR-SCNC: 18 MMOL/L
POTASSIUM/CREAT UR-RTO: 60 MMOL/G CREAT
PROT UR-ACNC: 16 MG/DL (ref 5–24)
PROT/CREAT UR: 0.53 MG/MG CREAT (ref 0–0.17)
RBC # BLD AUTO: 3.64 X10*6/UL (ref 4–5.2)
SODIUM SERPL-SCNC: 123 MMOL/L (ref 136–145)
SODIUM SERPL-SCNC: 124 MMOL/L (ref 136–145)
SODIUM UR-SCNC: 43 MMOL/L
SODIUM/CREAT UR-RTO: 142 MMOL/G CREAT
UREA/CREAT UR-SRTO: 7.6 G/G CREAT
UUN UR-MCNC: 231 MG/DL
WBC # BLD AUTO: 16.5 X10*3/UL (ref 4.4–11.3)

## 2024-07-07 PROCEDURE — 99232 SBSQ HOSP IP/OBS MODERATE 35: CPT | Performed by: STUDENT IN AN ORGANIZED HEALTH CARE EDUCATION/TRAINING PROGRAM

## 2024-07-07 PROCEDURE — 83735 ASSAY OF MAGNESIUM: CPT | Performed by: STUDENT IN AN ORGANIZED HEALTH CARE EDUCATION/TRAINING PROGRAM

## 2024-07-07 PROCEDURE — 2500000002 HC RX 250 W HCPCS SELF ADMINISTERED DRUGS (ALT 637 FOR MEDICARE OP, ALT 636 FOR OP/ED): Performed by: STUDENT IN AN ORGANIZED HEALTH CARE EDUCATION/TRAINING PROGRAM

## 2024-07-07 PROCEDURE — 83935 ASSAY OF URINE OSMOLALITY: CPT | Performed by: INTERNAL MEDICINE

## 2024-07-07 PROCEDURE — 2500000002 HC RX 250 W HCPCS SELF ADMINISTERED DRUGS (ALT 637 FOR MEDICARE OP, ALT 636 FOR OP/ED)

## 2024-07-07 PROCEDURE — 82565 ASSAY OF CREATININE: CPT | Performed by: INTERNAL MEDICINE

## 2024-07-07 PROCEDURE — 84540 ASSAY OF URINE/UREA-N: CPT | Performed by: INTERNAL MEDICINE

## 2024-07-07 PROCEDURE — 84156 ASSAY OF PROTEIN URINE: CPT | Performed by: INTERNAL MEDICINE

## 2024-07-07 PROCEDURE — 85027 COMPLETE CBC AUTOMATED: CPT | Performed by: STUDENT IN AN ORGANIZED HEALTH CARE EDUCATION/TRAINING PROGRAM

## 2024-07-07 PROCEDURE — 80069 RENAL FUNCTION PANEL: CPT | Performed by: STUDENT IN AN ORGANIZED HEALTH CARE EDUCATION/TRAINING PROGRAM

## 2024-07-07 PROCEDURE — 83930 ASSAY OF BLOOD OSMOLALITY: CPT | Performed by: INTERNAL MEDICINE

## 2024-07-07 PROCEDURE — 2500000004 HC RX 250 GENERAL PHARMACY W/ HCPCS (ALT 636 FOR OP/ED)

## 2024-07-07 PROCEDURE — 2500000001 HC RX 250 WO HCPCS SELF ADMINISTERED DRUGS (ALT 637 FOR MEDICARE OP): Performed by: STUDENT IN AN ORGANIZED HEALTH CARE EDUCATION/TRAINING PROGRAM

## 2024-07-07 PROCEDURE — 82565 ASSAY OF CREATININE: CPT

## 2024-07-07 PROCEDURE — 2500000004 HC RX 250 GENERAL PHARMACY W/ HCPCS (ALT 636 FOR OP/ED): Performed by: STUDENT IN AN ORGANIZED HEALTH CARE EDUCATION/TRAINING PROGRAM

## 2024-07-07 PROCEDURE — 99233 SBSQ HOSP IP/OBS HIGH 50: CPT | Performed by: INTERNAL MEDICINE

## 2024-07-07 PROCEDURE — 1200000002 HC GENERAL ROOM WITH TELEMETRY DAILY

## 2024-07-07 PROCEDURE — 84300 ASSAY OF URINE SODIUM: CPT | Performed by: INTERNAL MEDICINE

## 2024-07-07 PROCEDURE — 84133 ASSAY OF URINE POTASSIUM: CPT | Performed by: INTERNAL MEDICINE

## 2024-07-07 PROCEDURE — 2500000004 HC RX 250 GENERAL PHARMACY W/ HCPCS (ALT 636 FOR OP/ED): Mod: JZ

## 2024-07-07 PROCEDURE — 2500000001 HC RX 250 WO HCPCS SELF ADMINISTERED DRUGS (ALT 637 FOR MEDICARE OP)

## 2024-07-07 RX ORDER — CALCIUM GLUCONATE 20 MG/ML
2 INJECTION, SOLUTION INTRAVENOUS ONCE
Status: COMPLETED | OUTPATIENT
Start: 2024-07-07 | End: 2024-07-07

## 2024-07-07 RX ORDER — ACETAMINOPHEN 325 MG/1
650 TABLET ORAL ONCE
Status: COMPLETED | OUTPATIENT
Start: 2024-07-07 | End: 2024-07-07

## 2024-07-07 RX ORDER — DIPHENHYDRAMINE HCL 25 MG
25 CAPSULE ORAL ONCE
Status: COMPLETED | OUTPATIENT
Start: 2024-07-07 | End: 2024-07-07

## 2024-07-07 RX ORDER — FUROSEMIDE 10 MG/ML
40 INJECTION INTRAMUSCULAR; INTRAVENOUS
Status: DISCONTINUED | OUTPATIENT
Start: 2024-07-07 | End: 2024-07-19 | Stop reason: HOSPADM

## 2024-07-07 ASSESSMENT — COGNITIVE AND FUNCTIONAL STATUS - GENERAL
MOBILITY SCORE: 20
CLIMB 3 TO 5 STEPS WITH RAILING: A LITTLE
TURNING FROM BACK TO SIDE WHILE IN FLAT BAD: A LITTLE
DAILY ACTIVITIY SCORE: 24
MOBILITY SCORE: 24
MOVING TO AND FROM BED TO CHAIR: A LITTLE
WALKING IN HOSPITAL ROOM: A LITTLE
DAILY ACTIVITIY SCORE: 24
DAILY ACTIVITIY SCORE: 24
MOBILITY SCORE: 24
DAILY ACTIVITIY SCORE: 24
MOBILITY SCORE: 24

## 2024-07-07 ASSESSMENT — PAIN SCALES - GENERAL
PAINLEVEL_OUTOF10: 0 - NO PAIN
PAINLEVEL_OUTOF10: 8
PAINLEVEL_OUTOF10: 4
PAINLEVEL_OUTOF10: 0 - NO PAIN

## 2024-07-07 ASSESSMENT — PAIN - FUNCTIONAL ASSESSMENT
PAIN_FUNCTIONAL_ASSESSMENT: 0-10

## 2024-07-07 NOTE — CARE PLAN
The patient's goals for the shift include Labs WNL    The clinical goals for the shift include Maintained safety measures      Problem: Pain - Adult  Goal: Verbalizes/displays adequate comfort level or baseline comfort level  Outcome: Progressing     Problem: Safety - Adult  Goal: Free from fall injury  Outcome: Progressing     Problem: Discharge Planning  Goal: Discharge to home or other facility with appropriate resources  Outcome: Progressing     Problem: Chronic Conditions and Co-morbidities  Goal: Patient's chronic conditions and co-morbidity symptoms are monitored and maintained or improved  Outcome: Progressing     Problem: Fall/Injury  Goal: Not fall by end of shift  Outcome: Progressing  Goal: Be free from injury by end of the shift  Outcome: Progressing  Goal: Verbalize understanding of personal risk factors for fall in the hospital  Outcome: Progressing  Goal: Verbalize understanding of risk factor reduction measures to prevent injury from fall in the home  Outcome: Progressing  Goal: Use assistive devices by end of the shift  Outcome: Progressing  Goal: Pace activities to prevent fatigue by end of the shift  Outcome: Progressing     Problem: Skin  Goal: Participates in plan/prevention/treatment measures  Outcome: Progressing  Goal: Prevent/manage excess moisture  Outcome: Progressing  Goal: Prevent/minimize sheer/friction injuries  Outcome: Progressing  Goal: Promote/optimize nutrition  Outcome: Progressing  Goal: Promote skin healing  Outcome: Progressing

## 2024-07-07 NOTE — CARE PLAN
The patient's goals for the shift include Labs WNL    The clinical goals for the shift include Pt will remain injury free throughout shift.      Problem: Pain - Adult  Goal: Verbalizes/displays adequate comfort level or baseline comfort level  Outcome: Progressing     Problem: Safety - Adult  Goal: Free from fall injury  Outcome: Progressing     Problem: Discharge Planning  Goal: Discharge to home or other facility with appropriate resources  Outcome: Progressing     Problem: Chronic Conditions and Co-morbidities  Goal: Patient's chronic conditions and co-morbidity symptoms are monitored and maintained or improved  Outcome: Progressing     Problem: Fall/Injury  Goal: Not fall by end of shift  Outcome: Progressing  Goal: Be free from injury by end of the shift  Outcome: Progressing  Goal: Verbalize understanding of personal risk factors for fall in the hospital  Outcome: Progressing  Goal: Verbalize understanding of risk factor reduction measures to prevent injury from fall in the home  Outcome: Progressing  Goal: Use assistive devices by end of the shift  Outcome: Progressing  Goal: Pace activities to prevent fatigue by end of the shift  Outcome: Progressing     Problem: Skin  Goal: Participates in plan/prevention/treatment measures  Outcome: Progressing  Goal: Prevent/manage excess moisture  Outcome: Progressing  Goal: Prevent/minimize sheer/friction injuries  Outcome: Progressing  Goal: Promote/optimize nutrition  Outcome: Progressing  Goal: Promote skin healing  Outcome: Progressing

## 2024-07-07 NOTE — PROGRESS NOTES
"        INPATIENT TRANSPLANT NEPHROLOGY PROGRESS NOTE          REASON FOR CONSULT:  Immunosuppressive medication management and nephrology related issues.    SUBJECTION:     Finished solumedrol # 3/3, 500 mg yesterday 7/6/24.  Giving thymo 125 mg x once today with premed.  Biopsy 7/5/24 = pending  CMV -pending  Cr increased to 3.4  Uop 1.4 L  Continue  MMF 1000 mg bid  Diuretic   Hypervolemic hyponatremia  No acute event overnight.    PHYCISCAL EXAMINATION:    Visit Vitals  /89 (BP Location: Right arm, Patient Position: Lying) Comment (BP Location): LOWER PART OF ARM   Pulse 79   Temp 37 °C (98.6 °F) (Temporal)   Resp 18   Ht 1.626 m (5' 4.02\")   Wt 84.2 kg (185 lb 10 oz)   LMP  (LMP Unknown)   SpO2 98%   BMI 31.85 kg/m²   OB Status Unknown   Smoking Status Never   BSA 1.95 m²        07/05 1900 - 07/07 0659  In: -   Out: 2050 [Urine:2050]     Weight change: 4.1 kg (9 lb 0.6 oz)    General Appearance - NAD, Good speech, oriented and alert  HEENT - Supple. Not pale. No jaundice. No cervical lymphadenopathy. Pharynx and tonsils are not injected.  CVS - RRR. Normal S1/S2. No murmur, click , rub or gallop  Lungs- clear to auscultation bilaterally  Abdomen - soft , not tender, no guarding, no rigidity. No hepatosplenomegaly. Normal bowel sounds. No masses and ascites. S/P Kidney transplant .  Transplanted kidney is not tender.   Musculoskeletal /Extremities - no edema. Full ROM. No joint tenderness.   Neuro/Psych - appropriate mood and affect. Motor power V/V all extremities. CN I -XII were grossly intact.  Skin - No visible rash    MEDICATION LIST: REVIEWED    acetaminophen, 650 mg, Once  amLODIPine, 10 mg, Daily  antithymocyte globulin (rabbit), 1.5 mg/kg, Once  calcitriol, 0.25 mcg, Daily  calcium carbonate, 250 mg, Daily  cholecalciferol, 4,000 Units, Daily  clotrimazole, 10 mg, TID after meals  diphenhydrAMINE, 25 mg, Once  furosemide, 40 mg, BID  gabapentin, 300 mg, Nightly  heparin (porcine), 5,000 Units, q8h " Replaced by [facility]  levothyroxine, 25 mcg, Daily before breakfast  loratadine, 10 mg, Daily  magnesium oxide, 400 mg, Daily  mycophenolate, 1,000 mg, q12h  pantoprazole, 40 mg, Daily before breakfast  potassium citrate CR, 20 mEq, Daily  predniSONE, 20 mg, Daily  sennosides-docusate sodium, 2 tablet, BID  sertraline, 50 mg, Daily  sodium bicarbonate, 1,300 mg, BID  sulfamethoxazole-trimethoprim, 1 tablet, Daily  sulfur hexafluoride microsphr, 2 mL, Once in imaging  tenofovir alafenamide, 25 mg, Daily  valGANciclovir, 450 mg, q48h           acetaminophen, 650 mg, q4h PRN  alteplase, 2 mg, Once PRN  guaiFENesin, 600 mg, BID PRN  hydrALAZINE, 10 mg, q8h PRN  melatonin, 3 mg, Nightly PRN  naloxone, 0.2 mg, q5 min PRN  ondansetron ODT, 4 mg, q8h PRN   Or  ondansetron, 4 mg, q8h PRN  oxyCODONE, 5 mg, q4h PRN  SUMAtriptan, 100 mg, Once PRN        ALLERGY:  No Known Allergies    LABS:  Results for orders placed or performed during the hospital encounter of 07/02/24 (from the past 24 hour(s))   CBC   Result Value Ref Range    WBC 16.5 (H) 4.4 - 11.3 x10*3/uL    nRBC 0.0 0.0 - 0.0 /100 WBCs    RBC 3.64 (L) 4.00 - 5.20 x10*6/uL    Hemoglobin 11.4 (L) 12.0 - 16.0 g/dL    Hematocrit 32.5 (L) 36.0 - 46.0 %    MCV 89 80 - 100 fL    MCH 31.3 26.0 - 34.0 pg    MCHC 35.1 32.0 - 36.0 g/dL    RDW 15.6 (H) 11.5 - 14.5 %    Platelets 140 (L) 150 - 450 x10*3/uL   Magnesium   Result Value Ref Range    Magnesium 2.07 1.60 - 2.40 mg/dL   Renal function panel   Result Value Ref Range    Glucose 117 (H) 74 - 99 mg/dL    Sodium 124 (L) 136 - 145 mmol/L    Potassium 4.4 3.5 - 5.3 mmol/L    Chloride 94 (L) 98 - 107 mmol/L    Bicarbonate 21 21 - 32 mmol/L    Anion Gap 13 10 - 20 mmol/L    Urea Nitrogen 46 (H) 6 - 23 mg/dL    Creatinine 3.38 (H) 0.50 - 1.05 mg/dL    eGFR 16 (L) >60 mL/min/1.73m*2    Calcium 7.6 (L) 8.6 - 10.6 mg/dL    Phosphorus 4.7 2.5 - 4.9 mg/dL    Albumin 3.3 (L) 3.4 - 5.0 g/dL        ASSESSMENT AND PLAN:    44 y.o. female with past medical  history significant for ESRD secondary to HTN/NSAID whom received a  donor kidney transplant on 23. 4.5 mg/kg total of thymoglobulin induction. She was initially started on Tacrolimus and later switched to belatacept on POD 6 for hemolytic anemia. CMV D+/R+. Operative course was complicated by return to OR for exploration of transplanted kidney and washout of hematoma. She has some chronic abdominal pain since surgery. Noted recent admission for CMV disease (severe headache ; negative LPx2) with peak PCR of 23,400; treated with Valcyte. CMV - Not detected. We rechecked on 24-PENDING. Also found stone with negative MAG3 scan. She came in for SOB, volume overload, MARK.  DSA was positive (DQ2 10,158). We started solumedrol pulse given it was holiday and earliest Biopsy was done on  = Awaiting path. She finished Solumedrol 500 mg daily x 3 days.  AVF is still working. Cr continues to rise from 0.9à3.4 over few days. We are giving one dose of thymo today due to rapidly worsening renal function. Please follow up path tomorrow and will likely need PLEX/IVIG ([facility] Protocol) +/- more thymo (pending path).  She is on rachel, due . We increased MMF to 1000 bid. Consider adding tac upon discharge.      Transplant nephrology is consulted to assist with immunosuppressive medication management and nephrology related issues.        1. ESRD S/P Kidney transplant.   - Renal allograft function:   Lab Results   Component Value Date    CREATININE 3.38 (H) 2024     Estimated Creatinine Clearance: 21.8 mL/min (A) (by C-G formula based on SCr of 3.38 mg/dL (H)).    Intake/Output Summary (Last 24 hours) at 2024 1123  Last data filed at 2024 0840  Gross per 24 hour   Intake --   Output 1550 ml   Net -1550 ml     MFI DSA  DQ2 10,158     - Bx- PENDING PATH  - Solumedrol 500 mg iv daily x 3 days; completed 24  -[id number] mg x once.  - Further Rx : pending path  -Lasix 40 mg IV bid      - Continue to  monitor UOP and Serum creatinine closely.   - Avoid nephrotoxic agents, NSAIDs and IV contrast   - Strict I/O.   - Renally dose all medications by the most recent CrCl from Cockcroft-Gault formula.    2. Immunosuppression   -          Belatacept 5 mg/kg (362.5 mg) q28 days; last dose received 6/24, next dose scheduled outpatient for 7/22  -          MMF 1000 mg BID  -          Prednisone 10 mg daily à methylpred 500 mg daily x 3 days 7/4 - 7/6 à prednisone 20 mg daily starting 7/7    3. Anemia and WBC   Lab Results   Component Value Date    WBC 16.5 (H) 07/07/2024    HGB 11.4 (L) 07/07/2024    HCT 32.5 (L) 07/07/2024    MCV 89 07/07/2024     (L) 07/07/2024     -Continue to monitor Hgb   -No indications for PRBC transfusion     4. Electrolyte   Lab Results   Component Value Date    GLUCOSE 117 (H) 07/07/2024    CALCIUM 7.6 (L) 07/07/2024     (L) 07/07/2024    K 4.4 07/07/2024    CO2 21 07/07/2024    CL 94 (L) 07/07/2024    BUN 46 (H) 07/07/2024    CREATININE 3.38 (H) 07/07/2024     - Reviewed renal profile.     6. Hypertension   Blood Pressures         7/6/2024  1609 7/6/2024  2048 7/7/2024  0002 7/7/2024  0500 7/7/2024  0840    BP: 150/77 142/81 146/84 136/54 150/89          -Goal BP < 140/90 mmHg   -continue current management     7. Hydronephrosis  US  7/2/24-stable  - Discussed with tx surgery no need to repeat MAG -3    8.  GI prophylaxis   - On PPI     9. 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  -monitor Plasma PCR  - Asked team to inform Dr. Lou, pt is in the hospital    * Case was discussed with primary team.  For questions, please contact transplant nephrology page x [phone]    Marcia Webber    Transplant Nephrologist

## 2024-07-08 ENCOUNTER — APPOINTMENT (OUTPATIENT)
Dept: RADIOLOGY | Facility: HOSPITAL | Age: 44
End: 2024-07-08
Payer: COMMERCIAL

## 2024-07-08 LAB
ALBUMIN SERPL BCP-MCNC: 3 G/DL (ref 3.4–5)
ALBUMIN SERPL BCP-MCNC: 3.6 G/DL (ref 3.4–5)
ANION GAP SERPL CALC-SCNC: 13 MMOL/L (ref 10–20)
ANION GAP SERPL CALC-SCNC: 14 MMOL/L (ref 10–20)
BUN SERPL-MCNC: 70 MG/DL (ref 6–23)
BUN SERPL-MCNC: 73 MG/DL (ref 6–23)
CA-I BLD-SCNC: 1.05 MMOL/L (ref 1.1–1.33)
CALCIUM SERPL-MCNC: 7.8 MG/DL (ref 8.6–10.6)
CALCIUM SERPL-MCNC: 8 MG/DL (ref 8.6–10.6)
CHLORIDE SERPL-SCNC: 93 MMOL/L (ref 98–107)
CHLORIDE SERPL-SCNC: 94 MMOL/L (ref 98–107)
CHLORIDE UR-SCNC: 42 MMOL/L
CHLORIDE/CREATININE (MMOL/G) IN URINE: 181 MMOL/G CREAT (ref 38–318)
CO2 SERPL-SCNC: 22 MMOL/L (ref 21–32)
CO2 SERPL-SCNC: 23 MMOL/L (ref 21–32)
CREAT SERPL-MCNC: 3.24 MG/DL (ref 0.5–1.05)
CREAT SERPL-MCNC: 3.34 MG/DL (ref 0.5–1.05)
CREAT UR-MCNC: 23.2 MG/DL (ref 20–320)
EGFRCR SERPLBLD CKD-EPI 2021: 17 ML/MIN/1.73M*2
EGFRCR SERPLBLD CKD-EPI 2021: 17 ML/MIN/1.73M*2
ERYTHROCYTE [DISTWIDTH] IN BLOOD BY AUTOMATED COUNT: 15.4 % (ref 11.5–14.5)
GLUCOSE SERPL-MCNC: 153 MG/DL (ref 74–99)
GLUCOSE SERPL-MCNC: 97 MG/DL (ref 74–99)
HCT VFR BLD AUTO: 28.3 % (ref 36–46)
HGB BLD-MCNC: 9.9 G/DL (ref 12–16)
LAB AP ASR DISCLAIMER: NORMAL
LABORATORY COMMENT REPORT: NORMAL
MAGNESIUM SERPL-MCNC: 1.88 MG/DL (ref 1.6–2.4)
MCH RBC QN AUTO: 30.7 PG (ref 26–34)
MCHC RBC AUTO-ENTMCNC: 35 G/DL (ref 32–36)
MCV RBC AUTO: 88 FL (ref 80–100)
NRBC BLD-RTO: 0 /100 WBCS (ref 0–0)
OSMOLALITY UR: 219 MOSM/KG (ref 200–1200)
PATH REPORT.COMMENTS IMP SPEC: NORMAL
PATH REPORT.FINAL DX SPEC: NORMAL
PATH REPORT.GROSS SPEC: NORMAL
PATH REPORT.MICROSCOPIC SPEC OTHER STN: NORMAL
PATH REPORT.RELEVANT HX SPEC: NORMAL
PATH REPORT.TOTAL CANCER: NORMAL
PHOSPHATE SERPL-MCNC: 3.4 MG/DL (ref 2.5–4.9)
PHOSPHATE SERPL-MCNC: 4.5 MG/DL (ref 2.5–4.9)
PLATELET # BLD AUTO: 109 X10*3/UL (ref 150–450)
POTASSIUM SERPL-SCNC: 3.9 MMOL/L (ref 3.5–5.3)
POTASSIUM SERPL-SCNC: 4 MMOL/L (ref 3.5–5.3)
POTASSIUM UR-SCNC: 10 MMOL/L
POTASSIUM/CREAT UR-RTO: 43 MMOL/G CREAT
RBC # BLD AUTO: 3.22 X10*6/UL (ref 4–5.2)
SODIUM SERPL-SCNC: 125 MMOL/L (ref 136–145)
SODIUM SERPL-SCNC: 126 MMOL/L (ref 136–145)
SODIUM UR-SCNC: 45 MMOL/L
SODIUM/CREAT UR-RTO: 194 MMOL/G CREAT
WBC # BLD AUTO: 26.2 X10*3/UL (ref 4.4–11.3)

## 2024-07-08 PROCEDURE — 2500000002 HC RX 250 W HCPCS SELF ADMINISTERED DRUGS (ALT 637 FOR MEDICARE OP, ALT 636 FOR OP/ED)

## 2024-07-08 PROCEDURE — 2500000004 HC RX 250 GENERAL PHARMACY W/ HCPCS (ALT 636 FOR OP/ED)

## 2024-07-08 PROCEDURE — 2500000001 HC RX 250 WO HCPCS SELF ADMINISTERED DRUGS (ALT 637 FOR MEDICARE OP)

## 2024-07-08 PROCEDURE — 76776 US EXAM K TRANSPL W/DOPPLER: CPT

## 2024-07-08 PROCEDURE — 83935 ASSAY OF URINE OSMOLALITY: CPT | Performed by: STUDENT IN AN ORGANIZED HEALTH CARE EDUCATION/TRAINING PROGRAM

## 2024-07-08 PROCEDURE — 1200000002 HC GENERAL ROOM WITH TELEMETRY DAILY

## 2024-07-08 PROCEDURE — 99233 SBSQ HOSP IP/OBS HIGH 50: CPT | Performed by: HOSPITALIST

## 2024-07-08 PROCEDURE — 83735 ASSAY OF MAGNESIUM: CPT | Performed by: STUDENT IN AN ORGANIZED HEALTH CARE EDUCATION/TRAINING PROGRAM

## 2024-07-08 PROCEDURE — 82330 ASSAY OF CALCIUM: CPT

## 2024-07-08 PROCEDURE — 85027 COMPLETE CBC AUTOMATED: CPT | Performed by: STUDENT IN AN ORGANIZED HEALTH CARE EDUCATION/TRAINING PROGRAM

## 2024-07-08 PROCEDURE — 76776 US EXAM K TRANSPL W/DOPPLER: CPT | Performed by: RADIOLOGY

## 2024-07-08 PROCEDURE — 82570 ASSAY OF URINE CREATININE: CPT | Performed by: STUDENT IN AN ORGANIZED HEALTH CARE EDUCATION/TRAINING PROGRAM

## 2024-07-08 PROCEDURE — 2500000001 HC RX 250 WO HCPCS SELF ADMINISTERED DRUGS (ALT 637 FOR MEDICARE OP): Performed by: STUDENT IN AN ORGANIZED HEALTH CARE EDUCATION/TRAINING PROGRAM

## 2024-07-08 PROCEDURE — 99221 1ST HOSP IP/OBS SF/LOW 40: CPT

## 2024-07-08 PROCEDURE — 80069 RENAL FUNCTION PANEL: CPT

## 2024-07-08 PROCEDURE — 84133 ASSAY OF URINE POTASSIUM: CPT | Performed by: STUDENT IN AN ORGANIZED HEALTH CARE EDUCATION/TRAINING PROGRAM

## 2024-07-08 PROCEDURE — 2500000002 HC RX 250 W HCPCS SELF ADMINISTERED DRUGS (ALT 637 FOR MEDICARE OP, ALT 636 FOR OP/ED): Performed by: STUDENT IN AN ORGANIZED HEALTH CARE EDUCATION/TRAINING PROGRAM

## 2024-07-08 PROCEDURE — P9047 ALBUMIN (HUMAN), 25%, 50ML: HCPCS | Mod: JZ

## 2024-07-08 PROCEDURE — 80069 RENAL FUNCTION PANEL: CPT | Performed by: STUDENT IN AN ORGANIZED HEALTH CARE EDUCATION/TRAINING PROGRAM

## 2024-07-08 PROCEDURE — 2500000004 HC RX 250 GENERAL PHARMACY W/ HCPCS (ALT 636 FOR OP/ED): Performed by: STUDENT IN AN ORGANIZED HEALTH CARE EDUCATION/TRAINING PROGRAM

## 2024-07-08 PROCEDURE — 99232 SBSQ HOSP IP/OBS MODERATE 35: CPT | Performed by: STUDENT IN AN ORGANIZED HEALTH CARE EDUCATION/TRAINING PROGRAM

## 2024-07-08 RX ORDER — ALBUMIN HUMAN 250 G/1000ML
12.5 SOLUTION INTRAVENOUS EVERY 8 HOURS
Status: COMPLETED | OUTPATIENT
Start: 2024-07-08 | End: 2024-07-09

## 2024-07-08 RX ORDER — MAGNESIUM SULFATE HEPTAHYDRATE 40 MG/ML
2 INJECTION, SOLUTION INTRAVENOUS ONCE
Status: COMPLETED | OUTPATIENT
Start: 2024-07-08 | End: 2024-07-08

## 2024-07-08 RX ORDER — ACETAMINOPHEN 325 MG/1
650 TABLET ORAL ONCE
Status: COMPLETED | OUTPATIENT
Start: 2024-07-08 | End: 2024-07-08

## 2024-07-08 RX ORDER — CALCIUM CARBONATE 200(500)MG
1000 TABLET,CHEWABLE ORAL ONCE
Status: COMPLETED | OUTPATIENT
Start: 2024-07-08 | End: 2024-07-08

## 2024-07-08 RX ORDER — POTASSIUM CHLORIDE 20 MEQ/1
20 TABLET, EXTENDED RELEASE ORAL ONCE
Status: COMPLETED | OUTPATIENT
Start: 2024-07-08 | End: 2024-07-08

## 2024-07-08 RX ORDER — HEPARIN SODIUM 5000 [USP'U]/ML
5000 INJECTION, SOLUTION INTRAVENOUS; SUBCUTANEOUS EVERY 8 HOURS SCHEDULED
Status: COMPLETED | OUTPATIENT
Start: 2024-07-08 | End: 2024-07-08

## 2024-07-08 RX ORDER — TACROLIMUS 1 MG/1
2 CAPSULE ORAL
Status: DISCONTINUED | OUTPATIENT
Start: 2024-07-09 | End: 2024-07-12

## 2024-07-08 RX ORDER — SODIUM CHLORIDE 9 MG/ML
75 INJECTION, SOLUTION INTRAVENOUS CONTINUOUS
Status: DISCONTINUED | OUTPATIENT
Start: 2024-07-08 | End: 2024-07-09

## 2024-07-08 RX ORDER — DIPHENHYDRAMINE HCL 25 MG
25 CAPSULE ORAL ONCE
Status: COMPLETED | OUTPATIENT
Start: 2024-07-08 | End: 2024-07-08

## 2024-07-08 ASSESSMENT — COGNITIVE AND FUNCTIONAL STATUS - GENERAL
MOBILITY SCORE: 24
DAILY ACTIVITIY SCORE: 24

## 2024-07-08 ASSESSMENT — PAIN - FUNCTIONAL ASSESSMENT
PAIN_FUNCTIONAL_ASSESSMENT: 0-10

## 2024-07-08 ASSESSMENT — PAIN SCALES - GENERAL
PAINLEVEL_OUTOF10: 0 - NO PAIN

## 2024-07-08 NOTE — PROGRESS NOTES
Transplant Nephrology progress note     Date of admission: 7/2/2024     Herber Foy Rai is a 44 y.o.  with PMH   Past Medical History:   Diagnosis Date    Anxiety     Chronic kidney disease     Chronic kidney disease, unspecified     CKD, patient interested in transplantation    Depression     Disease of thyroid gland     GERD (gastroesophageal reflux disease)     Hypertension     Personal history of COVID-19 07/20/2022    History of COVID-19        SUBJECTIVE:    Communicated with her through language services.  Patient complained that she cannot drink the protein supplements.      PROBLEM LIST:  Principal Problem:    Shortness of breath         ALLERGIES:  No Known Allergies         CURRENT MEDICATIONS:  Scheduled medications  acetaminophen, 650 mg, oral, Once  albumin human, 12.5 g, intravenous, q8h  amLODIPine, 10 mg, oral, Daily  antithymocyte globulin (rabbit), 1.5 mg/kg, intravenous, Once  calcitriol, 0.25 mcg, oral, Daily  calcium carbonate, 250 mg, oral, Daily  cholecalciferol, 4,000 Units, oral, Daily  clotrimazole, 10 mg, oral, TID after meals  diphenhydrAMINE, 25 mg, oral, Once  furosemide, 40 mg, intravenous, BID  gabapentin, 300 mg, oral, Nightly  heparin (porcine), 5,000 Units, subcutaneous, q8h JANIE  levothyroxine, 25 mcg, oral, Daily before breakfast  loratadine, 10 mg, oral, Daily  magnesium oxide, 400 mg, oral, Daily  methylPREDNISolone sodium succinate (PF), 40 mg, intravenous, Once  mycophenolate, 1,000 mg, oral, q12h  pantoprazole, 40 mg, oral, Daily before breakfast  potassium citrate CR, 20 mEq, oral, Daily  predniSONE, 20 mg, oral, Daily  sennosides-docusate sodium, 2 tablet, oral, BID  sertraline, 50 mg, oral, Daily  sodium bicarbonate, 1,300 mg, oral, BID  sulfamethoxazole-trimethoprim, 1 tablet, oral, Daily  sulfur hexafluoride microsphr, 2 mL, intravenous, Once in imaging  tenofovir alafenamide, 25 mg, oral, Daily  valGANciclovir, 450 mg, oral, q48h      Continuous medications  sodium  "chloride 0.9%, 75 mL/hr, Last Rate: 75 mL/hr (24 1127)      PRN medications  PRN medications: acetaminophen, alteplase, guaiFENesin, hydrALAZINE, melatonin, naloxone, ondansetron ODT **OR** ondansetron, oxyCODONE, SUMAtriptan       OBJECTIVE:    VITALS: Visit Vitals  /71 (BP Location: Right arm, Patient Position: Lying)   Pulse 89   Temp 36.8 °C (98.2 °F) (Temporal)   Resp 18   Ht 1.626 m (5' 4.02\")   Wt 85.1 kg (187 lb 9.8 oz)   LMP  (LMP Unknown)   SpO2 93%   BMI 32.19 kg/m²   OB Status Unknown   Smoking Status Never   BSA 1.96 m²        General: No distress   Mucosa moist   AI, AC, AF     HEENT: PEERLA  CVS: S1 S2 no murmurs  RESP:  Lungs clear to auscultation   ABDO: Soft, non-tender   Neuro: A + O x 3  Skin: No rash   Extremities: +1-2 edema       LABS:  Results from last 72 hours   Lab Units 24  1350 24  0424   WBC AUTO x10*3/uL  --  26.2*   HEMOGLOBIN g/dL  --  9.9*   MCV fL  --  88   PLATELETS AUTO x10*3/uL  --  109*   BUN mg/dL 70* 73*   CREATININE mg/dL 3.34* 3.24*   CALCIUM mg/dL 8.0* 7.8*            Intake/Output Summary (Last 24 hours) at 2024 1532  Last data filed at 2024 1222  Gross per 24 hour   Intake 183.75 ml   Output 1400 ml   Net -1216.25 ml          ASSESSMENT AND PLAN:    Herber Foy Rai is a 44 y.o. female with past medical history significant for ESRD secondary to HTN/NSAID whom received a  donor kidney transplant on 23, KDPI of the kidney is a 56%, PRA is 48% 6 antigen mismatch. 4.5 mg/kg total of thymoglobulin induction.  She was a transition from tacrolimus to belatacept in the setting of hematoma at the kidney site that required exploration.  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  Presented currently with volume overload and MARK with DQ 2 DSA positive with 10,158 MFI.  Transplant nephrology consulted for further management.    Allograft function:  -Her baseline prior to this episode is " around 0.9 -0.8 currently around 3.34.  Multiple echogenic calculi noticed on the lower pole of the kidney.  -S/p kidney biopsy showing [place] IIB ACR and [facility]-planning Thymoglobulin 4.5 mg/kg along with 4 sessions of Plex and IV immunoglobulin 2 g/kg tentatively.  S/p 3 doses of IV steroids currently.  -Mild hyponatremia [id number] check TSH, cortisol, urine osmolarity, serum osmolarity and urine lites to evaluate for hyponatremia likely due to decreased renal function.  -Continue with free water restriction to 1.5 L continued, can consider normal saline and albumin for volume expansion.    Immunosuppression: Will consider starting tacrolimus in the setting of combined [facility] and ACR.  -Continue with the mycophenolate and prednisone tapering.    Hemodynamics: Blood pressures are quite elevated continue with amlodipine, consider Coreg 12.5 twice daily.    Anemia/leukopenia: Follow-up with the repeat transplant ultrasound and monitor hemoglobin closely.  Leukocytosis secondary to steroid use.    Infectious prophylaxis: Continue with the Vemlidy for hepatitis B prophylaxis and Valcyte, Bactrim and clotrimazole 3 months post rejection management.  -Last BK, CMV, EBV negative as of 5/24/2024.  Patient had a prior CMV viremia will follow-up with CMV PCR closely.    Bone mineral disease: Will check vitamin D PTH continue with the calcitriol and Tums.      Thank you for consulting .  Billie Mcfarland MD       Notes created by Amy -Please excuse the Typos .

## 2024-07-08 NOTE — CONSULTS
Reason for consult:   Therapeutic plasma exchange (TPE) for Transplantation, kidney, ABO compatible - Antibody-mediated rejection (ASFA : Category I, Grade 1B).    HPI:   Herber Foy Rai is a 44 y.o. female with with past medical history significant for ESRD secondary to HTN/NSAID whom received a  donor kidney transplant on 23, KDPI of the kidney is a 56%, PRA is 48% 6 antigen mismatch. 4.5 mg/kg total of thymoglobulin induction.  She presented with fluid overload and lower extremity edema. Biopsy findings are consistent with an acute antibody-mediated rejection, acute T cell-mediated rejection and acute vascular rejection.    Transfusion Medicine was consulted for a series of TPE procedures to help with further management.    Past medical history:   Past Medical History:   Diagnosis Date    Anxiety     Chronic kidney disease     Chronic kidney disease, unspecified     CKD, patient interested in transplantation    Depression     Disease of thyroid gland     GERD (gastroesophageal reflux disease)     Hypertension     Personal history of COVID-19 2022    History of COVID-19        Past surgical history:  Past Surgical History:   Procedure Laterality Date    MR HEAD ANGIO W AND WO IV CONTRAST  2021    MR HEAD ANGIO W AND WO IV CONTRAST    MR HEAD ANGIO WO IV CONTRAST  2021    MR HEAD ANGIO WO IV CONTRAST    OTHER SURGICAL HISTORY  2022    Arteriovenous fistula creation procedure    TRANSPLANT, KIDNEY, OPEN Right 2023    US GUIDED PERCUTANEOUS PERITONEAL OR RETROPERITONEAL FLUID COLLECTION DRAINAGE  2023    US GUIDED PERCUTANEOUS PERITONEAL OR RETROPERITONEAL FLUID COLLECTION DRAINAGE 2023 Batsheva Shanks MD [facility]        Allergies:   No Known Allergies    ROS (limited):  ROS per chart.    Medications:    Current Facility-Administered Medications:     acetaminophen (Tylenol) tablet 650 mg, 650 mg, oral, q4h PRN, Law Escobar MD, 650 mg at 24     acetaminophen (Tylenol) tablet 650 mg, 650 mg, oral, Once, Mariel Peck MD    albumin human 25 % solution 12.5 g, 12.5 g, intravenous, q8h, Mariel Peck MD, Stopped at 07/08/24 1219    alteplase (Cathflo Activase) injection 2 mg, 2 mg, intra-catheter, Once PRN, MARIO Dixon    amLODIPine (Norvasc) tablet 10 mg, 10 mg, oral, Daily, MARIO Lewis, 10 mg at 07/08/24 1006    anti-thymocyte globulin rabbit (Thymoglobulin) 125 mg, hydrocortisone sod succ (PF) (Solu-CORTEF) 20 mg, heparin 1,000 Units in sodium chloride 0.9% 500 mL IV, 1.5 mg/kg, intravenous, Once, Mariel Peck MD    calcitriol (Rocaltrol) capsule 0.25 mcg, 0.25 mcg, oral, Daily, Law Escobar MD, 0.25 mcg at 07/08/24 1006    calcium carbonate (Tums) chewable tablet 250 mg, 250 mg, oral, Daily, Law Escobar MD, 250 mg at 07/08/24 1006    cholecalciferol (Vitamin D-3) tablet 4,000 Units, 4,000 Units, oral, Daily, Law Escobar MD, 4,000 Units at 07/08/24 1006    clotrimazole (Mycelex) samia 10 mg, 10 mg, oral, TID after meals, MARIO Lewis, 10 mg at 07/08/24 1348    diphenhydrAMINE (BENADryl) capsule 25 mg, 25 mg, oral, Once, Mariel Peck MD    [Held by provider] furosemide (Lasix) injection 40 mg, 40 mg, intravenous, BID, MARIO Lewis, 40 mg at 07/08/24 1008    gabapentin (Neurontin) capsule 300 mg, 300 mg, oral, Nightly, Law Escobar MD, 300 mg at 07/07/24 2023    guaiFENesin (Mucinex) 12 hr tablet 600 mg, 600 mg, oral, BID PRN, Mariel Peck MD, 600 mg at 07/04/24 1139    heparin (porcine) injection 5,000 Units, 5,000 Units, subcutaneous, q8h [facility], Alice Gaitan, JELLY-CNP    hydrALAZINE (Apresoline) injection 10 mg, 10 mg, intravenous, q8h PRN, Mariel Peck MD    levothyroxine (Synthroid, Levoxyl) tablet 25 mcg, 25 mcg, oral, Daily before breakfast, Law Escobar MD, 25 mcg at 07/08/24 1007    loratadine (Claritin) tablet 10 mg, 10 mg, oral, Daily, Law Escobar,  MD, 10 mg at 07/08/24 1006    magnesium oxide (Mag-Ox) tablet 400 mg, 400 mg, oral, Daily, Law Escobar MD, 400 mg at 07/08/24 1006    melatonin tablet 3 mg, 3 mg, oral, Nightly PRN, Jose Galo MD, 3 mg at 07/03/24 2241    methylPREDNISolone sod succinate (SOLU-Medrol) 40 mg/mL injection 40 mg, 40 mg, intravenous, Once, Mariel Peck MD    mycophenolate (Cellcept) capsule 1,000 mg, 1,000 mg, oral, q12h, MARIO Lewis, 1,000 mg at 07/08/24 0618    naloxone (Narcan) injection 0.2 mg, 0.2 mg, intravenous, q5 min PRN, Law Escobar MD    ondansetron ODT (Zofran-ODT) disintegrating tablet 4 mg, 4 mg, oral, q8h PRN **OR** ondansetron (Zofran) injection 4 mg, 4 mg, intravenous, q8h PRN, Law Escobar MD    oxyCODONE (Roxicodone) immediate release tablet 5 mg, 5 mg, oral, q4h PRN, Law Escobar MD, 5 mg at 07/04/24 0624    pantoprazole (ProtoNix) EC tablet 40 mg, 40 mg, oral, Daily before breakfast, Law Escobar MD, 40 mg at 07/08/24 0618    potassium citrate CR (Urocit-K-10) ER tablet 20 mEq, 20 mEq, oral, Daily, MARIO Lewis, 20 mEq at 07/08/24 0900    predniSONE (Deltasone) tablet 20 mg, 20 mg, oral, Daily, MARIO Lewis, 20 mg at 07/08/24 1006    sennosides-docusate sodium (Rose-Colace) 8.6-50 mg per tablet 2 tablet, 2 tablet, oral, BID, Law Escobar MD, 2 tablet at 07/08/24 1006    sertraline (Zoloft) tablet 50 mg, 50 mg, oral, Daily, Law Escobar MD, 50 mg at 07/08/24 1006    sodium bicarbonate tablet 1,300 mg, 1,300 mg, oral, BID, Nadine Hathaway, APRN-CNP, 1,300 mg at 07/08/24 1006    sodium chloride 0.9% infusion, 75 mL/hr, intravenous, Continuous, Mariel Peck MD, Last Rate: 75 mL/hr at 07/08/24 1127, 75 mL/hr at 07/08/24 1127    sulfamethoxazole-trimethoprim (Bactrim) 400-80 mg per tablet 1 tablet, 1 tablet, oral, Daily, Mariel Peck MD, 1 tablet at 07/08/24 1006    sulfur hexafluoride microsphr (Lumason) injection 24.28 mg, 2  "mL, intravenous, Once in imaging, Mariel Peck MD    SUMAtriptan (Imitrex) tablet 100 mg, 100 mg, oral, Once PRN, Law Escobar MD, 100 mg at 07/03/24 2241    [START ON 7/9/2024] tacrolimus (Prograf) capsule 2 mg, 2 mg, oral, q12h JANIE, Alice Gaitan, APRN-CNP    tenofovir alafenamide (Vemlidy) tablet 25 mg, 25 mg, oral, Daily, Law Escobar MD, 25 mg at 07/08/24 1007    valGANciclovir (Valcyte) tablet 450 mg, 450 mg, oral, q48h, Nadine Hathaway, APRN-CNP, 450 mg at 07/08/24 1223     Vitals:  Visit Vitals  /71 (BP Location: Right arm, Patient Position: Lying)   Pulse 89   Temp 36.8 °C (98.2 °F) (Temporal)   Resp 18   Ht 1.626 m (5' 4.02\")   Wt 85.1 kg (187 lb 9.8 oz)   LMP  (LMP Unknown)   SpO2 93%   BMI 32.19 kg/m²   OB Status Unknown   Smoking Status Never   BSA 1.96 m²        Labs:  WBC   Date/Time Value Ref Range Status   07/08/2024 04:24 AM 26.2 (H) 4.4 - 11.3 x10*3/uL Final     Hemoglobin   Date/Time Value Ref Range Status   07/08/2024 04:24 AM 9.9 (L) 12.0 - 16.0 g/dL Final     Hematocrit   Date/Time Value Ref Range Status   07/08/2024 04:24 AM 28.3 (L) 36.0 - 46.0 % Final     Platelets   Date/Time Value Ref Range Status   07/08/2024 04:24  (L) 150 - 450 x10*3/uL Final        Protime   Date/Time Value Ref Range Status   07/06/2024 05:54 AM 10.9 9.8 - 12.8 seconds Final     INR   Date/Time Value Ref Range Status   07/06/2024 05:54 AM 1.0 0.9 - 1.1 Final     aPTT   Date/Time Value Ref Range Status   07/06/2024 05:54 AM 30 27 - 38 seconds Final     Fibrinogen   Date/Time Value Ref Range Status   12/03/2023 06:38  200 - 400 mg/dL Final        POCT Calcium, Ionized   Date/Time Value Ref Range Status   07/08/2024 04:24 AM 1.05 (L) 1.1 - 1.33 mmol/L Final     Comment:     The performance characteristics of ionized calcium tested  in heparinized plasma or serum have been validated by the  individual  laboratory site where testing is performed.   Testing on heparinized plasma or " serum is not approved by   the FDA; however, such approval is not necessary.        Assessment/Plan:  44 y.o. female with Transplantation, kidney, ABO compatible - Antibody-mediated rejection (ASFA 2023: Category I, Grade 1B)  1. Explained the procedure and obtained consent from the patient. MARTTI  used [ID number [id number]].  2. Vascular access to be maintained by clinical team.  3. First TPE (1 of 4 total) scheduled for 7/9/2024.

## 2024-07-09 ENCOUNTER — APPOINTMENT (OUTPATIENT)
Dept: OTHER | Facility: HOSPITAL | Age: 44
End: 2024-07-09
Payer: COMMERCIAL

## 2024-07-09 LAB
ALBUMIN SERPL BCP-MCNC: 3.4 G/DL (ref 3.4–5)
ANION GAP SERPL CALC-SCNC: 15 MMOL/L (ref 10–20)
APTT PPP: 32 SECONDS (ref 27–38)
BLOOD EXPIRATION DATE: NORMAL
BUN SERPL-MCNC: 69 MG/DL (ref 6–23)
CA-I BLD-SCNC: 1.08 MMOL/L (ref 1.1–1.33)
CA-I BLD-SCNC: 1.16 MMOL/L (ref 1.1–1.33)
CALCIUM SERPL-MCNC: 7.7 MG/DL (ref 8.6–10.6)
CHLORIDE SERPL-SCNC: 100 MMOL/L (ref 98–107)
CO2 SERPL-SCNC: 19 MMOL/L (ref 21–32)
CORTIS AM PEAK SERPL-MSCNC: 26.4 UG/DL (ref 5–20)
CREAT SERPL-MCNC: 3.4 MG/DL (ref 0.5–1.05)
DISPENSE STATUS: NORMAL
EGFRCR SERPLBLD CKD-EPI 2021: 16 ML/MIN/1.73M*2
ERYTHROCYTE [DISTWIDTH] IN BLOOD BY AUTOMATED COUNT: 15.8 % (ref 11.5–14.5)
ERYTHROCYTE [DISTWIDTH] IN BLOOD BY AUTOMATED COUNT: 16.2 % (ref 11.5–14.5)
FIBRINOGEN PPP-MCNC: 356 MG/DL (ref 200–400)
GLUCOSE SERPL-MCNC: 157 MG/DL (ref 74–99)
HCT VFR BLD AUTO: 30.3 % (ref 36–46)
HCT VFR BLD AUTO: 33 % (ref 36–46)
HGB BLD-MCNC: 10.4 G/DL (ref 12–16)
HGB BLD-MCNC: 11.4 G/DL (ref 12–16)
INR PPP: 1 (ref 0.9–1.1)
MAGNESIUM SERPL-MCNC: 2.2 MG/DL (ref 1.6–2.4)
MCH RBC QN AUTO: 30.2 PG (ref 26–34)
MCH RBC QN AUTO: 31.1 PG (ref 26–34)
MCHC RBC AUTO-ENTMCNC: 34.3 G/DL (ref 32–36)
MCHC RBC AUTO-ENTMCNC: 34.5 G/DL (ref 32–36)
MCV RBC AUTO: 88 FL (ref 80–100)
MCV RBC AUTO: 90 FL (ref 80–100)
NRBC BLD-RTO: 0 /100 WBCS (ref 0–0)
NRBC BLD-RTO: 0 /100 WBCS (ref 0–0)
OSMOLALITY SERPL: 300 MOSM/KG (ref 280–300)
PHOSPHATE SERPL-MCNC: 3.2 MG/DL (ref 2.5–4.9)
PLATELET # BLD AUTO: 73 X10*3/UL (ref 150–450)
PLATELET # BLD AUTO: 90 X10*3/UL (ref 150–450)
POTASSIUM SERPL-SCNC: 3.9 MMOL/L (ref 3.5–5.3)
PRODUCT BLOOD TYPE: 7300
PRODUCT CODE: NORMAL
PROTHROMBIN TIME: 11.3 SECONDS (ref 9.8–12.8)
RBC # BLD AUTO: 3.44 X10*6/UL (ref 4–5.2)
RBC # BLD AUTO: 3.66 X10*6/UL (ref 4–5.2)
SODIUM SERPL-SCNC: 130 MMOL/L (ref 136–145)
TSH SERPL-ACNC: 0.44 MIU/L (ref 0.44–3.98)
UNIT ABO: NORMAL
UNIT NUMBER: NORMAL
UNIT RH: NORMAL
UNIT VOLUME: 208
UNIT VOLUME: 237
UNIT VOLUME: 244
UNIT VOLUME: 321
WBC # BLD AUTO: 13.7 X10*3/UL (ref 4.4–11.3)
WBC # BLD AUTO: 13.8 X10*3/UL (ref 4.4–11.3)

## 2024-07-09 PROCEDURE — 82330 ASSAY OF CALCIUM: CPT

## 2024-07-09 PROCEDURE — 2500000001 HC RX 250 WO HCPCS SELF ADMINISTERED DRUGS (ALT 637 FOR MEDICARE OP): Performed by: STUDENT IN AN ORGANIZED HEALTH CARE EDUCATION/TRAINING PROGRAM

## 2024-07-09 PROCEDURE — 2500000002 HC RX 250 W HCPCS SELF ADMINISTERED DRUGS (ALT 637 FOR MEDICARE OP, ALT 636 FOR OP/ED)

## 2024-07-09 PROCEDURE — 99232 SBSQ HOSP IP/OBS MODERATE 35: CPT | Performed by: STUDENT IN AN ORGANIZED HEALTH CARE EDUCATION/TRAINING PROGRAM

## 2024-07-09 PROCEDURE — 85384 FIBRINOGEN ACTIVITY: CPT

## 2024-07-09 PROCEDURE — 85027 COMPLETE CBC AUTOMATED: CPT | Performed by: STUDENT IN AN ORGANIZED HEALTH CARE EDUCATION/TRAINING PROGRAM

## 2024-07-09 PROCEDURE — 2500000001 HC RX 250 WO HCPCS SELF ADMINISTERED DRUGS (ALT 637 FOR MEDICARE OP)

## 2024-07-09 PROCEDURE — 80069 RENAL FUNCTION PANEL: CPT | Performed by: STUDENT IN AN ORGANIZED HEALTH CARE EDUCATION/TRAINING PROGRAM

## 2024-07-09 PROCEDURE — 2500000004 HC RX 250 GENERAL PHARMACY W/ HCPCS (ALT 636 FOR OP/ED): Mod: JZ

## 2024-07-09 PROCEDURE — 84443 ASSAY THYROID STIM HORMONE: CPT | Performed by: STUDENT IN AN ORGANIZED HEALTH CARE EDUCATION/TRAINING PROGRAM

## 2024-07-09 PROCEDURE — 2500000002 HC RX 250 W HCPCS SELF ADMINISTERED DRUGS (ALT 637 FOR MEDICARE OP, ALT 636 FOR OP/ED): Performed by: STUDENT IN AN ORGANIZED HEALTH CARE EDUCATION/TRAINING PROGRAM

## 2024-07-09 PROCEDURE — 2500000004 HC RX 250 GENERAL PHARMACY W/ HCPCS (ALT 636 FOR OP/ED): Performed by: STUDENT IN AN ORGANIZED HEALTH CARE EDUCATION/TRAINING PROGRAM

## 2024-07-09 PROCEDURE — 83735 ASSAY OF MAGNESIUM: CPT | Performed by: STUDENT IN AN ORGANIZED HEALTH CARE EDUCATION/TRAINING PROGRAM

## 2024-07-09 PROCEDURE — 83930 ASSAY OF BLOOD OSMOLALITY: CPT | Performed by: STUDENT IN AN ORGANIZED HEALTH CARE EDUCATION/TRAINING PROGRAM

## 2024-07-09 PROCEDURE — 82533 TOTAL CORTISOL: CPT | Performed by: STUDENT IN AN ORGANIZED HEALTH CARE EDUCATION/TRAINING PROGRAM

## 2024-07-09 PROCEDURE — 2500000004 HC RX 250 GENERAL PHARMACY W/ HCPCS (ALT 636 FOR OP/ED)

## 2024-07-09 PROCEDURE — 99233 SBSQ HOSP IP/OBS HIGH 50: CPT | Performed by: HOSPITALIST

## 2024-07-09 PROCEDURE — P9045 ALBUMIN (HUMAN), 5%, 250 ML: HCPCS | Mod: JZ

## 2024-07-09 PROCEDURE — 82330 ASSAY OF CALCIUM: CPT | Performed by: STUDENT IN AN ORGANIZED HEALTH CARE EDUCATION/TRAINING PROGRAM

## 2024-07-09 PROCEDURE — 85610 PROTHROMBIN TIME: CPT

## 2024-07-09 PROCEDURE — 1200000002 HC GENERAL ROOM WITH TELEMETRY DAILY

## 2024-07-09 PROCEDURE — 36514 APHERESIS PLASMA: CPT

## 2024-07-09 PROCEDURE — P9047 ALBUMIN (HUMAN), 25%, 50ML: HCPCS | Mod: JZ

## 2024-07-09 RX ORDER — HEPARIN SODIUM 5000 [USP'U]/ML
5000 INJECTION, SOLUTION INTRAVENOUS; SUBCUTANEOUS EVERY 8 HOURS SCHEDULED
Status: COMPLETED | OUTPATIENT
Start: 2024-07-09 | End: 2024-07-09

## 2024-07-09 RX ORDER — DIPHENHYDRAMINE HCL 25 MG
25 CAPSULE ORAL ONCE
Status: COMPLETED | OUTPATIENT
Start: 2024-07-09 | End: 2024-07-09

## 2024-07-09 RX ORDER — HEPARIN SODIUM 1000 [USP'U]/ML
1000 INJECTION, SOLUTION INTRAVENOUS; SUBCUTANEOUS ONCE
Status: DISCONTINUED | OUTPATIENT
Start: 2024-07-09 | End: 2024-07-09 | Stop reason: HOSPADM

## 2024-07-09 RX ORDER — DIPHENHYDRAMINE HCL 25 MG
50 CAPSULE ORAL ONCE
Status: DISCONTINUED | OUTPATIENT
Start: 2024-07-09 | End: 2024-07-09 | Stop reason: HOSPADM

## 2024-07-09 RX ORDER — CALCIUM CARBONATE 200(500)MG
1500 TABLET,CHEWABLE ORAL EVERY 5 MIN PRN
Status: DISCONTINUED | OUTPATIENT
Start: 2024-07-09 | End: 2024-07-09 | Stop reason: HOSPADM

## 2024-07-09 RX ORDER — ACETAMINOPHEN 325 MG/1
650 TABLET ORAL ONCE
Status: COMPLETED | OUTPATIENT
Start: 2024-07-09 | End: 2024-07-09

## 2024-07-09 RX ORDER — ACETAMINOPHEN 325 MG/1
650 TABLET ORAL ONCE
Status: DISCONTINUED | OUTPATIENT
Start: 2024-07-09 | End: 2024-07-09 | Stop reason: HOSPADM

## 2024-07-09 RX ORDER — PANTOPRAZOLE SODIUM 40 MG/1
40 TABLET, DELAYED RELEASE ORAL
Status: DISCONTINUED | OUTPATIENT
Start: 2024-07-09 | End: 2024-07-18

## 2024-07-09 RX ORDER — ALBUMIN HUMAN 50 G/1000ML
12.5 SOLUTION INTRAVENOUS
Status: COMPLETED | OUTPATIENT
Start: 2024-07-09 | End: 2024-07-09

## 2024-07-09 RX ORDER — CALCIUM GLUCONATE 20 MG/ML
5002 INJECTION, SOLUTION INTRAVENOUS ONCE
Status: COMPLETED | OUTPATIENT
Start: 2024-07-09 | End: 2024-07-09

## 2024-07-09 RX ORDER — DIPHENHYDRAMINE HYDROCHLORIDE 50 MG/ML
25 INJECTION INTRAMUSCULAR; INTRAVENOUS EVERY 5 MIN PRN
Status: DISCONTINUED | OUTPATIENT
Start: 2024-07-09 | End: 2024-07-09 | Stop reason: HOSPADM

## 2024-07-09 ASSESSMENT — COGNITIVE AND FUNCTIONAL STATUS - GENERAL
DAILY ACTIVITIY SCORE: 24
MOBILITY SCORE: 24
MOBILITY SCORE: 24
DAILY ACTIVITIY SCORE: 24
MOBILITY SCORE: 24
MOBILITY SCORE: 24
DAILY ACTIVITIY SCORE: 24
MOBILITY SCORE: 24

## 2024-07-09 ASSESSMENT — PAIN DESCRIPTION - LOCATION: LOCATION: ABDOMEN

## 2024-07-09 ASSESSMENT — PAIN SCALES - PAIN ASSESSMENT IN ADVANCED DEMENTIA (PAINAD)
TOTALSCORE: MEDICATION (SEE MAR)
FACIALEXPRESSION: SMILING OR INEXPRESSIVE
TOTALSCORE: 1
BREATHING: NORMAL
BODYLANGUAGE: RELAXED
CONSOLABILITY: NO NEED TO CONSOLE
NEGVOCALIZATION: OCCASIONAL MOAN/GROAN, LOW SPEECH, NEGATIVE/DISAPPROVING QUALITY

## 2024-07-09 ASSESSMENT — PAIN SCALES - GENERAL
PAINLEVEL_OUTOF10: 2
PAINLEVEL_OUTOF10: 9

## 2024-07-09 ASSESSMENT — PAIN SCALES - WONG BAKER: WONGBAKER_NUMERICALRESPONSE: HURTS WHOLE LOT

## 2024-07-09 ASSESSMENT — PAIN DESCRIPTION - ORIENTATION: ORIENTATION: LEFT;LOWER

## 2024-07-09 NOTE — PROGRESS NOTES
"Herber Foy Rai is a 44 y.o. female on day 7 of admission presenting with Shortness of breath.    Subjective   Awaiting biopsy results       Objective   Vitals:    07/09/24 1125   BP: 159/69   Pulse: 86   Resp: 18   Temp: 36.9 °C (98.4 °F)   SpO2:        Physical Exam  Constitutional:       Appearance: Normal appearance.   Eyes:      Pupils: Pupils are equal, round, and reactive to light.   Cardiovascular:      Rate and Rhythm: Normal rate.   Pulmonary:      Effort: Pulmonary effort is normal. No respiratory distress.   Abdominal:      General: There is no distension.      Palpations: Abdomen is soft.      Comments: Incision clean, dry, intact   Musculoskeletal:         General: Normal range of motion.      Right lower leg: No edema.      Left lower leg: No edema.   Skin:     General: Skin is warm and dry.   Neurological:      General: No focal deficit present.      Mental Status: She is alert and oriented to person, place, and time.   Psychiatric:         Mood and Affect: Mood normal.         Behavior: Behavior normal.         Last Recorded Vitals  Blood pressure 159/69, pulse 86, temperature 36.9 °C (98.4 °F), resp. rate 18, height 1.626 m (5' 4.02\"), weight 84.9 kg (187 lb 4.5 oz), SpO2 96%.  Intake/Output last 3 Shifts:  I/O last 3 completed shifts:  In: 183.8 (2.2 mL/kg) [I.V.:142.1 (1.7 mL/kg); IV Piggyback:41.7]  Out: 2750 (32.4 mL/kg) [Urine:2750 (0.9 mL/kg/hr)]  Weight: 84.9 kg     Relevant Results  Lab Results   Component Value Date    WBC 13.8 (H) 07/09/2024    HGB 11.4 (L) 07/09/2024    HCT 33.0 (L) 07/09/2024    MCV 90 07/09/2024    PLT 90 (L) 07/09/2024     Lab Results   Component Value Date    GLUCOSE 157 (H) 07/09/2024    CALCIUM 7.7 (L) 07/09/2024     (L) 07/09/2024    K 3.9 07/09/2024    CO2 19 (L) 07/09/2024     07/09/2024    BUN 69 (H) 07/09/2024    CREATININE 3.40 (H) 07/09/2024     amLODIPine, 10 mg, oral, Daily  calcitriol, 0.25 mcg, oral, Daily  calcium carbonate, 250 mg, oral, " Daily  cholecalciferol, 4,000 Units, oral, Daily  clotrimazole, 10 mg, oral, TID after meals  [Held by provider] furosemide, 40 mg, intravenous, BID  gabapentin, 300 mg, oral, Nightly  levothyroxine, 25 mcg, oral, Daily before breakfast  loratadine, 10 mg, oral, Daily  magnesium oxide, 400 mg, oral, Daily  mycophenolate, 1,000 mg, oral, q12h  pantoprazole, 40 mg, oral, BID AC  plasma exchange albumin human, 12.5 g, apheresis (aphr), q15 min  potassium citrate CR, 20 mEq, oral, Daily  predniSONE, 20 mg, oral, Daily  sennosides-docusate sodium, 2 tablet, oral, BID  sertraline, 50 mg, oral, Daily  sodium bicarbonate, 1,300 mg, oral, BID  sulfamethoxazole-trimethoprim, 1 tablet, oral, Daily  sulfur hexafluoride microsphr, 2 mL, intravenous, Once in imaging  tacrolimus, 2 mg, oral, q12h JANIE  tenofovir alafenamide, 25 mg, oral, Daily  valGANciclovir, 450 mg, oral, q48h        Assessment/Plan   Principal Problem:    Shortness of breath    DDKT 11/2023    Kidney allograft function   MARK, creatinine 3.4, baseline < 1   Kidney duplex: multiple calculi, mild-mod hydro, which is similar to previous   Echo unremarkable   New DSA, sp biopsy 7/5/24 - waiting on prelim    Steroid bolus 500 x 3, completed    Thymo today      Immunosuppression   Belatacept, consider adding Tac      Steroid IV bolus    LE swelling   No DVT       I spent 35 minutes in the professional and overall care of this patient.      Anita Louis MD

## 2024-07-09 NOTE — POST-PROCEDURE NOTE
This is a 44 y.o. female with Antibody Mediated Rejection (AMR) of Kidney Transplant who underwent therapeutic plasma exchange (TPE) with 100% albumin as replacement fluid.     I saw and evaluated the patient during the TPE.  The patient was resting in bed.  The vascular access functioned well.     Pre-procedure labs:  WBC   Date/Time Value Ref Range Status   07/09/2024 05:21 AM 13.8 (H) 4.4 - 11.3 x10*3/uL Final     Hemoglobin   Date/Time Value Ref Range Status   07/09/2024 05:21 AM 11.4 (L) 12.0 - 16.0 g/dL Final     Hematocrit   Date/Time Value Ref Range Status   07/09/2024 05:21 AM 33.0 (L) 36.0 - 46.0 % Final     Platelets   Date/Time Value Ref Range Status   07/09/2024 05:21 AM 90 (L) 150 - 450 x10*3/uL Final        POCT Calcium, Ionized   Date/Time Value Ref Range Status   07/09/2024 05:21 AM 1.08 (L) 1.1 - 1.33 mmol/L Final     Comment:     The performance characteristics of ionized calcium tested  in heparinized plasma or serum have been validated by the  individual  laboratory site where testing is performed.   Testing on heparinized plasma or serum is not approved by   the FDA; however, such approval is not necessary.        Protime   Date/Time Value Ref Range Status   07/09/2024 05:21 AM 11.3 9.8 - 12.8 seconds Final     INR   Date/Time Value Ref Range Status   07/09/2024 05:21 AM 1.0 0.9 - 1.1 Final     aPTT   Date/Time Value Ref Range Status   07/09/2024 05:21 AM 32 27 - 38 seconds Final     Fibrinogen   Date/Time Value Ref Range Status   07/09/2024 05:21  200 - 400 mg/dL Final        Pre-procedure vital signs at 10:06:  T: 37.1 C, HR: 91, RR: 20, /86  Pulse oximetry: 96% on room air    Post-procedure vital signs at 11:38:  T: 36.9 C, HR: 88, RR: 18, BP: 157.83     Outcome: TPE completed without complications.   Adverse Reaction: No    Assessment and Plan:  44 y.o. female with Antibody Mediated Rejection (AMR) of Kidney Transplant who underwent TPE #1.  Draw post-procedure labs  Vascular  access to be managed by clinical team.  Next TPE #2 is schedule for 7/11/2024.

## 2024-07-09 NOTE — PROGRESS NOTES
Transplant Nephrology progress note     Date of admission: 7/2/2024     Herber Foy Rai is a 44 y.o.  with PMH   Past Medical History:   Diagnosis Date    Anxiety     Chronic kidney disease     Chronic kidney disease, unspecified     CKD, patient interested in transplantation    Depression     Disease of thyroid gland     GERD (gastroesophageal reflux disease)     Hypertension     Personal history of COVID-19 07/20/2022    History of COVID-19        SUBJECTIVE:    Tolerated Plex with no issues.  Discussed in detail about her plan of care with her brother this morning.  PROBLEM LIST:  Principal Problem:    Shortness of breath         ALLERGIES:  No Known Allergies         CURRENT MEDICATIONS:  Scheduled medications  acetaminophen, 650 mg, oral, Once  amLODIPine, 10 mg, oral, Daily  calcitriol, 0.25 mcg, oral, Daily  calcium carbonate, 250 mg, oral, Daily  cholecalciferol, 4,000 Units, oral, Daily  clotrimazole, 10 mg, oral, TID after meals  diphenhydrAMINE, 25 mg, oral, Once  [Held by provider] furosemide, 40 mg, intravenous, BID  gabapentin, 300 mg, oral, Nightly  heparin (porcine), 5,000 Units, subcutaneous, q8h JANIE  immune globulin (human), 10 g, intravenous, Once  levothyroxine, 25 mcg, oral, Daily before breakfast  loratadine, 10 mg, oral, Daily  magnesium oxide, 400 mg, oral, Daily  mycophenolate, 1,000 mg, oral, q12h  pantoprazole, 40 mg, oral, BID AC  potassium citrate CR, 20 mEq, oral, Daily  predniSONE, 20 mg, oral, Daily  sennosides-docusate sodium, 2 tablet, oral, BID  sertraline, 50 mg, oral, Daily  sodium bicarbonate, 1,300 mg, oral, BID  sulfamethoxazole-trimethoprim, 1 tablet, oral, Daily  sulfur hexafluoride microsphr, 2 mL, intravenous, Once in imaging  tacrolimus, 2 mg, oral, q12h JANIE  tenofovir alafenamide, 25 mg, oral, Daily  valGANciclovir, 450 mg, oral, q48h      Continuous medications       PRN medications  PRN medications: acetaminophen, alteplase, guaiFENesin, hydrALAZINE, melatonin, naloxone,  "ondansetron ODT **OR** ondansetron, oxyCODONE, SUMAtriptan       OBJECTIVE:    VITALS: Visit Vitals  /77 (BP Location: Right arm, Patient Position: Lying)   Pulse 82   Temp 36.4 °C (97.5 °F) (Temporal)   Resp 18   Ht 1.626 m (5' 4.02\")   Wt 84.9 kg (187 lb 4.5 oz)   LMP  (LMP Unknown)   SpO2 95%   BMI 32.13 kg/m²   OB Status Unknown   Smoking Status Never   BSA 1.96 m²        General: No distress   Mucosa moist   AI, AC, AF     HEENT: PEERLA  CVS: S1 S2 no murmurs  RESP:  Lungs clear to auscultation   ABDO: Soft, non-tender   Neuro: A + O x 3  Skin: No rash   Extremities: +1-2 edema       LABS:  Results from last 72 hours   Lab Units 24  1140 24  0521   WBC AUTO x10*3/uL 13.7* 13.8*   HEMOGLOBIN g/dL 10.4* 11.4*   MCV fL 88 90   PLATELETS AUTO x10*3/uL 73* 90*   BUN mg/dL  --  69*   CREATININE mg/dL  --  3.40*   CALCIUM mg/dL  --  7.7*            Intake/Output Summary (Last 24 hours) at 2024 1429  Last data filed at 2024 1220  Gross per 24 hour   Intake 2944 ml   Output 4886 ml   Net -1942 ml          ASSESSMENT AND PLAN:    Herber Foy Rai is a 44 y.o. female with past medical history significant for ESRD secondary to HTN/NSAID whom received a  donor kidney transplant on 23, KDPI of the kidney is a 56%, PRA is 48% 6 antigen mismatch. 4.5 mg/kg total of thymoglobulin induction.  She was a transition from tacrolimus to belatacept in the setting of hematoma at the kidney site that required exploration.  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  Presented currently with volume overload and MARK with DQ 2 DSA positive with 10,158 MFI.  Transplant nephrology consulted for further management.    Allograft function:  -Her baseline prior to this episode is around 0.9 -0.8 currently around 3.40 stable  Multiple echogenic calculi noticed on the lower pole of the kidney.  -S/p kidney biopsy showing [place] IIB ACR and AMR-planning " Thymoglobulin 6mg/kg along with 4 sessions of Plex and IV immunoglobulin 2 g/kg tentatively.  S/p 3 doses of IV steroids currently.  -Plex today, Thymo 3 rd dose tomorrow.  -Hyponatremia got corrected with free water restriction and albumin and normal saline infusion yesterday.  Will watch closely    Immunosuppression: Restarted back on tacrolimus aiming levels 8-10-Continue with the mycophenolate and prednisone tapering.    Hemodynamics: Blood pressures are elevated continue with amlodipine, consider Coreg 12.5 twice daily-can uptitrate Coreg.    Anemia/leukopenia: Follow-up with the repeat transplant ultrasound and monitor hemoglobin closely.  Leukocytosis secondary to steroid use.    Infectious prophylaxis: Continue with the Vemlidy for hepatitis B prophylaxis and Valcyte, Bactrim and clotrimazole 3 months post rejection management.  -Last BK, CMV, EBV negative as of 5/24/2024.  Patient had a prior CMV viremia will follow-up with CMV PCR closely.    Bone mineral disease: Will check vitamin D PTH continue with the calcitriol and Tums.      Thank you for consulting .  Billie Mcfarland MD       Notes created by Amy -Please excuse the Typos .

## 2024-07-09 NOTE — PROGRESS NOTES
"Herber Foy Rai is a 44 y.o. female on day 7 of admission presenting with Shortness of breath.    Subjective   Awaiting biopsy results  Some dizziness today       Objective   Vitals:    07/09/24 1125   BP: 159/69   Pulse: 86   Resp: 18   Temp: 36.9 °C (98.4 °F)   SpO2:        Physical Exam  Constitutional:       Appearance: Normal appearance.   Eyes:      Pupils: Pupils are equal, round, and reactive to light.   Cardiovascular:      Rate and Rhythm: Normal rate.   Pulmonary:      Effort: Pulmonary effort is normal. No respiratory distress.   Abdominal:      General: There is no distension.      Palpations: Abdomen is soft.      Comments: Incision clean, dry, intact   Musculoskeletal:         General: Normal range of motion.      Right lower leg: No edema.      Left lower leg: No edema.   Skin:     General: Skin is warm and dry.   Neurological:      General: No focal deficit present.      Mental Status: She is alert and oriented to person, place, and time.   Psychiatric:         Mood and Affect: Mood normal.         Behavior: Behavior normal.         Last Recorded Vitals  Blood pressure 159/69, pulse 86, temperature 36.9 °C (98.4 °F), resp. rate 18, height 1.626 m (5' 4.02\"), weight 84.9 kg (187 lb 4.5 oz), SpO2 96%.  Intake/Output last 3 Shifts:  I/O last 3 completed shifts:  In: 183.8 (2.2 mL/kg) [I.V.:142.1 (1.7 mL/kg); IV Piggyback:41.7]  Out: 2750 (32.4 mL/kg) [Urine:2750 (0.9 mL/kg/hr)]  Weight: 84.9 kg     Relevant Results  Lab Results   Component Value Date    WBC 13.8 (H) 07/09/2024    HGB 11.4 (L) 07/09/2024    HCT 33.0 (L) 07/09/2024    MCV 90 07/09/2024    PLT 90 (L) 07/09/2024     Lab Results   Component Value Date    GLUCOSE 157 (H) 07/09/2024    CALCIUM 7.7 (L) 07/09/2024     (L) 07/09/2024    K 3.9 07/09/2024    CO2 19 (L) 07/09/2024     07/09/2024    BUN 69 (H) 07/09/2024    CREATININE 3.40 (H) 07/09/2024     amLODIPine, 10 mg, oral, Daily  calcitriol, 0.25 mcg, oral, Daily  calcium " carbonate, 250 mg, oral, Daily  cholecalciferol, 4,000 Units, oral, Daily  clotrimazole, 10 mg, oral, TID after meals  [Held by provider] furosemide, 40 mg, intravenous, BID  gabapentin, 300 mg, oral, Nightly  levothyroxine, 25 mcg, oral, Daily before breakfast  loratadine, 10 mg, oral, Daily  magnesium oxide, 400 mg, oral, Daily  mycophenolate, 1,000 mg, oral, q12h  pantoprazole, 40 mg, oral, BID AC  plasma exchange albumin human, 12.5 g, apheresis (aphr), q15 min  potassium citrate CR, 20 mEq, oral, Daily  predniSONE, 20 mg, oral, Daily  sennosides-docusate sodium, 2 tablet, oral, BID  sertraline, 50 mg, oral, Daily  sodium bicarbonate, 1,300 mg, oral, BID  sulfamethoxazole-trimethoprim, 1 tablet, oral, Daily  sulfur hexafluoride microsphr, 2 mL, intravenous, Once in imaging  tacrolimus, 2 mg, oral, q12h JANIE  tenofovir alafenamide, 25 mg, oral, Daily  valGANciclovir, 450 mg, oral, q48h        Assessment/Plan   Principal Problem:    Shortness of breath    DDKT 11/2023    Kidney allograft function   MARK, creatinine 3.4, baseline < 1   Kidney duplex: multiple calculi, mild-mod hydro, which is similar to previous   Echo unremarkable   New DSA, sp biopsy 7/5/24 - waiting on prelim    Steroid bolus 500 x 3, completed    Thymo second dose today      Immunosuppression   Belatacept, consider adding Tac      Steroid IV bolus    LE swelling   No DVT       I spent 35 minutes in the professional and overall care of this patient.      Anita Louis MD

## 2024-07-09 NOTE — PROGRESS NOTES
Transitional Care Coordination Progress Note: Interdisciplinary Rounds    Team members present: TCC, Transplant NP    Discharge disposition: Home - needs TBD     Status: IP    Payer: Medicare Dual - [employer]    Potential Barriers: s/p biopsy 7/5/24 - waiting on prelim, plan for 4 rounds of Plex followed by IVIG -first round 7/9      ADOD: 7/15    This TCC will continue to follow for home going needs and safe DC plan.    Vidya Wilson RN

## 2024-07-09 NOTE — PROGRESS NOTES
Herber Foy [facility] [id number]       Procedure type: Plasma Exchange    Replacement Fluids: 100% Albumin     Procedure Completed Without complications.    Attending notified of completion status. See flow sheet(s) for additional details.  Post-Vitals listed below.    Post-procedure vitals:         07/09/24 1138   Vital Signs   Temp 36.9 °C (98.4 °F)   Heart Rate 88   Resp 18   /83   Medical Gas Therapy   SpO2 97 %     SpO2 on room air     Kit Subramanian RN

## 2024-07-09 NOTE — PROGRESS NOTES
"Herber Foy Rai is a 44 y.o. female on day 7 of admission presenting with Shortness of breath.    Subjective   To undergo first round of Plex  Some dizziness today       Objective   Vitals:    07/09/24 1125   BP: 159/69   Pulse: 86   Resp: 18   Temp: 36.9 °C (98.4 °F)   SpO2:        Physical Exam  Constitutional:       Appearance: Normal appearance.   Eyes:      Pupils: Pupils are equal, round, and reactive to light.   Cardiovascular:      Rate and Rhythm: Normal rate.   Pulmonary:      Effort: Pulmonary effort is normal. No respiratory distress.   Abdominal:      General: There is no distension.      Palpations: Abdomen is soft.      Comments: Incision clean, dry, intact   Musculoskeletal:         General: Normal range of motion.      Right lower leg: No edema.      Left lower leg: No edema.   Skin:     General: Skin is warm and dry.   Neurological:      General: No focal deficit present.      Mental Status: She is alert and oriented to person, place, and time.   Psychiatric:         Mood and Affect: Mood normal.         Behavior: Behavior normal.         Last Recorded Vitals  Blood pressure 159/69, pulse 86, temperature 36.9 °C (98.4 °F), resp. rate 18, height 1.626 m (5' 4.02\"), weight 84.9 kg (187 lb 4.5 oz), SpO2 96%.  Intake/Output last 3 Shifts:  I/O last 3 completed shifts:  In: 183.8 (2.2 mL/kg) [I.V.:142.1 (1.7 mL/kg); IV Piggyback:41.7]  Out: 2750 (32.4 mL/kg) [Urine:2750 (0.9 mL/kg/hr)]  Weight: 84.9 kg     Relevant Results  Lab Results   Component Value Date    WBC 13.8 (H) 07/09/2024    HGB 11.4 (L) 07/09/2024    HCT 33.0 (L) 07/09/2024    MCV 90 07/09/2024    PLT 90 (L) 07/09/2024     Lab Results   Component Value Date    GLUCOSE 157 (H) 07/09/2024    CALCIUM 7.7 (L) 07/09/2024     (L) 07/09/2024    K 3.9 07/09/2024    CO2 19 (L) 07/09/2024     07/09/2024    BUN 69 (H) 07/09/2024    CREATININE 3.40 (H) 07/09/2024     amLODIPine, 10 mg, oral, Daily  calcitriol, 0.25 mcg, oral, Daily  calcium " carbonate, 250 mg, oral, Daily  cholecalciferol, 4,000 Units, oral, Daily  clotrimazole, 10 mg, oral, TID after meals  [Held by provider] furosemide, 40 mg, intravenous, BID  gabapentin, 300 mg, oral, Nightly  levothyroxine, 25 mcg, oral, Daily before breakfast  loratadine, 10 mg, oral, Daily  magnesium oxide, 400 mg, oral, Daily  mycophenolate, 1,000 mg, oral, q12h  pantoprazole, 40 mg, oral, BID AC  plasma exchange albumin human, 12.5 g, apheresis (aphr), q15 min  potassium citrate CR, 20 mEq, oral, Daily  predniSONE, 20 mg, oral, Daily  sennosides-docusate sodium, 2 tablet, oral, BID  sertraline, 50 mg, oral, Daily  sodium bicarbonate, 1,300 mg, oral, BID  sulfamethoxazole-trimethoprim, 1 tablet, oral, Daily  sulfur hexafluoride microsphr, 2 mL, intravenous, Once in imaging  tacrolimus, 2 mg, oral, q12h JANIE  tenofovir alafenamide, 25 mg, oral, Daily  valGANciclovir, 450 mg, oral, q48h        Assessment/Plan   Principal Problem:    Shortness of breath    DDKT 11/2023    Kidney allograft function   MARK, creatinine 3.4, baseline < 1   Kidney duplex: multiple calculi, mild-mod hydro, which is similar to previous   Echo unremarkable   New DSA, sp biopsy 7/5/24 -consistent with T-cell mediated rejection Banff 1B and acute vascular rejection Banff 2B and antibody mediated rejection    Steroid bolus 500 x 3, completed    Thymo 2 doses completed -will plan for total of 6 mg/kg    Plan for 4 rounds of Plex followed by IVIG -first round today      Immunosuppression   Belatacept   Initiate tacrolimus 2 and 2   MMF 1000   Steroid IV bolus    LE swelling   No DVT    Given patient poor health literacy we will plan for family to come in today for discussion     I spent 35 minutes in the professional and overall care of this patient.      Anita Louis MD

## 2024-07-10 LAB
ALBUMIN SERPL BCP-MCNC: 3.4 G/DL (ref 3.4–5)
ANION GAP SERPL CALC-SCNC: 10 MMOL/L (ref 10–20)
BUN SERPL-MCNC: 44 MG/DL (ref 6–23)
CA-I BLD-SCNC: 1.19 MMOL/L (ref 1.1–1.33)
CALCIUM SERPL-MCNC: 8.1 MG/DL (ref 8.6–10.6)
CHLORIDE SERPL-SCNC: 107 MMOL/L (ref 98–107)
CO2 SERPL-SCNC: 24 MMOL/L (ref 21–32)
COLLECT DURATION TIME SPEC: <23 HRS
CREAT 24H UR-MRATE: NORMAL G/(24.H)
CREAT CL 24H UR+SERPL-VRATE: NORMAL ML/MIN
CREAT SERPL-MCNC: 2.33 MG/DL (ref 0.5–1.05)
CREAT UR-MCNC: 38.2 MG/DL (ref 20–320)
EGFRCR SERPLBLD CKD-EPI 2021: 26 ML/MIN/1.73M*2
ERYTHROCYTE [DISTWIDTH] IN BLOOD BY AUTOMATED COUNT: 16.9 % (ref 11.5–14.5)
GLUCOSE BLD MANUAL STRIP-MCNC: 90 MG/DL (ref 74–99)
GLUCOSE SERPL-MCNC: 69 MG/DL (ref 74–99)
HCT VFR BLD AUTO: 30.4 % (ref 36–46)
HGB BLD-MCNC: 10.1 G/DL (ref 12–16)
MAGNESIUM SERPL-MCNC: 1.88 MG/DL (ref 1.6–2.4)
MCH RBC QN AUTO: 31 PG (ref 26–34)
MCHC RBC AUTO-ENTMCNC: 33.2 G/DL (ref 32–36)
MCV RBC AUTO: 93 FL (ref 80–100)
NRBC BLD-RTO: 0 /100 WBCS (ref 0–0)
PHOSPHATE SERPL-MCNC: 3 MG/DL (ref 2.5–4.9)
PLATELET # BLD AUTO: 71 X10*3/UL (ref 150–450)
POTASSIUM SERPL-SCNC: 3.8 MMOL/L (ref 3.5–5.3)
RBC # BLD AUTO: 3.26 X10*6/UL (ref 4–5.2)
SODIUM SERPL-SCNC: 137 MMOL/L (ref 136–145)
SPECIMEN VOL ?TM UR: 11 ML
TACROLIMUS BLD-MCNC: 4.7 NG/ML
WBC # BLD AUTO: 8.1 X10*3/UL (ref 4.4–11.3)

## 2024-07-10 PROCEDURE — 2500000004 HC RX 250 GENERAL PHARMACY W/ HCPCS (ALT 636 FOR OP/ED)

## 2024-07-10 PROCEDURE — 1200000002 HC GENERAL ROOM WITH TELEMETRY DAILY

## 2024-07-10 PROCEDURE — 80069 RENAL FUNCTION PANEL: CPT | Performed by: STUDENT IN AN ORGANIZED HEALTH CARE EDUCATION/TRAINING PROGRAM

## 2024-07-10 PROCEDURE — 85027 COMPLETE CBC AUTOMATED: CPT | Performed by: STUDENT IN AN ORGANIZED HEALTH CARE EDUCATION/TRAINING PROGRAM

## 2024-07-10 PROCEDURE — 2500000001 HC RX 250 WO HCPCS SELF ADMINISTERED DRUGS (ALT 637 FOR MEDICARE OP)

## 2024-07-10 PROCEDURE — 2500000002 HC RX 250 W HCPCS SELF ADMINISTERED DRUGS (ALT 637 FOR MEDICARE OP, ALT 636 FOR OP/ED)

## 2024-07-10 PROCEDURE — 99232 SBSQ HOSP IP/OBS MODERATE 35: CPT | Performed by: STUDENT IN AN ORGANIZED HEALTH CARE EDUCATION/TRAINING PROGRAM

## 2024-07-10 PROCEDURE — 81050 URINALYSIS VOLUME MEASURE: CPT | Performed by: INTERNAL MEDICINE

## 2024-07-10 PROCEDURE — 82947 ASSAY GLUCOSE BLOOD QUANT: CPT

## 2024-07-10 PROCEDURE — 2500000001 HC RX 250 WO HCPCS SELF ADMINISTERED DRUGS (ALT 637 FOR MEDICARE OP): Performed by: STUDENT IN AN ORGANIZED HEALTH CARE EDUCATION/TRAINING PROGRAM

## 2024-07-10 PROCEDURE — 80197 ASSAY OF TACROLIMUS: CPT

## 2024-07-10 PROCEDURE — 83735 ASSAY OF MAGNESIUM: CPT | Performed by: STUDENT IN AN ORGANIZED HEALTH CARE EDUCATION/TRAINING PROGRAM

## 2024-07-10 PROCEDURE — 2500000002 HC RX 250 W HCPCS SELF ADMINISTERED DRUGS (ALT 637 FOR MEDICARE OP, ALT 636 FOR OP/ED): Performed by: STUDENT IN AN ORGANIZED HEALTH CARE EDUCATION/TRAINING PROGRAM

## 2024-07-10 PROCEDURE — 82330 ASSAY OF CALCIUM: CPT

## 2024-07-10 PROCEDURE — 2500000004 HC RX 250 GENERAL PHARMACY W/ HCPCS (ALT 636 FOR OP/ED): Performed by: STUDENT IN AN ORGANIZED HEALTH CARE EDUCATION/TRAINING PROGRAM

## 2024-07-10 RX ORDER — DIPHENHYDRAMINE HCL 25 MG
25 CAPSULE ORAL ONCE
Status: COMPLETED | OUTPATIENT
Start: 2024-07-10 | End: 2024-07-10

## 2024-07-10 RX ORDER — ACETAMINOPHEN 325 MG/1
650 TABLET ORAL ONCE
Status: COMPLETED | OUTPATIENT
Start: 2024-07-10 | End: 2024-07-10

## 2024-07-10 RX ORDER — POLYETHYLENE GLYCOL 3350 17 G/17G
17 POWDER, FOR SOLUTION ORAL DAILY
Status: DISCONTINUED | OUTPATIENT
Start: 2024-07-10 | End: 2024-07-14

## 2024-07-10 RX ORDER — HEPARIN SODIUM 5000 [USP'U]/ML
5000 INJECTION, SOLUTION INTRAVENOUS; SUBCUTANEOUS EVERY 8 HOURS SCHEDULED
Status: DISCONTINUED | OUTPATIENT
Start: 2024-07-10 | End: 2024-07-13

## 2024-07-10 RX ORDER — MAGNESIUM SULFATE HEPTAHYDRATE 40 MG/ML
2 INJECTION, SOLUTION INTRAVENOUS ONCE
Status: COMPLETED | OUTPATIENT
Start: 2024-07-10 | End: 2024-07-10

## 2024-07-10 ASSESSMENT — PAIN SCALES - GENERAL
PAINLEVEL_OUTOF10: 0 - NO PAIN
PAINLEVEL_OUTOF10: 6
PAINLEVEL_OUTOF10: 0 - NO PAIN

## 2024-07-10 ASSESSMENT — COGNITIVE AND FUNCTIONAL STATUS - GENERAL
CLIMB 3 TO 5 STEPS WITH RAILING: A LITTLE
MOBILITY SCORE: 22
DAILY ACTIVITIY SCORE: 24
WALKING IN HOSPITAL ROOM: A LITTLE

## 2024-07-10 ASSESSMENT — PAIN - FUNCTIONAL ASSESSMENT
PAIN_FUNCTIONAL_ASSESSMENT: 0-10
PAIN_FUNCTIONAL_ASSESSMENT: 0-10

## 2024-07-10 NOTE — CARE PLAN
The patient's goals for the shift include Labs WNL    The clinical goals for the shift include Patient will remain free from all falls throughout the night.    Over the shift, the patient did not make progress toward the following goals. Barriers to progression include language barrier. Recommendations to address these barriers include frequent observation and education on ways to prevent falls.

## 2024-07-10 NOTE — CONSULTS
Nutrition Note:   Nutrition Assessment    Reason for Assessment: Provider consult order    Pt request this service come back another time d/t pt trying to sleep. RDN will follow up regarding full evaluation and ONS options      Time Spent (min): 15 minutes

## 2024-07-10 NOTE — CARE PLAN
The patient's goals for the shift include Labs WNL    The clinical goals for the shift include pt to remain free of stomach pain

## 2024-07-11 ENCOUNTER — APPOINTMENT (OUTPATIENT)
Dept: OTHER | Facility: HOSPITAL | Age: 44
End: 2024-07-11
Payer: COMMERCIAL

## 2024-07-11 LAB
ABO GROUP (TYPE) IN BLOOD: NORMAL
ALBUMIN SERPL BCP-MCNC: 3.4 G/DL (ref 3.4–5)
ANION GAP SERPL CALC-SCNC: 11 MMOL/L (ref 10–20)
ANTIBODY SCREEN: NORMAL
APTT PPP: 35 SECONDS (ref 27–38)
BLOOD EXPIRATION DATE: NORMAL
BUN SERPL-MCNC: 32 MG/DL (ref 6–23)
CA-I BLD-SCNC: 1.14 MMOL/L (ref 1.1–1.33)
CA-I BLD-SCNC: 1.17 MMOL/L (ref 1.1–1.33)
CALCIUM SERPL-MCNC: 8.1 MG/DL (ref 8.6–10.6)
CHLORIDE SERPL-SCNC: 107 MMOL/L (ref 98–107)
CO2 SERPL-SCNC: 22 MMOL/L (ref 21–32)
CREAT SERPL-MCNC: 1.93 MG/DL (ref 0.5–1.05)
DISPENSE STATUS: NORMAL
EGFRCR SERPLBLD CKD-EPI 2021: 32 ML/MIN/1.73M*2
ERYTHROCYTE [DISTWIDTH] IN BLOOD BY AUTOMATED COUNT: 16.5 % (ref 11.5–14.5)
ERYTHROCYTE [DISTWIDTH] IN BLOOD BY AUTOMATED COUNT: 16.7 % (ref 11.5–14.5)
FIBRINOGEN PPP-MCNC: 211 MG/DL (ref 200–400)
GLUCOSE SERPL-MCNC: 77 MG/DL (ref 74–99)
HCT VFR BLD AUTO: 31.1 % (ref 36–46)
HCT VFR BLD AUTO: 32.1 % (ref 36–46)
HGB BLD-MCNC: 10.3 G/DL (ref 12–16)
HGB BLD-MCNC: 10.7 G/DL (ref 12–16)
INR PPP: 1 (ref 0.9–1.1)
MAGNESIUM SERPL-MCNC: 2.12 MG/DL (ref 1.6–2.4)
MCH RBC QN AUTO: 30.6 PG (ref 26–34)
MCH RBC QN AUTO: 31 PG (ref 26–34)
MCHC RBC AUTO-ENTMCNC: 33.1 G/DL (ref 32–36)
MCHC RBC AUTO-ENTMCNC: 33.3 G/DL (ref 32–36)
MCV RBC AUTO: 92 FL (ref 80–100)
MCV RBC AUTO: 94 FL (ref 80–100)
NRBC BLD-RTO: 0 /100 WBCS (ref 0–0)
NRBC BLD-RTO: 0 /100 WBCS (ref 0–0)
PHOSPHATE SERPL-MCNC: 2.7 MG/DL (ref 2.5–4.9)
PLATELET # BLD AUTO: 69 X10*3/UL (ref 150–450)
PLATELET # BLD AUTO: 82 X10*3/UL (ref 150–450)
POTASSIUM SERPL-SCNC: 4 MMOL/L (ref 3.5–5.3)
PRODUCT BLOOD TYPE: 1700
PRODUCT BLOOD TYPE: 7300
PRODUCT CODE: NORMAL
PROTHROMBIN TIME: 11.2 SECONDS (ref 9.8–12.8)
RBC # BLD AUTO: 3.32 X10*6/UL (ref 4–5.2)
RBC # BLD AUTO: 3.5 X10*6/UL (ref 4–5.2)
RH FACTOR (ANTIGEN D): NORMAL
SODIUM SERPL-SCNC: 136 MMOL/L (ref 136–145)
TACROLIMUS BLD-MCNC: 7 NG/ML
UNIT ABO: NORMAL
UNIT NUMBER: NORMAL
UNIT RH: NORMAL
UNIT VOLUME: 244
UNIT VOLUME: 315
UNIT VOLUME: 321
UNIT VOLUME: 338
UNIT VOLUME: 344
WBC # BLD AUTO: 13.2 X10*3/UL (ref 4.4–11.3)
WBC # BLD AUTO: 9 X10*3/UL (ref 4.4–11.3)

## 2024-07-11 PROCEDURE — 2500000001 HC RX 250 WO HCPCS SELF ADMINISTERED DRUGS (ALT 637 FOR MEDICARE OP)

## 2024-07-11 PROCEDURE — 80069 RENAL FUNCTION PANEL: CPT | Performed by: STUDENT IN AN ORGANIZED HEALTH CARE EDUCATION/TRAINING PROGRAM

## 2024-07-11 PROCEDURE — 36514 APHERESIS PLASMA: CPT

## 2024-07-11 PROCEDURE — 2500000004 HC RX 250 GENERAL PHARMACY W/ HCPCS (ALT 636 FOR OP/ED): Performed by: STUDENT IN AN ORGANIZED HEALTH CARE EDUCATION/TRAINING PROGRAM

## 2024-07-11 PROCEDURE — 85384 FIBRINOGEN ACTIVITY: CPT

## 2024-07-11 PROCEDURE — 2500000004 HC RX 250 GENERAL PHARMACY W/ HCPCS (ALT 636 FOR OP/ED)

## 2024-07-11 PROCEDURE — 2500000002 HC RX 250 W HCPCS SELF ADMINISTERED DRUGS (ALT 637 FOR MEDICARE OP, ALT 636 FOR OP/ED)

## 2024-07-11 PROCEDURE — 2500000001 HC RX 250 WO HCPCS SELF ADMINISTERED DRUGS (ALT 637 FOR MEDICARE OP): Performed by: STUDENT IN AN ORGANIZED HEALTH CARE EDUCATION/TRAINING PROGRAM

## 2024-07-11 PROCEDURE — 80197 ASSAY OF TACROLIMUS: CPT

## 2024-07-11 PROCEDURE — 86901 BLOOD TYPING SEROLOGIC RH(D): CPT

## 2024-07-11 PROCEDURE — 83735 ASSAY OF MAGNESIUM: CPT | Performed by: STUDENT IN AN ORGANIZED HEALTH CARE EDUCATION/TRAINING PROGRAM

## 2024-07-11 PROCEDURE — 85027 COMPLETE CBC AUTOMATED: CPT | Performed by: STUDENT IN AN ORGANIZED HEALTH CARE EDUCATION/TRAINING PROGRAM

## 2024-07-11 PROCEDURE — 85027 COMPLETE CBC AUTOMATED: CPT

## 2024-07-11 PROCEDURE — 85610 PROTHROMBIN TIME: CPT

## 2024-07-11 PROCEDURE — 1100000001 HC PRIVATE ROOM DAILY

## 2024-07-11 PROCEDURE — 2500000002 HC RX 250 W HCPCS SELF ADMINISTERED DRUGS (ALT 637 FOR MEDICARE OP, ALT 636 FOR OP/ED): Performed by: STUDENT IN AN ORGANIZED HEALTH CARE EDUCATION/TRAINING PROGRAM

## 2024-07-11 PROCEDURE — P9017 PLASMA 1 DONOR FRZ W/IN 8 HR: HCPCS

## 2024-07-11 PROCEDURE — 99232 SBSQ HOSP IP/OBS MODERATE 35: CPT | Performed by: STUDENT IN AN ORGANIZED HEALTH CARE EDUCATION/TRAINING PROGRAM

## 2024-07-11 PROCEDURE — 82330 ASSAY OF CALCIUM: CPT

## 2024-07-11 PROCEDURE — 2500000004 HC RX 250 GENERAL PHARMACY W/ HCPCS (ALT 636 FOR OP/ED): Mod: JZ

## 2024-07-11 PROCEDURE — P9045 ALBUMIN (HUMAN), 5%, 250 ML: HCPCS | Mod: JZ

## 2024-07-11 RX ORDER — DIPHENHYDRAMINE HCL 25 MG
50 CAPSULE ORAL ONCE
Status: COMPLETED | OUTPATIENT
Start: 2024-07-11 | End: 2024-07-11

## 2024-07-11 RX ORDER — ACETAMINOPHEN 325 MG/1
650 TABLET ORAL ONCE
Status: COMPLETED | OUTPATIENT
Start: 2024-07-11 | End: 2024-07-11

## 2024-07-11 RX ORDER — CARVEDILOL 12.5 MG/1
12.5 TABLET ORAL 2 TIMES DAILY
Status: DISCONTINUED | OUTPATIENT
Start: 2024-07-11 | End: 2024-07-14

## 2024-07-11 RX ORDER — CALCIUM CARBONATE 200(500)MG
1500 TABLET,CHEWABLE ORAL EVERY 5 MIN PRN
Status: DISCONTINUED | OUTPATIENT
Start: 2024-07-11 | End: 2024-07-11 | Stop reason: HOSPADM

## 2024-07-11 RX ORDER — ALBUMIN HUMAN 50 G/1000ML
12.5 SOLUTION INTRAVENOUS
Status: COMPLETED | OUTPATIENT
Start: 2024-07-11 | End: 2024-07-11

## 2024-07-11 RX ORDER — DIPHENHYDRAMINE HYDROCHLORIDE 50 MG/ML
25 INJECTION INTRAMUSCULAR; INTRAVENOUS EVERY 5 MIN PRN
Status: DISCONTINUED | OUTPATIENT
Start: 2024-07-11 | End: 2024-07-11 | Stop reason: HOSPADM

## 2024-07-11 RX ORDER — ACETAMINOPHEN 325 MG/1
650 TABLET ORAL ONCE
Status: DISCONTINUED | OUTPATIENT
Start: 2024-07-11 | End: 2024-07-17

## 2024-07-11 RX ORDER — DIPHENHYDRAMINE HCL 25 MG
25 CAPSULE ORAL ONCE
Status: COMPLETED | OUTPATIENT
Start: 2024-07-11 | End: 2024-07-11

## 2024-07-11 RX ORDER — CALCIUM GLUCONATE 20 MG/ML
5080 INJECTION, SOLUTION INTRAVENOUS ONCE
Status: COMPLETED | OUTPATIENT
Start: 2024-07-11 | End: 2024-07-11

## 2024-07-11 ASSESSMENT — COGNITIVE AND FUNCTIONAL STATUS - GENERAL
MOBILITY SCORE: 24
PERSONAL GROOMING: A LITTLE
TOILETING: A LITTLE
DRESSING REGULAR LOWER BODY CLOTHING: A LITTLE
CLIMB 3 TO 5 STEPS WITH RAILING: A LITTLE
WALKING IN HOSPITAL ROOM: A LITTLE
DAILY ACTIVITIY SCORE: 24
WALKING IN HOSPITAL ROOM: A LITTLE
DAILY ACTIVITIY SCORE: 24
CLIMB 3 TO 5 STEPS WITH RAILING: A LOT
MOBILITY SCORE: 24
MOBILITY SCORE: 20
DRESSING REGULAR UPPER BODY CLOTHING: A LITTLE
STANDING UP FROM CHAIR USING ARMS: A LITTLE
HELP NEEDED FOR BATHING: A LITTLE
MOBILITY SCORE: 24
DAILY ACTIVITIY SCORE: 24
DAILY ACTIVITIY SCORE: 19
DAILY ACTIVITIY SCORE: 24
MOBILITY SCORE: 22

## 2024-07-11 ASSESSMENT — PAIN - FUNCTIONAL ASSESSMENT
PAIN_FUNCTIONAL_ASSESSMENT: 0-10

## 2024-07-11 ASSESSMENT — PAIN SCALES - GENERAL
PAINLEVEL_OUTOF10: 0 - NO PAIN
PAINLEVEL_OUTOF10: 5 - MODERATE PAIN
PAINLEVEL_OUTOF10: 0 - NO PAIN
PAINLEVEL_OUTOF10: 2

## 2024-07-11 NOTE — CONSULTS
Nutrition Initial Assessment:   Nutrition Assessment    Reason for Assessment: Provider consult order    Patient is a 44 y.o. female presenting with shortness of breath s/p DDKT 11/2023    Per transplant: 7/5/24 - sp biopsy consistent with T-cell mediated rejection   Kidney transplant underwent therapeutic plasma exchange 7/11    Past Medical History:   Diagnosis Date    Anxiety     Chronic kidney disease     Chronic kidney disease, unspecified     CKD, patient interested in transplantation    Depression     Disease of thyroid gland     GERD (gastroesophageal reflux disease)     Hypertension     Personal history of COVID-19 07/20/2022    History of COVID-19     Past Surgical History:   Procedure Laterality Date    MR HEAD ANGIO W AND WO IV CONTRAST  01/26/2021    MR HEAD ANGIO W AND WO IV CONTRAST    MR HEAD ANGIO WO IV CONTRAST  01/26/2021    MR HEAD ANGIO WO IV CONTRAST    OTHER SURGICAL HISTORY  07/20/2022    Arteriovenous fistula creation procedure    TRANSPLANT, KIDNEY, OPEN Right 11/30/2023    US GUIDED PERCUTANEOUS PERITONEAL OR RETROPERITONEAL FLUID COLLECTION DRAINAGE  12/21/2023    US GUIDED PERCUTANEOUS PERITONEAL OR RETROPERITONEAL FLUID COLLECTION DRAINAGE 12/21/2023 Batsheva Shanks MD [facility]         Nutrition History:  Food and Nutrient History: Met with pt and family/friend this morning. Family/friend responded to all questions and translated as needed.Reported that pt only eats when someone brings food from home d/t difficulty ordering while in the hospital d/t language barrier. Discussed with both that RDN will order dinners for pt as family is bringing in breakfast/ lunch from home. Meals will be sent automatically at 5 pm - RDN will input in health touch. Discussed that pt usually eats 1 x a day a larger meal. Pt eats food such as (beans, baked potatos, broccoli, hot tea, bread, fruit). Reports that pt feels dizziness and pt would like lemon ice, or orange flavored supplements.  Food  "Allergies/Intolerances:   None but patient is Vegan  GI Symptoms: Abdominal pain  Oral Problems: None       Anthropometrics:  Height: 162.6 cm (5' 4.02\")   Weight: 83.2 kg (183 lb 6.4 oz)   BMI (Calculated): 31.47  IBW/kg (Dietitian Calculated): 54.6 kg  Adjusted Body Weight (kg): 66 kg    Weight History:   Wt Readings from Last 15 Encounters:   07/11/24 83.2 kg (183 lb 6.4 oz)   06/24/24 79.5 kg (175 lb 4.3 oz)   06/05/24 79.9 kg (176 lb 1.6 oz)   05/24/24 79.9 kg (176 lb 1.6 oz)   05/24/24 80.4 kg (177 lb 4.8 oz)   05/22/24 80.5 kg (177 lb 7.5 oz)   04/25/24 81.6 kg (180 lb)   04/24/24 80.5 kg (177 lb 7.5 oz)   03/26/24 79.6 kg (175 lb 7.8 oz)   03/22/24 79.2 kg (174 lb 8 oz)   03/01/24 78.2 kg (172 lb 4.8 oz)   02/27/24 77.2 kg (170 lb 3.1 oz)   02/02/24 75.8 kg (167 lb)   01/30/24 75.6 kg (166 lb 10.7 oz)   01/15/24 73.7 kg (162 lb 8 oz)       Weight Change %:  Weight History / % Weight Change: Family unsure of weight changes  Significant Weight Loss: No    Nutrition Focused Physical Exam Findings:  defer: pt receiving plasma exchange and actively uncomfortable   Nutrition Significant Labs:  CBC Trend:   Results from last 7 days   Lab Units 07/11/24  1227 07/11/24  0543 07/10/24  0540 07/09/24  1140   WBC AUTO x10*3/uL 13.2* 9.0 8.1 13.7*   RBC AUTO x10*6/uL 3.50* 3.32* 3.26* 3.44*   HEMOGLOBIN g/dL 10.7* 10.3* 10.1* 10.4*   HEMATOCRIT % 32.1* 31.1* 30.4* 30.3*   MCV fL 92 94 93 88   PLATELETS AUTO x10*3/uL 82* 69* 71* 73*    , BMP Trend:   Results from last 7 days   Lab Units 07/11/24  0543 07/10/24  0540 07/09/24  0521 07/08/24  1350   GLUCOSE mg/dL 77 69* 157* 153*   CALCIUM mg/dL 8.1* 8.1* 7.7* 8.0*   SODIUM mmol/L 136 137 130* 126*   POTASSIUM mmol/L 4.0 3.8 3.9 4.0   CO2 mmol/L 22 24 19* 23   CHLORIDE mmol/L 107 107 100 94*   BUN mg/dL 32* 44* 69* 70*   CREATININE mg/dL 1.93* 2.33* 3.40* 3.34*    , A1C:  Lab Results   Component Value Date    HGBA1C 4.8 05/09/2023   ,Renal Lab Trend:   Results from last 7 " days   Lab Units 07/11/24  0543 07/10/24  0540 07/09/24  0521 07/08/24  1350   POTASSIUM mmol/L 4.0 3.8 3.9 4.0   PHOSPHORUS mg/dL 2.7 3.0 3.2 3.4   SODIUM mmol/L 136 137 130* 126*   MAGNESIUM mg/dL 2.12 1.88 2.20  --    EGFR mL/min/1.73m*2 32* 26* 16* 17*   BUN mg/dL 32* 44* 69* 70*   CREATININE mg/dL 1.93* 2.33* 3.40* 3.34*         Nutrition Specific Medications:  Scheduled medications  acetaminophen, 650 mg, oral, Once  amLODIPine, 10 mg, oral, Daily  calcitriol, 0.25 mcg, oral, Daily  calcium carbonate, 250 mg, oral, Daily  carvedilol, 12.5 mg, oral, BID  cholecalciferol, 4,000 Units, oral, Daily  clotrimazole, 10 mg, oral, TID after meals  [Held by provider] furosemide, 40 mg, intravenous, BID  gabapentin, 300 mg, oral, Nightly  heparin (porcine), 5,000 Units, subcutaneous, q8h JANIE  levothyroxine, 25 mcg, oral, Daily before breakfast  loratadine, 10 mg, oral, Daily  magnesium oxide, 400 mg, oral, Daily  mycophenolate, 1,000 mg, oral, q12h  pantoprazole, 40 mg, oral, BID AC  polyethylene glycol, 17 g, oral, Daily  potassium citrate CR, 20 mEq, oral, Daily  predniSONE, 20 mg, oral, Daily  sennosides-docusate sodium, 2 tablet, oral, BID  sertraline, 50 mg, oral, Daily  sodium bicarbonate, 1,300 mg, oral, BID  sulfamethoxazole-trimethoprim, 1 tablet, oral, Daily  sulfur hexafluoride microsphr, 2 mL, intravenous, Once in imaging  tacrolimus, 2 mg, oral, q12h JANIE  tenofovir alafenamide, 25 mg, oral, Daily  valGANciclovir, 450 mg, oral, q48h        I/O:   Last BM Date: 07/04/24;      Dietary Orders (From admission, onward)       Start     Ordered    07/09/24 1301  Adult diet Regular  Diet effective now        Question:  Diet type  Answer:  Regular    07/09/24 1302    07/08/24 1013  Oral nutritional supplements  Until discontinued        Question Answer Comment   Deliver with All meals    Select supplement: Boost [facility]        07/08/24 1015    07/03/24 2156  May Participate in Room Service With Assistance  Once         Question:  .  Answer:  Yes    07/03/24 2150                     Estimated Needs:   Total Energy Estimated Needs (kCal):  (9663-8015)  Method for Estimating Needs: ABW x 25-28  Total Protein Estimated Needs (g):  ()  Method for Estimating Needs: 1.5-2 x adjusted BW  Total Fluid Estimated Needs (mL):  (per team)           Nutrition Diagnosis   Malnutrition Diagnosis  Patient has Malnutrition Diagnosis: No    Nutrition Diagnosis  Patient has Nutrition Diagnosis: Yes  Diagnosis Status (1): New  Nutrition Diagnosis 1: Increased nutrient needs  Related to (1): increased metabolic demand  As Evidenced by (1): possible T-cell mediated rejection       Nutrition Interventions/Recommendations         Nutrition Prescription:    Will order lemon Icee for pt  Will discuss with floor how to plan meal times d/t difficulty ordering   RDN scheduled meal daily for pt to eat to be sent at 5pm.         Nutrition Interventions:   Interventions: Meals and snacks, Medical food supplement  Meals and Snacks: General healthful diet    Nutrition Monitoring and Evaluation   Food/Nutrient Related History Monitoring  Monitoring and Evaluation Plan: Energy intake  Energy Intake: Estimated energy intake  Criteria: >75% of EEN    Body Composition/Growth/Weight History  Monitoring and Evaluation Plan: Weight    Biochemical Data, Medical Tests and Procedures  Monitoring and Evaluation Plan: Glucose/endocrine profile, Electrolyte/renal panel  Electrolyte and Renal Panel: Sodium, Potassium, Phosphorus, Magnesium  Criteria: WNL  Glucose/Endocrine Profile: Glucose, casual  Criteria: WNL    Time Spent (min): 60 minutes

## 2024-07-11 NOTE — PROGRESS NOTES
Confirmed with  Transplant NP , when medically ready pt will discharge with no care coordination needs.      LUTHER LEMON RN

## 2024-07-11 NOTE — CARE PLAN
The patient's goals for the shift include Labs WNL    The clinical goals for the shift include Safety measures maintained    Over the shift, the patient did  make progress toward the following goals. Barriers to progression include . Recommendations to address these barriers include to ensure room free from clutter.

## 2024-07-11 NOTE — POST-PROCEDURE NOTE
This is a 44 y.o. female with Antibody Mediated Rejection (AMR) of Kidney Transplant who underwent therapeutic plasma exchange (TPE) #2 with 75 % albumin 25 % plasma as replacement fluid.     I saw and evaluated the patient during the TPE.  The patient was resting in bed.  The vascular access functioned well.     Pre-procedure labs:  WBC   Date/Time Value Ref Range Status   07/11/2024 05:43 AM 9.0 4.4 - 11.3 x10*3/uL Final     Hemoglobin   Date/Time Value Ref Range Status   07/11/2024 05:43 AM 10.3 (L) 12.0 - 16.0 g/dL Final     Hematocrit   Date/Time Value Ref Range Status   07/11/2024 05:43 AM 31.1 (L) 36.0 - 46.0 % Final     Platelets   Date/Time Value Ref Range Status   07/11/2024 05:43 AM 69 (L) 150 - 450 x10*3/uL Final        POCT Calcium, Ionized   Date/Time Value Ref Range Status   07/11/2024 05:43 AM 1.14 1.1 - 1.33 mmol/L Final     Comment:     The performance characteristics of ionized calcium tested  in heparinized plasma or serum have been validated by the  individual  laboratory site where testing is performed.   Testing on heparinized plasma or serum is not approved by   the FDA; however, such approval is not necessary.        Protime   Date/Time Value Ref Range Status   07/11/2024 05:43 AM 11.2 9.8 - 12.8 seconds Final     INR   Date/Time Value Ref Range Status   07/11/2024 05:43 AM 1.0 0.9 - 1.1 Final     aPTT   Date/Time Value Ref Range Status   07/11/2024 05:43 AM 35 27 - 38 seconds Final     Fibrinogen   Date/Time Value Ref Range Status   07/11/2024 05:43  200 - 400 mg/dL Final        Pre-procedure vital signs at 10:10:  T: 36.8 C, HR: 84, RR: 20, /95  Pulse oximetry: 99% on room air    Post-procedure vital signs at 11:55:  T: 36.5 C, HR: 80, RR: 16, BP: 168/83       Outcome: TPE completed without complications.   Adverse Reaction: No    Assessment and Plan:  44 y.o. female with Antibody Mediated Rejection (AMR) of Kidney Transplant who underwent TPE #2.  Draw post-procedure  labs  Vascular access to be managed by clinical team.  Next TPE #3 is schedule for 7/12/2024.

## 2024-07-11 NOTE — POST-PROCEDURE NOTE
Herber Foy [facility] [id number]       Procedure type: Plasma Exchange    Replacement Fluids:  75%   Albumin /  25%  Plasma     Procedure Completed Without complications.    Attending notified of completion status. See flow sheet(s) for additional details.  Post-Vitals listed below.    Post-procedure vitals:  Vitals  BP: 168/83  Temp: 36.5 °C (97.7 °F)  Heart Rate: 80  Resp: 16  SpO2: 97 %        Dylan Cuadra RN

## 2024-07-12 ENCOUNTER — APPOINTMENT (OUTPATIENT)
Dept: OTHER | Facility: HOSPITAL | Age: 44
End: 2024-07-12
Payer: COMMERCIAL

## 2024-07-12 LAB
ALBUMIN SERPL BCP-MCNC: 3.3 G/DL (ref 3.4–5)
ANION GAP SERPL CALC-SCNC: 12 MMOL/L (ref 10–20)
APTT PPP: 37 SECONDS (ref 27–38)
BLOOD EXPIRATION DATE: NORMAL
BUN SERPL-MCNC: 25 MG/DL (ref 6–23)
CA-I BLD-SCNC: 1.24 MMOL/L (ref 1.1–1.33)
CA-I BLD-SCNC: 1.31 MMOL/L (ref 1.1–1.33)
CALCIUM SERPL-MCNC: 8.2 MG/DL (ref 8.6–10.6)
CHLORIDE SERPL-SCNC: 108 MMOL/L (ref 98–107)
CO2 SERPL-SCNC: 23 MMOL/L (ref 21–32)
CREAT SERPL-MCNC: 2.07 MG/DL (ref 0.5–1.05)
DISPENSE STATUS: NORMAL
EGFRCR SERPLBLD CKD-EPI 2021: 29 ML/MIN/1.73M*2
ERYTHROCYTE [DISTWIDTH] IN BLOOD BY AUTOMATED COUNT: 16.8 % (ref 11.5–14.5)
ERYTHROCYTE [DISTWIDTH] IN BLOOD BY AUTOMATED COUNT: 16.8 % (ref 11.5–14.5)
FIBRINOGEN PPP-MCNC: 175 MG/DL (ref 200–400)
GLUCOSE BLD MANUAL STRIP-MCNC: 128 MG/DL (ref 74–99)
GLUCOSE BLD MANUAL STRIP-MCNC: 163 MG/DL (ref 74–99)
GLUCOSE SERPL-MCNC: 68 MG/DL (ref 74–99)
HCT VFR BLD AUTO: 28.4 % (ref 36–46)
HCT VFR BLD AUTO: 29.8 % (ref 36–46)
HGB BLD-MCNC: 9.1 G/DL (ref 12–16)
HGB BLD-MCNC: 9.9 G/DL (ref 12–16)
INR PPP: 1.1 (ref 0.9–1.1)
MAGNESIUM SERPL-MCNC: 1.74 MG/DL (ref 1.6–2.4)
MCH RBC QN AUTO: 30 PG (ref 26–34)
MCH RBC QN AUTO: 31 PG (ref 26–34)
MCHC RBC AUTO-ENTMCNC: 32 G/DL (ref 32–36)
MCHC RBC AUTO-ENTMCNC: 33.2 G/DL (ref 32–36)
MCV RBC AUTO: 93 FL (ref 80–100)
MCV RBC AUTO: 94 FL (ref 80–100)
NRBC BLD-RTO: 0 /100 WBCS (ref 0–0)
NRBC BLD-RTO: 0 /100 WBCS (ref 0–0)
PHOSPHATE SERPL-MCNC: 3.8 MG/DL (ref 2.5–4.9)
PLATELET # BLD AUTO: 83 X10*3/UL (ref 150–450)
PLATELET # BLD AUTO: 90 X10*3/UL (ref 150–450)
POTASSIUM SERPL-SCNC: 4.5 MMOL/L (ref 3.5–5.3)
PRODUCT BLOOD TYPE: 1700
PRODUCT BLOOD TYPE: 7300
PRODUCT CODE: NORMAL
PROTHROMBIN TIME: 12.1 SECONDS (ref 9.8–12.8)
RBC # BLD AUTO: 3.03 X10*6/UL (ref 4–5.2)
RBC # BLD AUTO: 3.19 X10*6/UL (ref 4–5.2)
SODIUM SERPL-SCNC: 138 MMOL/L (ref 136–145)
TACROLIMUS BLD-MCNC: 5.4 NG/ML
UNIT ABO: NORMAL
UNIT NUMBER: NORMAL
UNIT RH: NORMAL
UNIT VOLUME: 217
UNIT VOLUME: 293
UNIT VOLUME: 308
UNIT VOLUME: 321
UNIT VOLUME: 340
WBC # BLD AUTO: 10.1 X10*3/UL (ref 4.4–11.3)
WBC # BLD AUTO: 8.6 X10*3/UL (ref 4.4–11.3)

## 2024-07-12 PROCEDURE — 2500000002 HC RX 250 W HCPCS SELF ADMINISTERED DRUGS (ALT 637 FOR MEDICARE OP, ALT 636 FOR OP/ED): Performed by: STUDENT IN AN ORGANIZED HEALTH CARE EDUCATION/TRAINING PROGRAM

## 2024-07-12 PROCEDURE — P9017 PLASMA 1 DONOR FRZ W/IN 8 HR: HCPCS

## 2024-07-12 PROCEDURE — 2500000002 HC RX 250 W HCPCS SELF ADMINISTERED DRUGS (ALT 637 FOR MEDICARE OP, ALT 636 FOR OP/ED)

## 2024-07-12 PROCEDURE — 99233 SBSQ HOSP IP/OBS HIGH 50: CPT | Performed by: HOSPITALIST

## 2024-07-12 PROCEDURE — 83735 ASSAY OF MAGNESIUM: CPT | Performed by: STUDENT IN AN ORGANIZED HEALTH CARE EDUCATION/TRAINING PROGRAM

## 2024-07-12 PROCEDURE — 99232 SBSQ HOSP IP/OBS MODERATE 35: CPT | Performed by: STUDENT IN AN ORGANIZED HEALTH CARE EDUCATION/TRAINING PROGRAM

## 2024-07-12 PROCEDURE — 2500000004 HC RX 250 GENERAL PHARMACY W/ HCPCS (ALT 636 FOR OP/ED)

## 2024-07-12 PROCEDURE — 85027 COMPLETE CBC AUTOMATED: CPT | Performed by: STUDENT IN AN ORGANIZED HEALTH CARE EDUCATION/TRAINING PROGRAM

## 2024-07-12 PROCEDURE — 2500000001 HC RX 250 WO HCPCS SELF ADMINISTERED DRUGS (ALT 637 FOR MEDICARE OP): Performed by: STUDENT IN AN ORGANIZED HEALTH CARE EDUCATION/TRAINING PROGRAM

## 2024-07-12 PROCEDURE — 2500000004 HC RX 250 GENERAL PHARMACY W/ HCPCS (ALT 636 FOR OP/ED): Mod: JZ

## 2024-07-12 PROCEDURE — 82330 ASSAY OF CALCIUM: CPT

## 2024-07-12 PROCEDURE — 2500000001 HC RX 250 WO HCPCS SELF ADMINISTERED DRUGS (ALT 637 FOR MEDICARE OP)

## 2024-07-12 PROCEDURE — 85384 FIBRINOGEN ACTIVITY: CPT

## 2024-07-12 PROCEDURE — 80197 ASSAY OF TACROLIMUS: CPT

## 2024-07-12 PROCEDURE — 82607 VITAMIN B-12: CPT

## 2024-07-12 PROCEDURE — 2500000004 HC RX 250 GENERAL PHARMACY W/ HCPCS (ALT 636 FOR OP/ED): Performed by: STUDENT IN AN ORGANIZED HEALTH CARE EDUCATION/TRAINING PROGRAM

## 2024-07-12 PROCEDURE — 85027 COMPLETE CBC AUTOMATED: CPT

## 2024-07-12 PROCEDURE — 85610 PROTHROMBIN TIME: CPT

## 2024-07-12 PROCEDURE — 1100000001 HC PRIVATE ROOM DAILY

## 2024-07-12 PROCEDURE — P9045 ALBUMIN (HUMAN), 5%, 250 ML: HCPCS | Mod: JZ

## 2024-07-12 PROCEDURE — 82947 ASSAY GLUCOSE BLOOD QUANT: CPT

## 2024-07-12 PROCEDURE — 6A551Z3 PHERESIS OF PLASMA, MULTIPLE: ICD-10-PCS | Performed by: STUDENT IN AN ORGANIZED HEALTH CARE EDUCATION/TRAINING PROGRAM

## 2024-07-12 PROCEDURE — 87340 HEPATITIS B SURFACE AG IA: CPT

## 2024-07-12 PROCEDURE — 36514 APHERESIS PLASMA: CPT

## 2024-07-12 PROCEDURE — 80069 RENAL FUNCTION PANEL: CPT | Performed by: STUDENT IN AN ORGANIZED HEALTH CARE EDUCATION/TRAINING PROGRAM

## 2024-07-12 RX ORDER — CALCIUM CARBONATE 200(500)MG
1500 TABLET,CHEWABLE ORAL EVERY 5 MIN PRN
Status: DISCONTINUED | OUTPATIENT
Start: 2024-07-12 | End: 2024-07-12 | Stop reason: HOSPADM

## 2024-07-12 RX ORDER — TACROLIMUS 1 MG/1
3 CAPSULE ORAL
Status: DISCONTINUED | OUTPATIENT
Start: 2024-07-12 | End: 2024-07-13

## 2024-07-12 RX ORDER — ACETAMINOPHEN 325 MG/1
650 TABLET ORAL ONCE
Status: COMPLETED | OUTPATIENT
Start: 2024-07-12 | End: 2024-07-12

## 2024-07-12 RX ORDER — CALCIUM GLUCONATE 20 MG/ML
5279 INJECTION, SOLUTION INTRAVENOUS ONCE
Status: COMPLETED | OUTPATIENT
Start: 2024-07-12 | End: 2024-07-12

## 2024-07-12 RX ORDER — ALBUMIN HUMAN 50 G/1000ML
12.5 SOLUTION INTRAVENOUS
Status: COMPLETED | OUTPATIENT
Start: 2024-07-12 | End: 2024-07-12

## 2024-07-12 RX ORDER — DIPHENHYDRAMINE HYDROCHLORIDE 50 MG/ML
25 INJECTION INTRAMUSCULAR; INTRAVENOUS EVERY 5 MIN PRN
Status: DISCONTINUED | OUTPATIENT
Start: 2024-07-12 | End: 2024-07-12 | Stop reason: HOSPADM

## 2024-07-12 RX ORDER — DIPHENHYDRAMINE HCL 25 MG
50 CAPSULE ORAL ONCE
Status: COMPLETED | OUTPATIENT
Start: 2024-07-12 | End: 2024-07-12

## 2024-07-12 RX ORDER — DIPHENHYDRAMINE HCL 25 MG
25 CAPSULE ORAL ONCE
Status: COMPLETED | OUTPATIENT
Start: 2024-07-12 | End: 2024-07-12

## 2024-07-12 RX ORDER — MAGNESIUM SULFATE HEPTAHYDRATE 40 MG/ML
2 INJECTION, SOLUTION INTRAVENOUS ONCE
Status: COMPLETED | OUTPATIENT
Start: 2024-07-12 | End: 2024-07-12

## 2024-07-12 ASSESSMENT — COGNITIVE AND FUNCTIONAL STATUS - GENERAL
MOVING TO AND FROM BED TO CHAIR: A LITTLE
TURNING FROM BACK TO SIDE WHILE IN FLAT BAD: A LITTLE
DAILY ACTIVITIY SCORE: 24
DAILY ACTIVITIY SCORE: 24
WALKING IN HOSPITAL ROOM: A LITTLE
DAILY ACTIVITIY SCORE: 24
PERSONAL GROOMING: A LITTLE
MOBILITY SCORE: 24
TOILETING: A LITTLE
MOBILITY SCORE: 24
DAILY ACTIVITIY SCORE: 24
MOBILITY SCORE: 24
MOVING FROM LYING ON BACK TO SITTING ON SIDE OF FLAT BED WITH BEDRAILS: A LITTLE
DAILY ACTIVITIY SCORE: 24
CLIMB 3 TO 5 STEPS WITH RAILING: A LOT
DAILY ACTIVITIY SCORE: 24
HELP NEEDED FOR BATHING: A LITTLE
DRESSING REGULAR LOWER BODY CLOTHING: A LITTLE
DAILY ACTIVITIY SCORE: 24
MOBILITY SCORE: 24
DAILY ACTIVITIY SCORE: 24
MOBILITY SCORE: 24
DRESSING REGULAR UPPER BODY CLOTHING: A LITTLE
STANDING UP FROM CHAIR USING ARMS: A LITTLE

## 2024-07-12 ASSESSMENT — PAIN - FUNCTIONAL ASSESSMENT: PAIN_FUNCTIONAL_ASSESSMENT: 0-10

## 2024-07-12 ASSESSMENT — PAIN SCALES - GENERAL
PAINLEVEL_OUTOF10: 5 - MODERATE PAIN
PAINLEVEL_OUTOF10: 0 - NO PAIN
PAINLEVEL_OUTOF10: 0 - NO PAIN

## 2024-07-12 NOTE — PROGRESS NOTES
Herber Foy [facility] [id number]       Procedure type: Plasma Exchange    Replacement Fluids:  65 Albumin /  35 Plasma     Procedure Completed Without complications.    Attending notified of completion status. See flow sheet(s) for additional details.  Post-Vitals listed below.    Post-procedure vitals:  Vitals  BP: 148/83  Temp: 36.4 °C (97.5 °F)  Heart Rate: 70  Resp: 18  SpO2: 97 %  on room air       Kit Subramanian RN

## 2024-07-12 NOTE — CARE PLAN
The patient's goals for the shift include Labs WNL    The clinical goals for the shift include pt to remain free from falls    Over the shift, the patient did not make progress toward the following goals.

## 2024-07-12 NOTE — PROGRESS NOTES
Transplant Nephrology progress note     Date of admission: 7/2/2024     Herber Foy Rai is a 44 y.o.  with PMH   Past Medical History:   Diagnosis Date    Anxiety     Chronic kidney disease     Chronic kidney disease, unspecified     CKD, patient interested in transplantation    Depression     Disease of thyroid gland     GERD (gastroesophageal reflux disease)     Hypertension     Personal history of COVID-19 07/20/2022    History of COVID-19        SUBJECTIVE:    Plex this morning.  Denied any overnight events    PROBLEM LIST:  Principal Problem:    Shortness of breath         ALLERGIES:  No Known Allergies         CURRENT MEDICATIONS:  Scheduled medications  acetaminophen, 650 mg, oral, Once  acetaminophen, 650 mg, oral, Once  amLODIPine, 10 mg, oral, Daily  calcitriol, 0.25 mcg, oral, Daily  calcium carbonate, 250 mg, oral, Daily  carvedilol, 12.5 mg, oral, BID  cholecalciferol, 4,000 Units, oral, Daily  clotrimazole, 10 mg, oral, TID after meals  diphenhydrAMINE, 25 mg, oral, Once  [Held by provider] furosemide, 40 mg, intravenous, BID  gabapentin, 300 mg, oral, Nightly  heparin (porcine), 5,000 Units, subcutaneous, q8h JANIE  immune globulin (human), 10 g, intravenous, Once  levothyroxine, 25 mcg, oral, Daily before breakfast  loratadine, 10 mg, oral, Daily  magnesium oxide, 400 mg, oral, Daily  mycophenolate, 1,000 mg, oral, q12h  pantoprazole, 40 mg, oral, BID AC  polyethylene glycol, 17 g, oral, Daily  potassium citrate CR, 20 mEq, oral, Daily  predniSONE, 20 mg, oral, Daily  sennosides-docusate sodium, 2 tablet, oral, BID  sertraline, 50 mg, oral, Daily  sodium bicarbonate, 1,300 mg, oral, BID  sulfamethoxazole-trimethoprim, 1 tablet, oral, Daily  sulfur hexafluoride microsphr, 2 mL, intravenous, Once in imaging  tacrolimus, 2 mg, oral, q12h JANIE  tenofovir alafenamide, 25 mg, oral, Daily  valGANciclovir, 450 mg, oral, q48h      Continuous medications       PRN medications  PRN medications: acetaminophen,  "alteplase, calcium carbonate, diphenhydrAMINE, guaiFENesin, hydrALAZINE, melatonin, naloxone, ondansetron ODT **OR** ondansetron, oxyCODONE, sodium chloride, SUMAtriptan       OBJECTIVE:    VITALS: Visit Vitals  /79   Pulse 66   Temp 36.7 °C (98.1 °F)   Resp 18   Ht 1.626 m (5' 4.02\")   Wt 83.2 kg (183 lb 6.4 oz)   LMP  (LMP Unknown)   SpO2 96%   BMI 31.46 kg/m²   OB Status Unknown   Smoking Status Never   BSA 1.94 m²        General: No distress   Mucosa moist   AI, AC, AF     HEENT: PEERLA  CVS: S1 S2 no murmurs  RESP:  Lungs clear to auscultation   ABDO: Soft, non-tender   Neuro: A + O x 3  Skin: No rash   Extremities: +1-2 edema       LABS:  Results from last 72 hours   Lab Units 24  0609   WBC AUTO x10*3/uL 8.6   HEMOGLOBIN g/dL 9.1*   MCV fL 94   PLATELETS AUTO x10*3/uL 83*   BUN mg/dL 25*   CREATININE mg/dL 2.07*   CALCIUM mg/dL 8.2*   TACROLIMUS ng/mL 5.4            Intake/Output Summary (Last 24 hours) at 2024 1229  Last data filed at 2024 0758  Gross per 24 hour   Intake 120 ml   Output 1600 ml   Net -1480 ml          ASSESSMENT AND PLAN:    Herber Foy Rai is a 44 y.o. female with past medical history significant for ESRD secondary to HTN/NSAID whom received a  donor kidney transplant on 23, KDPI of the kidney is a 56%, PRA is 48% 6 antigen mismatch. 4.5 mg/kg total of thymoglobulin induction.  She was a transition from tacrolimus to belatacept in the setting of hematoma at the kidney site that required exploration.  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  Presented currently with volume overload and MARK with DQ 2 DSA positive with 10,158 MFI.  Transplant nephrology consulted for further management.    Allograft function:  -Her baseline prior to this episode is around 0.9 -0.8 currently peaked to  3.40  now 2.07 .Ultrasound Multiple echogenic calculi noticed on the lower pole of the kidney.  Can decrease sodium " bicarb to 650 twice daily  -S/p kidney biopsy showing Banff IIB ACR and [facility]-planning Thymoglobulin 6mg/kg along with 4 sessions of Plex and IV immunoglobulin 2 g/kg tentatively.  S/p 3 doses of IV steroids currently.  -Plex today-day 2, Thymo 4 th dose tomorrow.    Immunosuppression: Restarted back on tacrolimus aiming levels 8-10-Continue with the mycophenolate and prednisone tapering.-20 mg    Hemodynamics: Blood pressures are elevated continue with amlodipine, consider Coreg 12.5 twice daily-can uptitrate Coreg.    Anemia/leukopenia: Globin stable can consider Aranesp.  Leukocytosis down trended and stable.    Infectious prophylaxis: Continue with the Vemlidy for hepatitis B prophylaxis and Valcyte, Bactrim and clotrimazole 3 months post rejection management.  -Last BK, CMV, EBV negative as of 5/24/2024.  Patient had a prior CMV viremia will follow-up with CMV PCR closely.    Bone mineral disease: Continue with the calcitriol and Tums.      Thank you for consulting .  Billie Mcfarland MD       Notes created by Amy -Please excuse the Typos .

## 2024-07-12 NOTE — PROGRESS NOTES
"Herber Foy Rai is a 44 y.o. female on day 10 of admission presenting with Shortness of breath.    Subjective   PLEX today  Otherwise feeling well  Cr downtrending       Objective   Vitals:    07/12/24 1231   BP: 148/83   Pulse: 70   Resp: 18   Temp: 36.4 °C (97.5 °F)   SpO2: 97%       Physical Exam  Constitutional:       Appearance: Normal appearance.   Eyes:      Pupils: Pupils are equal, round, and reactive to light.   Cardiovascular:      Rate and Rhythm: Normal rate.   Pulmonary:      Effort: Pulmonary effort is normal. No respiratory distress.   Abdominal:      General: There is no distension.      Palpations: Abdomen is soft.      Comments: Incision clean, dry, intact   Musculoskeletal:         General: Normal range of motion.      Right lower leg: No edema.      Left lower leg: No edema.   Skin:     General: Skin is warm and dry.   Neurological:      General: No focal deficit present.      Mental Status: She is alert and oriented to person, place, and time.   Psychiatric:         Mood and Affect: Mood normal.         Behavior: Behavior normal.         Last Recorded Vitals  Blood pressure 148/83, pulse 70, temperature 36.4 °C (97.5 °F), resp. rate 18, height 1.626 m (5' 4.02\"), weight 83.2 kg (183 lb 6.4 oz), SpO2 97%.  Intake/Output last 3 Shifts:  I/O last 3 completed shifts:  In: 3350 (40.3 mL/kg) [P.O.:120; I.V.:50 (0.6 mL/kg); Other:3180]  Out: 4854 (58.3 mL/kg) [Urine:1700 (0.6 mL/kg/hr); Other:3154]  Weight: 83.2 kg     Relevant Results  Lab Results   Component Value Date    WBC 10.1 07/12/2024    HGB 9.9 (L) 07/12/2024    HCT 29.8 (L) 07/12/2024    MCV 93 07/12/2024    PLT 90 (L) 07/12/2024     Lab Results   Component Value Date    GLUCOSE 68 (L) 07/12/2024    CALCIUM 8.2 (L) 07/12/2024     07/12/2024    K 4.5 07/12/2024    CO2 23 07/12/2024     (H) 07/12/2024    BUN 25 (H) 07/12/2024    CREATININE 2.07 (H) 07/12/2024     acetaminophen, 650 mg, oral, Once  amLODIPine, 10 mg, oral, " Daily  calcitriol, 0.25 mcg, oral, Daily  calcium carbonate, 250 mg, oral, Daily  carvedilol, 12.5 mg, oral, BID  cholecalciferol, 4,000 Units, oral, Daily  clotrimazole, 10 mg, oral, TID after meals  [Held by provider] furosemide, 40 mg, intravenous, BID  gabapentin, 300 mg, oral, Nightly  heparin (porcine), 5,000 Units, subcutaneous, q8h JANIE  immune globulin (human), 10 g, intravenous, Once  levothyroxine, 25 mcg, oral, Daily before breakfast  loratadine, 10 mg, oral, Daily  magnesium oxide, 400 mg, oral, Daily  mycophenolate, 1,000 mg, oral, q12h  pantoprazole, 40 mg, oral, BID AC  polyethylene glycol, 17 g, oral, Daily  potassium citrate CR, 20 mEq, oral, Daily  predniSONE, 20 mg, oral, Daily  sennosides-docusate sodium, 2 tablet, oral, BID  sertraline, 50 mg, oral, Daily  sodium bicarbonate, 1,300 mg, oral, BID  sulfamethoxazole-trimethoprim, 1 tablet, oral, Daily  sulfur hexafluoride microsphr, 2 mL, intravenous, Once in imaging  tacrolimus, 3 mg, oral, q12h JANIE  tenofovir alafenamide, 25 mg, oral, Daily  valGANciclovir, 450 mg, oral, q48h        Assessment/Plan   Principal Problem:    Shortness of breath    DDKT 11/2023    Kidney allograft function   MARK, creatinine 3.4, baseline < 1   Kidney duplex: multiple calculi, mild-mod hydro, which is similar to previous   Echo unremarkable   New DSA, sp biopsy 7/5/24 -consistent with T-cell mediated rejection Banff 1B and acute vascular rejection Banff 2B and antibody mediated rejection    Steroid bolus 500 x 3, completed    Thymo plan for total of 6 mg/kg - last dose 7/13    Plan for 4 rounds of Plex followed by IVIG -7/9, 7/11, 7/12. Last Monday       Immunosuppression   Belatacept   Initiate tacrolimus 2 and 2   MMF 1000   Steroid IV bolus    LE swelling   No DVT    Given patient poor health literacy we will plan for family to come in today for discussion     I spent 35 minutes in the professional and overall care of this patient.      Anita Louis MD

## 2024-07-12 NOTE — PROGRESS NOTES
"Herber Foy Rai is a 44 y.o. female on day 10 of admission presenting with Shortness of breath.    Subjective   Thymo today  Otherwise feeling well       Objective   Vitals:    07/12/24 1231   BP: 148/83   Pulse: 70   Resp: 18   Temp: 36.4 °C (97.5 °F)   SpO2: 97%       Physical Exam  Constitutional:       Appearance: Normal appearance.   Eyes:      Pupils: Pupils are equal, round, and reactive to light.   Cardiovascular:      Rate and Rhythm: Normal rate.   Pulmonary:      Effort: Pulmonary effort is normal. No respiratory distress.   Abdominal:      General: There is no distension.      Palpations: Abdomen is soft.      Comments: Incision clean, dry, intact   Musculoskeletal:         General: Normal range of motion.      Right lower leg: No edema.      Left lower leg: No edema.   Skin:     General: Skin is warm and dry.   Neurological:      General: No focal deficit present.      Mental Status: She is alert and oriented to person, place, and time.   Psychiatric:         Mood and Affect: Mood normal.         Behavior: Behavior normal.         Last Recorded Vitals  Blood pressure 148/83, pulse 70, temperature 36.4 °C (97.5 °F), resp. rate 18, height 1.626 m (5' 4.02\"), weight 83.2 kg (183 lb 6.4 oz), SpO2 97%.  Intake/Output last 3 Shifts:  I/O last 3 completed shifts:  In: 3350 (40.3 mL/kg) [P.O.:120; I.V.:50 (0.6 mL/kg); Other:3180]  Out: 4854 (58.3 mL/kg) [Urine:1700 (0.6 mL/kg/hr); Other:3154]  Weight: 83.2 kg     Relevant Results  Lab Results   Component Value Date    WBC 10.1 07/12/2024    HGB 9.9 (L) 07/12/2024    HCT 29.8 (L) 07/12/2024    MCV 93 07/12/2024    PLT 90 (L) 07/12/2024     Lab Results   Component Value Date    GLUCOSE 68 (L) 07/12/2024    CALCIUM 8.2 (L) 07/12/2024     07/12/2024    K 4.5 07/12/2024    CO2 23 07/12/2024     (H) 07/12/2024    BUN 25 (H) 07/12/2024    CREATININE 2.07 (H) 07/12/2024     acetaminophen, 650 mg, oral, Once  amLODIPine, 10 mg, oral, Daily  calcitriol, 0.25 " mcg, oral, Daily  calcium carbonate, 250 mg, oral, Daily  carvedilol, 12.5 mg, oral, BID  cholecalciferol, 4,000 Units, oral, Daily  clotrimazole, 10 mg, oral, TID after meals  [Held by provider] furosemide, 40 mg, intravenous, BID  gabapentin, 300 mg, oral, Nightly  heparin (porcine), 5,000 Units, subcutaneous, q8h JANIE  immune globulin (human), 10 g, intravenous, Once  levothyroxine, 25 mcg, oral, Daily before breakfast  loratadine, 10 mg, oral, Daily  magnesium oxide, 400 mg, oral, Daily  mycophenolate, 1,000 mg, oral, q12h  pantoprazole, 40 mg, oral, BID AC  polyethylene glycol, 17 g, oral, Daily  potassium citrate CR, 20 mEq, oral, Daily  predniSONE, 20 mg, oral, Daily  sennosides-docusate sodium, 2 tablet, oral, BID  sertraline, 50 mg, oral, Daily  sodium bicarbonate, 1,300 mg, oral, BID  sulfamethoxazole-trimethoprim, 1 tablet, oral, Daily  sulfur hexafluoride microsphr, 2 mL, intravenous, Once in imaging  tacrolimus, 3 mg, oral, q12h JANIE  tenofovir alafenamide, 25 mg, oral, Daily  valGANciclovir, 450 mg, oral, q48h        Assessment/Plan   Principal Problem:    Shortness of breath    DDKT 11/2023    Kidney allograft function   MARK, creatinine 3.4, baseline < 1   Kidney duplex: multiple calculi, mild-mod hydro, which is similar to previous   Echo unremarkable   New DSA, sp biopsy 7/5/24 -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      Anita Louis MD

## 2024-07-12 NOTE — PROGRESS NOTES
"Herber Foy Rai is a 44 y.o. female on day 10 of admission presenting with Shortness of breath.    Subjective   PLEX today  Otherwise feeling well  Cr downtrending       Objective   Vitals:    07/12/24 1231   BP: 148/83   Pulse: 70   Resp: 18   Temp: 36.4 °C (97.5 °F)   SpO2: 97%       Physical Exam  Constitutional:       Appearance: Normal appearance.   Eyes:      Pupils: Pupils are equal, round, and reactive to light.   Cardiovascular:      Rate and Rhythm: Normal rate.   Pulmonary:      Effort: Pulmonary effort is normal. No respiratory distress.   Abdominal:      General: There is no distension.      Palpations: Abdomen is soft.      Comments: Incision clean, dry, intact   Musculoskeletal:         General: Normal range of motion.      Right lower leg: No edema.      Left lower leg: No edema.   Skin:     General: Skin is warm and dry.   Neurological:      General: No focal deficit present.      Mental Status: She is alert and oriented to person, place, and time.   Psychiatric:         Mood and Affect: Mood normal.         Behavior: Behavior normal.         Last Recorded Vitals  Blood pressure 148/83, pulse 70, temperature 36.4 °C (97.5 °F), resp. rate 18, height 1.626 m (5' 4.02\"), weight 83.2 kg (183 lb 6.4 oz), SpO2 97%.  Intake/Output last 3 Shifts:  I/O last 3 completed shifts:  In: 3350 (40.3 mL/kg) [P.O.:120; I.V.:50 (0.6 mL/kg); Other:3180]  Out: 4854 (58.3 mL/kg) [Urine:1700 (0.6 mL/kg/hr); Other:3154]  Weight: 83.2 kg     Relevant Results  Lab Results   Component Value Date    WBC 10.1 07/12/2024    HGB 9.9 (L) 07/12/2024    HCT 29.8 (L) 07/12/2024    MCV 93 07/12/2024    PLT 90 (L) 07/12/2024     Lab Results   Component Value Date    GLUCOSE 68 (L) 07/12/2024    CALCIUM 8.2 (L) 07/12/2024     07/12/2024    K 4.5 07/12/2024    CO2 23 07/12/2024     (H) 07/12/2024    BUN 25 (H) 07/12/2024    CREATININE 2.07 (H) 07/12/2024     acetaminophen, 650 mg, oral, Once  amLODIPine, 10 mg, oral, " Daily  calcitriol, 0.25 mcg, oral, Daily  calcium carbonate, 250 mg, oral, Daily  carvedilol, 12.5 mg, oral, BID  cholecalciferol, 4,000 Units, oral, Daily  clotrimazole, 10 mg, oral, TID after meals  [Held by provider] furosemide, 40 mg, intravenous, BID  gabapentin, 300 mg, oral, Nightly  heparin (porcine), 5,000 Units, subcutaneous, q8h JANIE  immune globulin (human), 10 g, intravenous, Once  levothyroxine, 25 mcg, oral, Daily before breakfast  loratadine, 10 mg, oral, Daily  magnesium oxide, 400 mg, oral, Daily  mycophenolate, 1,000 mg, oral, q12h  pantoprazole, 40 mg, oral, BID AC  polyethylene glycol, 17 g, oral, Daily  potassium citrate CR, 20 mEq, oral, Daily  predniSONE, 20 mg, oral, Daily  sennosides-docusate sodium, 2 tablet, oral, BID  sertraline, 50 mg, oral, Daily  sodium bicarbonate, 1,300 mg, oral, BID  sulfamethoxazole-trimethoprim, 1 tablet, oral, Daily  sulfur hexafluoride microsphr, 2 mL, intravenous, Once in imaging  tacrolimus, 3 mg, oral, q12h JANIE  tenofovir alafenamide, 25 mg, oral, Daily  valGANciclovir, 450 mg, oral, q48h        Assessment/Plan   Principal Problem:    Shortness of breath    DDKT 11/2023    Kidney allograft function   MARK, creatinine 3.4, baseline < 1   Kidney duplex: multiple calculi, mild-mod hydro, which is similar to previous   Echo unremarkable   New DSA, sp biopsy 7/5/24 -consistent with T-cell mediated rejection Banff 1B and acute vascular rejection Banff 2B and antibody mediated rejection    Steroid bolus 500 x 3, completed    Thymo plan for total of 6 mg/kg - last dose 7/13    Plan for 4 rounds of Plex followed by IVIG -7/9 - dose today, Friday and monday      Immunosuppression   Belatacept   Initiate tacrolimus 2 and 2   MMF 1000   Steroid IV bolus    LE swelling   No DVT    Given patient poor health literacy we will plan for family to come in today for discussion     I spent 35 minutes in the professional and overall care of this patient.      Anita Louis MD

## 2024-07-12 NOTE — CARE PLAN
Problem: Pain - Adult  Goal: Verbalizes/displays adequate comfort level or baseline comfort level  Outcome: Progressing     Problem: Safety - Adult  Goal: Free from fall injury  Outcome: Progressing     Problem: Discharge Planning  Goal: Discharge to home or other facility with appropriate resources  Outcome: Progressing     Problem: Chronic Conditions and Co-morbidities  Goal: Patient's chronic conditions and co-morbidity symptoms are monitored and maintained or improved  Outcome: Progressing     Problem: Fall/Injury  Goal: Not fall by end of shift  Outcome: Progressing  Goal: Be free from injury by end of the shift  Outcome: Progressing  Goal: Verbalize understanding of personal risk factors for fall in the hospital  Outcome: Progressing  Goal: Verbalize understanding of risk factor reduction measures to prevent injury from fall in the home  Outcome: Progressing  Goal: Use assistive devices by end of the shift  Outcome: Progressing  Goal: Pace activities to prevent fatigue by end of the shift  Outcome: Progressing     Problem: Skin  Goal: Participates in plan/prevention/treatment measures  Outcome: Progressing  Goal: Prevent/manage excess moisture  Outcome: Progressing  Goal: Prevent/minimize sheer/friction injuries  Outcome: Progressing  Goal: Promote/optimize nutrition  Outcome: Progressing  Goal: Promote skin healing  Outcome: Progressing   The patient's goals for the shift include Labs WNL    The clinical goals for the shift include Safety measures maintained

## 2024-07-12 NOTE — POST-PROCEDURE NOTE
This is a 44 y.o. female with Antibody Mediated Rejection (AMR) of Kidney Transplant who underwent therapeutic plasma exchange (TPE) #3 with  65% albumin and 35% plasma as replacement fluid.     I saw and evaluated the patient during the TPE.  The patient was resting in bed.  The vascular access functioned well.     Pre-procedure labs:  WBC   Date/Time Value Ref Range Status   07/12/2024 06:09 AM 8.6 4.4 - 11.3 x10*3/uL Final     Hemoglobin   Date/Time Value Ref Range Status   07/12/2024 06:09 AM 9.1 (L) 12.0 - 16.0 g/dL Final     Hematocrit   Date/Time Value Ref Range Status   07/12/2024 06:09 AM 28.4 (L) 36.0 - 46.0 % Final     Platelets   Date/Time Value Ref Range Status   07/12/2024 06:09 AM 83 (L) 150 - 450 x10*3/uL Final        POCT Calcium, Ionized   Date/Time Value Ref Range Status   07/12/2024 06:09 AM 1.24 1.1 - 1.33 mmol/L Final     Comment:     The performance characteristics of ionized calcium tested  in heparinized plasma or serum have been validated by the  individual  laboratory site where testing is performed.   Testing on heparinized plasma or serum is not approved by   the FDA; however, such approval is not necessary.        Protime   Date/Time Value Ref Range Status   07/12/2024 06:09 AM 12.1 9.8 - 12.8 seconds Final     INR   Date/Time Value Ref Range Status   07/12/2024 06:09 AM 1.1 0.9 - 1.1 Final     aPTT   Date/Time Value Ref Range Status   07/12/2024 06:09 AM 37 27 - 38 seconds Final     Fibrinogen   Date/Time Value Ref Range Status   07/12/2024 06:09  (L) 200 - 400 mg/dL Final        Pre-procedure vital signs at 1020:  T: 36.8 C, HR: 70, RR: 20, /85  Pulse oximetry: 96% on room air    Post-procedure vital signs at 1231:  T: 36.4 C, HR: 70, RR: 18, BP: 148/83     Pulse oximetry: 97% on room air    Outcome: TPE completed.   Adverse Reaction: No    Assessment and Plan:  44 y.o. female with Antibody Mediated Rejection (AMR) of Kidney Transplant who underwent TPE #3  Draw  post-procedure labs  Vascular access to be managed by clinical team.  Next TPE #4 is schedule for 7/15/2024.

## 2024-07-13 LAB
ALBUMIN SERPL BCP-MCNC: 3.4 G/DL (ref 3.4–5)
ANION GAP SERPL CALC-SCNC: 11 MMOL/L (ref 10–20)
BUN SERPL-MCNC: 20 MG/DL (ref 6–23)
CA-I BLD-SCNC: 1.4 MMOL/L (ref 1.1–1.33)
CALCIUM SERPL-MCNC: 9.2 MG/DL (ref 8.6–10.6)
CHLORIDE SERPL-SCNC: 105 MMOL/L (ref 98–107)
CO2 SERPL-SCNC: 24 MMOL/L (ref 21–32)
CREAT SERPL-MCNC: 1.91 MG/DL (ref 0.5–1.05)
EGFRCR SERPLBLD CKD-EPI 2021: 32 ML/MIN/1.73M*2
ERYTHROCYTE [DISTWIDTH] IN BLOOD BY AUTOMATED COUNT: 16.8 % (ref 11.5–14.5)
GLUCOSE BLD MANUAL STRIP-MCNC: 132 MG/DL (ref 74–99)
GLUCOSE BLD MANUAL STRIP-MCNC: 213 MG/DL (ref 74–99)
GLUCOSE BLD MANUAL STRIP-MCNC: 76 MG/DL (ref 74–99)
GLUCOSE SERPL-MCNC: 79 MG/DL (ref 74–99)
HCT VFR BLD AUTO: 29.1 % (ref 36–46)
HGB BLD-MCNC: 9.4 G/DL (ref 12–16)
MAGNESIUM SERPL-MCNC: 1.74 MG/DL (ref 1.6–2.4)
MCH RBC QN AUTO: 30.6 PG (ref 26–34)
MCHC RBC AUTO-ENTMCNC: 32.3 G/DL (ref 32–36)
MCV RBC AUTO: 95 FL (ref 80–100)
NRBC BLD-RTO: 0 /100 WBCS (ref 0–0)
PHOSPHATE SERPL-MCNC: 4.2 MG/DL (ref 2.5–4.9)
PLATELET # BLD AUTO: 107 X10*3/UL (ref 150–450)
POTASSIUM SERPL-SCNC: 4.8 MMOL/L (ref 3.5–5.3)
RBC # BLD AUTO: 3.07 X10*6/UL (ref 4–5.2)
SODIUM SERPL-SCNC: 135 MMOL/L (ref 136–145)
TACROLIMUS BLD-MCNC: 7.7 NG/ML
WBC # BLD AUTO: 11.1 X10*3/UL (ref 4.4–11.3)

## 2024-07-13 PROCEDURE — 2500000002 HC RX 250 W HCPCS SELF ADMINISTERED DRUGS (ALT 637 FOR MEDICARE OP, ALT 636 FOR OP/ED): Performed by: STUDENT IN AN ORGANIZED HEALTH CARE EDUCATION/TRAINING PROGRAM

## 2024-07-13 PROCEDURE — 2500000002 HC RX 250 W HCPCS SELF ADMINISTERED DRUGS (ALT 637 FOR MEDICARE OP, ALT 636 FOR OP/ED)

## 2024-07-13 PROCEDURE — 2500000001 HC RX 250 WO HCPCS SELF ADMINISTERED DRUGS (ALT 637 FOR MEDICARE OP): Performed by: STUDENT IN AN ORGANIZED HEALTH CARE EDUCATION/TRAINING PROGRAM

## 2024-07-13 PROCEDURE — 82947 ASSAY GLUCOSE BLOOD QUANT: CPT

## 2024-07-13 PROCEDURE — 80069 RENAL FUNCTION PANEL: CPT | Performed by: STUDENT IN AN ORGANIZED HEALTH CARE EDUCATION/TRAINING PROGRAM

## 2024-07-13 PROCEDURE — 2500000004 HC RX 250 GENERAL PHARMACY W/ HCPCS (ALT 636 FOR OP/ED)

## 2024-07-13 PROCEDURE — 83735 ASSAY OF MAGNESIUM: CPT | Performed by: STUDENT IN AN ORGANIZED HEALTH CARE EDUCATION/TRAINING PROGRAM

## 2024-07-13 PROCEDURE — 80197 ASSAY OF TACROLIMUS: CPT

## 2024-07-13 PROCEDURE — 99232 SBSQ HOSP IP/OBS MODERATE 35: CPT | Performed by: SURGERY

## 2024-07-13 PROCEDURE — 1100000001 HC PRIVATE ROOM DAILY

## 2024-07-13 PROCEDURE — 2500000004 HC RX 250 GENERAL PHARMACY W/ HCPCS (ALT 636 FOR OP/ED): Performed by: STUDENT IN AN ORGANIZED HEALTH CARE EDUCATION/TRAINING PROGRAM

## 2024-07-13 PROCEDURE — 2500000001 HC RX 250 WO HCPCS SELF ADMINISTERED DRUGS (ALT 637 FOR MEDICARE OP)

## 2024-07-13 PROCEDURE — 82330 ASSAY OF CALCIUM: CPT

## 2024-07-13 PROCEDURE — 99233 SBSQ HOSP IP/OBS HIGH 50: CPT | Performed by: HOSPITALIST

## 2024-07-13 PROCEDURE — 85027 COMPLETE CBC AUTOMATED: CPT | Performed by: STUDENT IN AN ORGANIZED HEALTH CARE EDUCATION/TRAINING PROGRAM

## 2024-07-13 RX ORDER — MAGNESIUM SULFATE HEPTAHYDRATE 40 MG/ML
2 INJECTION, SOLUTION INTRAVENOUS ONCE
Status: COMPLETED | OUTPATIENT
Start: 2024-07-13 | End: 2024-07-13

## 2024-07-13 RX ORDER — ACETAMINOPHEN 325 MG/1
650 TABLET ORAL ONCE
Status: COMPLETED | OUTPATIENT
Start: 2024-07-13 | End: 2024-07-13

## 2024-07-13 RX ORDER — DIPHENHYDRAMINE HCL 25 MG
25 CAPSULE ORAL ONCE
Status: COMPLETED | OUTPATIENT
Start: 2024-07-13 | End: 2024-07-13

## 2024-07-13 RX ORDER — SODIUM BICARBONATE 650 MG/1
650 TABLET ORAL 2 TIMES DAILY
Status: DISCONTINUED | OUTPATIENT
Start: 2024-07-13 | End: 2024-07-17

## 2024-07-13 ASSESSMENT — COGNITIVE AND FUNCTIONAL STATUS - GENERAL
MOBILITY SCORE: 24
DAILY ACTIVITIY SCORE: 24
DAILY ACTIVITIY SCORE: 24
MOBILITY SCORE: 24

## 2024-07-13 ASSESSMENT — PAIN DESCRIPTION - LOCATION: LOCATION: HEAD

## 2024-07-13 ASSESSMENT — PAIN SCALES - GENERAL
PAINLEVEL_OUTOF10: 0 - NO PAIN
PAINLEVEL_OUTOF10: 0 - NO PAIN
PAINLEVEL_OUTOF10: 4

## 2024-07-13 ASSESSMENT — PAIN - FUNCTIONAL ASSESSMENT
PAIN_FUNCTIONAL_ASSESSMENT: 0-10

## 2024-07-13 NOTE — PROGRESS NOTES
Herber Foy Rai is a 44 y.o. female s/p DDKT readmit for treatment of biopsy-proven ACR and AMR.    - No acute events; Cr stable    Objective   Gen: A+OX3; NAD  HEENT: PERRL, sclera anicteric, MMM  Cardiac: RRR  Chest: Normal inspiratory effort  Abdomen: S/NT/ND. Incision C/D/I.  Ext: No LE edema    Last Recorded Vitals  Visit Vitals  /68 (BP Location: Right arm)   Pulse 72   Temp 36.4 °C (97.5 °F) (Temporal)   Resp 16      Intake/Output last 3 Shifts:    Intake/Output Summary (Last 24 hours) at 7/13/2024 1651  Last data filed at 7/13/2024 1443  Gross per 24 hour   Intake 360 ml   Output 2250 ml   Net -1890 ml      Vitals:    07/11/24 0551   Weight: 83.2 kg (183 lb 6.4 oz)   Scheduled medications  acetaminophen, 650 mg, oral, Once  amLODIPine, 10 mg, oral, Daily  antithymocyte globulin (rabbit), 1.5 mg/kg, intravenous, Once  calcitriol, 0.25 mcg, oral, Daily  calcium carbonate, 250 mg, oral, Daily  carvedilol, 12.5 mg, oral, BID  cholecalciferol, 4,000 Units, oral, Daily  clotrimazole, 10 mg, oral, TID after meals  [Held by provider] furosemide, 40 mg, intravenous, BID  gabapentin, 300 mg, oral, Nightly  heparin (porcine), 5,000 Units, subcutaneous, q8h JANIE  levothyroxine, 25 mcg, oral, Daily before breakfast  loratadine, 10 mg, oral, Daily  magnesium oxide, 400 mg, oral, Daily  mycophenolate, 1,000 mg, oral, q12h  pantoprazole, 40 mg, oral, BID AC  polyethylene glycol, 17 g, oral, Daily  potassium citrate CR, 20 mEq, oral, Daily  predniSONE, 20 mg, oral, Daily  sennosides-docusate sodium, 2 tablet, oral, BID  sertraline, 50 mg, oral, Daily  sodium bicarbonate, 650 mg, oral, BID  sulfamethoxazole-trimethoprim, 1 tablet, oral, Daily  sulfur hexafluoride microsphr, 2 mL, intravenous, Once in imaging  tacrolimus, 3.5 mg, oral, q12h JANIE  tenofovir alafenamide, 25 mg, oral, Daily  valGANciclovir, 450 mg, oral, q48h    Assessment/Plan   Principal Problem:    Kidney transplant recipient  Active Problems:  Patient  Active Problem List   Diagnosis    ESRD (end stage renal disease) (Multi)    HTN (hypertension)    Gastroesophageal reflux disease    Kidney replaced by transplant ([facility])    Immunosuppression (Multi)    Transplant    Fever of unknown origin    Kidney transplant complication ([facility])    Shortness of breath      - Plan Thymoglobulin 6 mg/kg; last dose today  - Tacrolimus goal 8-10 ng/mL  - PLEX/IVIg Monday and Tuesday or Wednesday total 5 sessions  - PT/OT  - Renal diet    I spent 35 minutes in the professional and overall care of this patient, including immunosuppression management.    Magi Lambert MD, PHD, MPH, FACS  Chief Transplant and Hepatobiliary Surgery

## 2024-07-13 NOTE — CARE PLAN
The patient's goals for the shift include Labs WNL    The clinical goals for the shift include pt to remain HDS    Over the shift, the patient did not make progress toward the following goals.

## 2024-07-13 NOTE — CARE PLAN
Problem: Pain - Adult  Goal: Verbalizes/displays adequate comfort level or baseline comfort level  Outcome: Progressing     Problem: Safety - Adult  Goal: Free from fall injury  Outcome: Progressing     Problem: Discharge Planning  Goal: Discharge to home or other facility with appropriate resources  Outcome: Progressing     Problem: Chronic Conditions and Co-morbidities  Goal: Patient's chronic conditions and co-morbidity symptoms are monitored and maintained or improved  Outcome: Progressing     Problem: Fall/Injury  Goal: Not fall by end of shift  Outcome: Progressing  Goal: Be free from injury by end of the shift  Outcome: Progressing  Goal: Verbalize understanding of personal risk factors for fall in the hospital  Outcome: Progressing  Goal: Verbalize understanding of risk factor reduction measures to prevent injury from fall in the home  Outcome: Progressing  Goal: Use assistive devices by end of the shift  Outcome: Progressing  Goal: Pace activities to prevent fatigue by end of the shift  Outcome: Progressing     Problem: Skin  Goal: Participates in plan/prevention/treatment measures  Outcome: Progressing  Goal: Prevent/manage excess moisture  Outcome: Progressing  Goal: Prevent/minimize sheer/friction injuries  Outcome: Progressing  Goal: Promote/optimize nutrition  Outcome: Progressing  Goal: Promote skin healing  Outcome: Progressing   The patient's goals for the shift include Labs WNL    The clinical goals for the shift include pt to remain HDS

## 2024-07-13 NOTE — PROGRESS NOTES
Transplant Nephrology progress note     Date of admission: 7/2/2024     Herber Foy Rai is a 44 y.o.  with PMH   Past Medical History:   Diagnosis Date    Anxiety     Chronic kidney disease     Chronic kidney disease, unspecified     CKD, patient interested in transplantation    Depression     Disease of thyroid gland     GERD (gastroesophageal reflux disease)     Hypertension     Personal history of COVID-19 07/20/2022    History of COVID-19        SUBJECTIVE:    Denied any complaints overnight.  Urine output in last 24 hours is only 2.5 L.    PROBLEM LIST:  Principal Problem:    Shortness of breath         ALLERGIES:  No Known Allergies         CURRENT MEDICATIONS:  Scheduled medications  acetaminophen, 650 mg, oral, Once  acetaminophen, 650 mg, oral, Once  amLODIPine, 10 mg, oral, Daily  antithymocyte globulin (rabbit), 1.5 mg/kg, intravenous, Once  calcitriol, 0.25 mcg, oral, Daily  calcium carbonate, 250 mg, oral, Daily  carvedilol, 12.5 mg, oral, BID  cholecalciferol, 4,000 Units, oral, Daily  clotrimazole, 10 mg, oral, TID after meals  diphenhydrAMINE, 25 mg, oral, Once  [Held by provider] furosemide, 40 mg, intravenous, BID  gabapentin, 300 mg, oral, Nightly  heparin (porcine), 5,000 Units, subcutaneous, q8h JANIE  levothyroxine, 25 mcg, oral, Daily before breakfast  loratadine, 10 mg, oral, Daily  magnesium oxide, 400 mg, oral, Daily  mycophenolate, 1,000 mg, oral, q12h  pantoprazole, 40 mg, oral, BID AC  polyethylene glycol, 17 g, oral, Daily  potassium citrate CR, 20 mEq, oral, Daily  predniSONE, 20 mg, oral, Daily  sennosides-docusate sodium, 2 tablet, oral, BID  sertraline, 50 mg, oral, Daily  sodium bicarbonate, 650 mg, oral, BID  sulfamethoxazole-trimethoprim, 1 tablet, oral, Daily  sulfur hexafluoride microsphr, 2 mL, intravenous, Once in imaging  tacrolimus, 3 mg, oral, q12h JANIE  tenofovir alafenamide, 25 mg, oral, Daily  valGANciclovir, 450 mg, oral, q48h      Continuous medications       PRN  "medications  PRN medications: acetaminophen, alteplase, guaiFENesin, hydrALAZINE, melatonin, naloxone, ondansetron ODT **OR** ondansetron, oxyCODONE, SUMAtriptan       OBJECTIVE:    VITALS: Visit Vitals  /77 (BP Location: Right arm, Patient Position: Lying)   Pulse 74   Temp 36.1 °C (97 °F) (Temporal)   Resp 16   Ht 1.626 m (5' 4.02\")   Wt 83.2 kg (183 lb 6.4 oz)   LMP  (LMP Unknown)   SpO2 99%   BMI 31.46 kg/m²   OB Status Unknown   Smoking Status Never   BSA 1.94 m²        General: No distress   Mucosa moist   AI, AC, AF     HEENT: PEERLA  CVS: S1 S2 no murmurs  RESP:  Lungs clear to auscultation   ABDO: Soft, non-tender   Neuro: A + O x 3  Skin: No rash   Extremities: +1-2 edema       LABS:  Results from last 72 hours   Lab Units 24  0646   WBC AUTO x10*3/uL 11.1   HEMOGLOBIN g/dL 9.4*   MCV fL 95   PLATELETS AUTO x10*3/uL 107*   BUN mg/dL 20   CREATININE mg/dL 1.91*   CALCIUM mg/dL 9.2   TACROLIMUS ng/mL 7.7            Intake/Output Summary (Last 24 hours) at 2024 1139  Last data filed at 2024 0800  Gross per 24 hour   Intake 3293 ml   Output 5502 ml   Net -2209 ml          ASSESSMENT AND PLAN:    Herber Foy Rai is a 44 y.o. female with past medical history significant for ESRD secondary to HTN/NSAID whom received a  donor kidney transplant on 23, KDPI of the kidney is a 56%, PRA is 48% 6 antigen mismatch. 4.5 mg/kg total of thymoglobulin induction.  She was a transition from tacrolimus to belatacept in the setting of hematoma at the kidney site that required exploration.  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  Presented currently with volume overload and MARK with DQ 2 DSA positive with 10,158 MFI.  Transplant nephrology consulted for further management.    Allograft function:  -Her baseline prior to this episode is around 0.9 -0.8 currently peaked to  3.40  now 1.91 .Ultrasound Multiple echogenic calculi noticed on the " lower pole of the kidney.    -S/p kidney biopsy showing [place] IIB ACR and [facility]-planning Thymoglobulin 6mg/kg along with 5 sessions of Plex and IV immunoglobulin 2 g/kg tentatively.  S/p 3 doses of IV steroids currently.  -Plex completed -#3 next on Monday and Tuesday , Thymo 4 th dose today.    Immunosuppression: Restarted back on tacrolimus aiming levels 8-10-Continue with the mycophenolate and prednisone tapering.-20 mg-FK level today is 7.7 - will increase dose to 3.5 mg bID    Hemodynamics: Blood pressures are elevated continue with amlodipine, coreg 12.5 ,can start Hydralazine 25 bid for better control .    Anemia/leukopenia:  Aranesp-100 mcg given on 7/12.  Leukocytosis down trended and stable.    Infectious prophylaxis: Continue with the Vemlidy for hepatitis B prophylaxis and Valcyte, Bactrim and clotrimazole 3 months post rejection management.  -Last BK, CMV, EBV negative as of 5/24/2024.  Patient had a prior CMV viremia will follow-up with CMV PCR closely.    Bone mineral disease: Continue with the calcitriol and Tums.      Thank you for consulting .  Billie Mcfarland MD       Notes created by Amy -Please excuse the Typos .

## 2024-07-14 VITALS
RESPIRATION RATE: 19 BRPM | HEIGHT: 64 IN | DIASTOLIC BLOOD PRESSURE: 72 MMHG | TEMPERATURE: 97.5 F | SYSTOLIC BLOOD PRESSURE: 151 MMHG | OXYGEN SATURATION: 98 % | WEIGHT: 183.4 LBS | BODY MASS INDEX: 31.31 KG/M2 | HEART RATE: 67 BPM

## 2024-07-14 LAB
ALBUMIN SERPL BCP-MCNC: 3.6 G/DL (ref 3.4–5)
ANION GAP SERPL CALC-SCNC: 12 MMOL/L (ref 10–20)
APPEARANCE UR: CLEAR
BILIRUB UR STRIP.AUTO-MCNC: NEGATIVE MG/DL
BLOOD EXPIRATION DATE: NORMAL
BUN SERPL-MCNC: 19 MG/DL (ref 6–23)
CA-I BLD-SCNC: 1.3 MMOL/L (ref 1.1–1.33)
CALCIUM SERPL-MCNC: 9.4 MG/DL (ref 8.6–10.6)
CHLORIDE SERPL-SCNC: 102 MMOL/L (ref 98–107)
CO2 SERPL-SCNC: 23 MMOL/L (ref 21–32)
COLOR UR: COLORLESS
CREAT SERPL-MCNC: 2.2 MG/DL (ref 0.5–1.05)
DISPENSE STATUS: NORMAL
EGFRCR SERPLBLD CKD-EPI 2021: 27 ML/MIN/1.73M*2
ERYTHROCYTE [DISTWIDTH] IN BLOOD BY AUTOMATED COUNT: 16.4 % (ref 11.5–14.5)
GLUCOSE BLD MANUAL STRIP-MCNC: 122 MG/DL (ref 74–99)
GLUCOSE BLD MANUAL STRIP-MCNC: 93 MG/DL (ref 74–99)
GLUCOSE SERPL-MCNC: 88 MG/DL (ref 74–99)
GLUCOSE UR STRIP.AUTO-MCNC: NORMAL MG/DL
HCT VFR BLD AUTO: 26.8 % (ref 36–46)
HGB BLD-MCNC: 9.2 G/DL (ref 12–16)
KETONES UR STRIP.AUTO-MCNC: NEGATIVE MG/DL
LEUKOCYTE ESTERASE UR QL STRIP.AUTO: NEGATIVE
MAGNESIUM SERPL-MCNC: 1.72 MG/DL (ref 1.6–2.4)
MCH RBC QN AUTO: 30.4 PG (ref 26–34)
MCHC RBC AUTO-ENTMCNC: 34.3 G/DL (ref 32–36)
MCV RBC AUTO: 88 FL (ref 80–100)
NITRITE UR QL STRIP.AUTO: NEGATIVE
NRBC BLD-RTO: 0 /100 WBCS (ref 0–0)
PH UR STRIP.AUTO: 7.5 [PH]
PHOSPHATE SERPL-MCNC: 3.5 MG/DL (ref 2.5–4.9)
PLATELET # BLD AUTO: 101 X10*3/UL (ref 150–450)
POTASSIUM SERPL-SCNC: 4.7 MMOL/L (ref 3.5–5.3)
PRODUCT BLOOD TYPE: 1700
PRODUCT BLOOD TYPE: 1700
PRODUCT BLOOD TYPE: 7300
PRODUCT BLOOD TYPE: 7300
PRODUCT CODE: NORMAL
PROT UR STRIP.AUTO-MCNC: ABNORMAL MG/DL
RBC # BLD AUTO: 3.03 X10*6/UL (ref 4–5.2)
RBC # UR STRIP.AUTO: ABNORMAL /UL
RBC #/AREA URNS AUTO: NORMAL /HPF
SODIUM SERPL-SCNC: 132 MMOL/L (ref 136–145)
SP GR UR STRIP.AUTO: 1.01
SQUAMOUS #/AREA URNS AUTO: NORMAL /HPF
TACROLIMUS BLD-MCNC: 17.5 NG/ML
UNIT ABO: NORMAL
UNIT NUMBER: NORMAL
UNIT RH: NORMAL
UNIT VOLUME: 284
UNIT VOLUME: 293
UNIT VOLUME: 335
UNIT VOLUME: 359
UROBILINOGEN UR STRIP.AUTO-MCNC: NORMAL MG/DL
VIT B12 SERPL-MCNC: 173 PG/ML (ref 211–911)
WBC # BLD AUTO: 16 X10*3/UL (ref 4.4–11.3)
WBC #/AREA URNS AUTO: NORMAL /HPF

## 2024-07-14 PROCEDURE — 2500000004 HC RX 250 GENERAL PHARMACY W/ HCPCS (ALT 636 FOR OP/ED)

## 2024-07-14 PROCEDURE — 2500000001 HC RX 250 WO HCPCS SELF ADMINISTERED DRUGS (ALT 637 FOR MEDICARE OP)

## 2024-07-14 PROCEDURE — 81001 URINALYSIS AUTO W/SCOPE: CPT | Performed by: STUDENT IN AN ORGANIZED HEALTH CARE EDUCATION/TRAINING PROGRAM

## 2024-07-14 PROCEDURE — 99232 SBSQ HOSP IP/OBS MODERATE 35: CPT | Performed by: SURGERY

## 2024-07-14 PROCEDURE — 2500000002 HC RX 250 W HCPCS SELF ADMINISTERED DRUGS (ALT 637 FOR MEDICARE OP, ALT 636 FOR OP/ED): Performed by: STUDENT IN AN ORGANIZED HEALTH CARE EDUCATION/TRAINING PROGRAM

## 2024-07-14 PROCEDURE — 80197 ASSAY OF TACROLIMUS: CPT

## 2024-07-14 PROCEDURE — 82947 ASSAY GLUCOSE BLOOD QUANT: CPT

## 2024-07-14 PROCEDURE — 2500000002 HC RX 250 W HCPCS SELF ADMINISTERED DRUGS (ALT 637 FOR MEDICARE OP, ALT 636 FOR OP/ED)

## 2024-07-14 PROCEDURE — 80069 RENAL FUNCTION PANEL: CPT | Performed by: STUDENT IN AN ORGANIZED HEALTH CARE EDUCATION/TRAINING PROGRAM

## 2024-07-14 PROCEDURE — 99233 SBSQ HOSP IP/OBS HIGH 50: CPT | Performed by: HOSPITALIST

## 2024-07-14 PROCEDURE — 1100000001 HC PRIVATE ROOM DAILY

## 2024-07-14 PROCEDURE — 2500000004 HC RX 250 GENERAL PHARMACY W/ HCPCS (ALT 636 FOR OP/ED): Performed by: STUDENT IN AN ORGANIZED HEALTH CARE EDUCATION/TRAINING PROGRAM

## 2024-07-14 PROCEDURE — 85027 COMPLETE CBC AUTOMATED: CPT | Performed by: STUDENT IN AN ORGANIZED HEALTH CARE EDUCATION/TRAINING PROGRAM

## 2024-07-14 PROCEDURE — 2500000001 HC RX 250 WO HCPCS SELF ADMINISTERED DRUGS (ALT 637 FOR MEDICARE OP): Performed by: STUDENT IN AN ORGANIZED HEALTH CARE EDUCATION/TRAINING PROGRAM

## 2024-07-14 PROCEDURE — 82330 ASSAY OF CALCIUM: CPT

## 2024-07-14 PROCEDURE — 83735 ASSAY OF MAGNESIUM: CPT | Performed by: STUDENT IN AN ORGANIZED HEALTH CARE EDUCATION/TRAINING PROGRAM

## 2024-07-14 RX ORDER — MAGNESIUM SULFATE HEPTAHYDRATE 40 MG/ML
4 INJECTION, SOLUTION INTRAVENOUS ONCE
Status: COMPLETED | OUTPATIENT
Start: 2024-07-14 | End: 2024-07-14

## 2024-07-14 RX ORDER — POLYETHYLENE GLYCOL 3350 17 G/17G
17 POWDER, FOR SOLUTION ORAL 2 TIMES DAILY
Status: DISCONTINUED | OUTPATIENT
Start: 2024-07-14 | End: 2024-07-19 | Stop reason: HOSPADM

## 2024-07-14 RX ORDER — CARVEDILOL 25 MG/1
25 TABLET ORAL 2 TIMES DAILY
Status: DISCONTINUED | OUTPATIENT
Start: 2024-07-14 | End: 2024-07-19 | Stop reason: HOSPADM

## 2024-07-14 RX ORDER — BISACODYL 10 MG/1
10 SUPPOSITORY RECTAL ONCE
Status: DISCONTINUED | OUTPATIENT
Start: 2024-07-14 | End: 2024-07-17

## 2024-07-14 RX ORDER — HYDRALAZINE HYDROCHLORIDE 25 MG/1
25 TABLET, FILM COATED ORAL 2 TIMES DAILY
Status: DISCONTINUED | OUTPATIENT
Start: 2024-07-14 | End: 2024-07-19 | Stop reason: HOSPADM

## 2024-07-14 RX ORDER — CALCIUM CARBONATE 200(500)MG
500 TABLET,CHEWABLE ORAL 4 TIMES DAILY PRN
Status: DISCONTINUED | OUTPATIENT
Start: 2024-07-14 | End: 2024-07-19 | Stop reason: HOSPADM

## 2024-07-14 ASSESSMENT — PAIN SCALES - GENERAL
PAINLEVEL_OUTOF10: 8
PAINLEVEL_OUTOF10: 0 - NO PAIN
PAINLEVEL_OUTOF10: 0 - NO PAIN
PAINLEVEL_OUTOF10: 9
PAINLEVEL_OUTOF10: 9

## 2024-07-14 ASSESSMENT — COGNITIVE AND FUNCTIONAL STATUS - GENERAL
DAILY ACTIVITIY SCORE: 24
DAILY ACTIVITIY SCORE: 24
MOBILITY SCORE: 24
MOBILITY SCORE: 24

## 2024-07-14 ASSESSMENT — PAIN - FUNCTIONAL ASSESSMENT
PAIN_FUNCTIONAL_ASSESSMENT: 0-10
PAIN_FUNCTIONAL_ASSESSMENT: 0-10

## 2024-07-14 NOTE — CARE PLAN
Problem: Pain - Adult  Goal: Verbalizes/displays adequate comfort level or baseline comfort level  Outcome: Progressing     Problem: Safety - Adult  Goal: Free from fall injury  Outcome: Progressing     Problem: Discharge Planning  Goal: Discharge to home or other facility with appropriate resources  Outcome: Progressing     Problem: Chronic Conditions and Co-morbidities  Goal: Patient's chronic conditions and co-morbidity symptoms are monitored and maintained or improved  Outcome: Progressing     Problem: Fall/Injury  Goal: Not fall by end of shift  Outcome: Progressing  Goal: Be free from injury by end of the shift  Outcome: Progressing  Goal: Verbalize understanding of personal risk factors for fall in the hospital  Outcome: Progressing  Goal: Verbalize understanding of risk factor reduction measures to prevent injury from fall in the home  Outcome: Progressing  Goal: Use assistive devices by end of the shift  Outcome: Progressing  Goal: Pace activities to prevent fatigue by end of the shift  Outcome: Progressing     Problem: Skin  Goal: Participates in plan/prevention/treatment measures  Outcome: Progressing  Goal: Prevent/manage excess moisture  Outcome: Progressing  Goal: Prevent/minimize sheer/friction injuries  Outcome: Progressing  Goal: Promote/optimize nutrition  Outcome: Progressing  Goal: Promote skin healing  Outcome: Progressing   The patient's goals for the shift include Labs WNL    The clinical goals for the shift include Pt headache will be gone by end of shift    Over the shift, the patient did not make progress toward the following goals.

## 2024-07-14 NOTE — SIGNIFICANT EVENT
Called to bedside due to concerns for hypertensive emergency with elevated BP of 179/113 along with headache. Checked repeat vitals which showed a BP of 152/82. Gave patient her PRN sumatriptan for headache relief. Will continue to monitor BP     - If continually elevated, consider Hydralazine 25 mg BID as per recommendations from transplant nephrology

## 2024-07-14 NOTE — CARE PLAN
The patient's goals for the shift include Stop pt headache  The clinical goals for the shift include Pt Headache will be gone by end of shift      Problem: Pain - Adult  Goal: Verbalizes/displays adequate comfort level or baseline comfort level  Outcome: Progressing     Problem: Safety - Adult  Goal: Free from fall injury  Outcome: Progressing     Problem: Discharge Planning  Goal: Discharge to home or other facility with appropriate resources  Outcome: Progressing     Problem: Chronic Conditions and Co-morbidities  Goal: Patient's chronic conditions and co-morbidity symptoms are monitored and maintained or improved  Outcome: Progressing     Problem: Fall/Injury  Goal: Not fall by end of shift  Outcome: Progressing  Goal: Be free from injury by end of the shift  Outcome: Progressing  Goal: Verbalize understanding of personal risk factors for fall in the hospital  Outcome: Progressing  Goal: Verbalize understanding of risk factor reduction measures to prevent injury from fall in the home  Outcome: Progressing  Goal: Use assistive devices by end of the shift  Outcome: Progressing  Goal: Pace activities to prevent fatigue by end of the shift  Outcome: Progressing     Problem: Skin  Goal: Participates in plan/prevention/treatment measures  Outcome: Progressing  Flowsheets (Taken 7/14/2024 0059)  Participates in plan/prevention/treatment measures: Increase activity/out of bed for meals  Goal: Prevent/manage excess moisture  Outcome: Progressing  Goal: Prevent/minimize sheer/friction injuries  Outcome: Progressing  Flowsheets (Taken 7/14/2024 0059)  Prevent/minimize sheer/friction injuries: Increase activity/out of bed for meals  Goal: Promote/optimize nutrition  Outcome: Progressing  Goal: Promote skin healing  Outcome: Progressing

## 2024-07-14 NOTE — PROGRESS NOTES
Herber Foy Rai is a 44 y.o. female s/p DDKT readmit with [facility].     -- Seen with [facility]  on [facility]  - Reports GERD sx, constipation  - Minimal OOB due to daily IV therapies and/or plasmapheresis\    Objective   Gen: A+OX3; NAD  HEENT: PERRL, sclera anicteric, MMM  Cardiac: RRR  Chest: Normal inspiratory effort  Abdomen: S/NT/ND. Incision well-healed.  Ext: No LE edema    Last Recorded Vitals  Visit Vitals  /88 (BP Location: Right arm, Patient Position: Sitting)   Pulse 70   Temp 35.6 °C (96.1 °F) (Temporal)   Resp 20      Intake/Output last 3 Shifts:    Intake/Output Summary (Last 24 hours) at 7/14/2024 1815  Last data filed at 7/14/2024 1700  Gross per 24 hour   Intake 2475.4 ml   Output 1500 ml   Net 975.4 ml      Vitals:    07/11/24 0551   Weight: 83.2 kg (183 lb 6.4 oz)   Scheduled medications  acetaminophen, 650 mg, oral, Once  amLODIPine, 10 mg, oral, Daily  bisacodyl, 10 mg, rectal, Once  calcitriol, 0.25 mcg, oral, Daily  calcium carbonate, 250 mg, oral, Daily  carvedilol, 25 mg, oral, BID  cholecalciferol, 4,000 Units, oral, Daily  clotrimazole, 10 mg, oral, TID after meals  [Held by provider] furosemide, 40 mg, intravenous, BID  gabapentin, 300 mg, oral, Nightly  hydrALAZINE, 25 mg, oral, BID  levothyroxine, 25 mcg, oral, Daily before breakfast  loratadine, 10 mg, oral, Daily  magnesium oxide, 400 mg, oral, Daily  mycophenolate, 1,000 mg, oral, q12h  pantoprazole, 40 mg, oral, BID AC  polyethylene glycol, 17 g, oral, BID  potassium citrate CR, 20 mEq, oral, Daily  predniSONE, 20 mg, oral, Daily  sennosides-docusate sodium, 2 tablet, oral, BID  sertraline, 50 mg, oral, Daily  sodium bicarbonate, 650 mg, oral, BID  sulfamethoxazole-trimethoprim, 1 tablet, oral, Daily  sulfur hexafluoride microsphr, 2 mL, intravenous, Once in imaging  [START ON 7/15/2024] tacrolimus, 2.5 mg, oral, q12h JANIE  tenofovir alafenamide, 25 mg, oral, Daily  valGANciclovir, 450 mg, oral, q48h    Assessment/Plan    Principal Problem:    Kidney transplant recipient  Active Problems:  Patient Active Problem List   Diagnosis    ESRD (end stage renal disease) (Multi)    HTN (hypertension)    Gastroesophageal reflux disease    Kidney replaced by transplant ([facility]HS-HCC)    Immunosuppression (Multi)    Transplant    Fever of unknown origin    Kidney transplant complication ([facility]HS-HCC)    Shortness of breath      - Bowel regimen  - UA and urine cx; start cipro x 3 days  - 1L NS and encourage increased PO intake  - OOB to chair and ambulating in hallway  - Plamsapheresis/IVIg Mon/Wed    I spent 35 minutes in the professional and overall care of this patient, including immunosuppression management.    Magi Lambert MD, PHD, MPH, FACS  Chief Transplant and Hepatobiliary Surgery

## 2024-07-14 NOTE — PROGRESS NOTES
Transplant Nephrology progress note     Date of admission: 7/2/2024     Herber Foy Rai is a 44 y.o.  with PMH   Past Medical History:   Diagnosis Date    Anxiety     Chronic kidney disease     Chronic kidney disease, unspecified     CKD, patient interested in transplantation    Depression     Disease of thyroid gland     GERD (gastroesophageal reflux disease)     Hypertension     Personal history of COVID-19 07/20/2022    History of COVID-19        SUBJECTIVE:    Complained of ongoing abdominal discomfort.  Discussed with her in detail about moving around.  Urine output in last 24 hours is 1.4 L.  Discussed with her about importance of drinking at least 2 L daily.    PROBLEM LIST:  Principal Problem:    Shortness of breath         ALLERGIES:  No Known Allergies         CURRENT MEDICATIONS:  Scheduled medications  acetaminophen, 650 mg, oral, Once  amLODIPine, 10 mg, oral, Daily  bisacodyl, 10 mg, rectal, Once  calcitriol, 0.25 mcg, oral, Daily  calcium carbonate, 250 mg, oral, Daily  carvedilol, 25 mg, oral, BID  cholecalciferol, 4,000 Units, oral, Daily  clotrimazole, 10 mg, oral, TID after meals  [Held by provider] furosemide, 40 mg, intravenous, BID  gabapentin, 300 mg, oral, Nightly  hydrALAZINE, 25 mg, oral, BID  levothyroxine, 25 mcg, oral, Daily before breakfast  loratadine, 10 mg, oral, Daily  magnesium oxide, 400 mg, oral, Daily  magnesium sulfate, 4 g, intravenous, Once  mycophenolate, 1,000 mg, oral, q12h  pantoprazole, 40 mg, oral, BID AC  polyethylene glycol, 17 g, oral, BID  potassium citrate CR, 20 mEq, oral, Daily  predniSONE, 20 mg, oral, Daily  sennosides-docusate sodium, 2 tablet, oral, BID  sertraline, 50 mg, oral, Daily  sodium bicarbonate, 650 mg, oral, BID  sodium chloride, 1,000 mL, intravenous, Once  sulfamethoxazole-trimethoprim, 1 tablet, oral, Daily  sulfur hexafluoride microsphr, 2 mL, intravenous, Once in imaging  tacrolimus, 3.5 mg, oral, q12h JANIE  tenofovir alafenamide, 25 mg, oral,  "Daily  valGANciclovir, 450 mg, oral, q48h      Continuous medications       PRN medications  PRN medications: acetaminophen, alteplase, calcium carbonate, guaiFENesin, hydrALAZINE, melatonin, naloxone, ondansetron ODT **OR** ondansetron, oxyCODONE       OBJECTIVE:    VITALS: Visit Vitals  /69 (BP Location: Right arm, Patient Position: Lying)   Pulse 67   Temp 36.6 °C (97.9 °F) (Temporal)   Resp 16   Ht 1.626 m (5' 4.02\")   Wt 83.2 kg (183 lb 6.4 oz)   LMP  (LMP Unknown)   SpO2 98%   BMI 31.46 kg/m²   OB Status Unknown   Smoking Status Never   BSA 1.94 m²        General: No distress   Mucosa moist   AI, AC, AF     HEENT: PEERLA  CVS: S1 S2 no murmurs  RESP:  Lungs clear to auscultation   ABDO: Soft, non-tender   Neuro: A + O x 3  Skin: No rash   Extremities: +no edema .       LABS:  Results from last 72 hours   Lab Units 24  0454   WBC AUTO x10*3/uL 16.0*   HEMOGLOBIN g/dL 9.2*   MCV fL 88   PLATELETS AUTO x10*3/uL 101*   BUN mg/dL 19   CREATININE mg/dL 2.20*   CALCIUM mg/dL 9.4   TACROLIMUS ng/mL 17.5*            Intake/Output Summary (Last 24 hours) at 2024 1214  Last data filed at 2024 0900  Gross per 24 hour   Intake 766.4 ml   Output 2000 ml   Net -1233.6 ml          ASSESSMENT AND PLAN:    Herber Foy Rai is a 44 y.o. female with past medical history significant for ESRD secondary to HTN/NSAID whom received a  donor kidney transplant on 23, KDPI of the kidney is a 56%, PRA is 48% 6 antigen mismatch. 4.5 mg/kg total of thymoglobulin induction.  She was a transition from tacrolimus to belatacept in the setting of hematoma at the kidney site that required exploration.  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  Presented currently with volume overload and MARK with DQ 2 DSA positive with 10,158 MFI.  Transplant nephrology consulted for further management.    Allograft function:  -Her baseline prior to this episode is around 0.9 " -0.8 currently peaked to  3.40  now 2.20 .Ultrasound Multiple echogenic calculi noticed on the lower pole of the kidney.    -S/p kidney biopsy showing [place] IIB ACR and [facility] with microvascular inflammation -planning Thymoglobulin 6mg/kg along with 5 sessions of Plex and IV immunoglobulin 2 g/kg .  S/p 3 doses of IV steroids currently.  -Plex completed -#3 next on Monday and Tuesday , Thymo -completed yesterday .    Immunosuppression: -Continue with the mycophenolate and prednisone tapering.-20 mg-FK level today is 17.5 - will hold this evening and restart 2.5 mg BID tomorrow.    Hemodynamics: Blood pressures are elevated continue with amlodipine, coreg 12.5 ,can start Hydralazine 25 bid for better control .    Anemia/leukopenia:  Aranesp-100 mcg given on 7/12.  Mild leukocytosis noticed will follow-up with urine analysis and culture and start ciprofloxacin as the patient has mild UTI symptoms.    Infectious prophylaxis: Continue with the Vemlidy for hepatitis B prophylaxis and Valcyte, Bactrim and clotrimazole 3 months post rejection management.  -Last BK, CMV, EBV negative as of 5/24/2024.  Patient had a prior CMV viremia will follow-up with CMV PCR closely.    Bone mineral disease: Continue with the calcitriol and Tums.      Thank you for consulting .  Billie Mcfarland MD       Notes created by Amy -Please excuse the Typos .

## 2024-07-14 NOTE — NURSING NOTE
Transplant team notified of patient symptoms of burning in her belly, she would like a liquid to cool it.

## 2024-07-14 NOTE — CARE PLAN
The patient's goals for the shift include Labs WNL    The clinical goals for the shift include Pt headache will be gone by end of shift    Over the shift, the patient did not make progress toward the following goals.   Problem: Pain - Adult  Goal: Verbalizes/displays adequate comfort level or baseline comfort level  Outcome: Progressing     Problem: Safety - Adult  Goal: Free from fall injury  Outcome: Progressing     Problem: Discharge Planning  Goal: Discharge to home or other facility with appropriate resources  Outcome: Progressing     Problem: Chronic Conditions and Co-morbidities  Goal: Patient's chronic conditions and co-morbidity symptoms are monitored and maintained or improved  Outcome: Progressing     Problem: Fall/Injury  Goal: Not fall by end of shift  Outcome: Progressing  Goal: Be free from injury by end of the shift  Outcome: Progressing  Goal: Verbalize understanding of personal risk factors for fall in the hospital  Outcome: Progressing  Goal: Verbalize understanding of risk factor reduction measures to prevent injury from fall in the home  Outcome: Progressing  Goal: Use assistive devices by end of the shift  Outcome: Progressing  Goal: Pace activities to prevent fatigue by end of the shift  Outcome: Progressing     Problem: Skin  Goal: Participates in plan/prevention/treatment measures  7/14/2024 1439 by Mariangel Joaquin RN  Flowsheets (Taken 7/14/2024 1439)  Participates in plan/prevention/treatment measures: Increase activity/out of bed for meals  7/14/2024 1439 by Mariangel Joaquin RN  Outcome: Progressing  Flowsheets (Taken 7/14/2024 1439)  Participates in plan/prevention/treatment measures: Increase activity/out of bed for meals  Goal: Prevent/manage excess moisture  Outcome: Progressing  Goal: Prevent/minimize sheer/friction injuries  Outcome: Progressing  Goal: Promote/optimize nutrition  Outcome: Progressing  Goal: Promote skin healing  Outcome: Progressing

## 2024-07-15 ENCOUNTER — APPOINTMENT (OUTPATIENT)
Dept: OTHER | Facility: HOSPITAL | Age: 44
End: 2024-07-15
Payer: COMMERCIAL

## 2024-07-15 LAB
ALBUMIN SERPL BCP-MCNC: 3.4 G/DL (ref 3.4–5)
ANION GAP SERPL CALC-SCNC: 12 MMOL/L (ref 10–20)
APTT PPP: 28 SECONDS (ref 27–38)
BLOOD EXPIRATION DATE: NORMAL
BUN SERPL-MCNC: 20 MG/DL (ref 6–23)
CA-I BLD-SCNC: 1.31 MMOL/L (ref 1.1–1.33)
CA-I BLD-SCNC: 1.34 MMOL/L (ref 1.1–1.33)
CALCIUM SERPL-MCNC: 8.9 MG/DL (ref 8.6–10.6)
CHLORIDE SERPL-SCNC: 104 MMOL/L (ref 98–107)
CO2 SERPL-SCNC: 23 MMOL/L (ref 21–32)
CREAT SERPL-MCNC: 2.19 MG/DL (ref 0.5–1.05)
DISPENSE STATUS: NORMAL
EGFRCR SERPLBLD CKD-EPI 2021: 28 ML/MIN/1.73M*2
ERYTHROCYTE [DISTWIDTH] IN BLOOD BY AUTOMATED COUNT: 16.9 % (ref 11.5–14.5)
ERYTHROCYTE [DISTWIDTH] IN BLOOD BY AUTOMATED COUNT: 17.2 % (ref 11.5–14.5)
FIBRINOGEN PPP-MCNC: 226 MG/DL (ref 200–400)
GLUCOSE SERPL-MCNC: 70 MG/DL (ref 74–99)
HCT VFR BLD AUTO: 29.4 % (ref 36–46)
HCT VFR BLD AUTO: 29.6 % (ref 36–46)
HGB BLD-MCNC: 9.3 G/DL (ref 12–16)
HGB BLD-MCNC: 9.6 G/DL (ref 12–16)
HOLD SPECIMEN: NORMAL
HOLD SPECIMEN: NORMAL
INR PPP: 0.9 (ref 0.9–1.1)
MAGNESIUM SERPL-MCNC: 2.53 MG/DL (ref 1.6–2.4)
MCH RBC QN AUTO: 30.2 PG (ref 26–34)
MCH RBC QN AUTO: 30.8 PG (ref 26–34)
MCHC RBC AUTO-ENTMCNC: 31.4 G/DL (ref 32–36)
MCHC RBC AUTO-ENTMCNC: 32.7 G/DL (ref 32–36)
MCV RBC AUTO: 94 FL (ref 80–100)
MCV RBC AUTO: 96 FL (ref 80–100)
NRBC BLD-RTO: 0.2 /100 WBCS (ref 0–0)
NRBC BLD-RTO: 0.2 /100 WBCS (ref 0–0)
PHOSPHATE SERPL-MCNC: 4.4 MG/DL (ref 2.5–4.9)
PLATELET # BLD AUTO: 117 X10*3/UL (ref 150–450)
PLATELET # BLD AUTO: 143 X10*3/UL (ref 150–450)
POTASSIUM SERPL-SCNC: 4.9 MMOL/L (ref 3.5–5.3)
PRODUCT BLOOD TYPE: 1700
PRODUCT BLOOD TYPE: 1700
PRODUCT BLOOD TYPE: 7300
PRODUCT BLOOD TYPE: 7300
PRODUCT CODE: NORMAL
PROTHROMBIN TIME: 10.6 SECONDS (ref 9.8–12.8)
RBC # BLD AUTO: 3.08 X10*6/UL (ref 4–5.2)
RBC # BLD AUTO: 3.12 X10*6/UL (ref 4–5.2)
SODIUM SERPL-SCNC: 134 MMOL/L (ref 136–145)
TACROLIMUS BLD-MCNC: 6.3 NG/ML
UNIT ABO: NORMAL
UNIT NUMBER: NORMAL
UNIT RH: NORMAL
UNIT VOLUME: 284
UNIT VOLUME: 293
UNIT VOLUME: 335
UNIT VOLUME: 359
WBC # BLD AUTO: 12.3 X10*3/UL (ref 4.4–11.3)
WBC # BLD AUTO: 13.4 X10*3/UL (ref 4.4–11.3)

## 2024-07-15 PROCEDURE — 2500000002 HC RX 250 W HCPCS SELF ADMINISTERED DRUGS (ALT 637 FOR MEDICARE OP, ALT 636 FOR OP/ED): Performed by: STUDENT IN AN ORGANIZED HEALTH CARE EDUCATION/TRAINING PROGRAM

## 2024-07-15 PROCEDURE — 82330 ASSAY OF CALCIUM: CPT

## 2024-07-15 PROCEDURE — 2500000001 HC RX 250 WO HCPCS SELF ADMINISTERED DRUGS (ALT 637 FOR MEDICARE OP)

## 2024-07-15 PROCEDURE — 2500000004 HC RX 250 GENERAL PHARMACY W/ HCPCS (ALT 636 FOR OP/ED): Performed by: STUDENT IN AN ORGANIZED HEALTH CARE EDUCATION/TRAINING PROGRAM

## 2024-07-15 PROCEDURE — 2500000001 HC RX 250 WO HCPCS SELF ADMINISTERED DRUGS (ALT 637 FOR MEDICARE OP): Performed by: STUDENT IN AN ORGANIZED HEALTH CARE EDUCATION/TRAINING PROGRAM

## 2024-07-15 PROCEDURE — 85027 COMPLETE CBC AUTOMATED: CPT

## 2024-07-15 PROCEDURE — 2500000002 HC RX 250 W HCPCS SELF ADMINISTERED DRUGS (ALT 637 FOR MEDICARE OP, ALT 636 FOR OP/ED)

## 2024-07-15 PROCEDURE — 80197 ASSAY OF TACROLIMUS: CPT

## 2024-07-15 PROCEDURE — 99233 SBSQ HOSP IP/OBS HIGH 50: CPT | Performed by: INTERNAL MEDICINE

## 2024-07-15 PROCEDURE — 2500000004 HC RX 250 GENERAL PHARMACY W/ HCPCS (ALT 636 FOR OP/ED)

## 2024-07-15 PROCEDURE — 85384 FIBRINOGEN ACTIVITY: CPT

## 2024-07-15 PROCEDURE — 83735 ASSAY OF MAGNESIUM: CPT | Performed by: STUDENT IN AN ORGANIZED HEALTH CARE EDUCATION/TRAINING PROGRAM

## 2024-07-15 PROCEDURE — 99232 SBSQ HOSP IP/OBS MODERATE 35: CPT | Performed by: SURGERY

## 2024-07-15 PROCEDURE — 36514 APHERESIS PLASMA: CPT

## 2024-07-15 PROCEDURE — P9017 PLASMA 1 DONOR FRZ W/IN 8 HR: HCPCS

## 2024-07-15 PROCEDURE — 85610 PROTHROMBIN TIME: CPT

## 2024-07-15 PROCEDURE — 2500000004 HC RX 250 GENERAL PHARMACY W/ HCPCS (ALT 636 FOR OP/ED): Mod: JZ

## 2024-07-15 PROCEDURE — 85027 COMPLETE CBC AUTOMATED: CPT | Performed by: STUDENT IN AN ORGANIZED HEALTH CARE EDUCATION/TRAINING PROGRAM

## 2024-07-15 PROCEDURE — 80069 RENAL FUNCTION PANEL: CPT | Performed by: STUDENT IN AN ORGANIZED HEALTH CARE EDUCATION/TRAINING PROGRAM

## 2024-07-15 PROCEDURE — P9045 ALBUMIN (HUMAN), 5%, 250 ML: HCPCS | Mod: JZ

## 2024-07-15 PROCEDURE — 1100000001 HC PRIVATE ROOM DAILY

## 2024-07-15 PROCEDURE — 97165 OT EVAL LOW COMPLEX 30 MIN: CPT | Mod: GO

## 2024-07-15 RX ORDER — ACETAMINOPHEN 325 MG/1
650 TABLET ORAL ONCE
Status: COMPLETED | OUTPATIENT
Start: 2024-07-15 | End: 2024-07-15

## 2024-07-15 RX ORDER — ALBUMIN HUMAN 50 G/1000ML
12.5 SOLUTION INTRAVENOUS
Status: COMPLETED | OUTPATIENT
Start: 2024-07-15 | End: 2024-07-15

## 2024-07-15 RX ORDER — CALCIUM CARBONATE 200(500)MG
1500 TABLET,CHEWABLE ORAL EVERY 5 MIN PRN
Status: DISCONTINUED | OUTPATIENT
Start: 2024-07-15 | End: 2024-07-15 | Stop reason: HOSPADM

## 2024-07-15 RX ORDER — DIPHENHYDRAMINE HCL 25 MG
25 CAPSULE ORAL ONCE
Status: COMPLETED | OUTPATIENT
Start: 2024-07-15 | End: 2024-07-15

## 2024-07-15 RX ORDER — DIPHENHYDRAMINE HYDROCHLORIDE 50 MG/ML
25 INJECTION INTRAMUSCULAR; INTRAVENOUS EVERY 5 MIN PRN
Status: DISCONTINUED | OUTPATIENT
Start: 2024-07-15 | End: 2024-07-15 | Stop reason: HOSPADM

## 2024-07-15 RX ORDER — DIPHENHYDRAMINE HCL 25 MG
50 CAPSULE ORAL ONCE
Status: COMPLETED | OUTPATIENT
Start: 2024-07-15 | End: 2024-07-15

## 2024-07-15 RX ORDER — CALCIUM GLUCONATE 20 MG/ML
4719 INJECTION, SOLUTION INTRAVENOUS ONCE
Status: COMPLETED | OUTPATIENT
Start: 2024-07-15 | End: 2024-07-15

## 2024-07-15 ASSESSMENT — COGNITIVE AND FUNCTIONAL STATUS - GENERAL
TOILETING: A LITTLE
MOBILITY SCORE: 24
DRESSING REGULAR LOWER BODY CLOTHING: A LITTLE
DAILY ACTIVITIY SCORE: 24
DAILY ACTIVITIY SCORE: 21
HELP NEEDED FOR BATHING: A LITTLE

## 2024-07-15 ASSESSMENT — PAIN SCALES - GENERAL
PAINLEVEL_OUTOF10: 0 - NO PAIN

## 2024-07-15 ASSESSMENT — ACTIVITIES OF DAILY LIVING (ADL)
BATHING_ASSISTANCE: STAND BY
ADL_ASSISTANCE: INDEPENDENT

## 2024-07-15 ASSESSMENT — PAIN - FUNCTIONAL ASSESSMENT: PAIN_FUNCTIONAL_ASSESSMENT: 0-10

## 2024-07-15 NOTE — PROGRESS NOTES
Herber Foy [facility] [id number]       Procedure type: Plasma Exchange    Replacement Fluids:  75 Albumin /  25 Plasma     Procedure Completed Without complications.    Attending notified of completion status. See flow sheet(s) for additional details.  Post-Vitals listed below.    Post-procedure vitals:  Vitals  BP: 111/63  Temp: 36.7 °C (98.1 °F)  Heart Rate: 70  Resp: 18  SpO2: 97 % on room air       Kit Subramanian RN

## 2024-07-15 NOTE — PROGRESS NOTES
"        INPATIENT TRANSPLANT NEPHROLOGY PROGRESS NOTE          REASON FOR CONSULT:  Immunosuppressive medication management and nephrology related issues.    SUBJECTION:     DSA has improved significantly, DQ2 went down to 10,581 MFI.   CMV PLASMA PCR not detected  Good UOP  Cr has been stable   No acute event overnight.  Leukocytosis -improved and not on antibiotic. Cultures -NGTD.    PHYCISCAL EXAMINATION:    Visit Vitals  /75 (BP Location: Right arm, Patient Position: Lying)   Pulse 66   Temp 36.7 °C (98.1 °F) (Temporal)   Resp 17   Ht 1.626 m (5' 4.02\")   Wt 83.2 kg (183 lb 6.4 oz)   LMP  (LMP Unknown)   SpO2 95%   BMI 31.46 kg/m²   OB Status Unknown   Smoking Status Never   BSA 1.94 m²        07/13 1900 - 07/15 0659  In: 3195.4 [P.O.:1470; I.V.:200]  Out: 2400 [Urine:2400]     Weight change:     General Appearance - NAD, Good speech, oriented and alert  HEENT - Supple. Not pale. No jaundice. No cervical lymphadenopathy. Pharynx and tonsils are not injected.  CVS - RRR. Normal S1/S2. No murmur, click , rub or gallop  Lungs- clear to auscultation bilaterally  Abdomen - soft , not tender, no guarding, no rigidity. No hepatosplenomegaly. Normal bowel sounds. No masses and ascites. S/P Kidney transplant .  Transplanted kidney is not tender.   Musculoskeletal /Extremities - no edema. Full ROM. No joint tenderness.   Neuro/Psych - appropriate mood and affect. Motor power V/V all extremities. CN I -XII were grossly intact.  Skin - No visible rash    MEDICATION LIST: REVIEWED    acetaminophen, 650 mg, Once  acetaminophen, 650 mg, Once  amLODIPine, 10 mg, Daily  bisacodyl, 10 mg, Once  calcitriol, 0.25 mcg, Daily  calcium carbonate, 250 mg, Daily  calcium gluconate in NS, 4,719 mg, Once  carvedilol, 25 mg, BID  cholecalciferol, 4,000 Units, Daily  clotrimazole, 10 mg, TID after meals  diphenhydrAMINE, 50 mg, Once  [Held by provider] furosemide, 40 mg, BID  gabapentin, 300 mg, Nightly  hydrALAZINE, 25 mg, " BID  levothyroxine, 25 mcg, Daily before breakfast  loratadine, 10 mg, Daily  magnesium oxide, 400 mg, Daily  mycophenolate, 1,000 mg, q12h  pantoprazole, 40 mg, BID AC  plasma exchange albumin human, 12.5 g, q15 min  polyethylene glycol, 17 g, BID  predniSONE, 20 mg, Daily  sennosides-docusate sodium, 2 tablet, BID  sertraline, 50 mg, Daily  sodium bicarbonate, 650 mg, BID  sulfamethoxazole-trimethoprim, 1 tablet, Daily  sulfur hexafluoride microsphr, 2 mL, Once in imaging  tacrolimus, 2.5 mg, q12h JANIE  tenofovir alafenamide, 25 mg, Daily  valGANciclovir, 450 mg, q48h           acetaminophen, 650 mg, q4h PRN  alteplase, 2 mg, Once PRN  calcium carbonate, 1,500 mg, q5 min PRN  calcium carbonate, 500 mg, 4x daily PRN  diphenhydrAMINE, 25 mg, q5 min PRN  guaiFENesin, 600 mg, BID PRN  hydrALAZINE, 10 mg, q8h PRN  melatonin, 3 mg, Nightly PRN  naloxone, 0.2 mg, q5 min PRN  ondansetron ODT, 4 mg, q8h PRN   Or  ondansetron, 4 mg, q8h PRN  oxyCODONE, 5 mg, q4h PRN  sodium chloride, 500 mL, Once PRN        ALLERGY:  No Known Allergies    LABS:  Results for orders placed or performed during the hospital encounter of 07/02/24 (from the past 24 hour(s))   Urinalysis with Reflex Culture and Microscopic   Result Value Ref Range    Color, Urine Colorless (N) Light-Yellow, Yellow, Dark-Yellow    Appearance, Urine Clear Clear    Specific Gravity, Urine 1.007 1.005 - 1.035    pH, Urine 7.5 5.0, 5.5, 6.0, 6.5, 7.0, 7.5, 8.0    Protein, Urine 10 (TRACE) NEGATIVE, 10 (TRACE), 20 (TRACE) mg/dL    Glucose, Urine Normal Normal mg/dL    Blood, Urine 0.03 (TRACE) (A) NEGATIVE    Ketones, Urine NEGATIVE NEGATIVE mg/dL    Bilirubin, Urine NEGATIVE NEGATIVE    Urobilinogen, Urine Normal Normal mg/dL    Nitrite, Urine NEGATIVE NEGATIVE    Leukocyte Esterase, Urine NEGATIVE NEGATIVE   Extra Urine Gray Tube   Result Value Ref Range    Extra Tube Hold for add-ons.    Urinalysis Microscopic   Result Value Ref Range    WBC, Urine 1-5 1-5, NONE /HPF     RBC, Urine 1-2 NONE, 1-2, 3-5 /HPF    Squamous Epithelial Cells, Urine 1-9 (SPARSE) Reference range not established. /HPF   POCT GLUCOSE   Result Value Ref Range    POCT Glucose 122 (H) 74 - 99 mg/dL   CBC   Result Value Ref Range    WBC 12.3 (H) 4.4 - 11.3 x10*3/uL    nRBC 0.2 (H) 0.0 - 0.0 /100 WBCs    RBC 3.08 (L) 4.00 - 5.20 x10*6/uL    Hemoglobin 9.3 (L) 12.0 - 16.0 g/dL    Hematocrit 29.6 (L) 36.0 - 46.0 %    MCV 96 80 - 100 fL    MCH 30.2 26.0 - 34.0 pg    MCHC 31.4 (L) 32.0 - 36.0 g/dL    RDW 16.9 (H) 11.5 - 14.5 %    Platelets 117 (L) 150 - 450 x10*3/uL   Magnesium   Result Value Ref Range    Magnesium 2.53 (H) 1.60 - 2.40 mg/dL   Renal function panel   Result Value Ref Range    Glucose 70 (L) 74 - 99 mg/dL    Sodium 134 (L) 136 - 145 mmol/L    Potassium 4.9 3.5 - 5.3 mmol/L    Chloride 104 98 - 107 mmol/L    Bicarbonate 23 21 - 32 mmol/L    Anion Gap 12 10 - 20 mmol/L    Urea Nitrogen 20 6 - 23 mg/dL    Creatinine 2.19 (H) 0.50 - 1.05 mg/dL    eGFR 28 (L) >60 mL/min/1.73m*2    Calcium 8.9 8.6 - 10.6 mg/dL    Phosphorus 4.4 2.5 - 4.9 mg/dL    Albumin 3.4 3.4 - 5.0 g/dL   Calcium, Ionized   Result Value Ref Range    POCT Calcium, Ionized 1.34 (H) 1.1 - 1.33 mmol/L   Fibrinogen   Result Value Ref Range    Fibrinogen 226 200 - 400 mg/dL   Coagulation Screen   Result Value Ref Range    Protime 10.6 9.8 - 12.8 seconds    INR 0.9 0.9 - 1.1    aPTT 28 27 - 38 seconds   SST TOP   Result Value Ref Range    Extra Tube Hold for add-ons.         ASSESSMENT AND PLAN:    Ms. Leblanc is a 44 y.o. female with past medical history significant for ESRD secondary to HTN/NSAID whom received a  donor kidney transplant on 23 by Dr. Marbin Lambert & Dr. Louis with a KDPI of 56% and PRA of 48%. HCV -/- and donor has not met risk factors. EBV +/+. 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 Dry weight is 81.1 kg (discharge weight is 76.2kg ). Pt received a total of 4.5 mg/kg total of thymoglobulin induction  therapy in conjunction with 500mg IV solumedrol. Pt was initiated on Tacrolimus & Cellcept immunosuppression in conjunction with IV solumedrol taper. 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 Pt was started on valcyte (CMV D + / R + ) and bactrim for CMV & PCP prophylaxis per protocol. She was started on clotrimazole as antifungal coverage per protocol. Operative course was complicated by return to OR for exploration of transplanted kidney and washout of hematoma. Hospital course was complicated by post-operative pain and constipation, which has resolved.     5/29/24 Direct admission for fever, nausea, vomiting and abdominal pain (chronic).  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 ID consulted. She is on Valcyte treatment dose.     Patient has 1+ pitting edema of her ankles and some mild swelling of UEs. Her UOP is unchanged, however she's endorsing some R flank tenderness that could be consistent with her known R sided kidney stone. At this time, this is likely an acute MARK, however an infectious workup was started in the ED. Will admit the patient for hydration, and monitoring of kidney function in the setting of Cr 1.6 (baseline 0.8), mild LE edema. Imaging pending to rule out Ddx of DVTs, transplant failure/rejection, infection.      Transplant nephrology is consulted to assist with immunosuppressive medication management and nephrology related issues.        1. Acute rejection BANFF  ACR/AVR and AMR  S/P  Solumedrol 500 mg daily x 3 days; now on prednisone 20 mg daily  Thymo 6 mg/kg  PLEX #4/5 today    - Renal allograft function:   Lab Results   Component Value Date    CREATININE 2.19 (H) 07/15/2024     Estimated Creatinine Clearance: 33.5 mL/min (A) (by C-G formula based on SCr of 2.19 mg/dL (H)).    Intake/Output Summary (Last 24 hours) at 7/15/2024 1013  Last data filed at 7/15/2024 0735  Gross per 24 hour   Intake  2669 ml   Output 2400 ml   Net 269 ml     MFI DSA  DQ2 10,158  (down from 23,400)    - Bx7/5/24  KIDNEY ALLOGRAFT, RIGHT ILIAC FOSSA, PERCUTANEOUS CORE BIOPSY:  -- CHANGES CONSISTENT WITH ACUTE T CELL-MEDIATED REJECTION, BANFF IB  -- ACUTE VASCULAR REJECTION, BANFF IIA  -- ACUTE ANTIBODY-MEDIATED REJECTION  IFTA <10%  CMV/SV-40 negative  Mild peritubular capillaritis    - Continue to monitor UOP and Serum creatinine closely.   - Avoid nephrotoxic agents, NSAIDs and IV contrast   - Strict I/O.   - Renally dose all medications by the most recent CrCl from Cockcroft-Gault formula.    2. Immunosuppression   -          Belatacept 5 mg/kg (362.5 mg) q28 days; last dose received 6/24, next dose scheduled outpatient for 7/22  -           mg BID  -          Prednisone 10 mg daily à methylpred 500 mg daily x 3 days 7/4 - 7/6 à prednisone 20 mg daily starting 7/7    3. Anemia and WBC   Lab Results   Component Value Date    WBC 12.3 (H) 07/15/2024    HGB 9.3 (L) 07/15/2024    HCT 29.6 (L) 07/15/2024    MCV 96 07/15/2024     (L) 07/15/2024     -Continue to monitor Hgb   -No indications for PRBC transfusion     4. Electrolyte   Lab Results   Component Value Date    GLUCOSE 70 (L) 07/15/2024    CALCIUM 8.9 07/15/2024     (L) 07/15/2024    K 4.9 07/15/2024    CO2 23 07/15/2024     07/15/2024    BUN 20 07/15/2024    CREATININE 2.19 (H) 07/15/2024     - Reviewed renal profile.     6. Hypertension   Blood Pressures         7/14/2024  1207 7/14/2024  1807 7/14/2024  2008 7/15/2024  0037 7/15/2024  0735    BP: 119/69 149/88 151/72 133/72 146/75          -Goal BP < 140/90 mmHg   -continue current management     7. Hydronephrosis  US  7/2/24-stable  - Discussed with tx surgery no need to repeat MAG -3    8.  GI prophylaxis   - On PPI     9. 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 Lou, pt is in the hospital    * Case  was discussed with primary team.  For questions, please contact transplant nephrology page x [phone]    Marcia Webber    Transplant Nephrologist

## 2024-07-15 NOTE — PROGRESS NOTES
Occupational Therapy    Evaluation    Patient Name: Herber Foy Rai  MRN: [id number]  Today's Date: 7/15/2024  Room: [address] [place] [address]  Time Calculation  Start Time: 0932  Stop Time: 0946  Time Calculation (min): 14 min    Assessment  IP OT Assessment  OT Assessment: Pt demo's functional and safe completion for observed ADLs and anticipated IND with unobserved ADLs as condition improves. Pt demo'd good safety, endurance, balance, and functional mobility upon eval. Pt demo'd no need for skilled OT services at this time.  Prognosis: Good  Barriers to Discharge: None  Evaluation/Treatment Tolerance: Patient tolerated treatment well  Medical Staff Made Aware: Yes  End of Session Communication: Bedside nurse  End of Session Patient Position: Up in chair, Alarm off, not on at start of session  Plan:  Inpatient Plan  No Skilled OT: No acute OT goals identified  OT Frequency: OT eval only  OT Discharge Recommendations: No further acute OT  OT Recommended Transfer Status: Stand by assist  OT - OK to Discharge: Yes  OT Assessment  Prognosis: Good  Barriers to Discharge: None  Evaluation/Treatment Tolerance: Patient tolerated treatment well  Medical Staff Made Aware: Yes  Strengths: Attitude of self  Barriers to Participation: Comorbidities    Subjective   Current Problem:  1. Shortness of breath  Transthoracic Echo (TTE) Complete    Transthoracic Echo (TTE) Complete      2. Elevated brain natriuretic peptide (BNP) level  Transthoracic Echo (TTE) Complete    Transthoracic Echo (TTE) Complete      3. Complication of transplanted kidney, unspecified complication (HHS-HCC)  Transthoracic Echo (TTE) Complete    Transthoracic Echo (TTE) Complete      4. Bilateral edema of lower extremity  Transthoracic Echo (TTE) Complete    Transthoracic Echo (TTE) Complete        General:  Reason for Referral: Admitted for ongoing perinephric fluid collection, pt is s/p DDKT 11/30  Past Medical History Relevant to Rehab: Per chart, PMH includes ESRD  secondary to HTN/NSAID s/p DDKT on 11/30/23, Chronic kidney disease, Chronic kidney disease, unspecified, GERD, and Personal history of COVID-19 (07/20/2022)  Prior to Session Communication: Bedside nurse  Patient Position Received: Up in chair, Alarm off, not on at start of session  Family/Caregiver Present: No  General Comment: pt up on bench in room upon arrival. Pt agreeable to OT evaluation. [employer]  service utilized.   Precautions:  Medical Precautions: Fall precautions, Abdominal precautions  Vital Signs:     Pain:  Pain Assessment  Pain Assessment: 0-10  0-10 (Numeric) Pain Score: 0 - No pain  Lines/Tubes/Drains:  PICC - Adult 07/05/24 Single lumen Right Cephalic vein (Active)   Number of days: 9         Objective   Cognition:  Overall Cognitive Status: Within Functional Limits  Orientation Level: Oriented X4           Home Living:  Type of Home: House  Lives With: Spouse, Dependent children  Home Adaptive Equipment: Walker rolling or standard  Home Layout: One level  Home Access: Level entry  Entrance Stairs-Number of Steps: 1  Bathroom Shower/Tub: Tub/shower unit  Bathroom Toilet: Standard  Bathroom Equipment: Shower chair with back  Home Living Comments: pt states home is accessible and she has help as needed   Prior Function:  Level of Rains: Independent with ADLs and functional transfers, Independent with homemaking with ambulation  Receives Help From: Family  ADL Assistance: Independent  Homemaking Assistance: Independent  Ambulatory Assistance: Independent  Vocational: Unemployed  Leisure: unknown  Hand Dominance: Right  Prior Function Comments: pt reports no falls in the last year and improved occupational performance in the time prior to admittance  IADL History:  Homemaking Responsibilities: Yes  Meal Prep Responsibility: Primary  Laundry Responsibility: Primary  Cleaning Responsibility: Primary  Bill Paying/Finance Responsibility: Primary  Shopping Responsibility: Primary  Child  Care Responsibility: Primary  Homemaking Comments: IND prior to admittance  Current License: No  Occupation: Unemployed  Leisure and Hobbies: unknown  IADL Comments: IND prior to admittance  ADL:  Eating Assistance: Independent (anticipated)  Grooming Assistance: Independent (anticipated)  Bathing Assistance: Stand by (anticipated)  Bathing Deficit:  (shower chair)  UE Dressing Assistance: Independent (anticipated)  LE Dressing Assistance: Stand by (anticipated)  Toileting Assistance with Device: Stand by  Toileting Deficit: Grab bar use  ADL Comments: Pt demo'd ability to complete simulated toileting with SBA nad cueing for the use of grab bars. It is anticipated that pt will imcreaseto IND with ADLs as condition improves  Activity Tolerance:  Endurance: Tolerates 10 - 20 min exercise with multiple rests  Balance:  Static Sitting Balance  Static Sitting-Balance Support: No upper extremity supported  Static Sitting-Level of Assistance: Close supervision  Static Sitting-Comment/Number of Minutes: pt dmeo'd static sitting on bench in room X10 minutes with SUP while conversing with therapists during eval  Static Standing Balance  Static Standing-Balance Support: No upper extremity supported  Static Standing-Level of Assistance: Close supervision  Static Standing-Comment/Number of Minutes: dynamic standing while completing simulated toileting with SUP and cueing for use of grab bars to improve safety and balance. ONe slight LOB noted upon ambulation to bathroom while attepmting to talk to tranlator while walking. Pt demo'd ability to self-correct.  Bed Mobility/Transfers: Bed Mobility  Bed Mobility: No   and Transfers  Transfer: Yes  Transfer 1  Transfer From 1: Sit to, Stand to  Transfer to 1: Stand, Sit  Technique 1: Sit to stand  Transfer Level of Assistance 1: Close supervision  Trials/Comments 1: pt demo'd ability to complete STS with SUP from bench in room. Pt was provided cueing for positioning to improve safety.  patient completed STS from toilet with SUP for balance and safety and was provided cueing for use of grab bars to improve balance and safety with transfer.  IADL's:   Homemaking Responsibilities: Yes  Meal Prep Responsibility: Primary  Laundry Responsibility: Primary  Cleaning Responsibility: Primary  Bill Paying/Finance Responsibility: Primary  Shopping Responsibility: Primary   Responsibility: Primary  Homemaking Comments: IND prior to admittance  Current License: No  Occupation: Unemployed  Leisure and Hobbies: unknown  IADL Comments: IND prior to admittance  Vision: Vision - Basic Assessment  Current Vision: No visual deficits   and    Sensation:  Light Touch: No apparent deficits  Strength:  Strength Comments: BUEs grossly WFL  Perception:  Inattention/Neglect: Appears intact  Coordination:  Movements are Fluid and Coordinated: Yes   Hand Function:  Hand Function  Gross Grasp: Functional  Coordination: Functional  Extremities: RUE   RUE : Within Functional Limits, LUE   LUE: Within Functional Limits,  , and      Outcome Measures: [facility] Daily Activity  Putting on and taking off regular lower body clothing: A little  Bathing (including washing, rinsing, drying): A little  Putting on and taking off regular upper body clothing: None  Toileting, which includes using toilet, bedpan or urinal: A little  Taking care of personal grooming such as brushing teeth: None  Eating Meals: None  Daily Activity - Total Score: 21         ,     OT Adult Other Outcome Measures  4AT: 4 AT -    Education Documentation  Body Mechanics, taught by ALEXANDRA Holly at 7/15/2024 11:00 AM.  Learner: Patient  Readiness: Acceptance  Method: Explanation  Response: Verbalizes Understanding    Precautions, taught by ALEXANDRA Holly at 7/15/2024 11:00 AM.  Learner: Patient  Readiness: Acceptance  Method: Explanation  Response: Verbalizes Understanding    ADL Training, taught by ALEXANDRA Holly at 7/15/2024 11:00 AM.  Learner:  Patient  Readiness: Acceptance  Method: Explanation  Response: Verbalizes Understanding    Education Comments  No comments found.        Goals:       .uhreh    07/15/24 at 2:28 PM   VALENTINO TEAGUE S-CARLO   Rehab Office: [phone]

## 2024-07-15 NOTE — PROGRESS NOTES
Music Therapy Note    Herber Foy Rai     Therapy Session  Referral Type: New referral this admission  Visit Type: Follow-up visit  Session Start Time: 1458  Session End Time: 1508  Intervention Delivery: In-person  Conflict of Service: None  Number of family members present: 1  Family Participation: Supportive     Pre-assessment  Unable to Assess Reason: Outcomes not applicable  Mood/Affect: Appropriate, Calm, Cooperative         Treatment/Interventions  Music Therapy Interventions: Assessment  Interruption: No  Patient Fell Asleep at End of Session: No    Post-assessment  Unable to Assess Reason: Did not provide expressive therapy intervention  Mood/Affect: Appropriate, Calm, Cooperative  Continue Visiting: Yes  Total Session Time (min): 10 minutes    Narrative  Assessment Detail: Patient presented awake and alert, sitting at EOB, displaying calm affect. Patient is familiar to this MT from a prior admission. MT re-introduced self and role and pt's visitor interpreted. Patient and pt's family member stated that they just wanted music to listen to, and pt has a smartphone but would like a Bluetooth speaker to borrow if possible. MT informed pt and family that she would try to find one for pt to borrow.  Follow-up: MT to continue to follow.    Education Documentation  No documentation found.

## 2024-07-15 NOTE — CARE PLAN
The patient's goals for the shift include get rid of headache    The clinical goals for the shift include Pt will remain comfortable      Problem: Pain - Adult  Goal: Verbalizes/displays adequate comfort level or baseline comfort level  Outcome: Progressing     Problem: Safety - Adult  Goal: Free from fall injury  Outcome: Progressing     Problem: Discharge Planning  Goal: Discharge to home or other facility with appropriate resources  Outcome: Progressing     Problem: Chronic Conditions and Co-morbidities  Goal: Patient's chronic conditions and co-morbidity symptoms are monitored and maintained or improved  Outcome: Progressing     Problem: Fall/Injury  Goal: Not fall by end of shift  Outcome: Progressing  Goal: Be free from injury by end of the shift  Outcome: Progressing  Goal: Verbalize understanding of personal risk factors for fall in the hospital  Outcome: Progressing  Goal: Verbalize understanding of risk factor reduction measures to prevent injury from fall in the home  Outcome: Progressing  Goal: Use assistive devices by end of the shift  Outcome: Progressing  Goal: Pace activities to prevent fatigue by end of the shift  Outcome: Progressing     Problem: Skin  Goal: Participates in plan/prevention/treatment measures  Outcome: Progressing  Goal: Prevent/manage excess moisture  Outcome: Progressing  Goal: Prevent/minimize sheer/friction injuries  Outcome: Progressing  Goal: Promote/optimize nutrition  Outcome: Progressing  Goal: Promote skin healing  Outcome: Progressing

## 2024-07-15 NOTE — PROGRESS NOTES
Herber Foy Rai is a 44 y.o. female s/p DDKT readmit with ACR/AMR.    - no acute events    Objective   Gen: A+OX3; NAD  HEENT: PERRL, sclera anicteric, MMM  Cardiac: RRR  Chest: Normal inspiratory effort  Abdomen: S/NT/ND. Incision C/D/I.  Ext: No LE edema    Last Recorded Vitals  Visit Vitals  /52 (BP Location: Right arm, Patient Position: Lying)   Pulse 70   Temp 36.3 °C (97.3 °F) (Temporal)   Resp 18      Intake/Output last 3 Shifts:    Intake/Output Summary (Last 24 hours) at 7/15/2024 1719  Last data filed at 7/15/2024 1642  Gross per 24 hour   Intake 3800 ml   Output 5780 ml   Net -1980 ml      Vitals:    07/11/24 0551   Weight: 83.2 kg (183 lb 6.4 oz)   Scheduled medications  acetaminophen, 650 mg, oral, Once  amLODIPine, 10 mg, oral, Daily  bisacodyl, 10 mg, rectal, Once  calcitriol, 0.25 mcg, oral, Daily  calcium carbonate, 250 mg, oral, Daily  carvedilol, 25 mg, oral, BID  cholecalciferol, 4,000 Units, oral, Daily  clotrimazole, 10 mg, oral, TID after meals  [Held by provider] furosemide, 40 mg, intravenous, BID  gabapentin, 300 mg, oral, Nightly  hydrALAZINE, 25 mg, oral, BID  immune globulin (human), 10 g, intravenous, Once  levothyroxine, 25 mcg, oral, Daily before breakfast  loratadine, 10 mg, oral, Daily  magnesium oxide, 400 mg, oral, Daily  mycophenolate, 1,000 mg, oral, q12h  pantoprazole, 40 mg, oral, BID AC  polyethylene glycol, 17 g, oral, BID  predniSONE, 20 mg, oral, Daily  sennosides-docusate sodium, 2 tablet, oral, BID  sertraline, 50 mg, oral, Daily  sodium bicarbonate, 650 mg, oral, BID  sulfamethoxazole-trimethoprim, 1 tablet, oral, Daily  sulfur hexafluoride microsphr, 2 mL, intravenous, Once in imaging  tacrolimus, 2.5 mg, oral, q12h JANIE  tenofovir alafenamide, 25 mg, oral, Daily  valGANciclovir, 450 mg, oral, q48h    Assessment/Plan   Principal Problem:    Kidney transplant recipient  Active Problems:  Patient Active Problem List   Diagnosis    ESRD (end stage renal disease)  (Multi)    HTN (hypertension)    Gastroesophageal reflux disease    Kidney replaced by transplant (HHS-HCC)    Immunosuppression (Multi)    Transplant    Fever of unknown origin    Kidney transplant complication (HHS-HCC)    Shortness of breath      - Bowel regimen  - OOB to chair and ambulating in hallway  - Plamsapheresis/IVIg Mon/Wed    I spent 35 minutes in the professional and overall care of this patient, including immunosuppression management.    Magi Lambert MD, PHD, MPH, FACS  Chief Transplant and Hepatobiliary Surgery

## 2024-07-15 NOTE — NURSING NOTE
Page sent to transplant team in regards to patient's family member having questions and wanting to speak with them.

## 2024-07-15 NOTE — PROGRESS NOTES
Physical Therapy                 Therapy Communication Note    Patient Name: Herber Foy Rai  MRN: [id number]  Today's Date: 7/15/2024     Discipline: Physical Therapy    Missed Visit Reason: Missed Visit Reason:  (pt with bedside transfusion. Will re-attempt as schedule permits.)    Missed Time: Attempt    Kristie Wilosn, PT

## 2024-07-15 NOTE — POST-PROCEDURE NOTE
This is a 44 y.o. female with Antibody Mediated Rejection (AMR) of Kidney Transplant who underwent therapeutic plasma exchange (TPE) 4 with 75% albumin and 25% plasma as replacement fluid.     I saw and evaluated the patient during the TPE.  The patient was resting in bed.  The vascular access functioned well.     Pre-procedure labs:  WBC   Date/Time Value Ref Range Status   07/15/2024 05:13 AM 12.3 (H) 4.4 - 11.3 x10*3/uL Final     Hemoglobin   Date/Time Value Ref Range Status   07/15/2024 05:13 AM 9.3 (L) 12.0 - 16.0 g/dL Final     Hematocrit   Date/Time Value Ref Range Status   07/15/2024 05:13 AM 29.6 (L) 36.0 - 46.0 % Final     Platelets   Date/Time Value Ref Range Status   07/15/2024 05:13  (L) 150 - 450 x10*3/uL Final        POCT Calcium, Ionized   Date/Time Value Ref Range Status   07/15/2024 05:13 AM 1.34 (H) 1.1 - 1.33 mmol/L Final     Comment:     The performance characteristics of ionized calcium tested  in heparinized plasma or serum have been validated by the  individual  laboratory site where testing is performed.   Testing on heparinized plasma or serum is not approved by   the FDA; however, such approval is not necessary.        Protime   Date/Time Value Ref Range Status   07/15/2024 05:13 AM 10.6 9.8 - 12.8 seconds Final     INR   Date/Time Value Ref Range Status   07/15/2024 05:13 AM 0.9 0.9 - 1.1 Final     aPTT   Date/Time Value Ref Range Status   07/15/2024 05:13 AM 28 27 - 38 seconds Final     Fibrinogen   Date/Time Value Ref Range Status   07/15/2024 05:13  200 - 400 mg/dL Final        Pre-procedure vital signs at 1024:  T: 36.8 C, HR: 72, RR: 18, /69  Pulse oximetry: 98% on room air    Post-procedure vital signs at 1211:  T: 36.7 C, HR: 70, RR: 18, BP: 111/63       Outcome: TPE completed.   Adverse Reaction: No    Assessment and Plan:  44 y.o. female with Antibody Mediated Rejection (AMR) of Kidney Transplant who underwent TPE #4.  Draw post-procedure labs.  Vascular  access to be managed by clinical team.  The series of TPE have been completed. No further TPE procedures are scheduled at this time.

## 2024-07-16 ENCOUNTER — APPOINTMENT (OUTPATIENT)
Dept: RADIOLOGY | Facility: HOSPITAL | Age: 44
End: 2024-07-16
Payer: COMMERCIAL

## 2024-07-16 LAB
ALBUMIN SERPL BCP-MCNC: 3.5 G/DL (ref 3.4–5)
ANION GAP SERPL CALC-SCNC: 14 MMOL/L (ref 10–20)
BUN SERPL-MCNC: 21 MG/DL (ref 6–23)
CA-I BLD-SCNC: 1.38 MMOL/L (ref 1.1–1.33)
CALCIUM SERPL-MCNC: 9.4 MG/DL (ref 8.6–10.6)
CHLORIDE SERPL-SCNC: 106 MMOL/L (ref 98–107)
CMV DNA SERPL NAA+PROBE-LOG IU: NORMAL {LOG_IU}/ML
CO2 SERPL-SCNC: 21 MMOL/L (ref 21–32)
CREAT SERPL-MCNC: 2.38 MG/DL (ref 0.5–1.05)
EGFRCR SERPLBLD CKD-EPI 2021: 25 ML/MIN/1.73M*2
ERYTHROCYTE [DISTWIDTH] IN BLOOD BY AUTOMATED COUNT: 17.3 % (ref 11.5–14.5)
GLUCOSE SERPL-MCNC: 77 MG/DL (ref 74–99)
HBV CORE AB SER QL: NONREACTIVE
HBV SURFACE AB SER-ACNC: 158.2 MIU/ML
HBV SURFACE AG SERPL QL IA: NONREACTIVE
HCT VFR BLD AUTO: 27.5 % (ref 36–46)
HGB BLD-MCNC: 8.8 G/DL (ref 12–16)
LABORATORY COMMENT REPORT: NOT DETECTED
MAGNESIUM SERPL-MCNC: 2.02 MG/DL (ref 1.6–2.4)
MCH RBC QN AUTO: 31 PG (ref 26–34)
MCHC RBC AUTO-ENTMCNC: 32 G/DL (ref 32–36)
MCV RBC AUTO: 97 FL (ref 80–100)
NRBC BLD-RTO: 0.1 /100 WBCS (ref 0–0)
PHOSPHATE SERPL-MCNC: 5.1 MG/DL (ref 2.5–4.9)
PLATELET # BLD AUTO: 136 X10*3/UL (ref 150–450)
POTASSIUM SERPL-SCNC: 4.7 MMOL/L (ref 3.5–5.3)
RBC # BLD AUTO: 2.84 X10*6/UL (ref 4–5.2)
SODIUM SERPL-SCNC: 136 MMOL/L (ref 136–145)
TACROLIMUS BLD-MCNC: 7.8 NG/ML
WBC # BLD AUTO: 15.3 X10*3/UL (ref 4.4–11.3)

## 2024-07-16 PROCEDURE — 2500000001 HC RX 250 WO HCPCS SELF ADMINISTERED DRUGS (ALT 637 FOR MEDICARE OP)

## 2024-07-16 PROCEDURE — 1100000001 HC PRIVATE ROOM DAILY

## 2024-07-16 PROCEDURE — 2500000002 HC RX 250 W HCPCS SELF ADMINISTERED DRUGS (ALT 637 FOR MEDICARE OP, ALT 636 FOR OP/ED)

## 2024-07-16 PROCEDURE — 76776 US EXAM K TRANSPL W/DOPPLER: CPT | Performed by: STUDENT IN AN ORGANIZED HEALTH CARE EDUCATION/TRAINING PROGRAM

## 2024-07-16 PROCEDURE — 76776 US EXAM K TRANSPL W/DOPPLER: CPT

## 2024-07-16 PROCEDURE — 80069 RENAL FUNCTION PANEL: CPT | Performed by: STUDENT IN AN ORGANIZED HEALTH CARE EDUCATION/TRAINING PROGRAM

## 2024-07-16 PROCEDURE — 2500000004 HC RX 250 GENERAL PHARMACY W/ HCPCS (ALT 636 FOR OP/ED): Performed by: STUDENT IN AN ORGANIZED HEALTH CARE EDUCATION/TRAINING PROGRAM

## 2024-07-16 PROCEDURE — 80197 ASSAY OF TACROLIMUS: CPT

## 2024-07-16 PROCEDURE — 86704 HEP B CORE ANTIBODY TOTAL: CPT

## 2024-07-16 PROCEDURE — 99233 SBSQ HOSP IP/OBS HIGH 50: CPT | Performed by: INTERNAL MEDICINE

## 2024-07-16 PROCEDURE — 86706 HEP B SURFACE ANTIBODY: CPT

## 2024-07-16 PROCEDURE — 85027 COMPLETE CBC AUTOMATED: CPT | Performed by: STUDENT IN AN ORGANIZED HEALTH CARE EDUCATION/TRAINING PROGRAM

## 2024-07-16 PROCEDURE — 83735 ASSAY OF MAGNESIUM: CPT | Performed by: STUDENT IN AN ORGANIZED HEALTH CARE EDUCATION/TRAINING PROGRAM

## 2024-07-16 PROCEDURE — 2500000004 HC RX 250 GENERAL PHARMACY W/ HCPCS (ALT 636 FOR OP/ED)

## 2024-07-16 PROCEDURE — 2500000002 HC RX 250 W HCPCS SELF ADMINISTERED DRUGS (ALT 637 FOR MEDICARE OP, ALT 636 FOR OP/ED): Performed by: STUDENT IN AN ORGANIZED HEALTH CARE EDUCATION/TRAINING PROGRAM

## 2024-07-16 PROCEDURE — 2500000001 HC RX 250 WO HCPCS SELF ADMINISTERED DRUGS (ALT 637 FOR MEDICARE OP): Performed by: STUDENT IN AN ORGANIZED HEALTH CARE EDUCATION/TRAINING PROGRAM

## 2024-07-16 PROCEDURE — 82330 ASSAY OF CALCIUM: CPT

## 2024-07-16 PROCEDURE — 97161 PT EVAL LOW COMPLEX 20 MIN: CPT | Mod: GP

## 2024-07-16 PROCEDURE — 99232 SBSQ HOSP IP/OBS MODERATE 35: CPT | Performed by: SURGERY

## 2024-07-16 RX ORDER — DIPHENHYDRAMINE HCL 25 MG
25 CAPSULE ORAL ONCE
Status: COMPLETED | OUTPATIENT
Start: 2024-07-16 | End: 2024-07-16

## 2024-07-16 RX ORDER — SODIUM CHLORIDE 9 MG/ML
50 INJECTION, SOLUTION INTRAVENOUS CONTINUOUS
Status: DISCONTINUED | OUTPATIENT
Start: 2024-07-17 | End: 2024-07-17

## 2024-07-16 RX ORDER — HEPARIN SODIUM 5000 [USP'U]/ML
5000 INJECTION, SOLUTION INTRAVENOUS; SUBCUTANEOUS EVERY 8 HOURS
Status: DISCONTINUED | OUTPATIENT
Start: 2024-07-16 | End: 2024-07-19 | Stop reason: HOSPADM

## 2024-07-16 RX ORDER — ACETAMINOPHEN 325 MG/1
650 TABLET ORAL ONCE
Status: COMPLETED | OUTPATIENT
Start: 2024-07-16 | End: 2024-07-16

## 2024-07-16 ASSESSMENT — COGNITIVE AND FUNCTIONAL STATUS - GENERAL
MOVING TO AND FROM BED TO CHAIR: A LITTLE
WALKING IN HOSPITAL ROOM: A LITTLE
CLIMB 3 TO 5 STEPS WITH RAILING: A LITTLE
MOVING FROM LYING ON BACK TO SITTING ON SIDE OF FLAT BED WITH BEDRAILS: A LITTLE
DRESSING REGULAR LOWER BODY CLOTHING: A LITTLE
MOBILITY SCORE: 18
MOBILITY SCORE: 22
STANDING UP FROM CHAIR USING ARMS: A LITTLE
TURNING FROM BACK TO SIDE WHILE IN FLAT BAD: A LITTLE
MOVING TO AND FROM BED TO CHAIR: A LITTLE
STANDING UP FROM CHAIR USING ARMS: A LITTLE
DAILY ACTIVITIY SCORE: 24
WALKING IN HOSPITAL ROOM: A LITTLE
DAILY ACTIVITIY SCORE: 23
TURNING FROM BACK TO SIDE WHILE IN FLAT BAD: A LITTLE
MOBILITY SCORE: 18
CLIMB 3 TO 5 STEPS WITH RAILING: A LITTLE
MOVING FROM LYING ON BACK TO SITTING ON SIDE OF FLAT BED WITH BEDRAILS: A LITTLE
MOVING FROM LYING ON BACK TO SITTING ON SIDE OF FLAT BED WITH BEDRAILS: A LITTLE
CLIMB 3 TO 5 STEPS WITH RAILING: A LITTLE

## 2024-07-16 ASSESSMENT — PAIN - FUNCTIONAL ASSESSMENT: PAIN_FUNCTIONAL_ASSESSMENT: 0-10

## 2024-07-16 ASSESSMENT — ACTIVITIES OF DAILY LIVING (ADL): ADL_ASSISTANCE: INDEPENDENT

## 2024-07-16 ASSESSMENT — PAIN SCALES - GENERAL
PAINLEVEL_OUTOF10: 0 - NO PAIN

## 2024-07-16 NOTE — PROGRESS NOTES
Herber Foy Rai is a 44 y.o. female s/p DDKT readmit for ACR/AMR treatment.    - No acute events, Cr uptrending    Objective   Gen: A+OX3; NAD  HEENT: PERRL, sclera anicteric, MMM  Cardiac: RRR  Chest: Normal inspiratory effort  Abdomen: S/NT/ND. Incision well-healed.  Ext: No LE edema    Last Recorded Vitals  Visit Vitals  /87 (BP Location: Right arm, Patient Position: Sitting)   Pulse 65   Temp 36.5 °C (97.7 °F) (Temporal)   Resp 17      Intake/Output last 3 Shifts:    Intake/Output Summary (Last 24 hours) at 7/16/2024 0844  Last data filed at 7/16/2024 0758  Gross per 24 hour   Intake 3080 ml   Output 4980 ml   Net -1900 ml      Vitals:    07/11/24 0551   Weight: 83.2 kg (183 lb 6.4 oz)   Scheduled medications  acetaminophen, 650 mg, oral, Once  amLODIPine, 10 mg, oral, Daily  bisacodyl, 10 mg, rectal, Once  calcitriol, 0.25 mcg, oral, Daily  calcium carbonate, 250 mg, oral, Daily  carvedilol, 25 mg, oral, BID  cholecalciferol, 4,000 Units, oral, Daily  clotrimazole, 10 mg, oral, TID after meals  [Held by provider] furosemide, 40 mg, intravenous, BID  gabapentin, 300 mg, oral, Nightly  hydrALAZINE, 25 mg, oral, BID  levothyroxine, 25 mcg, oral, Daily before breakfast  loratadine, 10 mg, oral, Daily  magnesium oxide, 400 mg, oral, Daily  mycophenolate, 1,000 mg, oral, q12h  pantoprazole, 40 mg, oral, BID AC  polyethylene glycol, 17 g, oral, BID  predniSONE, 20 mg, oral, Daily  sennosides-docusate sodium, 2 tablet, oral, BID  sertraline, 50 mg, oral, Daily  sodium bicarbonate, 650 mg, oral, BID  sulfamethoxazole-trimethoprim, 1 tablet, oral, Daily  sulfur hexafluoride microsphr, 2 mL, intravenous, Once in imaging  tacrolimus, 2.5 mg, oral, q12h JANIE  tenofovir alafenamide, 25 mg, oral, Daily  valGANciclovir, 450 mg, oral, q48h    Assessment/Plan   Principal Problem:    Kidney transplant recipient  Active Problems:  Patient Active Problem List   Diagnosis    ESRD (end stage renal disease) (Multi)    HTN  (hypertension)    Gastroesophageal reflux disease    Kidney replaced by transplant ([facility]HS-HCC)    Immunosuppression (Multi)    Transplant    Fever of unknown origin    Kidney transplant complication ([facility]HS-HCC)    Shortness of breath      - Stop Plasmapheresis given minimal DSA  - IR for repeat biopsy  - Tacrolimus goal 8-10 ng/mL    I spent 35 minutes in the professional and overall care of this patient, including immunosuppression management.    Magi Lambert MD, PHD, MPH, FACS  Chief Transplant and Hepatobiliary Surgery

## 2024-07-16 NOTE — PROGRESS NOTES
Physical Therapy    Physical Therapy Evaluation    Patient Name: Herber Foy Rai  MRN: [id number]  Today's Date: 2024   Room: 9091/9091-A  Time Calculation  Start Time: 1111  Stop Time: 1121  Time Calculation (min): 10 min    Assessment/Plan   PT Assessment  PT Assessment Results: Decreased endurance, Impaired balance, Decreased mobility  Rehab Prognosis: Excellent  Barriers to Discharge: none  Evaluation/Treatment Tolerance: Patient tolerated treatment well  Medical Staff Made Aware: Yes  Strengths: Attitude of self  Barriers to Participation: Comorbidities  End of Session Communication: Bedside nurse  Assessment Comment: 44 year old female DDKT readmit for ACR/AMR treatment. Pt demonstrating deficits in endurance impacting functional mobility.  Pt would benefit from continued PT while in hospital to improve functional mobility and return to PLOF.  End of Session Patient Position: Up in room, Alarm off, not on at start of session  IP OR SWING BED PT PLAN  Inpatient or Swing Bed: Inpatient  PT Plan  Treatment/Interventions: Bed mobility, Transfer training, Gait training, Balance training, Neuromuscular re-education, Strengthening, Endurance training, Therapeutic exercise, Therapeutic activity, Home exercise program, Positioning, Postural re-education  PT Plan: Ongoing PT  PT Frequency: 2 times per week  PT Discharge Recommendations: No PT needed after discharge  PT Recommended Transfer Status: Independent  PT - OK to Discharge: Yes (PT eval complete and DC rec made)    Subjective   General Visit Information:  Reason for Referral: 44 year old female DDKT readmit for ACR/AMR treatment  Past Medical History Relevant to Rehab: ESRD secondary to HTN/NSAID whom received a  donor kidney transplant on 23  Prior to Session Communication: Bedside nurse  Patient Position Received: Up in room, Alarm off, not on at start of session  Family/Caregiver Present: Yes  Caregiver Feedback: pt's family members present at  end of session  General Comment: Pt walking in hallway upon arrival. Pt pleasant, cooperative and willing to work with PT. Pt Divehi speaking, Neha utlized during session.   Home Living:  Home Living  Type of Home: House  Lives With: Spouse, Dependent children  Home Adaptive Equipment: Walker rolling or standard  Home Layout: One level  Home Access: Level entry  Bathroom Shower/Tub: Tub/shower unit  Prior Level of Function:  Prior Function Per Pt/Caregiver Report  Level of [place]: Independent with ADLs and functional transfers, Independent with homemaking with ambulation  Receives Help From: Family  ADL Assistance: Independent  Homemaking Assistance: Independent  Ambulatory Assistance: Independent  Prior Function Comments: pt denies any falls over the last 6 months  Precautions:  Precautions  Post-Surgical Precautions: Abdominal surgery precautions    Objective   Lines/Tubes/Drains:  PICC - Adult 07/05/24 Single lumen Right Cephalic vein (Active)   Number of days: 10     Continuous Medications/Drips:  [START ON 7/17/2024] sodium chloride 0.9%, 50 mL/hr      Pain:  Pain Assessment  Pain Assessment: 0-10  0-10 (Numeric) Pain Score: 0 - No pain  Cognition:  Cognition  Overall Cognitive Status: Within Functional Limits  Orientation Level: Oriented X4    Extremity/Trunk Assessments:  Strength:    RLE   RLE : Within Functional Limits  LLE   LLE : Within Functional Limits    General Assessments:  Strength  Strength Comments: BUEs grossly WFL      Activity Tolerance  Endurance: Decreased tolerance for upright activites  Sensation  Light Touch: No apparent deficits     Static Sitting Balance  Static Sitting-Comment/Number of Minutes: sba  Dynamic Sitting Balance  Dynamic Sitting-Comments: sba  Static Standing Balance  Static Standing-Comment/Number of Minutes: sba  Dynamic Standing Balance  Dynamic Standing-Comments: sba    Functional Assessments:  Bed Mobility  Bed Mobility: No (pt ambulating upon arrival and wanting  to sit on bench at end of session)  Transfers  Transfer: Yes  Transfer 1  Technique 1: Stand to sit, Sit to stand  Transfer Level of Assistance 1: Close supervision  Ambulation/Gait Training  Ambulation/Gait Training Performed: Yes  Ambulation/Gait Training 1  Surface 1: Level tile  Device 1: No device  Assistance 1: Distant supervision  Quality of Gait 1:  (decreased meg)  Comments/Distance (ft) 1: 400ft    Outcome Measures:  [facility] Basic Mobility  Turning from your back to your side while in a flat bed without using bedrails: A little  Moving from lying on your back to sitting on the side of a flat bed without using bedrails: A little  Moving to and from bed to chair (including a wheelchair): A little  Standing up from a chair using your arms (e.g. wheelchair or bedside chair): A little  To walk in hospital room: A little  Climbing 3-5 steps with railing: A little  Basic Mobility - Total Score: 18    Encounter Problems       Encounter Problems (Active)       Mobility       Pt will complete the 6mwt and score within age related normative values.        Start:  07/16/24    Expected End:  07/30/24            Pt will complete 5 x STS and score within age related normative values.         Start:  07/16/24    Expected End:  07/30/24            Pt will ambulate >1000ft with no assistive device and Independent no LOB and VSS.         Start:  07/16/24    Expected End:  07/30/24               Pain - Adult            Education Documentation  Mobility Training, taught by Kristie Wilson PT at 7/16/2024 12:59 PM.  Learner: Patient  Readiness: Acceptance  Method: Explanation  Response: Verbalizes Understanding    Precautions, taught by Kristie Wilson PT at 7/16/2024 12:59 PM.  Learner: Patient  Readiness: Acceptance  Method: Explanation  Response: Verbalizes Understanding    Education Comments  No comments found.      07/16/24 at 1:00 PM   Kristie Wilson PT   Rehab Office: [phone]

## 2024-07-17 ENCOUNTER — TELEPHONE (OUTPATIENT)
Dept: TRANSPLANT | Facility: HOSPITAL | Age: 44
End: 2024-07-17

## 2024-07-17 ENCOUNTER — APPOINTMENT (OUTPATIENT)
Dept: TRANSPLANT | Facility: CLINIC | Age: 44
End: 2024-07-17
Payer: COMMERCIAL

## 2024-07-17 ENCOUNTER — APPOINTMENT (OUTPATIENT)
Dept: RADIOLOGY | Facility: HOSPITAL | Age: 44
End: 2024-07-17
Payer: COMMERCIAL

## 2024-07-17 DIAGNOSIS — Z94.0 KIDNEY REPLACED BY TRANSPLANT (HHS-HCC): ICD-10-CM

## 2024-07-17 LAB
ALBUMIN SERPL BCP-MCNC: 3.4 G/DL (ref 3.4–5)
ANION GAP SERPL CALC-SCNC: 14 MMOL/L (ref 10–20)
BUN SERPL-MCNC: 23 MG/DL (ref 6–23)
CA-I BLD-SCNC: 1.14 MMOL/L (ref 1.1–1.33)
CALCIUM SERPL-MCNC: 8.5 MG/DL (ref 8.6–10.6)
CHLORIDE SERPL-SCNC: 103 MMOL/L (ref 98–107)
CO2 SERPL-SCNC: 19 MMOL/L (ref 21–32)
CREAT SERPL-MCNC: 2.49 MG/DL (ref 0.5–1.05)
EGFRCR SERPLBLD CKD-EPI 2021: 24 ML/MIN/1.73M*2
ERYTHROCYTE [DISTWIDTH] IN BLOOD BY AUTOMATED COUNT: 17.2 % (ref 11.5–14.5)
FERRITIN SERPL-MCNC: 691 NG/ML (ref 8–150)
GLUCOSE SERPL-MCNC: 66 MG/DL (ref 74–99)
HCT VFR BLD AUTO: 28.4 % (ref 36–46)
HGB BLD-MCNC: 8.9 G/DL (ref 12–16)
IRON SATN MFR SERPL: 51 % (ref 25–45)
IRON SERPL-MCNC: 89 UG/DL (ref 35–150)
MAGNESIUM SERPL-MCNC: 1.84 MG/DL (ref 1.6–2.4)
MCH RBC QN AUTO: 30.6 PG (ref 26–34)
MCHC RBC AUTO-ENTMCNC: 31.3 G/DL (ref 32–36)
MCV RBC AUTO: 98 FL (ref 80–100)
NRBC BLD-RTO: 0 /100 WBCS (ref 0–0)
PHOSPHATE SERPL-MCNC: 4.8 MG/DL (ref 2.5–4.9)
PLATELET # BLD AUTO: 167 X10*3/UL (ref 150–450)
POTASSIUM SERPL-SCNC: 4.5 MMOL/L (ref 3.5–5.3)
RBC # BLD AUTO: 2.91 X10*6/UL (ref 4–5.2)
SODIUM SERPL-SCNC: 131 MMOL/L (ref 136–145)
TACROLIMUS BLD-MCNC: 7.5 NG/ML
TIBC SERPL-MCNC: 173 UG/DL (ref 240–445)
UIBC SERPL-MCNC: 84 UG/DL (ref 110–370)
WBC # BLD AUTO: 15.5 X10*3/UL (ref 4.4–11.3)

## 2024-07-17 PROCEDURE — 76942 ECHO GUIDE FOR BIOPSY: CPT | Performed by: RADIOLOGY

## 2024-07-17 PROCEDURE — 2500000001 HC RX 250 WO HCPCS SELF ADMINISTERED DRUGS (ALT 637 FOR MEDICARE OP): Performed by: STUDENT IN AN ORGANIZED HEALTH CARE EDUCATION/TRAINING PROGRAM

## 2024-07-17 PROCEDURE — 2500000002 HC RX 250 W HCPCS SELF ADMINISTERED DRUGS (ALT 637 FOR MEDICARE OP, ALT 636 FOR OP/ED): Performed by: STUDENT IN AN ORGANIZED HEALTH CARE EDUCATION/TRAINING PROGRAM

## 2024-07-17 PROCEDURE — 82330 ASSAY OF CALCIUM: CPT

## 2024-07-17 PROCEDURE — 50200 RENAL BIOPSY PERQ: CPT | Performed by: RADIOLOGY

## 2024-07-17 PROCEDURE — 99233 SBSQ HOSP IP/OBS HIGH 50: CPT | Performed by: INTERNAL MEDICINE

## 2024-07-17 PROCEDURE — 82728 ASSAY OF FERRITIN: CPT | Performed by: INTERNAL MEDICINE

## 2024-07-17 PROCEDURE — 2500000001 HC RX 250 WO HCPCS SELF ADMINISTERED DRUGS (ALT 637 FOR MEDICARE OP)

## 2024-07-17 PROCEDURE — 99232 SBSQ HOSP IP/OBS MODERATE 35: CPT | Performed by: SURGERY

## 2024-07-17 PROCEDURE — 2500000004 HC RX 250 GENERAL PHARMACY W/ HCPCS (ALT 636 FOR OP/ED)

## 2024-07-17 PROCEDURE — 99152 MOD SED SAME PHYS/QHP 5/>YRS: CPT

## 2024-07-17 PROCEDURE — 83735 ASSAY OF MAGNESIUM: CPT | Performed by: STUDENT IN AN ORGANIZED HEALTH CARE EDUCATION/TRAINING PROGRAM

## 2024-07-17 PROCEDURE — 2500000004 HC RX 250 GENERAL PHARMACY W/ HCPCS (ALT 636 FOR OP/ED): Performed by: STUDENT IN AN ORGANIZED HEALTH CARE EDUCATION/TRAINING PROGRAM

## 2024-07-17 PROCEDURE — 2500000002 HC RX 250 W HCPCS SELF ADMINISTERED DRUGS (ALT 637 FOR MEDICARE OP, ALT 636 FOR OP/ED)

## 2024-07-17 PROCEDURE — 7100000010 HC PHASE TWO TIME - EACH INCREMENTAL 1 MINUTE

## 2024-07-17 PROCEDURE — 2720000007 HC OR 272 NO HCPCS

## 2024-07-17 PROCEDURE — 99152 MOD SED SAME PHYS/QHP 5/>YRS: CPT | Performed by: RADIOLOGY

## 2024-07-17 PROCEDURE — 85027 COMPLETE CBC AUTOMATED: CPT | Performed by: STUDENT IN AN ORGANIZED HEALTH CARE EDUCATION/TRAINING PROGRAM

## 2024-07-17 PROCEDURE — 50200 RENAL BIOPSY PERQ: CPT

## 2024-07-17 PROCEDURE — 80069 RENAL FUNCTION PANEL: CPT | Performed by: STUDENT IN AN ORGANIZED HEALTH CARE EDUCATION/TRAINING PROGRAM

## 2024-07-17 PROCEDURE — 2500000004 HC RX 250 GENERAL PHARMACY W/ HCPCS (ALT 636 FOR OP/ED): Performed by: RADIOLOGY

## 2024-07-17 PROCEDURE — 7100000009 HC PHASE TWO TIME - INITIAL BASE CHARGE

## 2024-07-17 PROCEDURE — 83540 ASSAY OF IRON: CPT | Performed by: INTERNAL MEDICINE

## 2024-07-17 PROCEDURE — 80197 ASSAY OF TACROLIMUS: CPT

## 2024-07-17 PROCEDURE — 0TB03ZX EXCISION OF RIGHT KIDNEY, PERCUTANEOUS APPROACH, DIAGNOSTIC: ICD-10-PCS | Performed by: RADIOLOGY

## 2024-07-17 PROCEDURE — 1100000001 HC PRIVATE ROOM DAILY

## 2024-07-17 RX ORDER — FENTANYL CITRATE 50 UG/ML
INJECTION, SOLUTION INTRAMUSCULAR; INTRAVENOUS
Status: COMPLETED | OUTPATIENT
Start: 2024-07-17 | End: 2024-07-17

## 2024-07-17 RX ORDER — MIDAZOLAM HYDROCHLORIDE 1 MG/ML
INJECTION INTRAMUSCULAR; INTRAVENOUS
Status: COMPLETED | OUTPATIENT
Start: 2024-07-17 | End: 2024-07-17

## 2024-07-17 RX ORDER — SODIUM BICARBONATE 650 MG/1
1300 TABLET ORAL 3 TIMES DAILY
Status: DISCONTINUED | OUTPATIENT
Start: 2024-07-17 | End: 2024-07-19 | Stop reason: HOSPADM

## 2024-07-17 ASSESSMENT — PAIN SCALES - GENERAL
PAINLEVEL_OUTOF10: 0 - NO PAIN
PAINLEVEL_OUTOF10: 2
PAINLEVEL_OUTOF10: 3
PAINLEVEL_OUTOF10: 3
PAINLEVEL_OUTOF10: 0 - NO PAIN

## 2024-07-17 ASSESSMENT — COGNITIVE AND FUNCTIONAL STATUS - GENERAL
DAILY ACTIVITIY SCORE: 23
CLIMB 3 TO 5 STEPS WITH RAILING: A LITTLE
DAILY ACTIVITIY SCORE: 24
MOBILITY SCORE: 22
MOVING FROM LYING ON BACK TO SITTING ON SIDE OF FLAT BED WITH BEDRAILS: A LITTLE
MOBILITY SCORE: 24
DRESSING REGULAR LOWER BODY CLOTHING: A LITTLE

## 2024-07-17 ASSESSMENT — ENCOUNTER SYMPTOMS
FEVER: 0
SHORTNESS OF BREATH: 0
CHILLS: 0
DIZZINESS: 0
HEADACHES: 0
ABDOMINAL DISTENTION: 0
ABDOMINAL PAIN: 0

## 2024-07-17 ASSESSMENT — PAIN - FUNCTIONAL ASSESSMENT
PAIN_FUNCTIONAL_ASSESSMENT: 0-10
PAIN_FUNCTIONAL_ASSESSMENT: 0-10

## 2024-07-17 ASSESSMENT — PAIN DESCRIPTION - LOCATION: LOCATION: HEAD

## 2024-07-17 NOTE — PROGRESS NOTES
Herber Foy Rai is a 44 y.o. female s/p DDKT admitted to treat ACR/AMR.    - Cr continues to uptrend    Objective   Gen: A+OX3; NAD  HEENT: PERRL, sclera anicteric, MMM  Cardiac: RRR  Chest: Normal inspiratory effort  Abdomen: S/NT/ND. Incision well-healed.  Ext: No LE edema    Last Recorded Vitals  Visit Vitals  /62 (BP Location: Right arm, Patient Position: Lying)   Pulse 75   Temp 35.9 °C (96.6 °F) (Temporal)   Resp 18      Intake/Output last 3 Shifts:    Intake/Output Summary (Last 24 hours) at 7/17/2024 1925  Last data filed at 7/17/2024 1800  Gross per 24 hour   Intake 767.5 ml   Output 2800 ml   Net -2032.5 ml      Vitals:    07/17/24 0620   Weight: 80.3 kg (177 lb 0.5 oz)   Scheduled medications  amLODIPine, 10 mg, oral, Daily  calcitriol, 0.25 mcg, oral, Daily  calcium carbonate, 250 mg, oral, Daily  carvedilol, 25 mg, oral, BID  cholecalciferol, 4,000 Units, oral, Daily  clotrimazole, 10 mg, oral, TID after meals  [START ON 7/19/2024] darbepoetin jacob, 100 mcg, subcutaneous, Once  [Held by provider] furosemide, 40 mg, intravenous, BID  gabapentin, 300 mg, oral, Nightly  heparin (porcine), 5,000 Units, subcutaneous, q8h  hydrALAZINE, 25 mg, oral, BID  levothyroxine, 25 mcg, oral, Daily before breakfast  loratadine, 10 mg, oral, Daily  magnesium oxide, 400 mg, oral, Daily  mycophenolate, 1,000 mg, oral, q12h  pantoprazole, 40 mg, oral, BID AC  polyethylene glycol, 17 g, oral, BID  predniSONE, 20 mg, oral, Daily  sennosides-docusate sodium, 2 tablet, oral, BID  sertraline, 50 mg, oral, Daily  sodium bicarbonate, 1,300 mg, oral, TID  sulfamethoxazole-trimethoprim, 1 tablet, oral, Daily  tacrolimus, 2.5 mg, oral, q12h JANIE  tenofovir alafenamide, 25 mg, oral, Daily  valGANciclovir, 450 mg, oral, q48h    Assessment/Plan   Principal Problem:    Kidney transplant recipient  Active Problems:  Patient Active Problem List   Diagnosis    ESRD (end stage renal disease) (Multi)    HTN (hypertension)     Gastroesophageal reflux disease    Kidney replaced by transplant ([facility]HS-HCC)    Immunosuppression (Multi)    Transplant    Fever of unknown origin    Kidney transplant complication ([facility]CC)    Shortness of breath      - Stop Plasmapheresis given minimal DSA  - IR for repeat biopsy  - Tacrolimus goal 8-10 ng/mL    I spent 35 minutes in the professional and overall care of this patient, including immunosuppression management.    Magi Lambert MD, PHD, MPH, FACS  Chief Transplant and Hepatobiliary Surgery

## 2024-07-17 NOTE — CARE PLAN
The patient's goals for the shift include Labs WNL    The clinical goals for the shift include Pt will remain HDS    Over the shift, the patient did not make progress toward the following goals.

## 2024-07-17 NOTE — PRE-PROCEDURE NOTE
Interventional Radiology Preprocedure Note - US Guided Renal Transplant Biopsy    Please note purposes of history taken via interpretor service.     Indication for procedure: The primary encounter diagnosis was Shortness of breath. Diagnoses of Elevated brain natriuretic peptide (BNP) level, Complication of transplanted kidney, unspecified complication ([facility]HS-HCC), and Bilateral edema of lower extremity were also pertinent to this visit.    Relevant review of systems: Review of Systems   Constitutional:  Negative for chills and fever.   Respiratory:  Negative for shortness of breath.    Cardiovascular:  Negative for chest pain.   Gastrointestinal:  Negative for abdominal distention and abdominal pain.   Skin: Negative.    Neurological:  Negative for dizziness and headaches.        Relevant Labs:   Lab Results   Component Value Date    CREATININE 2.49 (H) 07/17/2024    EGFR 24 (L) 07/17/2024    INR 0.9 07/15/2024    PROTIME 10.6 07/15/2024       Planned Sedation/Anesthesia: Moderate    Airway assessment: normal    Directed physical examination:    Physical Exam  Constitutional:       Appearance: Normal appearance. She is normal weight.   Cardiovascular:      Rate and Rhythm: Normal rate.      Pulses: Normal pulses.   Pulmonary:      Effort: Pulmonary effort is normal.   Skin:     General: Skin is warm and dry.   Neurological:      Mental Status: She is oriented to person, place, and time. Mental status is at baseline.          Mallampati: II (hard and soft palate, upper portion of tonsils and uvula visible)    ASA Score: ASA 2 - Patient with mild systemic disease with no functional limitations    Benefits, risks and alternatives of procedure and planned sedation have been discussed with the patient and/or their representative. All questions answered and they agree to proceed.

## 2024-07-17 NOTE — CARE PLAN
The patient's goals for the shift include Pt will sleep    The clinical goals for the shift include Pt will remain HDS      Problem: Pain - Adult  Goal: Verbalizes/displays adequate comfort level or baseline comfort level  Outcome: Progressing     Problem: Safety - Adult  Goal: Free from fall injury  Outcome: Progressing     Problem: Discharge Planning  Goal: Discharge to home or other facility with appropriate resources  Outcome: Progressing     Problem: Chronic Conditions and Co-morbidities  Goal: Patient's chronic conditions and co-morbidity symptoms are monitored and maintained or improved  Outcome: Progressing     Problem: Fall/Injury  Goal: Not fall by end of shift  Outcome: Progressing  Goal: Be free from injury by end of the shift  Outcome: Progressing  Goal: Verbalize understanding of personal risk factors for fall in the hospital  Outcome: Progressing  Goal: Verbalize understanding of risk factor reduction measures to prevent injury from fall in the home  Outcome: Progressing  Goal: Use assistive devices by end of the shift  Outcome: Progressing  Goal: Pace activities to prevent fatigue by end of the shift  Outcome: Progressing     Problem: Skin  Goal: Participates in plan/prevention/treatment measures  Outcome: Progressing  Flowsheets (Taken 7/14/2024 1424 by Mariangel Joaquin RN)  Participates in plan/prevention/treatment measures: Increase activity/out of bed for meals  Goal: Prevent/manage excess moisture  Outcome: Progressing  Goal: Prevent/minimize sheer/friction injuries  Outcome: Progressing  Goal: Promote/optimize nutrition  Outcome: Progressing  Flowsheets (Taken 7/16/2024 7816)  Promote/optimize nutrition: Consume > 50% meals/supplements  Goal: Promote skin healing  Outcome: Progressing

## 2024-07-17 NOTE — POST-PROCEDURE NOTE
Interventional Radiology Brief Postprocedure Note - US Guided Renal Transplant Biopsy    Attending: Dr. 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 2 core specimens obtained via 18 g core biopsy needle. No gel-foam placed     Anesthesia:  Local / fentanyl / versed    Complications: None    Estimated Blood Loss: none    Medications  As of 07/17/24 1232      heparin (porcine) injection 5,000 Units (Units) Total dose:  40,000 Units Dosing weight:  79.8      Date/Time Rate/Dose/Volume Action       07/02/24  1916 5,000 Units Given     07/03/24  0346 5,000 Units Given      1030 5,000 Units Given      1620 *5,000 Units Missed      1832 5,000 Units Given      2239 5,000 Units Given     07/04/24  0535 5,000 Units Given      1306 5,000 Units Given      2136 5,000 Units Given               heparin (porcine) injection 5,000 Units (Units) Total dose:  50,000 Units Dosing weight:  80.1      Date/Time Rate/Dose/Volume Action       07/05/24  1301 5,000 Units Given      2144 5,000 Units Given     07/06/24  0554 5,000 Units Given      1302 5,000 Units Given      2308 5,000 Units Given     07/07/24  0619 5,000 Units Given      1349 5,000 Units Given      2230 5,000 Units Given     07/08/24  0618 5,000 Units Given      1347 5,000 Units Given               heparin (porcine) injection 5,000 Units (Units) Total dose:  5,000 Units Dosing weight:  85.1      Date/Time Rate/Dose/Volume Action       07/08/24  2130 5,000 Units Given               heparin (porcine) injection 5,000 Units (Units) Total dose:  5,000 Units Dosing weight:  85      Date/Time Rate/Dose/Volume Action       07/09/24  1441 5,000 Units Given               heparin (porcine) injection 5,000 Units (Units) Total dose:  55,000 Units Dosing weight:  85      Date/Time Rate/Dose/Volume Action        07/10/24  0605 5,000 Units Given      1451 5,000 Units Given      2139 5,000 Units Given     07/11/24  0546 5,000 Units Given      1319 5,000 Units Given      2104 5,000 Units Given     07/12/24  0611 5,000 Units Given      1432 5,000 Units Given      2200 5,000 Units Given     07/13/24  0648 5,000 Units Given      1434 5,000 Units Given               heparin (porcine) injection 5,000 Units (Units) Total dose:  5,000 Units* Dosing weight:  83.2   *Administration not included in total     Date/Time Rate/Dose/Volume Action       07/16/24  1310 5,000 Units Given      1929 *Not included in total Held by provider      1930 *5,000 Units Missed     07/17/24  0330 *5,000 Units Missed      1130 *Not included in total Automatically Held               sodium chloride 0.9% infusion (mL/hr) Total volume:  Not documented* Dosing weight:  79.8   *Total volume has not been documented. View each administration to see the amount administered.     Date/Time Rate/Dose/Volume Action       07/02/24  1926 100 mL/hr New Bag      2326 100 mL/hr Rate Verify     07/03/24  0350 100 mL/hr New Bag      0604 100 mL/hr Rate Verify      0904  Stopped               sodium chloride 0.9% infusion (mL/hr) Total volume:  10 mL* Dosing weight:  85.1   *From user-documented volume     Date/Time Rate/Dose/Volume Action       07/08/24  1119 75 mL/hr New Bag      1127 75 mL/hr - 10 mL Rate Verify      1900 75 mL/hr New Bag     07/09/24  1302  Stopped               sodium chloride 0.9% infusion (mL/hr) Total volume:  Not documented* Dosing weight:  83.2   *Total volume has not been documented. View each administration to see the amount administered.     Date/Time Rate/Dose/Volume Action       07/16/24  2355 50 mL/hr New Bag               acetaminophen (Tylenol) tablet 650 mg (mg) Total dose:  3,250 mg Dosing weight:  79.8      Date/Time Rate/Dose/Volume Action       07/03/24  0854 650 mg Given     07/07/24 2024 650 mg Given     07/09/24 2017 650 mg Given      07/10/24  0548 650 mg Given     07/13/24  2233 650 mg Given               acetaminophen (Tylenol) tablet 650 mg (mg) Total dose:  650 mg Dosing weight:  84.2      Date/Time Rate/Dose/Volume Action       07/07/24  1224 650 mg Given               acetaminophen (Tylenol) tablet 650 mg (mg) Total dose:  650 mg Dosing weight:  85.1      Date/Time Rate/Dose/Volume Action       07/08/24  1921 650 mg Given               acetaminophen (Tylenol) tablet 650 mg (mg) Total dose:  650 mg Dosing weight:  85      Date/Time Rate/Dose/Volume Action       07/09/24  1437 650 mg Given               acetaminophen (Tylenol) tablet 650 mg (mg) Total dose:  650 mg Dosing weight:  85      Date/Time Rate/Dose/Volume Action       07/10/24  1142 650 mg Given               acetaminophen (Tylenol) tablet 650 mg (mg) Total dose:  650 mg Dosing weight:  85      Date/Time Rate/Dose/Volume Action       07/11/24  0912 650 mg Given               acetaminophen (Tylenol) tablet 650 mg (mg) Total dose:  0 mg* Dosing weight:  83.2   *Administration not included in total     Date/Time Rate/Dose/Volume Action       07/11/24  1330 *650 mg Missed               acetaminophen (Tylenol) tablet 650 mg (mg) Total dose:  650 mg Dosing weight:  83.2      Date/Time Rate/Dose/Volume Action       07/12/24  1105 650 mg Given               acetaminophen (Tylenol) tablet 650 mg (mg) Total dose:  650 mg Dosing weight:  83.2      Date/Time Rate/Dose/Volume Action       07/12/24  1440 650 mg Given               acetaminophen (Tylenol) tablet 650 mg (mg) Total dose:  650 mg Dosing weight:  83.2      Date/Time Rate/Dose/Volume Action       07/15/24  1054 650 mg Given               acetaminophen (Tylenol) tablet 650 mg (mg) Total dose:  650 mg Dosing weight:  83.2      Date/Time Rate/Dose/Volume Action       07/13/24  1450 650 mg Given               acetaminophen (Tylenol) tablet 650 mg (mg) Total dose:  650 mg Dosing weight:  83.2      Date/Time Rate/Dose/Volume Action        07/15/24  1620 650 mg Given               acetaminophen (Tylenol) tablet 650 mg (mg) Total dose:  650 mg Dosing weight:  83.2      Date/Time Rate/Dose/Volume Action       07/16/24  1311 650 mg Given               oxyCODONE (Roxicodone) immediate release tablet 5 mg (mg) Total dose:  15 mg Dosing weight:  79.8      Date/Time Rate/Dose/Volume Action       07/04/24  0624 5 mg Given     07/09/24 2017 5 mg Given     07/11/24  1328 5 mg Given               sennosides-docusate sodium (Rose-Colace) 8.6-50 mg per tablet 2 tablet (tablet) Total dose:  56 tablet* Dosing weight:  79.8   *Administration not included in total     Date/Time Rate/Dose/Volume Action       07/02/24 2101 *2 tablet Missed     07/03/24  0855 2 tablet Given      2240 2 tablet Given     07/04/24  0908 2 tablet Given      2135 2 tablet Given     07/05/24  0804 2 tablet Given      2144 2 tablet Given     07/06/24  0844 2 tablet Given      2100 *2 tablet Missed     07/07/24  0840 2 tablet Given      2023 2 tablet Given     07/08/24  1006 2 tablet Given      2036 2 tablet Given     07/09/24  0821 2 tablet Given      2017 2 tablet Given     07/10/24  0900 2 tablet Given      2010 2 tablet Given     07/11/24  0914 2 tablet Given      2020 2 tablet Given     07/12/24  0900 2 tablet Given      2037 2 tablet Given     07/13/24  0807 2 tablet Given      2057 2 tablet Given     07/14/24  0900 2 tablet Given      2010 2 tablet Given     07/15/24  0857 2 tablet Given      2027 2 tablet Given     07/16/24  0900 2 tablet Given      2100 2 tablet Given     07/17/24  0831 2 tablet Given               amLODIPine (Norvasc) tablet 5 mg (mg) Total dose:  20 mg Dosing weight:  79.8      Date/Time Rate/Dose/Volume Action       07/02/24 2101 5 mg Given     07/03/24  0854 5 mg Given      2240 5 mg Given     07/04/24  0907 5 mg Given               amLODIPine (Norvasc) tablet 5 mg (mg) Total dose:  5 mg Dosing weight:  79.4      Date/Time Rate/Dose/Volume Action        07/04/24  1139 5 mg Given               calcitriol (Rocaltrol) capsule 0.25 mcg (mcg) Total dose:  3.75 mcg*   *Administration not included in total     Date/Time Rate/Dose/Volume Action       07/02/24  1910 0.25 mcg Given     07/03/24  0900 *0.25 mcg Missed     07/04/24  0909 0.25 mcg Given     07/05/24  0804 0.25 mcg Given     07/06/24  0844 0.25 mcg Given     07/07/24  0839 0.25 mcg Given     07/08/24  1006 0.25 mcg Given     07/09/24  0820 0.25 mcg Given     07/10/24  0947 0.25 mcg Given     07/11/24  0914 0.25 mcg Given     07/12/24  0802 0.25 mcg Given     07/13/24  0806 0.25 mcg Given     07/14/24  0905 0.25 mcg Given     07/15/24  0856 0.25 mcg Given     07/16/24  0812 0.25 mcg Given     07/17/24  0828 0.25 mcg Given               calcium carbonate (Tums) chewable tablet 250 mg (mg) Total dose:  4,000 mg      Date/Time Rate/Dose/Volume Action       07/02/24  1909 250 mg Given     07/03/24  0855 250 mg Given     07/04/24  0908 250 mg Given     07/05/24  0803 250 mg Given     07/06/24  0844 250 mg Given     07/07/24  0839 250 mg Given     07/08/24  1006 250 mg Given     07/09/24  0821 250 mg Given     07/10/24  0947 250 mg Given     07/11/24  0912 250 mg Given     07/12/24  0803 250 mg Given     07/13/24  0802 250 mg Given     07/14/24  0904 250 mg Given     07/15/24  0856 250 mg Given     07/16/24  0811 250 mg Given     07/17/24  0827 250 mg Given               calcium carbonate (Tums) chewable tablet 1,000 mg (mg) Total dose:  1,000 mg Dosing weight:  85.1      Date/Time Rate/Dose/Volume Action       07/08/24  1223 1,000 mg Given               calcium carbonate (Tums) chewable tablet 500 mg (mg) Total dose:  500 mg Dosing weight:  83.2      Date/Time Rate/Dose/Volume Action       07/14/24  1407 500 mg Given               cholecalciferol (Vitamin D-3) tablet 4,000 Units (Units) Total dose:  64,000 Units Dosing weight:  79.8      Date/Time Rate/Dose/Volume Action       07/02/24  1909 4,000 Units Given      07/03/24  0854 4,000 Units Given     07/04/24  0906 4,000 Units Given     07/05/24  0802 4,000 Units Given     07/06/24  0844 4,000 Units Given     07/07/24  0839 4,000 Units Given     07/08/24  1006 4,000 Units Given     07/09/24  0820 4,000 Units Given     07/10/24  0945 4,000 Units Given     07/11/24  0914 4,000 Units Given     07/12/24  0804 4,000 Units Given     07/13/24  0804 4,000 Units Given     07/14/24  0902 4,000 Units Given     07/15/24  0857 4,000 Units Given     07/16/24  0810 4,000 Units Given     07/17/24  0829 4,000 Units Given               loratadine (Claritin) tablet 10 mg (mg) Total dose:  150 mg* Dosing weight:  79.8   *Administration not included in total     Date/Time Rate/Dose/Volume Action       07/02/24  1909 10 mg Given     07/03/24  0900 *10 mg Missed     07/04/24  0906 10 mg Given     07/05/24  0801 10 mg Given     07/06/24  0843 10 mg Given     07/07/24  0840 10 mg Given     07/08/24  1006 10 mg Given     07/09/24  0819 10 mg Given     07/10/24  0957 10 mg Given     07/11/24  0914 10 mg Given     07/12/24  0802 10 mg Given     07/13/24  0805 10 mg Given     07/14/24  0900 10 mg Given     07/15/24  0900 10 mg Given     07/16/24  0810 10 mg Given     07/17/24  0831 10 mg Given               gabapentin (Neurontin) capsule 300 mg (mg) Total dose:  4,500 mg Dosing weight:  79.8      Date/Time Rate/Dose/Volume Action       07/02/24  2101 300 mg Given     07/03/24 2241 300 mg Given     07/04/24 2136 300 mg Given     07/05/24 2144 300 mg Given     07/06/24 2050 300 mg Given     07/07/24 2023 300 mg Given     07/08/24 2036 300 mg Given     07/09/24 2016 300 mg Given     07/10/24  2010 300 mg Given     07/11/24 2020 300 mg Given     07/12/24 2037 300 mg Given     07/13/24 2056 300 mg Given     07/14/24 2011 300 mg Given     07/15/24  2027 300 mg Given     07/16/24 2018 300 mg Given               levothyroxine (Synthroid, Levoxyl) tablet 25 mcg (mcg) Total dose:  375 mcg Dosing  weight:  79.8      Date/Time Rate/Dose/Volume Action       07/03/24  0727 25 mcg Given     07/04/24  0531 25 mcg Given     07/05/24  0655 25 mcg Given     07/06/24  0604 25 mcg Given     07/07/24  0620 25 mcg Given     07/08/24  1007 25 mcg Given     07/09/24  0623 25 mcg Given     07/10/24  0548 25 mcg Given     07/11/24  0547 25 mcg Given     07/12/24  0613 25 mcg Given     07/13/24  0647 25 mcg Given     07/14/24  0704 25 mcg Given     07/15/24  0614 25 mcg Given     07/16/24  0621 25 mcg Given     07/17/24  0633 25 mcg Given               magnesium oxide (Mag-Ox) tablet 400 mg (mg) Total dose:  6,000 mg* Dosing weight:  79.8   *Administration not included in total     Date/Time Rate/Dose/Volume Action       07/02/24  1909 400 mg Given     07/03/24  0854 400 mg Given     07/04/24  0905 400 mg Given     07/05/24  0804 400 mg Given     07/06/24  0844 400 mg Given     07/07/24  0839 400 mg Given     07/08/24  1006 400 mg Given     07/09/24  0821 400 mg Given     07/10/24  0900 *400 mg Missed     07/11/24  0913 400 mg Given     07/12/24  0805 400 mg Given     07/13/24  0806 400 mg Given     07/14/24  0902 400 mg Given     07/15/24  0858 400 mg Given     07/16/24  0809 400 mg Given     07/17/24  0829 400 mg Given               mycophenolate (Cellcept) capsule 500 mg (mg) Total dose:  2,000 mg Dosing weight:  79.8      Date/Time Rate/Dose/Volume Action       07/02/24  1909 500 mg Given     07/03/24  0726 500 mg Given      2010 500 mg Given     07/04/24  0604 500 mg Given               mycophenolate (Cellcept) capsule 750 mg (mg) Total dose:  3,000 mg Dosing weight:  79.4      Date/Time Rate/Dose/Volume Action       07/04/24  1812 750 mg Given     07/05/24  0655 750 mg Given      1753 750 mg Given     07/06/24  0554 750 mg Given               mycophenolate (Cellcept) capsule 1,000 mg (mg) Total dose:  22,000 mg Dosing weight:  83.2      Date/Time Rate/Dose/Volume Action       07/06/24  1802 1,000 mg Given     07/07/24   0619 1,000 mg Given      1754 1,000 mg Given     07/08/24  0618 1,000 mg Given      1812 1,000 mg Given     07/09/24  0623 1,000 mg Given      1757 1,000 mg Given     07/10/24  0548 1,000 mg Given      1822 1,000 mg Given     07/11/24  0547 1,000 mg Given      1817 1,000 mg Given     07/12/24  0611 1,000 mg Given      1812 1,000 mg Given     07/13/24  0647 1,000 mg Given      1819 1,000 mg Given     07/14/24  0704 1,000 mg Given      1743 1,000 mg Given     07/15/24  0614 1,000 mg Given      1815 1,000 mg Given     07/16/24  0621 1,000 mg Given      1753 1,000 mg Given     07/17/24  0633 1,000 mg Given               pantoprazole (ProtoNix) EC tablet 40 mg (mg) Total dose:  280 mg Dosing weight:  79.8      Date/Time Rate/Dose/Volume Action       07/03/24  0727 40 mg Given     07/04/24  0604 40 mg Given     07/05/24  0655 40 mg Given     07/06/24  0604 40 mg Given     07/07/24  0620 40 mg Given     07/08/24  0618 40 mg Given     07/09/24  0623 40 mg Given               pantoprazole (ProtoNix) EC tablet 40 mg (mg) Total dose:  640 mg Dosing weight:  85      Date/Time Rate/Dose/Volume Action       07/09/24  1545 40 mg Given     07/10/24  0548 40 mg Given      1617 40 mg Given     07/11/24  0547 40 mg Given      1816 40 mg Given     07/12/24  0611 40 mg Given      1539 40 mg Given     07/13/24  0647 40 mg Given      1545 40 mg Given     07/14/24  0704 40 mg Given      1550 40 mg Given     07/15/24  0614 40 mg Given      1516 40 mg Given     07/16/24  0621 40 mg Given      1642 40 mg Given     07/17/24  0633 40 mg Given               potassium citrate CR (Urocit-K-10) ER tablet 20 mEq (mEq) Total dose:  240 mEq Dosing weight:  79.8      Date/Time Rate/Dose/Volume Action       07/02/24  1742 *Not included in total Held by provider      1745 *Not included in total Automatically Held     07/03/24 0900 *Not included in total Automatically Held      0954 *Not included in total Unheld by provider     07/04/24 0900 20 mEq  Given     07/05/24  0900 20 mEq Given     07/06/24  0900 20 mEq Given     07/07/24  0900 20 mEq Given     07/08/24  0900 20 mEq Given     07/09/24  0900 20 mEq Given     07/10/24  0900 20 mEq Given     07/11/24  0900 20 mEq Given     07/12/24  0900 20 mEq Given     07/13/24  0900 20 mEq Given     07/14/24  0900 20 mEq Given     07/15/24  0900 20 mEq Given               predniSONE (Deltasone) tablet 10 mg (mg) Total dose:  20 mg Dosing weight:  79.8      Date/Time Rate/Dose/Volume Action       07/02/24  1920 10 mg Given     07/03/24  0855 10 mg Given               sertraline (Zoloft) tablet 50 mg (mg) Total dose:  800 mg Dosing weight:  79.8      Date/Time Rate/Dose/Volume Action       07/02/24  1909 50 mg Given     07/03/24  0855 50 mg Given     07/04/24  0909 50 mg Given     07/05/24  1254 50 mg Given     07/06/24  0845 50 mg Given     07/07/24  0838 50 mg Given     07/08/24  1006 50 mg Given     07/09/24  0820 50 mg Given     07/10/24  0944 50 mg Given     07/11/24  0900 50 mg Given     07/12/24  0802 50 mg Given     07/13/24  0900 50 mg Given     07/14/24  0908 50 mg Given     07/15/24  0857 50 mg Given     07/16/24  0812 50 mg Given     07/17/24  0828 50 mg Given               SUMAtriptan (Imitrex) tablet 100 mg (mg) Total dose:  200 mg Dosing weight:  79.8      Date/Time Rate/Dose/Volume Action       07/03/24  2241 100 mg Given     07/14/24  0021 100 mg Given               tenofovir alafenamide (Vemlidy) tablet 25 mg (mg) Total dose:  375 mg* Dosing weight:  79.8   *Administration not included in total     Date/Time Rate/Dose/Volume Action       07/02/24  1910 25 mg Given     07/03/24  0900 *25 mg Missed     07/04/24  0909 25 mg Given     07/05/24  0801 25 mg Given     07/06/24  0844 25 mg Given     07/07/24  0839 25 mg Given     07/08/24  1007 25 mg Given     07/09/24  0820 25 mg Given     07/10/24  0944 25 mg Given     07/11/24  0900 25 mg Given     07/12/24  0802 25 mg Given     07/13/24  0807 25 mg Given      07/14/24  0900 25 mg Given     07/15/24  0858 25 mg Given     07/16/24  0900 25 mg Given     07/17/24  0832 25 mg Given               magnesium sulfate IV 2 g (mL/hr) Total volume:  Cannot be calculated* Dosing weight:  79.8   *Total user-documented volume 400 mL may contain volume from other administrations     Date/Time Rate/Dose/Volume Action       07/03/24  0955 2 g - 25 mL/hr (over 120 min) New Bag      1155  (over 120 min) Stopped               magnesium sulfate IV 2 g (mL/hr) Total volume:  Cannot be calculated* Dosing weight:  80.1   *Total user-documented volume 400 mL may contain volume from other administrations     Date/Time Rate/Dose/Volume Action       07/05/24  1305 2 g - 25 mL/hr (over 120 min) New Bag      1505  (over 120 min) Stopped               magnesium sulfate IV 2 g (mL/hr) Total volume:  Cannot be calculated* Dosing weight:  85.1   *Total user-documented volume 400 mL may contain volume from other administrations     Date/Time Rate/Dose/Volume Action       07/08/24  1010 2 g - 25 mL/hr (over 120 min) New Bag      1127 25 mL/hr Rate Verify      1210  (over 120 min) Stopped               magnesium sulfate IV 2 g (mL/hr) Total volume:  Cannot be calculated* Dosing weight:  85   *Total user-documented volume 400 mL may contain volume from other administrations     Date/Time Rate/Dose/Volume Action       07/10/24  0938 2 g - 25 mL/hr (over 120 min) New Bag      1138  (over 120 min) Stopped               magnesium sulfate 2 g in sterile water for injection 50 mL (mL/hr) Total volume:  Cannot be calculated* Dosing weight:  83.2   *Total user-documented volume 400 mL may contain volume from other administrations     Date/Time Rate/Dose/Volume Action       07/12/24  0916 2 g - 25 mL/hr (over 120 min) New Bag      1116  (over 120 min) Stopped               magnesium sulfate 2 g in sterile water for injection 50 mL (mL/hr) Total volume:  Cannot be calculated* Dosing weight:  83.2   *Total  user-documented volume 400 mL may contain volume from other administrations     Date/Time Rate/Dose/Volume Action       07/13/24  0855 2 g - 25 mL/hr (over 120 min) New Bag      1055  (over 120 min) Stopped               furosemide (Lasix) injection 40 mg (mg) Total dose:  80 mg Dosing weight:  79.8      Date/Time Rate/Dose/Volume Action       07/03/24  1030 40 mg Given      2239 40 mg Given               furosemide (Lasix) injection 40 mg (mg) Total dose:  40 mg Dosing weight:  79.4      Date/Time Rate/Dose/Volume Action       07/04/24  1141 40 mg Given               furosemide (Lasix) injection 40 mg (mg) Total dose:  80 mg Dosing weight:  83.2      Date/Time Rate/Dose/Volume Action       07/06/24  1042 40 mg Given     07/07/24  0840 40 mg Given               furosemide (Lasix) injection 40 mg (mg) Total dose:  40 mg Dosing weight:  83.2      Date/Time Rate/Dose/Volume Action       07/06/24  1751 40 mg Given               furosemide (Lasix) injection 40 mg (mg) Total dose:  80 mg* Dosing weight:  84.2   *Administration not included in total     Date/Time Rate/Dose/Volume Action       07/07/24  1603 40 mg Given     07/08/24  1008 40 mg Given      1622 *Not included in total Held by provider      Canceled Entry     07/09/24  0800 *40 mg Missed      1700 *40 mg Missed     07/10/24  0800 *40 mg Missed      1700 *40 mg Missed     07/11/24  0800 *40 mg Missed      1700 *40 mg Missed     07/12/24  0800 *40 mg Missed      1700 *40 mg Missed     07/13/24  0800 *40 mg Missed      1700 *40 mg Missed     07/14/24  0800 *40 mg Missed      1700 *40 mg Missed     07/15/24  0800 *40 mg Missed      1700 *40 mg Missed     07/16/24  0800 *40 mg Missed      1700 *40 mg Missed     07/17/24  0800 *40 mg Missed               perflutren lipid microspheres (Definity) injection 0.5-10 mL of dilution (mL of dilution) Total dose:  1.5 mL of dilution Dosing weight:  79.8      Date/Time Rate/Dose/Volume Action       07/03/24  1631 1.5 mL of  dilution Given               methylPREDNISolone sodium succinate (SOLU-Medrol) 500 mg in dextrose 5% 100 mL IV (mg) Total dose:  0 mg* Dosing weight:  79.8   *Administration not included in total     Date/Time Rate/Dose/Volume Action       07/03/24 2035 *500 mg - 108 mL/hr (over 60 min) Missed               methylPREDNISolone sodium succinate (SOLU-Medrol) 500 mg in dextrose 5% 100 mL IV (mL/hr) Total volume:  Not documented* Dosing weight:  79.4   *Total volume has not been documented. View each administration to see the amount administered.     Date/Time Rate/Dose/Volume Action       07/04/24  1153 500 mg - 108 mL/hr (over 60 min) New Bag      1253  (over 60 min) Stopped     07/05/24  0809 500 mg - 108 mL/hr (over 60 min) New Bag      0909  (over 60 min) Stopped               methylPREDNISolone sodium succinate (SOLU-Medrol) 500 mg in dextrose 5% 100 mL IV (mL/hr) Total volume:  Not documented* Dosing weight:  83.2   *Total volume has not been documented. View each administration to see the amount administered.     Date/Time Rate/Dose/Volume Action       07/06/24  1203 500 mg - 108 mL/hr (over 60 min) New Bag      1303  (over 60 min) Stopped               melatonin tablet 3 mg (mg) Total dose:  6 mg* Dosing weight:  80.7   *Administration not included in total     Date/Time Rate/Dose/Volume Action       07/03/24  2241 3 mg Given     07/06/24 2028 *3 mg Missed     07/09/24 2017 3 mg Given               guaiFENesin (Mucinex) 12 hr tablet 600 mg (mg) Total dose:  600 mg Dosing weight:  79.4      Date/Time Rate/Dose/Volume Action       07/04/24  1139 600 mg Given               potassium chloride CR (Klor-Con M20) ER tablet 40 mEq (mEq) Total dose:  40 mEq Dosing weight:  79.4      Date/Time Rate/Dose/Volume Action       07/04/24  1025 40 mEq Given               potassium chloride CR (Klor-Con M20) ER tablet 20 mEq (mEq) Total dose:  20 mEq Dosing weight:  85.1      Date/Time Rate/Dose/Volume Action       07/08/24   1010 20 mEq Given               hydrALAZINE (Apresoline) injection 10 mg (mg) Total dose:  20 mg Dosing weight:  79.4      Date/Time Rate/Dose/Volume Action       07/09/24  0009 10 mg Given     07/10/24  2010 10 mg Given               sulfamethoxazole-trimethoprim (Bactrim) 400-80 mg per tablet 1 tablet (tablet) Total dose:  14 tablet Dosing weight:  79.4      Date/Time Rate/Dose/Volume Action       07/04/24  1305 1 tablet Given     07/05/24  0803 1 tablet Given     07/06/24  0843 1 tablet Given     07/07/24  0840 1 tablet Given     07/08/24  1006 1 tablet Given     07/09/24  0819 1 tablet Given     07/10/24  0947 1 tablet Given     07/11/24  0913 1 tablet Given     07/12/24  0805 1 tablet Given     07/13/24  0803 1 tablet Given     07/14/24  0904 1 tablet Given     07/15/24  0858 1 tablet Given     07/16/24  0812 1 tablet Given     07/17/24  0833 1 tablet Given               clotrimazole (Mycelex) samia 10 mg (mg) Total dose:  390 mg Dosing weight:  79.4      Date/Time Rate/Dose/Volume Action       07/04/24  1308 10 mg Given      1813 10 mg Given     07/05/24  0804 10 mg Given      1254 10 mg Given      1753 10 mg Given     07/06/24  0844 10 mg Given      1201 10 mg Given      1751 10 mg Given     07/07/24  0839 10 mg Given      1224 10 mg Given      1743 10 mg Given     07/08/24  1007 10 mg Given      1348 10 mg Given      1812 10 mg Given     07/09/24  0821 10 mg Given      1440 10 mg Given      1744 10 mg Given     07/10/24  0945 10 mg Given      1239 10 mg Given      1823 10 mg Given     07/11/24  0913 10 mg Given      1320 10 mg Given      1817 10 mg Given     07/12/24  0806 10 mg Given      1200 10 mg Given      1813 10 mg Given     07/13/24  0804 10 mg Given      1300 10 mg Given      1819 10 mg Given     07/14/24  0902 10 mg Given      1407 10 mg Given      1800 10 mg Given     07/15/24  0856 10 mg Given      1231 10 mg Given      1815 10 mg Given     07/16/24  0813 10 mg Given      1315 10 mg Given       1753 10 mg Given     07/17/24  0828 10 mg Given               valGANciclovir (Valcyte) tablet 450 mg (mg) Total dose:  3,150 mg Dosing weight:  79.4      Date/Time Rate/Dose/Volume Action       07/04/24  1305 450 mg Given     07/06/24  1201 450 mg Given     07/08/24  1223 450 mg Given     07/10/24  1149 450 mg Given     07/12/24  1159 450 mg Given     07/14/24  1407 450 mg Given     07/16/24  1311 450 mg Given               amLODIPine (Norvasc) tablet 10 mg (mg) Total dose:  130 mg Dosing weight:  79.4      Date/Time Rate/Dose/Volume Action       07/05/24  0801 10 mg Given     07/06/24  0844 10 mg Given     07/07/24  0839 10 mg Given     07/08/24  1006 10 mg Given     07/09/24  0821 10 mg Given     07/10/24  0945 10 mg Given     07/11/24  0914 10 mg Given     07/12/24  0803 10 mg Given     07/13/24  0804 10 mg Given     07/14/24  0901 10 mg Given     07/15/24  0858 10 mg Given     07/16/24  0810 10 mg Given     07/17/24  0830 10 mg Given               sodium bicarbonate tablet 650 mg (mg) Total dose:  2,600 mg Dosing weight:  79.4      Date/Time Rate/Dose/Volume Action       07/04/24  1306 650 mg Given      2135 650 mg Given     07/05/24  0803 650 mg Given      2144 650 mg Given               sodium bicarbonate tablet 1,300 mg (mg) Total dose:  19,500 mg Dosing weight:  83.2      Date/Time Rate/Dose/Volume Action       07/06/24  0843 1,300 mg Given      2051 1,300 mg Given     07/07/24  0840 1,300 mg Given      2023 1,300 mg Given     07/08/24  1006 1,300 mg Given      2036 1,300 mg Given     07/09/24  0819 1,300 mg Given      2017 1,300 mg Given     07/10/24  0947 1,300 mg Given      2010 1,300 mg Given     07/11/24  0913 1,300 mg Given      2020 1,300 mg Given     07/12/24  0803 1,300 mg Given      2037 1,300 mg Given     07/13/24 0803 1,300 mg Given               sodium bicarbonate tablet 650 mg (mg) Total dose:  5,200 mg Dosing weight:  83.2      Date/Time Rate/Dose/Volume Action       07/13/24 2057 650 mg  Given     07/14/24  0901 650 mg Given      2010 650 mg Given     07/15/24  0858 650 mg Given      2027 650 mg Given     07/16/24  0809 650 mg Given      2018 650 mg Given     07/17/24  0833 650 mg Given               lactated Ringer's infusion (mL/hr) Total volume:  346.67 mL* Dosing weight:  79.4   *From user-documented volume     Date/Time Rate/Dose/Volume Action       07/05/24  0030 100 mL/hr New Bag      0358 100 mL/hr - 346.67 mL Rate Verify      0611 75 mL/hr Rate Change      1200  Stopped               fentaNYL PF (Sublimaze) injection (mcg) Total dose:  150 mcg      Date/Time Rate/Dose/Volume Action       07/05/24  1220 50 mcg Given      1225 50 mcg Given     07/17/24  1215 50 mcg Given               midazolam (Versed) injection (mg) Total dose:  3 mg      Date/Time Rate/Dose/Volume Action       07/05/24  1220 1 mg Given      1225 1 mg Given     07/17/24  1216 1 mg Given               predniSONE (Deltasone) tablet 20 mg (mg) Total dose:  220 mg Dosing weight:  83.2      Date/Time Rate/Dose/Volume Action       07/07/24  0840 20 mg Given     07/08/24  1006 20 mg Given     07/09/24  0821 20 mg Given     07/10/24  0947 20 mg Given     07/11/24  0913 20 mg Given     07/12/24  0805 20 mg Given     07/13/24  0803 20 mg Given     07/14/24  0904 20 mg Given     07/15/24  0857 20 mg Given     07/16/24  0810 20 mg Given     07/17/24  0830 20 mg Given               calcium gluconate in NS IV 2 g (mL/hr) Total volume:  Not documented* Dosing weight:  84.2   *Total volume has not been documented. View each administration to see the amount administered.     Date/Time Rate/Dose/Volume Action       07/07/24  0840 2 g - 100 mL/hr (over 60 min) New Bag      0940  (over 60 min) Stopped               anti-thymocyte globulin rabbit (Thymoglobulin) 125 mg, hydrocortisone sod succ (PF) (Solu-CORTEF) 20 mg, heparin 1,000 Units in sodium chloride 0.9% 500 mL IV (mL/hr) Total volume:  Not documented* Dosing weight:  84.2   *Total  volume has not been documented. View each administration to see the amount administered.     Date/Time Rate/Dose/Volume Action       07/07/24  1255 125 mg - 87.7 mL/hr (over 360 min) New Bag      1855  (over 360 min) Stopped               anti-thymocyte globulin rabbit (Thymoglobulin) 125 mg, hydrocortisone sod succ (PF) (Solu-CORTEF) 20 mg, heparin 1,000 Units in sodium chloride 0.9% 500 mL IV (mL/hr) Total volume:  Not documented* Dosing weight:  85.1   *Total volume has not been documented. View each administration to see the amount administered.     Date/Time Rate/Dose/Volume Action       07/08/24  2125 125 mg - 87.7 mL/hr (over 360 min) New Bag     07/09/24  0325  (over 360 min) Stopped               anti-thymocyte globulin rabbit (Thymoglobulin) 125 mg, hydrocortisone sod succ (PF) (Solu-CORTEF) 20 mg, heparin 1,000 Units in sodium chloride 0.9% 500 mL IV (mL/hr) Total volume:  Not documented* Dosing weight:  85   *Total volume has not been documented. View each administration to see the amount administered.     Date/Time Rate/Dose/Volume Action       07/10/24  1223 125 mg - 87.7 mL/hr (over 360 min) New Bag      1823  (over 360 min) Stopped               anti-thymocyte globulin rabbit (Thymoglobulin) 125 mg, hydrocortisone sod succ (PF) (Solu-CORTEF) 20 mg, heparin 1,000 Units in sodium chloride 0.9% 500 mL IV (mL/hr) Total dose:  125 mg* Dosing weight:  83.2   *From user-documented volume     Date/Time Rate/Dose/Volume Action       07/13/24  1537 125 mg - 87.7 mL/hr (over 360 min) New Bag      2137 526.4 mL [vol] Stopped               diphenhydrAMINE (BENADryl) capsule 25 mg (mg) Total dose:  25 mg Dosing weight:  84.2      Date/Time Rate/Dose/Volume Action       07/07/24  1224 25 mg Given               diphenhydrAMINE (BENADryl) capsule 25 mg (mg) Total dose:  25 mg Dosing weight:  85.1      Date/Time Rate/Dose/Volume Action       07/08/24  1921 25 mg Given               diphenhydrAMINE (BENADryl) capsule  25 mg (mg) Total dose:  25 mg Dosing weight:  85      Date/Time Rate/Dose/Volume Action       07/09/24  1437 25 mg Given               diphenhydrAMINE (BENADryl) capsule 25 mg (mg) Total dose:  25 mg Dosing weight:  85      Date/Time Rate/Dose/Volume Action       07/10/24  1142 25 mg Given               diphenhydrAMINE (BENADryl) capsule 50 mg (mg) Total dose:  50 mg Dosing weight:  85      Date/Time Rate/Dose/Volume Action       07/11/24  1045 50 mg Given               diphenhydrAMINE (BENADryl) capsule 25 mg (mg) Total dose:  25 mg Dosing weight:  83.2      Date/Time Rate/Dose/Volume Action       07/11/24  1330 25 mg Given               diphenhydrAMINE (BENADryl) capsule 50 mg (mg) Total dose:  50 mg Dosing weight:  83.2      Date/Time Rate/Dose/Volume Action       07/12/24  1105 50 mg Given               diphenhydrAMINE (BENADryl) capsule 25 mg (mg) Total dose:  25 mg Dosing weight:  83.2      Date/Time Rate/Dose/Volume Action       07/12/24  1441 25 mg Given               diphenhydrAMINE (BENADryl) capsule 50 mg (mg) Total dose:  50 mg Dosing weight:  83.2      Date/Time Rate/Dose/Volume Action       07/15/24  1055 50 mg Given               diphenhydrAMINE (BENADryl) capsule 25 mg (mg) Total dose:  25 mg Dosing weight:  83.2      Date/Time Rate/Dose/Volume Action       07/13/24  1450 25 mg Given               diphenhydrAMINE (BENADryl) capsule 25 mg (mg) Total dose:  25 mg Dosing weight:  83.2      Date/Time Rate/Dose/Volume Action       07/15/24  1621 25 mg Given               diphenhydrAMINE (BENADryl) capsule 25 mg (mg) Total dose:  25 mg Dosing weight:  83.2      Date/Time Rate/Dose/Volume Action       07/16/24  1311 25 mg Given               methylPREDNISolone sod succinate (SOLU-Medrol) 40 mg/mL injection 40 mg (mg) Total dose:  40 mg Dosing weight:  84.2      Date/Time Rate/Dose/Volume Action       07/07/24  1223 40 mg Given               methylPREDNISolone sod succinate (SOLU-Medrol) 40 mg/mL injection  40 mg (mg) Total dose:  40 mg Dosing weight:  85.1      Date/Time Rate/Dose/Volume Action       07/08/24  1921 40 mg Given               urea (Ure-Na) oral powder 30 g (g) Total dose:  30 g Dosing weight:  84.2      Date/Time Rate/Dose/Volume Action       07/07/24  1349 30 g Given               albumin human 25 % solution 12.5 g (mL/hr) Total volume:  Not documented* Dosing weight:  85.1   *Total volume has not been documented. View each administration to see the amount administered.     Date/Time Rate/Dose/Volume Action       07/08/24  1119 12.5 g - 50 mL/hr (over 60 min) New Bag      1219  (over 60 min) Stopped      1812 12.5 g - 50 mL/hr (over 60 min) New Bag      1912  (over 60 min) Stopped     07/09/24  0445 12.5 g - 50 mL/hr (over 60 min) New Bag      0545  (over 60 min) Stopped               sodium chloride 0.9 % bolus 500 mL (mL/hr) Total volume:  41.67 mL* Dosing weight:  85.1   *From user-documented volume     Date/Time Rate/Dose/Volume Action       07/08/24  1122 500 mL - 500 mL/hr (over 60 min) New Bag      1127 500 mL/hr - 41.67 mL Rate Verify      1222  (over 60 min) Stopped               sodium chloride 0.9 % bolus 1,000 mL (mL/hr) Total volume:  999 mL* Dosing weight:  83.2   *From user-documented volume     Date/Time Rate/Dose/Volume Action       07/14/24  1141 1,000 mL - 999 mL/hr (over 60 min) New Bag      1241  (over 60 min) Stopped      1444 999 mL                tacrolimus (Prograf) capsule 2 mg (mg) Total dose:  14 mg Dosing weight:  85.1      Date/Time Rate/Dose/Volume Action       07/09/24  0623 2 mg Given      1757 2 mg Given     07/10/24  0548 2 mg Given      1823 2 mg Given     07/11/24  0547 2 mg Given      1817 2 mg Given     07/12/24  0611 2 mg Given               tacrolimus (Prograf) capsule 3 mg (mg) Total dose:  6 mg Dosing weight:  83.2      Date/Time Rate/Dose/Volume Action       07/12/24  1812 3 mg Given     07/13/24  0647 3 mg Given               plasma exchange albumin human 5  % bottle 12.5 g (mL/hr) Total volume:  Not documented* Dosing weight:  85   *Total volume has not been documented. View each administration to see the amount administered.     Date/Time Rate/Dose/Volume Action       07/09/24  1028 12.5 g - 999 mL/hr (over 15 min) New Bag      1034 12.5 g - 999 mL/hr (over 15 min) New Bag      1040 12.5 g - 999 mL/hr (over 15 min) New Bag      1047 12.5 g - 999 mL/hr (over 15 min) New Bag      1053 12.5 g - 999 mL/hr (over 15 min) New Bag      1101 12.5 g - 999 mL/hr (over 15 min) New Bag      1107 12.5 g - 999 mL/hr (over 15 min) New Bag      1112 12.5 g - 999 mL/hr (over 15 min) New Bag      1119 12.5 g - 999 mL/hr (over 15 min) New Bag      1125 12.5 g - 999 mL/hr (over 15 min) New Bag      1140  (over 15 min) Stopped               plasma exchange albumin human 5 % bottle 12.5 g (mL/hr) Total volume:  Not documented* Dosing weight:  83.2   *Total volume has not been documented. View each administration to see the amount administered.     Date/Time Rate/Dose/Volume Action       07/11/24  1022 12.5 g - 999 mL/hr (over 15 min) New Bag      1027 12.5 g - 999 mL/hr (over 15 min) New Bag      1034 12.5 g - 999 mL/hr (over 15 min) New Bag      1041 12.5 g - 999 mL/hr (over 15 min) New Bag      1047 12.5 g - 999 mL/hr (over 15 min) New Bag      1055 12.5 g - 999 mL/hr (over 15 min) New Bag      1101 12.5 g - 999 mL/hr (over 15 min) New Bag      1108 12.5 g - 999 mL/hr (over 15 min) New Bag      1115  (over 15 min) Stopped               plasma exchange albumin human 5 % bottle 12.5 g (mL/hr) Total volume:  Not documented* Dosing weight:  83.2   *Total volume has not been documented. View each administration to see the amount administered.     Date/Time Rate/Dose/Volume Action       07/12/24  1058 12.5 g - 999 mL/hr (over 15 min) New Bag      1104 12.5 g - 999 mL/hr (over 15 min) New Bag      1110 12.5 g - 999 mL/hr (over 15 min) New Bag      1115 12.5 g - 999 mL/hr (over 15 min) New Bag       1122 12.5 g - 999 mL/hr (over 15 min) New Bag      1128 12.5 g - 999 mL/hr (over 15 min) New Bag      1133 12.5 g - 999 mL/hr (over 15 min) New Bag      1140  (over 15 min) Stopped               plasma exchange albumin human 5 % bottle 12.5 g (mL/hr) Total volume:  Not documented* Dosing weight:  83.2   *Total volume has not been documented. View each administration to see the amount administered.     Date/Time Rate/Dose/Volume Action       07/15/24  1041 12.5 g - 999 mL/hr (over 15 min) New Bag      1048 12.5 g - 999 mL/hr (over 15 min) New Bag      1053 12.5 g - 999 mL/hr (over 15 min) New Bag      1100 12.5 g - 999 mL/hr (over 15 min) New Bag      1105 12.5 g - 999 mL/hr (over 15 min) New Bag      1113 12.5 g - 999 mL/hr (over 15 min) New Bag      1118 12.5 g - 999 mL/hr (over 15 min) New Bag      1124 12.5 g - 999 mL/hr (over 15 min) New Bag      1139  (over 15 min) Stopped               calcium gluconate in NS apheresis IV 5,002 mg (mg) Total dose:  5,002 mg Dosing weight:  85      Date/Time Rate/Dose/Volume Action       07/09/24  1000 5,002 mg New Bag      1133  Stopped               calcium gluconate in NS apheresis IV 5,080 mg (mg) Total dose:  5,080 mg Dosing weight:  83.2      Date/Time Rate/Dose/Volume Action       07/11/24  1015 5,080 mg New Bag      1155  Stopped               calcium gluconate in NS apheresis IV 5,279 mg (mg) Total dose:  5,279 mg Dosing weight:  83.2      Date/Time Rate/Dose/Volume Action       07/12/24  1030 5,279 mg New Bag      1100  Stopped               calcium gluconate in NS apheresis IV 4,719 mg (mg) Total dose:  4,719 mg Dosing weight:  83.2      Date/Time Rate/Dose/Volume Action       07/15/24  1035 4,719 mg New Bag      1211  Stopped               immune globulin (human) (Gammagard Liquid 10%) infusion 10 g (g) Total dose:  30 g Dosing weight:  83.2      Date/Time Rate/Dose/Volume Action       07/11/24  1320 10 g New Bag     07/12/24  1542 10 g New Bag      1800   Stopped     07/15/24  1745 10 g New Bag               immune globulin (human) (Gammagard Liquid 10%) infusion 80 g (g) Total dose:  80 g Dosing weight:  83.2      Date/Time Rate/Dose/Volume Action       07/16/24  1400 80 g New Bag               immune globulin (human) (Gammagard Liquid 10%) infusion 10 g (mL/hr) Total volume:  Not documented* Dosing weight:  85   *Total volume has not been documented. View each administration to see the amount administered.     Date/Time Rate/Dose/Volume Action       07/09/24  1520 10 g - 42.45 mL/hr New Bag      1550  Rate Change      1620  Rate Change               polyethylene glycol (Glycolax, Miralax) packet 17 g (g) Total dose:  17 g* Dosing weight:  85   *Administration not included in total     Date/Time Rate/Dose/Volume Action       07/10/24  1010 17 g Given     07/11/24  0900 *17 g Missed     07/12/24 0900 *17 g Missed     07/13/24 0900 *17 g Missed               polyethylene glycol (Glycolax, Miralax) packet 17 g (g) Total dose:  0 g* Dosing weight:  83.2   *Administration not included in total     Date/Time Rate/Dose/Volume Action       07/14/24  0900 *17 g Missed      2010 *17 g Missed     07/15/24  0900 *17 g Missed      2027 *17 g Missed     07/16/24 0900 *17 g Missed      2019 *17 g Missed     07/17/24 0900 *17 g Missed               carvedilol (Coreg) tablet 12.5 mg (mg) Total dose:  75 mg Dosing weight:  83.2      Date/Time Rate/Dose/Volume Action       07/11/24  1320 12.5 mg Given      2020 12.5 mg Given     07/12/24  0804 12.5 mg Given      2037 12.5 mg Given     07/13/24  0804 12.5 mg Given      2056 12.5 mg Given               darbepoetin jacob (Aranesp) injection 100 mcg (mcg) Total dose:  100 mcg Dosing weight:  83.2      Date/Time Rate/Dose/Volume Action       07/12/24  1158 100 mcg Given               tacrolimus (Prograf) capsule 3.5 mg (mg) Total volume:  Not documented* Dosing weight:  83.2   *Total volume has not been documented. View each  administration to see the amount administered.     Date/Time Rate/Dose/Volume Action       07/13/24  1817 3.5 mg Given     07/14/24  0704 3.5 mg Given               tacrolimus (Prograf) capsule 2.5 mg (mg) Total volume:  Not documented* Dosing weight:  83.2   *Total volume has not been documented. View each administration to see the amount administered.     Date/Time Rate/Dose/Volume Action       07/15/24  0614 2.5 mg Given      1815 2.5 mg Given     07/16/24  0621 2.5 mg Given      1754 2.5 mg Given     07/17/24  0633 2.5 mg Given               carvedilol (Coreg) tablet 25 mg (mg) Total dose:  175 mg Dosing weight:  83.2      Date/Time Rate/Dose/Volume Action       07/14/24  0903 25 mg Given      2010 25 mg Given     07/15/24  0857 25 mg Given      2027 25 mg Given     07/16/24  0809 25 mg Given      2019 25 mg Given     07/17/24  0831 25 mg Given               magnesium sulfate 4 g in sterile water for injection 100 mL (mL/hr) Total volume:  Cannot be calculated* Dosing weight:  83.2   *Total user-documented volume 400 mL may contain volume from other administrations     Date/Time Rate/Dose/Volume Action       07/14/24  0923 4 g - 25 mL/hr (over 240 min) New Bag      1323  (over 240 min) Stopped               bisacodyl (Dulcolax) suppository 10 mg (mg) Total dose:  0 mg* Dosing weight:  83.2   *Administration not included in total     Date/Time Rate/Dose/Volume Action       07/14/24  0800 *10 mg Missed               hydrALAZINE (Apresoline) tablet 25 mg (mg) Total dose:  175 mg Dosing weight:  83.2      Date/Time Rate/Dose/Volume Action       07/14/24  0900 25 mg Given      2010 25 mg Given     07/15/24  0857 25 mg Given      2027 25 mg Given     07/16/24  0809 25 mg Given      2019 25 mg Given     07/17/24  0833 25 mg Given                   Two core specimens collected      See detailed result report with images in PACS.    The patient tolerated the procedure well without incident or complication and is in  stable condition.

## 2024-07-17 NOTE — PROGRESS NOTES
Music Therapy Note    Herber Foy Rai     Therapy Session  Referral Type: New referral this admission  Visit Type: Follow-up visit  Session Start Time: 1044  Session End Time: 1047  Intervention Delivery: In-person  Conflict of Service: Declined treatment  Number of family members present: 2  Family Participation: Supportive               Treatment/Interventions       Post-assessment  Total Session Time (min): 3 minutes    Narrative  Assessment Detail: Patient seen both at this time and later at 11:10. Patient declining music intervention but agreeable to having a speaker to borrow. MT retrieved a speaker compatible with pt's phone and returned to give it to pt.  Follow-up: MT to hold off on follow-up, but will plan to return to retrieve speaker from pt before pt discharges if possible. Patient has been instructed to return it to the nursing desk if she discharges before MT can retrieve it.    Education Documentation  No documentation found.

## 2024-07-18 LAB
ALBUMIN SERPL BCP-MCNC: 3.2 G/DL (ref 3.4–5)
ALBUMIN SERPL BCP-MCNC: 3.2 G/DL (ref 3.4–5)
ANION GAP SERPL CALC-SCNC: 10 MMOL/L (ref 10–20)
ANION GAP SERPL CALC-SCNC: 12 MMOL/L (ref 10–20)
BUN SERPL-MCNC: 22 MG/DL (ref 6–23)
BUN SERPL-MCNC: 34 MG/DL (ref 6–23)
CA-I BLD-SCNC: 1.24 MMOL/L (ref 1.1–1.33)
CALCIUM SERPL-MCNC: 7.6 MG/DL (ref 8.6–10.6)
CALCIUM SERPL-MCNC: 8.2 MG/DL (ref 8.6–10.6)
CHLORIDE SERPL-SCNC: 103 MMOL/L (ref 98–107)
CHLORIDE SERPL-SCNC: 103 MMOL/L (ref 98–107)
CO2 SERPL-SCNC: 17 MMOL/L (ref 21–32)
CO2 SERPL-SCNC: 19 MMOL/L (ref 21–32)
CREAT SERPL-MCNC: 2.34 MG/DL (ref 0.5–1.05)
CREAT SERPL-MCNC: 2.36 MG/DL (ref 0.5–1.05)
EGFRCR SERPLBLD CKD-EPI 2021: 25 ML/MIN/1.73M*2
EGFRCR SERPLBLD CKD-EPI 2021: 25 ML/MIN/1.73M*2
ERYTHROCYTE [DISTWIDTH] IN BLOOD BY AUTOMATED COUNT: 17.2 % (ref 11.5–14.5)
GLUCOSE SERPL-MCNC: 145 MG/DL (ref 74–99)
GLUCOSE SERPL-MCNC: 75 MG/DL (ref 74–99)
HCT VFR BLD AUTO: 27.4 % (ref 36–46)
HGB BLD-MCNC: 8.6 G/DL (ref 12–16)
MAGNESIUM SERPL-MCNC: 1.93 MG/DL (ref 1.6–2.4)
MCH RBC QN AUTO: 30.5 PG (ref 26–34)
MCHC RBC AUTO-ENTMCNC: 31.4 G/DL (ref 32–36)
MCV RBC AUTO: 97 FL (ref 80–100)
NRBC BLD-RTO: 0 /100 WBCS (ref 0–0)
PHOSPHATE SERPL-MCNC: 3.9 MG/DL (ref 2.5–4.9)
PHOSPHATE SERPL-MCNC: 4.7 MG/DL (ref 2.5–4.9)
PLATELET # BLD AUTO: 173 X10*3/UL (ref 150–450)
POTASSIUM SERPL-SCNC: 4.3 MMOL/L (ref 3.5–5.3)
POTASSIUM SERPL-SCNC: 5.2 MMOL/L (ref 3.5–5.3)
RBC # BLD AUTO: 2.82 X10*6/UL (ref 4–5.2)
SODIUM SERPL-SCNC: 127 MMOL/L (ref 136–145)
SODIUM SERPL-SCNC: 128 MMOL/L (ref 136–145)
TACROLIMUS BLD-MCNC: 15.4 NG/ML
WBC # BLD AUTO: 14.4 X10*3/UL (ref 4.4–11.3)

## 2024-07-18 PROCEDURE — 80069 RENAL FUNCTION PANEL: CPT | Performed by: STUDENT IN AN ORGANIZED HEALTH CARE EDUCATION/TRAINING PROGRAM

## 2024-07-18 PROCEDURE — 2500000004 HC RX 250 GENERAL PHARMACY W/ HCPCS (ALT 636 FOR OP/ED)

## 2024-07-18 PROCEDURE — 2500000001 HC RX 250 WO HCPCS SELF ADMINISTERED DRUGS (ALT 637 FOR MEDICARE OP): Performed by: STUDENT IN AN ORGANIZED HEALTH CARE EDUCATION/TRAINING PROGRAM

## 2024-07-18 PROCEDURE — 85027 COMPLETE CBC AUTOMATED: CPT | Performed by: STUDENT IN AN ORGANIZED HEALTH CARE EDUCATION/TRAINING PROGRAM

## 2024-07-18 PROCEDURE — 80197 ASSAY OF TACROLIMUS: CPT

## 2024-07-18 PROCEDURE — 2500000001 HC RX 250 WO HCPCS SELF ADMINISTERED DRUGS (ALT 637 FOR MEDICARE OP)

## 2024-07-18 PROCEDURE — 83735 ASSAY OF MAGNESIUM: CPT | Performed by: STUDENT IN AN ORGANIZED HEALTH CARE EDUCATION/TRAINING PROGRAM

## 2024-07-18 PROCEDURE — 99232 SBSQ HOSP IP/OBS MODERATE 35: CPT | Performed by: SURGERY

## 2024-07-18 PROCEDURE — 2500000002 HC RX 250 W HCPCS SELF ADMINISTERED DRUGS (ALT 637 FOR MEDICARE OP, ALT 636 FOR OP/ED)

## 2024-07-18 PROCEDURE — 2500000002 HC RX 250 W HCPCS SELF ADMINISTERED DRUGS (ALT 637 FOR MEDICARE OP, ALT 636 FOR OP/ED): Performed by: STUDENT IN AN ORGANIZED HEALTH CARE EDUCATION/TRAINING PROGRAM

## 2024-07-18 PROCEDURE — 99233 SBSQ HOSP IP/OBS HIGH 50: CPT | Performed by: INTERNAL MEDICINE

## 2024-07-18 PROCEDURE — 2500000004 HC RX 250 GENERAL PHARMACY W/ HCPCS (ALT 636 FOR OP/ED): Performed by: STUDENT IN AN ORGANIZED HEALTH CARE EDUCATION/TRAINING PROGRAM

## 2024-07-18 PROCEDURE — 1100000001 HC PRIVATE ROOM DAILY

## 2024-07-18 PROCEDURE — 2500000001 HC RX 250 WO HCPCS SELF ADMINISTERED DRUGS (ALT 637 FOR MEDICARE OP): Performed by: INTERNAL MEDICINE

## 2024-07-18 PROCEDURE — 82330 ASSAY OF CALCIUM: CPT

## 2024-07-18 RX ORDER — PANTOPRAZOLE SODIUM 40 MG/1
40 TABLET, DELAYED RELEASE ORAL
Status: DISCONTINUED | OUTPATIENT
Start: 2024-07-19 | End: 2024-07-19 | Stop reason: HOSPADM

## 2024-07-18 RX ORDER — TACROLIMUS 1 MG/1
2 CAPSULE ORAL
Status: DISCONTINUED | OUTPATIENT
Start: 2024-07-18 | End: 2024-07-19 | Stop reason: HOSPADM

## 2024-07-18 RX ORDER — SODIUM CHLORIDE 9 MG/ML
60 INJECTION, SOLUTION INTRAVENOUS CONTINUOUS
Status: ACTIVE | OUTPATIENT
Start: 2024-07-18 | End: 2024-07-19

## 2024-07-18 ASSESSMENT — COGNITIVE AND FUNCTIONAL STATUS - GENERAL
MOVING FROM LYING ON BACK TO SITTING ON SIDE OF FLAT BED WITH BEDRAILS: A LITTLE
DRESSING REGULAR LOWER BODY CLOTHING: A LITTLE
CLIMB 3 TO 5 STEPS WITH RAILING: A LITTLE
DAILY ACTIVITIY SCORE: 23
DAILY ACTIVITIY SCORE: 24
MOBILITY SCORE: 24
MOBILITY SCORE: 22

## 2024-07-18 ASSESSMENT — PAIN - FUNCTIONAL ASSESSMENT
PAIN_FUNCTIONAL_ASSESSMENT: 0-10

## 2024-07-18 ASSESSMENT — PAIN SCALES - GENERAL
PAINLEVEL_OUTOF10: 0 - NO PAIN

## 2024-07-18 NOTE — CARE PLAN
The patient's goals for the shift include no more headaches    The clinical goals for the shift include Pt will Remain HDS      Problem: Pain - Adult  Goal: Verbalizes/displays adequate comfort level or baseline comfort level  Outcome: Progressing     Problem: Safety - Adult  Goal: Free from fall injury  Outcome: Progressing     Problem: Discharge Planning  Goal: Discharge to home or other facility with appropriate resources  Outcome: Progressing     Problem: Chronic Conditions and Co-morbidities  Goal: Patient's chronic conditions and co-morbidity symptoms are monitored and maintained or improved  Outcome: Progressing     Problem: Fall/Injury  Goal: Not fall by end of shift  Outcome: Progressing  Goal: Be free from injury by end of the shift  Outcome: Progressing  Goal: Verbalize understanding of personal risk factors for fall in the hospital  Outcome: Progressing  Goal: Verbalize understanding of risk factor reduction measures to prevent injury from fall in the home  Outcome: Progressing  Goal: Use assistive devices by end of the shift  Outcome: Progressing  Goal: Pace activities to prevent fatigue by end of the shift  Outcome: Progressing     Problem: Skin  Goal: Participates in plan/prevention/treatment measures  Outcome: Progressing  Goal: Prevent/manage excess moisture  Outcome: Progressing  Goal: Prevent/minimize sheer/friction injuries  Outcome: Progressing  Goal: Promote/optimize nutrition  Outcome: Progressing  Goal: Promote skin healing  Outcome: Progressing

## 2024-07-18 NOTE — PROGRESS NOTES
Art Therapy Note    Herber Foy Rai     Therapy Session  Referral Type: New referral this admission  Visit Type: New visit  Session Start Time: 1341  Session End Time: 1342  Intervention Delivery: In-person  Conflict of Service: Declined treatment              Treatment/Interventions       Post-assessment  Total Session Time (min): 1 minutes    Narrative  Assessment Detail: Pt was lying down in bed when ATR visited, introduced self and services using google . Pt declined services but nodded that could stop back another time.  Follow-up: ATR will f/u with pt another day.    Education Documentation  No documentation found.

## 2024-07-18 NOTE — PROGRESS NOTES
Herber Foy Rai is a 44 y.o. female s/p DDKT readmit with ACR/AMR.    - No acute events    Objective   Gen: A+OX3; NAD  HEENT: PERRL, sclera anicteric, MMM  Cardiac: RRR  Chest: Normal inspiratory effort  Abdomen: S/NT/ND. Incision C/D/I.  Ext: No LE edema    Last Recorded Vitals  Visit Vitals  /71 (BP Location: Right arm, Patient Position: Lying)   Pulse 70   Temp 36.1 °C (97 °F) (Temporal)   Resp 17      Intake/Output last 3 Shifts:    Intake/Output Summary (Last 24 hours) at 7/18/2024 1440  Last data filed at 7/18/2024 1132  Gross per 24 hour   Intake 946.5 ml   Output 2300 ml   Net -1353.5 ml      Vitals:    07/18/24 0611   Weight: 81.1 kg (178 lb 12.7 oz)   Scheduled medications  amLODIPine, 10 mg, oral, Daily  calcitriol, 0.25 mcg, oral, Daily  calcium carbonate, 250 mg, oral, Daily  carvedilol, 25 mg, oral, BID  cholecalciferol, 4,000 Units, oral, Daily  clotrimazole, 10 mg, oral, TID after meals  [START ON 7/19/2024] darbepoetin jacob, 100 mcg, subcutaneous, Once  [Held by provider] furosemide, 40 mg, intravenous, BID  gabapentin, 300 mg, oral, Nightly  heparin (porcine), 5,000 Units, subcutaneous, q8h  hydrALAZINE, 25 mg, oral, BID  levothyroxine, 25 mcg, oral, Daily before breakfast  loratadine, 10 mg, oral, Daily  magnesium oxide, 400 mg, oral, Daily  mycophenolate, 1,000 mg, oral, q12h  [START ON 7/19/2024] pantoprazole, 40 mg, oral, Daily before breakfast  polyethylene glycol, 17 g, oral, BID  predniSONE, 20 mg, oral, Daily  sennosides-docusate sodium, 2 tablet, oral, BID  sertraline, 50 mg, oral, Daily  sodium bicarbonate, 1,300 mg, oral, TID  sulfamethoxazole-trimethoprim, 1 tablet, oral, Daily  tacrolimus, 2 mg, oral, q12h JANIE  tenofovir alafenamide, 25 mg, oral, Daily  valGANciclovir, 450 mg, oral, q48h    Assessment/Plan   Principal Problem:    Kidney transplant recipient  Active Problems:  Patient Active Problem List   Diagnosis    ESRD (end stage renal disease) (Multi)    HTN (hypertension)     Gastroesophageal reflux disease    Kidney replaced by transplant (HHS-HCC)    Immunosuppression (Multi)    Transplant    Fever of unknown origin    Kidney transplant complication (HHS-HCC)    Shortness of breath      - Pending biopsy path report  - Tacrolimus goal 8-10 ng/mL    I spent 35 minutes in the professional and overall care of this patient, including immunosuppression management.    Magi Lambert MD, PHD, MPH, FACS  Chief Transplant and Hepatobiliary Surgery

## 2024-07-18 NOTE — CARE PLAN
The patient's goals for the shift include Labs WNL    The clinical goals for the shift include pt will remain HDS      Problem: Pain - Adult  Goal: Verbalizes/displays adequate comfort level or baseline comfort level  Outcome: Progressing     Problem: Safety - Adult  Goal: Free from fall injury  Outcome: Progressing     Problem: Discharge Planning  Goal: Discharge to home or other facility with appropriate resources  Outcome: Progressing     Problem: Chronic Conditions and Co-morbidities  Goal: Patient's chronic conditions and co-morbidity symptoms are monitored and maintained or improved  Outcome: Progressing     Problem: Fall/Injury  Goal: Not fall by end of shift  Outcome: Progressing  Goal: Be free from injury by end of the shift  Outcome: Progressing  Goal: Verbalize understanding of personal risk factors for fall in the hospital  Outcome: Progressing  Goal: Verbalize understanding of risk factor reduction measures to prevent injury from fall in the home  Outcome: Progressing  Goal: Use assistive devices by end of the shift  Outcome: Progressing  Goal: Pace activities to prevent fatigue by end of the shift  Outcome: Progressing     Problem: Skin  Goal: Participates in plan/prevention/treatment measures  Outcome: Progressing  Goal: Prevent/manage excess moisture  Outcome: Progressing  Goal: Prevent/minimize sheer/friction injuries  Outcome: Progressing  Goal: Promote/optimize nutrition  Outcome: Progressing  Goal: Promote skin healing  Outcome: Progressing

## 2024-07-19 ENCOUNTER — DOCUMENTATION (OUTPATIENT)
Dept: TRANSPLANT | Facility: HOSPITAL | Age: 44
End: 2024-07-19
Payer: COMMERCIAL

## 2024-07-19 ENCOUNTER — PHARMACY VISIT (OUTPATIENT)
Dept: PHARMACY | Facility: CLINIC | Age: 44
End: 2024-07-19
Payer: COMMERCIAL

## 2024-07-19 VITALS
SYSTOLIC BLOOD PRESSURE: 104 MMHG | OXYGEN SATURATION: 96 % | TEMPERATURE: 97.9 F | DIASTOLIC BLOOD PRESSURE: 54 MMHG | RESPIRATION RATE: 17 BRPM | BODY MASS INDEX: 31.01 KG/M2 | WEIGHT: 181.66 LBS | HEART RATE: 67 BPM | HEIGHT: 64 IN

## 2024-07-19 LAB
ALBUMIN SERPL BCP-MCNC: 3.2 G/DL (ref 3.4–5)
ANION GAP SERPL CALC-SCNC: 13 MMOL/L (ref 10–20)
BUN SERPL-MCNC: 30 MG/DL (ref 6–23)
CA-I BLD-SCNC: 1.2 MMOL/L (ref 1.1–1.33)
CALCIUM SERPL-MCNC: 7.7 MG/DL (ref 8.6–10.6)
CHLORIDE SERPL-SCNC: 103 MMOL/L (ref 98–107)
CO2 SERPL-SCNC: 17 MMOL/L (ref 21–32)
CREAT SERPL-MCNC: 2.56 MG/DL (ref 0.5–1.05)
EGFRCR SERPLBLD CKD-EPI 2021: 23 ML/MIN/1.73M*2
ERYTHROCYTE [DISTWIDTH] IN BLOOD BY AUTOMATED COUNT: 17 % (ref 11.5–14.5)
GLUCOSE SERPL-MCNC: 65 MG/DL (ref 74–99)
HCT VFR BLD AUTO: 25.4 % (ref 36–46)
HGB BLD-MCNC: 8.2 G/DL (ref 12–16)
LAB AP ASR DISCLAIMER: NORMAL
LABORATORY COMMENT REPORT: NORMAL
MAGNESIUM SERPL-MCNC: 1.9 MG/DL (ref 1.6–2.4)
MCH RBC QN AUTO: 30.5 PG (ref 26–34)
MCHC RBC AUTO-ENTMCNC: 32.3 G/DL (ref 32–36)
MCV RBC AUTO: 94 FL (ref 80–100)
NRBC BLD-RTO: 0 /100 WBCS (ref 0–0)
PATH REPORT.COMMENTS IMP SPEC: NORMAL
PATH REPORT.FINAL DX SPEC: NORMAL
PATH REPORT.GROSS SPEC: NORMAL
PATH REPORT.MICROSCOPIC SPEC OTHER STN: NORMAL
PATH REPORT.RELEVANT HX SPEC: NORMAL
PATH REPORT.TOTAL CANCER: NORMAL
PHOSPHATE SERPL-MCNC: 4.1 MG/DL (ref 2.5–4.9)
PLATELET # BLD AUTO: 174 X10*3/UL (ref 150–450)
POTASSIUM SERPL-SCNC: 4.5 MMOL/L (ref 3.5–5.3)
RBC # BLD AUTO: 2.69 X10*6/UL (ref 4–5.2)
SODIUM SERPL-SCNC: 128 MMOL/L (ref 136–145)
TACROLIMUS BLD-MCNC: 8.1 NG/ML
WBC # BLD AUTO: 14 X10*3/UL (ref 4.4–11.3)

## 2024-07-19 PROCEDURE — 2500000002 HC RX 250 W HCPCS SELF ADMINISTERED DRUGS (ALT 637 FOR MEDICARE OP, ALT 636 FOR OP/ED): Performed by: STUDENT IN AN ORGANIZED HEALTH CARE EDUCATION/TRAINING PROGRAM

## 2024-07-19 PROCEDURE — 2500000004 HC RX 250 GENERAL PHARMACY W/ HCPCS (ALT 636 FOR OP/ED): Performed by: STUDENT IN AN ORGANIZED HEALTH CARE EDUCATION/TRAINING PROGRAM

## 2024-07-19 PROCEDURE — RXMED WILLOW AMBULATORY MEDICATION CHARGE

## 2024-07-19 PROCEDURE — 80069 RENAL FUNCTION PANEL: CPT | Performed by: STUDENT IN AN ORGANIZED HEALTH CARE EDUCATION/TRAINING PROGRAM

## 2024-07-19 PROCEDURE — 2500000002 HC RX 250 W HCPCS SELF ADMINISTERED DRUGS (ALT 637 FOR MEDICARE OP, ALT 636 FOR OP/ED)

## 2024-07-19 PROCEDURE — 83735 ASSAY OF MAGNESIUM: CPT | Performed by: STUDENT IN AN ORGANIZED HEALTH CARE EDUCATION/TRAINING PROGRAM

## 2024-07-19 PROCEDURE — 99233 SBSQ HOSP IP/OBS HIGH 50: CPT | Performed by: INTERNAL MEDICINE

## 2024-07-19 PROCEDURE — 2500000001 HC RX 250 WO HCPCS SELF ADMINISTERED DRUGS (ALT 637 FOR MEDICARE OP): Performed by: INTERNAL MEDICINE

## 2024-07-19 PROCEDURE — 80197 ASSAY OF TACROLIMUS: CPT

## 2024-07-19 PROCEDURE — 2500000001 HC RX 250 WO HCPCS SELF ADMINISTERED DRUGS (ALT 637 FOR MEDICARE OP)

## 2024-07-19 PROCEDURE — 2500000004 HC RX 250 GENERAL PHARMACY W/ HCPCS (ALT 636 FOR OP/ED): Mod: JZ

## 2024-07-19 PROCEDURE — 2500000004 HC RX 250 GENERAL PHARMACY W/ HCPCS (ALT 636 FOR OP/ED)

## 2024-07-19 PROCEDURE — 85027 COMPLETE CBC AUTOMATED: CPT | Performed by: STUDENT IN AN ORGANIZED HEALTH CARE EDUCATION/TRAINING PROGRAM

## 2024-07-19 PROCEDURE — 2500000001 HC RX 250 WO HCPCS SELF ADMINISTERED DRUGS (ALT 637 FOR MEDICARE OP): Performed by: STUDENT IN AN ORGANIZED HEALTH CARE EDUCATION/TRAINING PROGRAM

## 2024-07-19 PROCEDURE — 82330 ASSAY OF CALCIUM: CPT

## 2024-07-19 PROCEDURE — 99232 SBSQ HOSP IP/OBS MODERATE 35: CPT | Performed by: SURGERY

## 2024-07-19 RX ORDER — PREDNISONE 5 MG/1
20 TABLET ORAL DAILY
Qty: 120 TABLET | Refills: 0 | Status: ON HOLD | OUTPATIENT
Start: 2024-07-19 | End: 2024-08-18

## 2024-07-19 RX ORDER — CARVEDILOL 25 MG/1
25 TABLET ORAL 2 TIMES DAILY
Qty: 60 TABLET | Refills: 0 | Status: ON HOLD | OUTPATIENT
Start: 2024-07-19 | End: 2024-08-18

## 2024-07-19 RX ORDER — AMLODIPINE BESYLATE 5 MG/1
10 TABLET ORAL DAILY
Status: ON HOLD
Start: 2024-07-19 | End: 2025-07-19

## 2024-07-19 RX ORDER — PREDNISONE 5 MG/1
20 TABLET ORAL DAILY
Start: 2024-07-19 | End: 2024-07-19

## 2024-07-19 RX ORDER — HYDRALAZINE HYDROCHLORIDE 25 MG/1
25 TABLET, FILM COATED ORAL 2 TIMES DAILY
Qty: 60 TABLET | Refills: 0 | Status: ON HOLD | OUTPATIENT
Start: 2024-07-19 | End: 2024-08-18

## 2024-07-19 RX ORDER — AMLODIPINE BESYLATE 5 MG/1
10 TABLET ORAL 2 TIMES DAILY
Start: 2024-07-19 | End: 2024-07-19

## 2024-07-19 RX ORDER — MYCOPHENOLATE MOFETIL 250 MG/1
1000 CAPSULE ORAL EVERY 12 HOURS
Status: ON HOLD
Start: 2024-07-19 | End: 2025-07-19

## 2024-07-19 RX ORDER — CLOTRIMAZOLE 10 MG/1
10 LOZENGE ORAL
Qty: 90 TROCHE | Refills: 0 | Status: ON HOLD | OUTPATIENT
Start: 2024-07-19 | End: 2024-08-18

## 2024-07-19 RX ORDER — SODIUM BICARBONATE 650 MG/1
1300 TABLET ORAL 3 TIMES DAILY
Qty: 180 TABLET | Refills: 0 | Status: ON HOLD | OUTPATIENT
Start: 2024-07-19 | End: 2024-08-18

## 2024-07-19 RX ORDER — TACROLIMUS 1 MG/1
2 CAPSULE ORAL
Qty: 120 CAPSULE | Refills: 0 | Status: ON HOLD | OUTPATIENT
Start: 2024-07-19 | End: 2024-08-18

## 2024-07-19 RX ORDER — VALGANCICLOVIR 450 MG/1
450 TABLET, FILM COATED ORAL EVERY OTHER DAY
Status: ON HOLD
Start: 2024-07-20 | End: 2024-08-19

## 2024-07-19 RX ORDER — SULFAMETHOXAZOLE AND TRIMETHOPRIM 400; 80 MG/1; MG/1
1 TABLET ORAL DAILY
Qty: 30 TABLET | Refills: 0 | Status: ON HOLD | OUTPATIENT
Start: 2024-07-19 | End: 2024-08-18

## 2024-07-19 ASSESSMENT — PAIN SCALES - GENERAL
PAINLEVEL_OUTOF10: 0 - NO PAIN
PAINLEVEL_OUTOF10: 7
PAINLEVEL_OUTOF10: 4
PAINLEVEL_OUTOF10: 4
PAINLEVEL_OUTOF10: 0 - NO PAIN

## 2024-07-19 ASSESSMENT — COGNITIVE AND FUNCTIONAL STATUS - GENERAL
MOBILITY SCORE: 24
DAILY ACTIVITIY SCORE: 24

## 2024-07-19 ASSESSMENT — PAIN DESCRIPTION - LOCATION
LOCATION: HEAD
LOCATION: HEAD

## 2024-07-19 ASSESSMENT — PAIN - FUNCTIONAL ASSESSMENT
PAIN_FUNCTIONAL_ASSESSMENT: 0-10

## 2024-07-19 ASSESSMENT — PAIN DESCRIPTION - DESCRIPTORS
DESCRIPTORS: ACHING

## 2024-07-19 NOTE — CARE PLAN
The patient's goals for the shift include Labs WNL    The clinical goals for the shift include remain HDS      Problem: Pain - Adult  Goal: Verbalizes/displays adequate comfort level or baseline comfort level  Outcome: Progressing     Problem: Safety - Adult  Goal: Free from fall injury  Outcome: Progressing     Problem: Discharge Planning  Goal: Discharge to home or other facility with appropriate resources  Outcome: Progressing     Problem: Chronic Conditions and Co-morbidities  Goal: Patient's chronic conditions and co-morbidity symptoms are monitored and maintained or improved  Outcome: Progressing     Problem: Fall/Injury  Goal: Not fall by end of shift  Outcome: Progressing  Goal: Be free from injury by end of the shift  Outcome: Progressing  Goal: Verbalize understanding of personal risk factors for fall in the hospital  Outcome: Progressing  Goal: Verbalize understanding of risk factor reduction measures to prevent injury from fall in the home  Outcome: Progressing  Goal: Use assistive devices by end of the shift  Outcome: Progressing  Goal: Pace activities to prevent fatigue by end of the shift  Outcome: Progressing     Problem: Skin  Goal: Participates in plan/prevention/treatment measures  Outcome: Progressing  Goal: Prevent/manage excess moisture  Outcome: Progressing  Goal: Prevent/minimize sheer/friction injuries  Outcome: Progressing  Goal: Promote/optimize nutrition  Outcome: Progressing  Goal: Promote skin healing  Outcome: Progressing

## 2024-07-19 NOTE — CARE PLAN
The patient's goals for the shift include Labs WNL    The clinical goals for the shift include Labs WNL      Problem: Pain - Adult  Goal: Verbalizes/displays adequate comfort level or baseline comfort level  Outcome: Progressing     Problem: Safety - Adult  Goal: Free from fall injury  Outcome: Progressing     Problem: Discharge Planning  Goal: Discharge to home or other facility with appropriate resources  Outcome: Progressing     Problem: Chronic Conditions and Co-morbidities  Goal: Patient's chronic conditions and co-morbidity symptoms are monitored and maintained or improved  Outcome: Progressing     Problem: Fall/Injury  Goal: Not fall by end of shift  Outcome: Progressing  Goal: Be free from injury by end of the shift  Outcome: Progressing  Goal: Verbalize understanding of personal risk factors for fall in the hospital  Outcome: Progressing  Goal: Verbalize understanding of risk factor reduction measures to prevent injury from fall in the home  Outcome: Progressing  Goal: Use assistive devices by end of the shift  Outcome: Progressing  Goal: Pace activities to prevent fatigue by end of the shift  Outcome: Progressing     Problem: Skin  Goal: Participates in plan/prevention/treatment measures  7/19/2024 0517 by Meagan Hendrickson RN  Outcome: Progressing  7/18/2024 1954 by Meagan Hendrickson RN  Flowsheets (Taken 7/18/2024 1954)  Participates in plan/prevention/treatment measures:   Increase activity/out of bed for meals   Discuss with provider PT/OT consult   Elevate heels  Goal: Prevent/manage excess moisture  7/19/2024 0517 by Meagan Hendrickson RN  Outcome: Progressing  7/18/2024 1954 by Meagan Hendrickson RN  Flowsheets (Taken 7/18/2024 1954)  Prevent/manage excess moisture:   Use wicking fabric (obtain order)   Moisturize dry skin   Cleanse incontinence/protect with barrier cream   Monitor for/manage infection if present   Follow provider orders for dressing changes  Goal: Prevent/minimize sheer/friction  injuries  7/19/2024 0517 by Meagan Hendrickson RN  Outcome: Progressing  7/18/2024 1954 by Meagan Hendrickson RN  Flowsheets (Taken 7/18/2024 1954)  Prevent/minimize sheer/friction injuries:   Utilize specialty bed per algorithm   Use pull sheet   Turn/reposition every 2 hours/use positioning/transfer devices   Increase activity/out of bed for meals   HOB 30 degrees or less   Complete micro-shifts as needed if patient unable. Adjust patient position to relieve pressure points, not a full turn  Goal: Promote/optimize nutrition  7/19/2024 0517 by Meagan Hendrickson RN  Outcome: Progressing  7/18/2024 1954 by Meagan Hendrickson RN  Flowsheets (Taken 7/18/2024 1954)  Promote/optimize nutrition:   Offer water/supplements/favorite foods   Discuss with provider if NPO > 2 days   Assist with feeding   Reassess MST if dietician not consulted   Monitor/record intake including meals   Consume > 50% meals/supplements  Goal: Promote skin healing  7/19/2024 0517 by Meagan Hendrickson RN  Outcome: Progressing  7/18/2024 1954 by Meagan Hendrickson RN  Flowsheets (Taken 7/18/2024 1954)  Promote skin healing:   Turn/reposition every 2 hours/use positioning/transfer devices   Rotate device position/do not position patient on device   Protective dressings over bony prominences   Ensure correct size (line/device) and apply per  instructions   Assess skin/pad under line(s)/device(s)

## 2024-07-19 NOTE — DISCHARGE SUMMARY
Discharge Diagnosis  Shortness of breath    Issues Requiring Follow-Up  Kidney transplant rejection requiring ongoing labs and outpatient follow up    Test Results Pending At Discharge  Pending Labs       Order Current Status    HLA Transplant Antibody Panel In process    Surgical Pathology Exam In process            Hospital Course  Patient is a 45 y/o female with PMH significant for ESRD 2/2 HTN and NSAIDs, that received a DDKT from 11/30/23 c/b washout on 12/13/23 and IR drain for perirenal fluid collection, now presenting to ED on 7/2/24 with several day history of anasarca and shortness of breath. She had 1+ pitting edema in her feet/ankles and was also diffusely swollen throughout her limbs and face per her report. She also endorsed sharp R flank pain and was tender on palpation.     A R kidney US showed mild hydronephrosis of the transplanted kidney and a bilateral LE US showed no DVT. An infectious workup including blood culture and urinalysis were negative. For her chest pain, she had no troponin elevation but had a BNP of 272. She was admitted to the transplant surgery service and started on normal saline for her MARK.    Based on her rising Cr and positive DSA, patient was assumed have a high likelihood of rejection. As such, while an IR biopsy was pending for 7/5, patient was started on high dose methylprednisone 250 x 3 days, given 2 doses of thymo, and all prophylactic antibiotics and antivirals were started. Patient underwent IR biopsy on 7/5, without complication. 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 On 7/9 she was scheduled for first round of PLEX through her LUE radiocephalic fistula. She remained on the following schedule:  --PLEX followed by IVIG on Tues 7/9/24, Thursday, Friday, and Monday   -- 2 additional doses of thymo on Wed 7/10/24 and Sat (total of 4.5 mg/kg).     On 7/17, she had a repeat biopsy that resulted   --  Diffuse interstitial inflammatory infiltrate with focal moderate tubulitis, consistent with ACR Ban[place] IA   --Endothelialitis, consistent with ACR Ban[place] IIA   --Microvascular inflammation and C4d positivity (focal), consistent with persistent ABMR   -- No immune complex deposits    Given aggressive treatment this hospital course and clinical stability, she was discharged on 7/19/24 with tacro 2 mg BID, pred 20 QD, MMF 1g BID with plan to follow up on labs next Tuesday 7/23/24.    Pertinent Physical Exam At Time of Discharge  Physical Exam  Constitutional:       Appearance: Normal appearance.   Cardiovascular:      Rate and Rhythm: Normal rate and regular rhythm.      Pulses: Normal pulses.   Pulmonary:      Effort: Pulmonary effort is normal. No respiratory distress.      Breath sounds: Normal breath sounds. No wheezing or rales.   Abdominal:      General: Abdomen is flat. There is no distension.      Palpations: Abdomen is soft. There is no mass.      Tenderness: There is no abdominal tenderness.   Musculoskeletal:         General: No swelling. Normal range of motion.      Right lower leg: No edema.      Left lower leg: No edema.   Neurological:      General: No focal deficit present.      Mental Status: She is alert and oriented to person, place, and time. Mental status is at baseline.         Home Medications     Medication List      START taking these medications     carvedilol 25 mg tablet; Commonly known as: Coreg; Take 1 tablet (25 mg)   by mouth 2 times a day.; Notes to patient: Do not take if systolic blood   pressure is less than 110 or heart rate is less than 55.   clotrimazole 10 mg samia; Commonly known as: Mycelex; Take 1 tablet (10   mg) by mouth 3 times a day after meals.; Notes to patient: Do not chew or   swallow. Let dissolve on tongue and do not eat or drink anything for 15   minutes after taking.   hydrALAZINE 25 mg tablet; Commonly known as: Apresoline; Take 1 tablet   (25 mg) by mouth 2 times  a day.   sodium bicarbonate 650 mg tablet; Take 2 tablets (1,300 mg) by mouth 3   times a day.   sulfamethoxazole-trimethoprim 400-80 mg tablet; Commonly known as:   Bactrim; Take 1 tablet by mouth once daily.   tacrolimus 1 mg capsule; Commonly known as: Prograf; Take 2 capsules (2   mg) by mouth every 12 hours.; Notes to patient: Do not take on the morning   of your lab tests until AFTER your blood is drawn. After your blood is   drawn you may take your dose. No grapefruit or grapefruit juice.     CHANGE how you take these medications     amLODIPine 5 mg tablet; Commonly known as: Norvasc; Take 2 tablets (10   mg) by mouth once daily.; What changed: how much to take, when to take   this   mycophenolate 250 mg capsule; Commonly known as: Cellcept; Take 4   capsules (1,000 mg) by mouth every 12 hours.; What changed: how much to   take   predniSONE 5 mg tablet; Commonly known as: Deltasone; Take 4 tablets (20   mg) by mouth once daily.; What changed: how much to take   valGANciclovir 450 mg tablet; Commonly known as: Valcyte; Take 1 tablet   (450 mg) by mouth every other day. Do not crush or chew. Do not fill   before July 20, 2024.; Start taking on: July 20, 2024; What changed: how   much to take, when to take this     CONTINUE taking these medications     acetaminophen 325 mg tablet; Commonly known as: Tylenol   albuterol 90 mcg/actuation inhaler   azelastine 0.05 % ophthalmic solution; Commonly known as: Optivar;   Administer 1 drop into both eyes 2 times a day.   calcitriol 0.25 mcg capsule; Commonly known as: Rocaltrol; TAKE 1 TABLET   BY MOUTH ONCE DAILY   Calcium Antacid 200 mg calcium (500 mg) chewable tablet; Generic drug:   calcium carbonate; CHEW AND SWALLOW 1 TABLET BY MOUTH 2 TIMES DAILY   cholecalciferol 50 MCG (2000 UT) tablet; Commonly known as: Vitamin D-3;   Take 2 tablets (100 mcg) by mouth once daily.   gabapentin 100 mg capsule; Commonly known as: Neurontin; Take 3 capsules   (300 mg) by mouth  once daily at bedtime.   levothyroxine 25 mcg tablet; Commonly known as: Synthroid, Levoxyl   magnesium oxide 400 mg tablet; Commonly known as: Mag-Ox; Take 1 tablet   (400 mg) by mouth once daily. Take with food at lunch time   pantoprazole 40 mg EC tablet; Commonly known as: ProtoNix; Take 1 tablet   (40 mg) by mouth once daily in the morning. Take before meals. Do not   crush, chew, or split.   sertraline 50 mg tablet; Commonly known as: Zoloft   SUMAtriptan 100 mg tablet; Commonly known as: Imitrex; Take 1 tablet   (100 mg) by mouth 1 time if needed for migraine.   tenofovir alafenamide 25 mg tablet tablet; Commonly known as: Vemlidy;   Take 1 tablet (25 mg) by mouth once daily.     STOP taking these medications     BELATACEPT IV   potassium citrate CR 10 mEq ER tablet; Commonly known as: Urocit-K-10     ASK your doctor about these medications     fexofenadine 180 mg tablet; Commonly known as: Allegra; Take 1 tablet   (180 mg) by mouth once daily.       Outpatient Follow-Up  Future Appointments   Date Time Provider Department Center   7/29/2024  9:00 AM [facility] KIDNEY PHYSICIAN [id number]       Mariel Peck MD

## 2024-07-19 NOTE — NURSING NOTE
Discharge instructions reviewed with patient and brother. All questions and concerns addressed. PICC line removed w/o difficulty and pressure drsg applied. Pt transported to [facility] via wheelchair with all belongings.

## 2024-07-19 NOTE — PROGRESS NOTES
Herber was re-admitted on 7/3-7/19/24 for rising Cr and positive DSA. She had a kidney transplant biopsy on 7/5/24 that was positive for ACR 2B and AMR    For treatment, she received solumedrol 500 mg daily x3 days, PLEX and IVIG x5 sessions and a total 6 mg/kg of Thymo.     7/17 she underwent repeat biopsy that was consistant with ACR 1A and ABMR.    She was started on tacrolimus inpatient with plans to stop belatacept. Tacro education was done with patient and support person by pharmD.    Outpatient Plan:  Tacrolimus 2 mg every 12 hours  MMF 1g BID, pred 20 mg daily  Stop belatacept - appts cancelled and therapy plan cancelled  Labs Tuesday  If Cr not improved, plan to re-admit for further Thymo/Solumedrol  Clinic 7/29 @ 9 am

## 2024-07-19 NOTE — PROGRESS NOTES
Subjective:  
  
Minoo Nicolas  is a 78 y.o. male presents for evaluation of possible RIGHT inguinal hernia. Pt reports significant pain this morning which lead him to moving his appointment up sooner, however, is no longer having that pain. Pt reports he's had this hernia for the last 5 years and has been wearing a truss for support. He notes truss is no longer working as well. Past Medical History:  
Diagnosis Date  GERD (gastroesophageal reflux disease)  Hypertension  Right inguinal hernia 5/10/2021 Past Surgical History:  
Procedure Laterality Date  COLONOSCOPY Left 2017 COLONOSCOPY performed by Jacques Caballero MD at [address] [place] [address]  HX COLONOSCOPY  2013  
 with polyps Social History Tobacco Use  Smoking status: Former Smoker Packs/day: 0.25 Years: 11.00 Pack years: 2.75 Quit date: 1966 Years since quittin.3  Smokeless tobacco: Never Used Substance Use Topics  Alcohol use: Yes Comment: occasional  
 
 
History reviewed. No pertinent family history. Current Outpatient Medications on File Prior to Visit Medication Sig Dispense Refill  omeprazole (PRILOSEC) 10 mg capsule Take 10 mg by mouth daily. Indications: PREVENTION OF NSAID-INDUCED GASTRIC ULCER    
 lisinopril (PRINIVIL, ZESTRIL) 40 mg tablet Take 40 mg by mouth daily.  calcium carbonate (TUMS) 200 mg calcium (500 mg) chew Take 1 Tab by mouth as needed.  bismuth subsalicylate (PEPTO-BISMOL PO) Take  by mouth. No current facility-administered medications on file prior to visit. No Known Allergies Review of Systems: 
Constitutional: No fever or chills Neurologic: No headache Eyes: No scleral icterus or irritated eyes Nose: No nasal pain or drainage Mouth: No oral lesions or sore throat Cardiac: Positive for hypertension Pulmonary: No cough or shortness or breath Gastrointestinal: Positive for duplicated   GERD 
Genitourinary: No dysuria Musculoskeletal: No muscle or joint tenderness Skin: No rashes or lesions Psychiatric: No anxiety or depressed mood Objective:  
 
Visit Vitals BP (!) 149/94 Pulse 95 Temp 97.7 °F (36.5 °C) (Oral) Resp 18 Ht 5' 9\" (1.753 m) Wt 163 lb (73.9 kg) SpO2 97% BMI 24.07 kg/m² Physical Exam: 
General: No acute distress, conversant Eyes: PERRLA, no scleral icterus HENT: Normocephalic without oral lesions Neck: Trachea midline without LAD Cardiac: Normal pulse rate and rhythm Pulmonary: Symmetric chest rise with normal effort GI: Large, indirect RIGHT inguinal hernia that extends down into the scrotum. Hernia is reducible and non-tender. Skin: Warm without rash Extremities: No edema or joint stiffness Psych: Appropriate mood and affect Labs: No results found for this or any previous visit (from the past 24 hour(s)). Data Review: All previous imaging, testing and lab work was reviewed and interpreted. Assessment and Plan: ICD-10-CM ICD-9-CM 1. Right inguinal hernia  K40.90 550.90 Discussed their diagnosis thoroughly. Noted that the presence of a hernia is not a determining factor when considering surgical repair. Due to the natural progression of a hernia, this will not heal on its own and may continue to increase in size over time. Since this hernia is bothersome, recommend surgical repair as an outpatient, with possible mesh placement. Described the details of this surgery and discussed what the patient should expect for recovery. Pt should avoid any heavy lifting for 10-14 days post-operation. All questions were answered. They agree with this plan and will schedule this at their convenience. The patient was counseled at length about the risks of joshua Covid-19 during their perioperative period and any recovery window from their procedure.   The patient was made aware that joshua Covid-19  may worsen their prognosis for recovering from their procedure and lend to a higher morbidity and/or mortality risk. All material risks, benefits, and reasonable alternatives including postponing the procedure were discussed. The patient does  wish to proceed with the procedure at this time. Total face to face time with patient: 15 minutes. Greater than 50% of the time was spent in counseling. This document was scribed by Madelaine Trejo as dictated by Dr. Hao Nogueira. Signed By: Terry Hooks MD   
 05/10/21

## 2024-07-19 NOTE — PROGRESS NOTES
Herber Foy Rai is a 44 y.o. female s/p DDKT.    - No acute events; biopsy similar findings but has high tacro    Objective   Gen: A+OX3; NAD  HEENT: PERRL, sclera anicteric, MMM  Cardiac: RRR  Chest: Normal inspiratory effort  Abdomen: S/NT/ND. Incision well-healed.  Ext: No LE edema    Last Recorded Vitals  Visit Vitals  /75 (BP Location: Left leg, Patient Position: Lying)   Pulse 75   Temp 36.5 °C (97.7 °F) (Temporal)   Resp 16      Intake/Output last 3 Shifts:    Intake/Output Summary (Last 24 hours) at 7/19/2024 0709  Last data filed at 7/19/2024 0629  Gross per 24 hour   Intake 2156 ml   Output 2500 ml   Net -344 ml      Vitals:    07/19/24 0506   Weight: 82.4 kg (181 lb 10.5 oz)   Scheduled medications  amLODIPine, 10 mg, oral, Daily  calcitriol, 0.25 mcg, oral, Daily  calcium carbonate, 250 mg, oral, Daily  carvedilol, 25 mg, oral, BID  cholecalciferol, 4,000 Units, oral, Daily  clotrimazole, 10 mg, oral, TID after meals  darbepoetin jacob, 100 mcg, subcutaneous, Once  [Held by provider] furosemide, 40 mg, intravenous, BID  gabapentin, 300 mg, oral, Nightly  heparin (porcine), 5,000 Units, subcutaneous, q8h  hydrALAZINE, 25 mg, oral, BID  levothyroxine, 25 mcg, oral, Daily before breakfast  loratadine, 10 mg, oral, Daily  magnesium oxide, 400 mg, oral, Daily  mycophenolate, 1,000 mg, oral, q12h  pantoprazole, 40 mg, oral, Daily before breakfast  polyethylene glycol, 17 g, oral, BID  predniSONE, 20 mg, oral, Daily  sennosides-docusate sodium, 2 tablet, oral, BID  sertraline, 50 mg, oral, Daily  sodium bicarbonate, 1,300 mg, oral, TID  sulfamethoxazole-trimethoprim, 1 tablet, oral, Daily  tacrolimus, 2 mg, oral, q12h JANIE  tenofovir alafenamide, 25 mg, oral, Daily  valGANciclovir, 450 mg, oral, q48h    Assessment/Plan   Principal Problem:    Kidney transplant recipient  Active Problems:  Patient Active Problem List   Diagnosis    ESRD (end stage renal disease) (Multi)    HTN (hypertension)     Gastroesophageal reflux disease    Kidney replaced by transplant (HHS-HCC)    Immunosuppression (Multi)    Transplant    Fever of unknown origin    Kidney transplant complication (HHS-HCC)    Shortness of breath      - Discharge today  - Labs 7/23; if Cr not downtrending would plan further Thymo and solumedrol for refractory ACR    I spent 35 minutes in the professional and overall care of this patient, including immunosuppression management.    Magi Lambert MD, PHD, MPH, FACS  Chief Transplant and Hepatobiliary Surgery

## 2024-07-20 NOTE — PROGRESS NOTES
"        INPATIENT TRANSPLANT NEPHROLOGY PROGRESS NOTE          REASON FOR CONSULT:  Immunosuppressive medication management and nephrology related issues.    SUBJECTION:     Biopsy this am    No acute event overnight.      PHYCISCAL EXAMINATION:    Visit Vitals  /54 (BP Location: Left arm, Patient Position: Lying)   Pulse 67   Temp 36.6 °C (97.9 °F) (Temporal)   Resp 17   Ht 1.626 m (5' 4.02\")   Wt 82.4 kg (181 lb 10.5 oz)   LMP  (LMP Unknown)   SpO2 96%   BMI 31.17 kg/m²   OB Status Unknown   Smoking Status Never   BSA 1.93 m²        1900 -  0659  In: 1977 [P.O.:840; I.V.:1137]  Out: 1800 [Urine:1800]     Weight change:     General Appearance - NAD, Good speech, oriented and alert  HEENT - Supple. Not pale. No jaundice. No cervical lymphadenopathy. Pharynx and tonsils are not injected.  CVS - RRR. Normal S1/S2. No murmur, click , rub or gallop  Lungs- clear to auscultation bilaterally  Abdomen - soft , not tender, no guarding, no rigidity. No hepatosplenomegaly. Normal bowel sounds. No masses and ascites. S/P Kidney transplant .  Transplanted kidney is not tender.   Musculoskeletal /Extremities - no edema. Full ROM. No joint tenderness.   Neuro/Psych - appropriate mood and affect. Motor power V/V all extremities. CN I -XII were grossly intact.  Skin - No visible rash    MEDICATION LIST: REVIEWED                  ALLERGY:  No Known Allergies    LABS:  No results found for this or any previous visit (from the past 24 hour(s)).       ASSESSMENT AND PLAN:    Ms. Leblanc is a 44 y.o. female with past medical history significant for ESRD secondary to HTN/NSAID whom received a  donor kidney transplant on 23 by Dr. Marbin Lambert & Dr. Louis with a KDPI of 56% and PRA of 48%. HCV -/- and donor has not met risk factors. EBV +/+. 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 Dry weight is 81.1 kg (discharge weight is 76.2kg ). Pt received a total of 4.5 mg/kg total of thymoglobulin " induction therapy in conjunction with 500mg IV solumedrol. Pt was initiated on Tacrolimus & Cellcept immunosuppression in conjunction with IV solumedrol taper. 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 Pt was started on valcyte (CMV D + / R + ) and bactrim for CMV & PCP prophylaxis per protocol. She was started on clotrimazole as antifungal coverage per protocol. Operative course was complicated by return to OR for exploration of transplanted kidney and washout of hematoma. Hospital course was complicated by post-operative pain and constipation, which has resolved.     5/29/24 Direct admission for fever, nausea, vomiting and abdominal pain (chronic).  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 ID consulted. She is on Valcyte treatment dose.     Patient has 1+ pitting edema of her ankles and some mild swelling of UEs. Her UOP is unchanged, however she's endorsing some R flank tenderness that could be consistent with her known R sided kidney stone. At this time, this is likely an acute MARK, however an infectious workup was started in the ED. Will admit the patient for hydration, and monitoring of kidney function in the setting of Cr 1.6 (baseline 0.8), mild LE edema. Imaging pending to rule out Ddx of DVTs, transplant failure/rejection, infection.      Transplant nephrology is consulted to assist with immunosuppressive medication management and nephrology related issues.        1. Acute rejection BANFF ACR / AVR and [facility]  S/P  Solumedrol 500 mg daily x 3 days; now on prednisone 20 mg daily  Thymo 6 mg/kg  PLEX #4/4 completed 7/15/24    - Renal allograft function:   Lab Results   Component Value Date    CREATININE 2.56 (H) 07/19/2024     Estimated Creatinine Clearance: 28.5 mL/min (A) (by C-G formula based on SCr of 2.56 mg/dL (H)).  No intake or output data in the 24 hours ending 07/20/24 1301    MFI DSA  DQ2 10,158 on  7/3/24    - Bx7/5/24  KIDNEY ALLOGRAFT, RIGHT ILIAC FOSSA, PERCUTANEOUS CORE BIOPSY:  -- CHANGES CONSISTENT WITH ACUTE T CELL-MEDIATED REJECTION, BANFF IB  -- ACUTE VASCULAR REJECTION, BANFF IIA  -- ACUTE ANTIBODY-MEDIATED REJECTION  IFTA <10%  CMV/SV-40 negative  Mild peritubular capillaritis    - Continue to monitor UOP and Serum creatinine closely.   - Avoid nephrotoxic agents, NSAIDs and IV contrast   - Strict I/O.   - Renally dose all medications by the most recent CrCl from Cockcroft-Gault formula.  -Repeat Bx today    2. Immunosuppression   -          Rejection treatment - please see attached schedule    o   Thymo 125 mg on 7/7, 125 mg on 7/8, 125 mg on 7/10, 125 mg on 7/13 (course complete - total of 6 mg/kg)    o   IVIG 10 grams after PLEX sessions 1-4, then 80 grams 7/16    o   Steroids below    -          Belatacept 5 mg/kg (362.5 mg) q28 days; last dose received 6/24, next dose scheduled outpatient for 7/22 à will likely cancel    -          FK level = 7.5; continue tacrolimus 2.5 mg BID    -          MMF 1000 mg BID    -          Prednisone 10 mg daily à methylpred 500 mg daily x 3 days 7/4 - 7/6 à prednisone 20 mg daily starting 7/7    3. Anemia and WBC   Lab Results   Component Value Date    WBC 14.0 (H) 07/19/2024    HGB 8.2 (L) 07/19/2024    HCT 25.4 (L) 07/19/2024    MCV 94 07/19/2024     07/19/2024     -Continue to monitor Hgb   -No indications for PRBC transfusion     4. Electrolyte   Lab Results   Component Value Date    GLUCOSE 65 (L) 07/19/2024    CALCIUM 7.7 (L) 07/19/2024     (L) 07/19/2024    K 4.5 07/19/2024    CO2 17 (L) 07/19/2024     07/19/2024    BUN 30 (H) 07/19/2024    CREATININE 2.56 (H) 07/19/2024     - Reviewed renal profile.     6. Hypertension   Blood Pressures         7/18/2024  1951 7/18/2024  2323 7/19/2024  0421 7/19/2024  0745 7/19/2024  1055    BP: 162/85 157/78 147/75 153/75 104/54          -Goal BP < 140/90 mmHg   -continue current management     7.  Hydronephrosis  US  7/2/24-stable  - Discussed with tx surgery no need to repeat MAG -3    8.  GI prophylaxis   - On PPI     9. 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 Lou, pt is in the hospital    * Case was discussed with primary team.  For questions, please contact transplant nephrology page x [phone]    Marcia Webber    Transplant Nephrologist

## 2024-07-20 NOTE — PROGRESS NOTES
"        INPATIENT TRANSPLANT NEPHROLOGY PROGRESS NOTE          REASON FOR CONSULT:  Immunosuppressive medication management and nephrology related issues.    SUBJECTION:     Biopsy 7/17/24: Discussed result with Dr. Winkler, slight improved from previous biopsy on 7/5/24    KIDNEY ALLOGRAFT, RIGHT ILIAC FOSSA, PERCUTANEOUS CORE BIOPSY:  -- CHANGES CONSISTENT WITH ACUTE T CELL-MEDIATED REJECTION, BANFF IA  -- ACUTE VASCULAR REJECTION, BANFF IIA  -- MICROVASCULAR INFLAMMATION AND C4d POSITIVITY, CONSISTENT WITH PERSISTENT ACUTE ANTIBODY-MEDIATED REJECTION (SEE COMMENT)    Specimen adequacy - Adequate and more than 2 arteries for light microscopy.     [place]ff Working Classification - t2, i3; v1; cg0; ci1; ct1; ti3; i-IFTA3; cv1; ah0; g2; ptc1; C4d2    No acute event overnight.      PHYCISCAL EXAMINATION:    Visit Vitals  /54 (BP Location: Left arm, Patient Position: Lying)   Pulse 67   Temp 36.6 °C (97.9 °F) (Temporal)   Resp 17   Ht 1.626 m (5' 4.02\")   Wt 82.4 kg (181 lb 10.5 oz)   LMP  (LMP Unknown)   SpO2 96%   BMI 31.17 kg/m²   OB Status Unknown   Smoking Status Never   BSA 1.93 m²        07/18 1900 - 07/20 0659  In: 1979 [P.O.:840; I.V.:1137]  Out: 1800 [Urine:1800]     Weight change:     General Appearance - NAD, Good speech, oriented and alert  HEENT - Supple. Not pale. No jaundice. No cervical lymphadenopathy. Pharynx and tonsils are not injected.  CVS - RRR. Normal S1/S2. No murmur, click , rub or gallop  Lungs- clear to auscultation bilaterally  Abdomen - soft , not tender, no guarding, no rigidity. No hepatosplenomegaly. Normal bowel sounds. No masses and ascites. S/P Kidney transplant .  Transplanted kidney is not tender.   Musculoskeletal /Extremities - no edema. Full ROM. No joint tenderness.   Neuro/Psych - appropriate mood and affect. Motor power V/V all extremities. CN I -XII were grossly intact.  Skin - No visible rash    MEDICATION LIST: REVIEWED    ALLERGY:  No Known Allergies    LABS:  No results " found for this or any previous visit (from the past 24 hour(s)).       ASSESSMENT AND PLAN:    Ms. Leblanc is a 44 y.o. female with past medical history significant for ESRD secondary to HTN/NSAID whom received a  donor kidney transplant on 23 by Dr. Marbin Lambert & Dr. Louis with a KDPI of 56% and PRA of 48%. HCV -/- and donor has not met risk factors. EBV +/+. 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 Dry weight is 81.1 kg (discharge weight is 76.2kg ). Pt received a total of 4.5 mg/kg total of thymoglobulin induction therapy in conjunction with 500mg IV solumedrol. Pt was initiated on Tacrolimus & Cellcept immunosuppression in conjunction with IV solumedrol taper. 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 Pt was started on valcyte (CMV D + / R + ) and bactrim for CMV & PCP prophylaxis per protocol. She was started on clotrimazole as antifungal coverage per protocol. Operative course was complicated by return to OR for exploration of transplanted kidney and washout of hematoma. Hospital course was complicated by post-operative pain and constipation, which has resolved.     24 Direct admission for fever, nausea, vomiting and abdominal pain (chronic).  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 ID consulted. She is on Valcyte treatment dose.     Patient has 1+ pitting edema of her ankles and some mild swelling of UEs. Her UOP is unchanged, however she's endorsing some R flank tenderness that could be consistent with her known R sided kidney stone. At this time, this is likely an acute MARK, however an infectious workup was started in the ED. Will admit the patient for hydration, and monitoring of kidney function in the setting of Cr 1.6 (baseline 0.8), mild LE edema. Imaging pending to rule out Ddx of DVTs, transplant failure/rejection, infection.      Transplant nephrology is consulted to assist  with immunosuppressive medication management and nephrology related issues.        1. Acute rejection BANFF 1B ACR , AVR 2A AND [facility].  S/P  Solumedrol 500 mg daily x 3 days; now on prednisone 20 mg daily  Thymo 6 mg/kg  PLEX #4/4 completed 7/15/24    - Renal allograft function:   Lab Results   Component Value Date    CREATININE 2.56 (H) 07/19/2024     Estimated Creatinine Clearance: 28.5 mL/min (A) (by C-G formula based on SCr of 2.56 mg/dL (H)).  No intake or output data in the 24 hours ending 07/20/24 1302    MFI DSA  DQ2 10,158 on 7/3/24    - Bx7/5/24  KIDNEY ALLOGRAFT, RIGHT ILIAC FOSSA, PERCUTANEOUS CORE BIOPSY:  -- CHANGES CONSISTENT WITH ACUTE T CELL-MEDIATED REJECTION, BANFF IB  -- ACUTE VASCULAR REJECTION, BANFF IIA  -- ACUTE ANTIBODY-MEDIATED REJECTION  IFTA <10%  CMV/SV-40 negative  Mild peritubular capillaritis    - Continue to monitor UOP and Serum creatinine closely.   - Avoid nephrotoxic agents, NSAIDs and IV contrast   - Strict I/O.   - Renally dose all medications by the most recent CrCl from Cockcroft-Gault formula.  -Repeat 7/17 : Slightly improved.  ACR 1A/AVR 2A and persistent AMR     2. Immunosuppression   -          Rejection treatment - please see attached schedule    o   Thymo 125 mg on 7/7, 125 mg on 7/8, 125 mg on 7/10, 125 mg on 7/13 (course complete - total of 6 mg/kg)    o   IVIG 10 grams after PLEX sessions 1-4, then 80 grams 7/16    o   Steroids below    -          Belatacept 5 mg/kg (362.5 mg) q28 days; last dose received 6/24, next dose scheduled outpatient for 7/22 à will likely cancel    -          FK level = 15.4; decrease tacrolimus to 2 mg BID    -          MMF 1000 mg BID    -          Prednisone 10 mg daily à methylpred 500 mg daily x 3 days 7/4 - 7/6 à prednisone 20 mg daily starting 7/7    3. Anemia and WBC   Lab Results   Component Value Date    WBC 14.0 (H) 07/19/2024    HGB 8.2 (L) 07/19/2024    HCT 25.4 (L) 07/19/2024    MCV 94 07/19/2024     07/19/2024      -Continue to monitor Hgb   -No indications for PRBC transfusion     4. Electrolyte   Lab Results   Component Value Date    GLUCOSE 65 (L) 07/19/2024    CALCIUM 7.7 (L) 07/19/2024     (L) 07/19/2024    K 4.5 07/19/2024    CO2 17 (L) 07/19/2024     07/19/2024    BUN 30 (H) 07/19/2024    CREATININE 2.56 (H) 07/19/2024     - Reviewed renal profile.     6. Hypertension   Blood Pressures         7/18/2024  1951 7/18/2024  2323 7/19/2024  0421 7/19/2024  0745 7/19/2024  1055    BP: 162/85 157/78 147/75 153/75 104/54          -Goal BP < 140/90 mmHg   -continue current management     7. Hydronephrosis  US  7/2/24-stable  - Discussed with tx surgery no need to repeat MAG -3    8.  GI prophylaxis   - On PPI     9. 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 Lou, pt is in the hospital    * Case was discussed with primary team.  For questions, please contact transplant nephrology page x [phone]    Marcia Webber    Transplant Nephrologist

## 2024-07-20 NOTE — PROGRESS NOTES
"        INPATIENT TRANSPLANT NEPHROLOGY PROGRESS NOTE          REASON FOR CONSULT:  Immunosuppressive medication management and nephrology related issues.    SUBJECTION:     Diarrhea stopped.  No new complaints.  She wants to go home    No acute event overnight.      PHYCISCAL EXAMINATION:    Visit Vitals  /54 (BP Location: Left arm, Patient Position: Lying)   Pulse 67   Temp 36.6 °C (97.9 °F) (Temporal)   Resp 17   Ht 1.626 m (5' 4.02\")   Wt 82.4 kg (181 lb 10.5 oz)   LMP  (LMP Unknown)   SpO2 96%   BMI 31.17 kg/m²   OB Status Unknown   Smoking Status Never   BSA 1.93 m²         1900 -  0659  In: 1979 [P.O.:840; I.V.:1137]  Out: 1800 [Urine:1800]     Weight change:     General Appearance - NAD, Good speech, oriented and alert  HEENT - Supple. Not pale. No jaundice. No cervical lymphadenopathy. Pharynx and tonsils are not injected.  CVS - RRR. Normal S1/S2. No murmur, click , rub or gallop  Lungs- clear to auscultation bilaterally  Abdomen - soft , not tender, no guarding, no rigidity. No hepatosplenomegaly. Normal bowel sounds. No masses and ascites. S/P Kidney transplant .  Transplanted kidney is not tender.   Musculoskeletal /Extremities - no edema. Full ROM. No joint tenderness.   Neuro/Psych - appropriate mood and affect. Motor power V/V all extremities. CN I -XII were grossly intact.  Skin - No visible rash    MEDICATION LIST: REVIEWED    ALLERGY:  No Known Allergies    LABS:  No results found for this or any previous visit (from the past 24 hour(s)).       ASSESSMENT AND PLAN:    Ms. Leblanc is a 44 y.o. female with past medical history significant for ESRD secondary to HTN/NSAID whom received a  donor kidney transplant on 23 by Dr. Marbin Lambert & Dr. Louis with a KDPI of 56% and PRA of 48%. HCV -/- and donor has not met risk factors. EBV +/+. 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 Dry weight is 81.1 kg (discharge weight is 76.2kg ). Pt received a total of 4.5 mg/kg " total of thymoglobulin induction therapy in conjunction with 500mg IV solumedrol. Pt was initiated on Tacrolimus & Cellcept immunosuppression in conjunction with IV solumedrol taper. 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 Pt was started on valcyte (CMV D + / R + ) and bactrim for CMV & PCP prophylaxis per protocol. She was started on clotrimazole as antifungal coverage per protocol. Operative course was complicated by return to OR for exploration of transplanted kidney and washout of hematoma. Hospital course was complicated by post-operative pain and constipation, which has resolved.     5/29/24 Direct admission for fever, nausea, vomiting and abdominal pain (chronic).  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 ID consulted. She is on Valcyte treatment dose.     Patient has 1+ pitting edema of her ankles and some mild swelling of UEs. Her UOP is unchanged, however she's endorsing some R flank tenderness that could be consistent with her known R sided kidney stone. At this time, this is likely an acute MARK, however an infectious workup was started in the ED. Will admit the patient for hydration, and monitoring of kidney function in the setting of Cr 1.6 (baseline 0.8), mild LE edema. Imaging pending to rule out Ddx of DVTs, transplant failure/rejection, infection.      Transplant nephrology is consulted to assist with immunosuppressive medication management and nephrology related issues.        1. Acute rejection BANFF IIB ACR and [facility]  S/P  Solumedrol 500 mg daily x 3 days; now on prednisone 20 mg daily  Thymo 6 mg/kg  PLEX #4/4 completed 7/15/24    - Renal allograft function:   Lab Results   Component Value Date    CREATININE 2.56 (H) 07/19/2024     Estimated Creatinine Clearance: 28.5 mL/min (A) (by C-G formula based on SCr of 2.56 mg/dL (H)).  No intake or output data in the 24 hours ending 07/20/24 1304    MFI  DSA  DQ2 10,158 on 7/3/24    - Bx7/5/24  KIDNEY ALLOGRAFT, RIGHT ILIAC FOSSA, PERCUTANEOUS CORE BIOPSY:  -- CHANGES CONSISTENT WITH ACUTE T CELL-MEDIATED REJECTION, BANFF IB  -- ACUTE VASCULAR REJECTION, BANFF IIA  -- ACUTE ANTIBODY-MEDIATED REJECTION  IFTA <10%  CMV/SV-40 negative  Mild peritubular capillaritis    - Continue to monitor UOP and Serum creatinine closely.   - Avoid nephrotoxic agents, NSAIDs and IV contrast   - Strict I/O.   - Renally dose all medications by the most recent CrCl from Cockcroft-Gault formula.  -Repeat Bx today    2. Immunosuppression     Rejection treatment - please see attached schedule    o   Thymo 125 mg on 7/7, 125 mg on 7/8, 125 mg on 7/10, 125 mg on 7/13 (course complete - total of 6 mg/kg)    o   IVIG 10 grams after PLEX sessions 1-4, then 80 grams 7/16    o   Steroids below    -          Belatacept 5 mg/kg (362.5 mg) q28 days; last dose received 6/24, next dose scheduled outpatient for 7/22 à appointment cancelled    -          FK level = 8.1; continue tacrolimus 2 mg BID    -          MMF 1000 mg BID    -          Prednisone 10 mg daily à methylpred 500 mg daily x 3 days 7/4 - 7/6 à prednisone 20 mg daily starting 7/7      3. Anemia and WBC   Lab Results   Component Value Date    WBC 14.0 (H) 07/19/2024    HGB 8.2 (L) 07/19/2024    HCT 25.4 (L) 07/19/2024    MCV 94 07/19/2024     07/19/2024     -Continue to monitor Hgb   -No indications for PRBC transfusion     4. Electrolyte   Lab Results   Component Value Date    GLUCOSE 65 (L) 07/19/2024    CALCIUM 7.7 (L) 07/19/2024     (L) 07/19/2024    K 4.5 07/19/2024    CO2 17 (L) 07/19/2024     07/19/2024    BUN 30 (H) 07/19/2024    CREATININE 2.56 (H) 07/19/2024     - Reviewed renal profile.     6. Hypertension   Blood Pressures         7/18/2024  1951 7/18/2024  2323 7/19/2024  0421 7/19/2024  0745 7/19/2024  1055    BP: 162/85 157/78 147/75 153/75 104/54          -Goal BP < 140/90 mmHg   -continue current  management     7. Hydronephrosis  US  7/2/24-stable  - Discussed with tx surgery no need to repeat MAG -3    8.  GI prophylaxis   - On PPI     9. 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 Lou, pt is in the hospital    Dispo - Home today, lab on Tuesday. She is aware that if labs look bad, will need to be readmitted for more rejection treatment.    * Case was discussed with primary team.  For questions, please contact transplant nephrology page x [phone]    Marcia Webber    Transplant Nephrologist

## 2024-07-20 NOTE — PROGRESS NOTES
"        INPATIENT TRANSPLANT NEPHROLOGY PROGRESS NOTE          REASON FOR CONSULT:  Immunosuppressive medication management and nephrology related issues.    SUBJECTION:     She has some loose stools  Good UOP  Cr has increased; kidney biopsy tomorrow  No further PLEX  Discussed at [facility] - No Rituxan  No acute event overnight.      PHYCISCAL EXAMINATION:    Visit Vitals  /54 (BP Location: Left arm, Patient Position: Lying)   Pulse 67   Temp 36.6 °C (97.9 °F) (Temporal)   Resp 17   Ht 1.626 m (5' 4.02\")   Wt 82.4 kg (181 lb 10.5 oz)   LMP  (LMP Unknown)   SpO2 96%   BMI 31.17 kg/m²   OB Status Unknown   Smoking Status Never   BSA 1.93 m²         1900 -  0659  In: 1979 [P.O.:840; I.V.:1137]  Out: 1800 [Urine:1800]     Weight change:     General Appearance - NAD, Good speech, oriented and alert  HEENT - Supple. Not pale. No jaundice. No cervical lymphadenopathy. Pharynx and tonsils are not injected.  CVS - RRR. Normal S1/S2. No murmur, click , rub or gallop  Lungs- clear to auscultation bilaterally  Abdomen - soft , not tender, no guarding, no rigidity. No hepatosplenomegaly. Normal bowel sounds. No masses and ascites. S/P Kidney transplant .  Transplanted kidney is not tender.   Musculoskeletal /Extremities - no edema. Full ROM. No joint tenderness.   Neuro/Psych - appropriate mood and affect. Motor power V/V all extremities. CN I -XII were grossly intact.  Skin - No visible rash    MEDICATION LIST: REVIEWED                  ALLERGY:  No Known Allergies    LABS:  No results found for this or any previous visit (from the past 24 hour(s)).       ASSESSMENT AND PLAN:    Ms. Leblanc is a 44 y.o. female with past medical history significant for ESRD secondary to HTN/NSAID whom received a  donor kidney transplant on 23 by Dr. Marbin Lambert & Dr. Louis with a KDPI of 56% and PRA of 48%. HCV -/- and donor has not met risk factors. EBV +/+. 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 Dry " weight is 81.1 kg (discharge weight is 76.2kg ). Pt received a total of 4.5 mg/kg total of thymoglobulin induction therapy in conjunction with 500mg IV solumedrol. Pt was initiated on Tacrolimus & Cellcept immunosuppression in conjunction with IV solumedrol taper. 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 Pt was started on valcyte (CMV D + / R + ) and bactrim for CMV & PCP prophylaxis per protocol. She was started on clotrimazole as antifungal coverage per protocol. Operative course was complicated by return to OR for exploration of transplanted kidney and washout of hematoma. Hospital course was complicated by post-operative pain and constipation, which has resolved.     5/29/24 Direct admission for fever, nausea, vomiting and abdominal pain (chronic).  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 ID consulted. She is on Valcyte treatment dose.     Patient has 1+ pitting edema of her ankles and some mild swelling of UEs. Her UOP is unchanged, however she's endorsing some R flank tenderness that could be consistent with her known R sided kidney stone. At this time, this is likely an acute MARK, however an infectious workup was started in the ED. Will admit the patient for hydration, and monitoring of kidney function in the setting of Cr 1.6 (baseline 0.8), mild LE edema. Imaging pending to rule out Ddx of DVTs, transplant failure/rejection, infection.      Transplant nephrology is consulted to assist with immunosuppressive medication management and nephrology related issues.        1. Acute rejection BANFF  ACR/AVR and AMR  S/P  Solumedrol 500 mg daily x 3 days; now on prednisone 20 mg daily  Thymo 6 mg/kg  PLEX #4/4 completed 7/15/24    - Renal allograft function:   Lab Results   Component Value Date    CREATININE 2.56 (H) 07/19/2024     Estimated Creatinine Clearance: 28.5 mL/min (A) (by C-G formula based on SCr of 2.56 mg/dL  (H)).    Intake/Output Summary (Last 24 hours) at 7/20/2024 1258  Last data filed at 7/19/2024 1300  Gross per 24 hour   Intake 120 ml   Output --   Net 120 ml     MFI DSA  DQ2 10,158 on 7/3/24    - Bx7/5/24  KIDNEY ALLOGRAFT, RIGHT ILIAC FOSSA, PERCUTANEOUS CORE BIOPSY:  -- CHANGES CONSISTENT WITH ACUTE T CELL-MEDIATED REJECTION, BANFF IB  -- ACUTE VASCULAR REJECTION, BANFF IIA  -- ACUTE ANTIBODY-MEDIATED REJECTION  IFTA <10%  CMV/SV-40 negative  Mild peritubular capillaritis    - Continue to monitor UOP and Serum creatinine closely.   - Avoid nephrotoxic agents, NSAIDs and IV contrast   - Strict I/O.   - Renally dose all medications by the most recent CrCl from Cockcroft-Gault formula.  -Repeat Bx tomorrow    2. Immunosuppression   -          Rejection treatment - please see attached schedule    o   Thymo 125 mg on 7/7, 125 mg on 7/8, 125 mg on 7/10, 125 mg on 7/13 (course complete - total of 6 mg/kg)    o   IVIG 10 grams after PLEX sessions 1-4, then 80 grams 7/16    o   Steroids below    -          Belatacept 5 mg/kg (362.5 mg) q28 days; last dose received 6/24, next dose scheduled outpatient for 7/22 à will likely cancel    -          FK level = 6.3; continue tacrolimus 2.5 mg BID    -          MMF 1000 mg BID    -          Prednisone 10 mg daily [ methylpred 500 mg daily x 3 days 7/4 - 7/6 à prednisone 20 mg daily starting 7/7]    3. Anemia and WBC   Lab Results   Component Value Date    WBC 14.0 (H) 07/19/2024    HGB 8.2 (L) 07/19/2024    HCT 25.4 (L) 07/19/2024    MCV 94 07/19/2024     07/19/2024     -Continue to monitor Hgb   -No indications for PRBC transfusion     4. Electrolyte   Lab Results   Component Value Date    GLUCOSE 65 (L) 07/19/2024    CALCIUM 7.7 (L) 07/19/2024     (L) 07/19/2024    K 4.5 07/19/2024    CO2 17 (L) 07/19/2024     07/19/2024    BUN 30 (H) 07/19/2024    CREATININE 2.56 (H) 07/19/2024     - Reviewed renal profile.     6. Hypertension   Blood Pressures          7/18/2024  1951 7/18/2024  2323 7/19/2024  0421 7/19/2024  0745 7/19/2024  1055    BP: 162/85 157/78 147/75 153/75 104/54          -Goal BP < 140/90 mmHg   -continue current management     7. Hydronephrosis  US  7/2/24-stable  - Discussed with tx surgery no need to repeat MAG -3    8.  GI prophylaxis   - On PPI     9. 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 Lou, pt is in the hospital    * Case was discussed with primary team.  For questions, please contact transplant nephrology page x [phone]    Marcia Webber    Transplant Nephrologist

## 2024-07-22 ENCOUNTER — HOSPITAL ENCOUNTER (INPATIENT)
Facility: HOSPITAL | Age: 44
DRG: 698 | End: 2024-07-22
Admitting: STUDENT IN AN ORGANIZED HEALTH CARE EDUCATION/TRAINING PROGRAM
Payer: COMMERCIAL

## 2024-07-22 ENCOUNTER — TELEPHONE (OUTPATIENT)
Dept: TRANSPLANT | Facility: HOSPITAL | Age: 44
End: 2024-07-22
Payer: COMMERCIAL

## 2024-07-22 ENCOUNTER — APPOINTMENT (OUTPATIENT)
Dept: INFUSION THERAPY | Facility: HOSPITAL | Age: 44
End: 2024-07-22
Payer: COMMERCIAL

## 2024-07-22 ENCOUNTER — APPOINTMENT (OUTPATIENT)
Dept: RADIOLOGY | Facility: HOSPITAL | Age: 44
DRG: 698 | End: 2024-07-22
Payer: COMMERCIAL

## 2024-07-22 ENCOUNTER — CLINICAL SUPPORT (OUTPATIENT)
Dept: EMERGENCY MEDICINE | Facility: HOSPITAL | Age: 44
DRG: 698 | End: 2024-07-22
Payer: COMMERCIAL

## 2024-07-22 DIAGNOSIS — I10 ESSENTIAL HYPERTENSION: ICD-10-CM

## 2024-07-22 DIAGNOSIS — D84.9 IMMUNOSUPPRESSION: ICD-10-CM

## 2024-07-22 DIAGNOSIS — T86.19 OTHER COMPLICATION OF KIDNEY TRANSPLANT: ICD-10-CM

## 2024-07-22 DIAGNOSIS — M79.10 MUSCLE ACHE: ICD-10-CM

## 2024-07-22 DIAGNOSIS — Z79.899 IMMUNOSUPPRESSIVE MANAGEMENT ENCOUNTER FOLLOWING KIDNEY TRANSPLANT (HHS-HCC): ICD-10-CM

## 2024-07-22 DIAGNOSIS — Z94.0 IMMUNOSUPPRESSIVE MANAGEMENT ENCOUNTER FOLLOWING KIDNEY TRANSPLANT (HHS-HCC): ICD-10-CM

## 2024-07-22 DIAGNOSIS — R60.0 BILATERAL LOWER EXTREMITY EDEMA: Primary | ICD-10-CM

## 2024-07-22 DIAGNOSIS — Z94.0 KIDNEY REPLACED BY TRANSPLANT (HHS-HCC): ICD-10-CM

## 2024-07-22 DIAGNOSIS — H10.11 ALLERGIC CONJUNCTIVITIS OF RIGHT EYE: ICD-10-CM

## 2024-07-22 DIAGNOSIS — R06.02 SHORTNESS OF BREATH: ICD-10-CM

## 2024-07-22 DIAGNOSIS — D84.9 IMMUNOSUPPRESSION (MULTI): ICD-10-CM

## 2024-07-22 DIAGNOSIS — R07.9 CHEST PAIN, UNSPECIFIED TYPE: ICD-10-CM

## 2024-07-22 LAB
ALBUMIN SERPL BCP-MCNC: 3.7 G/DL (ref 3.4–5)
ALP SERPL-CCNC: 68 U/L (ref 33–110)
ALT SERPL W P-5'-P-CCNC: 35 U/L (ref 7–45)
AMYLASE SERPL-CCNC: 30 U/L (ref 29–103)
ANION GAP SERPL CALC-SCNC: 15 MMOL/L (ref 10–20)
AST SERPL W P-5'-P-CCNC: 27 U/L (ref 9–39)
BASOPHILS # BLD AUTO: 0.02 X10*3/UL (ref 0–0.1)
BASOPHILS NFR BLD AUTO: 0.2 %
BILIRUB SERPL-MCNC: 0.4 MG/DL (ref 0–1.2)
BNP SERPL-MCNC: 786 PG/ML (ref 0–99)
BUN SERPL-MCNC: 43 MG/DL (ref 6–23)
CALCIUM SERPL-MCNC: 7.6 MG/DL (ref 8.6–10.6)
CARDIAC TROPONIN I PNL SERPL HS: 26 NG/L (ref 0–34)
CARDIAC TROPONIN I PNL SERPL HS: 29 NG/L (ref 0–34)
CHLORIDE SERPL-SCNC: 97 MMOL/L (ref 98–107)
CHLORIDE UR-SCNC: <15 MMOL/L
CHLORIDE/CREATININE (MMOL/G) IN URINE: NORMAL
CO2 SERPL-SCNC: 15 MMOL/L (ref 21–32)
CREAT SERPL-MCNC: 3.13 MG/DL (ref 0.5–1.05)
CREAT UR-MCNC: 36.9 MG/DL (ref 20–320)
EGFRCR SERPLBLD CKD-EPI 2021: 18 ML/MIN/1.73M*2
EOSINOPHIL # BLD AUTO: 0 X10*3/UL (ref 0–0.7)
EOSINOPHIL NFR BLD AUTO: 0 %
ERYTHROCYTE [DISTWIDTH] IN BLOOD BY AUTOMATED COUNT: 17.3 % (ref 11.5–14.5)
GLUCOSE SERPL-MCNC: 153 MG/DL (ref 74–99)
HCT VFR BLD AUTO: 26.8 % (ref 36–46)
HGB BLD-MCNC: 9.5 G/DL (ref 12–16)
HOLD SPECIMEN: NORMAL
IMM GRANULOCYTES # BLD AUTO: 0.32 X10*3/UL (ref 0–0.7)
IMM GRANULOCYTES NFR BLD AUTO: 2.9 % (ref 0–0.9)
INR PPP: 1 (ref 0.9–1.1)
LIPASE SERPL-CCNC: 11 U/L (ref 9–82)
LYMPHOCYTES # BLD AUTO: 0.17 X10*3/UL (ref 1.2–4.8)
LYMPHOCYTES NFR BLD AUTO: 1.6 %
MAGNESIUM SERPL-MCNC: 2.24 MG/DL (ref 1.6–2.4)
MCH RBC QN AUTO: 31.5 PG (ref 26–34)
MCHC RBC AUTO-ENTMCNC: 35.4 G/DL (ref 32–36)
MCV RBC AUTO: 89 FL (ref 80–100)
MONOCYTES # BLD AUTO: 0.39 X10*3/UL (ref 0.1–1)
MONOCYTES NFR BLD AUTO: 3.6 %
NEUTROPHILS # BLD AUTO: 10.02 X10*3/UL (ref 1.2–7.7)
NEUTROPHILS NFR BLD AUTO: 91.7 %
NRBC BLD-RTO: 0 /100 WBCS (ref 0–0)
OSMOLALITY UR: 177 MOSM/KG (ref 200–1200)
PLATELET # BLD AUTO: 215 X10*3/UL (ref 150–450)
POTASSIUM SERPL-SCNC: 4.9 MMOL/L (ref 3.5–5.3)
POTASSIUM UR-SCNC: 10 MMOL/L
POTASSIUM/CREAT UR-RTO: 27 MMOL/G CREAT
PROT SERPL-MCNC: 6.6 G/DL (ref 6.4–8.2)
PROTHROMBIN TIME: 10.7 SECONDS (ref 9.8–12.8)
RBC # BLD AUTO: 3.02 X10*6/UL (ref 4–5.2)
SARS-COV-2 RNA RESP QL NAA+PROBE: NOT DETECTED
SODIUM SERPL-SCNC: 122 MMOL/L (ref 136–145)
SODIUM UR-SCNC: 22 MMOL/L
SODIUM/CREAT UR-RTO: 60 MMOL/G CREAT
WBC # BLD AUTO: 10.9 X10*3/UL (ref 4.4–11.3)

## 2024-07-22 PROCEDURE — 36415 COLL VENOUS BLD VENIPUNCTURE: CPT

## 2024-07-22 PROCEDURE — 85610 PROTHROMBIN TIME: CPT

## 2024-07-22 PROCEDURE — 93970 EXTREMITY STUDY: CPT

## 2024-07-22 PROCEDURE — 2500000004 HC RX 250 GENERAL PHARMACY W/ HCPCS (ALT 636 FOR OP/ED)

## 2024-07-22 PROCEDURE — 87075 CULTR BACTERIA EXCEPT BLOOD: CPT

## 2024-07-22 PROCEDURE — 76776 US EXAM K TRANSPL W/DOPPLER: CPT | Performed by: RADIOLOGY

## 2024-07-22 PROCEDURE — 99285 EMERGENCY DEPT VISIT HI MDM: CPT

## 2024-07-22 PROCEDURE — 93971 EXTREMITY STUDY: CPT | Performed by: RADIOLOGY

## 2024-07-22 PROCEDURE — 99285 EMERGENCY DEPT VISIT HI MDM: CPT | Mod: 25

## 2024-07-22 PROCEDURE — 71045 X-RAY EXAM CHEST 1 VIEW: CPT

## 2024-07-22 PROCEDURE — 83690 ASSAY OF LIPASE: CPT

## 2024-07-22 PROCEDURE — 85025 COMPLETE CBC W/AUTO DIFF WBC: CPT

## 2024-07-22 PROCEDURE — 82150 ASSAY OF AMYLASE: CPT

## 2024-07-22 PROCEDURE — 76776 US EXAM K TRANSPL W/DOPPLER: CPT

## 2024-07-22 PROCEDURE — 2500000004 HC RX 250 GENERAL PHARMACY W/ HCPCS (ALT 636 FOR OP/ED): Performed by: STUDENT IN AN ORGANIZED HEALTH CARE EDUCATION/TRAINING PROGRAM

## 2024-07-22 PROCEDURE — 84484 ASSAY OF TROPONIN QUANT: CPT

## 2024-07-22 PROCEDURE — 82435 ASSAY OF BLOOD CHLORIDE: CPT

## 2024-07-22 PROCEDURE — 87040 BLOOD CULTURE FOR BACTERIA: CPT

## 2024-07-22 PROCEDURE — 82570 ASSAY OF URINE CREATININE: CPT

## 2024-07-22 PROCEDURE — 71045 X-RAY EXAM CHEST 1 VIEW: CPT | Mod: FOREIGN READ | Performed by: RADIOLOGY

## 2024-07-22 PROCEDURE — 87635 SARS-COV-2 COVID-19 AMP PRB: CPT

## 2024-07-22 PROCEDURE — 93005 ELECTROCARDIOGRAM TRACING: CPT

## 2024-07-22 PROCEDURE — 2500000001 HC RX 250 WO HCPCS SELF ADMINISTERED DRUGS (ALT 637 FOR MEDICARE OP): Performed by: STUDENT IN AN ORGANIZED HEALTH CARE EDUCATION/TRAINING PROGRAM

## 2024-07-22 PROCEDURE — 83880 ASSAY OF NATRIURETIC PEPTIDE: CPT

## 2024-07-22 PROCEDURE — 83935 ASSAY OF URINE OSMOLALITY: CPT

## 2024-07-22 PROCEDURE — 84075 ASSAY ALKALINE PHOSPHATASE: CPT

## 2024-07-22 PROCEDURE — 1200000002 HC GENERAL ROOM WITH TELEMETRY DAILY

## 2024-07-22 PROCEDURE — 99222 1ST HOSP IP/OBS MODERATE 55: CPT | Performed by: STUDENT IN AN ORGANIZED HEALTH CARE EDUCATION/TRAINING PROGRAM

## 2024-07-22 PROCEDURE — 83735 ASSAY OF MAGNESIUM: CPT

## 2024-07-22 RX ORDER — SUMATRIPTAN SUCCINATE 100 MG/1
100 TABLET ORAL ONCE AS NEEDED
Status: DISCONTINUED | OUTPATIENT
Start: 2024-07-22 | End: 2024-07-24

## 2024-07-22 RX ORDER — HEPARIN SODIUM 5000 [USP'U]/ML
5000 INJECTION, SOLUTION INTRAVENOUS; SUBCUTANEOUS EVERY 8 HOURS
Status: DISCONTINUED | OUTPATIENT
Start: 2024-07-22 | End: 2024-07-31 | Stop reason: HOSPADM

## 2024-07-22 RX ORDER — MYCOPHENOLATE MOFETIL 250 MG/1
1000 CAPSULE ORAL EVERY 12 HOURS
Status: DISCONTINUED | OUTPATIENT
Start: 2024-07-22 | End: 2024-07-31 | Stop reason: HOSPADM

## 2024-07-22 RX ORDER — SERTRALINE HYDROCHLORIDE 50 MG/1
50 TABLET, FILM COATED ORAL DAILY
Status: DISCONTINUED | OUTPATIENT
Start: 2024-07-23 | End: 2024-07-31 | Stop reason: HOSPADM

## 2024-07-22 RX ORDER — LEVOTHYROXINE SODIUM 50 UG/1
25 TABLET ORAL
Status: DISCONTINUED | OUTPATIENT
Start: 2024-07-23 | End: 2024-07-31 | Stop reason: HOSPADM

## 2024-07-22 RX ORDER — GABAPENTIN 300 MG/1
300 CAPSULE ORAL NIGHTLY
Status: DISCONTINUED | OUTPATIENT
Start: 2024-07-22 | End: 2024-07-31 | Stop reason: HOSPADM

## 2024-07-22 RX ORDER — SODIUM BICARBONATE 650 MG/1
1300 TABLET ORAL 3 TIMES DAILY
Status: DISCONTINUED | OUTPATIENT
Start: 2024-07-22 | End: 2024-07-25

## 2024-07-22 RX ORDER — AMOXICILLIN 250 MG
2 CAPSULE ORAL 2 TIMES DAILY
Status: DISCONTINUED | OUTPATIENT
Start: 2024-07-22 | End: 2024-07-31 | Stop reason: HOSPADM

## 2024-07-22 RX ORDER — CARVEDILOL 25 MG/1
25 TABLET ORAL 2 TIMES DAILY
Status: DISCONTINUED | OUTPATIENT
Start: 2024-07-22 | End: 2024-07-31 | Stop reason: HOSPADM

## 2024-07-22 RX ORDER — PREDNISONE 10 MG/1
20 TABLET ORAL DAILY
Status: DISCONTINUED | OUTPATIENT
Start: 2024-07-23 | End: 2024-07-31 | Stop reason: HOSPADM

## 2024-07-22 RX ORDER — TACROLIMUS 1 MG/1
2 CAPSULE ORAL
Status: DISCONTINUED | OUTPATIENT
Start: 2024-07-22 | End: 2024-07-24

## 2024-07-22 RX ORDER — CLOTRIMAZOLE 10 MG/1
10 LOZENGE ORAL
Status: DISCONTINUED | OUTPATIENT
Start: 2024-07-22 | End: 2024-07-31 | Stop reason: HOSPADM

## 2024-07-22 RX ORDER — POLYETHYLENE GLYCOL 3350 17 G/17G
17 POWDER, FOR SOLUTION ORAL DAILY
Status: DISCONTINUED | OUTPATIENT
Start: 2024-07-22 | End: 2024-07-31 | Stop reason: HOSPADM

## 2024-07-22 RX ORDER — HYDRALAZINE HYDROCHLORIDE 25 MG/1
25 TABLET, FILM COATED ORAL 2 TIMES DAILY
Status: DISCONTINUED | OUTPATIENT
Start: 2024-07-22 | End: 2024-07-25

## 2024-07-22 RX ORDER — ACETAMINOPHEN 325 MG/1
650 TABLET ORAL EVERY 4 HOURS PRN
Status: DISCONTINUED | OUTPATIENT
Start: 2024-07-22 | End: 2024-07-31 | Stop reason: HOSPADM

## 2024-07-22 RX ORDER — AMLODIPINE BESYLATE 10 MG/1
10 TABLET ORAL DAILY
Status: DISCONTINUED | OUTPATIENT
Start: 2024-07-23 | End: 2024-07-31 | Stop reason: HOSPADM

## 2024-07-22 RX ORDER — ONDANSETRON HYDROCHLORIDE 2 MG/ML
4 INJECTION, SOLUTION INTRAVENOUS EVERY 8 HOURS PRN
Status: DISCONTINUED | OUTPATIENT
Start: 2024-07-22 | End: 2024-07-31 | Stop reason: HOSPADM

## 2024-07-22 RX ORDER — PANTOPRAZOLE SODIUM 40 MG/1
40 TABLET, DELAYED RELEASE ORAL
Status: DISCONTINUED | OUTPATIENT
Start: 2024-07-23 | End: 2024-07-24

## 2024-07-22 RX ORDER — CALCIUM CARBONATE 200(500)MG
500 TABLET,CHEWABLE ORAL 2 TIMES DAILY
Status: DISCONTINUED | OUTPATIENT
Start: 2024-07-22 | End: 2024-07-31 | Stop reason: HOSPADM

## 2024-07-22 RX ORDER — CALCITRIOL 0.25 UG/1
0.25 CAPSULE ORAL DAILY
Status: DISCONTINUED | OUTPATIENT
Start: 2024-07-23 | End: 2024-07-27

## 2024-07-22 RX ORDER — VALGANCICLOVIR 450 MG/1
450 TABLET, FILM COATED ORAL EVERY OTHER DAY
Status: DISCONTINUED | OUTPATIENT
Start: 2024-07-23 | End: 2024-07-31 | Stop reason: HOSPADM

## 2024-07-22 RX ORDER — PANTOPRAZOLE SODIUM 40 MG/1
TABLET, DELAYED RELEASE ORAL
Qty: 30 TABLET | Refills: 1 | Status: ON HOLD | OUTPATIENT
Start: 2024-07-22

## 2024-07-22 RX ORDER — LANOLIN ALCOHOL/MO/W.PET/CERES
400 CREAM (GRAM) TOPICAL DAILY
Status: DISCONTINUED | OUTPATIENT
Start: 2024-07-23 | End: 2024-07-31 | Stop reason: HOSPADM

## 2024-07-22 RX ORDER — ONDANSETRON 4 MG/1
4 TABLET, ORALLY DISINTEGRATING ORAL EVERY 8 HOURS PRN
Status: DISCONTINUED | OUTPATIENT
Start: 2024-07-22 | End: 2024-07-31 | Stop reason: HOSPADM

## 2024-07-22 RX ORDER — CHOLECALCIFEROL (VITAMIN D3) 25 MCG
4000 TABLET ORAL DAILY
Status: DISCONTINUED | OUTPATIENT
Start: 2024-07-23 | End: 2024-07-31 | Stop reason: HOSPADM

## 2024-07-22 RX ORDER — SULFAMETHOXAZOLE AND TRIMETHOPRIM 400; 80 MG/1; MG/1
1 TABLET ORAL DAILY
Status: DISCONTINUED | OUTPATIENT
Start: 2024-07-23 | End: 2024-07-31 | Stop reason: HOSPADM

## 2024-07-22 RX ORDER — BUMETANIDE 0.25 MG/ML
2 INJECTION, SOLUTION INTRAMUSCULAR; INTRAVENOUS ONCE
Status: COMPLETED | OUTPATIENT
Start: 2024-07-22 | End: 2024-07-22

## 2024-07-22 RX ORDER — ALBUTEROL SULFATE 90 UG/1
2 INHALANT RESPIRATORY (INHALATION) EVERY 4 HOURS PRN
Status: DISCONTINUED | OUTPATIENT
Start: 2024-07-22 | End: 2024-07-31 | Stop reason: HOSPADM

## 2024-07-22 RX ADMIN — CALCIUM CARBONATE (ANTACID) CHEW TAB 500 MG 500 MG: 500 CHEW TAB at 20:28

## 2024-07-22 RX ADMIN — HEPARIN SODIUM 5000 UNITS: 5000 INJECTION INTRAVENOUS; SUBCUTANEOUS at 22:06

## 2024-07-22 RX ADMIN — CARVEDILOL 25 MG: 25 TABLET, FILM COATED ORAL at 20:28

## 2024-07-22 RX ADMIN — TACROLIMUS 2 MG: 1 CAPSULE ORAL at 20:28

## 2024-07-22 RX ADMIN — MYCOPHENOLATE MOFETIL 1000 MG: 250 CAPSULE ORAL at 20:28

## 2024-07-22 RX ADMIN — SENNOSIDES AND DOCUSATE SODIUM 2 TABLET: 50; 8.6 TABLET ORAL at 20:29

## 2024-07-22 RX ADMIN — HYDRALAZINE HYDROCHLORIDE 25 MG: 25 TABLET ORAL at 20:28

## 2024-07-22 RX ADMIN — BUMETANIDE 2 MG: 0.25 INJECTION, SOLUTION INTRAMUSCULAR; INTRAVENOUS at 22:30

## 2024-07-22 RX ADMIN — SODIUM BICARBONATE 1300 MG: 650 TABLET ORAL at 20:29

## 2024-07-22 RX ADMIN — GABAPENTIN 300 MG: 300 CAPSULE ORAL at 20:28

## 2024-07-22 RX ADMIN — CLOTRIMAZOLE 10 MG: 10 LOZENGE ORAL at 22:06

## 2024-07-22 SDOH — HEALTH STABILITY: MENTAL HEALTH: HAVE YOU ACTUALLY HAD ANY THOUGHTS OF KILLING YOURSELF?: NO

## 2024-07-22 SDOH — HEALTH STABILITY: MENTAL HEALTH: HAVE YOU EVER DONE ANYTHING, STARTED TO DO ANYTHING, OR PREPARED TO DO ANYTHING TO END YOUR LIFE?: NO

## 2024-07-22 SDOH — HEALTH STABILITY: MENTAL HEALTH: HAVE YOU WISHED YOU WERE DEAD OR WISHED YOU COULD GO TO SLEEP AND NOT WAKE UP?: NO

## 2024-07-22 SDOH — HEALTH STABILITY: MENTAL HEALTH: SUICIDE ASSESSMENT: ADULT (C-SSRS)

## 2024-07-22 ASSESSMENT — PAIN SCALES - GENERAL
PAINLEVEL_OUTOF10: 0 - NO PAIN
PAINLEVEL_OUTOF10: 9

## 2024-07-22 ASSESSMENT — COLUMBIA-SUICIDE SEVERITY RATING SCALE - C-SSRS
1. IN THE PAST MONTH, HAVE YOU WISHED YOU WERE DEAD OR WISHED YOU COULD GO TO SLEEP AND NOT WAKE UP?: NO
6. HAVE YOU EVER DONE ANYTHING, STARTED TO DO ANYTHING, OR PREPARED TO DO ANYTHING TO END YOUR LIFE?: NO
2. HAVE YOU ACTUALLY HAD ANY THOUGHTS OF KILLING YOURSELF?: NO

## 2024-07-22 ASSESSMENT — PAIN - FUNCTIONAL ASSESSMENT: PAIN_FUNCTIONAL_ASSESSMENT: 0-10

## 2024-07-22 ASSESSMENT — LIFESTYLE VARIABLES
EVER HAD A DRINK FIRST THING IN THE MORNING TO STEADY YOUR NERVES TO GET RID OF A HANGOVER: NO
TOTAL SCORE: 0
EVER FELT BAD OR GUILTY ABOUT YOUR DRINKING: NO
HAVE YOU EVER FELT YOU SHOULD CUT DOWN ON YOUR DRINKING: NO
HAVE PEOPLE ANNOYED YOU BY CRITICIZING YOUR DRINKING: NO

## 2024-07-22 NOTE — DOCUMENTATION CLARIFICATION NOTE
PATIENT:               DREW MORRISSEY  ACCT #:                  [id number]  MRN:                       [id number]  :                       1980  ADMIT DATE:       2024 3:29 PM  DISCH DATE:        2024 2:12 PM  RESPONDING PROVIDER #:        [id number]          PROVIDER RESPONSE TEXT:    acute rejection of transplant kidney    CDI QUERY TEXT:    Clarification        Instruction:    Based on your assessment of the patient and the clinical information, please provide the requested documentation by clicking on the appropriate radio button and enter any additional information if prompted.    Question: Please further clarify the diagnosis of acute kidney injury as    When answering this query, please exercise your independent professional judgment. The fact that a question is being asked, does not imply that any particular answer is desired or expected.    The patient's clinical indicators include:  Clinical Information: 44 yr old female hx kidney transplant 2023 who presents with  SOB, volume overload and MARK. VS on admit - 36 .6 - 93 -17 143/87  pox RA - 96    Clinical Indicators: Baseline creat 0.8. Creatinine trend during this admit 24 - 1.6,   - 2.14,   - 3.40,   - 2.07, 7/15 - 2.19,  - 2.36    Treatment: serial creatinine, nephrology consult, kidney transplant biopsies x 2    Risk Factors: immunosuppressed pt, transplant rejection  Options provided:  -- Acute kidney injury with acute tubular necrosis  -- Acute kidney injury without acute tubular necrosis  -- Other - I will add my own diagnosis  -- Refer to Clinical Documentation Reviewer    Query created by: Katarina Aragon on 2024 3:59 PM      Electronically signed by:  BEATRICE MARIE MD 2024 12:26 PM

## 2024-07-22 NOTE — ED TRIAGE NOTES
Pt to ED with c/o SOB, burning chest pain, bilateral lower extremity swelling, and abdominal pain with swelling.  Pt has had these symptoms for 3-5 days, worsening today.     SpO2 96% or better, but patient is working hard to breath during triage.     Hx of kidney transplant

## 2024-07-22 NOTE — TELEPHONE ENCOUNTER
Call placed to patient friend Danuta, states patient swelling increased in her legs and abdomen C/O difficulty breathing especially at night and seemed lethargic.  Danuta states he is not with patient but can be in an hour.     Call placed to patient with use of language line, spoke with  who is with patient. States swelling increased in legs and abdomen, SOB with ambulation, sitting and worse upon lying down. States they gave albuterol thinking asthma, hot in house and only have air conditioner in bed room not working well. Advised to proceed to ED, or call 911 if breathing worsens. Local ED is [facility], but will try to come to main campus.     Message sent to Dr. Urbina and Dr. Louis with update.

## 2024-07-22 NOTE — ED PROVIDER NOTES
CC: Chest Pain, Shortness of Breath, and Leg Swelling     History provided by: Patient  Limitations to History: None    HPI:    Patient is a 44-year-old Kiswahili speaking female with a PMH of ESRD 2/2 HTN and NSAIDs, that received a DDKT on 11/30/23 who was recently admitted from 7/2/24 to 7/19/24 for acute rejection.  During that hospital admission she was treated with methylprednisone, plasmapheresis, and thymo.  Patient was  discharged on 7/19/24.  Patient reports that 1 day after discharge she reported feeling unwell with edema in her abdomen and bilateral lower extremities.  Associated symptoms include a burning  sensation in her epigastric region to her chest that was present during her last hospital admission.  She reports that she has diminished p.o. intake and has increased satiety.  Patient denies nausea, vomiting, constipation, diarrhea, fevers, or chills.    External Records Reviewed: Previous ED records, inpatient records, and outpatient records  ???????????????????????????????????????????????????????????????  Triage Vitals:  T 36.6 °C (97.9 °F)  HR 72  BP (!) 161/91  RR (!) 24  O2 97 % None (Room air)    Physical Exam  Constitutional:       General: She is awake. She is not in acute distress.     Appearance: She is not toxic-appearing or diaphoretic.      Comments: Chronically ill-appearing   HENT:      Head: Normocephalic and atraumatic.   Eyes:      Extraocular Movements: Extraocular movements intact.      Conjunctiva/sclera: Conjunctivae normal.      Pupils: Pupils are equal, round, and reactive to light.   Cardiovascular:      Rate and Rhythm: Normal rate and regular rhythm.      Pulses:           Radial pulses are 2+ on the right side and 2+ on the left side.        Dorsalis pedis pulses are 2+ on the right side and 2+ on the left side.      Heart sounds: Normal heart sounds, S1 normal and S2 normal. Heart sounds not distant. No murmur heard.     No friction rub.   Pulmonary:      Effort:  Pulmonary effort is normal. Tachypnea present.      Comments: Bilateral rales left greater than right.  Abdominal:      General: Abdomen is protuberant. There is distension.      Palpations: Abdomen is soft.      Tenderness: There is abdominal tenderness in the right lower quadrant and epigastric area.      Comments: RLQ kidney transplant surgical scar. Ansarca present over the abdomen.   Musculoskeletal:      Right lower le+ Pitting Edema present.      Left lower le+ Pitting Edema present.   Skin:     General: Skin is warm and dry.      Capillary Refill: Capillary refill takes less than 2 seconds.   Neurological:      Mental Status: She is alert and oriented to person, place, and time.   Psychiatric:         Behavior: Behavior is cooperative.        ???????????????????????????????????????????????????????????????  ED Course/Treatment/Medical Decision Making    EKG Interpretation:   Normal sinus rhythm. Rate of 73 bpm. Normal axis. Normal intervals. No acute ST elevations, depressions, or T wave inversions.  Unchanged when compared to previous EKG completed on 2024.    Independent Interpretation of Studies:  I independently interpreted: EKG, CXR, bilateral lower extremity ultrasound    Differential diagnoses considered include but are not limited to: Acute kidney transplant rejection, kidney failure, nephrolithiasis, ACS, pulmonary edema, pneumonia, new onset heart failure, recurrent CMV infection, sepsis, arrhythmia, electrolyte abnormalities    Social Determinants Limiting Care:  None identified         ED Course:  ED Course as of 24 1409   Mon 2024   1804 Patient is a 44-year-old female presents emergency department for chest pain and shortness of breath.  She is Maori speaking and  was used ([employer] ID# [id number]). Patient underwent a right kidney transplant 2023 and has a history of a CMV infection.  States she was recently admitted to the hospital earlier this  month.  Since her discharge she has been feeling short of breath feels like she is swollen all over.  Also endorses epigastric burning abdominal pain.  Endorses decreased appetite.  Denies nausea, vomiting, diarrhea, difficulty passing bowel movements, dysuria, increased urinary frequency, hematuria.  She has been taking her medications.    Physical exam:  General: Well-developed, well-nourished patient lying in bed who appears non-toxic.  Head: Atraumatic, normocephalic.  Eyes: Sclera anicteric.  ENT: Mucous membranes moist.  Heart: Regular rate and rhythm.  Lungs: Bilateral rales left greater than right.  Tachypneic.    Abdomen: Distended.  Right lower quadrant and epigastric tenderness.  Anasarca.  Neurologic: Awake and alert, normal speech and mental status. Moves all extremities equally well. No focal deficits or lateralizing signs.  Psychiatric: Mood and affect appropriate.  Skin: Warm and dry, no appreciable rash.  Musculoskeletal: Bilateral upper extremity and bilateral lower extremity edema    MDM:   Patient is a 44-year-old female who presents emergency department for chest pain shortness of breath in the setting of right renal transplant November 2023.  Differential diagnosis include organ rejection, renal failure, recurrent CMV infection, new onset heart failure, arrhythmia, electrolyte abnormalities.  Labs, EKG, chest x-ray ordered.  This patient tachypneic with worsening edema in setting of her renal transplant, she will require admission to the transplant service. [RAMÍREZ]   1812 ECG 12 lead  Sinus rhythm with baseline artifact.  Normal axis and intervals. [RAMÍREZ]      ED Course User Index  [RAMÍREZ] Marlo Quinones MD         Diagnoses as of 07/25/24 1409   Bilateral lower extremity edema   Shortness of breath   Chest pain, unspecified type       MDM:    Patient is a 44-year-old female with above PMH presents to the emergency department for chief complaint of chest pain and shortness of breath.  Upon arrival  patient's vital signs remarkable for hypertension, she appears chronically ill, but is in no acute distress.  Upon examination patient does have bilateral rales, soft but distended abdomen, and bilateral lower extremity edema.  Transplant surgery was immediately contacted on patient's arrival. CXR remarkable for cardiomegaly and bilateral pleural effusions.  Bilateral duplex ultrasounds are without evidence DVT and with anasarca.  Blood cultures abdominal patient's arrival.  VBG remarkable for mild acidemia 7.31, pCO2 32, and normal lactate.  BNP is elevated at 76 which is up from 272 from recent admission.  Troponin is within normal limits x 2 and EKG is nonischemic therefore ACS is less likely today.  Lipase is within normal limits therefore pancreatitis less likely.  Further abdominal imaging and diuresis will be deferred to the transplant surgery team.  Patient was admitted to transplant surgery in stable condition for further management.    Impression:  Bilateral lower extremity edema  Shortness of breath  Chest pain    Disposition:  Admitted to transplant surgery    Patient was staffed and discussed with ED attending Dr. Stacie Tucker, DO   Emergency Medicine, PGY-2      Procedures ? Smart[employer] last updated 7/22/2024 5:45 PM          Darrell Tucker, DO  Resident  07/25/24 1448

## 2024-07-22 NOTE — H&P
Transplant Surgery History and Physical    Subjective   Chief Complaint/Reason for Admission: SOB and LE edema    HPI:  Herber Foy Rai is a 44 y.o. female with ESRD 2/2 HTN and NSAIDs, that received a DDKT from 11/30/23 who was recently admitted from 7/2/24 to 7/19/24 for acute rejection and treated with 3 days of methylprednisone 250, 4 doses of thymo (4.5 mg/kg total), and 5 rounds of plasmapharesis. She was discharged on 7/19/24 with tacro 2 mg BID, pred 20 QD, MMF 1g BID     On 7/17, she had a repeat biopsy that resulted   -- Diffuse interstitial inflammatory infiltrate with focal moderate tubulitis, consistent with ACR Banff IA   --Endothelialitis, consistent with ACR Banff IIA   --Microvascular inflammation and C4d positivity (focal), consistent with persistent ABMR   -- No immune complex deposits  Bloodwork on 7/19/24 showed Cr of 2.56, uptrending from 2.34 on discharge. Tacro level was appropriate at 8.1    Pt reports one day after discharge she reported feeling unwell and having swelling again. For the last few days she reports both LE edema and swelling in the abdomen. She also reports a burning in her abdomen and chest similar to what she experienced in the hospital. She has had diminished PO intake and gets full very quickly. She denies N/V, constipation, diarrhea. She denies decrease to urine production, burning with urination, blood in urine.     A 12-point ROS was performed and was unremarkable except as above.    PMH:  Past Medical History:   Diagnosis Date    Anxiety     Chronic kidney disease     Chronic kidney disease, unspecified     CKD, patient interested in transplantation    Depression     Disease of thyroid gland     GERD (gastroesophageal reflux disease)     Hypertension     Personal history of COVID-19 07/20/2022    History of COVID-19     PSH:  Past Surgical History:   Procedure Laterality Date    MR HEAD ANGIO W AND WO IV CONTRAST  01/26/2021    MR HEAD ANGIO W AND WO IV CONTRAST    MR  HEAD ANGIO WO IV CONTRAST  01/26/2021    MR HEAD ANGIO WO IV CONTRAST    OTHER SURGICAL HISTORY  07/20/2022    Arteriovenous fistula creation procedure    TRANSPLANT, KIDNEY, OPEN Right 11/30/2023    US GUIDED PERCUTANEOUS PERITONEAL OR RETROPERITONEAL FLUID COLLECTION DRAINAGE  12/21/2023    US GUIDED PERCUTANEOUS PERITONEAL OR RETROPERITONEAL FLUID COLLECTION DRAINAGE 12/21/2023 Batsheva Shanks MD [facility]     Soc Hx:  Social History     Socioeconomic History    Marital status:      Spouse name: Not on file    Number of children: Not on file    Years of education: Not on file    Highest education level: Not on file   Occupational History    Not on file   Tobacco Use    Smoking status: Never     Passive exposure: Never    Smokeless tobacco: Former     Quit date: 2021    Tobacco comments:     Chewing tobacco, quit 1 year ago   Vaping Use    Vaping status: Never Used   Substance and Sexual Activity    Alcohol use: Never    Drug use: Never    Sexual activity: Defer   Other Topics Concern    Not on file   Social History Narrative    Not on file     Social Determinants of Health     Financial Resource Strain: Low Risk  (7/3/2024)    Overall Financial Resource Strain (CARDIA)     Difficulty of Paying Living Expenses: Not very hard   Food Insecurity: No Food Insecurity (7/3/2024)    Hunger Vital Sign     Worried About Running Out of Food in the Last Year: Never true     Ran Out of Food in the Last Year: Never true   Transportation Needs: No Transportation Needs (7/3/2024)    PRAPARE - Transportation     Lack of Transportation (Medical): No     Lack of Transportation (Non-Medical): No   Physical Activity: Inactive (7/3/2024)    Exercise Vital Sign     Days of Exercise per Week: 0 days     Minutes of Exercise per Session: 0 min   Stress: No Stress Concern Present (7/3/2024)    [place] [employer] - Occupational Stress Questionnaire     Feeling of Stress : Not at all   Social Connections: Unknown  (7/3/2024)    Social Connection and Isolation Panel [NHANES]     Frequency of Communication with Friends and Family: More than three times a week     Frequency of Social Gatherings with Friends and Family: More than three times a week     Attends [employer] Services: Patient declined     Active Member of Clubs or Organizations: Patient declined     Attends Club or Organization Meetings: Patient declined     Marital Status:    Intimate Partner Violence: Not At Risk (7/3/2024)    Humiliation, Afraid, Rape, and Kick questionnaire     Fear of Current or Ex-Partner: No     Emotionally Abused: No     Physically Abused: No     Sexually Abused: No   Housing Stability: Low Risk  (7/3/2024)    Housing Stability Vital Sign     Unable to Pay for Housing in the Last Year: No     Number of Places Lived in the Last Year: 1     Unstable Housing in the Last Year: No     Fam Hx:  Family History   Family history unknown: Yes      Allergies:  No Known Allergies  Current Medications:  No current facility-administered medications on file prior to encounter.     Current Outpatient Medications on File Prior to Encounter   Medication Sig Dispense Refill    acetaminophen (Tylenol) 325 mg tablet Take 2 tablets (650 mg) by mouth every 6 hours if needed for mild pain (1 - 3).      albuterol 90 mcg/actuation inhaler Inhale 2 puffs every 4 hours if needed for wheezing.      amLODIPine (Norvasc) 5 mg tablet Take 2 tablets (10 mg) by mouth once daily.      azelastine (Optivar) 0.05 % ophthalmic solution Administer 1 drop into both eyes 2 times a day. 6 mL 3    calcitriol (Rocaltrol) 0.25 mcg capsule TAKE 1 TABLET BY MOUTH ONCE DAILY 30 capsule 2    Calcium Antacid 200 mg calcium (500 mg) chewable tablet CHEW AND SWALLOW 1 TABLET BY MOUTH 2 TIMES DAILY 60 tablet 3    carvedilol (Coreg) 25 mg tablet Take 1 tablet (25 mg) by mouth 2 times a day. 60 tablet 0    cholecalciferol (Vitamin D-3) 50 MCG (2000 UT) tablet Take 2 tablets (100 mcg) by  mouth once daily. 180 tablet 3    clotrimazole (Mycelex) 10 mg irma Take 1 tablet (10 mg) by mouth 3 times a day after meals. 90 Irma 0    fexofenadine (Allegra) 180 mg tablet Take 1 tablet (180 mg) by mouth once daily. (Patient not taking: Reported on 7/3/2024) 30 tablet 11    gabapentin (Neurontin) 100 mg capsule Take 3 capsules (300 mg) by mouth once daily at bedtime. 270 capsule 3    hydrALAZINE (Apresoline) 25 mg tablet Take 1 tablet (25 mg) by mouth 2 times a day. 60 tablet 0    levothyroxine (Synthroid, Levoxyl) 25 mcg tablet Take 1 tablet (25 mcg) by mouth once daily in the morning. Take before meals.      magnesium oxide (Mag-Ox) 400 mg tablet Take 1 tablet (400 mg) by mouth once daily. Take with food at lunch time 30 tablet 11    mycophenolate (Cellcept) 250 mg capsule Take 4 capsules (1,000 mg) by mouth every 12 hours.      pantoprazole (ProtoNix) 40 mg EC tablet Take 1 tablet (40 mg) by mouth once daily in the morning. Take before meals. Do not crush, chew, or split. 30 tablet 1    predniSONE (Deltasone) 5 mg tablet Take 4 tablets (20 mg) by mouth once daily. 120 tablet 0    sertraline (Zoloft) 50 mg tablet Take 1 tablet (50 mg) by mouth once daily.      sodium bicarbonate 650 mg tablet Take 2 tablets (1,300 mg) by mouth 3 times a day. 180 tablet 0    sulfamethoxazole-trimethoprim (Bactrim) 400-80 mg tablet Take 1 tablet by mouth once daily. 30 tablet 0    SUMAtriptan (Imitrex) 100 mg tablet Take 1 tablet (100 mg) by mouth 1 time if needed for migraine. 9 tablet 11    tacrolimus (Prograf) 1 mg capsule Take 2 capsules (2 mg) by mouth every 12 hours. 120 capsule 0    tenofovir alafenamide (Vemlidy) 25 mg tablet tablet Take 1 tablet (25 mg) by mouth once daily. 30 tablet 11    valGANciclovir (Valcyte) 450 mg tablet Take 1 tablet (450 mg) by mouth every other day. Do not crush or chew. Do not fill before July 20, 2024.      [DISCONTINUED] amLODIPine (Norvasc) 5 mg tablet Take 1 tablet (5 mg) by mouth 2  times a day. 180 tablet 3    [DISCONTINUED] amLODIPine (Norvasc) 5 mg tablet Take 2 tablets (10 mg) by mouth 2 times a day.      [DISCONTINUED] BELATACEPT IV Infuse into a venous catheter every 28 (twenty-eight) days. Maintenance infusion      [DISCONTINUED] mycophenolate (Cellcept) 250 mg capsule Take 2 capsules (500 mg) by mouth every 12 hours. 120 capsule 11    [DISCONTINUED] potassium citrate CR (Urocit-K-10) 10 mEq ER tablet Take 2 tablets (20 mEq) by mouth once daily. Do not crush, chew, or split. 60 tablet 11    [DISCONTINUED] predniSONE (Deltasone) 5 mg tablet Take 2 tablets (10 mg) by mouth once daily. 60 tablet 11    [DISCONTINUED] predniSONE (Deltasone) 5 mg tablet Take 4 tablets (20 mg) by mouth once daily.      [DISCONTINUED] valGANciclovir (Valcyte) 450 mg tablet Take 2 tablets (900 mg) by mouth every 12 hours. Do not crush or chew. (Patient not taking: Reported on 6/24/2024) 120 tablet 11         Objective   Vitals:  Visit Vitals  BP (!) 161/91 (BP Location: Right arm, Patient Position: Sitting)   Pulse 72   Temp 36.6 °C (97.9 °F) (Temporal)   Resp (!) 24       Physical Exam:  GEN: No acute distress. Alert, awake and conversive.  HEENT: Sclera anicteric. Moist mucous membranes.  RESP: Breathing mildly labored, equal chest rise. On RA satting 98%  CV: Regular rate, normotensive  EXTREMITIES: bilaterally pitting edema in the LE to the ankles  GI: Abdomen soft, mildly distended,  mild tenderness to palpation, bruising over lower abdomen    : Voiding spontaneously.  MSK: No gross deformities. Moves all extremities spontaneously.  NEURO: Alert and oriented x3. No focal deficits.  PSYCH: Appropriate mood and affect.  SKIN: bruising over lower abdomen    Labs within past 24h:  No results found for this or any previous visit (from the past 24 hour(s)).    Imaging within past 24h:  No results found.       ASSESSMENT  Herber Foy Rai is a 44 y.o. female with with ESRD 2/2 HTN and NSAIDs, that received a DDKT  from 11/30/23 who was recently admitted from 7/2/24 to 7/19/24 for acute rejection and treated with 3 days of methylprednisone 250, 4 doses of thymo (4.5 mg/kg total), and 5 rounds of plasmapharesis. Patient is in fair condition.     PLAN:  - Admit to transplant service  - Labs drawn in ED: CBC, RFP, Mg, trops, BNP, BCx2, VBG, amylase, lipase, UA+cx  - CXR from ED shows mild bilateral pulm infiltrates  - Will continue IS: tacro 2 mg BID, pred 20 QD, MMF 1g BID   - Will continue home meds: amlodipine 10 every day, coreg 25 bid, gabapentin, hydral 25 bid, MgOx, zoloft, Na-bicarb, vemlidy  - Morning labs: CBC, RFP, Mg, tac  - LE Duplex to r/o DVT  - Will trend Cr. If within reasonable limits (2.5 or below), can give IV lasix 80, if elevated, will get renal US  - Will need to consider PIV team or PICC for access. Pt reported hard stick on last admission and ED    Addendum  - On urinalysis and culture, concerning for UTI  - Pt with hyponatremia both in past hospitalization and upon arrival in ED      Patient's exam, labs, and findings discussed with Dr. Louis, who agrees with plan as above.    Mariel Peck MD  PGY-1 General Surgery  Transplant Surgery [id number]

## 2024-07-23 ENCOUNTER — TELEPHONE (OUTPATIENT)
Dept: TRANSPLANT | Facility: HOSPITAL | Age: 44
End: 2024-07-23

## 2024-07-23 ENCOUNTER — APPOINTMENT (OUTPATIENT)
Dept: RADIOLOGY | Facility: HOSPITAL | Age: 44
DRG: 698 | End: 2024-07-23
Payer: COMMERCIAL

## 2024-07-23 DIAGNOSIS — Z79.899 IMMUNOSUPPRESSIVE MANAGEMENT ENCOUNTER FOLLOWING KIDNEY TRANSPLANT (HHS-HCC): ICD-10-CM

## 2024-07-23 DIAGNOSIS — I10 ESSENTIAL HYPERTENSION: ICD-10-CM

## 2024-07-23 DIAGNOSIS — H10.11 ALLERGIC CONJUNCTIVITIS OF RIGHT EYE: ICD-10-CM

## 2024-07-23 DIAGNOSIS — Z94.0 IMMUNOSUPPRESSIVE MANAGEMENT ENCOUNTER FOLLOWING KIDNEY TRANSPLANT (HHS-HCC): ICD-10-CM

## 2024-07-23 DIAGNOSIS — M79.10 MUSCLE ACHE: ICD-10-CM

## 2024-07-23 DIAGNOSIS — Z94.0 KIDNEY REPLACED BY TRANSPLANT (HHS-HCC): ICD-10-CM

## 2024-07-23 LAB
ALBUMIN SERPL BCP-MCNC: 3.3 G/DL (ref 3.4–5)
ANION GAP BLDV CALCULATED.4IONS-SCNC: 16 MMOL/L (ref 10–25)
ANION GAP SERPL CALC-SCNC: 13 MMOL/L (ref 10–20)
BASE EXCESS BLDV CALC-SCNC: -9.2 MMOL/L (ref -2–3)
BODY TEMPERATURE: 37 DEGREES CELSIUS
BUN SERPL-MCNC: 42 MG/DL (ref 6–23)
CA-I BLDV-SCNC: 1.1 MMOL/L (ref 1.1–1.33)
CALCIUM SERPL-MCNC: 7.4 MG/DL (ref 8.6–10.6)
CHLORIDE BLDV-SCNC: 96 MMOL/L (ref 98–107)
CHLORIDE SERPL-SCNC: 101 MMOL/L (ref 98–107)
CO2 SERPL-SCNC: 17 MMOL/L (ref 21–32)
CREAT SERPL-MCNC: 3.15 MG/DL (ref 0.5–1.05)
EGFRCR SERPLBLD CKD-EPI 2021: 18 ML/MIN/1.73M*2
ERYTHROCYTE [DISTWIDTH] IN BLOOD BY AUTOMATED COUNT: 18 % (ref 11.5–14.5)
GLUCOSE BLD MANUAL STRIP-MCNC: 121 MG/DL (ref 74–99)
GLUCOSE BLDV-MCNC: 152 MG/DL (ref 74–99)
GLUCOSE SERPL-MCNC: 79 MG/DL (ref 74–99)
HCO3 BLDV-SCNC: 16.1 MMOL/L (ref 22–26)
HCT VFR BLD AUTO: 25.7 % (ref 36–46)
HCT VFR BLD EST: 30 % (ref 36–46)
HGB BLD-MCNC: 8.8 G/DL (ref 12–16)
HGB BLDV-MCNC: 10.1 G/DL (ref 12–16)
INHALED O2 CONCENTRATION: 21 %
LACTATE BLDV-SCNC: 0.8 MMOL/L (ref 0.4–2)
MAGNESIUM SERPL-MCNC: 2.39 MG/DL (ref 1.6–2.4)
MCH RBC QN AUTO: 31.5 PG (ref 26–34)
MCHC RBC AUTO-ENTMCNC: 34.2 G/DL (ref 32–36)
MCV RBC AUTO: 92 FL (ref 80–100)
NRBC BLD-RTO: 0 /100 WBCS (ref 0–0)
OXYHGB MFR BLDV: 73.3 % (ref 45–75)
PCO2 BLDV: 32 MM HG (ref 41–51)
PH BLDV: 7.31 PH (ref 7.33–7.43)
PHOSPHATE SERPL-MCNC: 5.7 MG/DL (ref 2.5–4.9)
PLATELET # BLD AUTO: 165 X10*3/UL (ref 150–450)
PO2 BLDV: 47 MM HG (ref 35–45)
POTASSIUM BLDV-SCNC: 4.9 MMOL/L (ref 3.5–5.3)
POTASSIUM SERPL-SCNC: 5.1 MMOL/L (ref 3.5–5.3)
RBC # BLD AUTO: 2.79 X10*6/UL (ref 4–5.2)
SAO2 % BLDV: 75 % (ref 45–75)
SODIUM BLDV-SCNC: 123 MMOL/L (ref 136–145)
SODIUM SERPL-SCNC: 126 MMOL/L (ref 136–145)
TACROLIMUS BLD-MCNC: 5.8 NG/ML
WBC # BLD AUTO: 5.8 X10*3/UL (ref 4.4–11.3)

## 2024-07-23 PROCEDURE — 2500000004 HC RX 250 GENERAL PHARMACY W/ HCPCS (ALT 636 FOR OP/ED): Performed by: STUDENT IN AN ORGANIZED HEALTH CARE EDUCATION/TRAINING PROGRAM

## 2024-07-23 PROCEDURE — 87799 DETECT AGENT NOS DNA QUANT: CPT | Performed by: STUDENT IN AN ORGANIZED HEALTH CARE EDUCATION/TRAINING PROGRAM

## 2024-07-23 PROCEDURE — 83735 ASSAY OF MAGNESIUM: CPT

## 2024-07-23 PROCEDURE — 87799 DETECT AGENT NOS DNA QUANT: CPT

## 2024-07-23 PROCEDURE — 36415 COLL VENOUS BLD VENIPUNCTURE: CPT

## 2024-07-23 PROCEDURE — 80069 RENAL FUNCTION PANEL: CPT

## 2024-07-23 PROCEDURE — 82947 ASSAY GLUCOSE BLOOD QUANT: CPT

## 2024-07-23 PROCEDURE — 71045 X-RAY EXAM CHEST 1 VIEW: CPT

## 2024-07-23 PROCEDURE — 85027 COMPLETE CBC AUTOMATED: CPT

## 2024-07-23 PROCEDURE — 2500000001 HC RX 250 WO HCPCS SELF ADMINISTERED DRUGS (ALT 637 FOR MEDICARE OP): Performed by: STUDENT IN AN ORGANIZED HEALTH CARE EDUCATION/TRAINING PROGRAM

## 2024-07-23 PROCEDURE — 99232 SBSQ HOSP IP/OBS MODERATE 35: CPT | Performed by: STUDENT IN AN ORGANIZED HEALTH CARE EDUCATION/TRAINING PROGRAM

## 2024-07-23 PROCEDURE — 71045 X-RAY EXAM CHEST 1 VIEW: CPT | Performed by: RADIOLOGY

## 2024-07-23 PROCEDURE — 80197 ASSAY OF TACROLIMUS: CPT

## 2024-07-23 PROCEDURE — 2500000002 HC RX 250 W HCPCS SELF ADMINISTERED DRUGS (ALT 637 FOR MEDICARE OP, ALT 636 FOR OP/ED): Performed by: STUDENT IN AN ORGANIZED HEALTH CARE EDUCATION/TRAINING PROGRAM

## 2024-07-23 PROCEDURE — 1200000002 HC GENERAL ROOM WITH TELEMETRY DAILY

## 2024-07-23 RX ORDER — BUMETANIDE 0.25 MG/ML
4 INJECTION, SOLUTION INTRAMUSCULAR; INTRAVENOUS ONCE
Status: DISCONTINUED | OUTPATIENT
Start: 2024-07-23 | End: 2024-07-23

## 2024-07-23 RX ORDER — BUMETANIDE 0.25 MG/ML
4 INJECTION, SOLUTION INTRAMUSCULAR; INTRAVENOUS ONCE
Status: COMPLETED | OUTPATIENT
Start: 2024-07-23 | End: 2024-07-23

## 2024-07-23 RX ADMIN — PANTOPRAZOLE SODIUM 40 MG: 40 TABLET, DELAYED RELEASE ORAL at 06:17

## 2024-07-23 RX ADMIN — VALGANCICLOVIR 450 MG: 450 TABLET, FILM COATED ORAL at 08:42

## 2024-07-23 RX ADMIN — SODIUM BICARBONATE 1300 MG: 650 TABLET ORAL at 20:04

## 2024-07-23 RX ADMIN — CARVEDILOL 25 MG: 25 TABLET, FILM COATED ORAL at 08:42

## 2024-07-23 RX ADMIN — SULFAMETHOXAZOLE AND TRIMETHOPRIM 1 TABLET: 400; 80 TABLET ORAL at 08:51

## 2024-07-23 RX ADMIN — LEVOTHYROXINE SODIUM 25 MCG: 0.05 TABLET ORAL at 06:17

## 2024-07-23 RX ADMIN — MYCOPHENOLATE MOFETIL 1000 MG: 250 CAPSULE ORAL at 20:04

## 2024-07-23 RX ADMIN — CLOTRIMAZOLE 10 MG: 10 LOZENGE ORAL at 08:42

## 2024-07-23 RX ADMIN — CLOTRIMAZOLE 10 MG: 10 LOZENGE ORAL at 18:04

## 2024-07-23 RX ADMIN — HYDRALAZINE HYDROCHLORIDE 25 MG: 25 TABLET ORAL at 08:41

## 2024-07-23 RX ADMIN — MAGNESIUM OXIDE TAB 400 MG (241.3 MG ELEMENTAL MG) 400 MG: 400 (241.3 MG) TAB at 08:44

## 2024-07-23 RX ADMIN — MYCOPHENOLATE MOFETIL 1000 MG: 250 CAPSULE ORAL at 08:41

## 2024-07-23 RX ADMIN — POLYETHYLENE GLYCOL 3350 17 G: 17 POWDER, FOR SOLUTION ORAL at 08:40

## 2024-07-23 RX ADMIN — CALCIUM CARBONATE (ANTACID) CHEW TAB 500 MG 500 MG: 500 CHEW TAB at 20:04

## 2024-07-23 RX ADMIN — SENNOSIDES AND DOCUSATE SODIUM 2 TABLET: 50; 8.6 TABLET ORAL at 08:41

## 2024-07-23 RX ADMIN — CARVEDILOL 25 MG: 25 TABLET, FILM COATED ORAL at 20:04

## 2024-07-23 RX ADMIN — SODIUM BICARBONATE 1300 MG: 650 TABLET ORAL at 08:41

## 2024-07-23 RX ADMIN — PREDNISONE 20 MG: 20 TABLET ORAL at 08:41

## 2024-07-23 RX ADMIN — ACETAMINOPHEN 650 MG: 325 TABLET ORAL at 20:15

## 2024-07-23 RX ADMIN — SERTRALINE HYDROCHLORIDE 50 MG: 50 TABLET ORAL at 08:41

## 2024-07-23 RX ADMIN — TACROLIMUS 2 MG: 1 CAPSULE ORAL at 18:04

## 2024-07-23 RX ADMIN — CALCIUM CARBONATE (ANTACID) CHEW TAB 500 MG 500 MG: 500 CHEW TAB at 08:41

## 2024-07-23 RX ADMIN — HYDRALAZINE HYDROCHLORIDE 25 MG: 25 TABLET ORAL at 20:04

## 2024-07-23 RX ADMIN — ACETAMINOPHEN 650 MG: 325 TABLET ORAL at 04:54

## 2024-07-23 RX ADMIN — HEPARIN SODIUM 5000 UNITS: 5000 INJECTION INTRAVENOUS; SUBCUTANEOUS at 06:17

## 2024-07-23 RX ADMIN — TENOFOVIR ALAFENAMIDE 25 MG: 25 TABLET ORAL at 08:43

## 2024-07-23 RX ADMIN — HEPARIN SODIUM 5000 UNITS: 5000 INJECTION INTRAVENOUS; SUBCUTANEOUS at 15:06

## 2024-07-23 RX ADMIN — SODIUM BICARBONATE 1300 MG: 650 TABLET ORAL at 15:06

## 2024-07-23 RX ADMIN — BUMETANIDE 4 MG: 0.25 INJECTION INTRAMUSCULAR; INTRAVENOUS at 20:15

## 2024-07-23 RX ADMIN — HEPARIN SODIUM 5000 UNITS: 5000 INJECTION INTRAVENOUS; SUBCUTANEOUS at 21:32

## 2024-07-23 RX ADMIN — Medication 4000 UNITS: at 08:41

## 2024-07-23 RX ADMIN — AMLODIPINE BESYLATE 10 MG: 10 TABLET ORAL at 08:41

## 2024-07-23 RX ADMIN — SENNOSIDES AND DOCUSATE SODIUM 2 TABLET: 50; 8.6 TABLET ORAL at 20:04

## 2024-07-23 RX ADMIN — GABAPENTIN 300 MG: 300 CAPSULE ORAL at 20:00

## 2024-07-23 RX ADMIN — CALCITRIOL CAPSULES 0.25 MCG 0.25 MCG: 0.25 CAPSULE ORAL at 08:42

## 2024-07-23 RX ADMIN — TACROLIMUS 2 MG: 1 CAPSULE ORAL at 06:17

## 2024-07-23 SDOH — SOCIAL STABILITY: SOCIAL INSECURITY: WERE YOU ABLE TO COMPLETE ALL THE BEHAVIORAL HEALTH SCREENINGS?: YES

## 2024-07-23 SDOH — SOCIAL STABILITY: SOCIAL INSECURITY: ARE THERE ANY APPARENT SIGNS OF INJURIES/BEHAVIORS THAT COULD BE RELATED TO ABUSE/NEGLECT?: NO

## 2024-07-23 SDOH — SOCIAL STABILITY: SOCIAL INSECURITY: ABUSE: ADULT

## 2024-07-23 SDOH — SOCIAL STABILITY: SOCIAL INSECURITY: DO YOU FEEL UNSAFE GOING BACK TO THE PLACE WHERE YOU ARE LIVING?: NO

## 2024-07-23 SDOH — SOCIAL STABILITY: SOCIAL INSECURITY: HAS ANYONE EVER THREATENED TO HURT YOUR FAMILY OR YOUR PETS?: NO

## 2024-07-23 SDOH — SOCIAL STABILITY: SOCIAL INSECURITY: DO YOU FEEL ANYONE HAS EXPLOITED OR TAKEN ADVANTAGE OF YOU FINANCIALLY OR OF YOUR PERSONAL PROPERTY?: NO

## 2024-07-23 SDOH — SOCIAL STABILITY: SOCIAL INSECURITY: HAVE YOU HAD THOUGHTS OF HARMING ANYONE ELSE?: NO

## 2024-07-23 SDOH — SOCIAL STABILITY: SOCIAL INSECURITY: DOES ANYONE TRY TO KEEP YOU FROM HAVING/CONTACTING OTHER FRIENDS OR DOING THINGS OUTSIDE YOUR HOME?: NO

## 2024-07-23 SDOH — SOCIAL STABILITY: SOCIAL INSECURITY: ARE YOU OR HAVE YOU BEEN THREATENED OR ABUSED PHYSICALLY, EMOTIONALLY, OR SEXUALLY BY ANYONE?: NO

## 2024-07-23 ASSESSMENT — LIFESTYLE VARIABLES
AUDIT-C TOTAL SCORE: 0
SKIP TO QUESTIONS 9-10: 1
AUDIT-C TOTAL SCORE: 0
HOW OFTEN DO YOU HAVE A DRINK CONTAINING ALCOHOL: NEVER
HOW OFTEN DO YOU HAVE 6 OR MORE DRINKS ON ONE OCCASION: NEVER
HOW MANY STANDARD DRINKS CONTAINING ALCOHOL DO YOU HAVE ON A TYPICAL DAY: PATIENT DOES NOT DRINK

## 2024-07-23 ASSESSMENT — COGNITIVE AND FUNCTIONAL STATUS - GENERAL
PATIENT BASELINE BEDBOUND: NO
MOBILITY SCORE: 24
DAILY ACTIVITIY SCORE: 24

## 2024-07-23 ASSESSMENT — ACTIVITIES OF DAILY LIVING (ADL)
DRESSING YOURSELF: INDEPENDENT
LACK_OF_TRANSPORTATION: PATIENT DECLINED
JUDGMENT_ADEQUATE_SAFELY_COMPLETE_DAILY_ACTIVITIES: YES
WALKS IN HOME: INDEPENDENT
HEARING - RIGHT EAR: FUNCTIONAL
FEEDING YOURSELF: INDEPENDENT
PATIENT'S MEMORY ADEQUATE TO SAFELY COMPLETE DAILY ACTIVITIES?: YES
BATHING: INDEPENDENT
HEARING - LEFT EAR: FUNCTIONAL
ADEQUATE_TO_COMPLETE_ADL: YES
TOILETING: INDEPENDENT
GROOMING: INDEPENDENT

## 2024-07-23 ASSESSMENT — PAIN SCALES - GENERAL
PAINLEVEL_OUTOF10: 2
PAINLEVEL_OUTOF10: 3
PAINLEVEL_OUTOF10: 5 - MODERATE PAIN

## 2024-07-23 ASSESSMENT — PATIENT HEALTH QUESTIONNAIRE - PHQ9
SUM OF ALL RESPONSES TO PHQ9 QUESTIONS 1 & 2: 0
1. LITTLE INTEREST OR PLEASURE IN DOING THINGS: NOT AT ALL
2. FEELING DOWN, DEPRESSED OR HOPELESS: NOT AT ALL

## 2024-07-23 NOTE — CARE PLAN
Problem: Respiratory  Goal: Minimal/no exertional discomfort or dyspnea this shift  Outcome: Progressing  Flowsheets (Taken 7/23/2024 1723)  Minimal/no exertional discomfort or dyspnea this shift: Positioning to promote ventilation/comfort       The clinical goals for the shift include patient will not have chest pain by end of shift    Over the shift, the patient did  make progress toward the following goals.

## 2024-07-23 NOTE — PROGRESS NOTES
Pharmacy Admission Order Reconciliation Review    Herber Foy Rai is a 44 y.o. female admitted for Bilateral lower extremity edema. Pharmacy reviewed the patient's unreconciled admission medications.    Prior to admission medications that were reviewed and acted on by the pharmacist include:  Acetaminophen  Allegra  protonix  These medications have been reconciled.     Any other unreconcilied medications have been addressed and will be ordered or held by the patient's medical team. Medications addressed by the pharmacist may be added or changed by the patient's medical team at any time.    Breann Abbott, PharmD  Transitions of Care Pharmacist  [facility]  Please reach out via Secure Chat for questions

## 2024-07-23 NOTE — PROGRESS NOTES
"Herber Foy Rai is a 44 y.o. female now POD # 236 s/p DDKT transplant.    Subjective   BLE edema  Got bumex ovenight        Objective   Physical Exam  Gen: A+OX3; NAD  Abd: S/NT/ND. Incision C/D/I.  Ext: 2+ LE edema    Last Recorded Vitals  Blood pressure 145/80, pulse 73, temperature 36.1 °C (97 °F), temperature source Temporal, resp. rate 18, height 1.626 m (5' 4\"), weight 85.8 kg (189 lb 2.5 oz), SpO2 98%.  Intake/Output last 3 Shifts:  No intake/output data recorded.     Lab Results   Component Value Date    CREATININE 3.15 (H) 07/23/2024    K 5.1 07/23/2024    GLUCOSE 79 07/23/2024    HGB 8.8 (L) 07/23/2024    WBC 5.8 07/23/2024     07/23/2024    CALCIUM 7.4 (L) 07/23/2024         Current Facility-Administered Medications:     acetaminophen (Tylenol) tablet 650 mg, 650 mg, oral, q4h PRN, Law Escobar MD, 650 mg at 07/23/24 0454    albuterol 90 mcg/actuation inhaler 2 puff, 2 puff, inhalation, q4h PRN, Law Escobar MD    amLODIPine (Norvasc) tablet 10 mg, 10 mg, oral, Daily, Law Escobar MD, 10 mg at 07/23/24 0841    bumetanide (Bumex) injection 4 mg, 4 mg, intravenous, Once, Law Escobar MD    calcitriol (Rocaltrol) capsule 0.25 mcg, 0.25 mcg, oral, Daily, Law Escobar MD, 0.25 mcg at 07/23/24 0842    calcium carbonate (Tums) chewable tablet 500 mg, 500 mg, oral, BID, Law Escobar MD, 500 mg at 07/23/24 0841    carvedilol (Coreg) tablet 25 mg, 25 mg, oral, BID, Law Escobar MD, 25 mg at 07/23/24 0842    cholecalciferol (Vitamin D-3) tablet 4,000 Units, 4,000 Units, oral, Daily, Law Escobar MD, 4,000 Units at 07/23/24 0841    clotrimazole (Mycelex) samia 10 mg, 10 mg, oral, TID after meals, Law Escobar MD, 10 mg at 07/23/24 0842    gabapentin (Neurontin) capsule 300 mg, 300 mg, oral, Nightly, Law Escobar MD, 300 mg at 07/22/24 2028    heparin (porcine) injection 5,000 Units, 5,000 Units, subcutaneous, q8h, Law Escobar MD, 5,000 Units at 07/23/24 " 1506    hydrALAZINE (Apresoline) tablet 25 mg, 25 mg, oral, BID, Law Escobar MD, 25 mg at 07/23/24 0841    levothyroxine (Synthroid, Levoxyl) tablet 25 mcg, 25 mcg, oral, Daily before breakfast, Law Escobar MD, 25 mcg at 07/23/24 0617    magnesium oxide (Mag-Ox) tablet 400 mg, 400 mg, oral, Daily, Law Escobar MD, 400 mg at 07/23/24 0844    mycophenolate (Cellcept) capsule 1,000 mg, 1,000 mg, oral, q12h, Law Escobar MD, 1,000 mg at 07/23/24 0841    ondansetron ODT (Zofran-ODT) disintegrating tablet 4 mg, 4 mg, oral, q8h PRN **OR** ondansetron (Zofran) injection 4 mg, 4 mg, intravenous, q8h PRN, Law Escobar MD    pantoprazole (ProtoNix) EC tablet 40 mg, 40 mg, oral, Daily before breakfast, Law Escobar MD, 40 mg at 07/23/24 0617    polyethylene glycol (Glycolax, Miralax) packet 17 g, 17 g, oral, Daily, Law Escobar MD, 17 g at 07/23/24 0840    predniSONE (Deltasone) tablet 20 mg, 20 mg, oral, Daily, Law Escobar MD, 20 mg at 07/23/24 0841    sennosides-docusate sodium (Rose-Colace) 8.6-50 mg per tablet 2 tablet, 2 tablet, oral, BID, Law Escobar MD, 2 tablet at 07/23/24 0841    sertraline (Zoloft) tablet 50 mg, 50 mg, oral, Daily, Law Escobar MD, 50 mg at 07/23/24 0841    sodium bicarbonate tablet 1,300 mg, 1,300 mg, oral, TID, Law Escobar MD, 1,300 mg at 07/23/24 1506    sulfamethoxazole-trimethoprim (Bactrim) 400-80 mg per tablet 1 tablet, 1 tablet, oral, Daily, Law Escobar MD, 1 tablet at 07/23/24 0851    SUMAtriptan (Imitrex) tablet 100 mg, 100 mg, oral, Once PRN, Law Escobar MD    tacrolimus (Prograf) capsule 2 mg, 2 mg, oral, q12h JANIE, Law sEcobar MD, 2 mg at 07/23/24 0617    tenofovir alafenamide (Vemlidy) tablet 25 mg, 25 mg, oral, Daily, Law Escobar MD, 25 mg at 07/23/24 0843    valGANciclovir (Valcyte) tablet 450 mg, 450 mg, oral, Every other day, Law Escobar MD, 450 mg at 07/23/24 0842     Assessment/Plan    DDKT  11/2023     Kidney allograft function         biopsy 7/5/24 -consistent with T-cell mediated rejection Banff 1B and acute vascular rejection Banff 2B and antibody mediated rejection                          Steroid bolus 500 x 3, completed                          Thymo 6 mg/kg - last dose 7/13                          Plex followed by IVIG x 4   7/17 Interval biopsy with mild improvement    Readmitted with Rising creatinine, edema   Renal US   Gentle diuresis   Continue home tac, MMF and pred 20   Transitioned off belatacept.   Will observe response to diuresis and determine if additional antirejection treatment required.        I spent 35 minutes in the professional and overall care of this patient.      Anita Louis MD

## 2024-07-23 NOTE — PROGRESS NOTES
Pharmacy Medication History Review    Herber Foy Rai is a 44 y.o. female admitted for Bilateral lower extremity edema. Pharmacy reviewed the patient's [id number] medications and allergies for accuracy.    The list below reflects the updated PTA list. Comments regarding how patient may be taking medications differently can be found in the Admit Orders Activity  Prior to Admission Medications   Prescriptions Last Dose Informant   Calcium Antacid 200 mg calcium (500 mg) chewable tablet  Self   Sig: CHEW AND SWALLOW 1 TABLET BY MOUTH 2 TIMES DAILY   Patient taking differently: Chew 1 tablet (500 mg) 2 times a day.   SUMAtriptan (Imitrex) 100 mg tablet  Self   Sig: Take 1 tablet (100 mg) by mouth 1 time if needed for migraine.   acetaminophen (Tylenol) 325 mg tablet  Self   Sig: Take 2 tablets (650 mg) by mouth every 6 hours if needed for mild pain (1 - 3).   albuterol 90 mcg/actuation inhaler  Self   Sig: Inhale 2 puffs every 4 hours if needed for wheezing.   amLODIPine (Norvasc) 5 mg tablet  Self   Sig: Take 2 tablets (10 mg) by mouth once daily.   azelastine (Optivar) 0.05 % ophthalmic solution  Self   Sig: Administer 1 drop into both eyes 2 times a day.   calcitriol (Rocaltrol) 0.25 mcg capsule  Self   Sig: TAKE 1 TABLET BY MOUTH ONCE DAILY   Patient taking differently: Take 1 capsule (0.25 mcg) by mouth once daily.   carvedilol (Coreg) 25 mg tablet  Self   Sig: Take 1 tablet (25 mg) by mouth 2 times a day.   cholecalciferol (Vitamin D-3) 50 MCG (2000 UT) tablet  Self   Sig: Take 2 tablets (100 mcg) by mouth once daily.   clotrimazole (Mycelex) 10 mg samia  Self   Sig: Take 1 tablet (10 mg) by mouth 3 times a day after meals.   fexofenadine (Allegra) 180 mg tablet Not Taking Self   Sig: Take 1 tablet (180 mg) by mouth once daily.   Patient not taking: Reported on 7/23/2024   gabapentin (Neurontin) 100 mg capsule  Self   Sig: Take 3 capsules (300 mg) by mouth once daily at bedtime.   hydrALAZINE (Apresoline) 25  mg tablet  Self   Sig: Take 1 tablet (25 mg) by mouth 2 times a day.   levothyroxine (Synthroid, Levoxyl) 25 mcg tablet  Self   Sig: Take 1 tablet (25 mcg) by mouth once daily in the morning. Take before meals.   magnesium oxide (Mag-Ox) 400 mg tablet  Self   Sig: Take 1 tablet (400 mg) by mouth once daily. Take with food at lunch time   mycophenolate (Cellcept) 250 mg capsule  Self   Sig: Take 4 capsules (1,000 mg) by mouth every 12 hours.   pantoprazole (ProtoNix) 40 mg EC tablet  Self   Sig: TAKE 1 TABLET (40 MG) BY MOUTH EVERY MORNING (BEFORE BREAKFAST).   predniSONE (Deltasone) 5 mg tablet  Self   Sig: Take 4 tablets (20 mg) by mouth once daily.   sertraline (Zoloft) 50 mg tablet  Self   Sig: Take 1 tablet (50 mg) by mouth once daily.   sodium bicarbonate 650 mg tablet  Self   Sig: Take 2 tablets (1,300 mg) by mouth 3 times a day.   sulfamethoxazole-trimethoprim (Bactrim) 400-80 mg tablet  Self   Sig: Take 1 tablet by mouth once daily.   tacrolimus (Prograf) 1 mg capsule  Self   Sig: Take 2 capsules (2 mg) by mouth every 12 hours.   tenofovir alafenamide (Vemlidy) 25 mg tablet tablet  Self   Sig: Take 1 tablet (25 mg) by mouth once daily.   valGANciclovir (Valcyte) 450 mg tablet  Self   Sig: Take 1 tablet (450 mg) by mouth every other day. Do not crush or chew. Do not fill before July 20, 2024.      Facility-Administered Medications: None        The list below reflects the updated allergy list. Please review each documented allergy for additional clarification and justification.  Allergies  Reviewed by Breanna Phoenix on 7/23/2024   No Known Allergies         Patient accepts M2B at discharge. Pharmacy has been updated to [facility].    Sources used to complete the med history include out patient fill history, OARRS, and patient interview (patient has her med list with her.)    Below are additional concerns with the patient's PTA list.  ~Patient did not know when was the last time she has taken her meds,  her brother gives her her medications and does not know which med is what and when he gives them to her.     ~No longer taking/Discontinued:    *Belatacept IV    *Atorvastatin    *Renal caps    *Cyclobenzaprine     *Fexofeenadine    BreAnna Phoenix, CPhT  Transitions of Care   [facility]ulatory [facility]  Please reach out via Secure Chat for questions, or if no response call [phone] or [facility]

## 2024-07-24 LAB
ALBUMIN SERPL BCP-MCNC: 3.5 G/DL (ref 3.4–5)
ANION GAP SERPL CALC-SCNC: 15 MMOL/L (ref 10–20)
APPEARANCE UR: CLEAR
ATRIAL RATE: 73 BPM
ATRIAL RATE: 73 BPM
BILIRUB UR STRIP.AUTO-MCNC: NEGATIVE MG/DL
BKV DNA SERPL NAA+PROBE-LOG#: NORMAL {LOG_COPIES}/ML
BUN SERPL-MCNC: 41 MG/DL (ref 6–23)
CALCIUM SERPL-MCNC: 7.6 MG/DL (ref 8.6–10.6)
CHLORIDE SERPL-SCNC: 101 MMOL/L (ref 98–107)
CMV DNA SERPL NAA+PROBE-LOG IU: NORMAL {LOG_IU}/ML
CO2 SERPL-SCNC: 15 MMOL/L (ref 21–32)
COLOR UR: COLORLESS
CREAT SERPL-MCNC: 3.28 MG/DL (ref 0.5–1.05)
EBV DNA SPEC NAA+PROBE-LOG#: NORMAL {LOG_COPIES}/ML
EGFRCR SERPLBLD CKD-EPI 2021: 17 ML/MIN/1.73M*2
ERYTHROCYTE [DISTWIDTH] IN BLOOD BY AUTOMATED COUNT: 18.5 % (ref 11.5–14.5)
GLUCOSE SERPL-MCNC: 125 MG/DL (ref 74–99)
GLUCOSE UR STRIP.AUTO-MCNC: NORMAL MG/DL
HCT VFR BLD AUTO: 29.7 % (ref 36–46)
HGB BLD-MCNC: 9.2 G/DL (ref 12–16)
HOLD SPECIMEN: NORMAL
KETONES UR STRIP.AUTO-MCNC: NEGATIVE MG/DL
LABORATORY COMMENT REPORT: NOT DETECTED
LEUKOCYTE ESTERASE UR QL STRIP.AUTO: ABNORMAL
MAGNESIUM SERPL-MCNC: 2.37 MG/DL (ref 1.6–2.4)
MCH RBC QN AUTO: 31.6 PG (ref 26–34)
MCHC RBC AUTO-ENTMCNC: 31 G/DL (ref 32–36)
MCV RBC AUTO: 102 FL (ref 80–100)
NITRITE UR QL STRIP.AUTO: NEGATIVE
NRBC BLD-RTO: 0 /100 WBCS (ref 0–0)
P AXIS: 72 DEGREES
P AXIS: 76 DEGREES
P OFFSET: 195 MS
P OFFSET: 196 MS
P ONSET: 142 MS
P ONSET: 143 MS
PH UR STRIP.AUTO: 6.5 [PH]
PHOSPHATE SERPL-MCNC: 5.1 MG/DL (ref 2.5–4.9)
PLATELET # BLD AUTO: 206 X10*3/UL (ref 150–450)
POTASSIUM SERPL-SCNC: 4 MMOL/L (ref 3.5–5.3)
PR INTERVAL: 154 MS
PR INTERVAL: 158 MS
PROT UR STRIP.AUTO-MCNC: ABNORMAL MG/DL
Q ONSET: 220 MS
Q ONSET: 221 MS
QRS COUNT: 12 BEATS
QRS COUNT: 12 BEATS
QRS DURATION: 80 MS
QRS DURATION: 82 MS
QT INTERVAL: 402 MS
QT INTERVAL: 402 MS
QTC CALCULATION(BAZETT): 442 MS
QTC CALCULATION(BAZETT): 442 MS
QTC FREDERICIA: 429 MS
QTC FREDERICIA: 429 MS
R AXIS: 62 DEGREES
R AXIS: 64 DEGREES
RBC # BLD AUTO: 2.91 X10*6/UL (ref 4–5.2)
RBC # UR STRIP.AUTO: ABNORMAL /UL
RBC #/AREA URNS AUTO: >20 /HPF
SODIUM SERPL-SCNC: 127 MMOL/L (ref 136–145)
SP GR UR STRIP.AUTO: 1.01
SQUAMOUS #/AREA URNS AUTO: ABNORMAL /HPF
T AXIS: 36 DEGREES
T AXIS: 52 DEGREES
T OFFSET: 421 MS
T OFFSET: 422 MS
TACROLIMUS BLD-MCNC: 6.7 NG/ML
UROBILINOGEN UR STRIP.AUTO-MCNC: NORMAL MG/DL
VENTRICULAR RATE: 73 BPM
VENTRICULAR RATE: 73 BPM
WBC # BLD AUTO: 5.3 X10*3/UL (ref 4.4–11.3)
WBC #/AREA URNS AUTO: ABNORMAL /HPF

## 2024-07-24 PROCEDURE — 36415 COLL VENOUS BLD VENIPUNCTURE: CPT

## 2024-07-24 PROCEDURE — 80197 ASSAY OF TACROLIMUS: CPT

## 2024-07-24 PROCEDURE — 99222 1ST HOSP IP/OBS MODERATE 55: CPT | Performed by: INTERNAL MEDICINE

## 2024-07-24 PROCEDURE — 83735 ASSAY OF MAGNESIUM: CPT

## 2024-07-24 PROCEDURE — 1200000002 HC GENERAL ROOM WITH TELEMETRY DAILY

## 2024-07-24 PROCEDURE — 99232 SBSQ HOSP IP/OBS MODERATE 35: CPT | Performed by: STUDENT IN AN ORGANIZED HEALTH CARE EDUCATION/TRAINING PROGRAM

## 2024-07-24 PROCEDURE — 81003 URINALYSIS AUTO W/O SCOPE: CPT

## 2024-07-24 PROCEDURE — 2500000004 HC RX 250 GENERAL PHARMACY W/ HCPCS (ALT 636 FOR OP/ED): Performed by: STUDENT IN AN ORGANIZED HEALTH CARE EDUCATION/TRAINING PROGRAM

## 2024-07-24 PROCEDURE — 87086 URINE CULTURE/COLONY COUNT: CPT

## 2024-07-24 PROCEDURE — 2500000004 HC RX 250 GENERAL PHARMACY W/ HCPCS (ALT 636 FOR OP/ED)

## 2024-07-24 PROCEDURE — 2500000001 HC RX 250 WO HCPCS SELF ADMINISTERED DRUGS (ALT 637 FOR MEDICARE OP): Performed by: STUDENT IN AN ORGANIZED HEALTH CARE EDUCATION/TRAINING PROGRAM

## 2024-07-24 PROCEDURE — 85027 COMPLETE CBC AUTOMATED: CPT

## 2024-07-24 PROCEDURE — 2500000002 HC RX 250 W HCPCS SELF ADMINISTERED DRUGS (ALT 637 FOR MEDICARE OP, ALT 636 FOR OP/ED): Performed by: STUDENT IN AN ORGANIZED HEALTH CARE EDUCATION/TRAINING PROGRAM

## 2024-07-24 PROCEDURE — 80069 RENAL FUNCTION PANEL: CPT

## 2024-07-24 PROCEDURE — 2500000001 HC RX 250 WO HCPCS SELF ADMINISTERED DRUGS (ALT 637 FOR MEDICARE OP)

## 2024-07-24 PROCEDURE — 87081 CULTURE SCREEN ONLY: CPT | Performed by: STUDENT IN AN ORGANIZED HEALTH CARE EDUCATION/TRAINING PROGRAM

## 2024-07-24 RX ORDER — TACROLIMUS 1 MG/1
3 CAPSULE ORAL
Status: DISCONTINUED | OUTPATIENT
Start: 2024-07-24 | End: 2024-07-31

## 2024-07-24 RX ORDER — PANTOPRAZOLE SODIUM 40 MG/1
40 TABLET, DELAYED RELEASE ORAL
Status: DISCONTINUED | OUTPATIENT
Start: 2024-07-24 | End: 2024-07-31 | Stop reason: HOSPADM

## 2024-07-24 RX ORDER — CEFTRIAXONE 1 G/50ML
1 INJECTION, SOLUTION INTRAVENOUS EVERY 24 HOURS
Status: DISCONTINUED | OUTPATIENT
Start: 2024-07-24 | End: 2024-07-26

## 2024-07-24 RX ORDER — BUMETANIDE 0.25 MG/ML
4 INJECTION, SOLUTION INTRAMUSCULAR; INTRAVENOUS ONCE
Status: COMPLETED | OUTPATIENT
Start: 2024-07-24 | End: 2024-07-24

## 2024-07-24 RX ADMIN — CARVEDILOL 25 MG: 25 TABLET, FILM COATED ORAL at 20:13

## 2024-07-24 RX ADMIN — ACETAMINOPHEN 650 MG: 325 TABLET ORAL at 11:28

## 2024-07-24 RX ADMIN — TACROLIMUS 3 MG: 1 CAPSULE ORAL at 18:35

## 2024-07-24 RX ADMIN — CEFTRIAXONE SODIUM 1 G: 1 INJECTION, SOLUTION INTRAVENOUS at 15:41

## 2024-07-24 RX ADMIN — SULFAMETHOXAZOLE AND TRIMETHOPRIM 1 TABLET: 400; 80 TABLET ORAL at 09:37

## 2024-07-24 RX ADMIN — LEVOTHYROXINE SODIUM 25 MCG: 0.05 TABLET ORAL at 06:39

## 2024-07-24 RX ADMIN — CARVEDILOL 25 MG: 25 TABLET, FILM COATED ORAL at 09:39

## 2024-07-24 RX ADMIN — CLOTRIMAZOLE 10 MG: 10 LOZENGE ORAL at 09:36

## 2024-07-24 RX ADMIN — POLYETHYLENE GLYCOL 3350 17 G: 17 POWDER, FOR SOLUTION ORAL at 09:43

## 2024-07-24 RX ADMIN — CLOTRIMAZOLE 10 MG: 10 LOZENGE ORAL at 18:35

## 2024-07-24 RX ADMIN — PREDNISONE 20 MG: 20 TABLET ORAL at 09:37

## 2024-07-24 RX ADMIN — TENOFOVIR ALAFENAMIDE 25 MG: 25 TABLET ORAL at 09:42

## 2024-07-24 RX ADMIN — CALCIUM GLUCONATE 3 G: 98 INJECTION, SOLUTION INTRAVENOUS at 11:30

## 2024-07-24 RX ADMIN — HEPARIN SODIUM 5000 UNITS: 5000 INJECTION INTRAVENOUS; SUBCUTANEOUS at 22:00

## 2024-07-24 RX ADMIN — SODIUM BICARBONATE 1300 MG: 650 TABLET ORAL at 09:38

## 2024-07-24 RX ADMIN — CALCITRIOL CAPSULES 0.25 MCG 0.25 MCG: 0.25 CAPSULE ORAL at 09:42

## 2024-07-24 RX ADMIN — MAGNESIUM OXIDE TAB 400 MG (241.3 MG ELEMENTAL MG) 400 MG: 400 (241.3 MG) TAB at 09:39

## 2024-07-24 RX ADMIN — MYCOPHENOLATE MOFETIL 1000 MG: 250 CAPSULE ORAL at 09:40

## 2024-07-24 RX ADMIN — CLOTRIMAZOLE 10 MG: 10 LOZENGE ORAL at 13:41

## 2024-07-24 RX ADMIN — BUMETANIDE 4 MG: 0.25 INJECTION INTRAMUSCULAR; INTRAVENOUS at 13:33

## 2024-07-24 RX ADMIN — HYDRALAZINE HYDROCHLORIDE 25 MG: 25 TABLET ORAL at 20:13

## 2024-07-24 RX ADMIN — GABAPENTIN 300 MG: 300 CAPSULE ORAL at 20:13

## 2024-07-24 RX ADMIN — SERTRALINE HYDROCHLORIDE 50 MG: 50 TABLET ORAL at 09:42

## 2024-07-24 RX ADMIN — CALCIUM CARBONATE (ANTACID) CHEW TAB 500 MG 500 MG: 500 CHEW TAB at 20:13

## 2024-07-24 RX ADMIN — CALCIUM CARBONATE (ANTACID) CHEW TAB 500 MG 500 MG: 500 CHEW TAB at 09:35

## 2024-07-24 RX ADMIN — SENNOSIDES AND DOCUSATE SODIUM 2 TABLET: 50; 8.6 TABLET ORAL at 09:35

## 2024-07-24 RX ADMIN — HYDRALAZINE HYDROCHLORIDE 25 MG: 25 TABLET ORAL at 09:40

## 2024-07-24 RX ADMIN — MYCOPHENOLATE MOFETIL 1000 MG: 250 CAPSULE ORAL at 20:13

## 2024-07-24 RX ADMIN — Medication 4000 UNITS: at 09:36

## 2024-07-24 RX ADMIN — SODIUM BICARBONATE 1300 MG: 650 TABLET ORAL at 20:13

## 2024-07-24 RX ADMIN — PANTOPRAZOLE SODIUM 40 MG: 40 TABLET, DELAYED RELEASE ORAL at 15:41

## 2024-07-24 RX ADMIN — HEPARIN SODIUM 5000 UNITS: 5000 INJECTION INTRAVENOUS; SUBCUTANEOUS at 13:33

## 2024-07-24 RX ADMIN — AMLODIPINE BESYLATE 10 MG: 10 TABLET ORAL at 09:39

## 2024-07-24 RX ADMIN — HEPARIN SODIUM 5000 UNITS: 5000 INJECTION INTRAVENOUS; SUBCUTANEOUS at 06:39

## 2024-07-24 RX ADMIN — SUMATRIPTAN SUCCINATE 100 MG: 100 TABLET ORAL at 09:42

## 2024-07-24 RX ADMIN — PANTOPRAZOLE SODIUM 40 MG: 40 TABLET, DELAYED RELEASE ORAL at 06:39

## 2024-07-24 RX ADMIN — SODIUM BICARBONATE 1300 MG: 650 TABLET ORAL at 15:41

## 2024-07-24 RX ADMIN — TACROLIMUS 2 MG: 1 CAPSULE ORAL at 06:39

## 2024-07-24 ASSESSMENT — COGNITIVE AND FUNCTIONAL STATUS - GENERAL
DAILY ACTIVITIY SCORE: 24
MOBILITY SCORE: 24

## 2024-07-24 ASSESSMENT — PAIN SCALES - GENERAL
PAINLEVEL_OUTOF10: 2
PAINLEVEL_OUTOF10: 7
PAINLEVEL_OUTOF10: 0 - NO PAIN

## 2024-07-24 ASSESSMENT — PAIN - FUNCTIONAL ASSESSMENT
PAIN_FUNCTIONAL_ASSESSMENT: 0-10
PAIN_FUNCTIONAL_ASSESSMENT: 0-10

## 2024-07-24 ASSESSMENT — PAIN DESCRIPTION - DESCRIPTORS: DESCRIPTORS: ACHING

## 2024-07-24 NOTE — HOSPITAL COURSE
Herber Foy Rai is a 44 y.o. female with ESRD 2/2 HTN and NSAIDs, that received a DDKT from 11/30/23 who was recently admitted from 7/2/24 to 7/19/24 for acute rejection and treated with 3 days of methylprednisone 250, 4 doses of thymo (4.5 mg/kg total), and 5 rounds of plasmapharesis. She was discharged on 7/19/24 with tacro 2 mg BID, pred 20 QD, MMF 1g BID. She represented to the ED on 7/22 for increased swelling in her bilateral LE and abdomen, early satiety, and epigastric burning. Initial ED workup including LE duplex for DVT, troponins, pancreatic enzymes, were all negative. CXR showed mild bilateral pulmonary infiltrates. Blood and urine cultures as well.     Admission labs were notable for a Cr of 3.13 and Na of 122. She was diuresed with bumex on 7/22 and 7/23 with interval improvement of edema. Given some improvement in edema with diuresis but continued mild extremity and facial swelling, she was started on Torsemide 50mg every day-BID depending on how she tolerated. A BLE duplex was negative and VQ scan was obtained and was negative.     She was given 1.5 mg/kg thymo on 7/26. She received hemodialysis on 7/29 d/t pt complaints of shortness of breath and hyponatremia. 4.4 L were removed during the session and pt reports improvement of shortness of breath. She is tolerating a low potassium diet and is stable for discharge home today. She has a tentative dialysis chair MWF @ 1200.

## 2024-07-24 NOTE — PROGRESS NOTES
Herber Foy Rai is a 44 y.o. female on day 2 of admission presenting with Bilateral lower extremity edema.    This pt was previously admitted for SOB. They are now admitted for BLE Edema.   I have made the team aware of this readmission.       DC Plannin/24:   Went in and met with the pt, confirmed demographics.   No home going needs anticipated for this pt at this time.      :   Pt not medically ready.     :   Pt ready for DC.     ADOD:     This TCC will continue to follow for home going needs and safe DC plan.           CRYS LEDBETTER

## 2024-07-24 NOTE — CONSULTS
Reason For Consult  S/p DDKT    History Of Present Illness  Ms. Leblanc is a 44 y.o. female with past medical history significant for ESRD secondary to HTN/NSAID who is s/p DDKT on 11/30/23 (Dr. Marbin Lambert & Dr. Louis, KDPI 56%, PRA 48%. HCV -/- and donor has not met risk factors. EBV +/+. 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 Pt received a total of 4.5 mg/kg thymoglobulin induction therapy in conjunction with solumedrol, was initiated on standard triple immunosuppression therapy (Tacrolimus, Mycophenolate, prednisone).  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 Pt was started on antiviral and antifuncal and PJ P prophylaxis per protocol.     Post-op course was complicated by return to OR for exploration of transplanted kidney and washout of hematoma. Hospital course was complicated by post-operative pain and constipation, which has resolved. H/o DGF with eventual recovery in renal function.      5/29/24 Direct admission for fever, nausea, vomiting and abdominal pain (chronic).  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 ID consulted. Pt remains on Valcyte treatment dose.    Serum Cr ~0.9 till 6/13/24, started trending up gradually since then to peak 3.38 (7/7/24) warranting admission early this month with MARK, hypervolemia. Kidney bx 7/5/24 with acute T-cell IB, acute vascular rejection IIB, and active ABMR.     KIDNEY ALLOGRAFT, RIGHT ILIAC FOSSA, PERCUTANEOUS CORE BIOPSY:  -- CHANGES CONSISTENT WITH ACUTE T CELL-MEDIATED REJECTION, BANFF IB  -- ACUTE VASCULAR REJECTION, BANFF IIA  -- ACUTE ANTIBODY-MEDIATED REJECTION (SEE COMMENT)     Pt was treated with pulse dose steroids, Thymo 6mg/kg, and PLEX 4 sessions following which serum Cr improved to ~2.1-2.3. pt was discharged home on 7/19/24 with Cr 2.54.     A rpt biopsy 7/17/24 showed persistent ACR 1A, AVR IIA, ABMR although the degree of tubulitis and c4d  positivity appeared to have improved from prior bx.     -- CHANGES CONSISTENT WITH ACUTE T CELL-MEDIATED REJECTION, BANFF IA  -- ACUTE VASCULAR REJECTION, BANFF IIA  -- MICROVASCULAR INFLAMMATION AND C4d POSITIVITY, CONSISTENT WITH PERSISTENT ACUTE ANTIBODY-MEDIATED REJECTION     Pt was readmitted 7/23/24 with c/o worsening generalized edema, dyspnea with mild exertion, increasing wt. Pt also c/o poor appetite, fatigue.     Admission Cr trending up to 3.28 on labs this am.     Pt did receive Bumex 4mg x2 with decent UOP.     Does c/o gross hematuria, dysuria this am. Started on IV ceftriaxone empirically for possible UTI.     CMV pcr neg 7/16/24. EBV, BK pcr neg 6/27/24.    Transplant nephrology is consulted to assist with immunosuppressive medication management and nephrology related issues.      Current immunosuppression; tac 2mg q12, MMF 1g q12, pred 20 mg/d. Taken off belatacept 7/11/24 due to opportunistic infection, rejection.   On Bactrim, Valcyte, Mycelex samia for ppx after rejection rx.    ROS neg other than stated above.      Past Medical History  She has a past medical history of Anxiety, Chronic kidney disease, Chronic kidney disease, unspecified, Depression, Disease of thyroid gland, GERD (gastroesophageal reflux disease), Hypertension, and Personal history of COVID-19 (07/20/2022).    Surgical History  She has a past surgical history that includes Other surgical history (07/20/2022); MR angio head w and wo IV contrast (01/26/2021); MR angio head wo IV contrast (01/26/2021); transplant, kidney, open (Right, 11/30/2023); and US guided percutaneous peritoneal or retroperitoneal fluid collection drainage (12/21/2023).     Social History  She reports that she has never smoked. She has never been exposed to tobacco smoke. She quit smokeless tobacco use about 3 years ago. She reports that she does not drink alcohol and does not use drugs.    Family History  Family History   Family history unknown: Yes       "  Allergies  Sumatriptan    Scheduled medications  amLODIPine, 10 mg, oral, Daily  bumetanide, 4 mg, intravenous, Once  calcitriol, 0.25 mcg, oral, Daily  calcium carbonate, 500 mg, oral, BID  calcium gluconate, 3 g, intravenous, Once  carvedilol, 25 mg, oral, BID  cefTRIAXone, 1 g, intravenous, q24h  cholecalciferol, 4,000 Units, oral, Daily  clotrimazole, 10 mg, oral, TID after meals  gabapentin, 300 mg, oral, Nightly  heparin (porcine), 5,000 Units, subcutaneous, q8h  hydrALAZINE, 25 mg, oral, BID  levothyroxine, 25 mcg, oral, Daily before breakfast  magnesium oxide, 400 mg, oral, Daily  mycophenolate, 1,000 mg, oral, q12h  pantoprazole, 40 mg, oral, BID AC  polyethylene glycol, 17 g, oral, Daily  predniSONE, 20 mg, oral, Daily  sennosides-docusate sodium, 2 tablet, oral, BID  sertraline, 50 mg, oral, Daily  sodium bicarbonate, 1,300 mg, oral, TID  sulfamethoxazole-trimethoprim, 1 tablet, oral, Daily  tacrolimus, 2 mg, oral, q12h JANIE  tenofovir alafenamide, 25 mg, oral, Daily  valGANciclovir, 450 mg, oral, Every other day      Continuous medications     PRN medications  PRN medications: acetaminophen, albuterol, ondansetron ODT **OR** ondansetron       Last Recorded Vitals  Blood pressure 151/87, pulse 66, temperature 36 °C (96.8 °F), temperature source Temporal, resp. rate (!) 148, height 1.626 m (5' 4\"), weight 84.9 kg (187 lb 3.2 oz), SpO2 96%.    A&ox3, mild distress, pleasant  MMM, no lesions  Lungs with diminished sounds at bases, bibasilar crackles+  Rrr, no m/r/g  Abd soft, nt, nd  No allograft tenderness  2+ LE edema b/l    Results for orders placed or performed during the hospital encounter of 07/22/24 (from the past 24 hour(s))   POCT GLUCOSE   Result Value Ref Range    POCT Glucose 121 (H) 74 - 99 mg/dL   CBC   Result Value Ref Range    WBC 5.3 4.4 - 11.3 x10*3/uL    nRBC 0.0 0.0 - 0.0 /100 WBCs    RBC 2.91 (L) 4.00 - 5.20 x10*6/uL    Hemoglobin 9.2 (L) 12.0 - 16.0 g/dL    Hematocrit 29.7 (L) 36.0 - " 46.0 %     (H) 80 - 100 fL    MCH 31.6 26.0 - 34.0 pg    MCHC 31.0 (L) 32.0 - 36.0 g/dL    RDW 18.5 (H) 11.5 - 14.5 %    Platelets 206 150 - 450 x10*3/uL   Renal Function Panel   Result Value Ref Range    Glucose 125 (H) 74 - 99 mg/dL    Sodium 127 (L) 136 - 145 mmol/L    Potassium 4.0 3.5 - 5.3 mmol/L    Chloride 101 98 - 107 mmol/L    Bicarbonate 15 (L) 21 - 32 mmol/L    Anion Gap 15 10 - 20 mmol/L    Urea Nitrogen 41 (H) 6 - 23 mg/dL    Creatinine 3.28 (H) 0.50 - 1.05 mg/dL    eGFR 17 (L) >60 mL/min/1.73m*2    Calcium 7.6 (L) 8.6 - 10.6 mg/dL    Phosphorus 5.1 (H) 2.5 - 4.9 mg/dL    Albumin 3.5 3.4 - 5.0 g/dL   Magnesium   Result Value Ref Range    Magnesium 2.37 1.60 - 2.40 mg/dL   Tacrolimus   Result Value Ref Range    Tacrolimus  6.7 <=15.0 ng/mL   Urinalysis with Reflex Culture and Microscopic   Result Value Ref Range    Color, Urine Colorless (N) Light-Yellow, Yellow, Dark-Yellow    Appearance, Urine Clear Clear    Specific Gravity, Urine 1.008 1.005 - 1.035    pH, Urine 6.5 5.0, 5.5, 6.0, 6.5, 7.0, 7.5, 8.0    Protein, Urine 30 (1+) (A) NEGATIVE, 10 (TRACE), 20 (TRACE) mg/dL    Glucose, Urine Normal Normal mg/dL    Blood, Urine OVER (3+) (A) NEGATIVE    Ketones, Urine NEGATIVE NEGATIVE mg/dL    Bilirubin, Urine NEGATIVE NEGATIVE    Urobilinogen, Urine Normal Normal mg/dL    Nitrite, Urine NEGATIVE NEGATIVE    Leukocyte Esterase, Urine 75 Jewels/µL (A) NEGATIVE   Microscopic Only, Urine   Result Value Ref Range    WBC, Urine 6-10 (A) 1-5, NONE /HPF    RBC, Urine >20 (A) NONE, 1-2, 3-5 /HPF    Squamous Epithelial Cells, Urine 1-9 (SPARSE) Reference range not established. /HPF     Renal US: 7/22/24  1. Slight interval worsening in hydronephrosis of the right iliac  fossa transplant kidney when compared to prior exam with new mild  prominence of the right proximal ureter.  2. Transplant kidney Doppler evaluation as above.  3. No perinephric fluid collection.    Assessment and Plan    44 y.o. female with  ESRD secondary to HTN/NSAID who is s/p DDKT on 11/30/23 (Dr. Marbin Lambert & Dr. Louis, KDPI 56%, PRA 48%. HCV -/-, CMV D+/R+, EBV D+/R+). Pt received a total of 4.5 mg/kg thymoglobulin induction therapy in conjunction with solumedrol, was initiated on standard triple immunosuppression therapy (Tacrolimus, Mycophenolate, prednisone).  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     Post-op course was complicated by return to OR for exploration of transplanted kidney and washout of hematoma. Hospital course was complicated by post-operative pain and constipation, H/o DGF with eventual recovery in renal function.      5/29/24 Direct admission for fever, nausea, vomiting and abdominal pain (chronic).  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 ID consulted. Pt remains on Valcyte treatment dose.    Serum Cr ~0.9 till 6/13/24, started trending up gradually since then to peak 3.38 (7/7/24) warranting admission for MARK, hypervolemia. Kidney bx 7/5/24 with acute T-cell IB, acute vascular rejection IIB, and active ABMR.   Pt is s/p Pulse steroids, Thymo 6mg/kg, PLEX x4. Cr trended to ~2.1-2.3.     Rpt bx 7/17/24 with persistent ACR 1A, AVR IIA, ABMR although the degree of tubulitis and c4d positivity appear improved from prior bx.     Currently readmitted with worsening Cr (3.28), hypervolemia.     Allograft function: s/p DDKT 11/30/23  - baseline Cr 0.8-.09, last stable 6/13/24.   - recent h/o ACR IIB and ABMR s/p treatment (as noted above).   - recent Cr worsening again to 3.28. Given recent rpt bx findings may need to consider extended Thymo infusion. However, need to be cautious given recent h/o CMV and close proximity to recent rx regimen.   - UA this admission concerning for UTI as well. On empiric abx, cx pending. Last Up/Uc 500 mg/g 7/7/24, stable  - Hypervolemic on exam. Bps above goal. Cont Bumex 4mg boluses, decent UOP.  Titrate as indicated. Consider RRT if poor response to diuretics.   - Corrected Ca low ~8, phos acceptable. PTH 18 6/2024. On calcitriol, Ca supplements.  - Hb 9.2, monitor. Transfuse as needed  - avoid potential nephrotoxins. Dose meds for CrCl.    Immunosuppression:  - On tac 2mg q12. Monitor daily Fk trough. Goal 8-10.   - Cont MMF 1000mg q12, pred taper per protocol  - Ppx: Bactrim, Mycelex troches, Valcyte per protocol    Rest of the management per primary team. Will follow    I spent 60 minutes in the professional and overall care of this patient.    Carlitos Stanford MD

## 2024-07-24 NOTE — CARE PLAN
The patient's goals for the shift include rest     The clinical goals for the shift include patient will not have chest pain by end of shift    Over the shift, the patient did make progress toward the following goals. Barriers to progression include SOB with continued exertion. Recommendations to address these barriers include slower movements and slower breaths.

## 2024-07-24 NOTE — CARE PLAN
The patient's goals for the shift include      The clinical goals for the shift include no chest pain and better breathing    Over the shift, the patient did not make progress toward the following goals. Barriers to progression include ***. Recommendations to address these barriers include ***.

## 2024-07-25 PROBLEM — E87.1 HYPONATREMIA: Chronic | Status: ACTIVE | Noted: 2024-07-25

## 2024-07-25 PROBLEM — R60.0 BILATERAL LOWER EXTREMITY EDEMA: Chronic | Status: ACTIVE | Noted: 2024-07-22

## 2024-07-25 PROBLEM — N39.0 URINARY TRACT INFECTION: Status: ACTIVE | Noted: 2024-07-25

## 2024-07-25 PROBLEM — E87.1 HYPONATREMIA: Status: ACTIVE | Noted: 2024-07-25

## 2024-07-25 LAB
ALBUMIN SERPL BCP-MCNC: 3.3 G/DL (ref 3.4–5)
ANION GAP SERPL CALC-SCNC: 14 MMOL/L (ref 10–20)
BACTERIA UR CULT: ABNORMAL
BUN SERPL-MCNC: 38 MG/DL (ref 6–23)
CA-I BLD-SCNC: 1.09 MMOL/L (ref 1.1–1.33)
CALCIUM SERPL-MCNC: 8.5 MG/DL (ref 8.6–10.6)
CHLORIDE SERPL-SCNC: 102 MMOL/L (ref 98–107)
CO2 SERPL-SCNC: 16 MMOL/L (ref 21–32)
CREAT SERPL-MCNC: 3.12 MG/DL (ref 0.5–1.05)
EGFRCR SERPLBLD CKD-EPI 2021: 18 ML/MIN/1.73M*2
ERYTHROCYTE [DISTWIDTH] IN BLOOD BY AUTOMATED COUNT: 18.6 % (ref 11.5–14.5)
GLUCOSE BLD MANUAL STRIP-MCNC: 97 MG/DL (ref 74–99)
GLUCOSE SERPL-MCNC: 78 MG/DL (ref 74–99)
HCT VFR BLD AUTO: 30.5 % (ref 36–46)
HGB BLD-MCNC: 9.5 G/DL (ref 12–16)
MAGNESIUM SERPL-MCNC: 2.22 MG/DL (ref 1.6–2.4)
MCH RBC QN AUTO: 31.1 PG (ref 26–34)
MCHC RBC AUTO-ENTMCNC: 31.1 G/DL (ref 32–36)
MCV RBC AUTO: 100 FL (ref 80–100)
NRBC BLD-RTO: 0 /100 WBCS (ref 0–0)
PHOSPHATE SERPL-MCNC: 5.1 MG/DL (ref 2.5–4.9)
PLATELET # BLD AUTO: 201 X10*3/UL (ref 150–450)
POTASSIUM SERPL-SCNC: 4.5 MMOL/L (ref 3.5–5.3)
RBC # BLD AUTO: 3.05 X10*6/UL (ref 4–5.2)
SODIUM SERPL-SCNC: 127 MMOL/L (ref 136–145)
TACROLIMUS BLD-MCNC: 9.4 NG/ML
WBC # BLD AUTO: 4.6 X10*3/UL (ref 4.4–11.3)

## 2024-07-25 PROCEDURE — 2500000004 HC RX 250 GENERAL PHARMACY W/ HCPCS (ALT 636 FOR OP/ED): Performed by: STUDENT IN AN ORGANIZED HEALTH CARE EDUCATION/TRAINING PROGRAM

## 2024-07-25 PROCEDURE — 99232 SBSQ HOSP IP/OBS MODERATE 35: CPT | Performed by: STUDENT IN AN ORGANIZED HEALTH CARE EDUCATION/TRAINING PROGRAM

## 2024-07-25 PROCEDURE — 87086 URINE CULTURE/COLONY COUNT: CPT | Performed by: STUDENT IN AN ORGANIZED HEALTH CARE EDUCATION/TRAINING PROGRAM

## 2024-07-25 PROCEDURE — 80197 ASSAY OF TACROLIMUS: CPT

## 2024-07-25 PROCEDURE — 82330 ASSAY OF CALCIUM: CPT

## 2024-07-25 PROCEDURE — 2500000001 HC RX 250 WO HCPCS SELF ADMINISTERED DRUGS (ALT 637 FOR MEDICARE OP): Performed by: STUDENT IN AN ORGANIZED HEALTH CARE EDUCATION/TRAINING PROGRAM

## 2024-07-25 PROCEDURE — 2500000001 HC RX 250 WO HCPCS SELF ADMINISTERED DRUGS (ALT 637 FOR MEDICARE OP)

## 2024-07-25 PROCEDURE — 83735 ASSAY OF MAGNESIUM: CPT

## 2024-07-25 PROCEDURE — 2500000004 HC RX 250 GENERAL PHARMACY W/ HCPCS (ALT 636 FOR OP/ED): Mod: JZ

## 2024-07-25 PROCEDURE — 36415 COLL VENOUS BLD VENIPUNCTURE: CPT

## 2024-07-25 PROCEDURE — 82947 ASSAY GLUCOSE BLOOD QUANT: CPT

## 2024-07-25 PROCEDURE — 1200000002 HC GENERAL ROOM WITH TELEMETRY DAILY

## 2024-07-25 PROCEDURE — 80069 RENAL FUNCTION PANEL: CPT

## 2024-07-25 PROCEDURE — 99231 SBSQ HOSP IP/OBS SF/LOW 25: CPT | Performed by: INTERNAL MEDICINE

## 2024-07-25 PROCEDURE — 85027 COMPLETE CBC AUTOMATED: CPT

## 2024-07-25 PROCEDURE — 2500000002 HC RX 250 W HCPCS SELF ADMINISTERED DRUGS (ALT 637 FOR MEDICARE OP, ALT 636 FOR OP/ED): Performed by: STUDENT IN AN ORGANIZED HEALTH CARE EDUCATION/TRAINING PROGRAM

## 2024-07-25 RX ORDER — HYDRALAZINE HYDROCHLORIDE 25 MG/1
25 TABLET, FILM COATED ORAL ONCE
Status: COMPLETED | OUTPATIENT
Start: 2024-07-25 | End: 2024-07-25

## 2024-07-25 RX ORDER — PROCHLORPERAZINE MALEATE 5 MG
5 TABLET ORAL ONCE
Status: COMPLETED | OUTPATIENT
Start: 2024-07-25 | End: 2024-07-25

## 2024-07-25 RX ORDER — SODIUM BICARBONATE 650 MG/1
1950 TABLET ORAL 3 TIMES DAILY
Status: DISCONTINUED | OUTPATIENT
Start: 2024-07-25 | End: 2024-07-31 | Stop reason: HOSPADM

## 2024-07-25 RX ORDER — CALCIUM GLUCONATE 20 MG/ML
1 INJECTION, SOLUTION INTRAVENOUS ONCE
Status: COMPLETED | OUTPATIENT
Start: 2024-07-25 | End: 2024-07-25

## 2024-07-25 RX ORDER — HYDRALAZINE HYDROCHLORIDE 25 MG/1
50 TABLET, FILM COATED ORAL 2 TIMES DAILY
Status: DISCONTINUED | OUTPATIENT
Start: 2024-07-25 | End: 2024-07-31 | Stop reason: HOSPADM

## 2024-07-25 RX ADMIN — SENNOSIDES AND DOCUSATE SODIUM 2 TABLET: 50; 8.6 TABLET ORAL at 08:30

## 2024-07-25 RX ADMIN — HYDRALAZINE HYDROCHLORIDE 50 MG: 25 TABLET ORAL at 20:12

## 2024-07-25 RX ADMIN — CALCITRIOL CAPSULES 0.25 MCG 0.25 MCG: 0.25 CAPSULE ORAL at 08:29

## 2024-07-25 RX ADMIN — MYCOPHENOLATE MOFETIL 1000 MG: 250 CAPSULE ORAL at 08:30

## 2024-07-25 RX ADMIN — SERTRALINE HYDROCHLORIDE 50 MG: 50 TABLET ORAL at 08:29

## 2024-07-25 RX ADMIN — ACETAMINOPHEN 650 MG: 325 TABLET ORAL at 18:01

## 2024-07-25 RX ADMIN — PANTOPRAZOLE SODIUM 40 MG: 40 TABLET, DELAYED RELEASE ORAL at 06:40

## 2024-07-25 RX ADMIN — TENOFOVIR ALAFENAMIDE 25 MG: 25 TABLET ORAL at 09:00

## 2024-07-25 RX ADMIN — SODIUM BICARBONATE 1300 MG: 650 TABLET ORAL at 08:30

## 2024-07-25 RX ADMIN — CLOTRIMAZOLE 10 MG: 10 LOZENGE ORAL at 18:02

## 2024-07-25 RX ADMIN — PREDNISONE 20 MG: 20 TABLET ORAL at 08:30

## 2024-07-25 RX ADMIN — TACROLIMUS 3 MG: 1 CAPSULE ORAL at 18:01

## 2024-07-25 RX ADMIN — Medication 4000 UNITS: at 08:30

## 2024-07-25 RX ADMIN — MYCOPHENOLATE MOFETIL 1000 MG: 250 CAPSULE ORAL at 20:12

## 2024-07-25 RX ADMIN — LEVOTHYROXINE SODIUM 25 MCG: 0.05 TABLET ORAL at 06:40

## 2024-07-25 RX ADMIN — CARVEDILOL 25 MG: 25 TABLET, FILM COATED ORAL at 20:15

## 2024-07-25 RX ADMIN — HYDRALAZINE HYDROCHLORIDE 25 MG: 25 TABLET ORAL at 08:30

## 2024-07-25 RX ADMIN — GABAPENTIN 300 MG: 300 CAPSULE ORAL at 20:12

## 2024-07-25 RX ADMIN — TORSEMIDE 50 MG: 20 TABLET ORAL at 16:04

## 2024-07-25 RX ADMIN — CLOTRIMAZOLE 10 MG: 10 LOZENGE ORAL at 15:23

## 2024-07-25 RX ADMIN — HYDRALAZINE HYDROCHLORIDE 25 MG: 25 TABLET ORAL at 10:24

## 2024-07-25 RX ADMIN — CLOTRIMAZOLE 10 MG: 10 LOZENGE ORAL at 08:30

## 2024-07-25 RX ADMIN — ACETAMINOPHEN 650 MG: 325 TABLET ORAL at 04:29

## 2024-07-25 RX ADMIN — SODIUM BICARBONATE 650 MG TABLET 1950 MG: at 15:23

## 2024-07-25 RX ADMIN — VALGANCICLOVIR 450 MG: 450 TABLET, FILM COATED ORAL at 08:30

## 2024-07-25 RX ADMIN — CALCIUM CARBONATE (ANTACID) CHEW TAB 500 MG 500 MG: 500 CHEW TAB at 08:30

## 2024-07-25 RX ADMIN — CALCIUM CARBONATE (ANTACID) CHEW TAB 500 MG 500 MG: 500 CHEW TAB at 20:15

## 2024-07-25 RX ADMIN — HEPARIN SODIUM 5000 UNITS: 5000 INJECTION INTRAVENOUS; SUBCUTANEOUS at 23:48

## 2024-07-25 RX ADMIN — PANTOPRAZOLE SODIUM 40 MG: 40 TABLET, DELAYED RELEASE ORAL at 15:23

## 2024-07-25 RX ADMIN — HEPARIN SODIUM 5000 UNITS: 5000 INJECTION INTRAVENOUS; SUBCUTANEOUS at 15:23

## 2024-07-25 RX ADMIN — CEFTRIAXONE SODIUM 1 G: 1 INJECTION, SOLUTION INTRAVENOUS at 11:27

## 2024-07-25 RX ADMIN — CALCIUM GLUCONATE 1 G: 20 INJECTION, SOLUTION INTRAVENOUS at 09:39

## 2024-07-25 RX ADMIN — AMLODIPINE BESYLATE 10 MG: 10 TABLET ORAL at 08:30

## 2024-07-25 RX ADMIN — CARVEDILOL 25 MG: 25 TABLET, FILM COATED ORAL at 08:30

## 2024-07-25 RX ADMIN — SODIUM BICARBONATE 650 MG TABLET 1950 MG: at 20:12

## 2024-07-25 RX ADMIN — SENNOSIDES AND DOCUSATE SODIUM 2 TABLET: 50; 8.6 TABLET ORAL at 20:15

## 2024-07-25 RX ADMIN — MAGNESIUM OXIDE TAB 400 MG (241.3 MG ELEMENTAL MG) 400 MG: 400 (241.3 MG) TAB at 08:30

## 2024-07-25 RX ADMIN — SULFAMETHOXAZOLE AND TRIMETHOPRIM 1 TABLET: 400; 80 TABLET ORAL at 08:30

## 2024-07-25 RX ADMIN — PROCHLORPERAZINE MALEATE 5 MG: 5 TABLET ORAL at 08:29

## 2024-07-25 RX ADMIN — TACROLIMUS 3 MG: 1 CAPSULE ORAL at 06:40

## 2024-07-25 RX ADMIN — HEPARIN SODIUM 5000 UNITS: 5000 INJECTION INTRAVENOUS; SUBCUTANEOUS at 06:40

## 2024-07-25 ASSESSMENT — COGNITIVE AND FUNCTIONAL STATUS - GENERAL
MOBILITY SCORE: 24
DAILY ACTIVITIY SCORE: 24
MOBILITY SCORE: 24
DAILY ACTIVITIY SCORE: 24
DAILY ACTIVITIY SCORE: 24
MOBILITY SCORE: 24

## 2024-07-25 ASSESSMENT — PAIN DESCRIPTION - ORIENTATION: ORIENTATION: ANTERIOR

## 2024-07-25 ASSESSMENT — PAIN SCALES - PAIN ASSESSMENT IN ADVANCED DEMENTIA (PAINAD): TOTALSCORE: MEDICATION (SEE MAR)

## 2024-07-25 ASSESSMENT — PAIN SCALES - GENERAL
PAINLEVEL_OUTOF10: 0 - NO PAIN
PAINLEVEL_OUTOF10: 7
PAINLEVEL_OUTOF10: 0 - NO PAIN
PAINLEVEL_OUTOF10: 8
PAINLEVEL_OUTOF10: 0 - NO PAIN

## 2024-07-25 ASSESSMENT — PAIN DESCRIPTION - LOCATION: LOCATION: HEAD

## 2024-07-25 ASSESSMENT — PAIN DESCRIPTION - DESCRIPTORS
DESCRIPTORS: ACHING
DESCRIPTORS: ACHING

## 2024-07-25 ASSESSMENT — PAIN - FUNCTIONAL ASSESSMENT
PAIN_FUNCTIONAL_ASSESSMENT: 0-10
PAIN_FUNCTIONAL_ASSESSMENT: 0-10

## 2024-07-25 NOTE — CARE PLAN
The patient's goals for the shift include      The clinical goals for the shift include Pt will remain injury free throughout shift      Problem: Respiratory  Goal: Minimal/no exertional discomfort or dyspnea this shift  Outcome: Progressing  Goal: No signs of respiratory distress (eg. Use of accessory muscles. Peds grunting)  Outcome: Progressing  Goal: Increase self care and/or family involvement in next 24 hours  Outcome: Progressing     Problem: Safety - Adult  Goal: Free from fall injury  Outcome: Progressing     Problem: Chronic Conditions and Co-morbidities  Goal: Patient's chronic conditions and co-morbidity symptoms are monitored and maintained or improved  Outcome: Progressing

## 2024-07-25 NOTE — PROGRESS NOTES
"Herber Foy Rai is a 44 y.o. female now POD # 238 s/p DDKT transplant.    Subjective   BLE edema  Mildly improved       Objective   Physical Exam  Gen: A+OX3; NAD  Abd: S/NT/ND. Incision C/D/I.  Ext: 2+ LE edema    Last Recorded Vitals  Blood pressure 136/79, pulse 72, temperature 35.1 °C (95.2 °F), temperature source Temporal, resp. rate 17, height 1.626 m (5' 4\"), weight 84.9 kg (187 lb 3.2 oz), SpO2 96%.  Intake/Output last 3 Shifts:  I/O last 3 completed shifts:  In: 350 (4.1 mL/kg) [P.O.:300; IV Piggyback:50]  Out: 3550 (41.8 mL/kg) [Urine:3550 (1.2 mL/kg/hr)]  Weight: 84.9 kg      Lab Results   Component Value Date    CREATININE 3.12 (H) 07/25/2024    K 4.5 07/25/2024    GLUCOSE 78 07/25/2024    HGB 9.5 (L) 07/25/2024    WBC 4.6 07/25/2024     07/25/2024    CALCIUM 8.5 (L) 07/25/2024         Current Facility-Administered Medications:     acetaminophen (Tylenol) tablet 650 mg, 650 mg, oral, q4h PRN, Lwa Escobar MD, 650 mg at 07/25/24 0429    albuterol 90 mcg/actuation inhaler 2 puff, 2 puff, inhalation, q4h PRN, Law Escobar MD    amLODIPine (Norvasc) tablet 10 mg, 10 mg, oral, Daily, Law Escobar MD, 10 mg at 07/25/24 0830    calcitriol (Rocaltrol) capsule 0.25 mcg, 0.25 mcg, oral, Daily, Law Escobar MD, 0.25 mcg at 07/25/24 0829    calcium carbonate (Tums) chewable tablet 500 mg, 500 mg, oral, BID, Law Escobar MD, 500 mg at 07/25/24 0830    carvedilol (Coreg) tablet 25 mg, 25 mg, oral, BID, Law Escobar MD, 25 mg at 07/25/24 0830    cefTRIAXone (Rocephin) 1 g in dextrose (iso) IV 50 mL, 1 g, intravenous, q24h, Bernadette Rolon MD, Last Rate: 100 mL/hr at 07/25/24 1127, 1 g at 07/25/24 1127    cholecalciferol (Vitamin D-3) tablet 4,000 Units, 4,000 Units, oral, Daily, Law Escobar MD, 4,000 Units at 07/25/24 0830    clotrimazole (Mycelex) samia 10 mg, 10 mg, oral, TID after meals, Law Escobar MD, 10 mg at 07/25/24 0830    gabapentin (Neurontin) capsule 300 " mg, 300 mg, oral, Nightly, Law Escobar MD, 300 mg at 07/24/24 2013    heparin (porcine) injection 5,000 Units, 5,000 Units, subcutaneous, q8h, Law Escobar MD, 5,000 Units at 07/25/24 0640    hydrALAZINE (Apresoline) tablet 50 mg, 50 mg, oral, BID, Bernadette Rolon MD    levothyroxine (Synthroid, Levoxyl) tablet 25 mcg, 25 mcg, oral, Daily before breakfast, Law Escobar MD, 25 mcg at 07/25/24 0640    magnesium oxide (Mag-Ox) tablet 400 mg, 400 mg, oral, Daily, Law Escobar MD, 400 mg at 07/25/24 0830    mycophenolate (Cellcept) capsule 1,000 mg, 1,000 mg, oral, q12h, Law Escobar MD, 1,000 mg at 07/25/24 0830    ondansetron ODT (Zofran-ODT) disintegrating tablet 4 mg, 4 mg, oral, q8h PRN **OR** ondansetron (Zofran) injection 4 mg, 4 mg, intravenous, q8h PRN, Law Escobar MD    pantoprazole (ProtoNix) EC tablet 40 mg, 40 mg, oral, BID AC, Mariel Peck MD, 40 mg at 07/25/24 0640    polyethylene glycol (Glycolax, Miralax) packet 17 g, 17 g, oral, Daily, Law Escobar MD, 17 g at 07/24/24 0943    predniSONE (Deltasone) tablet 20 mg, 20 mg, oral, Daily, Law Escobar MD, 20 mg at 07/25/24 0830    sennosides-docusate sodium (Rose-Colace) 8.6-50 mg per tablet 2 tablet, 2 tablet, oral, BID, Law Escobar MD, 2 tablet at 07/25/24 0830    sertraline (Zoloft) tablet 50 mg, 50 mg, oral, Daily, Law Escobar MD, 50 mg at 07/25/24 0829    sodium bicarbonate tablet 1,950 mg, 1,950 mg, oral, TID, Bernadette Rolon MD    sulfamethoxazole-trimethoprim (Bactrim) 400-80 mg per tablet 1 tablet, 1 tablet, oral, Daily, Law Escobar MD, 1 tablet at 07/25/24 0830    tacrolimus (Prograf) capsule 3 mg, 3 mg, oral, q12h [facility], JELLY Lewis-CNP, 3 mg at 07/25/24 0640    tenofovir alafenamide (Vemlidy) tablet 25 mg, 25 mg, oral, Daily, Law Escobar MD, 25 mg at 07/24/24 0942    torsemide (Demadex) tablet 50 mg, 50 mg, oral, BID, Bernadette Rolon MD    valGANciclovir  (Valcyte) tablet 450 mg, 450 mg, oral, Every other day, Law Escobar MD, 450 mg at 07/25/24 0830     Assessment/Plan    DDKT 11/2023     Kidney allograft function         biopsy 7/5/24 -consistent with T-cell mediated rejection Banff 1B and acute vascular rejection Banff 2B and antibody mediated rejection                          Steroid bolus 500 x 3, completed                          Thymo 6 mg/kg - last dose 7/13                          Plex followed by IVIG x 4   7/17 Interval biopsy with mild improvement    Readmitted with Rising creatinine, edema   Renal US within normal limits   Gentle diuresis will give Bumex again today   Continue home tac, MMF and pred 20   Transitioned off belatacept.   Will observe response to diuresis and determine if additional antirejection treatment required.    UA positive.  Will initiate treatment for UTI.       I spent 35 minutes in the professional and overall care of this patient.      Anita Louis MD

## 2024-07-25 NOTE — CARE PLAN
The patient's goals for the shift include  safety    The clinical goals for the shift include patient will have no chest pain, and non labored breathing by the end of this shift        Problem: Respiratory  Goal: Minimal/no exertional discomfort or dyspnea this shift  Outcome: Progressing  Goal: No signs of respiratory distress (eg. Use of accessory muscles. Peds grunting)  Outcome: Progressing  Goal: Increase self care and/or family involvement in next 24 hours  Outcome: Progressing     Problem: Safety - Adult  Goal: Free from fall injury  Outcome: Progressing     Problem: Chronic Conditions and Co-morbidities  Goal: Patient's chronic conditions and co-morbidity symptoms are monitored and maintained or improved  Outcome: Progressing

## 2024-07-25 NOTE — PROGRESS NOTES
"Herber Foy Rai is a 44 y.o. female now POD # 238 s/p DDKT transplant.    Subjective   BLE edema  Mildly improved  Having headaches       Objective   Physical Exam  Gen: A+OX3; NAD  Abd: S/NT/ND. Incision C/D/I.  Ext: 2+ LE edema    Last Recorded Vitals  Blood pressure 136/79, pulse 72, temperature 35.1 °C (95.2 °F), temperature source Temporal, resp. rate 17, height 1.626 m (5' 4\"), weight 84.9 kg (187 lb 3.2 oz), SpO2 96%.  Intake/Output last 3 Shifts:  I/O last 3 completed shifts:  In: 350 (4.1 mL/kg) [P.O.:300; IV Piggyback:50]  Out: 3550 (41.8 mL/kg) [Urine:3550 (1.2 mL/kg/hr)]  Weight: 84.9 kg      Lab Results   Component Value Date    CREATININE 3.12 (H) 07/25/2024    K 4.5 07/25/2024    GLUCOSE 78 07/25/2024    HGB 9.5 (L) 07/25/2024    WBC 4.6 07/25/2024     07/25/2024    CALCIUM 8.5 (L) 07/25/2024         Current Facility-Administered Medications:     acetaminophen (Tylenol) tablet 650 mg, 650 mg, oral, q4h PRN, Law Escobar MD, 650 mg at 07/25/24 0429    albuterol 90 mcg/actuation inhaler 2 puff, 2 puff, inhalation, q4h PRN, Law Escobar MD    amLODIPine (Norvasc) tablet 10 mg, 10 mg, oral, Daily, Law Escobar MD, 10 mg at 07/25/24 0830    calcitriol (Rocaltrol) capsule 0.25 mcg, 0.25 mcg, oral, Daily, Law Escobar MD, 0.25 mcg at 07/25/24 0829    calcium carbonate (Tums) chewable tablet 500 mg, 500 mg, oral, BID, Law Escobar MD, 500 mg at 07/25/24 0830    carvedilol (Coreg) tablet 25 mg, 25 mg, oral, BID, Law Escobar MD, 25 mg at 07/25/24 0830    cefTRIAXone (Rocephin) 1 g in dextrose (iso) IV 50 mL, 1 g, intravenous, q24h, Bernadette Rolon MD, Last Rate: 100 mL/hr at 07/25/24 1127, 1 g at 07/25/24 1127    cholecalciferol (Vitamin D-3) tablet 4,000 Units, 4,000 Units, oral, Daily, Law Escobar MD, 4,000 Units at 07/25/24 0830    clotrimazole (Mycelex) samia 10 mg, 10 mg, oral, TID after meals, Law Escobar MD, 10 mg at 07/25/24 0830    gabapentin " (Neurontin) capsule 300 mg, 300 mg, oral, Nightly, Law Escobar MD, 300 mg at 07/24/24 2013    heparin (porcine) injection 5,000 Units, 5,000 Units, subcutaneous, q8h, Law Escobar MD, 5,000 Units at 07/25/24 0640    hydrALAZINE (Apresoline) tablet 50 mg, 50 mg, oral, BID, Bernadette Rolon MD    levothyroxine (Synthroid, Levoxyl) tablet 25 mcg, 25 mcg, oral, Daily before breakfast, Law Escobar MD, 25 mcg at 07/25/24 0640    magnesium oxide (Mag-Ox) tablet 400 mg, 400 mg, oral, Daily, Law Escobar MD, 400 mg at 07/25/24 0830    mycophenolate (Cellcept) capsule 1,000 mg, 1,000 mg, oral, q12h, Law Escobar MD, 1,000 mg at 07/25/24 0830    ondansetron ODT (Zofran-ODT) disintegrating tablet 4 mg, 4 mg, oral, q8h PRN **OR** ondansetron (Zofran) injection 4 mg, 4 mg, intravenous, q8h PRN, Law Escobar MD    pantoprazole (ProtoNix) EC tablet 40 mg, 40 mg, oral, BID AC, Mariel Peck MD, 40 mg at 07/25/24 0640    polyethylene glycol (Glycolax, Miralax) packet 17 g, 17 g, oral, Daily, Law Escobar MD, 17 g at 07/24/24 0943    predniSONE (Deltasone) tablet 20 mg, 20 mg, oral, Daily, Law Escobar MD, 20 mg at 07/25/24 0830    sennosides-docusate sodium (Rose-Colace) 8.6-50 mg per tablet 2 tablet, 2 tablet, oral, BID, Law Escobar MD, 2 tablet at 07/25/24 0830    sertraline (Zoloft) tablet 50 mg, 50 mg, oral, Daily, Law Escobar MD, 50 mg at 07/25/24 0829    sodium bicarbonate tablet 1,950 mg, 1,950 mg, oral, TID, Bernadette Rolon MD    sulfamethoxazole-trimethoprim (Bactrim) 400-80 mg per tablet 1 tablet, 1 tablet, oral, Daily, Law Escobar MD, 1 tablet at 07/25/24 0830    tacrolimus (Prograf) capsule 3 mg, 3 mg, oral, q12h [facility], Nadine Hathaway, APRN-CNP, 3 mg at 07/25/24 0640    tenofovir alafenamide (Vemlidy) tablet 25 mg, 25 mg, oral, Daily, Law Escobar MD, 25 mg at 07/24/24 0942    torsemide (Demadex) tablet 50 mg, 50 mg, oral, BID, Bernadette Rloon,  MD    valGANciclovir (Valcyte) tablet 450 mg, 450 mg, oral, Every other day, Law Escobar MD, 450 mg at 07/25/24 0830     Assessment/Plan    DDKT 11/2023     Kidney allograft function         biopsy 7/5/24 -consistent with T-cell mediated rejection Banff 1B and acute vascular rejection Banff 2B and antibody mediated rejection                          Steroid bolus 500 x 3, completed                          Thymo 6 mg/kg - last dose 7/13                          Plex followed by IVIG x 4   7/17 Interval biopsy with mild improvement    Readmitted with Rising creatinine, edema   Renal US within normal limits   Gentle diuresis will trial oral torsemide today   Continue home tac, MMF and pred 20   Transitioned off belatacept.   Will observe response to diuresis and determine if additional antirejection treatment required.  Currently creatinine is slowly downtrending.   UA positive.  Pending culture.  Started ceftriaxone 7/24       I spent 35 minutes in the professional and overall care of this patient.      Anita Louis MD

## 2024-07-25 NOTE — CARE PLAN
Problem: Respiratory  Goal: Minimal/no exertional discomfort or dyspnea this shift  Outcome: Progressing  Goal: No signs of respiratory distress (eg. Use of accessory muscles. Peds grunting)  Outcome: Progressing  Goal: Increase self care and/or family involvement in next 24 hours  Outcome: Progressing     Problem: Safety - Adult  Goal: Free from fall injury  Outcome: Progressing     Problem: Chronic Conditions and Co-morbidities  Goal: Patient's chronic conditions and co-morbidity symptoms are monitored and maintained or improved  Outcome: Progressing   The patient's goals for the shift include      The clinical goals for the shift include patient will have no chest pain, and non labored breathing by the end of this shift

## 2024-07-26 LAB
ALBUMIN SERPL BCP-MCNC: 3.3 G/DL (ref 3.4–5)
ANION GAP SERPL CALC-SCNC: 16 MMOL/L (ref 10–20)
BACTERIA BLD CULT: NORMAL
BACTERIA BLD CULT: NORMAL
BACTERIA UR CULT: NORMAL
BUN SERPL-MCNC: 37 MG/DL (ref 6–23)
CA-I BLD-SCNC: 1.11 MMOL/L (ref 1.1–1.33)
CALCIUM SERPL-MCNC: 7.9 MG/DL (ref 8.6–10.6)
CHLORIDE SERPL-SCNC: 99 MMOL/L (ref 98–107)
CO2 SERPL-SCNC: 18 MMOL/L (ref 21–32)
CREAT SERPL-MCNC: 3.73 MG/DL (ref 0.5–1.05)
EGFRCR SERPLBLD CKD-EPI 2021: 15 ML/MIN/1.73M*2
ERYTHROCYTE [DISTWIDTH] IN BLOOD BY AUTOMATED COUNT: 18.4 % (ref 11.5–14.5)
GLUCOSE SERPL-MCNC: 72 MG/DL (ref 74–99)
HCT VFR BLD AUTO: 28.9 % (ref 36–46)
HGB BLD-MCNC: 9.2 G/DL (ref 12–16)
MAGNESIUM SERPL-MCNC: 2.03 MG/DL (ref 1.6–2.4)
MCH RBC QN AUTO: 31.5 PG (ref 26–34)
MCHC RBC AUTO-ENTMCNC: 31.8 G/DL (ref 32–36)
MCV RBC AUTO: 99 FL (ref 80–100)
NRBC BLD-RTO: 0 /100 WBCS (ref 0–0)
PHOSPHATE SERPL-MCNC: 4.7 MG/DL (ref 2.5–4.9)
PLATELET # BLD AUTO: 204 X10*3/UL (ref 150–450)
POTASSIUM SERPL-SCNC: 4.4 MMOL/L (ref 3.5–5.3)
RBC # BLD AUTO: 2.92 X10*6/UL (ref 4–5.2)
SODIUM SERPL-SCNC: 129 MMOL/L (ref 136–145)
STAPHYLOCOCCUS SPEC CULT: NORMAL
TACROLIMUS BLD-MCNC: 5.4 NG/ML
WBC # BLD AUTO: 3.8 X10*3/UL (ref 4.4–11.3)

## 2024-07-26 PROCEDURE — 2500000001 HC RX 250 WO HCPCS SELF ADMINISTERED DRUGS (ALT 637 FOR MEDICARE OP)

## 2024-07-26 PROCEDURE — 82330 ASSAY OF CALCIUM: CPT

## 2024-07-26 PROCEDURE — 80197 ASSAY OF TACROLIMUS: CPT

## 2024-07-26 PROCEDURE — 99232 SBSQ HOSP IP/OBS MODERATE 35: CPT | Performed by: STUDENT IN AN ORGANIZED HEALTH CARE EDUCATION/TRAINING PROGRAM

## 2024-07-26 PROCEDURE — 83735 ASSAY OF MAGNESIUM: CPT

## 2024-07-26 PROCEDURE — P9047 ALBUMIN (HUMAN), 25%, 50ML: HCPCS | Mod: JZ

## 2024-07-26 PROCEDURE — 36415 COLL VENOUS BLD VENIPUNCTURE: CPT

## 2024-07-26 PROCEDURE — 2500000001 HC RX 250 WO HCPCS SELF ADMINISTERED DRUGS (ALT 637 FOR MEDICARE OP): Performed by: STUDENT IN AN ORGANIZED HEALTH CARE EDUCATION/TRAINING PROGRAM

## 2024-07-26 PROCEDURE — 2500000002 HC RX 250 W HCPCS SELF ADMINISTERED DRUGS (ALT 637 FOR MEDICARE OP, ALT 636 FOR OP/ED): Performed by: STUDENT IN AN ORGANIZED HEALTH CARE EDUCATION/TRAINING PROGRAM

## 2024-07-26 PROCEDURE — 2500000004 HC RX 250 GENERAL PHARMACY W/ HCPCS (ALT 636 FOR OP/ED): Performed by: STUDENT IN AN ORGANIZED HEALTH CARE EDUCATION/TRAINING PROGRAM

## 2024-07-26 PROCEDURE — 1200000002 HC GENERAL ROOM WITH TELEMETRY DAILY

## 2024-07-26 PROCEDURE — 2500000004 HC RX 250 GENERAL PHARMACY W/ HCPCS (ALT 636 FOR OP/ED)

## 2024-07-26 PROCEDURE — 99231 SBSQ HOSP IP/OBS SF/LOW 25: CPT | Performed by: INTERNAL MEDICINE

## 2024-07-26 PROCEDURE — 85027 COMPLETE CBC AUTOMATED: CPT

## 2024-07-26 PROCEDURE — 80069 RENAL FUNCTION PANEL: CPT

## 2024-07-26 RX ORDER — ACETAMINOPHEN 325 MG/1
650 TABLET ORAL ONCE
Status: COMPLETED | OUTPATIENT
Start: 2024-07-26 | End: 2024-07-26

## 2024-07-26 RX ORDER — PROCHLORPERAZINE MALEATE 5 MG
5 TABLET ORAL EVERY 6 HOURS PRN
Status: DISCONTINUED | OUTPATIENT
Start: 2024-07-26 | End: 2024-07-31 | Stop reason: HOSPADM

## 2024-07-26 RX ORDER — ALBUMIN HUMAN 250 G/1000ML
12.5 SOLUTION INTRAVENOUS ONCE
Status: COMPLETED | OUTPATIENT
Start: 2024-07-26 | End: 2024-07-26

## 2024-07-26 RX ORDER — DIPHENHYDRAMINE HYDROCHLORIDE 50 MG/ML
25 INJECTION, SOLUTION INTRAMUSCULAR; INTRAVENOUS ONCE
Status: COMPLETED | OUTPATIENT
Start: 2024-07-26 | End: 2024-07-26

## 2024-07-26 RX ADMIN — HEPARIN SODIUM 5000 UNITS: 5000 INJECTION INTRAVENOUS; SUBCUTANEOUS at 16:48

## 2024-07-26 RX ADMIN — MAGNESIUM OXIDE TAB 400 MG (241.3 MG ELEMENTAL MG) 400 MG: 400 (241.3 MG) TAB at 09:07

## 2024-07-26 RX ADMIN — ACETAMINOPHEN 650 MG: 325 TABLET ORAL at 06:20

## 2024-07-26 RX ADMIN — CALCIUM CARBONATE (ANTACID) CHEW TAB 500 MG 500 MG: 500 CHEW TAB at 20:10

## 2024-07-26 RX ADMIN — SODIUM BICARBONATE 650 MG TABLET 1950 MG: at 16:48

## 2024-07-26 RX ADMIN — HEPARIN SODIUM 5000 UNITS: 5000 INJECTION INTRAVENOUS; SUBCUTANEOUS at 22:00

## 2024-07-26 RX ADMIN — TENOFOVIR ALAFENAMIDE 25 MG: 25 TABLET ORAL at 09:06

## 2024-07-26 RX ADMIN — SULFAMETHOXAZOLE AND TRIMETHOPRIM 1 TABLET: 400; 80 TABLET ORAL at 09:07

## 2024-07-26 RX ADMIN — ALBUMIN HUMAN 12.5 G: 0.25 SOLUTION INTRAVENOUS at 11:20

## 2024-07-26 RX ADMIN — CALCIUM CARBONATE (ANTACID) CHEW TAB 500 MG 500 MG: 500 CHEW TAB at 09:07

## 2024-07-26 RX ADMIN — HEPARIN SODIUM 125 MG: 1000 INJECTION INTRAVENOUS; SUBCUTANEOUS at 13:46

## 2024-07-26 RX ADMIN — CARVEDILOL 25 MG: 25 TABLET, FILM COATED ORAL at 09:07

## 2024-07-26 RX ADMIN — CLOTRIMAZOLE 10 MG: 10 LOZENGE ORAL at 09:06

## 2024-07-26 RX ADMIN — LEVOTHYROXINE SODIUM 25 MCG: 0.05 TABLET ORAL at 06:12

## 2024-07-26 RX ADMIN — PANTOPRAZOLE SODIUM 40 MG: 40 TABLET, DELAYED RELEASE ORAL at 16:48

## 2024-07-26 RX ADMIN — HEPARIN SODIUM 5000 UNITS: 5000 INJECTION INTRAVENOUS; SUBCUTANEOUS at 06:12

## 2024-07-26 RX ADMIN — PROCHLORPERAZINE MALEATE 5 MG: 5 TABLET ORAL at 09:06

## 2024-07-26 RX ADMIN — SERTRALINE HYDROCHLORIDE 50 MG: 50 TABLET ORAL at 09:05

## 2024-07-26 RX ADMIN — SENNOSIDES AND DOCUSATE SODIUM 2 TABLET: 50; 8.6 TABLET ORAL at 09:07

## 2024-07-26 RX ADMIN — GABAPENTIN 300 MG: 300 CAPSULE ORAL at 20:10

## 2024-07-26 RX ADMIN — CALCITRIOL CAPSULES 0.25 MCG 0.25 MCG: 0.25 CAPSULE ORAL at 09:06

## 2024-07-26 RX ADMIN — ACETAMINOPHEN 650 MG: 325 TABLET ORAL at 21:08

## 2024-07-26 RX ADMIN — SODIUM BICARBONATE 650 MG TABLET 1950 MG: at 09:06

## 2024-07-26 RX ADMIN — CARVEDILOL 25 MG: 25 TABLET, FILM COATED ORAL at 20:10

## 2024-07-26 RX ADMIN — HYDRALAZINE HYDROCHLORIDE 50 MG: 25 TABLET ORAL at 20:11

## 2024-07-26 RX ADMIN — ACETAMINOPHEN 650 MG: 325 TABLET ORAL at 17:06

## 2024-07-26 RX ADMIN — SODIUM BICARBONATE 650 MG TABLET 1950 MG: at 20:10

## 2024-07-26 RX ADMIN — MYCOPHENOLATE MOFETIL 1000 MG: 250 CAPSULE ORAL at 20:10

## 2024-07-26 RX ADMIN — HYDRALAZINE HYDROCHLORIDE 50 MG: 25 TABLET ORAL at 09:06

## 2024-07-26 RX ADMIN — CLOTRIMAZOLE 10 MG: 10 LOZENGE ORAL at 18:11

## 2024-07-26 RX ADMIN — AMLODIPINE BESYLATE 10 MG: 10 TABLET ORAL at 09:06

## 2024-07-26 RX ADMIN — Medication 4000 UNITS: at 09:06

## 2024-07-26 RX ADMIN — DIPHENHYDRAMINE HYDROCHLORIDE 25 MG: 50 INJECTION, SOLUTION INTRAMUSCULAR; INTRAVENOUS at 13:06

## 2024-07-26 RX ADMIN — POLYETHYLENE GLYCOL 3350 17 G: 17 POWDER, FOR SOLUTION ORAL at 09:05

## 2024-07-26 RX ADMIN — SENNOSIDES AND DOCUSATE SODIUM 2 TABLET: 50; 8.6 TABLET ORAL at 20:10

## 2024-07-26 RX ADMIN — MYCOPHENOLATE MOFETIL 1000 MG: 250 CAPSULE ORAL at 09:07

## 2024-07-26 RX ADMIN — PANTOPRAZOLE SODIUM 40 MG: 40 TABLET, DELAYED RELEASE ORAL at 07:00

## 2024-07-26 RX ADMIN — TACROLIMUS 3 MG: 1 CAPSULE ORAL at 18:12

## 2024-07-26 RX ADMIN — PREDNISONE 20 MG: 20 TABLET ORAL at 09:06

## 2024-07-26 RX ADMIN — TACROLIMUS 3 MG: 1 CAPSULE ORAL at 06:11

## 2024-07-26 RX ADMIN — CLOTRIMAZOLE 10 MG: 10 LOZENGE ORAL at 12:05

## 2024-07-26 RX ADMIN — TORSEMIDE 50 MG: 20 TABLET ORAL at 12:05

## 2024-07-26 RX ADMIN — ACETAMINOPHEN 650 MG: 325 TABLET ORAL at 13:05

## 2024-07-26 ASSESSMENT — COGNITIVE AND FUNCTIONAL STATUS - GENERAL
MOBILITY SCORE: 24
DAILY ACTIVITIY SCORE: 24
MOBILITY SCORE: 24
DAILY ACTIVITIY SCORE: 24

## 2024-07-26 ASSESSMENT — PAIN SCALES - GENERAL
PAINLEVEL_OUTOF10: 8
PAINLEVEL_OUTOF10: 7
PAINLEVEL_OUTOF10: 0 - NO PAIN
PAINLEVEL_OUTOF10: 8

## 2024-07-26 ASSESSMENT — PAIN DESCRIPTION - LOCATION
LOCATION: HEAD

## 2024-07-26 ASSESSMENT — PAIN DESCRIPTION - ORIENTATION: ORIENTATION: ANTERIOR

## 2024-07-26 ASSESSMENT — PAIN SCALES - WONG BAKER: WONGBAKER_NUMERICALRESPONSE: NO HURT

## 2024-07-26 NOTE — PROGRESS NOTES
"Herber Foy Rai is a 44 y.o. female on day 3 of admission presenting with Bilateral lower extremity edema.    No acute events.   Pt reports swelling slightly better.   C/o headaches    Scheduled medications  amLODIPine, 10 mg, oral, Daily  calcitriol, 0.25 mcg, oral, Daily  calcium carbonate, 500 mg, oral, BID  carvedilol, 25 mg, oral, BID  cefTRIAXone, 1 g, intravenous, q24h  cholecalciferol, 4,000 Units, oral, Daily  clotrimazole, 10 mg, oral, TID after meals  gabapentin, 300 mg, oral, Nightly  heparin (porcine), 5,000 Units, subcutaneous, q8h  hydrALAZINE, 50 mg, oral, BID  levothyroxine, 25 mcg, oral, Daily before breakfast  magnesium oxide, 400 mg, oral, Daily  mycophenolate, 1,000 mg, oral, q12h  pantoprazole, 40 mg, oral, BID AC  polyethylene glycol, 17 g, oral, Daily  predniSONE, 20 mg, oral, Daily  sennosides-docusate sodium, 2 tablet, oral, BID  sertraline, 50 mg, oral, Daily  sodium bicarbonate, 1,950 mg, oral, TID  sulfamethoxazole-trimethoprim, 1 tablet, oral, Daily  tacrolimus, 3 mg, oral, q12h JANIE  tenofovir alafenamide, 25 mg, oral, Daily  torsemide, 50 mg, oral, BID  valGANciclovir, 450 mg, oral, Every other day      Continuous medications     PRN medications  PRN medications: acetaminophen, albuterol, ondansetron ODT **OR** ondansetron    Last Recorded Vitals  Blood pressure 157/84, pulse 72, temperature 36 °C (96.8 °F), temperature source Temporal, resp. rate 17, height 1.626 m (5' 4\"), weight 84.9 kg (187 lb 3.2 oz), SpO2 95%.  Intake/Output last 3 Shifts:  I/O last 3 completed shifts:  In: 850 (10 mL/kg) [P.O.:750; IV Piggyback:100]  Out: 2900 (34.2 mL/kg) [Urine:2900 (0.9 mL/kg/hr)]  Weight: 84.9 kg     A&ox3, no distress, pleasant  MMM, no lesions  Lungs with scattered bibasilar crackles  Rrr, no m/r/g  Abd soft, nt, nd  No allograft tenderness  2+ LE edema b/l    Results for orders placed or performed during the hospital encounter of 07/22/24 (from the past 24 hour(s))   POCT GLUCOSE   Result " Value Ref Range    POCT Glucose 97 74 - 99 mg/dL   CBC   Result Value Ref Range    WBC 4.6 4.4 - 11.3 x10*3/uL    nRBC 0.0 0.0 - 0.0 /100 WBCs    RBC 3.05 (L) 4.00 - 5.20 x10*6/uL    Hemoglobin 9.5 (L) 12.0 - 16.0 g/dL    Hematocrit 30.5 (L) 36.0 - 46.0 %     80 - 100 fL    MCH 31.1 26.0 - 34.0 pg    MCHC 31.1 (L) 32.0 - 36.0 g/dL    RDW 18.6 (H) 11.5 - 14.5 %    Platelets 201 150 - 450 x10*3/uL   Renal Function Panel   Result Value Ref Range    Glucose 78 74 - 99 mg/dL    Sodium 127 (L) 136 - 145 mmol/L    Potassium 4.5 3.5 - 5.3 mmol/L    Chloride 102 98 - 107 mmol/L    Bicarbonate 16 (L) 21 - 32 mmol/L    Anion Gap 14 10 - 20 mmol/L    Urea Nitrogen 38 (H) 6 - 23 mg/dL    Creatinine 3.12 (H) 0.50 - 1.05 mg/dL    eGFR 18 (L) >60 mL/min/1.73m*2    Calcium 8.5 (L) 8.6 - 10.6 mg/dL    Phosphorus 5.1 (H) 2.5 - 4.9 mg/dL    Albumin 3.3 (L) 3.4 - 5.0 g/dL   Magnesium   Result Value Ref Range    Magnesium 2.22 1.60 - 2.40 mg/dL   Tacrolimus   Result Value Ref Range    Tacrolimus  9.4 <=15.0 ng/mL   Calcium, Ionized   Result Value Ref Range    POCT Calcium, Ionized 1.09 (L) 1.1 - 1.33 mmol/L         Assessment/Plan   Principal Problem:    Bilateral lower extremity edema  Active Problems:    Hyponatremia    Urinary tract infection    44 y.o. [place]ese F with ESRD secondary to HTN/NSAID who is s/p DDKT on 11/30/23 (Dr. Marbin Lambert & Dr. Louis, KDPI 56%, PRA 48%. HCV -/-, CMV D+/R+, EBV D+/R+). Pt received a total of 4.5 mg/kg thymoglobulin induction therapy in conjunction with solumedrol, was initiated on standard triple immunosuppression therapy (Tacrolimus, Mycophenolate, prednisone).  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      Post-op course was complicated by return to OR for exploration of transplanted kidney and washout of hematoma. Hospital course was complicated by post-operative pain and constipation, H/o DGF with eventual recovery in renal function.       5/29/24 Direct admission for fever, nausea, vomiting and abdominal pain (chronic).  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 ID consulted. Pt remains on Valcyte treatment dose.     Serum Cr ~0.9 till 6/13/24, started trending up gradually since then to peak 3.38 (7/7/24) warranting admission for MARK, hypervolemia. Kidney bx 7/5/24 with acute T-cell IB, acute vascular rejection IIB, and active ABMR.   Pt is s/p Pulse steroids, Thymo 6mg/kg, PLEX x4. Cr trended to ~2.1-2.3.      Rpt bx 7/17/24 with persistent ACR 1A, AVR IIA, ABMR although the degree of tubulitis and c4d positivity appear improved from prior bx.      Currently readmitted with worsening Cr (3.28), hypervolemia.      Allograft function: s/p DDKT 11/30/23  - baseline Cr 0.8-0.9, last stable 6/13/24.   - recent h/o ACR IIB and ABMR s/p treatment (as noted above).   - admitted with Cr worsening again to 3.28. rpt bx with persistent rejection but improved inflammation. Also with possible UTI, on empiric abx. Ucx with multiple organisms (rpt pending)  - Serum Cr slightly improved today. Cont to monitor.  - If renal function continues to worsen, may need extended treatment for rejection.   - Last Up/Uc 500 mg/g 7/7/24, stable  - Hypervolemic on exam. Bps above goal. S/p Bumex 4mg boluses with  decent UOP. Will start torsemide 50 mg bidTitrate as indicated. Consider RRT if poor response to diuretics.   - corrected Ca acceptable, phos borderline elevated. PTH 18 6/2024. On calcitriol 0.25 mcg daily, Ca supplements.  - Hb 9.5, monitor. Transfuse as needed  - avoid potential nephrotoxins. Dose meds for CrCl.     Immunosuppression:  - On tac 2mg q12. Monitor daily Fk trough. Goal 8-10.   - Cont MMF 1000mg q12, pred taper per protocol  - Ppx: Bactrim, Mycelex troches, Valcyte per protocol     Will follow    Carlitos Stanford MD

## 2024-07-26 NOTE — PROGRESS NOTES
"Herber Foy Rai is a 44 y.o. female now POD # 239 s/p DDKT transplant.    Subjective   BLE edema  Mildly improved  Having headaches -better this a.m.       Objective   Physical Exam  Gen: A+OX3; NAD  Abd: S/NT/ND. Incision C/D/I.  Ext: 2+ LE edema    Last Recorded Vitals  Blood pressure 135/77, pulse 80, temperature 36.7 °C (98.1 °F), resp. rate 20, height 1.626 m (5' 4\"), weight 84.9 kg (187 lb 3.2 oz), SpO2 96%.  Intake/Output last 3 Shifts:  I/O last 3 completed shifts:  In: 890 (10.5 mL/kg) [P.O.:840; IV Piggyback:50]  Out: 3050 (35.9 mL/kg) [Urine:3050 (1 mL/kg/hr)]  Weight: 84.9 kg      Lab Results   Component Value Date    CREATININE 3.73 (H) 07/26/2024    K 4.4 07/26/2024    GLUCOSE 72 (L) 07/26/2024    HGB 9.2 (L) 07/26/2024    WBC 3.8 (L) 07/26/2024     07/26/2024    CALCIUM 7.9 (L) 07/26/2024         Current Facility-Administered Medications:     acetaminophen (Tylenol) tablet 650 mg, 650 mg, oral, q4h PRN, Law Escobar MD, 650 mg at 07/26/24 0620    albuterol 90 mcg/actuation inhaler 2 puff, 2 puff, inhalation, q4h PRN, Law Escobar MD    amLODIPine (Norvasc) tablet 10 mg, 10 mg, oral, Daily, Law Escobar MD, 10 mg at 07/26/24 0906    anti-thymocyte globulin rabbit (Thymoglobulin) 125 mg, hydrocortisone sod succ (PF) (Solu-CORTEF) 20 mg, heparin 1,000 Units in sodium chloride 0.9% 500 mL IV, 1.5 mg/kg, intravenous, Once, Alice Gaitan, APRN-CNP, Last Rate: 87.7 mL/hr at 07/26/24 1346, 125 mg at 07/26/24 1346    calcitriol (Rocaltrol) capsule 0.25 mcg, 0.25 mcg, oral, Daily, Law Escobar MD, 0.25 mcg at 07/26/24 0906    calcium carbonate (Tums) chewable tablet 500 mg, 500 mg, oral, BID, Law Escobar MD, 500 mg at 07/26/24 0907    carvedilol (Coreg) tablet 25 mg, 25 mg, oral, BID, Law Escobar MD, 25 mg at 07/26/24 0907    cholecalciferol (Vitamin D-3) tablet 4,000 Units, 4,000 Units, oral, Daily, Law Escobar MD, 4,000 Units at 07/26/24 0906    clotrimazole " (Mycelex) samia 10 mg, 10 mg, oral, TID after meals, Law Escobar MD, 10 mg at 07/26/24 1205    gabapentin (Neurontin) capsule 300 mg, 300 mg, oral, Nightly, Law Escobar MD, 300 mg at 07/25/24 2012    heparin (porcine) injection 5,000 Units, 5,000 Units, subcutaneous, q8h, Law Escobar MD, 5,000 Units at 07/26/24 0612    hydrALAZINE (Apresoline) tablet 50 mg, 50 mg, oral, BID, Bernadette Rolon MD, 50 mg at 07/26/24 0906    levothyroxine (Synthroid, Levoxyl) tablet 25 mcg, 25 mcg, oral, Daily before breakfast, Law Escobar MD, 25 mcg at 07/26/24 0612    magnesium oxide (Mag-Ox) tablet 400 mg, 400 mg, oral, Daily, Law Escobar MD, 400 mg at 07/26/24 0907    mycophenolate (Cellcept) capsule 1,000 mg, 1,000 mg, oral, q12h, Law Escobar MD, 1,000 mg at 07/26/24 0907    ondansetron ODT (Zofran-ODT) disintegrating tablet 4 mg, 4 mg, oral, q8h PRN **OR** ondansetron (Zofran) injection 4 mg, 4 mg, intravenous, q8h PRN, Law Escobar MD    pantoprazole (ProtoNix) EC tablet 40 mg, 40 mg, oral, BID AC, Mariel Peck MD, 40 mg at 07/26/24 0700    polyethylene glycol (Glycolax, Miralax) packet 17 g, 17 g, oral, Daily, Law Escobar MD, 17 g at 07/26/24 0905    predniSONE (Deltasone) tablet 20 mg, 20 mg, oral, Daily, Law Escobar MD, 20 mg at 07/26/24 0906    prochlorperazine (Compazine) tablet 5 mg, 5 mg, oral, q6h PRN, Law Escobar MD, 5 mg at 07/26/24 0906    sennosides-docusate sodium (Rose-Colace) 8.6-50 mg per tablet 2 tablet, 2 tablet, oral, BID, Law Escobar MD, 2 tablet at 07/26/24 0907    sertraline (Zoloft) tablet 50 mg, 50 mg, oral, Daily, Law Escobar MD, 50 mg at 07/26/24 0905    sodium bicarbonate tablet 1,950 mg, 1,950 mg, oral, TID, Bernadette Rolon MD, 1,950 mg at 07/26/24 0906    sulfamethoxazole-trimethoprim (Bactrim) 400-80 mg per tablet 1 tablet, 1 tablet, oral, Daily, Law Escobar MD, 1 tablet at 07/26/24 0907    tacrolimus (Prograf)  capsule 3 mg, 3 mg, oral, q12h [facility], Nadine DAVID Hathaway, APRN-CNP, 3 mg at 07/26/24 0611    tenofovir alafenamide (Vemlidy) tablet 25 mg, 25 mg, oral, Daily, Law Escobar MD, 25 mg at 07/26/24 0906    torsemide (Demadex) tablet 50 mg, 50 mg, oral, Daily, Alice Gaitan, APRN-CNP, 50 mg at 07/26/24 1205    valGANciclovir (Valcyte) tablet 450 mg, 450 mg, oral, Every other day, Law Escobar MD, 450 mg at 07/25/24 0830     Assessment/Plan    DDKT 11/2023     Kidney allograft function  biopsy 7/5/24 -consistent with T-cell mediated rejection Banff 1B and acute vascular rejection Banff 2B and antibody mediated rejection              Initial therapy included:  Steroid bolus 500 x 3, completed                          Thymo 6 mg/kg - last dose 7/13                          Plex followed by IVIG x 4   7/17 Interval biopsy with minimal improvement    Readmitted with Rising creatinine, edema   Renal US within normal limits   Increased creatinine to 3.7 today    Decrease torsemide to once daily with concentrated albumin    Will administer 1.5 mg/kg of thymo given rising creatinine    May consider 1 additional dose pending creatinine trend.   Continue home tac, MMF and pred 20   Transitioned off belatacept.   UA positive.  But final culture negative.  Stop antibiotics         I spent 35 minutes in the professional and overall care of this patient.      Anita Louis MD

## 2024-07-26 NOTE — CARE PLAN
The patient's goals for the shift include   minimal shortness of breath    The clinical goals for the shift include Pt will remain injury free throughout shift, tolerate Thymo infusion.    Over the shift, the patient did not make progress toward the following goals. Barriers to progression include language barrier. Recommendations to address these barriers include Maartii usage.

## 2024-07-27 ENCOUNTER — APPOINTMENT (OUTPATIENT)
Dept: RADIOLOGY | Facility: HOSPITAL | Age: 44
DRG: 698 | End: 2024-07-27
Payer: COMMERCIAL

## 2024-07-27 LAB
ALBUMIN SERPL BCP-MCNC: 3.4 G/DL (ref 3.4–5)
ANION GAP SERPL CALC-SCNC: 15 MMOL/L (ref 10–20)
BUN SERPL-MCNC: 38 MG/DL (ref 6–23)
CA-I BLD-SCNC: 1.02 MMOL/L (ref 1.1–1.33)
CALCIUM SERPL-MCNC: 7.8 MG/DL (ref 8.6–10.6)
CHLORIDE SERPL-SCNC: 96 MMOL/L (ref 98–107)
CHLORIDE UR-SCNC: <15 MMOL/L
CHLORIDE/CREATININE (MMOL/G) IN URINE: NORMAL
CO2 SERPL-SCNC: 19 MMOL/L (ref 21–32)
CREAT SERPL-MCNC: 3.85 MG/DL (ref 0.5–1.05)
CREAT UR-MCNC: 75.8 MG/DL (ref 20–320)
CREAT UR-MCNC: 75.8 MG/DL (ref 20–320)
EGFRCR SERPLBLD CKD-EPI 2021: 14 ML/MIN/1.73M*2
ERYTHROCYTE [DISTWIDTH] IN BLOOD BY AUTOMATED COUNT: 18.3 % (ref 11.5–14.5)
GLUCOSE SERPL-MCNC: 94 MG/DL (ref 74–99)
HCT VFR BLD AUTO: 27.3 % (ref 36–46)
HGB BLD-MCNC: 8.9 G/DL (ref 12–16)
MAGNESIUM SERPL-MCNC: 1.93 MG/DL (ref 1.6–2.4)
MCH RBC QN AUTO: 31.9 PG (ref 26–34)
MCHC RBC AUTO-ENTMCNC: 32.6 G/DL (ref 32–36)
MCV RBC AUTO: 98 FL (ref 80–100)
NRBC BLD-RTO: 0 /100 WBCS (ref 0–0)
OSMOLALITY UR: 217 MOSM/KG (ref 200–1200)
PHOSPHATE SERPL-MCNC: 4.7 MG/DL (ref 2.5–4.9)
PLATELET # BLD AUTO: 157 X10*3/UL (ref 150–450)
POTASSIUM SERPL-SCNC: 4.6 MMOL/L (ref 3.5–5.3)
POTASSIUM UR-SCNC: 20 MMOL/L
POTASSIUM/CREAT UR-RTO: 26 MMOL/G CREAT
RBC # BLD AUTO: 2.79 X10*6/UL (ref 4–5.2)
SODIUM SERPL-SCNC: 125 MMOL/L (ref 136–145)
SODIUM UR-SCNC: 19 MMOL/L
SODIUM/CREAT UR-RTO: 25 MMOL/G CREAT
TACROLIMUS BLD-MCNC: 8.5 NG/ML
UREA/CREAT UR-SRTO: 4 G/G CREAT
UUN UR-MCNC: 302 MG/DL
WBC # BLD AUTO: 5.7 X10*3/UL (ref 4.4–11.3)

## 2024-07-27 PROCEDURE — 2500000004 HC RX 250 GENERAL PHARMACY W/ HCPCS (ALT 636 FOR OP/ED)

## 2024-07-27 PROCEDURE — 2500000001 HC RX 250 WO HCPCS SELF ADMINISTERED DRUGS (ALT 637 FOR MEDICARE OP): Performed by: STUDENT IN AN ORGANIZED HEALTH CARE EDUCATION/TRAINING PROGRAM

## 2024-07-27 PROCEDURE — 99231 SBSQ HOSP IP/OBS SF/LOW 25: CPT | Performed by: INTERNAL MEDICINE

## 2024-07-27 PROCEDURE — 2500000001 HC RX 250 WO HCPCS SELF ADMINISTERED DRUGS (ALT 637 FOR MEDICARE OP)

## 2024-07-27 PROCEDURE — 99232 SBSQ HOSP IP/OBS MODERATE 35: CPT | Performed by: TRANSPLANT SURGERY

## 2024-07-27 PROCEDURE — 93970 EXTREMITY STUDY: CPT

## 2024-07-27 PROCEDURE — 2500000002 HC RX 250 W HCPCS SELF ADMINISTERED DRUGS (ALT 637 FOR MEDICARE OP, ALT 636 FOR OP/ED): Performed by: STUDENT IN AN ORGANIZED HEALTH CARE EDUCATION/TRAINING PROGRAM

## 2024-07-27 PROCEDURE — 80197 ASSAY OF TACROLIMUS: CPT

## 2024-07-27 PROCEDURE — 36415 COLL VENOUS BLD VENIPUNCTURE: CPT

## 2024-07-27 PROCEDURE — 1200000002 HC GENERAL ROOM WITH TELEMETRY DAILY

## 2024-07-27 PROCEDURE — 82330 ASSAY OF CALCIUM: CPT

## 2024-07-27 PROCEDURE — 83735 ASSAY OF MAGNESIUM: CPT

## 2024-07-27 PROCEDURE — 82436 ASSAY OF URINE CHLORIDE: CPT

## 2024-07-27 PROCEDURE — 86832 HLA CLASS I HIGH DEFIN QUAL: CPT

## 2024-07-27 PROCEDURE — 2500000004 HC RX 250 GENERAL PHARMACY W/ HCPCS (ALT 636 FOR OP/ED): Performed by: STUDENT IN AN ORGANIZED HEALTH CARE EDUCATION/TRAINING PROGRAM

## 2024-07-27 PROCEDURE — 93971 EXTREMITY STUDY: CPT | Performed by: RADIOLOGY

## 2024-07-27 PROCEDURE — 84540 ASSAY OF URINE/UREA-N: CPT

## 2024-07-27 PROCEDURE — 83935 ASSAY OF URINE OSMOLALITY: CPT

## 2024-07-27 PROCEDURE — 80069 RENAL FUNCTION PANEL: CPT

## 2024-07-27 PROCEDURE — 85027 COMPLETE CBC AUTOMATED: CPT

## 2024-07-27 RX ORDER — CALCITRIOL 0.5 UG/1
0.5 CAPSULE ORAL DAILY
Status: DISCONTINUED | OUTPATIENT
Start: 2024-07-28 | End: 2024-07-31

## 2024-07-27 RX ORDER — BUMETANIDE 0.25 MG/ML
2 INJECTION, SOLUTION INTRAMUSCULAR; INTRAVENOUS ONCE
Status: COMPLETED | OUTPATIENT
Start: 2024-07-27 | End: 2024-07-27

## 2024-07-27 RX ADMIN — TENOFOVIR ALAFENAMIDE 25 MG: 25 TABLET ORAL at 09:44

## 2024-07-27 RX ADMIN — SENNOSIDES AND DOCUSATE SODIUM 2 TABLET: 50; 8.6 TABLET ORAL at 20:01

## 2024-07-27 RX ADMIN — SULFAMETHOXAZOLE AND TRIMETHOPRIM 1 TABLET: 400; 80 TABLET ORAL at 09:43

## 2024-07-27 RX ADMIN — Medication 4000 UNITS: at 09:57

## 2024-07-27 RX ADMIN — ACETAMINOPHEN 650 MG: 325 TABLET ORAL at 09:43

## 2024-07-27 RX ADMIN — CARVEDILOL 25 MG: 25 TABLET, FILM COATED ORAL at 20:01

## 2024-07-27 RX ADMIN — TORSEMIDE 50 MG: 20 TABLET ORAL at 09:47

## 2024-07-27 RX ADMIN — PREDNISONE 20 MG: 20 TABLET ORAL at 09:43

## 2024-07-27 RX ADMIN — MYCOPHENOLATE MOFETIL 1000 MG: 250 CAPSULE ORAL at 09:47

## 2024-07-27 RX ADMIN — MYCOPHENOLATE MOFETIL 1000 MG: 250 CAPSULE ORAL at 20:02

## 2024-07-27 RX ADMIN — CARVEDILOL 25 MG: 25 TABLET, FILM COATED ORAL at 09:57

## 2024-07-27 RX ADMIN — HEPARIN SODIUM 5000 UNITS: 5000 INJECTION INTRAVENOUS; SUBCUTANEOUS at 14:44

## 2024-07-27 RX ADMIN — CLOTRIMAZOLE 10 MG: 10 LOZENGE ORAL at 14:47

## 2024-07-27 RX ADMIN — PANTOPRAZOLE SODIUM 40 MG: 40 TABLET, DELAYED RELEASE ORAL at 16:37

## 2024-07-27 RX ADMIN — VALGANCICLOVIR 450 MG: 450 TABLET, FILM COATED ORAL at 09:43

## 2024-07-27 RX ADMIN — SODIUM BICARBONATE 650 MG TABLET 1950 MG: at 20:01

## 2024-07-27 RX ADMIN — GABAPENTIN 300 MG: 300 CAPSULE ORAL at 20:02

## 2024-07-27 RX ADMIN — CLOTRIMAZOLE 10 MG: 10 LOZENGE ORAL at 18:07

## 2024-07-27 RX ADMIN — PANTOPRAZOLE SODIUM 40 MG: 40 TABLET, DELAYED RELEASE ORAL at 06:28

## 2024-07-27 RX ADMIN — TACROLIMUS 3 MG: 1 CAPSULE ORAL at 06:25

## 2024-07-27 RX ADMIN — HEPARIN SODIUM 5000 UNITS: 5000 INJECTION INTRAVENOUS; SUBCUTANEOUS at 06:25

## 2024-07-27 RX ADMIN — SODIUM BICARBONATE 650 MG TABLET 1950 MG: at 14:44

## 2024-07-27 RX ADMIN — HYDRALAZINE HYDROCHLORIDE 50 MG: 25 TABLET ORAL at 09:43

## 2024-07-27 RX ADMIN — TACROLIMUS 3 MG: 1 CAPSULE ORAL at 18:08

## 2024-07-27 RX ADMIN — CALCITRIOL CAPSULES 0.25 MCG 0.25 MCG: 0.25 CAPSULE ORAL at 09:43

## 2024-07-27 RX ADMIN — CALCIUM CARBONATE (ANTACID) CHEW TAB 500 MG 500 MG: 500 CHEW TAB at 09:57

## 2024-07-27 RX ADMIN — LEVOTHYROXINE SODIUM 25 MCG: 0.05 TABLET ORAL at 06:21

## 2024-07-27 RX ADMIN — ACETAMINOPHEN 650 MG: 325 TABLET ORAL at 04:33

## 2024-07-27 RX ADMIN — CLOTRIMAZOLE 10 MG: 10 LOZENGE ORAL at 09:57

## 2024-07-27 RX ADMIN — HYDRALAZINE HYDROCHLORIDE 50 MG: 25 TABLET ORAL at 20:01

## 2024-07-27 RX ADMIN — MAGNESIUM OXIDE TAB 400 MG (241.3 MG ELEMENTAL MG) 400 MG: 400 (241.3 MG) TAB at 09:57

## 2024-07-27 RX ADMIN — SODIUM BICARBONATE 650 MG TABLET 1950 MG: at 09:43

## 2024-07-27 RX ADMIN — AMLODIPINE BESYLATE 10 MG: 10 TABLET ORAL at 09:57

## 2024-07-27 RX ADMIN — BUMETANIDE 2 MG: 0.25 INJECTION INTRAMUSCULAR; INTRAVENOUS at 11:29

## 2024-07-27 RX ADMIN — POLYETHYLENE GLYCOL 3350 17 G: 17 POWDER, FOR SOLUTION ORAL at 09:43

## 2024-07-27 RX ADMIN — ACETAMINOPHEN 650 MG: 325 TABLET ORAL at 20:02

## 2024-07-27 RX ADMIN — CALCIUM CARBONATE (ANTACID) CHEW TAB 500 MG 500 MG: 500 CHEW TAB at 21:00

## 2024-07-27 RX ADMIN — SENNOSIDES AND DOCUSATE SODIUM 2 TABLET: 50; 8.6 TABLET ORAL at 09:57

## 2024-07-27 RX ADMIN — SERTRALINE HYDROCHLORIDE 50 MG: 50 TABLET ORAL at 09:00

## 2024-07-27 ASSESSMENT — COGNITIVE AND FUNCTIONAL STATUS - GENERAL
MOBILITY SCORE: 24
DAILY ACTIVITIY SCORE: 24
DAILY ACTIVITIY SCORE: 24
MOBILITY SCORE: 24

## 2024-07-27 ASSESSMENT — PAIN DESCRIPTION - LOCATION
LOCATION: HEAD
LOCATION: HEAD

## 2024-07-27 ASSESSMENT — PAIN SCALES - GENERAL
PAINLEVEL_OUTOF10: 8
PAINLEVEL_OUTOF10: 8

## 2024-07-27 ASSESSMENT — PAIN SCALES - WONG BAKER: WONGBAKER_NUMERICALRESPONSE: HURTS EVEN MORE

## 2024-07-27 NOTE — CARE PLAN
Problem: Respiratory  Goal: Minimal/no exertional discomfort or dyspnea this shift  Outcome: Progressing  Flowsheets (Taken 7/23/2024 1723)  Minimal/no exertional discomfort or dyspnea this shift: Positioning to promote ventilation/comfort     Problem: Chronic Conditions and Co-morbidities  Goal: Patient's chronic conditions and co-morbidity symptoms are monitored and maintained or improved  Outcome: Progressing  Flowsheets (Taken 7/27/2024 1739)  Care Plan - Patient's Chronic Conditions and Co-Morbidity Symptoms are Monitored and Maintained or Improved: Monitor and assess patient's chronic conditions and comorbid symptoms for stability, deterioration, or improvement   The patient's goals for the shift include      The clinical goals for the shift include Decrease in shortness of breath    Over the shift, the patient did  make progress toward the following goals.

## 2024-07-27 NOTE — PROGRESS NOTES
"Herber Foy Rai is a 44 y.o. female now POD # 240 s/p DDKT transplant.    Subjective   BLE edema       Objective   Physical Exam  Gen: A+OX3; NAD  Abd: S/NT/ND. Incision C/D/I.  Ext: 2+ LE edema    Last Recorded Vitals  Blood pressure 158/88, pulse 74, temperature 36 °C (96.8 °F), resp. rate 18, height 1.626 m (5' 4\"), weight 88.2 kg (194 lb 7.1 oz), SpO2 96%.  Intake/Output last 3 Shifts:  I/O last 3 completed shifts:  In: 1750 (19.8 mL/kg) [P.O.:1200; IV Piggyback:550]  Out: 1841 (20.9 mL/kg) [Urine:1841 (0.6 mL/kg/hr)]  Weight: 88.2 kg      Lab Results   Component Value Date    CREATININE 3.73 (H) 07/26/2024    K 4.4 07/26/2024    GLUCOSE 72 (L) 07/26/2024    HGB 8.9 (L) 07/27/2024    WBC 5.7 07/27/2024     07/27/2024    CALCIUM 7.9 (L) 07/26/2024     Lab Results   Component Value Date    WBC 5.7 07/27/2024    HGB 8.9 (L) 07/27/2024    HCT 27.3 (L) 07/27/2024    MCV 98 07/27/2024     07/27/2024     Lab Results   Component Value Date    GLUCOSE 72 (L) 07/26/2024    CALCIUM 7.9 (L) 07/26/2024     (L) 07/26/2024    K 4.4 07/26/2024    CO2 18 (L) 07/26/2024    CL 99 07/26/2024    BUN 37 (H) 07/26/2024    CREATININE 3.73 (H) 07/26/2024           Current Facility-Administered Medications:     acetaminophen (Tylenol) tablet 650 mg, 650 mg, oral, q4h PRN, Law Escobar MD, 650 mg at 07/27/24 0433    albuterol 90 mcg/actuation inhaler 2 puff, 2 puff, inhalation, q4h PRN, Law Escobar MD    amLODIPine (Norvasc) tablet 10 mg, 10 mg, oral, Daily, Law Escobar MD, 10 mg at 07/26/24 0906    calcitriol (Rocaltrol) capsule 0.25 mcg, 0.25 mcg, oral, Daily, Law Escobar MD, 0.25 mcg at 07/26/24 0906    calcium carbonate (Tums) chewable tablet 500 mg, 500 mg, oral, BID, Law Escobar MD, 500 mg at 07/26/24 2010    carvedilol (Coreg) tablet 25 mg, 25 mg, oral, BID, Law Escobar MD, 25 mg at 07/26/24 2010    cholecalciferol (Vitamin D-3) tablet 4,000 Units, 4,000 Units, oral, Daily, " Law Escobar MD, 4,000 Units at 07/26/24 0906    clotrimazole (Mycelex) samia 10 mg, 10 mg, oral, TID after meals, Law Escobar MD, 10 mg at 07/26/24 1811    gabapentin (Neurontin) capsule 300 mg, 300 mg, oral, Nightly, Law Escobar MD, 300 mg at 07/26/24 2010    heparin (porcine) injection 5,000 Units, 5,000 Units, subcutaneous, q8h, Law Escobar MD, 5,000 Units at 07/27/24 0625    hydrALAZINE (Apresoline) tablet 50 mg, 50 mg, oral, BID, Bernadette Rolon MD, 50 mg at 07/26/24 2011    levothyroxine (Synthroid, Levoxyl) tablet 25 mcg, 25 mcg, oral, Daily before breakfast, Law Escobar MD, 25 mcg at 07/27/24 0621    magnesium oxide (Mag-Ox) tablet 400 mg, 400 mg, oral, Daily, Law Escobar MD, 400 mg at 07/26/24 0907    mycophenolate (Cellcept) capsule 1,000 mg, 1,000 mg, oral, q12h, Law Escobar MD, 1,000 mg at 07/26/24 2010    ondansetron ODT (Zofran-ODT) disintegrating tablet 4 mg, 4 mg, oral, q8h PRN **OR** ondansetron (Zofran) injection 4 mg, 4 mg, intravenous, q8h PRN, Law Escobar MD    pantoprazole (ProtoNix) EC tablet 40 mg, 40 mg, oral, BID AC, Mariel Peck MD, 40 mg at 07/27/24 0628    polyethylene glycol (Glycolax, Miralax) packet 17 g, 17 g, oral, Daily, Law Escobar MD, 17 g at 07/26/24 0905    predniSONE (Deltasone) tablet 20 mg, 20 mg, oral, Daily, Law Escobar MD, 20 mg at 07/26/24 0906    prochlorperazine (Compazine) tablet 5 mg, 5 mg, oral, q6h PRN, Law Escobar MD, 5 mg at 07/26/24 0906    sennosides-docusate sodium (Rose-Colace) 8.6-50 mg per tablet 2 tablet, 2 tablet, oral, BID, Law Escobar MD, 2 tablet at 07/26/24 2010    sertraline (Zoloft) tablet 50 mg, 50 mg, oral, Daily, Law Escobar MD, 50 mg at 07/26/24 0905    sodium bicarbonate tablet 1,950 mg, 1,950 mg, oral, TID, Bernadette Rolon MD, 1,950 mg at 07/26/24 2010    sulfamethoxazole-trimethoprim (Bactrim) 400-80 mg per tablet 1 tablet, 1 tablet, oral, Daily,  Law Escobar MD, 1 tablet at 07/26/24 0907    tacrolimus (Prograf) capsule 3 mg, 3 mg, oral, q12h JANIE, Nadine Hathaway, APRN-CNP, 3 mg at 07/27/24 0625    tenofovir alafenamide (Vemlidy) tablet 25 mg, 25 mg, oral, Daily, Law Escobar MD, 25 mg at 07/26/24 0906    torsemide (Demadex) tablet 50 mg, 50 mg, oral, Daily, Alice Gaitan, JELLY-CNP, 50 mg at 07/26/24 1205    valGANciclovir (Valcyte) tablet 450 mg, 450 mg, oral, Every other day, Law Escobar MD, 450 mg at 07/25/24 0830     Assessment/Plan    DDKT 11/2023     Kidney allograft function  biopsy 7/5/24 -consistent with T-cell mediated rejection Banff 1B and acute vascular rejection Banff 2B and antibody mediated rejection              Initial therapy included:  Steroid bolus 500 x 3, completed                          Thymo 6 mg/kg - last dose 7/13                          Plex followed by IVIG x 4   7/17 Interval biopsy with minimal improvement    Readmitted with Rising creatinine, edema   Renal US within normal limits   Creatinine pending today    Decrease torsemide to once daily with concentrated albumin    S/p administer 1.5 mg/kg of thymo 7/26   Continue home tac 3/3, MMF 1000/1000 and pred 20   Transitioned off belatacept.         I spent 35 minutes in the professional and overall care of this patient.      Don Lopez MD

## 2024-07-28 VITALS
DIASTOLIC BLOOD PRESSURE: 83 MMHG | RESPIRATION RATE: 18 BRPM | TEMPERATURE: 98.4 F | HEART RATE: 79 BPM | HEIGHT: 64 IN | BODY MASS INDEX: 33.2 KG/M2 | WEIGHT: 194.45 LBS | SYSTOLIC BLOOD PRESSURE: 156 MMHG | OXYGEN SATURATION: 97 %

## 2024-07-28 LAB
ALBUMIN SERPL BCP-MCNC: 3.2 G/DL (ref 3.4–5)
ANION GAP SERPL CALC-SCNC: 14 MMOL/L (ref 10–20)
BUN SERPL-MCNC: 39 MG/DL (ref 6–23)
CA-I BLD-SCNC: 0.97 MMOL/L (ref 1.1–1.33)
CALCIUM SERPL-MCNC: 7.3 MG/DL (ref 8.6–10.6)
CHLORIDE SERPL-SCNC: 93 MMOL/L (ref 98–107)
CO2 SERPL-SCNC: 19 MMOL/L (ref 21–32)
CREAT SERPL-MCNC: 3.76 MG/DL (ref 0.5–1.05)
EGFRCR SERPLBLD CKD-EPI 2021: 14 ML/MIN/1.73M*2
ERYTHROCYTE [DISTWIDTH] IN BLOOD BY AUTOMATED COUNT: 17.5 % (ref 11.5–14.5)
GLUCOSE BLD MANUAL STRIP-MCNC: 86 MG/DL (ref 74–99)
GLUCOSE SERPL-MCNC: 78 MG/DL (ref 74–99)
HCT VFR BLD AUTO: 27.6 % (ref 36–46)
HGB BLD-MCNC: 8.8 G/DL (ref 12–16)
MAGNESIUM SERPL-MCNC: 2 MG/DL (ref 1.6–2.4)
MCH RBC QN AUTO: 31.8 PG (ref 26–34)
MCHC RBC AUTO-ENTMCNC: 31.9 G/DL (ref 32–36)
MCV RBC AUTO: 100 FL (ref 80–100)
NRBC BLD-RTO: 0 /100 WBCS (ref 0–0)
OSMOLALITY SERPL: 265 MOSM/KG (ref 280–300)
PHOSPHATE SERPL-MCNC: 4.5 MG/DL (ref 2.5–4.9)
PLATELET # BLD AUTO: 135 X10*3/UL (ref 150–450)
POTASSIUM SERPL-SCNC: 4.2 MMOL/L (ref 3.5–5.3)
RBC # BLD AUTO: 2.77 X10*6/UL (ref 4–5.2)
SODIUM SERPL-SCNC: 122 MMOL/L (ref 136–145)
TACROLIMUS BLD-MCNC: 8.9 NG/ML
WBC # BLD AUTO: 2.9 X10*3/UL (ref 4.4–11.3)

## 2024-07-28 PROCEDURE — 2500000002 HC RX 250 W HCPCS SELF ADMINISTERED DRUGS (ALT 637 FOR MEDICARE OP, ALT 636 FOR OP/ED): Performed by: STUDENT IN AN ORGANIZED HEALTH CARE EDUCATION/TRAINING PROGRAM

## 2024-07-28 PROCEDURE — 2500000001 HC RX 250 WO HCPCS SELF ADMINISTERED DRUGS (ALT 637 FOR MEDICARE OP)

## 2024-07-28 PROCEDURE — 2500000004 HC RX 250 GENERAL PHARMACY W/ HCPCS (ALT 636 FOR OP/ED): Performed by: STUDENT IN AN ORGANIZED HEALTH CARE EDUCATION/TRAINING PROGRAM

## 2024-07-28 PROCEDURE — 1200000002 HC GENERAL ROOM WITH TELEMETRY DAILY

## 2024-07-28 PROCEDURE — 80069 RENAL FUNCTION PANEL: CPT

## 2024-07-28 PROCEDURE — 2500000001 HC RX 250 WO HCPCS SELF ADMINISTERED DRUGS (ALT 637 FOR MEDICARE OP): Performed by: STUDENT IN AN ORGANIZED HEALTH CARE EDUCATION/TRAINING PROGRAM

## 2024-07-28 PROCEDURE — 83735 ASSAY OF MAGNESIUM: CPT

## 2024-07-28 PROCEDURE — P9047 ALBUMIN (HUMAN), 25%, 50ML: HCPCS | Mod: JZ

## 2024-07-28 PROCEDURE — 2500000004 HC RX 250 GENERAL PHARMACY W/ HCPCS (ALT 636 FOR OP/ED)

## 2024-07-28 PROCEDURE — 82330 ASSAY OF CALCIUM: CPT

## 2024-07-28 PROCEDURE — 80197 ASSAY OF TACROLIMUS: CPT

## 2024-07-28 PROCEDURE — 82947 ASSAY GLUCOSE BLOOD QUANT: CPT

## 2024-07-28 PROCEDURE — 85027 COMPLETE CBC AUTOMATED: CPT

## 2024-07-28 PROCEDURE — 99231 SBSQ HOSP IP/OBS SF/LOW 25: CPT | Performed by: INTERNAL MEDICINE

## 2024-07-28 PROCEDURE — 83930 ASSAY OF BLOOD OSMOLALITY: CPT

## 2024-07-28 PROCEDURE — 99232 SBSQ HOSP IP/OBS MODERATE 35: CPT | Performed by: TRANSPLANT SURGERY

## 2024-07-28 PROCEDURE — 36415 COLL VENOUS BLD VENIPUNCTURE: CPT

## 2024-07-28 PROCEDURE — 2500000004 HC RX 250 GENERAL PHARMACY W/ HCPCS (ALT 636 FOR OP/ED): Mod: JZ

## 2024-07-28 RX ORDER — PROCHLORPERAZINE MALEATE 5 MG
5 TABLET ORAL ONCE
Status: DISCONTINUED | OUTPATIENT
Start: 2024-07-28 | End: 2024-07-31

## 2024-07-28 RX ORDER — ALBUMIN HUMAN 250 G/1000ML
12.5 SOLUTION INTRAVENOUS ONCE
Status: COMPLETED | OUTPATIENT
Start: 2024-07-28 | End: 2024-07-28

## 2024-07-28 RX ORDER — BUMETANIDE 0.25 MG/ML
2 INJECTION, SOLUTION INTRAMUSCULAR; INTRAVENOUS ONCE
Status: COMPLETED | OUTPATIENT
Start: 2024-07-28 | End: 2024-07-28

## 2024-07-28 RX ORDER — DOCUSATE SODIUM 100 MG/1
100 CAPSULE, LIQUID FILLED ORAL 2 TIMES DAILY
Status: DISCONTINUED | OUTPATIENT
Start: 2024-07-28 | End: 2024-07-28

## 2024-07-28 RX ADMIN — PANTOPRAZOLE SODIUM 40 MG: 40 TABLET, DELAYED RELEASE ORAL at 07:08

## 2024-07-28 RX ADMIN — LEVOTHYROXINE SODIUM 25 MCG: 0.05 TABLET ORAL at 07:07

## 2024-07-28 RX ADMIN — MYCOPHENOLATE MOFETIL 1000 MG: 250 CAPSULE ORAL at 21:18

## 2024-07-28 RX ADMIN — TORSEMIDE 50 MG: 20 TABLET ORAL at 09:25

## 2024-07-28 RX ADMIN — TACROLIMUS 3 MG: 1 CAPSULE ORAL at 18:42

## 2024-07-28 RX ADMIN — CLOTRIMAZOLE 10 MG: 10 LOZENGE ORAL at 13:18

## 2024-07-28 RX ADMIN — HEPARIN SODIUM 5000 UNITS: 5000 INJECTION INTRAVENOUS; SUBCUTANEOUS at 07:09

## 2024-07-28 RX ADMIN — MYCOPHENOLATE MOFETIL 1000 MG: 250 CAPSULE ORAL at 09:24

## 2024-07-28 RX ADMIN — HEPARIN SODIUM 5000 UNITS: 5000 INJECTION INTRAVENOUS; SUBCUTANEOUS at 14:43

## 2024-07-28 RX ADMIN — Medication 4000 UNITS: at 09:24

## 2024-07-28 RX ADMIN — CALCIUM CARBONATE (ANTACID) CHEW TAB 500 MG 500 MG: 500 CHEW TAB at 21:00

## 2024-07-28 RX ADMIN — ALBUMIN HUMAN 12.5 G: 0.25 SOLUTION INTRAVENOUS at 11:05

## 2024-07-28 RX ADMIN — HYDRALAZINE HYDROCHLORIDE 50 MG: 25 TABLET ORAL at 21:19

## 2024-07-28 RX ADMIN — SODIUM BICARBONATE 650 MG TABLET 1950 MG: at 21:18

## 2024-07-28 RX ADMIN — PREDNISONE 20 MG: 20 TABLET ORAL at 09:23

## 2024-07-28 RX ADMIN — CARVEDILOL 25 MG: 25 TABLET, FILM COATED ORAL at 21:19

## 2024-07-28 RX ADMIN — AMLODIPINE BESYLATE 10 MG: 10 TABLET ORAL at 09:24

## 2024-07-28 RX ADMIN — SERTRALINE HYDROCHLORIDE 50 MG: 50 TABLET ORAL at 09:25

## 2024-07-28 RX ADMIN — SODIUM BICARBONATE 650 MG TABLET 1950 MG: at 14:43

## 2024-07-28 RX ADMIN — HYDRALAZINE HYDROCHLORIDE 50 MG: 25 TABLET ORAL at 09:23

## 2024-07-28 RX ADMIN — MAGNESIUM OXIDE TAB 400 MG (241.3 MG ELEMENTAL MG) 400 MG: 400 (241.3 MG) TAB at 09:23

## 2024-07-28 RX ADMIN — CLOTRIMAZOLE 10 MG: 10 LOZENGE ORAL at 09:25

## 2024-07-28 RX ADMIN — ACETAMINOPHEN 650 MG: 325 TABLET ORAL at 21:18

## 2024-07-28 RX ADMIN — GABAPENTIN 300 MG: 300 CAPSULE ORAL at 21:18

## 2024-07-28 RX ADMIN — PANTOPRAZOLE SODIUM 40 MG: 40 TABLET, DELAYED RELEASE ORAL at 16:32

## 2024-07-28 RX ADMIN — SODIUM BICARBONATE 650 MG TABLET 1950 MG: at 09:23

## 2024-07-28 RX ADMIN — BUMETANIDE 2 MG: 0.25 INJECTION INTRAMUSCULAR; INTRAVENOUS at 12:08

## 2024-07-28 RX ADMIN — CALCIUM CARBONATE (ANTACID) CHEW TAB 500 MG 500 MG: 500 CHEW TAB at 09:23

## 2024-07-28 RX ADMIN — TENOFOVIR ALAFENAMIDE 25 MG: 25 TABLET ORAL at 09:25

## 2024-07-28 RX ADMIN — HEPARIN SODIUM 5000 UNITS: 5000 INJECTION INTRAVENOUS; SUBCUTANEOUS at 21:19

## 2024-07-28 RX ADMIN — CARVEDILOL 25 MG: 25 TABLET, FILM COATED ORAL at 09:24

## 2024-07-28 RX ADMIN — TACROLIMUS 3 MG: 1 CAPSULE ORAL at 07:07

## 2024-07-28 RX ADMIN — SENNOSIDES AND DOCUSATE SODIUM 2 TABLET: 50; 8.6 TABLET ORAL at 21:18

## 2024-07-28 RX ADMIN — SULFAMETHOXAZOLE AND TRIMETHOPRIM 1 TABLET: 400; 80 TABLET ORAL at 09:24

## 2024-07-28 RX ADMIN — CALCITRIOL 0.5 MCG: 0.5 CAPSULE ORAL at 09:25

## 2024-07-28 RX ADMIN — CLOTRIMAZOLE 10 MG: 10 LOZENGE ORAL at 18:42

## 2024-07-28 ASSESSMENT — PAIN SCALES - GENERAL
PAINLEVEL_OUTOF10: 2
PAINLEVEL_OUTOF10: 4
PAINLEVEL_OUTOF10: 3
PAINLEVEL_OUTOF10: 5 - MODERATE PAIN
PAINLEVEL_OUTOF10: 5 - MODERATE PAIN

## 2024-07-28 ASSESSMENT — COGNITIVE AND FUNCTIONAL STATUS - GENERAL
DAILY ACTIVITIY SCORE: 24
MOBILITY SCORE: 24

## 2024-07-28 ASSESSMENT — PAIN DESCRIPTION - LOCATION: LOCATION: HEAD

## 2024-07-28 ASSESSMENT — PAIN - FUNCTIONAL ASSESSMENT
PAIN_FUNCTIONAL_ASSESSMENT: 0-10
PAIN_FUNCTIONAL_ASSESSMENT: 0-10

## 2024-07-28 NOTE — PROGRESS NOTES
"Herber Foy Rai is a 44 y.o. female on day 5 of admission presenting with Bilateral lower extremity edema.    C/o shortness of breath with mild activity. Headaches respond to Tylenol.    S/p Thymo yest. Cr 3.8 today.     Scheduled medications  amLODIPine, 10 mg, oral, Daily  [START ON 7/28/2024] calcitriol, 0.5 mcg, oral, Daily  calcium carbonate, 500 mg, oral, BID  carvedilol, 25 mg, oral, BID  cholecalciferol, 4,000 Units, oral, Daily  clotrimazole, 10 mg, oral, TID after meals  gabapentin, 300 mg, oral, Nightly  heparin (porcine), 5,000 Units, subcutaneous, q8h  hydrALAZINE, 50 mg, oral, BID  levothyroxine, 25 mcg, oral, Daily before breakfast  magnesium oxide, 400 mg, oral, Daily  mycophenolate, 1,000 mg, oral, q12h  pantoprazole, 40 mg, oral, BID AC  polyethylene glycol, 17 g, oral, Daily  predniSONE, 20 mg, oral, Daily  sennosides-docusate sodium, 2 tablet, oral, BID  sertraline, 50 mg, oral, Daily  sodium bicarbonate, 1,950 mg, oral, TID  sulfamethoxazole-trimethoprim, 1 tablet, oral, Daily  tacrolimus, 3 mg, oral, q12h JANIE  tenofovir alafenamide, 25 mg, oral, Daily  torsemide, 50 mg, oral, Daily  valGANciclovir, 450 mg, oral, Every other day      Continuous medications     PRN medications  PRN medications: acetaminophen, albuterol, ondansetron ODT **OR** ondansetron, prochlorperazine    Last Recorded Vitals  Blood pressure 127/74, pulse 77, temperature 36.4 °C (97.5 °F), temperature source Temporal, resp. rate 18, height 1.626 m (5' 4\"), weight 88.2 kg (194 lb 7.1 oz), SpO2 93%.  Intake/Output last 3 Shifts:  I/O last 3 completed shifts:  In: 1770 (20.1 mL/kg) [P.O.:1220; IV Piggyback:550]  Out: 1661 (18.8 mL/kg) [Urine:1661 (0.5 mL/kg/hr)]  Weight: 88.2 kg     A&ox3, no distress, pleasant  MMM, no lesions  Lungs with scattered bibasilar crackles  Rrr, no m/r/g  Abd soft, nt, nd  No allograft tenderness  2+ LE edema b/l    Results for orders placed or performed during the hospital encounter of 07/22/24 (from " the past 24 hour(s))   CBC   Result Value Ref Range    WBC 5.7 4.4 - 11.3 x10*3/uL    nRBC 0.0 0.0 - 0.0 /100 WBCs    RBC 2.79 (L) 4.00 - 5.20 x10*6/uL    Hemoglobin 8.9 (L) 12.0 - 16.0 g/dL    Hematocrit 27.3 (L) 36.0 - 46.0 %    MCV 98 80 - 100 fL    MCH 31.9 26.0 - 34.0 pg    MCHC 32.6 32.0 - 36.0 g/dL    RDW 18.3 (H) 11.5 - 14.5 %    Platelets 157 150 - 450 x10*3/uL   Renal Function Panel   Result Value Ref Range    Glucose 94 74 - 99 mg/dL    Sodium 125 (L) 136 - 145 mmol/L    Potassium 4.6 3.5 - 5.3 mmol/L    Chloride 96 (L) 98 - 107 mmol/L    Bicarbonate 19 (L) 21 - 32 mmol/L    Anion Gap 15 10 - 20 mmol/L    Urea Nitrogen 38 (H) 6 - 23 mg/dL    Creatinine 3.85 (H) 0.50 - 1.05 mg/dL    eGFR 14 (L) >60 mL/min/1.73m*2    Calcium 7.8 (L) 8.6 - 10.6 mg/dL    Phosphorus 4.7 2.5 - 4.9 mg/dL    Albumin 3.4 3.4 - 5.0 g/dL   Magnesium   Result Value Ref Range    Magnesium 1.93 1.60 - 2.40 mg/dL   Tacrolimus   Result Value Ref Range    Tacrolimus  8.5 <=15.0 ng/mL   Calcium, Ionized   Result Value Ref Range    POCT Calcium, Ionized 1.02 (L) 1.1 - 1.33 mmol/L   Urine electrolytes   Result Value Ref Range    Sodium, Urine Random 19 mmol/L    Sodium/Creatinine Ratio 25 Not established. mmol/g Creat    Potassium, Urine Random 20 mmol/L    Potassium/Creatinine Ratio 26 Not established mmol/g Creat    Chloride, Urine Random <15 mmol/L    Chloride/Creatinine Ratio      Creatinine, Urine Random 75.8 20.0 - 320.0 mg/dL   Urea Nitrogen, Urine Random   Result Value Ref Range    Urea Nitrogen, Urine Random 302 mg/dL    Creatinine, Urine Random 75.8 20.0 - 320.0 mg/dL    Urea Nitrogen/Creatinine Ratio 4.0 Not established. g/g creat   Osmolality, urine   Result Value Ref Range    Osmolality, Urine Random 217 200 - 1,200 mOsm/kg     DSA:  7/3/24   DQ2          10,158   7/13/24 DQ2          1,884                Cw10 (C*03:04)       1,222       Assessment/Plan   Principal Problem:    Bilateral lower extremity edema  Active Problems:     Hyponatremia    Urinary tract infection    44 y.o. [place] F with ESRD secondary to HTN/NSAID who is s/p DDKT on 11/30/23 (Dr. Marbin Lambert & Dr. Louis, KDPI 56%, PRA 48%. HCV -/-, CMV D+/R+, EBV D+/R+). Pt received a total of 4.5 mg/kg thymoglobulin induction therapy in conjunction with solumedrol, was initiated on standard triple immunosuppression therapy (Tacrolimus, Mycophenolate, prednisone).  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      Post-op course was complicated by return to OR for exploration of transplanted kidney and washout of hematoma. Hospital course was complicated by post-operative pain and constipation, H/o DGF with eventual recovery in renal function.      5/29/24 Direct admission for fever, nausea, vomiting and abdominal pain (chronic).  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 ID consulted. Pt remains on Valcyte treatment dose.     Serum Cr ~0.9 till 6/13/24, started trending up gradually since then to peak 3.38 (7/7/24) warranting admission for MARK, hypervolemia. Kidney bx 7/5/24 with acute T-cell IB, acute vascular rejection IIB, and active ABMR. DSA against DQ2 (10,158). Pt is s/p Pulse steroids, Thymo 6mg/kg, PLEX x4. Cr trended to ~2.1-2.3.      Rpt bx 7/17/24 with persistent ACR 1A, AVR IIA, ABMR although the degree of tubulitis and c4d positivity appear improved from prior bx.      Currently readmitted with worsening Cr (3.28), hypervolemia.      Allograft function: s/p DDKT 11/30/23  - baseline Cr 0.8-0.9, last stable 6/13/24.   - recent h/o ACR IIB and ABMR s/p treatment (as noted above).   - admitted with Cr worsening again to 3.28. rpt bx (7/17/24, prior to admission) with persistent rejection but improved inflammation. Was on empiric abx for possible UTI. Ucx with multiple organisms. rpt cx 7/25 with no growth, abx stopped.   - pt is s/p Thymo 1.5 mg/kg 7/26/24 (total 7.5 mg/kg)  -  Serum Cr remains elevated 3.8.  Last Up/Uc 500 mg/g 7/7/24, stable  - Hypervolemic on exam. Bps acceptable. On torsemide 50mg/d, will give additional 2mg Bumex today. titrate as indicated.   - corrected Ca low, phos acceptable. PTH 18 6/2024. on calcitriol to 0.5 mcg daily, cont Ca supplements.  - Hb 8.9, trending down. Will start Aranesp 60 mcg weekly.   - avoid potential nephrotoxins. Dose meds for CrCl.     Immunosuppression:  - On tac 3mg q12. Monitor daily Fk trough. Goal 8-10.   - Cont MMF 1000mg q12, pred taper per protocol  - Ppx: Bactrim, Mycelex troches, Valcyte per protocol. On Vemlidy     Will follow    Carlitos Stanford MD

## 2024-07-28 NOTE — CARE PLAN
The patient's goals for the shift include  pain control.     The clinical goals for the shift include patient will have a decrease in head pain throughout this shift.    Over the shift, the patient did make progress towards her goals, had decreased head pain throughout the shift, not managed with thee prescribed pain regime.

## 2024-07-28 NOTE — PROGRESS NOTES
"Herber Foy Rai is a 44 y.o. female on day 5 of admission presenting with Bilateral lower extremity edema.    C/o headaches. Cr worse to 3.7.    Scheduled medications  amLODIPine, 10 mg, oral, Daily  [START ON 7/28/2024] calcitriol, 0.5 mcg, oral, Daily  calcium carbonate, 500 mg, oral, BID  carvedilol, 25 mg, oral, BID  cholecalciferol, 4,000 Units, oral, Daily  clotrimazole, 10 mg, oral, TID after meals  gabapentin, 300 mg, oral, Nightly  heparin (porcine), 5,000 Units, subcutaneous, q8h  hydrALAZINE, 50 mg, oral, BID  levothyroxine, 25 mcg, oral, Daily before breakfast  magnesium oxide, 400 mg, oral, Daily  mycophenolate, 1,000 mg, oral, q12h  pantoprazole, 40 mg, oral, BID AC  polyethylene glycol, 17 g, oral, Daily  predniSONE, 20 mg, oral, Daily  sennosides-docusate sodium, 2 tablet, oral, BID  sertraline, 50 mg, oral, Daily  sodium bicarbonate, 1,950 mg, oral, TID  sulfamethoxazole-trimethoprim, 1 tablet, oral, Daily  tacrolimus, 3 mg, oral, q12h JANIE  tenofovir alafenamide, 25 mg, oral, Daily  torsemide, 50 mg, oral, Daily  valGANciclovir, 450 mg, oral, Every other day      Continuous medications     PRN medications  PRN medications: acetaminophen, albuterol, ondansetron ODT **OR** ondansetron, prochlorperazine    Last Recorded Vitals  Blood pressure 127/74, pulse 77, temperature 36.4 °C (97.5 °F), temperature source Temporal, resp. rate 18, height 1.626 m (5' 4\"), weight 88.2 kg (194 lb 7.1 oz), SpO2 93%.  Intake/Output last 3 Shifts:  I/O last 3 completed shifts:  In: 1770 (20.1 mL/kg) [P.O.:1220; IV Piggyback:550]  Out: 1661 (18.8 mL/kg) [Urine:1661 (0.5 mL/kg/hr)]  Weight: 88.2 kg     A&ox3, no distress, pleasant  MMM, no lesions  Lungs with scattered bibasilar crackles  Rrr, no m/r/g  Abd soft, nt, nd  No allograft tenderness  2+ LE edema b/l    Results for orders placed or performed during the hospital encounter of 07/22/24 (from the past 24 hour(s))   CBC   Result Value Ref Range    WBC 5.7 4.4 - 11.3 " x10*3/uL    nRBC 0.0 0.0 - 0.0 /100 WBCs    RBC 2.79 (L) 4.00 - 5.20 x10*6/uL    Hemoglobin 8.9 (L) 12.0 - 16.0 g/dL    Hematocrit 27.3 (L) 36.0 - 46.0 %    MCV 98 80 - 100 fL    MCH 31.9 26.0 - 34.0 pg    MCHC 32.6 32.0 - 36.0 g/dL    RDW 18.3 (H) 11.5 - 14.5 %    Platelets 157 150 - 450 x10*3/uL   Renal Function Panel   Result Value Ref Range    Glucose 94 74 - 99 mg/dL    Sodium 125 (L) 136 - 145 mmol/L    Potassium 4.6 3.5 - 5.3 mmol/L    Chloride 96 (L) 98 - 107 mmol/L    Bicarbonate 19 (L) 21 - 32 mmol/L    Anion Gap 15 10 - 20 mmol/L    Urea Nitrogen 38 (H) 6 - 23 mg/dL    Creatinine 3.85 (H) 0.50 - 1.05 mg/dL    eGFR 14 (L) >60 mL/min/1.73m*2    Calcium 7.8 (L) 8.6 - 10.6 mg/dL    Phosphorus 4.7 2.5 - 4.9 mg/dL    Albumin 3.4 3.4 - 5.0 g/dL   Magnesium   Result Value Ref Range    Magnesium 1.93 1.60 - 2.40 mg/dL   Tacrolimus   Result Value Ref Range    Tacrolimus  8.5 <=15.0 ng/mL   Calcium, Ionized   Result Value Ref Range    POCT Calcium, Ionized 1.02 (L) 1.1 - 1.33 mmol/L   Urine electrolytes   Result Value Ref Range    Sodium, Urine Random 19 mmol/L    Sodium/Creatinine Ratio 25 Not established. mmol/g Creat    Potassium, Urine Random 20 mmol/L    Potassium/Creatinine Ratio 26 Not established mmol/g Creat    Chloride, Urine Random <15 mmol/L    Chloride/Creatinine Ratio      Creatinine, Urine Random 75.8 20.0 - 320.0 mg/dL   Urea Nitrogen, Urine Random   Result Value Ref Range    Urea Nitrogen, Urine Random 302 mg/dL    Creatinine, Urine Random 75.8 20.0 - 320.0 mg/dL    Urea Nitrogen/Creatinine Ratio 4.0 Not established. g/g creat   Osmolality, urine   Result Value Ref Range    Osmolality, Urine Random 217 200 - 1,200 mOsm/kg         Assessment/Plan   Principal Problem:    Bilateral lower extremity edema  Active Problems:    Hyponatremia    Urinary tract infection    44 y.o. [place] F with ESRD secondary to HTN/NSAID who is s/p DDKT on 11/30/23 (Dr. Marbin Lambert & Dr. Louis, KDPI 56%, PRA 48%. HCV -/-,  CMV D+/R+, EBV D+/R+). Pt received a total of 4.5 mg/kg thymoglobulin induction therapy in conjunction with solumedrol, was initiated on standard triple immunosuppression therapy (Tacrolimus, Mycophenolate, prednisone).  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      Post-op course was complicated by return to OR for exploration of transplanted kidney and washout of hematoma. Hospital course was complicated by post-operative pain and constipation, H/o DGF with eventual recovery in renal function.      5/29/24 Direct admission for fever, nausea, vomiting and abdominal pain (chronic).  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 ID consulted. Pt remains on Valcyte treatment dose.     Serum Cr ~0.9 till 6/13/24, started trending up gradually since then to peak 3.38 (7/7/24) warranting admission for MARK, hypervolemia. Kidney bx 7/5/24 with acute T-cell IB, acute vascular rejection IIB, and active ABMR.   Pt is s/p Pulse steroids, Thymo 6mg/kg, PLEX x4. Cr trended to ~2.1-2.3.      Rpt bx 7/17/24 with persistent ACR 1A, AVR IIA, ABMR although the degree of tubulitis and c4d positivity appear improved from prior bx.      Currently readmitted with worsening Cr (3.28), hypervolemia.      Allograft function: s/p DDKT 11/30/23  - baseline Cr 0.8-0.9, last stable 6/13/24.   - recent h/o ACR IIB and ABMR s/p treatment (as noted above).   - admitted with Cr worsening again to 3.28. rpt bx with persistent rejection but improved inflammation. Also with possible UTI, on empiric abx. Ucx with multiple organisms. rpt cx 7/25 with no growth, abx stopped.   - Serum Cr worse to 3.7. will plan for Thymo 1.5 mg/kg today (total 7.5mg/kg so far) and monitor.   - Last Up/Uc 500 mg/g 7/7/24, stable  - Hypervolemic on exam. Bps above goal. On torsemide 50mg/d, titrate as indicated.   - corrected Ca low, phos acceptable. PTH 18 6/2024. Incr calcitriol to  0.5 mcg daily, cont Ca supplements.  - Hb 8.9, trending down. Will start Aranesp 60 mcg weekly.   - avoid potential nephrotoxins. Dose meds for CrCl.     Immunosuppression:  - On tac 3mg q12. Monitor daily Fk trough. Goal 8-10.   - Cont MMF 1000mg q12, pred taper per protocol  - Ppx: Bactrim, Mycelex troches, Valcyte per protocol     Will follow    Carlitos Stanford MD

## 2024-07-28 NOTE — PROGRESS NOTES
"Herber Foy Rai is a 44 y.o. female on day 6 of admission presenting with Bilateral lower extremity edema.    C/o persistent shortness of breath. Headaches improving.   Cr 3.7 today.       Scheduled medications  amLODIPine, 10 mg, oral, Daily  calcitriol, 0.5 mcg, oral, Daily  calcium carbonate, 500 mg, oral, BID  carvedilol, 25 mg, oral, BID  cholecalciferol, 4,000 Units, oral, Daily  clotrimazole, 10 mg, oral, TID after meals  gabapentin, 300 mg, oral, Nightly  heparin (porcine), 5,000 Units, subcutaneous, q8h  hydrALAZINE, 50 mg, oral, BID  levothyroxine, 25 mcg, oral, Daily before breakfast  magnesium oxide, 400 mg, oral, Daily  mycophenolate, 1,000 mg, oral, q12h  pantoprazole, 40 mg, oral, BID AC  polyethylene glycol, 17 g, oral, Daily  predniSONE, 20 mg, oral, Daily  prochlorperazine, 5 mg, oral, Once  sennosides-docusate sodium, 2 tablet, oral, BID  sertraline, 50 mg, oral, Daily  sodium bicarbonate, 1,950 mg, oral, TID  sulfamethoxazole-trimethoprim, 1 tablet, oral, Daily  tacrolimus, 3 mg, oral, q12h JANIE  tenofovir alafenamide, 25 mg, oral, Daily  torsemide, 50 mg, oral, Daily  valGANciclovir, 450 mg, oral, Every other day      Continuous medications     PRN medications  PRN medications: acetaminophen, albuterol, ondansetron ODT **OR** ondansetron, prochlorperazine    Last Recorded Vitals  Blood pressure 131/81, pulse 73, temperature 36.6 °C (97.9 °F), temperature source Temporal, resp. rate 18, height 1.626 m (5' 4\"), weight 88.2 kg (194 lb 7.1 oz), SpO2 96%.  Intake/Output last 3 Shifts:  I/O last 3 completed shifts:  In: 1500 (17 mL/kg) [P.O.:1500]  Out: 1895 (21.5 mL/kg) [Urine:1895 (0.6 mL/kg/hr)]  Weight: 88.2 kg     A&ox3, no distress, pleasant  MMM, no lesions  Lungs with scattered bibasilar crackles  Rrr, no m/r/g  Abd soft, nt, nd  No allograft tenderness  2+ LE edema b/l    Results for orders placed or performed during the hospital encounter of 07/22/24 (from the past 24 hour(s))   CBC   Result " Value Ref Range    WBC 2.9 (L) 4.4 - 11.3 x10*3/uL    nRBC 0.0 0.0 - 0.0 /100 WBCs    RBC 2.77 (L) 4.00 - 5.20 x10*6/uL    Hemoglobin 8.8 (L) 12.0 - 16.0 g/dL    Hematocrit 27.6 (L) 36.0 - 46.0 %     80 - 100 fL    MCH 31.8 26.0 - 34.0 pg    MCHC 31.9 (L) 32.0 - 36.0 g/dL    RDW 17.5 (H) 11.5 - 14.5 %    Platelets 135 (L) 150 - 450 x10*3/uL   Renal Function Panel   Result Value Ref Range    Glucose 78 74 - 99 mg/dL    Sodium 122 (L) 136 - 145 mmol/L    Potassium 4.2 3.5 - 5.3 mmol/L    Chloride 93 (L) 98 - 107 mmol/L    Bicarbonate 19 (L) 21 - 32 mmol/L    Anion Gap 14 10 - 20 mmol/L    Urea Nitrogen 39 (H) 6 - 23 mg/dL    Creatinine 3.76 (H) 0.50 - 1.05 mg/dL    eGFR 14 (L) >60 mL/min/1.73m*2    Calcium 7.3 (L) 8.6 - 10.6 mg/dL    Phosphorus 4.5 2.5 - 4.9 mg/dL    Albumin 3.2 (L) 3.4 - 5.0 g/dL   Magnesium   Result Value Ref Range    Magnesium 2.00 1.60 - 2.40 mg/dL   Tacrolimus   Result Value Ref Range    Tacrolimus  8.9 <=15.0 ng/mL   Calcium, Ionized   Result Value Ref Range    POCT Calcium, Ionized 0.97 (L) 1.1 - 1.33 mmol/L   Osmolality   Result Value Ref Range    Osmolality, Serum 265 (L) 280 - 300 mOsm/kg   POCT GLUCOSE   Result Value Ref Range    POCT Glucose 86 74 - 99 mg/dL     DSA:  7/3/24   DQ2          10,158   7/13/24 DQ2          1,884                Cw10 (C*03:04)       1,222       Assessment/Plan   Principal Problem:    Bilateral lower extremity edema  Active Problems:    Hyponatremia    Urinary tract infection    44 y.o. [place] F with ESRD secondary to HTN/NSAID who is s/p DDKT on 11/30/23 (Dr. Marbin Lambert & Dr. Louis, KDPI 56%, PRA 48%. HCV -/-, CMV D+/R+, EBV D+/R+). Pt received a total of 4.5 mg/kg thymoglobulin induction therapy in conjunction with solumedrol, was initiated on standard triple immunosuppression therapy (Tacrolimus, Mycophenolate, prednisone).  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      Post-op course was complicated  by return to OR for exploration of transplanted kidney and washout of hematoma. Hospital course was complicated by post-operative pain and constipation, H/o DGF with eventual recovery in renal function.      5/29/24 Direct admission for fever, nausea, vomiting and abdominal pain (chronic).  Dx with CMV disease, new kidney stone with negative MAG3 for obstruction. ID consulted. Pt remains on Valcyte treatment dose.     Serum Cr ~0.9 till 6/13/24, started trending up gradually since then to peak 3.38 (7/7/24) warranting admission for MARK, hypervolemia. Kidney bx 7/5/24 with acute T-cell IB, acute vascular rejection IIB, and active ABMR. DSA against DQ2 (10,158). Pt is s/p Pulse steroids, Thymo 6mg/kg, PLEX x4. Cr trended to ~2.1-2.3.      Rpt bx 7/17/24 with persistent ACR 1A, AVR IIA, ABMR although the degree of inflammation appears improved from prior bx.      Currently readmitted with worsening Cr (3.28), hypervolemia.      Allograft function: s/p DDKT 11/30/23  - baseline Cr 0.8-0.9, last stable 6/13/24.   - recent h/o ACR IIB and ABMR s/p treatment (as noted above).   - admitted with Cr worsening again to 3.28. rpt bx (7/17/24, prior to admission) with persistent rejection but improved inflammation. Was on empiric abx for possible UTI. Ucx 7/25 with no growth, abx stopped.   - pt is s/p Thymo 1.5 mg/kg 7/26/24 (total dose received 7.5 mg/kg)  - Serum Cr remains elevated but stable 3.7 today.  Last Up/Uc 500 mg/g 7/7/24, stable  - Hypervolemic on exam. BPs acceptable. On torsemide 50mg/d, will give additional Bumex today. titrate as indicated.  - hyponatremia likely in the setting of volume overload. If response suboptimal to diuretics, consider RRT for volume optimization.    - corrected Ca low, phos acceptable. PTH 18 6/2024. on calcitriol to 0.5 mcg daily, cont Ca supplements.  - Hb 8.9, trending down. start Aranesp 60 mcg weekly.   - avoid potential nephrotoxins. Dose meds for CrCl.     Immunosuppression:  -  On tac 3mg q12. Monitor daily Fk trough. Goal 8-10.   - Cont MMF 1000mg q12, pred taper per protocol  - Ppx: Bactrim, Mycelex troches, Valcyte per protocol. On Vemlidy  - viral Pcr neg 7/23/24     Will follow    Carlitos Stanford MD

## 2024-07-28 NOTE — PROGRESS NOTES
"Herber Foy Rai is a 44 y.o. female now POD # 241 s/p DDKT transplant.    Subjective   BLE edema  SOB       Objective   Physical Exam  Gen: A+OX3; NAD  Abd: S/NT/ND. Incision C/D/I.  Ext: 2+ LE edema    Last Recorded Vitals  Blood pressure 157/78, pulse 76, temperature 36.1 °C (97 °F), temperature source Temporal, resp. rate 18, height 1.626 m (5' 4\"), weight 88.2 kg (194 lb 7.1 oz), SpO2 96%.  Intake/Output last 3 Shifts:  I/O last 3 completed shifts:  In: 1500 (17 mL/kg) [P.O.:1500]  Out: 1895 (21.5 mL/kg) [Urine:1895 (0.6 mL/kg/hr)]  Weight: 88.2 kg      Lab Results   Component Value Date    CREATININE 3.85 (H) 07/27/2024    K 4.6 07/27/2024    GLUCOSE 94 07/27/2024    HGB 8.8 (L) 07/28/2024    WBC 2.9 (L) 07/28/2024     (L) 07/28/2024    CALCIUM 7.8 (L) 07/27/2024     Lab Results   Component Value Date    WBC 2.9 (L) 07/28/2024    HGB 8.8 (L) 07/28/2024    HCT 27.6 (L) 07/28/2024     07/28/2024     (L) 07/28/2024     Lab Results   Component Value Date    GLUCOSE 94 07/27/2024    CALCIUM 7.8 (L) 07/27/2024     (L) 07/27/2024    K 4.6 07/27/2024    CO2 19 (L) 07/27/2024    CL 96 (L) 07/27/2024    BUN 38 (H) 07/27/2024    CREATININE 3.85 (H) 07/27/2024     amLODIPine, 10 mg, oral, Daily  calcitriol, 0.5 mcg, oral, Daily  calcium carbonate, 500 mg, oral, BID  carvedilol, 25 mg, oral, BID  cholecalciferol, 4,000 Units, oral, Daily  clotrimazole, 10 mg, oral, TID after meals  gabapentin, 300 mg, oral, Nightly  heparin (porcine), 5,000 Units, subcutaneous, q8h  hydrALAZINE, 50 mg, oral, BID  levothyroxine, 25 mcg, oral, Daily before breakfast  magnesium oxide, 400 mg, oral, Daily  mycophenolate, 1,000 mg, oral, q12h  pantoprazole, 40 mg, oral, BID AC  polyethylene glycol, 17 g, oral, Daily  predniSONE, 20 mg, oral, Daily  prochlorperazine, 5 mg, oral, Once  sennosides-docusate sodium, 2 tablet, oral, BID  sertraline, 50 mg, oral, Daily  sodium bicarbonate, 1,950 mg, oral, " TID  sulfamethoxazole-trimethoprim, 1 tablet, oral, Daily  tacrolimus, 3 mg, oral, q12h JANIE  tenofovir alafenamide, 25 mg, oral, Daily  torsemide, 50 mg, oral, Daily  valGANciclovir, 450 mg, oral, Every other day            Assessment/Plan    DDKT 11/2023     Kidney allograft function  biopsy 7/5/24 -consistent with T-cell mediated rejection Banff 1B and acute vascular rejection Banff 2B and antibody mediated rejection              Initial therapy included:  Steroid bolus 500 x 3, completed                          Thymo 6 mg/kg - last dose 7/13                          Plex followed by IVIG x 4   7/17 Interval biopsy with minimal improvement    Readmitted with Rising creatinine, edema   Renal US within normal limits   Creatinine pending today    Decrease torsemide to once daily with concentrated albumin    S/p administer 1.5 mg/kg of thymo 7/26   Continue home tac 3/3, MMF 1000/1000 and pred 20   Transitioned off belatacept.    SOB  Pending VQ scan  Duplex negative       I spent 35 minutes in the professional and overall care of this patient.      Don Lopez MD

## 2024-07-29 ENCOUNTER — APPOINTMENT (OUTPATIENT)
Dept: RADIOLOGY | Facility: HOSPITAL | Age: 44
DRG: 698 | End: 2024-07-29
Payer: COMMERCIAL

## 2024-07-29 ENCOUNTER — APPOINTMENT (OUTPATIENT)
Dept: TRANSPLANT | Facility: HOSPITAL | Age: 44
End: 2024-07-29
Payer: COMMERCIAL

## 2024-07-29 ENCOUNTER — APPOINTMENT (OUTPATIENT)
Dept: DIALYSIS | Facility: HOSPITAL | Age: 44
End: 2024-07-29
Payer: COMMERCIAL

## 2024-07-29 LAB
ALBUMIN SERPL BCP-MCNC: 3.7 G/DL (ref 3.4–5)
ANION GAP SERPL CALC-SCNC: 16 MMOL/L (ref 10–20)
BUN SERPL-MCNC: 34 MG/DL (ref 6–23)
CA-I BLD-SCNC: 0.97 MMOL/L (ref 1.1–1.33)
CALCIUM SERPL-MCNC: 7.7 MG/DL (ref 8.6–10.6)
CHLORIDE SERPL-SCNC: 94 MMOL/L (ref 98–107)
CO2 SERPL-SCNC: 17 MMOL/L (ref 21–32)
CREAT SERPL-MCNC: 3.58 MG/DL (ref 0.5–1.05)
EGFRCR SERPLBLD CKD-EPI 2021: 15 ML/MIN/1.73M*2
ERYTHROCYTE [DISTWIDTH] IN BLOOD BY AUTOMATED COUNT: 17.2 % (ref 11.5–14.5)
GLUCOSE SERPL-MCNC: 101 MG/DL (ref 74–99)
HCT VFR BLD AUTO: 30.6 % (ref 36–46)
HGB BLD-MCNC: 10 G/DL (ref 12–16)
HLA RESULTS: NORMAL
HLA-A+B+C AB NFR SER: NORMAL %
HLA-DP+DQ+DR AB NFR SER: NORMAL %
MAGNESIUM SERPL-MCNC: 2.07 MG/DL (ref 1.6–2.4)
MCH RBC QN AUTO: 31.7 PG (ref 26–34)
MCHC RBC AUTO-ENTMCNC: 32.7 G/DL (ref 32–36)
MCV RBC AUTO: 97 FL (ref 80–100)
NRBC BLD-RTO: 0 /100 WBCS (ref 0–0)
PHOSPHATE SERPL-MCNC: 4.4 MG/DL (ref 2.5–4.9)
PLATELET # BLD AUTO: 171 X10*3/UL (ref 150–450)
POTASSIUM SERPL-SCNC: 4.7 MMOL/L (ref 3.5–5.3)
RBC # BLD AUTO: 3.15 X10*6/UL (ref 4–5.2)
SODIUM SERPL-SCNC: 122 MMOL/L (ref 136–145)
TACROLIMUS BLD-MCNC: 8 NG/ML
WBC # BLD AUTO: 3.6 X10*3/UL (ref 4.4–11.3)

## 2024-07-29 PROCEDURE — 2500000002 HC RX 250 W HCPCS SELF ADMINISTERED DRUGS (ALT 637 FOR MEDICARE OP, ALT 636 FOR OP/ED): Performed by: STUDENT IN AN ORGANIZED HEALTH CARE EDUCATION/TRAINING PROGRAM

## 2024-07-29 PROCEDURE — 80197 ASSAY OF TACROLIMUS: CPT

## 2024-07-29 PROCEDURE — 87799 DETECT AGENT NOS DNA QUANT: CPT

## 2024-07-29 PROCEDURE — 80069 RENAL FUNCTION PANEL: CPT

## 2024-07-29 PROCEDURE — 2500000004 HC RX 250 GENERAL PHARMACY W/ HCPCS (ALT 636 FOR OP/ED): Performed by: STUDENT IN AN ORGANIZED HEALTH CARE EDUCATION/TRAINING PROGRAM

## 2024-07-29 PROCEDURE — 36415 COLL VENOUS BLD VENIPUNCTURE: CPT

## 2024-07-29 PROCEDURE — 99232 SBSQ HOSP IP/OBS MODERATE 35: CPT | Performed by: STUDENT IN AN ORGANIZED HEALTH CARE EDUCATION/TRAINING PROGRAM

## 2024-07-29 PROCEDURE — 2500000001 HC RX 250 WO HCPCS SELF ADMINISTERED DRUGS (ALT 637 FOR MEDICARE OP): Performed by: STUDENT IN AN ORGANIZED HEALTH CARE EDUCATION/TRAINING PROGRAM

## 2024-07-29 PROCEDURE — 1200000002 HC GENERAL ROOM WITH TELEMETRY DAILY

## 2024-07-29 PROCEDURE — 99233 SBSQ HOSP IP/OBS HIGH 50: CPT | Performed by: HOSPITALIST

## 2024-07-29 PROCEDURE — 2500000001 HC RX 250 WO HCPCS SELF ADMINISTERED DRUGS (ALT 637 FOR MEDICARE OP)

## 2024-07-29 PROCEDURE — 2500000004 HC RX 250 GENERAL PHARMACY W/ HCPCS (ALT 636 FOR OP/ED)

## 2024-07-29 PROCEDURE — 5A1D70Z PERFORMANCE OF URINARY FILTRATION, INTERMITTENT, LESS THAN 6 HOURS PER DAY: ICD-10-PCS

## 2024-07-29 PROCEDURE — 78830 RP LOCLZJ TUM SPECT W/CT 1: CPT

## 2024-07-29 PROCEDURE — 82330 ASSAY OF CALCIUM: CPT

## 2024-07-29 PROCEDURE — 83735 ASSAY OF MAGNESIUM: CPT

## 2024-07-29 PROCEDURE — 8010000001 HC DIALYSIS - HEMODIALYSIS PER DAY

## 2024-07-29 PROCEDURE — 85027 COMPLETE CBC AUTOMATED: CPT

## 2024-07-29 RX ADMIN — PANTOPRAZOLE SODIUM 40 MG: 40 TABLET, DELAYED RELEASE ORAL at 15:43

## 2024-07-29 RX ADMIN — ACETAMINOPHEN 650 MG: 325 TABLET ORAL at 18:08

## 2024-07-29 RX ADMIN — TACROLIMUS 3 MG: 1 CAPSULE ORAL at 06:29

## 2024-07-29 RX ADMIN — CALCIUM GLUCONATE 3 G: 98 INJECTION, SOLUTION INTRAVENOUS at 10:24

## 2024-07-29 RX ADMIN — TENOFOVIR ALAFENAMIDE 25 MG: 25 TABLET ORAL at 09:22

## 2024-07-29 RX ADMIN — HEPARIN SODIUM 5000 UNITS: 5000 INJECTION INTRAVENOUS; SUBCUTANEOUS at 21:17

## 2024-07-29 RX ADMIN — SODIUM BICARBONATE 650 MG TABLET 1950 MG: at 21:17

## 2024-07-29 RX ADMIN — SERTRALINE HYDROCHLORIDE 50 MG: 50 TABLET ORAL at 09:23

## 2024-07-29 RX ADMIN — CARVEDILOL 25 MG: 25 TABLET, FILM COATED ORAL at 21:17

## 2024-07-29 RX ADMIN — MYCOPHENOLATE MOFETIL 1000 MG: 250 CAPSULE ORAL at 21:17

## 2024-07-29 RX ADMIN — CLOTRIMAZOLE 10 MG: 10 LOZENGE ORAL at 15:43

## 2024-07-29 RX ADMIN — SODIUM BICARBONATE 650 MG TABLET 1950 MG: at 15:43

## 2024-07-29 RX ADMIN — CALCITRIOL 0.5 MCG: 0.5 CAPSULE ORAL at 09:22

## 2024-07-29 RX ADMIN — LEVOTHYROXINE SODIUM 25 MCG: 0.05 TABLET ORAL at 06:29

## 2024-07-29 RX ADMIN — SULFAMETHOXAZOLE AND TRIMETHOPRIM 1 TABLET: 400; 80 TABLET ORAL at 09:20

## 2024-07-29 RX ADMIN — Medication 4000 UNITS: at 09:22

## 2024-07-29 RX ADMIN — PANTOPRAZOLE SODIUM 40 MG: 40 TABLET, DELAYED RELEASE ORAL at 06:29

## 2024-07-29 RX ADMIN — PREDNISONE 20 MG: 20 TABLET ORAL at 09:23

## 2024-07-29 RX ADMIN — GABAPENTIN 300 MG: 300 CAPSULE ORAL at 21:17

## 2024-07-29 RX ADMIN — MAGNESIUM OXIDE TAB 400 MG (241.3 MG ELEMENTAL MG) 400 MG: 400 (241.3 MG) TAB at 09:22

## 2024-07-29 RX ADMIN — TORSEMIDE 50 MG: 20 TABLET ORAL at 09:22

## 2024-07-29 RX ADMIN — SENNOSIDES AND DOCUSATE SODIUM 2 TABLET: 50; 8.6 TABLET ORAL at 09:31

## 2024-07-29 RX ADMIN — TACROLIMUS 3 MG: 1 CAPSULE ORAL at 18:06

## 2024-07-29 RX ADMIN — MYCOPHENOLATE MOFETIL 1000 MG: 250 CAPSULE ORAL at 09:24

## 2024-07-29 RX ADMIN — HYDRALAZINE HYDROCHLORIDE 50 MG: 25 TABLET ORAL at 09:24

## 2024-07-29 RX ADMIN — CALCIUM CARBONATE (ANTACID) CHEW TAB 500 MG 500 MG: 500 CHEW TAB at 09:21

## 2024-07-29 RX ADMIN — HEPARIN SODIUM 5000 UNITS: 5000 INJECTION INTRAVENOUS; SUBCUTANEOUS at 15:43

## 2024-07-29 RX ADMIN — HEPARIN SODIUM 5000 UNITS: 5000 INJECTION INTRAVENOUS; SUBCUTANEOUS at 06:29

## 2024-07-29 RX ADMIN — AMLODIPINE BESYLATE 10 MG: 10 TABLET ORAL at 09:23

## 2024-07-29 RX ADMIN — CLOTRIMAZOLE 10 MG: 10 LOZENGE ORAL at 09:29

## 2024-07-29 RX ADMIN — ACETAMINOPHEN 650 MG: 325 TABLET ORAL at 11:36

## 2024-07-29 RX ADMIN — CALCIUM CARBONATE (ANTACID) CHEW TAB 500 MG 500 MG: 500 CHEW TAB at 21:17

## 2024-07-29 RX ADMIN — VALGANCICLOVIR 450 MG: 450 TABLET, FILM COATED ORAL at 09:22

## 2024-07-29 RX ADMIN — SODIUM BICARBONATE 650 MG TABLET 1950 MG: at 09:24

## 2024-07-29 RX ADMIN — CLOTRIMAZOLE 10 MG: 10 LOZENGE ORAL at 18:06

## 2024-07-29 RX ADMIN — CARVEDILOL 25 MG: 25 TABLET, FILM COATED ORAL at 09:23

## 2024-07-29 RX ADMIN — HYDRALAZINE HYDROCHLORIDE 50 MG: 25 TABLET ORAL at 21:17

## 2024-07-29 ASSESSMENT — COGNITIVE AND FUNCTIONAL STATUS - GENERAL
DAILY ACTIVITIY SCORE: 24
MOBILITY SCORE: 24
MOBILITY SCORE: 24
DAILY ACTIVITIY SCORE: 24

## 2024-07-29 ASSESSMENT — PAIN SCALES - GENERAL
PAINLEVEL_OUTOF10: 0 - NO PAIN
PAINLEVEL_OUTOF10: 2
PAINLEVEL_OUTOF10: 0 - NO PAIN
PAINLEVEL_OUTOF10: 0 - NO PAIN
PAINLEVEL_OUTOF10: 4
PAINLEVEL_OUTOF10: 6
PAINLEVEL_OUTOF10: 0 - NO PAIN
PAINLEVEL_OUTOF10: 3

## 2024-07-29 ASSESSMENT — PAIN - FUNCTIONAL ASSESSMENT
PAIN_FUNCTIONAL_ASSESSMENT: 0-10
PAIN_FUNCTIONAL_ASSESSMENT: 0-10
PAIN_FUNCTIONAL_ASSESSMENT: NO/DENIES PAIN
PAIN_FUNCTIONAL_ASSESSMENT: NO/DENIES PAIN
PAIN_FUNCTIONAL_ASSESSMENT: 0-10

## 2024-07-29 ASSESSMENT — PAIN DESCRIPTION - LOCATION: LOCATION: HEAD

## 2024-07-29 NOTE — PROGRESS NOTES
Physical Therapy                 Therapy Communication Note    Patient Name: Herber Foy Rai  MRN: [id number]  Today's Date: 7/29/2024     Discipline: Physical Therapy    Missed Visit Reason: Missed Visit Reason: Patient refused (Pt declining, via , stating that she has a pounding headache and gets SOB when ambulating. Will re-attempt when schedule permits. RN aware.)    Missed Time: Attempt    Kristie Wilson, PT

## 2024-07-29 NOTE — CARE PLAN
The patient's goals for the shift include  labs wnl    The clinical goals for the shift include labs wnl      Problem: Respiratory  Goal: Minimal/no exertional discomfort or dyspnea this shift  Outcome: Progressing  Goal: No signs of respiratory distress (eg. Use of accessory muscles. Peds grunting)  Outcome: Progressing     Problem: Chronic Conditions and Co-morbidities  Goal: Patient's chronic conditions and co-morbidity symptoms are monitored and maintained or improved  Outcome: Progressing     Problem: Pain  Goal: Takes deep breaths with improved pain control throughout the shift  Outcome: Progressing  Goal: Turns in bed with improved pain control throughout the shift  Outcome: Progressing  Goal: Walks with improved pain control throughout the shift  Outcome: Progressing  Goal: Performs ADL's with improved pain control throughout shift  Outcome: Progressing  Goal: Participates in PT with improved pain control throughout the shift  Outcome: Progressing  Goal: Free from opioid side effects throughout the shift  Outcome: Progressing  Goal: Free from acute confusion related to pain meds throughout the shift  Outcome: Progressing

## 2024-07-29 NOTE — NURSING NOTE
Report to Receiving RN:    Report To: Juan CUNNINGHAM  Time Report Called: [id number]  Hand-Off Communication: Pt tolerated HD tx well with a 2L fluid removal. Post bp: 131/86 p: 76  Complications During Treatment: No  Ultrafiltration Treatment: Yes  Medications Administered During Dialysis: No  Blood Products Administered During Dialysis: No  Labs Sent During Dialysis: No  Heparin Drip Rate Changes: N/A  Dialysis Catheter Dressing: FISTULA  Last Dressing Change: n/a        Last Updated: 3:15 PM by SONI APRIL

## 2024-07-29 NOTE — NURSING NOTE
Report from Sending RN:    Report From: Juan  Recent Surgery of Procedure: No  Baseline Level of Consciousness (LOC): A and O x per   Oxygen Use: No  Type: NA  Diabetic: No  Last BP Med Given Day of Dialysis: This AM  Last Pain Med Given: None  Lab Tests to be Obtained with Dialysis: Yes  Blood Transfusion to be Given During Dialysis: No  Available IV Access: Yes  Medications to be Administered During Dialysis: No  Continuous IV Infusion Running: No  Restraints on Currently or in the Last 24 Hours: No  Hand-Off Communication: Can ambulate to the restroom.  Dialysis Catheter Dressing: NA, has fistula  Last Dressing Change: NA

## 2024-07-29 NOTE — PROGRESS NOTES
Transplant Nephrology progress note     Date of admission: 7/22/2024     Herber Foy Rai is a 44 y.o.  with PMH   Past Medical History:   Diagnosis Date    Anxiety     Chronic kidney disease     Chronic kidney disease, unspecified     CKD, patient interested in transplantation    Depression     Disease of thyroid gland     GERD (gastroesophageal reflux disease)     Hypertension     Personal history of COVID-19 07/20/2022    History of COVID-19        SUBJECTIVE:      Complained of some shortness of breath this morning planning dialysis explained to her and to consent    PROBLEM LIST:  Principal Problem:    Bilateral lower extremity edema  Active Problems:    Hyponatremia    Urinary tract infection         ALLERGIES:  Allergies   Allergen Reactions    Sumatriptan Headache and Dizziness     Patient has worsening headache, dizziness, chest burning/tingling, and extremity pain upon taking sumatriptan.            CURRENT MEDICATIONS:  Scheduled medications  amLODIPine, 10 mg, oral, Daily  calcitriol, 0.5 mcg, oral, Daily  calcium carbonate, 500 mg, oral, BID  carvedilol, 25 mg, oral, BID  cholecalciferol, 4,000 Units, oral, Daily  clotrimazole, 10 mg, oral, TID after meals  gabapentin, 300 mg, oral, Nightly  heparin (porcine), 5,000 Units, subcutaneous, q8h  hydrALAZINE, 50 mg, oral, BID  levothyroxine, 25 mcg, oral, Daily before breakfast  magnesium oxide, 400 mg, oral, Daily  mycophenolate, 1,000 mg, oral, q12h  pantoprazole, 40 mg, oral, BID AC  polyethylene glycol, 17 g, oral, Daily  predniSONE, 20 mg, oral, Daily  prochlorperazine, 5 mg, oral, Once  sennosides-docusate sodium, 2 tablet, oral, BID  sertraline, 50 mg, oral, Daily  sodium bicarbonate, 1,950 mg, oral, TID  sulfamethoxazole-trimethoprim, 1 tablet, oral, Daily  tacrolimus, 3 mg, oral, q12h JANIE  tenofovir alafenamide, 25 mg, oral, Daily  torsemide, 50 mg, oral, Daily  valGANciclovir, 450 mg, oral, Every other day      Continuous medications     PRN  "medications  PRN medications: acetaminophen, albuterol, ondansetron ODT **OR** ondansetron, prochlorperazine       OBJECTIVE:    VITALS: Visit Vitals  /79 (BP Location: Right arm, Patient Position: Lying)   Pulse 76   Temp 36 °C (96.8 °F) (Temporal)   Resp 17   Ht 1.626 m (5' 4\")   Wt 88.4 kg (194 lb 14.4 oz)   LMP  (LMP Unknown)   SpO2 96%   BMI 33.45 kg/m²   OB Status Unknown   Smoking Status Never   BSA 2 m²        General: No distress   Mucosa moist   AI, AC, AF     HEENT: PEERLA  CVS: S1 S2 no murmurs  RESP:  Lungs clear to auscultation   ABDO: Soft, non-tender   Neuro: A + O x 3  Skin: No rash   Extremities: +2-3edema       LABS:  Results from last 72 hours   Lab Units 07/29/24  0533   WBC AUTO x10*3/uL 3.6*   HEMOGLOBIN g/dL 10.0*   MCV fL 97   PLATELETS AUTO x10*3/uL 171   BUN mg/dL 34*   CREATININE mg/dL 3.58*   CALCIUM mg/dL 7.7*   TACROLIMUS ng/mL 8.0            Intake/Output Summary (Last 24 hours) at 7/29/2024 1627  Last data filed at 7/29/2024 1546  Gross per 24 hour   Intake 2880 ml   Output 7000 ml   Net -4120 ml          ASSESSMENT AND PLAN:    44 y.o. [place] F with ESRD secondary to HTN/NSAID who is s/p DDKT on 11/30/23 (Dr. Marbin Lambert & Dr. Louis, KDPI 56%, PRA 48%. HCV -/-, CMV D+/R+, EBV D+/R+). Pt received a total of 4.5 mg/kg thymoglobulin induction therapy in conjunction with solumedrol, was initiated on standard triple immunosuppression therapy.  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  Patient had DGF with eventual renal recovery.  -Hospital admission in May in the setting of CMV disease, new kidney stone.  -Last hospital admission as of 6/13/2024 found to have ACR 1 B+ acute vascular rejection + active AMR with DQ 2 10,158-s/p pulse steroids, Tylenol 6 mg/kg, Plex x 5 and creatinine  trended to 2.1-2.3.  Transitioned to tacrolimus.   - Rpt bx 7/17/24 with persistent ACR 1A, AVR IIA, ABMR although the degree of " inflammation appears improved from prior bx.      Currently readmitted with worsening Cr (3.28), hypervolemia.     Allograft function:  -Worsening kidney function with volume overload-planning dialysis today through left upper extremity AV fistula short run with ultrafiltration almost 2 to 2.5 L.  -Hyponatremia likely hypervolemic will get corrected with the dialysis.  Acidosis likely due to renal insufficiency.  -Last AlloSure as of 6/27/2024 greater than 16 need repeat and repeat DSA.  -Monitor kidney function closely.    Infectious prophylaxis:  -CMV check as of 7/23/2024 negative  -EBV negative as of 7/23/2024.  -BK not detected as of 7/23/2024.  -Patient is on Vemlidy for hepatitis B prophylaxis, Bactrim, clotrimazole, Valcyte.    Immunosuppression:  -Continue with the 1 g twice daily mycophenolate, tapering prednisone to 20 mg daily, tacrolimus 3 mg twice daily.  Aim tacrolimus levels 8-10.    Hemodynamics:  -Blood pressures currently optimal continue with the amlodipine, Coreg 25 twice daily, hydralazine 50 twice daily.  -On torsemide 50 daily.    Anemia/leukopenia: Monitor hemoglobin closely no indication for DENIZ.    Bone mineral disease: Calcium level is lower normal range continue with Tums and vitamin D supplementation.  Recent vitamin D check is around 35 continue monitoring.       Thank you for consulting .  Billie Mcfarland MD       Notes created by Amy -Please excuse the Typos .

## 2024-07-29 NOTE — PROGRESS NOTES
"Herber Foy Rai is a 44 y.o. female now POD # 242 s/p DDKT transplant.    Subjective   BLE edema  SOB +, more pronounced on any activity       Objective   Physical Exam  Gen: A+OX3; NAD  Abd: S/NT/ND. Incision C/D/I.  Ext: 2+ LE edema    Last Recorded Vitals  Blood pressure 149/86, pulse 69, temperature 36 °C (96.8 °F), temperature source Temporal, resp. rate 17, height 1.626 m (5' 4\"), weight 88.2 kg (194 lb 7.1 oz), SpO2 97%.  Intake/Output last 3 Shifts:  I/O last 3 completed shifts:  In: 2580 (29.3 mL/kg) [P.O.:2580]  Out: 3550 (40.2 mL/kg) [Urine:3550 (1.1 mL/kg/hr)]  Weight: 88.2 kg      Lab Results   Component Value Date    CREATININE 3.58 (H) 07/29/2024    K 4.7 07/29/2024    GLUCOSE 101 (H) 07/29/2024    HGB 10.0 (L) 07/29/2024    WBC 3.6 (L) 07/29/2024     07/29/2024    CALCIUM 7.7 (L) 07/29/2024     Lab Results   Component Value Date    WBC 3.6 (L) 07/29/2024    HGB 10.0 (L) 07/29/2024    HCT 30.6 (L) 07/29/2024    MCV 97 07/29/2024     07/29/2024     Lab Results   Component Value Date    GLUCOSE 101 (H) 07/29/2024    CALCIUM 7.7 (L) 07/29/2024     (L) 07/29/2024    K 4.7 07/29/2024    CO2 17 (L) 07/29/2024    CL 94 (L) 07/29/2024    BUN 34 (H) 07/29/2024    CREATININE 3.58 (H) 07/29/2024     amLODIPine, 10 mg, oral, Daily  calcitriol, 0.5 mcg, oral, Daily  calcium carbonate, 500 mg, oral, BID  calcium gluconate, 3 g, intravenous, Once  carvedilol, 25 mg, oral, BID  cholecalciferol, 4,000 Units, oral, Daily  clotrimazole, 10 mg, oral, TID after meals  gabapentin, 300 mg, oral, Nightly  heparin (porcine), 5,000 Units, subcutaneous, q8h  hydrALAZINE, 50 mg, oral, BID  levothyroxine, 25 mcg, oral, Daily before breakfast  magnesium oxide, 400 mg, oral, Daily  mycophenolate, 1,000 mg, oral, q12h  pantoprazole, 40 mg, oral, BID AC  polyethylene glycol, 17 g, oral, Daily  predniSONE, 20 mg, oral, Daily  prochlorperazine, 5 mg, oral, Once  sennosides-docusate sodium, 2 tablet, oral, " BID  sertraline, 50 mg, oral, Daily  sodium bicarbonate, 1,950 mg, oral, TID  sulfamethoxazole-trimethoprim, 1 tablet, oral, Daily  tacrolimus, 3 mg, oral, q12h JANIE  tenofovir alafenamide, 25 mg, oral, Daily  torsemide, 50 mg, oral, Daily  valGANciclovir, 450 mg, oral, Every other day      Assessment/Plan    DDKT 11/2023     Kidney allograft function  biopsy 7/5/24 -consistent with T-cell mediated rejection Banff 1B and acute vascular rejection Banff 2B and antibody mediated rejection              Initial therapy included:  Steroid bolus 500 x 3, completed                          Thymo 6 mg/kg - last dose 7/13                          Plex followed by IVIG x 4   7/17 Interval biopsy with minimal improvement    Readmitted with Rising creatinine, edema   Renal US within normal limits   Volume overload, Symptomatic    Decrease torsemide to once daily with concentrated albumin    S/p administer 1.5 mg/kg of thymo 7/26   Continue home tac 3/3, MMF 1000/1000 and pred 20   Transitioned off belatacept.    SOB  Pending VQ scan  Duplex negative    HD today for symptomatic hypervolemia not responding to diuretics and electrolyte imbalance (Bicarb 17, Na 122)       I spent 35 minutes in the professional and overall care of this patient.      Maryam Bhatt MD

## 2024-07-30 DIAGNOSIS — T86.19 DELAYED GRAFT FUNCTION OF KIDNEY (HHS-HCC): ICD-10-CM

## 2024-07-30 LAB
ALBUMIN SERPL BCP-MCNC: 3.5 G/DL (ref 3.4–5)
ANION GAP SERPL CALC-SCNC: 13 MMOL/L (ref 10–20)
BKV DNA SERPL NAA+PROBE-LOG#: NORMAL {LOG_COPIES}/ML
BUN SERPL-MCNC: 22 MG/DL (ref 6–23)
CA-I BLD-SCNC: 1.04 MMOL/L (ref 1.1–1.33)
CALCIUM SERPL-MCNC: 8.1 MG/DL (ref 8.6–10.6)
CHLORIDE SERPL-SCNC: 95 MMOL/L (ref 98–107)
CMV DNA SERPL NAA+PROBE-LOG IU: NORMAL {LOG_IU}/ML
CO2 SERPL-SCNC: 26 MMOL/L (ref 21–32)
CREAT SERPL-MCNC: 2.61 MG/DL (ref 0.5–1.05)
D DIMER PPP FEU-MCNC: 953 NG/ML FEU
EBV DNA SPEC NAA+PROBE-LOG#: NORMAL {LOG_COPIES}/ML
EGFRCR SERPLBLD CKD-EPI 2021: 22 ML/MIN/1.73M*2
ERYTHROCYTE [DISTWIDTH] IN BLOOD BY AUTOMATED COUNT: 17.5 % (ref 11.5–14.5)
GLUCOSE SERPL-MCNC: 114 MG/DL (ref 74–99)
HCT VFR BLD AUTO: 29.9 % (ref 36–46)
HGB BLD-MCNC: 9.5 G/DL (ref 12–16)
LABORATORY COMMENT REPORT: NOT DETECTED
MAGNESIUM SERPL-MCNC: 1.96 MG/DL (ref 1.6–2.4)
MCH RBC QN AUTO: 31.8 PG (ref 26–34)
MCHC RBC AUTO-ENTMCNC: 31.8 G/DL (ref 32–36)
MCV RBC AUTO: 100 FL (ref 80–100)
NRBC BLD-RTO: 0 /100 WBCS (ref 0–0)
PHOSPHATE SERPL-MCNC: 3.5 MG/DL (ref 2.5–4.9)
PLATELET # BLD AUTO: 147 X10*3/UL (ref 150–450)
POTASSIUM SERPL-SCNC: 4.5 MMOL/L (ref 3.5–5.3)
RBC # BLD AUTO: 2.99 X10*6/UL (ref 4–5.2)
SODIUM SERPL-SCNC: 129 MMOL/L (ref 136–145)
TACROLIMUS BLD-MCNC: 11.2 NG/ML
WBC # BLD AUTO: 3.7 X10*3/UL (ref 4.4–11.3)

## 2024-07-30 PROCEDURE — 78830 RP LOCLZJ TUM SPECT W/CT 1: CPT | Performed by: NUCLEAR MEDICINE

## 2024-07-30 PROCEDURE — 99232 SBSQ HOSP IP/OBS MODERATE 35: CPT | Performed by: STUDENT IN AN ORGANIZED HEALTH CARE EDUCATION/TRAINING PROGRAM

## 2024-07-30 PROCEDURE — 2500000001 HC RX 250 WO HCPCS SELF ADMINISTERED DRUGS (ALT 637 FOR MEDICARE OP)

## 2024-07-30 PROCEDURE — 36415 COLL VENOUS BLD VENIPUNCTURE: CPT

## 2024-07-30 PROCEDURE — 1200000002 HC GENERAL ROOM WITH TELEMETRY DAILY

## 2024-07-30 PROCEDURE — 80069 RENAL FUNCTION PANEL: CPT

## 2024-07-30 PROCEDURE — 87799 DETECT AGENT NOS DNA QUANT: CPT

## 2024-07-30 PROCEDURE — 2500000004 HC RX 250 GENERAL PHARMACY W/ HCPCS (ALT 636 FOR OP/ED)

## 2024-07-30 PROCEDURE — 80197 ASSAY OF TACROLIMUS: CPT

## 2024-07-30 PROCEDURE — 2500000001 HC RX 250 WO HCPCS SELF ADMINISTERED DRUGS (ALT 637 FOR MEDICARE OP): Performed by: STUDENT IN AN ORGANIZED HEALTH CARE EDUCATION/TRAINING PROGRAM

## 2024-07-30 PROCEDURE — 82330 ASSAY OF CALCIUM: CPT

## 2024-07-30 PROCEDURE — 85379 FIBRIN DEGRADATION QUANT: CPT

## 2024-07-30 PROCEDURE — 97161 PT EVAL LOW COMPLEX 20 MIN: CPT | Mod: GP

## 2024-07-30 PROCEDURE — 2500000004 HC RX 250 GENERAL PHARMACY W/ HCPCS (ALT 636 FOR OP/ED): Performed by: STUDENT IN AN ORGANIZED HEALTH CARE EDUCATION/TRAINING PROGRAM

## 2024-07-30 PROCEDURE — 2500000002 HC RX 250 W HCPCS SELF ADMINISTERED DRUGS (ALT 637 FOR MEDICARE OP, ALT 636 FOR OP/ED): Performed by: STUDENT IN AN ORGANIZED HEALTH CARE EDUCATION/TRAINING PROGRAM

## 2024-07-30 PROCEDURE — 3430000001 HC RX 343 DIAGNOSTIC RADIOPHARMACEUTICALS: Performed by: STUDENT IN AN ORGANIZED HEALTH CARE EDUCATION/TRAINING PROGRAM

## 2024-07-30 PROCEDURE — 83735 ASSAY OF MAGNESIUM: CPT

## 2024-07-30 PROCEDURE — 85027 COMPLETE CBC AUTOMATED: CPT

## 2024-07-30 PROCEDURE — A9540 TC99M MAA: HCPCS | Performed by: STUDENT IN AN ORGANIZED HEALTH CARE EDUCATION/TRAINING PROGRAM

## 2024-07-30 RX ADMIN — KIT FOR THE PREPARATION OF TECHNETIUM TC 99M ALBUMIN AGGREGATED 5 MILLICURIE: 2.5 INJECTION, POWDER, FOR SOLUTION INTRAVENOUS at 15:26

## 2024-07-30 RX ADMIN — SODIUM BICARBONATE 650 MG TABLET 1950 MG: at 09:38

## 2024-07-30 RX ADMIN — AMLODIPINE BESYLATE 10 MG: 10 TABLET ORAL at 09:37

## 2024-07-30 RX ADMIN — CLOTRIMAZOLE 10 MG: 10 LOZENGE ORAL at 09:38

## 2024-07-30 RX ADMIN — MYCOPHENOLATE MOFETIL 1000 MG: 250 CAPSULE ORAL at 09:32

## 2024-07-30 RX ADMIN — CLOTRIMAZOLE 10 MG: 10 LOZENGE ORAL at 18:48

## 2024-07-30 RX ADMIN — CALCIUM CARBONATE (ANTACID) CHEW TAB 500 MG 500 MG: 500 CHEW TAB at 09:38

## 2024-07-30 RX ADMIN — CALCIUM CARBONATE (ANTACID) CHEW TAB 500 MG 500 MG: 500 CHEW TAB at 21:57

## 2024-07-30 RX ADMIN — Medication 4000 UNITS: at 09:33

## 2024-07-30 RX ADMIN — CARVEDILOL 25 MG: 25 TABLET, FILM COATED ORAL at 09:38

## 2024-07-30 RX ADMIN — CALCITRIOL 0.5 MCG: 0.5 CAPSULE ORAL at 09:39

## 2024-07-30 RX ADMIN — HYDRALAZINE HYDROCHLORIDE 50 MG: 25 TABLET ORAL at 21:56

## 2024-07-30 RX ADMIN — SULFAMETHOXAZOLE AND TRIMETHOPRIM 1 TABLET: 400; 80 TABLET ORAL at 09:33

## 2024-07-30 RX ADMIN — HEPARIN SODIUM 5000 UNITS: 5000 INJECTION INTRAVENOUS; SUBCUTANEOUS at 06:17

## 2024-07-30 RX ADMIN — SODIUM BICARBONATE 650 MG TABLET 1950 MG: at 16:24

## 2024-07-30 RX ADMIN — TENOFOVIR ALAFENAMIDE 25 MG: 25 TABLET ORAL at 09:39

## 2024-07-30 RX ADMIN — HEPARIN SODIUM 5000 UNITS: 5000 INJECTION INTRAVENOUS; SUBCUTANEOUS at 21:56

## 2024-07-30 RX ADMIN — MAGNESIUM OXIDE TAB 400 MG (241.3 MG ELEMENTAL MG) 400 MG: 400 (241.3 MG) TAB at 09:39

## 2024-07-30 RX ADMIN — PANTOPRAZOLE SODIUM 40 MG: 40 TABLET, DELAYED RELEASE ORAL at 16:24

## 2024-07-30 RX ADMIN — SENNOSIDES AND DOCUSATE SODIUM 2 TABLET: 50; 8.6 TABLET ORAL at 09:37

## 2024-07-30 RX ADMIN — SODIUM BICARBONATE 650 MG TABLET 1950 MG: at 21:56

## 2024-07-30 RX ADMIN — LEVOTHYROXINE SODIUM 25 MCG: 0.05 TABLET ORAL at 06:16

## 2024-07-30 RX ADMIN — HYDRALAZINE HYDROCHLORIDE 50 MG: 25 TABLET ORAL at 09:38

## 2024-07-30 RX ADMIN — ACETAMINOPHEN 650 MG: 325 TABLET ORAL at 14:19

## 2024-07-30 RX ADMIN — MYCOPHENOLATE MOFETIL 1000 MG: 250 CAPSULE ORAL at 21:56

## 2024-07-30 RX ADMIN — ACETAMINOPHEN 650 MG: 325 TABLET ORAL at 22:01

## 2024-07-30 RX ADMIN — SERTRALINE HYDROCHLORIDE 50 MG: 50 TABLET ORAL at 09:39

## 2024-07-30 RX ADMIN — HEPARIN SODIUM 5000 UNITS: 5000 INJECTION INTRAVENOUS; SUBCUTANEOUS at 14:14

## 2024-07-30 RX ADMIN — GABAPENTIN 300 MG: 300 CAPSULE ORAL at 21:56

## 2024-07-30 RX ADMIN — TACROLIMUS 3 MG: 1 CAPSULE ORAL at 18:48

## 2024-07-30 RX ADMIN — CARVEDILOL 25 MG: 25 TABLET, FILM COATED ORAL at 21:56

## 2024-07-30 RX ADMIN — TORSEMIDE 50 MG: 20 TABLET ORAL at 09:41

## 2024-07-30 RX ADMIN — PREDNISONE 20 MG: 20 TABLET ORAL at 09:38

## 2024-07-30 RX ADMIN — ACETAMINOPHEN 650 MG: 325 TABLET ORAL at 06:16

## 2024-07-30 RX ADMIN — PANTOPRAZOLE SODIUM 40 MG: 40 TABLET, DELAYED RELEASE ORAL at 06:16

## 2024-07-30 RX ADMIN — TACROLIMUS 3 MG: 1 CAPSULE ORAL at 06:16

## 2024-07-30 RX ADMIN — CLOTRIMAZOLE 10 MG: 10 LOZENGE ORAL at 12:38

## 2024-07-30 ASSESSMENT — COGNITIVE AND FUNCTIONAL STATUS - GENERAL
MOBILITY SCORE: 24
DAILY ACTIVITIY SCORE: 24
MOBILITY SCORE: 24

## 2024-07-30 ASSESSMENT — PAIN SCALES - GENERAL
PAINLEVEL_OUTOF10: 3
PAINLEVEL_OUTOF10: 2
PAINLEVEL_OUTOF10: 3
PAINLEVEL_OUTOF10: 0 - NO PAIN

## 2024-07-30 ASSESSMENT — PAIN SCALES - WONG BAKER: WONGBAKER_NUMERICALRESPONSE: HURTS LITTLE MORE

## 2024-07-30 ASSESSMENT — PAIN DESCRIPTION - LOCATION: LOCATION: HEAD

## 2024-07-30 ASSESSMENT — PAIN - FUNCTIONAL ASSESSMENT
PAIN_FUNCTIONAL_ASSESSMENT: 0-10

## 2024-07-30 ASSESSMENT — PAIN DESCRIPTION - ORIENTATION: ORIENTATION: ANTERIOR

## 2024-07-30 ASSESSMENT — ACTIVITIES OF DAILY LIVING (ADL): ADL_ASSISTANCE: INDEPENDENT

## 2024-07-30 NOTE — PROGRESS NOTES
"Herber Foy Rai is a 44 y.o. female now POD # 243 s/p DDKT transplant.    Subjective   Feeling better this morning without SOB  Pedal edema improved  HD yesterday       Objective   Physical Exam  Gen: A+OX3; NAD  Abd: S/NT/ND. Incision C/D/I.  Ext: 1+ LE edema    Last Recorded Vitals  Blood pressure (!) 167/91, pulse 65, temperature 35.5 °C (95.9 °F), temperature source Temporal, resp. rate 17, height 1.626 m (5' 4\"), weight 86.5 kg (190 lb 11.2 oz), SpO2 97%.  Intake/Output last 3 Shifts:  I/O last 3 completed shifts:  In: 3240 (37.5 mL/kg) [P.O.:1340; I.V.:1000 (11.6 mL/kg); Other:800; IV Piggyback:100]  Out: 7300 (84.4 mL/kg) [Urine:2900 (0.9 mL/kg/hr); Other:4400]  Weight: 86.5 kg      Lab Results   Component Value Date    CREATININE 2.61 (H) 07/30/2024    K 4.5 07/30/2024    GLUCOSE 114 (H) 07/30/2024    HGB 9.5 (L) 07/30/2024    WBC 3.7 (L) 07/30/2024     (L) 07/30/2024    CALCIUM 8.1 (L) 07/30/2024     Lab Results   Component Value Date    WBC 3.7 (L) 07/30/2024    HGB 9.5 (L) 07/30/2024    HCT 29.9 (L) 07/30/2024     07/30/2024     (L) 07/30/2024     Lab Results   Component Value Date    GLUCOSE 114 (H) 07/30/2024    CALCIUM 8.1 (L) 07/30/2024     (L) 07/30/2024    K 4.5 07/30/2024    CO2 26 07/30/2024    CL 95 (L) 07/30/2024    BUN 22 07/30/2024    CREATININE 2.61 (H) 07/30/2024     amLODIPine, 10 mg, oral, Daily  calcitriol, 0.5 mcg, oral, Daily  calcium carbonate, 500 mg, oral, BID  carvedilol, 25 mg, oral, BID  cholecalciferol, 4,000 Units, oral, Daily  clotrimazole, 10 mg, oral, TID after meals  gabapentin, 300 mg, oral, Nightly  heparin (porcine), 5,000 Units, subcutaneous, q8h  hydrALAZINE, 50 mg, oral, BID  levothyroxine, 25 mcg, oral, Daily before breakfast  magnesium oxide, 400 mg, oral, Daily  mycophenolate, 1,000 mg, oral, q12h  pantoprazole, 40 mg, oral, BID AC  polyethylene glycol, 17 g, oral, Daily  predniSONE, 20 mg, oral, Daily  prochlorperazine, 5 mg, oral, " Once  sennosides-docusate sodium, 2 tablet, oral, BID  sertraline, 50 mg, oral, Daily  sodium bicarbonate, 1,950 mg, oral, TID  sulfamethoxazole-trimethoprim, 1 tablet, oral, Daily  tacrolimus, 3 mg, oral, q12h JANIE  tenofovir alafenamide, 25 mg, oral, Daily  torsemide, 50 mg, oral, Daily  valGANciclovir, 450 mg, oral, Every other day      Assessment/Plan    DDKT 11/2023     Kidney allograft function  biopsy 7/5/24 -consistent with T-cell mediated rejection Banff 1B and acute vascular rejection Banff 2B and antibody mediated rejection              Initial therapy included:  Steroid bolus 500 x 3, completed                          Thymo 6 mg/kg - last dose 7/13                          Plex followed by IVIG x 4   7/17 Interval biopsy with minimal improvement    Readmitted with Rising creatinine, edema   Renal US within normal limits   Volume overload, Symptomatic    Decrease torsemide to once daily with concentrated albumin    S/p administer 1.5 mg/kg of thymo 7/26   Continue home tac 3/3, MMF 1000/1000 and pred 20   Transitioned off belatacept.    SOB  Pending VQ scan  Duplex negative    HD 7/29 for symptomatic hypervolemia not responding to diuretics and electrolyte imbalance (Bicarb 17, Na 122)    - SOB resolved  - feeling better overall  - Electrolyte imbalance treated    - previously planned V/Q scan pending  - Likely home tomorrow on Tac MMF and Pred 20  - HD MWF prn  - Repeat Biopsy next week     I spent 35 minutes in the professional and overall care of this patient.      Maryam Bhatt MD

## 2024-07-30 NOTE — NURSING NOTE
4 Eyes Skin Assessment    Reason for assessment? Weekly skin assessment  Does the patient have a wound? no  Wound RN consult placed? N/A  Preventative foam dressing applied? No      Four eyes skin check performed with Yanni Holly.

## 2024-07-30 NOTE — PROGRESS NOTES
Physical Therapy    Physical Therapy Evaluation    Patient Name: Herber Foy Rai  MRN: [id number]  Today's Date: 7/30/2024   Room: [address] [place], [place] [address]  Time Calculation  Start Time: 1212  Stop Time: 1222  Time Calculation (min): 10 min    Assessment/Plan   PT Assessment  Rehab Prognosis: Excellent  Barriers to Discharge: none  Evaluation/Treatment Tolerance: Patient tolerated treatment well  Medical Staff Made Aware: Yes  Strengths: Attitude of self  Barriers to Participation: Comorbidities  End of Session Communication: Bedside nurse  Assessment Comment: 44 year old female admitted with SOB, burning chest pain, bilateral lower extremity swelling, and abdominal pain with swelling. Pt completing all functional mobility grossly with distant supervision and no AD. Pt ambulating ~500ft with no AD and no LOB. No acute PT needs identified at this time. Eval only.  End of Session Patient Position: Bed, 3 rail up, Alarm off, not on at start of session  IP OR SWING BED PT PLAN  Inpatient or Swing Bed: Inpatient  PT Plan  PT Plan: PT Eval only  PT Eval Only Reason: No acute PT needs identified  PT Frequency: PT eval only  PT Discharge Recommendations: No PT needed after discharge, No further acute PT  PT Recommended Transfer Status: Independent  PT - OK to Discharge: Yes (PT eval complete and DC rec made.)    Subjective   General Visit Information:  Reason for Referral: 44 year old female admitted with SOB, burning chest pain, bilateral lower extremity swelling, and abdominal pain with swelling.  Past Medical History Relevant to Rehab: ESRD 2/2 HTN and NSAIDs, that received a DDKT from 11/30/23  Prior to Session Communication: Bedside nurse  Patient Position Received: Up in room, Alarm off, not on at start of session  General Comment: Pt standing in room upon entry. Pt pleasant, cooperative and willing to work with PT.   Home Living:  Home Living  Type of Home: House  Lives With: Spouse, Dependent children  Home Adaptive Equipment: Walker  rolling or standard  Home Layout: One level  Home Access: Level entry  Entrance Stairs-Number of Steps: 1  Bathroom Shower/Tub: Tub/shower unit  Bathroom Toilet: Standard  Bathroom Equipment: Shower chair with back  Prior Level of Function:  Prior Function Per Pt/Caregiver Report  Level of [place]: Independent with ADLs and functional transfers, Independent with homemaking with ambulation  Receives Help From: Family  ADL Assistance: Independent  Homemaking Assistance: Independent  Ambulatory Assistance: Independent  Prior Function Comments: pt denies any falls over the last 6 months    Objective     Pain:  Pain Assessment  Pain Assessment: 0-10  0-10 (Numeric) Pain Score: 0 - No pain  Cognition:  Cognition  Overall Cognitive Status: Within Functional Limits  Orientation Level: Oriented X4    Extremity/Trunk Assessments:  Strength:    RLE   RLE : Within Functional Limits  LLE   LLE : Within Functional Limits    General Assessments:    Static Sitting Balance  Static Sitting-Comment/Number of Minutes: sba  Dynamic Sitting Balance  Dynamic Sitting-Comments: sba  Static Standing Balance  Static Standing-Comment/Number of Minutes: sba  Dynamic Standing Balance  Dynamic Standing-Comments: sba    Functional Assessments:  Bed Mobility  Bed Mobility: Yes  Bed Mobility 1  Bed Mobility 1: Sitting to supine  Level of Assistance 1: Distant supervision  Bed Mobility Comments 1: HOB partially elevated  Transfers  Transfer: Yes  Transfer 1  Technique 1: Stand to sit  Transfer Level of Assistance 1: Distant supervision  Ambulation/Gait Training  Ambulation/Gait Training Performed: Yes  Ambulation/Gait Training 1  Surface 1: Level tile  Device 1: No device  Assistance 1: Distant supervision  Comments/Distance (ft) 1: 500ft    Outcome Measures:  [facility] Basic Mobility  Turning from your back to your side while in a flat bed without using bedrails: None  Moving from lying on your back to sitting on the side of a flat bed without  using bedrails: None  Moving to and from bed to chair (including a wheelchair): None  Standing up from a chair using your arms (e.g. wheelchair or bedside chair): None  To walk in hospital room: None  Climbing 3-5 steps with railing: None  Basic Mobility - Total Score: 24    Education Documentation  Mobility Training, taught by Kristie Wilson PT at 7/30/2024  2:39 PM.  Learner: Patient  Readiness: Acceptance  Method: Explanation  Response: Verbalizes Understanding    Precautions, taught by Kristie Wilson PT at 7/30/2024  2:39 PM.  Learner: Patient  Readiness: Acceptance  Method: Explanation  Response: Verbalizes Understanding    Education Comments  No comments found.      07/30/24 at 2:40 PM   Kristie Wilson PT   Rehab Office: [phone]

## 2024-07-30 NOTE — CARE PLAN
The patient's goals for the shift include      The clinical goals for the shift include pt labs will remsain in WNL      Problem: Respiratory  Goal: Minimal/no exertional discomfort or dyspnea this shift  Outcome: Progressing  Goal: No signs of respiratory distress (eg. Use of accessory muscles. Peds grunting)  Outcome: Progressing     Problem: Chronic Conditions and Co-morbidities  Goal: Patient's chronic conditions and co-morbidity symptoms are monitored and maintained or improved  Outcome: Progressing     Problem: Pain  Goal: Takes deep breaths with improved pain control throughout the shift  Outcome: Progressing  Goal: Turns in bed with improved pain control throughout the shift  Outcome: Progressing  Goal: Walks with improved pain control throughout the shift  Outcome: Progressing  Goal: Performs ADL's with improved pain control throughout shift  Outcome: Progressing  Goal: Participates in PT with improved pain control throughout the shift  Outcome: Progressing  Goal: Free from opioid side effects throughout the shift  Outcome: Progressing  Goal: Free from acute confusion related to pain meds throughout the shift  Outcome: Progressing

## 2024-07-30 NOTE — PROGRESS NOTES
TRANSPLANT NEPHROLOGY :   OUTPATIENT CLINIC NOTE      SERVICE DATE : 03/01/2024    REASON FOR VISIT/CHIEF COMPLAINT:  S/P  TRANSPLANT SURGERY  IMMUNOSUPPRESSIVE MEDICATION MANAGEMENT  BLOOD PRESSURE MANAGEMENT    HPI:    Ms. Leblanc is a 44 y.o. female with past medical history significant for:    ESRD S/p DDKT on 11/30/2023 (Kidney)  Course notable for DGF, washout, IR drain  DGF resolved, excellent renal function    Patient is doing well overall. No new complaints. Denied chest pain, SOB, FORREST, Palpitation. Normal urination and bowel movement. Normal gait and no weakness of arms/legs. No cough, runny nose, sore throat, cold symptoms, or rash. No hearing loss. Normal vision.No problems with his sleep, mood and function. No recent infection, hospitalization, surgery or ER visits.      ROS:  Review of  14 systems was performed system by system. See HPI. Otherwise, the symptoms were negative.    PAST MEDICAL HISTORY:  Past Medical History:   Diagnosis Date    Anxiety     Chronic kidney disease     Chronic kidney disease, unspecified     CKD, patient interested in transplantation    Depression     Disease of thyroid gland     GERD (gastroesophageal reflux disease)     Hypertension     Personal history of COVID-19 07/20/2022    History of COVID-19        PAST SURGICAL HISTORY:  Past Surgical History:   Procedure Laterality Date    MR HEAD ANGIO W AND WO IV CONTRAST  01/26/2021    MR HEAD ANGIO W AND WO IV CONTRAST    MR HEAD ANGIO WO IV CONTRAST  01/26/2021    MR HEAD ANGIO WO IV CONTRAST    OTHER SURGICAL HISTORY  07/20/2022    Arteriovenous fistula creation procedure    TRANSPLANT, KIDNEY, OPEN Right 11/30/2023    US GUIDED PERCUTANEOUS PERITONEAL OR RETROPERITONEAL FLUID COLLECTION DRAINAGE  12/21/2023    US GUIDED PERCUTANEOUS PERITONEAL OR RETROPERITONEAL FLUID COLLECTION DRAINAGE 12/21/2023 Batsheva Shanks MD [facility]        SOCIAL HISTORY:  Social History     Socioeconomic History    Marital status:       Spouse name: Not on file    Number of children: Not on file    Years of education: Not on file    Highest education level: Not on file   Occupational History    Not on file   Tobacco Use    Smoking status: Never     Passive exposure: Never    Smokeless tobacco: Former     Quit date: 2021    Tobacco comments:     Chewing tobacco, quit 1 year ago   Vaping Use    Vaping status: Never Used   Substance and Sexual Activity    Alcohol use: Never    Drug use: Never    Sexual activity: Defer   Other Topics Concern    Not on file   Social History Narrative    Not on file     Social Determinants of Health     Financial Resource Strain: Patient Declined (7/23/2024)    Overall Financial Resource Strain (CARDIA)     Difficulty of Paying Living Expenses: Patient declined   Food Insecurity: No Food Insecurity (7/3/2024)    Hunger Vital Sign     Worried About Running Out of Food in the Last Year: Never true     Ran Out of Food in the Last Year: Never true   Transportation Needs: Patient Declined (7/23/2024)    PRAPARE - Transportation     Lack of Transportation (Medical): Patient declined     Lack of Transportation (Non-Medical): Patient declined   Physical Activity: Inactive (7/3/2024)    Exercise Vital Sign     Days of Exercise per Week: 0 days     Minutes of Exercise per Session: 0 min   Stress: No Stress Concern Present (7/3/2024)    [place] [employer] - Occupational Stress Questionnaire     Feeling of Stress : Not at all   Social Connections: Unknown (7/3/2024)    Social Connection and Isolation Panel [NHANES]     Frequency of Communication with Friends and Family: More than three times a week     Frequency of Social Gatherings with Friends and Family: More than three times a week     Attends [employer] Services: Patient declined     Active Member of Clubs or Organizations: Patient declined     Attends Club or Organization Meetings: Patient declined     Marital Status:    Intimate Partner Violence:  Not At Risk (7/3/2024)    Humiliation, Afraid, Rape, and Kick questionnaire     Fear of Current or Ex-Partner: No     Emotionally Abused: No     Physically Abused: No     Sexually Abused: No   Housing Stability: Patient Declined (7/23/2024)    Housing Stability Vital Sign     Unable to Pay for Housing in the Last Year: Patient declined     Number of Times Moved in the Last Year: 1     Homeless in the Last Year: Patient declined       FAMILY HISTORY:  Family History   Family history unknown: Yes       MEDICATION LIST:  Current Outpatient Medications   Medication Instructions    acetaminophen (TYLENOL) 650 mg, oral, Every 6 hours PRN    albuterol 90 mcg/actuation inhaler 2 puffs, inhalation, Every 4 hours PRN    amLODIPine (NORVASC) 10 mg, oral, Daily    azelastine (Optivar) 0.05 % ophthalmic solution 1 drop, Both Eyes, 2 times daily    calcitriol (Rocaltrol) 0.25 mcg capsule oral, Daily    Calcium Antacid 200 mg calcium (500 mg) chewable tablet CHEW AND SWALLOW 1 TABLET BY MOUTH 2 TIMES DAILY    carvedilol (COREG) 25 mg, oral, 2 times daily    cholecalciferol (VITAMIN D-3) 100 mcg, oral, Daily    clotrimazole (MYCELEX) 10 mg, oral, 3 times daily after meals    fexofenadine (ALLEGRA) 180 mg, oral, Daily    gabapentin (NEURONTIN) 300 mg, oral, Nightly    hydrALAZINE (APRESOLINE) 25 mg, oral, 2 times daily    levothyroxine (SYNTHROID, LEVOXYL) 25 mcg, oral, Daily before breakfast    magnesium oxide (MAG-OX) 400 mg, oral, Daily, Take with food at lunch time    mycophenolate (CELLCEPT) 1,000 mg, oral, Every 12 hours    pantoprazole (ProtoNix) 40 mg EC tablet TAKE 1 TABLET (40 MG) BY MOUTH EVERY MORNING (BEFORE BREAKFAST).    predniSONE (DELTASONE) 20 mg, oral, Daily    sertraline (ZOLOFT) 50 mg, oral, Daily    sodium bicarbonate 1,300 mg, oral, 3 times daily    sulfamethoxazole-trimethoprim (Bactrim) 400-80 mg tablet 1 tablet, oral, Daily    SUMAtriptan (IMITREX) 100 mg, oral, Once as needed    tacrolimus (PROGRAF) 2 mg,  oral, Every 12 hours scheduled (0630,1830)    tenofovir alafenamide (VEMLIDY) 25 mg, oral, Daily    valGANciclovir (VALCYTE) 450 mg, oral, Every other day, Do not crush or chew.       ALLERGY  Allergies   Allergen Reactions    Sumatriptan Headache and Dizziness     Patient has worsening headache, dizziness, chest burning/tingling, and extremity pain upon taking sumatriptan.       PHYSICAL EXAM:    Visit Vitals  /86   Pulse 79   Temp 36.2 °C (97.1 °F) (Temporal)   Wt 78.2 kg (172 lb 4.8 oz)   SpO2 96%   BMI 29.58 kg/m²   Smoking Status Never   BSA 1.88 m²          Vital signs - reviewed. Acceptable BP at this office visit.   General Appearance - NAD, Good speech, oriented and alert  HEENT - Supple. Not pale. No jaundice. No cervical lymphadenopathy. Pharynx and tonsils are not injected.  CVS - RRR. Normal S1/S2. No murmur, click , rub or gallop  Lungs- clear to auscultation bilaterally  Abdomen - soft , not tender, no guarding, no rigidity. No hepatosplenomegaly. Normal bowel sounds. No masses and ascites. S/P Kidney transplant .  Transplanted kidney is not tender.   Musculoskeletal /Extremities - no edema. Full ROM. No joint tenderness.   Neuro/Psych - appropriate mood and affect. Motor power V/V all extremities. CN I -XII were grossly intact.  Skin - No visible rash      LABS:    Lab Results   Component Value Date    WBC 3.6 (L) 07/29/2024    HGB 10.0 (L) 07/29/2024    HCT 30.6 (L) 07/29/2024     07/29/2024    ALT 35 07/22/2024    AST 27 07/22/2024     (L) 07/29/2024    K 4.7 07/29/2024    CL 94 (L) 07/29/2024    CREATININE 3.58 (H) 07/29/2024    BUN 34 (H) 07/29/2024    CO2 17 (L) 07/29/2024    TSH 0.44 07/09/2024    INR 1.0 07/22/2024    HGBA1C 4.8 05/09/2023     par    ASSESSMENT AND PLAN:    Ms. Leblanc is a 44 y.o. female  who is here for follow up s/p kidney transplant.    TRANSPLANT DATE: 11/30/2023 (Kidney)      1. ESRD S/P kidney transplant   - Creatinine last check was :  Lab Results    Component Value Date    CREATININE 3.58 (H) 07/29/2024     Serum creatinine: 3.58 mg/dL (H) 07/29/24 0533  Estimated creatinine clearance: 19.9 mL/min (A)    -stable kidney allograft function  -Random urine protein/creatinine ratio     -Ensure adequate hydration  - Avoid nephrotoxic medications, NSAIDs, and IV contrast.    2. Immunosuppression  -Tacrolimus level last check was acceptable  -Continue current immunosuppression regimen.    3. Electrolytes  Lab Results   Component Value Date    GLUCOSE 101 (H) 07/29/2024    CALCIUM 7.7 (L) 07/29/2024     (L) 07/29/2024    K 4.7 07/29/2024    CO2 17 (L) 07/29/2024    CL 94 (L) 07/29/2024    BUN 34 (H) 07/29/2024    CREATININE 3.58 (H) 07/29/2024     -Acceptable from last lab drawn    4. Hypertension  Blood Pressures         3/1/2024  0952             BP: 141/86          -Home  BP had been acceptable  -Encourage to monitor home BP  -Continue current anti hypertensive medication    5. Bone Mineral Disease/Osteoporosis  Lab Results   Component Value Date    PTH 18.5 06/05/2024    CALCIUM 7.7 (L) 07/29/2024    CAION 0.97 (L) 07/29/2024    PHOS 4.4 07/29/2024    VITD25 35 06/05/2024     -check VIT D, PTH with next lab  - Consider DEXA every 2-3 years , defer to PCP    6.Anemia  Lab Results   Component Value Date    WBC 3.6 (L) 07/29/2024    HGB 10.0 (L) 07/29/2024    HCT 30.6 (L) 07/29/2024    MCV 97 07/29/2024     07/29/2024     -asymptomatic  - Continue to monitor  -check iron studies and ferritin. Will consider DENIZ as needed.  - No indications for blood transfusion     7.Health maintenance and vaccination  - Flu shot during flu season annually  - Cancer screening is up to date per the patient    Summary  Seen and discussed with Dr. Louis , tx surgery  CT scan ordered, tasked SA to schedule  Email to pharm to check on Mary Ellen dose  Allosure box was given in clinic  UPC today with UA and culture  Gabapentin 100mg daily as needed   Labs q weekly  RTC in 3  sumanth Webber    Transplant Nephrology

## 2024-07-30 NOTE — CARE PLAN
The patient's goals for the shift include  pain control.     The clinical goals for the shift include patient will remain HDS throughout this shift.    Over the shift, the patient did make progress towards  her goals, remained stable throughout the shift and controlled her pain with the prescribed pain regime.

## 2024-07-31 ENCOUNTER — PHARMACY VISIT (OUTPATIENT)
Dept: PHARMACY | Facility: CLINIC | Age: 44
End: 2024-07-31
Payer: COMMERCIAL

## 2024-07-31 ENCOUNTER — CLINICAL SUPPORT (OUTPATIENT)
Dept: TRANSPLANT | Facility: HOSPITAL | Age: 44
End: 2024-07-31
Payer: COMMERCIAL

## 2024-07-31 ENCOUNTER — APPOINTMENT (OUTPATIENT)
Dept: DIALYSIS | Facility: HOSPITAL | Age: 44
End: 2024-07-31
Payer: COMMERCIAL

## 2024-07-31 VITALS
DIASTOLIC BLOOD PRESSURE: 84 MMHG | WEIGHT: 191.36 LBS | HEART RATE: 84 BPM | OXYGEN SATURATION: 94 % | HEIGHT: 64 IN | BODY MASS INDEX: 32.67 KG/M2 | SYSTOLIC BLOOD PRESSURE: 170 MMHG | TEMPERATURE: 97.3 F | RESPIRATION RATE: 18 BRPM

## 2024-07-31 LAB
ALBUMIN SERPL BCP-MCNC: 3.4 G/DL (ref 3.4–5)
ALLOSURE SCORE - KIDNEY: 1.9 %
ANION GAP SERPL CALC-SCNC: 15 MMOL/L (ref 10–20)
BUN SERPL-MCNC: 21 MG/DL (ref 6–23)
CA-I BLD-SCNC: 1.04 MMOL/L (ref 1.1–1.33)
CALCIUM SERPL-MCNC: 7.5 MG/DL (ref 8.6–10.6)
CAREDX_ORDER_ID: NORMAL
CENTER_ORDER_ID: NORMAL
CHLORIDE SERPL-SCNC: 94 MMOL/L (ref 98–107)
CLIENT SPECIMEN ID - ALLOSURE: NORMAL
CO2 SERPL-SCNC: 23 MMOL/L (ref 21–32)
CREAT SERPL-MCNC: 2.88 MG/DL (ref 0.5–1.05)
DONOR RELATION - ALLOSURE: NORMAL
EGFRCR SERPLBLD CKD-EPI 2021: 20 ML/MIN/1.73M*2
ERYTHROCYTE [DISTWIDTH] IN BLOOD BY AUTOMATED COUNT: 17 % (ref 11.5–14.5)
GLUCOSE SERPL-MCNC: 73 MG/DL (ref 74–99)
HCT VFR BLD AUTO: 30.3 % (ref 36–46)
HGB BLD-MCNC: 9.6 G/DL (ref 12–16)
HLA RESULTS: NORMAL
HLA-A+B+C AB NFR SER: NORMAL %
HLA-DP+DQ+DR AB NFR SER: NORMAL %
MAGNESIUM SERPL-MCNC: 2.01 MG/DL (ref 1.6–2.4)
MCH RBC QN AUTO: 31.2 PG (ref 26–34)
MCHC RBC AUTO-ENTMCNC: 31.7 G/DL (ref 32–36)
MCV RBC AUTO: 98 FL (ref 80–100)
NOTES - ALLOSURE: NORMAL
NRBC BLD-RTO: 0 /100 WBCS (ref 0–0)
PHOSPHATE SERPL-MCNC: 3.8 MG/DL (ref 2.5–4.9)
PLATELET # BLD AUTO: 157 X10*3/UL (ref 150–450)
POTASSIUM SERPL-SCNC: 4.8 MMOL/L (ref 3.5–5.3)
RBC # BLD AUTO: 3.08 X10*6/UL (ref 4–5.2)
RELATIVE CHANGE VALUE - KIDNEY: NORMAL
SODIUM SERPL-SCNC: 127 MMOL/L (ref 136–145)
TACROLIMUS BLD-MCNC: 11.6 NG/ML
TEST COMMENTS - ALLOSURE: NORMAL
TIME POST TX - ALLOSURE: NORMAL
TRANSPLANTED ORGAN - ALLOSURE: NORMAL
TX DATE - ALLOSURE/ALLOMAP: NORMAL
WBC # BLD AUTO: 3.4 X10*3/UL (ref 4.4–11.3)
WP_ORDER_ID: NORMAL

## 2024-07-31 PROCEDURE — 2500000001 HC RX 250 WO HCPCS SELF ADMINISTERED DRUGS (ALT 637 FOR MEDICARE OP): Performed by: STUDENT IN AN ORGANIZED HEALTH CARE EDUCATION/TRAINING PROGRAM

## 2024-07-31 PROCEDURE — 2500000004 HC RX 250 GENERAL PHARMACY W/ HCPCS (ALT 636 FOR OP/ED): Performed by: STUDENT IN AN ORGANIZED HEALTH CARE EDUCATION/TRAINING PROGRAM

## 2024-07-31 PROCEDURE — 80197 ASSAY OF TACROLIMUS: CPT

## 2024-07-31 PROCEDURE — 2500000004 HC RX 250 GENERAL PHARMACY W/ HCPCS (ALT 636 FOR OP/ED): Mod: JZ

## 2024-07-31 PROCEDURE — 80069 RENAL FUNCTION PANEL: CPT

## 2024-07-31 PROCEDURE — 2500000004 HC RX 250 GENERAL PHARMACY W/ HCPCS (ALT 636 FOR OP/ED)

## 2024-07-31 PROCEDURE — 8010000001 HC DIALYSIS - HEMODIALYSIS PER DAY

## 2024-07-31 PROCEDURE — 99238 HOSP IP/OBS DSCHRG MGMT 30/<: CPT

## 2024-07-31 PROCEDURE — 2500000001 HC RX 250 WO HCPCS SELF ADMINISTERED DRUGS (ALT 637 FOR MEDICARE OP)

## 2024-07-31 PROCEDURE — 82330 ASSAY OF CALCIUM: CPT

## 2024-07-31 PROCEDURE — 99232 SBSQ HOSP IP/OBS MODERATE 35: CPT | Performed by: STUDENT IN AN ORGANIZED HEALTH CARE EDUCATION/TRAINING PROGRAM

## 2024-07-31 PROCEDURE — 85027 COMPLETE CBC AUTOMATED: CPT

## 2024-07-31 PROCEDURE — 99233 SBSQ HOSP IP/OBS HIGH 50: CPT | Performed by: HOSPITALIST

## 2024-07-31 PROCEDURE — RXMED WILLOW AMBULATORY MEDICATION CHARGE

## 2024-07-31 PROCEDURE — 83735 ASSAY OF MAGNESIUM: CPT

## 2024-07-31 PROCEDURE — 2500000002 HC RX 250 W HCPCS SELF ADMINISTERED DRUGS (ALT 637 FOR MEDICARE OP, ALT 636 FOR OP/ED): Performed by: STUDENT IN AN ORGANIZED HEALTH CARE EDUCATION/TRAINING PROGRAM

## 2024-07-31 PROCEDURE — 36415 COLL VENOUS BLD VENIPUNCTURE: CPT

## 2024-07-31 RX ORDER — TACROLIMUS 1 MG/1
2 CAPSULE ORAL
Qty: 120 CAPSULE | Refills: 0 | Status: SHIPPED | OUTPATIENT
Start: 2024-07-31 | End: 2024-08-26 | Stop reason: SDUPTHER

## 2024-07-31 RX ORDER — TACROLIMUS 1 MG/1
2 CAPSULE ORAL
Status: DISCONTINUED | OUTPATIENT
Start: 2024-07-31 | End: 2024-07-31 | Stop reason: HOSPADM

## 2024-07-31 RX ORDER — TORSEMIDE 20 MG/1
50 TABLET ORAL DAILY
Qty: 75 TABLET | Refills: 0 | Status: SHIPPED | OUTPATIENT
Start: 2024-07-31 | End: 2024-08-07

## 2024-07-31 RX ORDER — CALCIUM GLUCONATE 20 MG/ML
2 INJECTION, SOLUTION INTRAVENOUS ONCE
Status: COMPLETED | OUTPATIENT
Start: 2024-07-31 | End: 2024-07-31

## 2024-07-31 RX ORDER — HYDRALAZINE HYDROCHLORIDE 50 MG/1
50 TABLET, FILM COATED ORAL 2 TIMES DAILY
Qty: 60 TABLET | Refills: 0 | Status: SHIPPED | OUTPATIENT
Start: 2024-07-31 | End: 2024-09-09 | Stop reason: SDUPTHER

## 2024-07-31 RX ORDER — TACROLIMUS 1 MG/1
3 CAPSULE ORAL
Qty: 180 CAPSULE | Refills: 0 | Status: SHIPPED | OUTPATIENT
Start: 2024-07-31 | End: 2024-07-31

## 2024-07-31 RX ORDER — CALCITRIOL 0.25 UG/1
0.75 CAPSULE ORAL DAILY
Qty: 90 CAPSULE | Refills: 0 | Status: SHIPPED | OUTPATIENT
Start: 2024-08-01 | End: 2024-08-26 | Stop reason: ALTCHOICE

## 2024-07-31 RX ADMIN — HEPARIN SODIUM 5000 UNITS: 5000 INJECTION INTRAVENOUS; SUBCUTANEOUS at 15:47

## 2024-07-31 RX ADMIN — HYDRALAZINE HYDROCHLORIDE 50 MG: 25 TABLET ORAL at 09:13

## 2024-07-31 RX ADMIN — SULFAMETHOXAZOLE AND TRIMETHOPRIM 1 TABLET: 400; 80 TABLET ORAL at 09:14

## 2024-07-31 RX ADMIN — CARVEDILOL 25 MG: 25 TABLET, FILM COATED ORAL at 09:13

## 2024-07-31 RX ADMIN — PREDNISONE 20 MG: 20 TABLET ORAL at 09:13

## 2024-07-31 RX ADMIN — SODIUM BICARBONATE 650 MG TABLET 1950 MG: at 15:47

## 2024-07-31 RX ADMIN — TORSEMIDE 50 MG: 20 TABLET ORAL at 09:21

## 2024-07-31 RX ADMIN — VALGANCICLOVIR 450 MG: 450 TABLET, FILM COATED ORAL at 09:12

## 2024-07-31 RX ADMIN — TACROLIMUS 3 MG: 1 CAPSULE ORAL at 06:00

## 2024-07-31 RX ADMIN — CLOTRIMAZOLE 10 MG: 10 LOZENGE ORAL at 09:12

## 2024-07-31 RX ADMIN — CALCIUM CARBONATE (ANTACID) CHEW TAB 500 MG 500 MG: 500 CHEW TAB at 09:14

## 2024-07-31 RX ADMIN — CALCITRIOL 0.5 MCG: 0.5 CAPSULE ORAL at 09:21

## 2024-07-31 RX ADMIN — AMLODIPINE BESYLATE 10 MG: 10 TABLET ORAL at 09:13

## 2024-07-31 RX ADMIN — MAGNESIUM OXIDE TAB 400 MG (241.3 MG ELEMENTAL MG) 400 MG: 400 (241.3 MG) TAB at 09:13

## 2024-07-31 RX ADMIN — MYCOPHENOLATE MOFETIL 1000 MG: 250 CAPSULE ORAL at 09:13

## 2024-07-31 RX ADMIN — CALCIUM GLUCONATE 2 G: 20 INJECTION, SOLUTION INTRAVENOUS at 09:33

## 2024-07-31 RX ADMIN — PANTOPRAZOLE SODIUM 40 MG: 40 TABLET, DELAYED RELEASE ORAL at 06:00

## 2024-07-31 RX ADMIN — LEVOTHYROXINE SODIUM 25 MCG: 0.05 TABLET ORAL at 06:01

## 2024-07-31 RX ADMIN — SERTRALINE HYDROCHLORIDE 50 MG: 50 TABLET ORAL at 09:21

## 2024-07-31 RX ADMIN — Medication 4000 UNITS: at 09:13

## 2024-07-31 RX ADMIN — TENOFOVIR ALAFENAMIDE 25 MG: 25 TABLET ORAL at 09:21

## 2024-07-31 RX ADMIN — CLOTRIMAZOLE 10 MG: 10 LOZENGE ORAL at 15:46

## 2024-07-31 RX ADMIN — PANTOPRAZOLE SODIUM 40 MG: 40 TABLET, DELAYED RELEASE ORAL at 15:47

## 2024-07-31 RX ADMIN — HEPARIN SODIUM 5000 UNITS: 5000 INJECTION INTRAVENOUS; SUBCUTANEOUS at 06:00

## 2024-07-31 RX ADMIN — SENNOSIDES AND DOCUSATE SODIUM 2 TABLET: 50; 8.6 TABLET ORAL at 09:14

## 2024-07-31 RX ADMIN — SODIUM BICARBONATE 650 MG TABLET 1950 MG: at 09:12

## 2024-07-31 ASSESSMENT — COGNITIVE AND FUNCTIONAL STATUS - GENERAL
DAILY ACTIVITIY SCORE: 24
MOBILITY SCORE: 24

## 2024-07-31 ASSESSMENT — PAIN - FUNCTIONAL ASSESSMENT: PAIN_FUNCTIONAL_ASSESSMENT: NO/DENIES PAIN

## 2024-07-31 ASSESSMENT — PAIN SCALES - GENERAL: PAINLEVEL_OUTOF10: 0 - NO PAIN

## 2024-07-31 NOTE — DISCHARGE INSTR - OTHER ORDERS
Dialysis MWF @ 12p  You will go to dialysis Friday 8/2 @ 12 p and they will draw your labs there  Biopsy Monday 8/5 @ 8 am  Dialysis Monday 8/5 @ 12p after biopsy, the nephrologist will see you at dialysis  Tacrolimus 2 mg every 12 hours  Mycophenolate 1000 mg every 12 hours  Prednisone 20 mg daily  Clinic 8/12 @ 11 am

## 2024-07-31 NOTE — CARE PLAN
The patient's goals for the shift include  Patient will remain free from any new injury throughout day shift.    The clinical goals for the shift include Patient will remain free from any new injury throughout night shift.

## 2024-07-31 NOTE — NURSING NOTE
Report from Sending RN:    Report From: Agueda ( OCTAVIO)  Recent Surgery of Procedure: No  Baseline Level of Consciousness (LOC): a/o x 4  Oxygen Use: No  Type: none  Diabetic: No  Last BP Med Given Day of Dialysis: torsemide 50 mg, 0921 am, hydralazine 50 mg, 0913 am, coreg 25 mg  0913 am; Norvasc 10 mg, 0913 am  Last Pain Med Given: none  Lab Tests to be Obtained with Dialysis: No  Blood Transfusion to be Given During Dialysis: No  Available IV Access: Yes  Medications to be Administered During Dialysis: No  Continuous IV Infusion Running: No  Restraints on Currently or in the Last 24 Hours: No  Hand-Off Communication: No acute overnight or morning events; vss; Pt did take morning medications; Pt will not need labs; Pt is a full code; Christy Welch RN.  Dialysis Catheter Dressing: left arm fistula  Last Dressing Change: none

## 2024-07-31 NOTE — DISCHARGE SUMMARY
Discharge Diagnosis  Bilateral lower extremity edema    Issues Requiring Follow-Up  Immunosuppression  Kidney Allograft function      Test Results Pending At Discharge  Pending Labs       Order Current Status    AlloSure Kidney In process            Hospital Course  Herber Foy Rai is a 44 y.o. female with ESRD 2/2 HTN and NSAIDs, that received a DDKT from 11/30/23 who was recently admitted from 7/2/24 to 7/19/24 for acute rejection and treated with 3 days of methylprednisone 250, 4 doses of thymo (4.5 mg/kg total), and 5 rounds of plasmapharesis. She was discharged on 7/19/24 with tacro 2 mg BID, pred 20 QD, MMF 1g BID. She represented to the ED on 7/22 for increased swelling in her bilateral LE and abdomen, early satiety, and epigastric burning. Initial ED workup including LE duplex for DVT, troponins, pancreatic enzymes, were all negative. CXR showed mild bilateral pulmonary infiltrates. Blood and urine cultures as well.     Admission labs were notable for a Cr of 3.13 and Na of 122. She was diuresed with bumex on 7/22 and 7/23 with interval improvement of edema. Given some improvement in edema with diuresis but continued mild extremity and facial swelling, she was started on Torsemide 50mg every day-BID depending on how she tolerated. A VQ scan was obtained and was negative.     She was given 1.5 mg/kg thymo on 7/26. She received hemodialysis on 7/29 d/t pt complaints of shortness of breath and hyponatremia. 4.4 L were removed during the session and pt reports improvement of shortness of breath. She is tolerating a low potassium diet and is stable for discharge home today. She has a tentative dialysis chair MWF @ 1200.          Pertinent Physical Exam At Time of Discharge  Physical Exam  Constitutional:       Appearance: Normal appearance.   HENT:      Mouth/Throat:      Mouth: Mucous membranes are moist.   Eyes:      Extraocular Movements: Extraocular movements intact.   Cardiovascular:      Rate and Rhythm:  Normal rate and regular rhythm.      Heart sounds: Normal heart sounds.   Pulmonary:      Effort: Pulmonary effort is normal.      Breath sounds: Normal breath sounds.   Abdominal:      Palpations: Abdomen is soft.   Musculoskeletal:      Right lower leg: Edema present.      Left lower leg: Edema present.   Skin:     General: Skin is warm and dry.   Neurological:      General: No focal deficit present.      Mental Status: She is alert.   Psychiatric:         Behavior: Behavior normal.         Thought Content: Thought content normal.         Home Medications     Medication List      START taking these medications     torsemide 20 mg tablet; Commonly known as: Demadex; Take 2.5 tablets (50   mg) by mouth once daily.     CHANGE how you take these medications     calcitriol 0.25 mcg capsule; Commonly known as: Rocaltrol; Take 3   capsules (0.75 mcg) by mouth once daily. Do not fill before August 1, 2024.; Start taking on: August 1, 2024; What changed: how much to take   Calcium Antacid 200 mg calcium (500 mg) chewable tablet; Generic drug:   calcium carbonate; CHEW AND SWALLOW 1 TABLET BY MOUTH 2 TIMES DAILY; What   changed: See the new instructions.   hydrALAZINE 50 mg tablet; Commonly known as: Apresoline; Take 1 tablet   (50 mg) by mouth 2 times a day.; What changed: medication strength, how   much to take   pantoprazole 40 mg EC tablet; Commonly known as: ProtoNix; TAKE 1 TABLET   (40 MG) BY MOUTH EVERY MORNING (BEFORE BREAKFAST).; What changed: See the   new instructions.     CONTINUE taking these medications     acetaminophen 325 mg tablet; Commonly known as: Tylenol   albuterol 90 mcg/actuation inhaler   amLODIPine 5 mg tablet; Commonly known as: Norvasc; Take 2 tablets (10   mg) by mouth once daily.   azelastine 0.05 % ophthalmic solution; Commonly known as: Optivar;   Administer 1 drop into both eyes 2 times a day.   carvedilol 25 mg tablet; Commonly known as: Coreg; Take 1 tablet (25 mg)   by mouth 2 times a  day.; Notes to patient: Do not take if systolic blood   pressure is less than 110 or heart rate is less than 55.   cholecalciferol 50 MCG (2000 UT) tablet; Commonly known as: Vitamin D-3;   Take 2 tablets (100 mcg) by mouth once daily.   clotrimazole 10 mg samia; Commonly known as: Mycelex; Take 1 tablet (10   mg) by mouth 3 times a day after meals.; Notes to patient: Do not chew or   swallow. Let dissolve on tongue and do not eat or drink anything for 15   minutes after taking.   gabapentin 100 mg capsule; Commonly known as: Neurontin; Take 3 capsules   (300 mg) by mouth once daily at bedtime.   levothyroxine 25 mcg tablet; Commonly known as: Synthroid, Levoxyl   magnesium oxide 400 mg tablet; Commonly known as: Mag-Ox; Take 1 tablet   (400 mg) by mouth once daily. Take with food at lunch time   mycophenolate 250 mg capsule; Commonly known as: Cellcept; Take 4   capsules (1,000 mg) by mouth every 12 hours.   predniSONE 5 mg tablet; Commonly known as: Deltasone; Take 4 tablets (20   mg) by mouth once daily.   sertraline 50 mg tablet; Commonly known as: Zoloft   sodium bicarbonate 650 mg tablet; Take 2 tablets (1,300 mg) by mouth 3   times a day.   sulfamethoxazole-trimethoprim 400-80 mg tablet; Commonly known as:   Bactrim; Take 1 tablet by mouth once daily.   SUMAtriptan 100 mg tablet; Commonly known as: Imitrex; Take 1 tablet   (100 mg) by mouth 1 time if needed for migraine.   tacrolimus 1 mg capsule; Commonly known as: Prograf; Take 2 capsules (2   mg) by mouth every 12 hours.   tenofovir alafenamide 25 mg tablet tablet; Commonly known as: Vemlidy;   Take 1 tablet (25 mg) by mouth once daily.   valGANciclovir 450 mg tablet; Commonly known as: Valcyte; Take 1 tablet   (450 mg) by mouth every other day. Do not crush or chew. Do not fill   before July 20, 2024.     STOP taking these medications     fexofenadine 180 mg tablet; Commonly known as: Allegra       Outpatient Follow-Up  Future Appointments   Date Time  Provider Department Center   8/5/2024  9:00 AM [facility] ULTRASOUND 3 [facility]   8/7/2024 10:00 AM [facility] KIDNEY PHYSICIAN [id number]       Emerald Aggarwal APRN-CNP

## 2024-07-31 NOTE — NURSING NOTE
Transplant Coordinator Note    Patient was re-admitted on 7/22 for BLE. Cr elevated to 3.13 at time of admission and hyponatremia. LE duplex for DVT negative, VQ scan negative. Pt was initially started on Bumex with minimal improvement so she was changed to Torsemide 50 mg BID.     On 7/26, she received 1.5 mg/kg of Thymo since Cr continued to rise. On 7/29 she received dialysis and symptoms improved.     Outpatient Plan:  Low K diet  Dialysis MWF @ 12p  Dialysis definitely Friday 8/2 and Monday 8/5  Kidney Transplant biopsy 8/5 @ 8 am  Clinic 8/12 @ 11am  Labs at dialysis  Tacro 2 mg BID, MMF 1g BID, pred 20 mg daily

## 2024-07-31 NOTE — NURSING NOTE
Report to Receiving RN:    Report To: Agueda CUNNINGHAM via secure chat  Time Report Called: 1430  Hand-Off Communication: pt tolerated HD tx well with a 2L fluid removal. Post bp: 117/68 p: 84  Complications During Treatment: No  Ultrafiltration Treatment: Yes  Medications Administered During Dialysis: No  Blood Products Administered During Dialysis: No  Labs Sent During Dialysis: No  Heparin Drip Rate Changes: N/A  Dialysis Catheter Dressing: fistula  Last Dressing Change: n/a    Last Updated: 2:25 PM by SONI APRIL

## 2024-07-31 NOTE — PROGRESS NOTES
Transplant Nephrology progress note     Date of admission: 7/22/2024     Herber Foy Rai is a 44 y.o.  with PMH   Past Medical History:   Diagnosis Date    Anxiety     Chronic kidney disease     Chronic kidney disease, unspecified     CKD, patient interested in transplantation    Depression     Disease of thyroid gland     GERD (gastroesophageal reflux disease)     Hypertension     Personal history of COVID-19 07/20/2022    History of COVID-19        SUBJECTIVE:  Complained of on and off for shortness of breath.  Planning discharge today.    PROBLEM LIST:  Principal Problem:    Bilateral lower extremity edema  Active Problems:    Hyponatremia    Urinary tract infection         ALLERGIES:  Allergies   Allergen Reactions    Sumatriptan Headache and Dizziness     Patient has worsening headache, dizziness, chest burning/tingling, and extremity pain upon taking sumatriptan.            CURRENT MEDICATIONS:  Scheduled medications  amLODIPine, 10 mg, oral, Daily  [START ON 8/1/2024] calcitriol, 0.75 mcg, oral, Daily  calcium carbonate, 500 mg, oral, BID  carvedilol, 25 mg, oral, BID  cholecalciferol, 4,000 Units, oral, Daily  clotrimazole, 10 mg, oral, TID after meals  gabapentin, 300 mg, oral, Nightly  heparin (porcine), 5,000 Units, subcutaneous, q8h  hydrALAZINE, 50 mg, oral, BID  levothyroxine, 25 mcg, oral, Daily before breakfast  magnesium oxide, 400 mg, oral, Daily  mycophenolate, 1,000 mg, oral, q12h  pantoprazole, 40 mg, oral, BID AC  polyethylene glycol, 17 g, oral, Daily  predniSONE, 20 mg, oral, Daily  sennosides-docusate sodium, 2 tablet, oral, BID  sertraline, 50 mg, oral, Daily  sodium bicarbonate, 1,950 mg, oral, TID  sulfamethoxazole-trimethoprim, 1 tablet, oral, Daily  tacrolimus, 2 mg, oral, q12h JANIE  tenofovir alafenamide, 25 mg, oral, Daily  torsemide, 50 mg, oral, Daily  valGANciclovir, 450 mg, oral, Every other day      Continuous medications     PRN medications  PRN medications: acetaminophen,  "albuterol, ondansetron ODT **OR** ondansetron, prochlorperazine       OBJECTIVE:    VITALS: Visit Vitals  /84 (BP Location: Right arm, Patient Position: Lying)   Pulse 84   Temp 36.3 °C (97.3 °F) (Temporal)   Resp 18   Ht 1.626 m (5' 4\")   Wt 86.8 kg (191 lb 5.8 oz)   LMP  (LMP Unknown)   SpO2 94%   BMI 32.85 kg/m²   OB Status Unknown   Smoking Status Never   BSA 1.98 m²        General: No distress   Mucosa moist   AI, AC, AF     HEENT: PEERLA  CVS: S1 S2 no murmurs  RESP:  Lungs clear to auscultation   ABDO: Soft, non-tender   Neuro: A + O x 3  Skin: No rash   Extremities: +2-3edema       LABS:  Results from last 72 hours   Lab Units 07/31/24  0521   WBC AUTO x10*3/uL 3.4*   HEMOGLOBIN g/dL 9.6*   MCV fL 98   PLATELETS AUTO x10*3/uL 157   BUN mg/dL 21   CREATININE mg/dL 2.88*   CALCIUM mg/dL 7.5*   TACROLIMUS ng/mL 11.6            Intake/Output Summary (Last 24 hours) at 7/31/2024 1606  Last data filed at 7/31/2024 1400  Gross per 24 hour   Intake 1340 ml   Output 4200 ml   Net -2860 ml          ASSESSMENT AND PLAN:    44 y.o. [place] F with ESRD secondary to HTN/NSAID who is s/p DDKT on 11/30/23 (Dr. Marbin Lambert & Dr. Louis, KDPI 56%, PRA 48%. HCV -/-, CMV D+/R+, EBV D+/R+). Pt received a total of 4.5 mg/kg thymoglobulin induction therapy in conjunction with solumedrol, was initiated on standard triple immunosuppression therapy.  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  Patient had DGF with eventual renal recovery.  -Hospital admission in May in the setting of CMV disease, new kidney stone.  -Last hospital admission as of 6/13/2024 found to have ACR 1 B+ acute vascular rejection + active AMR with DQ 2 10,158-s/p pulse steroids, Tylenol 6 mg/kg, Plex x 5 and creatinine  trended to 2.1-2.3.  Transitioned to tacrolimus.   - Rpt bx 7/17/24 with persistent ACR 1A, AVR IIA, ABMR although the degree of inflammation appears improved from prior bx. "      Currently readmitted with worsening Cr (3.28), hypervolemia.     Allograft function:  -Worsening kidney function with volume overload-started on dialysis this admission and planning Monday Wednesday Friday going along at noon.  -Hyponatremia likely hypervolemic will get corrected with the dialysis.    -Last AlloSure as of 6/27/2024 greater than 16 need repeat and repeat DSA.  -Monitor kidney function closely.    Infectious prophylaxis:  -CMV check as of 7/23/2024 negative  -EBV negative as of 7/23/2024.  -BK not detected as of 7/23/2024.  -Patient is on Vemlidy for hepatitis B prophylaxis, Bactrim, clotrimazole, Valcyte.    Immunosuppression:  -Continue with the 1 g twice daily mycophenolate, tapering prednisone to 20 mg daily, tacrolimus 2mg twice daily.  Aim tacrolimus levels 8-10.    Hemodynamics:  -Blood pressures are elevated continue with the amlodipine, Coreg 25 twice daily, hydralazine 50 twice daily.  -On torsemide 50 daily.  -Need further adjustment in the clinic after dialysis.    Anemia/leukopenia: Monitor hemoglobin closely no indication for DENIZ.    Bone mineral disease: Calcium level is lower normal range continue with Tums and vitamin D supplementation.  Recent vitamin D check is around 35 continue monitoring.       Thank you for consulting .  Billie Mcfarland MD       Notes created by Amy -Please excuse the Typos .

## 2024-07-31 NOTE — PROGRESS NOTES
"Herber Foy Rai is a 44 y.o. female now POD # 244 s/p DDKT transplant.    Subjective   Feeling better this morning without SOB  Pedal edema improved  UOP 1L with diuresis       Objective   Physical Exam  Gen: A+OX3; NAD  Abd: S/NT/ND. Incision C/D/I.  Ext: 1+ LE edema    Last Recorded Vitals  Blood pressure 170/84, pulse 76, temperature 36.5 °C (97.7 °F), temperature source Temporal, resp. rate 18, height 1.626 m (5' 4\"), weight 86.8 kg (191 lb 5.8 oz), SpO2 94%.  Intake/Output last 3 Shifts:  I/O last 3 completed shifts:  In: 360 (4.1 mL/kg) [P.O.:360]  Out: 1300 (15 mL/kg) [Urine:1300 (0.4 mL/kg/hr)]  Weight: 86.8 kg      Lab Results   Component Value Date    CREATININE 2.88 (H) 07/31/2024    K 4.8 07/31/2024    GLUCOSE 73 (L) 07/31/2024    HGB 9.6 (L) 07/31/2024    WBC 3.4 (L) 07/31/2024     07/31/2024    CALCIUM 7.5 (L) 07/31/2024     Lab Results   Component Value Date    WBC 3.4 (L) 07/31/2024    HGB 9.6 (L) 07/31/2024    HCT 30.3 (L) 07/31/2024    MCV 98 07/31/2024     07/31/2024     Lab Results   Component Value Date    GLUCOSE 73 (L) 07/31/2024    CALCIUM 7.5 (L) 07/31/2024     (L) 07/31/2024    K 4.8 07/31/2024    CO2 23 07/31/2024    CL 94 (L) 07/31/2024    BUN 21 07/31/2024    CREATININE 2.88 (H) 07/31/2024     amLODIPine, 10 mg, oral, Daily  [START ON 8/1/2024] calcitriol, 0.75 mcg, oral, Daily  calcium carbonate, 500 mg, oral, BID  carvedilol, 25 mg, oral, BID  cholecalciferol, 4,000 Units, oral, Daily  clotrimazole, 10 mg, oral, TID after meals  gabapentin, 300 mg, oral, Nightly  heparin (porcine), 5,000 Units, subcutaneous, q8h  hydrALAZINE, 50 mg, oral, BID  levothyroxine, 25 mcg, oral, Daily before breakfast  magnesium oxide, 400 mg, oral, Daily  mycophenolate, 1,000 mg, oral, q12h  pantoprazole, 40 mg, oral, BID AC  polyethylene glycol, 17 g, oral, Daily  predniSONE, 20 mg, oral, Daily  sennosides-docusate sodium, 2 tablet, oral, BID  sertraline, 50 mg, oral, Daily  sodium " bicarbonate, 1,950 mg, oral, TID  sulfamethoxazole-trimethoprim, 1 tablet, oral, Daily  tacrolimus, 3 mg, oral, q12h JANIE  tenofovir alafenamide, 25 mg, oral, Daily  torsemide, 50 mg, oral, Daily  valGANciclovir, 450 mg, oral, Every other day      Assessment/Plan    DDKT 11/2023     Kidney allograft function  biopsy 7/5/24 -consistent with T-cell mediated rejection Banff 1B and acute vascular rejection Banff 2B and antibody mediated rejection              Initial therapy included:  Steroid bolus 500 x 3, completed                          Thymo 6 mg/kg - last dose 7/13                          Plex followed by IVIG x 4   7/17 Interval biopsy with minimal improvement    Readmitted with Rising creatinine, edema   Renal US within normal limits   Volume overload, Symptomatic    Decrease torsemide to once daily with concentrated albumin    S/p administer 1.5 mg/kg of thymo 7/26   Continue home tac 3/3, MMF 1000/1000 and pred 20   Transitioned off belatacept.    SOB  Pending VQ scan  Duplex negative    HD 7/29 for symptomatic hypervolemia not responding to diuretics and electrolyte imbalance (Bicarb 17, Na 122)    - SOB resolved  - feeling better overall  - Electrolyte imbalance treated    - previously planned V/Q scan completed not diagnostic of PE  - previous BLE duplex unremarkable  - Likely home today after HD   - Tac MMF and Pred 20  - HD MWF prn  - Repeat Biopsy next Monday     I spent 35 minutes in the professional and overall care of this patient.      Maryam Bhatt MD

## 2024-08-01 ENCOUNTER — LAB (OUTPATIENT)
Dept: LAB | Facility: LAB | Age: 44
End: 2024-08-01
Payer: COMMERCIAL

## 2024-08-01 DIAGNOSIS — Z94.0 KIDNEY REPLACED BY TRANSPLANT (HHS-HCC): ICD-10-CM

## 2024-08-01 LAB
ALBUMIN SERPL BCP-MCNC: 3.7 G/DL (ref 3.4–5)
ANION GAP SERPL CALC-SCNC: 11 MMOL/L (ref 10–20)
BUN SERPL-MCNC: 22 MG/DL (ref 6–23)
CALCIUM SERPL-MCNC: 8 MG/DL (ref 8.6–10.3)
CHLORIDE SERPL-SCNC: 97 MMOL/L (ref 98–107)
CO2 SERPL-SCNC: 26 MMOL/L (ref 21–32)
CREAT SERPL-MCNC: 2.91 MG/DL (ref 0.5–1.05)
EGFRCR SERPLBLD CKD-EPI 2021: 20 ML/MIN/1.73M*2
ERYTHROCYTE [DISTWIDTH] IN BLOOD BY AUTOMATED COUNT: 17.6 % (ref 11.5–14.5)
GLUCOSE SERPL-MCNC: 143 MG/DL (ref 74–99)
HCT VFR BLD AUTO: 30.2 % (ref 36–46)
HGB BLD-MCNC: 9.5 G/DL (ref 12–16)
MAGNESIUM SERPL-MCNC: 1.79 MG/DL (ref 1.6–2.4)
MCH RBC QN AUTO: 32.3 PG (ref 26–34)
MCHC RBC AUTO-ENTMCNC: 31.5 G/DL (ref 32–36)
MCV RBC AUTO: 103 FL (ref 80–100)
NRBC BLD-RTO: 0 /100 WBCS (ref 0–0)
PHOSPHATE SERPL-MCNC: 3.3 MG/DL (ref 2.5–4.9)
PLATELET # BLD AUTO: 115 X10*3/UL (ref 150–450)
POTASSIUM SERPL-SCNC: 5.2 MMOL/L (ref 3.5–5.3)
RBC # BLD AUTO: 2.94 X10*6/UL (ref 4–5.2)
SODIUM SERPL-SCNC: 129 MMOL/L (ref 136–145)
WBC # BLD AUTO: 5.1 X10*3/UL (ref 4.4–11.3)

## 2024-08-01 PROCEDURE — 83735 ASSAY OF MAGNESIUM: CPT

## 2024-08-01 PROCEDURE — 85027 COMPLETE CBC AUTOMATED: CPT

## 2024-08-01 PROCEDURE — 80069 RENAL FUNCTION PANEL: CPT

## 2024-08-01 PROCEDURE — 36415 COLL VENOUS BLD VENIPUNCTURE: CPT

## 2024-08-02 ENCOUNTER — HOSPITAL ENCOUNTER (OUTPATIENT)
Dept: DIALYSIS | Facility: HOSPITAL | Age: 44
Setting detail: DIALYSIS SERIES
Discharge: HOME | End: 2024-08-02
Payer: COMMERCIAL

## 2024-08-02 VITALS — HEART RATE: 77 BPM | TEMPERATURE: 97.1 F

## 2024-08-02 DIAGNOSIS — T86.11 ACUTE REJECTION OF KIDNEY TRANSPLANT (HHS-HCC): Primary | ICD-10-CM

## 2024-08-02 DIAGNOSIS — T86.11 ACUTE REJECTION OF KIDNEY TRANSPLANT (HHS-HCC): ICD-10-CM

## 2024-08-02 LAB
CMV DNA SERPL NAA+PROBE-LOG IU: NORMAL {LOG_IU}/ML
LABORATORY COMMENT REPORT: NOT DETECTED

## 2024-08-02 ASSESSMENT — PAIN - FUNCTIONAL ASSESSMENT
PAIN_FUNCTIONAL_ASSESSMENT: NO/DENIES PAIN
PAIN_FUNCTIONAL_ASSESSMENT: NO/DENIES PAIN

## 2024-08-02 NOTE — TELEPHONE ENCOUNTER
Call placed to [facility] states he thought dialysis not needed since he did not receive call. Updated him patient needs dialysis and to please bring in.   States they will be late about 1230.   Direct message sent to Dr. Wise with update.

## 2024-08-05 ENCOUNTER — HOSPITAL ENCOUNTER (OUTPATIENT)
Dept: DIALYSIS | Facility: HOSPITAL | Age: 44
Setting detail: DIALYSIS SERIES
Discharge: HOME | End: 2024-08-05
Payer: COMMERCIAL

## 2024-08-05 ENCOUNTER — HOSPITAL ENCOUNTER (OUTPATIENT)
Dept: RADIOLOGY | Facility: HOSPITAL | Age: 44
Discharge: HOME | End: 2024-08-05
Payer: COMMERCIAL

## 2024-08-05 VITALS
OXYGEN SATURATION: 98 % | TEMPERATURE: 97.2 F | SYSTOLIC BLOOD PRESSURE: 125 MMHG | HEART RATE: 74 BPM | DIASTOLIC BLOOD PRESSURE: 70 MMHG | RESPIRATION RATE: 18 BRPM

## 2024-08-05 VITALS — TEMPERATURE: 97.7 F | HEART RATE: 76 BPM

## 2024-08-05 DIAGNOSIS — Z94.0 KIDNEY REPLACED BY TRANSPLANT (HHS-HCC): ICD-10-CM

## 2024-08-05 DIAGNOSIS — T86.11 ACUTE REJECTION OF KIDNEY TRANSPLANT (HHS-HCC): ICD-10-CM

## 2024-08-05 DIAGNOSIS — T86.19 DELAYED GRAFT FUNCTION OF KIDNEY (HHS-HCC): ICD-10-CM

## 2024-08-05 DIAGNOSIS — R05.9 COUGH, UNSPECIFIED TYPE: ICD-10-CM

## 2024-08-05 LAB
ALBUMIN SERPL BCP-MCNC: 3.4 G/DL (ref 3.4–5)
ANION GAP SERPL CALC-SCNC: 16 MMOL/L (ref 10–20)
BUN SERPL-MCNC: 28 MG/DL (ref 6–23)
CALCIUM SERPL-MCNC: 9 MG/DL (ref 8.6–10.6)
CHLORIDE SERPL-SCNC: 102 MMOL/L (ref 98–107)
CO2 SERPL-SCNC: 23 MMOL/L (ref 21–32)
CREAT SERPL-MCNC: 3.93 MG/DL (ref 0.5–1.05)
EGFRCR SERPLBLD CKD-EPI 2021: 14 ML/MIN/1.73M*2
ERYTHROCYTE [DISTWIDTH] IN BLOOD BY AUTOMATED COUNT: 16.1 % (ref 11.5–14.5)
GLUCOSE SERPL-MCNC: 139 MG/DL (ref 74–99)
HCT VFR BLD AUTO: 29 % (ref 36–46)
HGB BLD-MCNC: 9.1 G/DL (ref 12–16)
MAGNESIUM SERPL-MCNC: 2.05 MG/DL (ref 1.6–2.4)
MCH RBC QN AUTO: 32.7 PG (ref 26–34)
MCHC RBC AUTO-ENTMCNC: 31.4 G/DL (ref 32–36)
MCV RBC AUTO: 104 FL (ref 80–100)
NRBC BLD-RTO: 0 /100 WBCS (ref 0–0)
PHOSPHATE SERPL-MCNC: 4.7 MG/DL (ref 2.5–4.9)
PLATELET # BLD AUTO: 131 X10*3/UL (ref 150–450)
POTASSIUM SERPL-SCNC: 4.5 MMOL/L (ref 3.5–5.3)
RBC # BLD AUTO: 2.78 X10*6/UL (ref 4–5.2)
SODIUM SERPL-SCNC: 136 MMOL/L (ref 136–145)
WBC # BLD AUTO: 4.8 X10*3/UL (ref 4.4–11.3)

## 2024-08-05 PROCEDURE — 85027 COMPLETE CBC AUTOMATED: CPT | Performed by: INTERNAL MEDICINE

## 2024-08-05 PROCEDURE — 99152 MOD SED SAME PHYS/QHP 5/>YRS: CPT | Performed by: RADIOLOGY

## 2024-08-05 PROCEDURE — 50200 RENAL BIOPSY PERQ: CPT | Performed by: RADIOLOGY

## 2024-08-05 PROCEDURE — 2500000004 HC RX 250 GENERAL PHARMACY W/ HCPCS (ALT 636 FOR OP/ED): Performed by: RADIOLOGY

## 2024-08-05 PROCEDURE — 76942 ECHO GUIDE FOR BIOPSY: CPT | Performed by: RADIOLOGY

## 2024-08-05 PROCEDURE — 7100000009 HC PHASE TWO TIME - INITIAL BASE CHARGE

## 2024-08-05 PROCEDURE — 90937 HEMODIALYSIS REPEATED EVAL: CPT

## 2024-08-05 PROCEDURE — 83735 ASSAY OF MAGNESIUM: CPT | Performed by: INTERNAL MEDICINE

## 2024-08-05 PROCEDURE — 50200 RENAL BIOPSY PERQ: CPT

## 2024-08-05 PROCEDURE — 36415 COLL VENOUS BLD VENIPUNCTURE: CPT | Performed by: INTERNAL MEDICINE

## 2024-08-05 PROCEDURE — 99152 MOD SED SAME PHYS/QHP 5/>YRS: CPT

## 2024-08-05 PROCEDURE — 7100000010 HC PHASE TWO TIME - EACH INCREMENTAL 1 MINUTE

## 2024-08-05 PROCEDURE — 80069 RENAL FUNCTION PANEL: CPT | Performed by: INTERNAL MEDICINE

## 2024-08-05 PROCEDURE — 2720000007 HC OR 272 NO HCPCS

## 2024-08-05 PROCEDURE — 88313 SPECIAL STAINS GROUP 2: CPT | Mod: TC,SUR | Performed by: STUDENT IN AN ORGANIZED HEALTH CARE EDUCATION/TRAINING PROGRAM

## 2024-08-05 RX ORDER — MIDAZOLAM HYDROCHLORIDE 1 MG/ML
INJECTION INTRAMUSCULAR; INTRAVENOUS
Status: COMPLETED | OUTPATIENT
Start: 2024-08-05 | End: 2024-08-05

## 2024-08-05 RX ORDER — FENTANYL CITRATE 50 UG/ML
INJECTION, SOLUTION INTRAMUSCULAR; INTRAVENOUS
Status: COMPLETED | OUTPATIENT
Start: 2024-08-05 | End: 2024-08-05

## 2024-08-05 RX ORDER — BENZONATATE 100 MG/1
100 CAPSULE ORAL EVERY 6 HOURS PRN
Qty: 20 CAPSULE | Refills: 0 | Status: SHIPPED | OUTPATIENT
Start: 2024-08-05 | End: 2025-08-05

## 2024-08-05 ASSESSMENT — PAIN - FUNCTIONAL ASSESSMENT
PAIN_FUNCTIONAL_ASSESSMENT: 0-10
PAIN_FUNCTIONAL_ASSESSMENT: 0-10
PAIN_FUNCTIONAL_ASSESSMENT: NO/DENIES PAIN
PAIN_FUNCTIONAL_ASSESSMENT: 0-10
PAIN_FUNCTIONAL_ASSESSMENT: NO/DENIES PAIN
PAIN_FUNCTIONAL_ASSESSMENT: 0-10

## 2024-08-05 ASSESSMENT — PAIN SCALES - GENERAL

## 2024-08-05 NOTE — Clinical Note
Procedure complete. Pt tolerated well with 2mg versed and 100mcg fentanyl IVP, VSS. Dressing applied by resident is c/d/I,pt to [facility], report to RN

## 2024-08-05 NOTE — PRE-PROCEDURE NOTE
INTERVENTIONAL RADIOLOGY PRE-PROCEDURE NOTE    Herber Foy Rai is a 44 y.o. female with PMHx of renal transplant with elevated creatinine who presents to the interventional radiology department for renal transplant biopsy.    Procedure: right iliac fossa renal transplant biopsy    Indication for procedure: The encounter diagnosis was Delayed graft function of kidney ([facility]HS-HCC).    Past Medical History:   Diagnosis Date    Anxiety     Chronic kidney disease     Chronic kidney disease, unspecified     CKD, patient interested in transplantation    Depression     Disease of thyroid gland     GERD (gastroesophageal reflux disease)     Hypertension     Personal history of COVID-19 07/20/2022    History of COVID-19      Past Surgical History:   Procedure Laterality Date    MR HEAD ANGIO W AND WO IV CONTRAST  01/26/2021    MR HEAD ANGIO W AND WO IV CONTRAST    MR HEAD ANGIO WO IV CONTRAST  01/26/2021    MR HEAD ANGIO WO IV CONTRAST    OTHER SURGICAL HISTORY  07/20/2022    Arteriovenous fistula creation procedure    TRANSPLANT, KIDNEY, OPEN Right 11/30/2023    US GUIDED PERCUTANEOUS PERITONEAL OR RETROPERITONEAL FLUID COLLECTION DRAINAGE  12/21/2023    US GUIDED PERCUTANEOUS PERITONEAL OR RETROPERITONEAL FLUID COLLECTION DRAINAGE 12/21/2023 Batsheva Shanks MD [place] [facility]       Relevant Labs:   Lab Results   Component Value Date    CREATININE 2.91 (H) 08/01/2024    EGFR 20 (L) 08/01/2024    INR 1.0 07/22/2024    PROTIME 10.7 07/22/2024       Planned Sedation/Anesthesia: Moderate    Directed physical examination:    General: Normal appearance, behavior, cognition and NAD  Lungs: No increased work of breathing  Abdomen: soft and nontender      Current Outpatient Medications:     acetaminophen (Tylenol) 325 mg tablet, Take 2 tablets (650 mg) by mouth every 6 hours if needed for mild pain (1 - 3)., Disp: , Rfl:     albuterol 90 mcg/actuation inhaler, Inhale 2 puffs every 4 hours if needed for wheezing., Disp: , Rfl:     amLODIPine (Norvasc)  5 mg tablet, Take 2 tablets (10 mg) by mouth once daily., Disp: , Rfl:     azelastine (Optivar) 0.05 % ophthalmic solution, Administer 1 drop into both eyes 2 times a day., Disp: 6 mL, Rfl: 3    calcitriol (Rocaltrol) 0.25 mcg capsule, Take 3 capsules (0.75 mcg) by mouth once daily. Do not fill before August 1, 2024., Disp: 90 capsule, Rfl: 0    Calcium Antacid 200 mg calcium (500 mg) chewable tablet, CHEW AND SWALLOW 1 TABLET BY MOUTH 2 TIMES DAILY (Patient taking differently: Chew 1 tablet (500 mg) 2 times a day.), Disp: 60 tablet, Rfl: 3    carvedilol (Coreg) 25 mg tablet, Take 1 tablet (25 mg) by mouth 2 times a day., Disp: 60 tablet, Rfl: 0    cholecalciferol (Vitamin D-3) 50 MCG (2000 UT) tablet, Take 2 tablets (100 mcg) by mouth once daily., Disp: 180 tablet, Rfl: 3    clotrimazole (Mycelex) 10 mg irma, Take 1 tablet (10 mg) by mouth 3 times a day after meals., Disp: 90 Irma, Rfl: 0    gabapentin (Neurontin) 100 mg capsule, Take 3 capsules (300 mg) by mouth once daily at bedtime., Disp: 270 capsule, Rfl: 3    hydrALAZINE (Apresoline) 50 mg tablet, Take 1 tablet (50 mg) by mouth 2 times a day., Disp: 60 tablet, Rfl: 0    levothyroxine (Synthroid, Levoxyl) 25 mcg tablet, Take 1 tablet (25 mcg) by mouth once daily in the morning. Take before meals., Disp: , Rfl:     magnesium oxide (Mag-Ox) 400 mg tablet, Take 1 tablet (400 mg) by mouth once daily. Take with food at lunch time, Disp: 30 tablet, Rfl: 11    mycophenolate (Cellcept) 250 mg capsule, Take 4 capsules (1,000 mg) by mouth every 12 hours., Disp: , Rfl:     pantoprazole (ProtoNix) 40 mg EC tablet, TAKE 1 TABLET (40 MG) BY MOUTH EVERY MORNING (BEFORE BREAKFAST)., Disp: 30 tablet, Rfl: 1    predniSONE (Deltasone) 5 mg tablet, Take 4 tablets (20 mg) by mouth once daily., Disp: 120 tablet, Rfl: 0    sertraline (Zoloft) 50 mg tablet, Take 1 tablet (50 mg) by mouth once daily., Disp: , Rfl:     sodium bicarbonate 650 mg tablet, Take 2 tablets (1,300 mg) by  mouth 3 times a day., Disp: 180 tablet, Rfl: 0    sulfamethoxazole-trimethoprim (Bactrim) 400-80 mg tablet, Take 1 tablet by mouth once daily., Disp: 30 tablet, Rfl: 0    SUMAtriptan (Imitrex) 100 mg tablet, Take 1 tablet (100 mg) by mouth 1 time if needed for migraine., Disp: 9 tablet, Rfl: 11    tacrolimus (Prograf) 1 mg capsule, Take 2 capsules (2 mg) by mouth every 12 hours., Disp: 120 capsule, Rfl: 0    tenofovir alafenamide (Vemlidy) 25 mg tablet tablet, Take 1 tablet (25 mg) by mouth once daily., Disp: 30 tablet, Rfl: 11    torsemide (Demadex) 20 mg tablet, Take 2.5 tablets (50 mg) by mouth once daily., Disp: 75 tablet, Rfl: 0    valGANciclovir (Valcyte) 450 mg tablet, Take 1 tablet (450 mg) by mouth every other day. Do not crush or chew. Do not fill before July 20, 2024., Disp: , Rfl:      Mallampati: III (soft and hard palate and base of uvula visible)    ASA Score: ASA 2 - Patient with mild systemic disease with no functional limitations    Benefits, risks and alternatives of procedure and planned sedation have been discussed with the patient and/or their representative. All questions answered and they agree to proceed.     Edmar Wood MD  Diagnostic Radiology, PGY-5, R4    NON-Urgent on call weekends and after hours weekdays (5pm - 5am) IR pager: [phone]  Urgent & emergent on call weekends and after hours weekdays (5pm-7am) IR pager: [phone]

## 2024-08-05 NOTE — POST-PROCEDURE NOTE
Interventional Radiology Brief Postprocedure Note    Attending: Batsheva Shanks MD    Assistant: Edmar Wood MD    Diagnosis: Elevated creatinine    Description of procedure:    A total of 3 passes were made into the lower pole of the right iliac fossa transplant kidney under ultrasound guidance using an 18 Gauge BARD needle passed through a 17 gauge coaxial system. Scanning after each pass demonstrated no bleeding. Specimens were sent to pathology for further analysis. See PACS for full procedural report.      Anesthesia:  Local, moderate sedation    Complications: None    Estimated Blood Loss: minimal    Medications (Filter: Administrations occurring from 0914 to 0932 on 08/05/24) As of 08/05/24 0932      fentaNYL PF (Sublimaze) injection (mcg) Total dose:  100 mcg      Date/Time Rate/Dose/Volume Action       08/05/24 0918 50 mcg Given      0923 50 mcg Given               midazolam (Versed) injection (mg) Total dose:  2 mg      Date/Time Rate/Dose/Volume Action       08/05/24 0918 1 mg Given      0923 1 mg Given                 3 core biopsy samples were obtained and submitted to pathology for further analysis.      See detailed result report with images in PACS.    The patient tolerated the procedure well without incident or complication and is in stable condition.     Emdar Wood MD  Diagnostic Radiology, PGY-5, R4    NON-Urgent on call weekends and after hours weekdays (5pm - 5am) IR pager: [phone]  Urgent & emergent on call weekends and after hours weekdays (5pm-7am) IR pager: [phone]

## 2024-08-06 DIAGNOSIS — Z94.0 KIDNEY REPLACED BY TRANSPLANT (HHS-HCC): ICD-10-CM

## 2024-08-06 LAB
CMV DNA SERPL NAA+PROBE-LOG IU: ABNORMAL {LOG_IU}/ML
LABORATORY COMMENT REPORT: ABNORMAL

## 2024-08-06 NOTE — TELEPHONE ENCOUNTER
Spoke with Danuta, asking if HD chair time on 8/9/24 can be moved to 11 or 3 pm    Will discuss with nephrology

## 2024-08-07 ENCOUNTER — HOSPITAL ENCOUNTER (OUTPATIENT)
Dept: DIALYSIS | Facility: HOSPITAL | Age: 44
Setting detail: DIALYSIS SERIES
Discharge: HOME | End: 2024-08-07
Payer: COMMERCIAL

## 2024-08-07 ENCOUNTER — APPOINTMENT (OUTPATIENT)
Dept: TRANSPLANT | Facility: HOSPITAL | Age: 44
End: 2024-08-07
Payer: COMMERCIAL

## 2024-08-07 VITALS — HEART RATE: 77 BPM | TEMPERATURE: 97 F

## 2024-08-07 DIAGNOSIS — T86.11 ACUTE REJECTION OF KIDNEY TRANSPLANT (HHS-HCC): ICD-10-CM

## 2024-08-07 DIAGNOSIS — T86.19 OTHER COMPLICATION OF KIDNEY TRANSPLANT (HHS-HCC): ICD-10-CM

## 2024-08-07 DIAGNOSIS — Z94.0 KIDNEY REPLACED BY TRANSPLANT (HHS-HCC): ICD-10-CM

## 2024-08-07 LAB
ALBUMIN SERPL BCP-MCNC: 3.5 G/DL (ref 3.4–5)
ANION GAP SERPL CALC-SCNC: 16 MMOL/L (ref 10–20)
BUN SERPL-MCNC: 23 MG/DL (ref 6–23)
CALCIUM SERPL-MCNC: 9.2 MG/DL (ref 8.6–10.6)
CHLORIDE SERPL-SCNC: 98 MMOL/L (ref 98–107)
CO2 SERPL-SCNC: 23 MMOL/L (ref 21–32)
CREAT SERPL-MCNC: 4.1 MG/DL (ref 0.5–1.05)
EGFRCR SERPLBLD CKD-EPI 2021: 13 ML/MIN/1.73M*2
ERYTHROCYTE [DISTWIDTH] IN BLOOD BY AUTOMATED COUNT: 15.6 % (ref 11.5–14.5)
GLUCOSE SERPL-MCNC: 175 MG/DL (ref 74–99)
HCT VFR BLD AUTO: 30.9 % (ref 36–46)
HGB BLD-MCNC: 9.8 G/DL (ref 12–16)
LAB AP ASR DISCLAIMER: NORMAL
LABORATORY COMMENT REPORT: NORMAL
MAGNESIUM SERPL-MCNC: 1.83 MG/DL (ref 1.6–2.4)
MCH RBC QN AUTO: 32.3 PG (ref 26–34)
MCHC RBC AUTO-ENTMCNC: 31.7 G/DL (ref 32–36)
MCV RBC AUTO: 102 FL (ref 80–100)
NRBC BLD-RTO: 0 /100 WBCS (ref 0–0)
PATH REPORT.COMMENTS IMP SPEC: NORMAL
PATH REPORT.FINAL DX SPEC: NORMAL
PATH REPORT.GROSS SPEC: NORMAL
PATH REPORT.MICROSCOPIC SPEC OTHER STN: NORMAL
PATH REPORT.RELEVANT HX SPEC: NORMAL
PATH REPORT.TOTAL CANCER: NORMAL
PHOSPHATE SERPL-MCNC: 4.3 MG/DL (ref 2.5–4.9)
PLATELET # BLD AUTO: 125 X10*3/UL (ref 150–450)
POTASSIUM SERPL-SCNC: 4.8 MMOL/L (ref 3.5–5.3)
RBC # BLD AUTO: 3.03 X10*6/UL (ref 4–5.2)
SODIUM SERPL-SCNC: 132 MMOL/L (ref 136–145)
WBC # BLD AUTO: 5.3 X10*3/UL (ref 4.4–11.3)

## 2024-08-07 PROCEDURE — 90937 HEMODIALYSIS REPEATED EVAL: CPT | Mod: V7

## 2024-08-07 PROCEDURE — 83735 ASSAY OF MAGNESIUM: CPT | Mod: V7 | Performed by: INTERNAL MEDICINE

## 2024-08-07 PROCEDURE — 36415 COLL VENOUS BLD VENIPUNCTURE: CPT | Mod: V7 | Performed by: INTERNAL MEDICINE

## 2024-08-07 PROCEDURE — 85027 COMPLETE CBC AUTOMATED: CPT | Mod: V7 | Performed by: INTERNAL MEDICINE

## 2024-08-07 PROCEDURE — 80069 RENAL FUNCTION PANEL: CPT | Mod: V7 | Performed by: INTERNAL MEDICINE

## 2024-08-07 PROCEDURE — 87522 HEPATITIS C REVRS TRNSCRPJ: CPT | Mod: V7 | Performed by: INTERNAL MEDICINE

## 2024-08-07 RX ORDER — TORSEMIDE 20 MG/1
50 TABLET ORAL 2 TIMES DAILY
Qty: 150 TABLET | Refills: 11 | Status: SHIPPED | OUTPATIENT
Start: 2024-08-07 | End: 2025-08-07

## 2024-08-07 ASSESSMENT — PAIN SCALES - GENERAL: PAINLEVEL_OUTOF10: 0 - NO PAIN

## 2024-08-07 ASSESSMENT — PAIN - FUNCTIONAL ASSESSMENT: PAIN_FUNCTIONAL_ASSESSMENT: NO/DENIES PAIN

## 2024-08-07 NOTE — TELEPHONE ENCOUNTER
Per Dr. Trevizo, patient needs new script for Torsemide 50 mg BID. New script sent pending Dr. Trevizo signature.

## 2024-08-08 LAB
HCV RNA SERPL NAA+PROBE-ACNC: NOT DETECTED K[IU]/ML
HCV RNA SERPL NAA+PROBE-LOG IU: NORMAL {LOG_IU}/ML

## 2024-08-09 ENCOUNTER — HOSPITAL ENCOUNTER (OUTPATIENT)
Dept: RADIOLOGY | Facility: HOSPITAL | Age: 44
Discharge: HOME | End: 2024-08-09
Payer: COMMERCIAL

## 2024-08-09 ENCOUNTER — TELEPHONE (OUTPATIENT)
Dept: TRANSPLANT | Facility: HOSPITAL | Age: 44
End: 2024-08-09
Payer: COMMERCIAL

## 2024-08-09 ENCOUNTER — HOSPITAL ENCOUNTER (OUTPATIENT)
Dept: DIALYSIS | Facility: HOSPITAL | Age: 44
Setting detail: DIALYSIS SERIES
Discharge: HOME | End: 2024-08-09
Payer: COMMERCIAL

## 2024-08-09 ENCOUNTER — APPOINTMENT (OUTPATIENT)
Dept: DIALYSIS | Facility: HOSPITAL | Age: 44
End: 2024-08-09
Payer: COMMERCIAL

## 2024-08-09 VITALS — HEART RATE: 71 BPM | TEMPERATURE: 97.7 F

## 2024-08-09 DIAGNOSIS — Z94.0 KIDNEY REPLACED BY TRANSPLANT (HHS-HCC): ICD-10-CM

## 2024-08-09 DIAGNOSIS — R06.02 SHORTNESS OF BREATH: ICD-10-CM

## 2024-08-09 DIAGNOSIS — T86.11 ACUTE REJECTION OF KIDNEY TRANSPLANT (HHS-HCC): ICD-10-CM

## 2024-08-09 LAB
ALBUMIN SERPL BCP-MCNC: 3.6 G/DL (ref 3.4–5)
ANION GAP SERPL CALC-SCNC: 16 MMOL/L (ref 10–20)
BUN SERPL-MCNC: 21 MG/DL (ref 6–23)
CALCIUM SERPL-MCNC: 8.8 MG/DL (ref 8.6–10.6)
CHLORIDE SERPL-SCNC: 98 MMOL/L (ref 98–107)
CO2 SERPL-SCNC: 23 MMOL/L (ref 21–32)
CREAT SERPL-MCNC: 4.06 MG/DL (ref 0.5–1.05)
EGFRCR SERPLBLD CKD-EPI 2021: 13 ML/MIN/1.73M*2
ERYTHROCYTE [DISTWIDTH] IN BLOOD BY AUTOMATED COUNT: 14.8 % (ref 11.5–14.5)
GLUCOSE SERPL-MCNC: 159 MG/DL (ref 74–99)
HCT VFR BLD AUTO: 29.9 % (ref 36–46)
HGB BLD-MCNC: 9.3 G/DL (ref 12–16)
MAGNESIUM SERPL-MCNC: 1.88 MG/DL (ref 1.6–2.4)
MCH RBC QN AUTO: 31.7 PG (ref 26–34)
MCHC RBC AUTO-ENTMCNC: 31.1 G/DL (ref 32–36)
MCV RBC AUTO: 102 FL (ref 80–100)
NRBC BLD-RTO: 0 /100 WBCS (ref 0–0)
PHOSPHATE SERPL-MCNC: 3.5 MG/DL (ref 2.5–4.9)
PLATELET # BLD AUTO: 144 X10*3/UL (ref 150–450)
POTASSIUM SERPL-SCNC: 5.2 MMOL/L (ref 3.5–5.3)
RBC # BLD AUTO: 2.93 X10*6/UL (ref 4–5.2)
SODIUM SERPL-SCNC: 132 MMOL/L (ref 136–145)
WBC # BLD AUTO: 3.3 X10*3/UL (ref 4.4–11.3)

## 2024-08-09 PROCEDURE — 71046 X-RAY EXAM CHEST 2 VIEWS: CPT | Performed by: RADIOLOGY

## 2024-08-09 PROCEDURE — 80069 RENAL FUNCTION PANEL: CPT | Mod: V7 | Performed by: INTERNAL MEDICINE

## 2024-08-09 PROCEDURE — 90937 HEMODIALYSIS REPEATED EVAL: CPT | Mod: V7

## 2024-08-09 PROCEDURE — 71046 X-RAY EXAM CHEST 2 VIEWS: CPT

## 2024-08-09 PROCEDURE — 36415 COLL VENOUS BLD VENIPUNCTURE: CPT | Mod: V7 | Performed by: INTERNAL MEDICINE

## 2024-08-09 PROCEDURE — 85027 COMPLETE CBC AUTOMATED: CPT | Mod: V7 | Performed by: INTERNAL MEDICINE

## 2024-08-09 PROCEDURE — 83735 ASSAY OF MAGNESIUM: CPT | Mod: V7 | Performed by: INTERNAL MEDICINE

## 2024-08-09 RX ORDER — CYCLOBENZAPRINE HCL 5 MG
5 TABLET ORAL 2 TIMES DAILY
Qty: 30 TABLET | Refills: 0 | Status: SHIPPED | OUTPATIENT
Start: 2024-08-09 | End: 2025-08-09

## 2024-08-09 RX ORDER — ALBUTEROL SULFATE 90 UG/1
2 INHALANT RESPIRATORY (INHALATION) EVERY 6 HOURS PRN
Qty: 18 G | Refills: 11 | Status: SHIPPED | OUTPATIENT
Start: 2024-08-09 | End: 2025-08-09

## 2024-08-09 RX ORDER — DEXTROMETHORPHAN HYDROBROMIDE AND GUAIFENESIN 10; 200 MG/1; MG/1
1 CAPSULE, LIQUID FILLED ORAL 3 TIMES DAILY
Qty: 168 CAPSULE | Refills: 1 | Status: SHIPPED | OUTPATIENT
Start: 2024-08-09 | End: 2024-09-08

## 2024-08-10 NOTE — TELEPHONE ENCOUNTER
ON CALL:  Reviewed CXR with Dr Trevizo at request of primary coordinator. Per Dr Trevizo - looks stable, no changes to POC.

## 2024-08-12 ENCOUNTER — SPECIALTY PHARMACY (OUTPATIENT)
Dept: INTERNAL MEDICINE | Facility: HOSPITAL | Age: 44
End: 2024-08-12
Payer: COMMERCIAL

## 2024-08-12 ENCOUNTER — TELEPHONE (OUTPATIENT)
Dept: TRANSPLANT | Facility: HOSPITAL | Age: 44
End: 2024-08-12
Payer: COMMERCIAL

## 2024-08-12 ENCOUNTER — HOSPITAL ENCOUNTER (OUTPATIENT)
Dept: DIALYSIS | Facility: HOSPITAL | Age: 44
Setting detail: DIALYSIS SERIES
Discharge: HOME | End: 2024-08-12
Payer: COMMERCIAL

## 2024-08-12 ENCOUNTER — APPOINTMENT (OUTPATIENT)
Dept: TRANSPLANT | Facility: HOSPITAL | Age: 44
End: 2024-08-12
Payer: COMMERCIAL

## 2024-08-12 VITALS — HEART RATE: 78 BPM | TEMPERATURE: 96.8 F

## 2024-08-12 DIAGNOSIS — Z94.0 KIDNEY REPLACED BY TRANSPLANT (HHS-HCC): ICD-10-CM

## 2024-08-12 DIAGNOSIS — T86.11 ACUTE REJECTION OF KIDNEY TRANSPLANT (HHS-HCC): ICD-10-CM

## 2024-08-12 LAB
ALBUMIN SERPL BCP-MCNC: 3.6 G/DL (ref 3.4–5)
ANION GAP SERPL CALC-SCNC: 17 MMOL/L (ref 10–20)
BUN SERPL-MCNC: 40 MG/DL (ref 6–23)
CALCIUM SERPL-MCNC: 10.6 MG/DL (ref 8.6–10.6)
CHLORIDE SERPL-SCNC: 99 MMOL/L (ref 98–107)
CO2 SERPL-SCNC: 23 MMOL/L (ref 21–32)
CREAT SERPL-MCNC: 4.92 MG/DL (ref 0.5–1.05)
EGFRCR SERPLBLD CKD-EPI 2021: 10 ML/MIN/1.73M*2
ERYTHROCYTE [DISTWIDTH] IN BLOOD BY AUTOMATED COUNT: 14.2 % (ref 11.5–14.5)
GLUCOSE SERPL-MCNC: 127 MG/DL (ref 74–99)
HCT VFR BLD AUTO: 32.1 % (ref 36–46)
HGB BLD-MCNC: 10.2 G/DL (ref 12–16)
MAGNESIUM SERPL-MCNC: 1.87 MG/DL (ref 1.6–2.4)
MCH RBC QN AUTO: 32.1 PG (ref 26–34)
MCHC RBC AUTO-ENTMCNC: 31.8 G/DL (ref 32–36)
MCV RBC AUTO: 101 FL (ref 80–100)
NRBC BLD-RTO: 0 /100 WBCS (ref 0–0)
PHOSPHATE SERPL-MCNC: 7.1 MG/DL (ref 2.5–4.9)
PLATELET # BLD AUTO: 259 X10*3/UL (ref 150–450)
POTASSIUM SERPL-SCNC: 4.9 MMOL/L (ref 3.5–5.3)
RBC # BLD AUTO: 3.18 X10*6/UL (ref 4–5.2)
SODIUM SERPL-SCNC: 134 MMOL/L (ref 136–145)
WBC # BLD AUTO: 5.5 X10*3/UL (ref 4.4–11.3)

## 2024-08-12 PROCEDURE — 36415 COLL VENOUS BLD VENIPUNCTURE: CPT | Mod: V7 | Performed by: INTERNAL MEDICINE

## 2024-08-12 PROCEDURE — 80069 RENAL FUNCTION PANEL: CPT | Mod: V7 | Performed by: INTERNAL MEDICINE

## 2024-08-12 PROCEDURE — 85027 COMPLETE CBC AUTOMATED: CPT | Mod: V7 | Performed by: INTERNAL MEDICINE

## 2024-08-12 PROCEDURE — 83735 ASSAY OF MAGNESIUM: CPT | Mod: V7 | Performed by: INTERNAL MEDICINE

## 2024-08-12 PROCEDURE — 90937 HEMODIALYSIS REPEATED EVAL: CPT | Mod: V7

## 2024-08-12 ASSESSMENT — PAIN - FUNCTIONAL ASSESSMENT: PAIN_FUNCTIONAL_ASSESSMENT: NO/DENIES PAIN

## 2024-08-12 ASSESSMENT — PAIN SCALES - GENERAL: PAINLEVEL_OUTOF10: 0 - NO PAIN

## 2024-08-12 NOTE — TELEPHONE ENCOUNTER
Spoke with patients brother, and got her scheduled for a neph appt.       DAISY KIDNEY PHYSICIAN at  8:20 AM (20 min)  Thursday September 5, 2024  Appointment Provider:DAISY KIDNEY PHYSICIAN in [facility] [place]

## 2024-08-12 NOTE — PROGRESS NOTES
[facility] Clinical Note    Herber Foy Rai is a 44 y.o. female, who is on the specialty pharmacy service for management of:  Hepatitis B Core.    Herber Foy Rai is taking: Vemlidy.    Medication Receipt Date: 12/2023  Medication Start Date (planned or actual): 12/2023    Herber was contacted on 8/12/2024 at 1:18 PM for a virtual pharmacy visit with encounter number [id number] from:   [facility] HEPATOLOG[facility] VIRTUAL  [address]  VIRTUAL DEPARTMENT  [facility] [address]  Loc: [phone]    Herber was offered a Telemedicine Video visit or Telephone visit.  Herber consented to a telephone visit, which was performed. Spoke with her brother in law, Kimi, per her request.    The most recent encounter visit with the referring prescriber Dr. Webber on 6/5/24 was reviewed.  Pharmacy will continue to collaborate in the care of this patient with the referring prescriber transplant team; Lawanda Soler.    General Assessment      Impression/Plan  IMPRESSION/PLAN:  Is patient high risk (potential patients:  pregnancy, geriatric, pediatric)?  no  Is laboratory follow-up needed? Yes, renal function impaired; patient is getting weekly labs  Is a clinical intervention needed? no  Next reassessment date? Approx 11/12/24  Additional comments: Plan to continue the med until 11/30/24 (until 1 year post transplant); patient to continue to follow up with her transplant team    Refer to the encounter summary report for documentation details about patient counseling and education.      Medication Adherence    The importance of adherence was discussed with the patient and they were advised to take the medication as prescribed by their provider. Patient was encouraged to call their physician's office if they have a question regarding a missed dose.     QOL/Patient Satisfaction  Rate your quality of life on scale of 1-10: 5 (patient is experiencing possible rejection of the transplanted  kidney)  Rate your satisfaction with  Specialty Pharmacy on scale of 1-10: 10 - Completely satisfied      Patient was advised to contact the pharmacy if there are any changes to their medication list, including prescriptions, OTC medications, herbal products, or supplements. Patient was advised of [facility]'s dispensing process, refill timeline, contact information ([phone]), and patient management follow up. Patient confirmed understanding of education conducted during assessment. All patient questions and concerns were addressed to the best of my ability. Patient was encouraged to contact the specialty pharmacy with any questions or concerns.    Confirmed follow-up outreaches are properly scheduled and reviewed goals of therapy with the patient.        CrCl= 18 mL/min (using adjusted body weight); plan is to followup with patient sooner than the typical 6 months    Marcelino WilsonD

## 2024-08-13 ENCOUNTER — TELEPHONE (OUTPATIENT)
Dept: TRANSPLANT | Facility: HOSPITAL | Age: 44
End: 2024-08-13
Payer: COMMERCIAL

## 2024-08-13 DIAGNOSIS — R05.9 COUGH, UNSPECIFIED TYPE: ICD-10-CM

## 2024-08-13 DIAGNOSIS — Z94.0 KIDNEY REPLACED BY TRANSPLANT (HHS-HCC): ICD-10-CM

## 2024-08-13 DIAGNOSIS — Z94.0 KIDNEY REPLACED BY TRANSPLANT (HHS-HCC): Primary | ICD-10-CM

## 2024-08-13 RX ORDER — EPINEPHRINE 0.3 MG/.3ML
0.3 INJECTION SUBCUTANEOUS EVERY 5 MIN PRN
OUTPATIENT
Start: 2024-08-20

## 2024-08-13 RX ORDER — DIPHENHYDRAMINE HCL 25 MG
25 TABLET ORAL ONCE
OUTPATIENT
Start: 2024-08-20 | End: 2024-08-20

## 2024-08-13 RX ORDER — FAMOTIDINE 10 MG/ML
20 INJECTION INTRAVENOUS ONCE AS NEEDED
OUTPATIENT
Start: 2024-08-20

## 2024-08-13 RX ORDER — DIPHENHYDRAMINE HYDROCHLORIDE 50 MG/ML
50 INJECTION INTRAMUSCULAR; INTRAVENOUS AS NEEDED
OUTPATIENT
Start: 2024-08-20

## 2024-08-13 RX ORDER — ACETAMINOPHEN 325 MG/1
650 TABLET ORAL ONCE
OUTPATIENT
Start: 2024-08-20 | End: 2024-08-20

## 2024-08-13 RX ORDER — ALBUTEROL SULFATE 0.83 MG/ML
3 SOLUTION RESPIRATORY (INHALATION) AS NEEDED
OUTPATIENT
Start: 2024-08-20

## 2024-08-13 NOTE — TELEPHONE ENCOUNTER
Rituximab ordered 1 dose per dr. Urbina after biopsy result readings discussed on 8/9/24.  Confirmed dosing with pharmacist Terell: 375 mg/m2     Patient aware per Danuta.     Requested [facility] infusion.

## 2024-08-13 NOTE — TELEPHONE ENCOUNTER
"Call placed to patient with interpretor spoke with Danuta, states patient is \"stable\" cough better and did not take patient to ED because of this. States he is bringing her to dialysis tomorrow 8/14/24 and if worse and or doctor wants her to go to ED he will take her.   Will update Dr. Wise  Advised Danuta we wanted her seen in ED.  "

## 2024-08-14 ENCOUNTER — HOSPITAL ENCOUNTER (OUTPATIENT)
Dept: RADIOLOGY | Facility: HOSPITAL | Age: 44
Discharge: HOME | End: 2024-08-14
Payer: COMMERCIAL

## 2024-08-14 ENCOUNTER — HOSPITAL ENCOUNTER (OUTPATIENT)
Dept: DIALYSIS | Facility: HOSPITAL | Age: 44
Setting detail: DIALYSIS SERIES
Discharge: HOME | End: 2024-08-14
Payer: COMMERCIAL

## 2024-08-14 ENCOUNTER — TELEPHONE (OUTPATIENT)
Dept: TRANSPLANT | Facility: HOSPITAL | Age: 44
End: 2024-08-14
Payer: COMMERCIAL

## 2024-08-14 VITALS — HEART RATE: 77 BPM | TEMPERATURE: 95.9 F

## 2024-08-14 DIAGNOSIS — U07.1 COVID-19: ICD-10-CM

## 2024-08-14 DIAGNOSIS — R05.9 COUGH, UNSPECIFIED TYPE: ICD-10-CM

## 2024-08-14 DIAGNOSIS — Z94.0 KIDNEY REPLACED BY TRANSPLANT (HHS-HCC): ICD-10-CM

## 2024-08-14 DIAGNOSIS — T86.11 ACUTE REJECTION OF KIDNEY TRANSPLANT (HHS-HCC): ICD-10-CM

## 2024-08-14 LAB
ALBUMIN SERPL BCP-MCNC: 3.4 G/DL (ref 3.4–5)
ANION GAP SERPL CALC-SCNC: 16 MMOL/L (ref 10–20)
BASOPHILS # BLD MANUAL: 0 X10*3/UL (ref 0–0.1)
BASOPHILS NFR BLD MANUAL: 0 %
BUN SERPL-MCNC: 40 MG/DL (ref 6–23)
CALCIUM SERPL-MCNC: 9.7 MG/DL (ref 8.6–10.6)
CHLORIDE SERPL-SCNC: 97 MMOL/L (ref 98–107)
CO2 SERPL-SCNC: 24 MMOL/L (ref 21–32)
CREAT SERPL-MCNC: 4.66 MG/DL (ref 0.5–1.05)
DACRYOCYTES BLD QL SMEAR: ABNORMAL
EGFRCR SERPLBLD CKD-EPI 2021: 11 ML/MIN/1.73M*2
EOSINOPHIL # BLD MANUAL: 0 X10*3/UL (ref 0–0.7)
EOSINOPHIL NFR BLD MANUAL: 0 %
ERYTHROCYTE [DISTWIDTH] IN BLOOD BY AUTOMATED COUNT: 14.1 % (ref 11.5–14.5)
GLUCOSE SERPL-MCNC: 178 MG/DL (ref 74–99)
HCT VFR BLD AUTO: 33.8 % (ref 36–46)
HGB BLD-MCNC: 10.7 G/DL (ref 12–16)
IMM GRANULOCYTES # BLD AUTO: 0.63 X10*3/UL (ref 0–0.7)
IMM GRANULOCYTES NFR BLD AUTO: 8.5 % (ref 0–0.9)
LYMPHOCYTES # BLD MANUAL: 0.2 X10*3/UL (ref 1.2–4.8)
LYMPHOCYTES NFR BLD MANUAL: 2.7 %
MCH RBC QN AUTO: 31.8 PG (ref 26–34)
MCHC RBC AUTO-ENTMCNC: 31.7 G/DL (ref 32–36)
MCV RBC AUTO: 101 FL (ref 80–100)
MONOCYTES # BLD MANUAL: 0.26 X10*3/UL (ref 0.1–1)
MONOCYTES NFR BLD MANUAL: 3.5 %
MYELOCYTES # BLD MANUAL: 0.19 X10*3/UL
MYELOCYTES NFR BLD MANUAL: 2.6 %
NEUTS SEG # BLD MANUAL: 6.49 X10*3/UL (ref 1.2–7)
NEUTS SEG NFR BLD MANUAL: 87.7 %
NRBC BLD-RTO: 0 /100 WBCS (ref 0–0)
OVALOCYTES BLD QL SMEAR: ABNORMAL
PHOSPHATE SERPL-MCNC: 4.1 MG/DL (ref 2.5–4.9)
PLATELET # BLD AUTO: 295 X10*3/UL (ref 150–450)
POTASSIUM SERPL-SCNC: 4.3 MMOL/L (ref 3.5–5.3)
PROMYELOCYTES # BLD MANUAL: 0.19 X10*3/UL
PROMYELOCYTES NFR BLD MANUAL: 2.6 %
RBC # BLD AUTO: 3.36 X10*6/UL (ref 4–5.2)
RBC MORPH BLD: ABNORMAL
SARS-COV-2 RNA RESP QL NAA+PROBE: DETECTED
SODIUM SERPL-SCNC: 133 MMOL/L (ref 136–145)
TACROLIMUS BLD-MCNC: 23.1 NG/ML
TOTAL CELLS COUNTED BLD: 114
VARIANT LYMPHS # BLD MANUAL: 0.07 X10*3/UL (ref 0–0.5)
VARIANT LYMPHS NFR BLD: 0.9 %
WBC # BLD AUTO: 7.4 X10*3/UL (ref 4.4–11.3)

## 2024-08-14 PROCEDURE — 80197 ASSAY OF TACROLIMUS: CPT | Mod: V7 | Performed by: HOSPITALIST

## 2024-08-14 PROCEDURE — 85007 BL SMEAR W/DIFF WBC COUNT: CPT | Mod: V7 | Performed by: INTERNAL MEDICINE

## 2024-08-14 PROCEDURE — 90937 HEMODIALYSIS REPEATED EVAL: CPT | Mod: V7

## 2024-08-14 PROCEDURE — 85027 COMPLETE CBC AUTOMATED: CPT | Mod: V7 | Performed by: INTERNAL MEDICINE

## 2024-08-14 PROCEDURE — 71046 X-RAY EXAM CHEST 2 VIEWS: CPT

## 2024-08-14 PROCEDURE — 36415 COLL VENOUS BLD VENIPUNCTURE: CPT | Mod: V7 | Performed by: HOSPITALIST

## 2024-08-14 PROCEDURE — 87635 SARS-COV-2 COVID-19 AMP PRB: CPT | Mod: V7 | Performed by: HOSPITALIST

## 2024-08-14 PROCEDURE — 80069 RENAL FUNCTION PANEL: CPT | Mod: V7 | Performed by: INTERNAL MEDICINE

## 2024-08-14 ASSESSMENT — PAIN DESCRIPTION - DESCRIPTORS: DESCRIPTORS: SORE

## 2024-08-14 ASSESSMENT — PAIN - FUNCTIONAL ASSESSMENT: PAIN_FUNCTIONAL_ASSESSMENT: 0-10

## 2024-08-14 ASSESSMENT — PAIN SCALES - GENERAL: PAINLEVEL_OUTOF10: 7

## 2024-08-14 NOTE — TELEPHONE ENCOUNTER
STAT CXR results, notified Dr. Wise:   1. Similar findings on current exam mild improvement in right lower  lung field opacification. Possible minimal right pleural effusion  definite left pleural effusion. No evidence of new acute  cardiopulmonary process.     PLAN:  Follow up outpatient, if patient symptoms worsen, chest pain, cough, SOB, fever, go to ED.     Called and spoke with Danuta who verbalized understanding  Direct message also sent to give udpate on changes    Labs Friday am prior to HD, once results may not need dialysis.

## 2024-08-14 NOTE — TELEPHONE ENCOUNTER
Tac 23.1 notified by Dr. Wise  Called patient friend Daríoemmy, who asked Herber if she took tac prior to dialysis today,   Per Rohini yes she took all meds.   Also discussed patient urinating frequently and good volume per rohini, is not measuring at home, does not have measuring hat to do so. Dr. Wise notified.

## 2024-08-14 NOTE — TELEPHONE ENCOUNTER
Notified by Dr. Wise patient COVID+   Patient currently getting dialysis, still having cough   PLAN:  Chest xray STAT to rule out pneumonia   Call placed to TIGRE, understands to get XRAY once dialysis done.     Will watch for results    Decrease MMF to 500 BID for 2 weeks

## 2024-08-15 DIAGNOSIS — Z91.89 AT RISK FOR INFECTION TRANSMITTED FROM DONOR: ICD-10-CM

## 2024-08-15 DIAGNOSIS — Z94.0 KIDNEY REPLACED BY TRANSPLANT (HHS-HCC): ICD-10-CM

## 2024-08-16 ENCOUNTER — TELEPHONE (OUTPATIENT)
Dept: TRANSPLANT | Facility: HOSPITAL | Age: 44
End: 2024-08-16

## 2024-08-16 ENCOUNTER — HOSPITAL ENCOUNTER (OUTPATIENT)
Dept: DIALYSIS | Facility: HOSPITAL | Age: 44
Setting detail: DIALYSIS SERIES
Discharge: HOME | End: 2024-08-16
Payer: COMMERCIAL

## 2024-08-16 ENCOUNTER — APPOINTMENT (OUTPATIENT)
Dept: LAB | Facility: LAB | Age: 44
End: 2024-08-16
Payer: COMMERCIAL

## 2024-08-16 VITALS — TEMPERATURE: 96.8 F | HEART RATE: 73 BPM

## 2024-08-16 DIAGNOSIS — T86.11 ACUTE REJECTION OF KIDNEY TRANSPLANT (HHS-HCC): ICD-10-CM

## 2024-08-16 DIAGNOSIS — Z94.0 KIDNEY REPLACED BY TRANSPLANT (HHS-HCC): ICD-10-CM

## 2024-08-16 LAB
ALBUMIN SERPL BCP-MCNC: 3.6 G/DL (ref 3.4–5)
ANION GAP SERPL CALC-SCNC: 15 MMOL/L (ref 10–20)
BUN SERPL-MCNC: 35 MG/DL (ref 6–23)
CALCIUM SERPL-MCNC: 10.1 MG/DL (ref 8.6–10.6)
CHLORIDE SERPL-SCNC: 96 MMOL/L (ref 98–107)
CO2 SERPL-SCNC: 24 MMOL/L (ref 21–32)
CREAT SERPL-MCNC: 4.37 MG/DL (ref 0.5–1.05)
EGFRCR SERPLBLD CKD-EPI 2021: 12 ML/MIN/1.73M*2
ERYTHROCYTE [DISTWIDTH] IN BLOOD BY AUTOMATED COUNT: 13.9 % (ref 11.5–14.5)
GLUCOSE SERPL-MCNC: 131 MG/DL (ref 74–99)
HCT VFR BLD AUTO: 32.1 % (ref 36–46)
HGB BLD-MCNC: 10.4 G/DL (ref 12–16)
MAGNESIUM SERPL-MCNC: 1.71 MG/DL (ref 1.6–2.4)
MCH RBC QN AUTO: 32.1 PG (ref 26–34)
MCHC RBC AUTO-ENTMCNC: 32.4 G/DL (ref 32–36)
MCV RBC AUTO: 99 FL (ref 80–100)
NRBC BLD-RTO: 0 /100 WBCS (ref 0–0)
PHOSPHATE SERPL-MCNC: 4.4 MG/DL (ref 2.5–4.9)
PLATELET # BLD AUTO: 257 X10*3/UL (ref 150–450)
POTASSIUM SERPL-SCNC: 4.5 MMOL/L (ref 3.5–5.3)
RBC # BLD AUTO: 3.24 X10*6/UL (ref 4–5.2)
SODIUM SERPL-SCNC: 130 MMOL/L (ref 136–145)
TACROLIMUS BLD-MCNC: 16.4 NG/ML
WBC # BLD AUTO: 9.9 X10*3/UL (ref 4.4–11.3)

## 2024-08-16 PROCEDURE — 80197 ASSAY OF TACROLIMUS: CPT | Mod: V7 | Performed by: HOSPITALIST

## 2024-08-16 PROCEDURE — 80069 RENAL FUNCTION PANEL: CPT | Mod: V7 | Performed by: INTERNAL MEDICINE

## 2024-08-16 PROCEDURE — 83735 ASSAY OF MAGNESIUM: CPT | Mod: V7 | Performed by: INTERNAL MEDICINE

## 2024-08-16 PROCEDURE — 36415 COLL VENOUS BLD VENIPUNCTURE: CPT | Mod: V7 | Performed by: INTERNAL MEDICINE

## 2024-08-16 PROCEDURE — 90937 HEMODIALYSIS REPEATED EVAL: CPT | Mod: V7

## 2024-08-16 PROCEDURE — 85027 COMPLETE CBC AUTOMATED: CPT | Mod: V7 | Performed by: INTERNAL MEDICINE

## 2024-08-16 RX ORDER — AZELASTINE HYDROCHLORIDE 0.5 MG/ML
SOLUTION/ DROPS OPHTHALMIC
Qty: 6 ML | Refills: 3 | Status: SHIPPED | OUTPATIENT
Start: 2024-08-16

## 2024-08-16 ASSESSMENT — PAIN - FUNCTIONAL ASSESSMENT: PAIN_FUNCTIONAL_ASSESSMENT: NO/DENIES PAIN

## 2024-08-16 NOTE — TELEPHONE ENCOUNTER
Tac 16.4 at noon called and spoke with Danuta patient did take meds prior to dialysis.       Per Dr. Wise:   Patient to get labs Monday am and will decided on possible dialysis for Wednesday

## 2024-08-19 ENCOUNTER — HOSPITAL ENCOUNTER (OUTPATIENT)
Dept: DIALYSIS | Facility: HOSPITAL | Age: 44
Setting detail: DIALYSIS SERIES
Discharge: HOME | End: 2024-08-19
Payer: COMMERCIAL

## 2024-08-19 ENCOUNTER — LAB (OUTPATIENT)
Dept: LAB | Facility: LAB | Age: 44
End: 2024-08-19
Payer: COMMERCIAL

## 2024-08-19 VITALS — TEMPERATURE: 97.7 F | HEART RATE: 72 BPM

## 2024-08-19 DIAGNOSIS — Z94.0 KIDNEY REPLACED BY TRANSPLANT (HHS-HCC): ICD-10-CM

## 2024-08-19 DIAGNOSIS — N18.6 ESRD (END STAGE RENAL DISEASE) (MULTI): Primary | ICD-10-CM

## 2024-08-19 LAB
ALBUMIN SERPL BCP-MCNC: 3.6 G/DL (ref 3.4–5)
ALBUMIN SERPL BCP-MCNC: 3.6 G/DL (ref 3.4–5)
ALP SERPL-CCNC: 47 U/L (ref 33–110)
ALT SERPL W P-5'-P-CCNC: 14 U/L (ref 7–45)
ANION GAP SERPL CALC-SCNC: 13 MMOL/L (ref 10–20)
ANION GAP SERPL CALC-SCNC: 18 MMOL/L (ref 10–20)
AST SERPL W P-5'-P-CCNC: 11 U/L (ref 9–39)
BILIRUB SERPL-MCNC: 0.4 MG/DL (ref 0–1.2)
BUN SERPL-MCNC: 37 MG/DL (ref 6–23)
BUN SERPL-MCNC: 38 MG/DL (ref 6–23)
CALCIUM SERPL-MCNC: 9.4 MG/DL (ref 8.6–10.3)
CALCIUM SERPL-MCNC: 9.6 MG/DL (ref 8.6–10.6)
CHLORIDE SERPL-SCNC: 96 MMOL/L (ref 98–107)
CHLORIDE SERPL-SCNC: 97 MMOL/L (ref 98–107)
CO2 SERPL-SCNC: 22 MMOL/L (ref 21–32)
CO2 SERPL-SCNC: 26 MMOL/L (ref 21–32)
CREAT SERPL-MCNC: 4.61 MG/DL (ref 0.5–1.05)
CREAT SERPL-MCNC: 4.81 MG/DL (ref 0.5–1.05)
CRP SERPL-MCNC: 0.14 MG/DL
EGFRCR SERPLBLD CKD-EPI 2021: 11 ML/MIN/1.73M*2
EGFRCR SERPLBLD CKD-EPI 2021: 11 ML/MIN/1.73M*2
ERYTHROCYTE [DISTWIDTH] IN BLOOD BY AUTOMATED COUNT: 14 % (ref 11.5–14.5)
GLUCOSE SERPL-MCNC: 131 MG/DL (ref 74–99)
GLUCOSE SERPL-MCNC: 77 MG/DL (ref 74–99)
HAV IGM SER QL: NONREACTIVE
HBV CORE IGM SER QL: NONREACTIVE
HBV SURFACE AG SERPL QL IA: NONREACTIVE
HCT VFR BLD AUTO: 31.3 % (ref 36–46)
HCV AB SER QL: NONREACTIVE
HGB BLD-MCNC: 10.2 G/DL (ref 12–16)
MAGNESIUM SERPL-MCNC: 1.78 MG/DL (ref 1.6–2.4)
MAGNESIUM SERPL-MCNC: 1.87 MG/DL (ref 1.6–2.4)
MCH RBC QN AUTO: 32.5 PG (ref 26–34)
MCHC RBC AUTO-ENTMCNC: 32.6 G/DL (ref 32–36)
MCV RBC AUTO: 100 FL (ref 80–100)
NRBC BLD-RTO: 0 /100 WBCS (ref 0–0)
PHOSPHATE SERPL-MCNC: 4.6 MG/DL (ref 2.5–4.9)
PHOSPHATE SERPL-MCNC: 4.9 MG/DL (ref 2.5–4.9)
PLATELET # BLD AUTO: 215 X10*3/UL (ref 150–450)
POTASSIUM SERPL-SCNC: 4.3 MMOL/L (ref 3.5–5.3)
POTASSIUM SERPL-SCNC: 4.4 MMOL/L (ref 3.5–5.3)
PROT SERPL-MCNC: 5.9 G/DL (ref 6.4–8.2)
RBC # BLD AUTO: 3.14 X10*6/UL (ref 4–5.2)
SODIUM SERPL-SCNC: 132 MMOL/L (ref 136–145)
SODIUM SERPL-SCNC: 132 MMOL/L (ref 136–145)
WBC # BLD AUTO: 11.7 X10*3/UL (ref 4.4–11.3)

## 2024-08-19 PROCEDURE — 36415 COLL VENOUS BLD VENIPUNCTURE: CPT | Mod: V7 | Performed by: INTERNAL MEDICINE

## 2024-08-19 PROCEDURE — 86140 C-REACTIVE PROTEIN: CPT

## 2024-08-19 PROCEDURE — 84100 ASSAY OF PHOSPHORUS: CPT

## 2024-08-19 PROCEDURE — 83735 ASSAY OF MAGNESIUM: CPT

## 2024-08-19 PROCEDURE — 86481 TB AG RESPONSE T-CELL SUSP: CPT

## 2024-08-19 PROCEDURE — 82784 ASSAY IGA/IGD/IGG/IGM EACH: CPT

## 2024-08-19 PROCEDURE — 83735 ASSAY OF MAGNESIUM: CPT | Mod: V7 | Performed by: INTERNAL MEDICINE

## 2024-08-19 PROCEDURE — 90937 HEMODIALYSIS REPEATED EVAL: CPT | Mod: V7

## 2024-08-19 PROCEDURE — 87799 DETECT AGENT NOS DNA QUANT: CPT

## 2024-08-19 PROCEDURE — 80074 ACUTE HEPATITIS PANEL: CPT

## 2024-08-19 PROCEDURE — 80053 COMPREHEN METABOLIC PANEL: CPT

## 2024-08-19 PROCEDURE — 80069 RENAL FUNCTION PANEL: CPT | Mod: V7 | Performed by: INTERNAL MEDICINE

## 2024-08-19 PROCEDURE — 85027 COMPLETE CBC AUTOMATED: CPT | Mod: V7 | Performed by: INTERNAL MEDICINE

## 2024-08-19 RX ORDER — DIPHENHYDRAMINE HCL 25 MG
25 TABLET ORAL
Status: CANCELLED | OUTPATIENT
Start: 2024-08-19

## 2024-08-19 RX ORDER — DIPHENHYDRAMINE HCL 25 MG
25 CAPSULE ORAL
Status: CANCELLED | OUTPATIENT
Start: 2024-08-26

## 2024-08-19 RX ORDER — ACETAMINOPHEN 325 MG/1
650 TABLET ORAL EVERY 4 HOURS PRN
Status: CANCELLED | OUTPATIENT
Start: 2024-08-26

## 2024-08-19 RX ORDER — ONDANSETRON HYDROCHLORIDE 2 MG/ML
4 INJECTION, SOLUTION INTRAVENOUS
Status: CANCELLED | OUTPATIENT
Start: 2024-08-26

## 2024-08-19 RX ORDER — ONDANSETRON HYDROCHLORIDE 2 MG/ML
4 INJECTION, SOLUTION INTRAVENOUS
Status: CANCELLED | OUTPATIENT
Start: 2024-08-19

## 2024-08-19 RX ORDER — LIDOCAINE AND PRILOCAINE 25; 25 MG/G; MG/G
1 CREAM TOPICAL AS NEEDED
Status: CANCELLED | OUTPATIENT
Start: 2024-08-26

## 2024-08-19 RX ORDER — LIDOCAINE AND PRILOCAINE 25; 25 MG/G; MG/G
1 CREAM TOPICAL AS NEEDED
Status: CANCELLED | OUTPATIENT
Start: 2024-08-19

## 2024-08-19 RX ORDER — ACETAMINOPHEN 325 MG/1
650 TABLET ORAL EVERY 4 HOURS PRN
Status: CANCELLED | OUTPATIENT
Start: 2024-08-19

## 2024-08-19 ASSESSMENT — PAIN SCALES - GENERAL: PAINLEVEL_OUTOF10: 0 - NO PAIN

## 2024-08-19 ASSESSMENT — PAIN - FUNCTIONAL ASSESSMENT: PAIN_FUNCTIONAL_ASSESSMENT: NO/DENIES PAIN

## 2024-08-20 ENCOUNTER — TELEPHONE (OUTPATIENT)
Dept: TRANSPLANT | Facility: HOSPITAL | Age: 44
End: 2024-08-20
Payer: COMMERCIAL

## 2024-08-20 DIAGNOSIS — Z94.0 KIDNEY REPLACED BY TRANSPLANT (HHS-HCC): ICD-10-CM

## 2024-08-20 LAB
BKV DNA SERPL NAA+PROBE-LOG#: NORMAL {LOG_COPIES}/ML
CMV DNA SERPL NAA+PROBE-LOG IU: NORMAL {LOG_IU}/ML
IGA SERPL-MCNC: 95 MG/DL (ref 70–400)
IGG SERPL-MCNC: 1160 MG/DL (ref 700–1600)
IGM SERPL-MCNC: 23 MG/DL (ref 40–230)
LABORATORY COMMENT REPORT: NOT DETECTED
LABORATORY COMMENT REPORT: NOT DETECTED

## 2024-08-20 RX ORDER — PANTOPRAZOLE SODIUM 40 MG/1
TABLET, DELAYED RELEASE ORAL
Qty: 30 TABLET | Refills: 1 | Status: SHIPPED | OUTPATIENT
Start: 2024-08-20

## 2024-08-21 ENCOUNTER — HOSPITAL ENCOUNTER (OUTPATIENT)
Dept: DIALYSIS | Facility: HOSPITAL | Age: 44
Setting detail: DIALYSIS SERIES
Discharge: HOME | End: 2024-08-21
Payer: COMMERCIAL

## 2024-08-21 VITALS — HEART RATE: 75 BPM | TEMPERATURE: 97.7 F

## 2024-08-21 DIAGNOSIS — N18.6 ESRD (END STAGE RENAL DISEASE) (MULTI): Primary | ICD-10-CM

## 2024-08-21 LAB
ALBUMIN SERPL BCP-MCNC: 3.6 G/DL (ref 3.4–5)
ANION GAP SERPL CALC-SCNC: 14 MMOL/L (ref 10–20)
BUN SERPL-MCNC: 41 MG/DL (ref 6–23)
CALCIUM SERPL-MCNC: 9.8 MG/DL (ref 8.6–10.6)
CHLORIDE SERPL-SCNC: 97 MMOL/L (ref 98–107)
CO2 SERPL-SCNC: 23 MMOL/L (ref 21–32)
CREAT SERPL-MCNC: 4.39 MG/DL (ref 0.5–1.05)
EBV DNA SPEC NAA+PROBE-LOG#: NORMAL {LOG_COPIES}/ML
EGFRCR SERPLBLD CKD-EPI 2021: 12 ML/MIN/1.73M*2
ERYTHROCYTE [DISTWIDTH] IN BLOOD BY AUTOMATED COUNT: 13.9 % (ref 11.5–14.5)
GLUCOSE SERPL-MCNC: 130 MG/DL (ref 74–99)
HCT VFR BLD AUTO: 30.5 % (ref 36–46)
HGB BLD-MCNC: 10 G/DL (ref 12–16)
LABORATORY COMMENT REPORT: NOT DETECTED
MAGNESIUM SERPL-MCNC: 1.81 MG/DL (ref 1.6–2.4)
MCH RBC QN AUTO: 32.5 PG (ref 26–34)
MCHC RBC AUTO-ENTMCNC: 32.8 G/DL (ref 32–36)
MCV RBC AUTO: 99 FL (ref 80–100)
NIL(NEG) CONTROL SPOT COUNT: NORMAL
NRBC BLD-RTO: 0 /100 WBCS (ref 0–0)
PANEL A SPOT COUNT: 2
PANEL B SPOT COUNT: 0
PHOSPHATE SERPL-MCNC: 5.3 MG/DL (ref 2.5–4.9)
PLATELET # BLD AUTO: 208 X10*3/UL (ref 150–450)
POS CONTROL SPOT COUNT: NORMAL
POTASSIUM SERPL-SCNC: 4.3 MMOL/L (ref 3.5–5.3)
RBC # BLD AUTO: 3.08 X10*6/UL (ref 4–5.2)
SODIUM SERPL-SCNC: 130 MMOL/L (ref 136–145)
T-SPOT. TB INTERPRETATION: NEGATIVE
TACROLIMUS BLD-MCNC: 17.7 NG/ML
WBC # BLD AUTO: 13 X10*3/UL (ref 4.4–11.3)

## 2024-08-21 PROCEDURE — 80197 ASSAY OF TACROLIMUS: CPT | Mod: V7 | Performed by: INTERNAL MEDICINE

## 2024-08-21 PROCEDURE — 80069 RENAL FUNCTION PANEL: CPT | Mod: V7 | Performed by: INTERNAL MEDICINE

## 2024-08-21 PROCEDURE — 36415 COLL VENOUS BLD VENIPUNCTURE: CPT | Mod: V7 | Performed by: INTERNAL MEDICINE

## 2024-08-21 PROCEDURE — 85027 COMPLETE CBC AUTOMATED: CPT | Mod: V7 | Performed by: INTERNAL MEDICINE

## 2024-08-21 PROCEDURE — 83735 ASSAY OF MAGNESIUM: CPT | Mod: V7 | Performed by: INTERNAL MEDICINE

## 2024-08-21 RX ORDER — LIDOCAINE AND PRILOCAINE 25; 25 MG/G; MG/G
1 CREAM TOPICAL AS NEEDED
OUTPATIENT
Start: 2024-08-26

## 2024-08-21 RX ORDER — DIPHENHYDRAMINE HCL 25 MG
25 CAPSULE ORAL
OUTPATIENT
Start: 2024-08-26

## 2024-08-21 RX ORDER — ONDANSETRON HYDROCHLORIDE 2 MG/ML
4 INJECTION, SOLUTION INTRAVENOUS
OUTPATIENT
Start: 2024-08-26

## 2024-08-21 RX ORDER — ACETAMINOPHEN 325 MG/1
650 TABLET ORAL EVERY 4 HOURS PRN
OUTPATIENT
Start: 2024-08-26

## 2024-08-21 ASSESSMENT — PAIN - FUNCTIONAL ASSESSMENT: PAIN_FUNCTIONAL_ASSESSMENT: NO/DENIES PAIN

## 2024-08-21 ASSESSMENT — PAIN SCALES - GENERAL: PAINLEVEL_OUTOF10: 0 - NO PAIN

## 2024-08-22 ENCOUNTER — TELEPHONE (OUTPATIENT)
Dept: TRANSPLANT | Facility: HOSPITAL | Age: 44
End: 2024-08-22
Payer: COMMERCIAL

## 2024-08-22 LAB
ALLOSURE SCORE - KIDNEY: 1 %
CAREDX_ORDER_ID: NORMAL
CENTER_ORDER_ID: NORMAL
CLIENT SPECIMEN ID - ALLOSURE: NORMAL
DONOR RELATION - ALLOSURE: NORMAL
NOTES - ALLOSURE: NORMAL
RELATIVE CHANGE VALUE - KIDNEY: -47 %
TEST COMMENTS - ALLOSURE: NORMAL
TIME POST TX - ALLOSURE: NORMAL
TRANSPLANTED ORGAN - ALLOSURE: NORMAL
TX DATE - ALLOSURE/ALLOMAP: NORMAL
WP_ORDER_ID: NORMAL

## 2024-08-22 NOTE — TELEPHONE ENCOUNTER
Discussed in 365 with Dr. Lawanda Tipton concerns about how long on dialysis? Is kidney failed? Can dialysis be done closer to home having a hard time getting patient up to [place] 3X week.     Per Dr. Webber, continue dialysis at [facility] have a 3 month deadline, hoping kidney will continue to work just needs time.     Called and LVM for Danuta  If needing help with rides we can look into insurance paying for rides.   Asked for call back if have any questions.

## 2024-08-23 ENCOUNTER — HOSPITAL ENCOUNTER (OUTPATIENT)
Dept: DIALYSIS | Facility: HOSPITAL | Age: 44
Setting detail: DIALYSIS SERIES
Discharge: HOME | End: 2024-08-23
Payer: COMMERCIAL

## 2024-08-23 VITALS — HEART RATE: 78 BPM | TEMPERATURE: 97.7 F

## 2024-08-23 DIAGNOSIS — N18.6 ESRD (END STAGE RENAL DISEASE) (MULTI): Primary | ICD-10-CM

## 2024-08-23 LAB
ALBUMIN SERPL BCP-MCNC: 3.6 G/DL (ref 3.4–5)
ANION GAP SERPL CALC-SCNC: 16 MMOL/L (ref 10–20)
BUN SERPL-MCNC: 32 MG/DL (ref 6–23)
CALCIUM SERPL-MCNC: 10.1 MG/DL (ref 8.6–10.6)
CHLORIDE SERPL-SCNC: 95 MMOL/L (ref 98–107)
CO2 SERPL-SCNC: 25 MMOL/L (ref 21–32)
CREAT SERPL-MCNC: 4.75 MG/DL (ref 0.5–1.05)
EGFRCR SERPLBLD CKD-EPI 2021: 11 ML/MIN/1.73M*2
ERYTHROCYTE [DISTWIDTH] IN BLOOD BY AUTOMATED COUNT: 13.9 % (ref 11.5–14.5)
GLUCOSE SERPL-MCNC: 94 MG/DL (ref 74–99)
HCT VFR BLD AUTO: 32.3 % (ref 36–46)
HGB BLD-MCNC: 10.5 G/DL (ref 12–16)
MAGNESIUM SERPL-MCNC: 1.88 MG/DL (ref 1.6–2.4)
MCH RBC QN AUTO: 32 PG (ref 26–34)
MCHC RBC AUTO-ENTMCNC: 32.5 G/DL (ref 32–36)
MCV RBC AUTO: 99 FL (ref 80–100)
NRBC BLD-RTO: 0 /100 WBCS (ref 0–0)
PHOSPHATE SERPL-MCNC: 5.1 MG/DL (ref 2.5–4.9)
PLATELET # BLD AUTO: 193 X10*3/UL (ref 150–450)
POTASSIUM SERPL-SCNC: 4.2 MMOL/L (ref 3.5–5.3)
RBC # BLD AUTO: 3.28 X10*6/UL (ref 4–5.2)
SODIUM SERPL-SCNC: 132 MMOL/L (ref 136–145)
TACROLIMUS BLD-MCNC: 4.4 NG/ML
WBC # BLD AUTO: 10.6 X10*3/UL (ref 4.4–11.3)

## 2024-08-23 PROCEDURE — 90937 HEMODIALYSIS REPEATED EVAL: CPT | Mod: V7

## 2024-08-23 PROCEDURE — 8010000001 HC DIALYSIS - HEMODIALYSIS PER DAY: Mod: V7

## 2024-08-23 PROCEDURE — 83735 ASSAY OF MAGNESIUM: CPT | Mod: V7 | Performed by: INTERNAL MEDICINE

## 2024-08-23 PROCEDURE — 36415 COLL VENOUS BLD VENIPUNCTURE: CPT | Mod: V7 | Performed by: INTERNAL MEDICINE

## 2024-08-23 PROCEDURE — 85027 COMPLETE CBC AUTOMATED: CPT | Mod: V7 | Performed by: INTERNAL MEDICINE

## 2024-08-23 PROCEDURE — 80197 ASSAY OF TACROLIMUS: CPT | Mod: V7 | Performed by: INTERNAL MEDICINE

## 2024-08-23 PROCEDURE — 80069 RENAL FUNCTION PANEL: CPT | Mod: V7 | Performed by: INTERNAL MEDICINE

## 2024-08-23 RX ORDER — DIPHENHYDRAMINE HCL 25 MG
25 CAPSULE ORAL
Status: CANCELLED | OUTPATIENT
Start: 2024-08-26

## 2024-08-23 RX ORDER — ONDANSETRON HYDROCHLORIDE 2 MG/ML
4 INJECTION, SOLUTION INTRAVENOUS
Status: CANCELLED | OUTPATIENT
Start: 2024-08-26

## 2024-08-23 RX ORDER — LIDOCAINE AND PRILOCAINE 25; 25 MG/G; MG/G
1 CREAM TOPICAL AS NEEDED
Status: CANCELLED | OUTPATIENT
Start: 2024-08-26

## 2024-08-23 RX ORDER — ACETAMINOPHEN 325 MG/1
650 TABLET ORAL EVERY 4 HOURS PRN
Status: CANCELLED | OUTPATIENT
Start: 2024-08-26

## 2024-08-26 ENCOUNTER — TELEPHONE (OUTPATIENT)
Dept: TRANSPLANT | Facility: HOSPITAL | Age: 44
End: 2024-08-26
Payer: COMMERCIAL

## 2024-08-26 ENCOUNTER — HOSPITAL ENCOUNTER (OUTPATIENT)
Dept: DIALYSIS | Facility: HOSPITAL | Age: 44
Setting detail: DIALYSIS SERIES
Discharge: HOME | End: 2024-08-26
Payer: COMMERCIAL

## 2024-08-26 VITALS — HEART RATE: 81 BPM | TEMPERATURE: 97.9 F

## 2024-08-26 DIAGNOSIS — H10.11 ALLERGIC CONJUNCTIVITIS OF RIGHT EYE: ICD-10-CM

## 2024-08-26 DIAGNOSIS — E55.9 VITAMIN D DEFICIENCY: ICD-10-CM

## 2024-08-26 DIAGNOSIS — M79.10 MUSCLE ACHE: ICD-10-CM

## 2024-08-26 DIAGNOSIS — Z94.0 IMMUNOSUPPRESSIVE MANAGEMENT ENCOUNTER FOLLOWING KIDNEY TRANSPLANT (HHS-HCC): ICD-10-CM

## 2024-08-26 DIAGNOSIS — Z94.0 KIDNEY REPLACED BY TRANSPLANT (HHS-HCC): ICD-10-CM

## 2024-08-26 DIAGNOSIS — N18.6 ESRD (END STAGE RENAL DISEASE) (MULTI): Primary | ICD-10-CM

## 2024-08-26 DIAGNOSIS — D84.9 IMMUNOSUPPRESSION (MULTI): ICD-10-CM

## 2024-08-26 DIAGNOSIS — I10 ESSENTIAL HYPERTENSION: ICD-10-CM

## 2024-08-26 DIAGNOSIS — Z79.899 IMMUNOSUPPRESSIVE MANAGEMENT ENCOUNTER FOLLOWING KIDNEY TRANSPLANT (HHS-HCC): ICD-10-CM

## 2024-08-26 LAB
ALBUMIN SERPL BCP-MCNC: 3.9 G/DL (ref 3.4–5)
ANION GAP SERPL CALC-SCNC: 18 MMOL/L (ref 10–20)
BUN SERPL-MCNC: 49 MG/DL (ref 6–23)
CALCIUM SERPL-MCNC: 10.5 MG/DL (ref 8.6–10.6)
CHLORIDE SERPL-SCNC: 95 MMOL/L (ref 98–107)
CO2 SERPL-SCNC: 22 MMOL/L (ref 21–32)
CREAT SERPL-MCNC: 6.89 MG/DL (ref 0.5–1.05)
EGFRCR SERPLBLD CKD-EPI 2021: 7 ML/MIN/1.73M*2
ERYTHROCYTE [DISTWIDTH] IN BLOOD BY AUTOMATED COUNT: 13.3 % (ref 11.5–14.5)
GLUCOSE SERPL-MCNC: 152 MG/DL (ref 74–99)
HCT VFR BLD AUTO: 34 % (ref 36–46)
HGB BLD-MCNC: 11.3 G/DL (ref 12–16)
MAGNESIUM SERPL-MCNC: 1.88 MG/DL (ref 1.6–2.4)
MCH RBC QN AUTO: 31.8 PG (ref 26–34)
MCHC RBC AUTO-ENTMCNC: 33.2 G/DL (ref 32–36)
MCV RBC AUTO: 96 FL (ref 80–100)
NRBC BLD-RTO: 0 /100 WBCS (ref 0–0)
PHOSPHATE SERPL-MCNC: 4.7 MG/DL (ref 2.5–4.9)
PLATELET # BLD AUTO: 181 X10*3/UL (ref 150–450)
POTASSIUM SERPL-SCNC: 4.5 MMOL/L (ref 3.5–5.3)
RBC # BLD AUTO: 3.55 X10*6/UL (ref 4–5.2)
SODIUM SERPL-SCNC: 130 MMOL/L (ref 136–145)
TACROLIMUS BLD-MCNC: 5.8 NG/ML
WBC # BLD AUTO: 9.5 X10*3/UL (ref 4.4–11.3)

## 2024-08-26 PROCEDURE — 36415 COLL VENOUS BLD VENIPUNCTURE: CPT | Mod: V7 | Performed by: INTERNAL MEDICINE

## 2024-08-26 PROCEDURE — 83735 ASSAY OF MAGNESIUM: CPT | Mod: V7 | Performed by: INTERNAL MEDICINE

## 2024-08-26 PROCEDURE — 2500000004 HC RX 250 GENERAL PHARMACY W/ HCPCS (ALT 636 FOR OP/ED): Mod: JZ,JG,SE | Performed by: INTERNAL MEDICINE

## 2024-08-26 PROCEDURE — 80197 ASSAY OF TACROLIMUS: CPT | Mod: V7 | Performed by: INTERNAL MEDICINE

## 2024-08-26 PROCEDURE — 80069 RENAL FUNCTION PANEL: CPT | Mod: V7 | Performed by: INTERNAL MEDICINE

## 2024-08-26 PROCEDURE — 90937 HEMODIALYSIS REPEATED EVAL: CPT | Mod: V7

## 2024-08-26 PROCEDURE — 85027 COMPLETE CBC AUTOMATED: CPT | Mod: V7 | Performed by: INTERNAL MEDICINE

## 2024-08-26 RX ORDER — CHOLECALCIFEROL (VITAMIN D3) 50 MCG
100 TABLET ORAL DAILY
Qty: 180 TABLET | Refills: 3 | Status: SHIPPED | OUTPATIENT
Start: 2024-08-26 | End: 2025-08-26

## 2024-08-26 RX ORDER — DIPHENHYDRAMINE HCL 25 MG
25 CAPSULE ORAL
Status: CANCELLED | OUTPATIENT
Start: 2024-09-02

## 2024-08-26 RX ORDER — TACROLIMUS 1 MG/1
2 CAPSULE ORAL
Qty: 120 CAPSULE | Refills: 0 | Status: SHIPPED | OUTPATIENT
Start: 2024-08-26 | End: 2024-09-25

## 2024-08-26 RX ORDER — DIPHENHYDRAMINE HCL 25 MG
25 CAPSULE ORAL ONCE
OUTPATIENT
Start: 2024-08-27 | End: 2024-08-27

## 2024-08-26 RX ORDER — ALBUTEROL SULFATE 0.83 MG/ML
3 SOLUTION RESPIRATORY (INHALATION) AS NEEDED
OUTPATIENT
Start: 2024-08-27

## 2024-08-26 RX ORDER — EPINEPHRINE 0.3 MG/.3ML
0.3 INJECTION SUBCUTANEOUS EVERY 5 MIN PRN
OUTPATIENT
Start: 2024-08-27

## 2024-08-26 RX ORDER — FAMOTIDINE 10 MG/ML
20 INJECTION INTRAVENOUS ONCE AS NEEDED
OUTPATIENT
Start: 2024-08-27

## 2024-08-26 RX ORDER — ACETAMINOPHEN 325 MG/1
650 TABLET ORAL EVERY 4 HOURS PRN
Status: CANCELLED | OUTPATIENT
Start: 2024-09-02

## 2024-08-26 RX ORDER — LIDOCAINE AND PRILOCAINE 25; 25 MG/G; MG/G
1 CREAM TOPICAL AS NEEDED
Status: CANCELLED | OUTPATIENT
Start: 2024-09-02

## 2024-08-26 RX ORDER — DIPHENHYDRAMINE HYDROCHLORIDE 50 MG/ML
50 INJECTION INTRAMUSCULAR; INTRAVENOUS AS NEEDED
OUTPATIENT
Start: 2024-08-27

## 2024-08-26 RX ORDER — ONDANSETRON HYDROCHLORIDE 2 MG/ML
4 INJECTION, SOLUTION INTRAVENOUS
Status: CANCELLED | OUTPATIENT
Start: 2024-09-02

## 2024-08-26 RX ORDER — CARVEDILOL 25 MG/1
25 TABLET ORAL 2 TIMES DAILY
Qty: 60 TABLET | Refills: 0 | Status: SHIPPED | OUTPATIENT
Start: 2024-08-26 | End: 2024-09-25

## 2024-08-26 RX ORDER — ACETAMINOPHEN 325 MG/1
650 TABLET ORAL ONCE
OUTPATIENT
Start: 2024-08-27 | End: 2024-08-27

## 2024-08-26 ASSESSMENT — PAIN SCALES - GENERAL: PAINLEVEL_OUTOF10: 0 - NO PAIN

## 2024-08-26 ASSESSMENT — PAIN - FUNCTIONAL ASSESSMENT: PAIN_FUNCTIONAL_ASSESSMENT: NO/DENIES PAIN

## 2024-08-26 NOTE — TELEPHONE ENCOUNTER
Patient friend Danuta stopped by clinic asking for refills on medications.   Also asked for help with gas and parking fee and if any support for another ride.   Also asking for possibility changing time of dialysis from time to time.     Will discuss with Dr. Urbina possibility of dialysis time changes  Direct message sent to SW in regards to ride options and help with fees.

## 2024-08-27 ENCOUNTER — DOCUMENTATION (OUTPATIENT)
Dept: TRANSPLANT | Facility: HOSPITAL | Age: 44
End: 2024-08-27
Payer: COMMERCIAL

## 2024-08-27 DIAGNOSIS — Z94.0 KIDNEY REPLACED BY TRANSPLANT (HHS-HCC): ICD-10-CM

## 2024-08-27 NOTE — PROGRESS NOTES
"DALJIT received message from Pt's coordinator informing DALJIT Pt requested assistance with gas and transportation to and from her medical appointments. Pt's coordinator requested SW reach out to Pt's friend Danuta at [phone]. DALJIT spoke with Danuta via telephone call. Danuta shared it has been difficult paying for parking 3 times a week when Pt presents to her dialysis treatments. DALJIT provided parking pass information. Danuta was thankful. DALJIT provided Danuta with [facility] member services language line as well to schedule provider rides for Pt. Danuta reported Pt has not had \"a good experience\" with [employer] and has been considering switching insurances to Medicare/Medicaid. DALJIT also provided [employer] language line and [facility] language line as well to potentially assist with gas money and transportation. DALJIT informed Danuta if in January Pt is still in need of transportation assistance, DALJIT can apply for [employer] application once funds are replenished. Danuta expressed understanding and was agreeable. Daríoemmy denied any further questions/concerns at this time.     Plan: DALJIT will remain available.   "

## 2024-08-27 NOTE — PROGRESS NOTES
"Infusion date changed to 9/17/24 at 0830.  Direct message sent to patient and brother Danuta        In regards to help with transportation and gas money:    DALJIT Jessica:   I looked it up and it says this: \"You can call [facility] Member Services at [phone] to get help in another language or format. You can also call [phone] for language assistance services.\" They could also reach out to [place] at [phone]. They could also call S at [phone]. These are the language lines. [employer] and [employer] could potentially help with gas money and offer vouchers to them and help with other resources. They could get a  to assist them on a weekly basis     [place]  the department of J[employer] will also offer scheduled rides to doctors appointments as well   "

## 2024-08-28 ENCOUNTER — DOCUMENTATION (OUTPATIENT)
Dept: TRANSPLANT | Facility: HOSPITAL | Age: 44
End: 2024-08-28
Payer: COMMERCIAL

## 2024-08-28 ENCOUNTER — HOSPITAL ENCOUNTER (OUTPATIENT)
Dept: DIALYSIS | Facility: HOSPITAL | Age: 44
Setting detail: DIALYSIS SERIES
Discharge: HOME | End: 2024-08-28
Payer: COMMERCIAL

## 2024-08-28 VITALS — TEMPERATURE: 96.1 F | HEART RATE: 67 BPM

## 2024-08-28 DIAGNOSIS — R60.9 EDEMA, UNSPECIFIED TYPE: ICD-10-CM

## 2024-08-28 DIAGNOSIS — N18.6 ESRD (END STAGE RENAL DISEASE) (MULTI): Primary | ICD-10-CM

## 2024-08-28 LAB
ALBUMIN SERPL BCP-MCNC: 3.6 G/DL (ref 3.4–5)
ANION GAP SERPL CALC-SCNC: 14 MMOL/L (ref 10–20)
BUN SERPL-MCNC: 37 MG/DL (ref 6–23)
CALCIUM SERPL-MCNC: 9.5 MG/DL (ref 8.6–10.6)
CHLORIDE SERPL-SCNC: 98 MMOL/L (ref 98–107)
CO2 SERPL-SCNC: 24 MMOL/L (ref 21–32)
CREAT SERPL-MCNC: 5.31 MG/DL (ref 0.5–1.05)
EGFRCR SERPLBLD CKD-EPI 2021: 10 ML/MIN/1.73M*2
ERYTHROCYTE [DISTWIDTH] IN BLOOD BY AUTOMATED COUNT: 13.3 % (ref 11.5–14.5)
GLUCOSE SERPL-MCNC: 137 MG/DL (ref 74–99)
HCT VFR BLD AUTO: 29.1 % (ref 36–46)
HGB BLD-MCNC: 9.6 G/DL (ref 12–16)
MAGNESIUM SERPL-MCNC: 1.74 MG/DL (ref 1.6–2.4)
MCH RBC QN AUTO: 32.1 PG (ref 26–34)
MCHC RBC AUTO-ENTMCNC: 33 G/DL (ref 32–36)
MCV RBC AUTO: 97 FL (ref 80–100)
NRBC BLD-RTO: 0 /100 WBCS (ref 0–0)
PHOSPHATE SERPL-MCNC: 4.2 MG/DL (ref 2.5–4.9)
PLATELET # BLD AUTO: 132 X10*3/UL (ref 150–450)
POTASSIUM SERPL-SCNC: 4.1 MMOL/L (ref 3.5–5.3)
RBC # BLD AUTO: 2.99 X10*6/UL (ref 4–5.2)
SODIUM SERPL-SCNC: 132 MMOL/L (ref 136–145)
TACROLIMUS BLD-MCNC: 6 NG/ML
WBC # BLD AUTO: 8.1 X10*3/UL (ref 4.4–11.3)

## 2024-08-28 PROCEDURE — 85027 COMPLETE CBC AUTOMATED: CPT | Mod: V7 | Performed by: INTERNAL MEDICINE

## 2024-08-28 PROCEDURE — 80197 ASSAY OF TACROLIMUS: CPT | Mod: V7 | Performed by: INTERNAL MEDICINE

## 2024-08-28 PROCEDURE — 80069 RENAL FUNCTION PANEL: CPT | Mod: V7 | Performed by: INTERNAL MEDICINE

## 2024-08-28 PROCEDURE — 36415 COLL VENOUS BLD VENIPUNCTURE: CPT | Mod: V7 | Performed by: INTERNAL MEDICINE

## 2024-08-28 PROCEDURE — 83735 ASSAY OF MAGNESIUM: CPT | Mod: V7 | Performed by: INTERNAL MEDICINE

## 2024-08-28 RX ORDER — ACETAMINOPHEN 325 MG/1
650 TABLET ORAL EVERY 4 HOURS PRN
OUTPATIENT
Start: 2024-09-02

## 2024-08-28 RX ORDER — ONDANSETRON HYDROCHLORIDE 2 MG/ML
4 INJECTION, SOLUTION INTRAVENOUS
OUTPATIENT
Start: 2024-09-02

## 2024-08-28 RX ORDER — LIDOCAINE AND PRILOCAINE 25; 25 MG/G; MG/G
1 CREAM TOPICAL AS NEEDED
OUTPATIENT
Start: 2024-09-02

## 2024-08-28 RX ORDER — DIPHENHYDRAMINE HCL 25 MG
25 CAPSULE ORAL
OUTPATIENT
Start: 2024-09-02

## 2024-08-28 ASSESSMENT — PAIN - FUNCTIONAL ASSESSMENT: PAIN_FUNCTIONAL_ASSESSMENT: NO/DENIES PAIN

## 2024-08-28 ASSESSMENT — PAIN SCALES - GENERAL: PAINLEVEL_OUTOF10: 0 - NO PAIN

## 2024-08-28 NOTE — PROGRESS NOTES
Spoke with Danuta in person came to [place] [address] while Herber in dialysis. Asking for a letter stating patient's medical reason why needing assistance with transportation and gas fee's  (Direct message sent to  to help) this is for [employer] and [employer].      also spoke with Danuta, noted patient weight gain and increased torsemide to 80 mg BID, new script sent to local pharmacy, also provided Danuta with urine measure hat to keep track of patient urine output.

## 2024-08-29 ENCOUNTER — APPOINTMENT (OUTPATIENT)
Dept: INFUSION THERAPY | Facility: HOSPITAL | Age: 44
End: 2024-08-29
Payer: COMMERCIAL

## 2024-08-29 DIAGNOSIS — Z94.0 KIDNEY REPLACED BY TRANSPLANT (HHS-HCC): Primary | ICD-10-CM

## 2024-08-30 ENCOUNTER — HOSPITAL ENCOUNTER (OUTPATIENT)
Dept: DIALYSIS | Facility: HOSPITAL | Age: 44
Setting detail: DIALYSIS SERIES
Discharge: HOME | End: 2024-08-30
Payer: COMMERCIAL

## 2024-08-30 VITALS — HEART RATE: 71 BPM | TEMPERATURE: 96.4 F

## 2024-08-30 DIAGNOSIS — N18.6 ESRD (END STAGE RENAL DISEASE) (MULTI): Primary | ICD-10-CM

## 2024-08-30 PROBLEM — Z99.2 ESRD (END STAGE RENAL DISEASE) ON DIALYSIS (MULTI): Status: ACTIVE | Noted: 2023-11-30

## 2024-08-30 LAB
ALBUMIN SERPL BCP-MCNC: 3.6 G/DL (ref 3.4–5)
ANION GAP SERPL CALC-SCNC: 15 MMOL/L (ref 10–20)
BUN SERPL-MCNC: 36 MG/DL (ref 6–23)
CALCIUM SERPL-MCNC: 9.9 MG/DL (ref 8.6–10.6)
CHLORIDE SERPL-SCNC: 97 MMOL/L (ref 98–107)
CO2 SERPL-SCNC: 23 MMOL/L (ref 21–32)
CREAT SERPL-MCNC: 4.97 MG/DL (ref 0.5–1.05)
EGFRCR SERPLBLD CKD-EPI 2021: 10 ML/MIN/1.73M*2
ERYTHROCYTE [DISTWIDTH] IN BLOOD BY AUTOMATED COUNT: 13.8 % (ref 11.5–14.5)
GLUCOSE SERPL-MCNC: 117 MG/DL (ref 74–99)
HCT VFR BLD AUTO: 32.4 % (ref 36–46)
HGB BLD-MCNC: 10.4 G/DL (ref 12–16)
MAGNESIUM SERPL-MCNC: 1.75 MG/DL (ref 1.6–2.4)
MCH RBC QN AUTO: 32 PG (ref 26–34)
MCHC RBC AUTO-ENTMCNC: 32.1 G/DL (ref 32–36)
MCV RBC AUTO: 100 FL (ref 80–100)
NRBC BLD-RTO: 0 /100 WBCS (ref 0–0)
PHOSPHATE SERPL-MCNC: 3.9 MG/DL (ref 2.5–4.9)
PLATELET # BLD AUTO: 152 X10*3/UL (ref 150–450)
POTASSIUM SERPL-SCNC: 4.3 MMOL/L (ref 3.5–5.3)
RBC # BLD AUTO: 3.25 X10*6/UL (ref 4–5.2)
SODIUM SERPL-SCNC: 131 MMOL/L (ref 136–145)
TACROLIMUS BLD-MCNC: 11.3 NG/ML
WBC # BLD AUTO: 7.3 X10*3/UL (ref 4.4–11.3)

## 2024-08-30 PROCEDURE — 83735 ASSAY OF MAGNESIUM: CPT | Mod: V7 | Performed by: INTERNAL MEDICINE

## 2024-08-30 PROCEDURE — 80197 ASSAY OF TACROLIMUS: CPT | Mod: V7 | Performed by: INTERNAL MEDICINE

## 2024-08-30 PROCEDURE — 80069 RENAL FUNCTION PANEL: CPT | Mod: V7 | Performed by: INTERNAL MEDICINE

## 2024-08-30 PROCEDURE — 85027 COMPLETE CBC AUTOMATED: CPT | Mod: V7 | Performed by: INTERNAL MEDICINE

## 2024-08-30 PROCEDURE — 36600 WITHDRAWAL OF ARTERIAL BLOOD: CPT | Mod: 59,V7 | Performed by: INTERNAL MEDICINE

## 2024-08-30 PROCEDURE — 90937 HEMODIALYSIS REPEATED EVAL: CPT | Mod: V7

## 2024-08-30 RX ORDER — DIPHENHYDRAMINE HCL 25 MG
25 CAPSULE ORAL
Status: CANCELLED | OUTPATIENT
Start: 2024-09-02

## 2024-08-30 RX ORDER — ONDANSETRON HYDROCHLORIDE 2 MG/ML
4 INJECTION, SOLUTION INTRAVENOUS
Status: CANCELLED | OUTPATIENT
Start: 2024-09-02

## 2024-08-30 RX ORDER — ACETAMINOPHEN 325 MG/1
650 TABLET ORAL EVERY 4 HOURS PRN
Status: CANCELLED | OUTPATIENT
Start: 2024-09-02

## 2024-08-30 RX ORDER — LIDOCAINE AND PRILOCAINE 25; 25 MG/G; MG/G
1 CREAM TOPICAL AS NEEDED
Status: CANCELLED | OUTPATIENT
Start: 2024-09-02

## 2024-08-30 ASSESSMENT — PAIN SCALES - GENERAL: PAINLEVEL_OUTOF10: 0 - NO PAIN

## 2024-08-30 ASSESSMENT — PAIN - FUNCTIONAL ASSESSMENT: PAIN_FUNCTIONAL_ASSESSMENT: NO/DENIES PAIN

## 2024-09-02 ENCOUNTER — HOSPITAL ENCOUNTER (OUTPATIENT)
Dept: DIALYSIS | Facility: HOSPITAL | Age: 44
Setting detail: DIALYSIS SERIES
Discharge: HOME | End: 2024-09-02
Payer: COMMERCIAL

## 2024-09-02 VITALS — HEART RATE: 69 BPM | TEMPERATURE: 96.7 F

## 2024-09-02 DIAGNOSIS — Z99.2 ESRD (END STAGE RENAL DISEASE) ON DIALYSIS (MULTI): Primary | ICD-10-CM

## 2024-09-02 DIAGNOSIS — N18.6 ESRD (END STAGE RENAL DISEASE) ON DIALYSIS (MULTI): Primary | ICD-10-CM

## 2024-09-02 LAB
ALBUMIN SERPL BCP-MCNC: 3.5 G/DL (ref 3.4–5)
ANION GAP SERPL CALC-SCNC: 16 MMOL/L (ref 10–20)
BUN SERPL-MCNC: 47 MG/DL (ref 6–23)
CALCIUM SERPL-MCNC: 9 MG/DL (ref 8.6–10.6)
CHLORIDE SERPL-SCNC: 95 MMOL/L (ref 98–107)
CO2 SERPL-SCNC: 22 MMOL/L (ref 21–32)
CREAT SERPL-MCNC: 5.19 MG/DL (ref 0.5–1.05)
EGFRCR SERPLBLD CKD-EPI 2021: 10 ML/MIN/1.73M*2
ERYTHROCYTE [DISTWIDTH] IN BLOOD BY AUTOMATED COUNT: 13.5 % (ref 11.5–14.5)
GLUCOSE SERPL-MCNC: 90 MG/DL (ref 74–99)
HCT VFR BLD AUTO: 31.3 % (ref 36–46)
HGB BLD-MCNC: 10.4 G/DL (ref 12–16)
MAGNESIUM SERPL-MCNC: 1.92 MG/DL (ref 1.6–2.4)
MCH RBC QN AUTO: 32.2 PG (ref 26–34)
MCHC RBC AUTO-ENTMCNC: 33.2 G/DL (ref 32–36)
MCV RBC AUTO: 97 FL (ref 80–100)
NRBC BLD-RTO: 0 /100 WBCS (ref 0–0)
PHOSPHATE SERPL-MCNC: 4.1 MG/DL (ref 2.5–4.9)
PLATELET # BLD AUTO: 154 X10*3/UL (ref 150–450)
POTASSIUM SERPL-SCNC: 4.5 MMOL/L (ref 3.5–5.3)
RBC # BLD AUTO: 3.23 X10*6/UL (ref 4–5.2)
SODIUM SERPL-SCNC: 128 MMOL/L (ref 136–145)
TACROLIMUS BLD-MCNC: 3.5 NG/ML
WBC # BLD AUTO: 6.2 X10*3/UL (ref 4.4–11.3)

## 2024-09-02 PROCEDURE — 90937 HEMODIALYSIS REPEATED EVAL: CPT

## 2024-09-02 PROCEDURE — 85027 COMPLETE CBC AUTOMATED: CPT | Performed by: INTERNAL MEDICINE

## 2024-09-02 PROCEDURE — 2500000004 HC RX 250 GENERAL PHARMACY W/ HCPCS (ALT 636 FOR OP/ED): Mod: JZ,JG,SE

## 2024-09-02 PROCEDURE — 80069 RENAL FUNCTION PANEL: CPT | Performed by: INTERNAL MEDICINE

## 2024-09-02 PROCEDURE — 36415 COLL VENOUS BLD VENIPUNCTURE: CPT | Performed by: INTERNAL MEDICINE

## 2024-09-02 PROCEDURE — 80197 ASSAY OF TACROLIMUS: CPT | Performed by: INTERNAL MEDICINE

## 2024-09-02 PROCEDURE — 83735 ASSAY OF MAGNESIUM: CPT | Performed by: INTERNAL MEDICINE

## 2024-09-02 RX ORDER — ONDANSETRON HYDROCHLORIDE 2 MG/ML
4 INJECTION, SOLUTION INTRAVENOUS
Status: CANCELLED | OUTPATIENT
Start: 2024-09-09

## 2024-09-02 RX ORDER — LIDOCAINE AND PRILOCAINE 25; 25 MG/G; MG/G
1 CREAM TOPICAL AS NEEDED
Status: CANCELLED | OUTPATIENT
Start: 2024-09-09

## 2024-09-02 RX ORDER — DIPHENHYDRAMINE HCL 25 MG
25 CAPSULE ORAL
Status: CANCELLED | OUTPATIENT
Start: 2024-09-09

## 2024-09-02 RX ORDER — ACETAMINOPHEN 325 MG/1
650 TABLET ORAL EVERY 4 HOURS PRN
Status: CANCELLED | OUTPATIENT
Start: 2024-09-09

## 2024-09-02 ASSESSMENT — PAIN - FUNCTIONAL ASSESSMENT: PAIN_FUNCTIONAL_ASSESSMENT: NO/DENIES PAIN

## 2024-09-02 ASSESSMENT — PAIN SCALES - GENERAL: PAINLEVEL_OUTOF10: 0 - NO PAIN

## 2024-09-04 ENCOUNTER — TELEPHONE (OUTPATIENT)
Dept: TRANSPLANT | Facility: HOSPITAL | Age: 44
End: 2024-09-04
Payer: COMMERCIAL

## 2024-09-04 ENCOUNTER — HOSPITAL ENCOUNTER (OUTPATIENT)
Dept: DIALYSIS | Facility: HOSPITAL | Age: 44
Setting detail: DIALYSIS SERIES
Discharge: HOME | End: 2024-09-04
Payer: COMMERCIAL

## 2024-09-04 ENCOUNTER — DOCUMENTATION (OUTPATIENT)
Dept: TRANSPLANT | Facility: HOSPITAL | Age: 44
End: 2024-09-04
Payer: COMMERCIAL

## 2024-09-04 VITALS — HEART RATE: 80 BPM | TEMPERATURE: 96.8 F

## 2024-09-04 DIAGNOSIS — Z94.0 KIDNEY REPLACED BY TRANSPLANT (HHS-HCC): ICD-10-CM

## 2024-09-04 DIAGNOSIS — Z99.2 ESRD (END STAGE RENAL DISEASE) ON DIALYSIS (MULTI): Primary | ICD-10-CM

## 2024-09-04 DIAGNOSIS — N18.6 ESRD (END STAGE RENAL DISEASE) ON DIALYSIS (MULTI): Primary | ICD-10-CM

## 2024-09-04 LAB
ALBUMIN SERPL BCP-MCNC: 3.6 G/DL (ref 3.4–5)
ANION GAP SERPL CALC-SCNC: 15 MMOL/L (ref 10–20)
BUN SERPL-MCNC: 32 MG/DL (ref 6–23)
CALCIUM SERPL-MCNC: 8.5 MG/DL (ref 8.6–10.6)
CHLORIDE SERPL-SCNC: 99 MMOL/L (ref 98–107)
CO2 SERPL-SCNC: 22 MMOL/L (ref 21–32)
CREAT SERPL-MCNC: 4.58 MG/DL (ref 0.5–1.05)
EGFRCR SERPLBLD CKD-EPI 2021: 11 ML/MIN/1.73M*2
ERYTHROCYTE [DISTWIDTH] IN BLOOD BY AUTOMATED COUNT: 13.7 % (ref 11.5–14.5)
GLUCOSE SERPL-MCNC: 117 MG/DL (ref 74–99)
HCT VFR BLD AUTO: 31.8 % (ref 36–46)
HGB BLD-MCNC: 10.2 G/DL (ref 12–16)
MAGNESIUM SERPL-MCNC: 1.88 MG/DL (ref 1.6–2.4)
MCH RBC QN AUTO: 31.6 PG (ref 26–34)
MCHC RBC AUTO-ENTMCNC: 32.1 G/DL (ref 32–36)
MCV RBC AUTO: 99 FL (ref 80–100)
NRBC BLD-RTO: 0 /100 WBCS (ref 0–0)
PHOSPHATE SERPL-MCNC: 3.8 MG/DL (ref 2.5–4.9)
PLATELET # BLD AUTO: 163 X10*3/UL (ref 150–450)
POTASSIUM SERPL-SCNC: 4.4 MMOL/L (ref 3.5–5.3)
RBC # BLD AUTO: 3.23 X10*6/UL (ref 4–5.2)
SODIUM SERPL-SCNC: 132 MMOL/L (ref 136–145)
TACROLIMUS BLD-MCNC: 4.5 NG/ML
WBC # BLD AUTO: 5.5 X10*3/UL (ref 4.4–11.3)

## 2024-09-04 PROCEDURE — 80197 ASSAY OF TACROLIMUS: CPT | Mod: V7 | Performed by: INTERNAL MEDICINE

## 2024-09-04 PROCEDURE — 80069 RENAL FUNCTION PANEL: CPT | Performed by: INTERNAL MEDICINE

## 2024-09-04 PROCEDURE — 90937 HEMODIALYSIS REPEATED EVAL: CPT

## 2024-09-04 PROCEDURE — 83735 ASSAY OF MAGNESIUM: CPT | Performed by: INTERNAL MEDICINE

## 2024-09-04 PROCEDURE — 36415 COLL VENOUS BLD VENIPUNCTURE: CPT | Performed by: INTERNAL MEDICINE

## 2024-09-04 PROCEDURE — 85027 COMPLETE CBC AUTOMATED: CPT | Performed by: INTERNAL MEDICINE

## 2024-09-04 RX ORDER — LIDOCAINE AND PRILOCAINE 25; 25 MG/G; MG/G
1 CREAM TOPICAL AS NEEDED
OUTPATIENT
Start: 2024-09-09

## 2024-09-04 RX ORDER — DIPHENHYDRAMINE HCL 25 MG
25 CAPSULE ORAL
OUTPATIENT
Start: 2024-09-09

## 2024-09-04 RX ORDER — ONDANSETRON HYDROCHLORIDE 2 MG/ML
4 INJECTION, SOLUTION INTRAVENOUS
OUTPATIENT
Start: 2024-09-09

## 2024-09-04 RX ORDER — ACETAMINOPHEN 325 MG/1
650 TABLET ORAL EVERY 4 HOURS PRN
OUTPATIENT
Start: 2024-09-09

## 2024-09-04 RX ORDER — PREDNISONE 5 MG/1
TABLET ORAL
Qty: 30 TABLET | Refills: 0 | Status: SHIPPED | OUTPATIENT
Start: 2024-09-04 | End: 2024-09-05 | Stop reason: SDUPTHER

## 2024-09-04 NOTE — PROGRESS NOTES
Discussed in 365  Patient on pred 20, decrease by 5 mg weekly.     Direct message sent to patient and Daríober, updated script sent to local pharmacy.

## 2024-09-05 ENCOUNTER — TELEPHONE (OUTPATIENT)
Dept: TRANSPLANT | Facility: HOSPITAL | Age: 44
End: 2024-09-05
Payer: COMMERCIAL

## 2024-09-05 ENCOUNTER — APPOINTMENT (OUTPATIENT)
Dept: TRANSPLANT | Facility: HOSPITAL | Age: 44
End: 2024-09-05
Payer: COMMERCIAL

## 2024-09-05 RX ORDER — CALCIUM CARBONATE 500 MG/1
TABLET, CHEWABLE ORAL
Qty: 60 TABLET | Refills: 3 | Status: SHIPPED | OUTPATIENT
Start: 2024-09-05

## 2024-09-05 RX ORDER — PREDNISONE 5 MG/1
5 TABLET ORAL DAILY
Qty: 90 TABLET | Refills: 3 | Status: SHIPPED | OUTPATIENT
Start: 2024-09-05 | End: 2025-09-05

## 2024-09-06 ENCOUNTER — HOSPITAL ENCOUNTER (OUTPATIENT)
Dept: DIALYSIS | Facility: HOSPITAL | Age: 44
Setting detail: DIALYSIS SERIES
Discharge: HOME | End: 2024-09-06
Payer: COMMERCIAL

## 2024-09-06 VITALS — TEMPERATURE: 97.3 F | HEART RATE: 75 BPM

## 2024-09-06 DIAGNOSIS — Z99.2 ESRD (END STAGE RENAL DISEASE) ON DIALYSIS (MULTI): Primary | ICD-10-CM

## 2024-09-06 DIAGNOSIS — N18.6 ESRD (END STAGE RENAL DISEASE) ON DIALYSIS (MULTI): Primary | ICD-10-CM

## 2024-09-06 LAB
ALBUMIN SERPL BCP-MCNC: 3.7 G/DL (ref 3.4–5)
ANION GAP SERPL CALC-SCNC: 13 MMOL/L (ref 10–20)
BUN SERPL-MCNC: 34 MG/DL (ref 6–23)
CALCIUM SERPL-MCNC: 8.4 MG/DL (ref 8.6–10.6)
CHLORIDE SERPL-SCNC: 102 MMOL/L (ref 98–107)
CO2 SERPL-SCNC: 25 MMOL/L (ref 21–32)
CREAT SERPL-MCNC: 4.65 MG/DL (ref 0.5–1.05)
EGFRCR SERPLBLD CKD-EPI 2021: 11 ML/MIN/1.73M*2
ERYTHROCYTE [DISTWIDTH] IN BLOOD BY AUTOMATED COUNT: 14 % (ref 11.5–14.5)
GLUCOSE SERPL-MCNC: 112 MG/DL (ref 74–99)
HCT VFR BLD AUTO: 33.4 % (ref 36–46)
HGB BLD-MCNC: 10.5 G/DL (ref 12–16)
MAGNESIUM SERPL-MCNC: 1.95 MG/DL (ref 1.6–2.4)
MCH RBC QN AUTO: 32.2 PG (ref 26–34)
MCHC RBC AUTO-ENTMCNC: 31.4 G/DL (ref 32–36)
MCV RBC AUTO: 103 FL (ref 80–100)
NRBC BLD-RTO: 0 /100 WBCS (ref 0–0)
PHOSPHATE SERPL-MCNC: 2.9 MG/DL (ref 2.5–4.9)
PLATELET # BLD AUTO: 162 X10*3/UL (ref 150–450)
POTASSIUM SERPL-SCNC: 4.3 MMOL/L (ref 3.5–5.3)
RBC # BLD AUTO: 3.26 X10*6/UL (ref 4–5.2)
SODIUM SERPL-SCNC: 136 MMOL/L (ref 136–145)
TACROLIMUS BLD-MCNC: 3.8 NG/ML
WBC # BLD AUTO: 4.5 X10*3/UL (ref 4.4–11.3)

## 2024-09-06 PROCEDURE — 90937 HEMODIALYSIS REPEATED EVAL: CPT | Mod: V7

## 2024-09-06 PROCEDURE — 80197 ASSAY OF TACROLIMUS: CPT | Mod: V7 | Performed by: INTERNAL MEDICINE

## 2024-09-06 PROCEDURE — 83735 ASSAY OF MAGNESIUM: CPT | Mod: V7 | Performed by: INTERNAL MEDICINE

## 2024-09-06 PROCEDURE — 36600 WITHDRAWAL OF ARTERIAL BLOOD: CPT | Mod: V7 | Performed by: INTERNAL MEDICINE

## 2024-09-06 PROCEDURE — 80069 RENAL FUNCTION PANEL: CPT | Mod: V7 | Performed by: INTERNAL MEDICINE

## 2024-09-06 PROCEDURE — 85027 COMPLETE CBC AUTOMATED: CPT | Mod: V7 | Performed by: INTERNAL MEDICINE

## 2024-09-06 PROCEDURE — 84100 ASSAY OF PHOSPHORUS: CPT | Mod: V7 | Performed by: INTERNAL MEDICINE

## 2024-09-06 RX ORDER — LIDOCAINE AND PRILOCAINE 25; 25 MG/G; MG/G
1 CREAM TOPICAL AS NEEDED
OUTPATIENT
Start: 2024-09-09

## 2024-09-06 RX ORDER — DIPHENHYDRAMINE HCL 25 MG
25 CAPSULE ORAL
OUTPATIENT
Start: 2024-09-09

## 2024-09-06 RX ORDER — ONDANSETRON HYDROCHLORIDE 2 MG/ML
4 INJECTION, SOLUTION INTRAVENOUS
OUTPATIENT
Start: 2024-09-09

## 2024-09-06 RX ORDER — ACETAMINOPHEN 325 MG/1
650 TABLET ORAL EVERY 4 HOURS PRN
OUTPATIENT
Start: 2024-09-09

## 2024-09-06 ASSESSMENT — PAIN SCALES - GENERAL: PAINLEVEL_OUTOF10: 0 - NO PAIN

## 2024-09-06 ASSESSMENT — PAIN - FUNCTIONAL ASSESSMENT
PAIN_FUNCTIONAL_ASSESSMENT: NO/DENIES PAIN
PAIN_FUNCTIONAL_ASSESSMENT: NO/DENIES PAIN

## 2024-09-09 ENCOUNTER — OFFICE VISIT (OUTPATIENT)
Dept: TRANSPLANT | Facility: HOSPITAL | Age: 44
End: 2024-09-09
Payer: COMMERCIAL

## 2024-09-09 ENCOUNTER — HOSPITAL ENCOUNTER (OUTPATIENT)
Dept: DIALYSIS | Facility: HOSPITAL | Age: 44
Setting detail: DIALYSIS SERIES
Discharge: HOME | End: 2024-09-09
Payer: COMMERCIAL

## 2024-09-09 VITALS
OXYGEN SATURATION: 97 % | BODY MASS INDEX: 31.14 KG/M2 | SYSTOLIC BLOOD PRESSURE: 160 MMHG | WEIGHT: 181.4 LBS | DIASTOLIC BLOOD PRESSURE: 92 MMHG | HEART RATE: 73 BPM | TEMPERATURE: 95.5 F

## 2024-09-09 VITALS — TEMPERATURE: 97.2 F | HEART RATE: 68 BPM

## 2024-09-09 DIAGNOSIS — Z48.298 AFTERCARE FOLLOWING ORGAN TRANSPLANT: ICD-10-CM

## 2024-09-09 DIAGNOSIS — N18.6 ESRD (END STAGE RENAL DISEASE) ON DIALYSIS (MULTI): Primary | ICD-10-CM

## 2024-09-09 DIAGNOSIS — H10.11 ALLERGIC CONJUNCTIVITIS OF RIGHT EYE: ICD-10-CM

## 2024-09-09 DIAGNOSIS — Z94.0 KIDNEY REPLACED BY TRANSPLANT (HHS-HCC): ICD-10-CM

## 2024-09-09 DIAGNOSIS — Z94.0 IMMUNOSUPPRESSIVE MANAGEMENT ENCOUNTER FOLLOWING KIDNEY TRANSPLANT (HHS-HCC): ICD-10-CM

## 2024-09-09 DIAGNOSIS — Z99.2 ESRD (END STAGE RENAL DISEASE) ON DIALYSIS (MULTI): Primary | ICD-10-CM

## 2024-09-09 DIAGNOSIS — I10 ESSENTIAL HYPERTENSION: ICD-10-CM

## 2024-09-09 DIAGNOSIS — D84.9 IMMUNOSUPPRESSION (MULTI): ICD-10-CM

## 2024-09-09 DIAGNOSIS — I15.1 HYPERTENSION SECONDARY TO OTHER RENAL DISORDERS: Primary | ICD-10-CM

## 2024-09-09 DIAGNOSIS — Z79.899 IMMUNOSUPPRESSIVE MANAGEMENT ENCOUNTER FOLLOWING KIDNEY TRANSPLANT (HHS-HCC): ICD-10-CM

## 2024-09-09 DIAGNOSIS — M79.10 MUSCLE ACHE: ICD-10-CM

## 2024-09-09 LAB
ALBUMIN SERPL BCP-MCNC: 3.8 G/DL (ref 3.4–5)
ANION GAP SERPL CALC-SCNC: 16 MMOL/L (ref 10–20)
BUN SERPL-MCNC: 58 MG/DL (ref 6–23)
CALCIUM SERPL-MCNC: 8.6 MG/DL (ref 8.6–10.6)
CHLORIDE SERPL-SCNC: 103 MMOL/L (ref 98–107)
CO2 SERPL-SCNC: 22 MMOL/L (ref 21–32)
CREAT SERPL-MCNC: 5.76 MG/DL (ref 0.5–1.05)
EGFRCR SERPLBLD CKD-EPI 2021: 9 ML/MIN/1.73M*2
ERYTHROCYTE [DISTWIDTH] IN BLOOD BY AUTOMATED COUNT: 13.8 % (ref 11.5–14.5)
GLUCOSE SERPL-MCNC: 121 MG/DL (ref 74–99)
HCT VFR BLD AUTO: 37.5 % (ref 36–46)
HGB BLD-MCNC: 11.6 G/DL (ref 12–16)
MAGNESIUM SERPL-MCNC: 2.27 MG/DL (ref 1.6–2.4)
MCH RBC QN AUTO: 31.5 PG (ref 26–34)
MCHC RBC AUTO-ENTMCNC: 30.9 G/DL (ref 32–36)
MCV RBC AUTO: 102 FL (ref 80–100)
NRBC BLD-RTO: 0 /100 WBCS (ref 0–0)
PHOSPHATE SERPL-MCNC: 5 MG/DL (ref 2.5–4.9)
PLATELET # BLD AUTO: 183 X10*3/UL (ref 150–450)
POTASSIUM SERPL-SCNC: 4.3 MMOL/L (ref 3.5–5.3)
RBC # BLD AUTO: 3.68 X10*6/UL (ref 4–5.2)
SODIUM SERPL-SCNC: 137 MMOL/L (ref 136–145)
TACROLIMUS BLD-MCNC: 4.5 NG/ML
WBC # BLD AUTO: 3.1 X10*3/UL (ref 4.4–11.3)

## 2024-09-09 PROCEDURE — 3080F DIAST BP >= 90 MM HG: CPT | Performed by: INTERNAL MEDICINE

## 2024-09-09 PROCEDURE — 83735 ASSAY OF MAGNESIUM: CPT | Mod: V7 | Performed by: INTERNAL MEDICINE

## 2024-09-09 PROCEDURE — 85027 COMPLETE CBC AUTOMATED: CPT | Mod: V7 | Performed by: INTERNAL MEDICINE

## 2024-09-09 PROCEDURE — 99215 OFFICE O/P EST HI 40 MIN: CPT | Performed by: INTERNAL MEDICINE

## 2024-09-09 PROCEDURE — 80197 ASSAY OF TACROLIMUS: CPT | Mod: V7 | Performed by: INTERNAL MEDICINE

## 2024-09-09 PROCEDURE — 3077F SYST BP >= 140 MM HG: CPT | Performed by: INTERNAL MEDICINE

## 2024-09-09 PROCEDURE — 36600 WITHDRAWAL OF ARTERIAL BLOOD: CPT | Mod: V7 | Performed by: INTERNAL MEDICINE

## 2024-09-09 PROCEDURE — 90937 HEMODIALYSIS REPEATED EVAL: CPT | Mod: V7

## 2024-09-09 PROCEDURE — 80069 RENAL FUNCTION PANEL: CPT | Mod: V7 | Performed by: INTERNAL MEDICINE

## 2024-09-09 RX ORDER — LIDOCAINE AND PRILOCAINE 25; 25 MG/G; MG/G
1 CREAM TOPICAL AS NEEDED
Status: CANCELLED | OUTPATIENT
Start: 2024-09-16

## 2024-09-09 RX ORDER — AMLODIPINE BESYLATE 5 MG/1
5 TABLET ORAL DAILY
Qty: 90 TABLET | Refills: 3 | Status: SHIPPED | OUTPATIENT
Start: 2024-09-09 | End: 2025-09-09

## 2024-09-09 RX ORDER — ACETAMINOPHEN 325 MG/1
650 TABLET ORAL EVERY 4 HOURS PRN
Status: CANCELLED | OUTPATIENT
Start: 2024-09-16

## 2024-09-09 RX ORDER — HYDRALAZINE HYDROCHLORIDE 50 MG/1
50 TABLET, FILM COATED ORAL 3 TIMES DAILY
Qty: 270 TABLET | Refills: 3 | Status: SHIPPED | OUTPATIENT
Start: 2024-09-09 | End: 2025-09-09

## 2024-09-09 RX ORDER — ONDANSETRON HYDROCHLORIDE 2 MG/ML
4 INJECTION, SOLUTION INTRAVENOUS
Status: CANCELLED | OUTPATIENT
Start: 2024-09-16

## 2024-09-09 RX ORDER — DIPHENHYDRAMINE HCL 25 MG
25 CAPSULE ORAL
Status: CANCELLED | OUTPATIENT
Start: 2024-09-16

## 2024-09-09 RX ORDER — TACROLIMUS 1 MG/1
2 CAPSULE ORAL
Qty: 120 CAPSULE | Refills: 0 | Status: SHIPPED | OUTPATIENT
Start: 2024-09-09 | End: 2024-10-11

## 2024-09-09 ASSESSMENT — PAIN - FUNCTIONAL ASSESSMENT: PAIN_FUNCTIONAL_ASSESSMENT: NO/DENIES PAIN

## 2024-09-09 ASSESSMENT — PAIN SCALES - GENERAL
PAINLEVEL_OUTOF10: 0 - NO PAIN
PAINLEVEL: 0-NO PAIN

## 2024-09-09 NOTE — PATIENT INSTRUCTIONS
Decrease amlodipine to 5 mg daily  Increase hydralazine to 50 mg three times a day  Referral to Ophthalmology  CT Chest Abd Pelvis without contrast  RTC: 1 month  LABS: with dialysis

## 2024-09-09 NOTE — PROGRESS NOTES
TRANSPLANT NEPHROLOGY :   OUTPATIENT CLINIC NOTE      SERVICE DATE : 09/09/2024     REASON FOR VISIT/CHIEF COMPLAINT:  S/P  TRANSPLANT SURGERY  IMMUNOSUPPRESSIVE MEDICATION MANAGEMENT  BLOOD PRESSURE MANAGEMENT    HPI:    Ms. Leblanc is a 44 y.o. Malian F with ESRD secondary to HTN/NSAID who is s/p DDKT on 11/30/23 (Dr. Marbin Lambert & Dr. Louis, KDPI 56%, PRA 48%. HCV -/-, CMV D+/R+, EBV D+/R+). Pt received a total of 4.5 mg/kg thymoglobulin induction therapy in conjunction with solumedrol, was initiated on standard triple immunosuppression therapy (Tacrolimus, Mycophenolate, prednisone).  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      Post-op course was complicated by return to OR for exploration of transplanted kidney and washout of hematoma. Hospital course was complicated by post-operative pain and constipation, H/o DGF with eventual recovery in renal function.      5/29/24 Direct admission for fever, nausea, vomiting and abdominal pain (chronic).  Dx with CMV disease, new kidney stone with negative MAG3 for obstruction. ID consulted. Pt remains on Valcyte treatment dose.     Serum Cr ~0.9 till 6/13/24, started trending up gradually since then to peak 3.38 (7/7/24) warranting admission for MARK, hypervolemia. Kidney bx 7/5/24 with acute T-cell IB, acute vascular rejection IIB, and active ABMR. DSA against DQ2 (10,158). Pt is s/p Pulse steroids, Thymo 6mg/kg, PLEX x4. Cr trended to ~2.1-2.3.      Rpt bx 7/17/24 with persistent ACR 1A, AVR IIA, ABMR although the degree of inflammation appears improved from prior bx. Pt received additional Thymo 1.5 mg/kg. Pt was restarted on HD during this admission for volume management and clearance and has been RRT dependent since then. Plan was made for outpt rituximab infusion but scheduling delayed due to recent COVID infection (managed conservatively).     Pt is currently on HD TTS. Reports somewhat improved urine output  with torsemide 80 mg bid. However, LE swelling and dyspnea with mild exertion persists.     Recent pre-HD Cr remains elevated ~5.     Pt and caregiver request help with travel and financial resources given considerable starin on caregiver whom the pt relies on for HD appts thrice weekly.    ROS neg other than stated above.     PAST MEDICAL HISTORY:  Past Medical History:   Diagnosis Date    Anxiety     Chronic kidney disease     Chronic kidney disease, unspecified     CKD, patient interested in transplantation    Depression     Disease of thyroid gland     GERD (gastroesophageal reflux disease)     Hypertension     Personal history of COVID-19 07/20/2022    History of COVID-19        PAST SURGICAL HISTORY:  Past Surgical History:   Procedure Laterality Date    MR HEAD ANGIO W AND WO IV CONTRAST  01/26/2021    MR HEAD ANGIO W AND WO IV CONTRAST    MR HEAD ANGIO WO IV CONTRAST  01/26/2021    MR HEAD ANGIO WO IV CONTRAST    OTHER SURGICAL HISTORY  07/20/2022    Arteriovenous fistula creation procedure    TRANSPLANT, KIDNEY, OPEN Right 11/30/2023    US GUIDED PERCUTANEOUS PERITONEAL OR RETROPERITONEAL FLUID COLLECTION DRAINAGE  12/21/2023    US GUIDED PERCUTANEOUS PERITONEAL OR RETROPERITONEAL FLUID COLLECTION DRAINAGE 12/21/2023 Batsheva Shanks MD [facility]        SOCIAL HISTORY:  Social History     Socioeconomic History    Marital status:      Spouse name: Not on file    Number of children: Not on file    Years of education: Not on file    Highest education level: Not on file   Occupational History    Not on file   Tobacco Use    Smoking status: Never     Passive exposure: Never    Smokeless tobacco: Former     Quit date: 2021    Tobacco comments:     Chewing tobacco, quit 1 year ago   Vaping Use    Vaping status: Never Used   Substance and Sexual Activity    Alcohol use: Never    Drug use: Never    Sexual activity: Defer   Other Topics Concern    Not on file   Social History Narrative    Not on file     Social  Determinants of Health     Financial Resource Strain: Patient Declined (7/23/2024)    Overall Financial Resource Strain (CARDIA)     Difficulty of Paying Living Expenses: Patient declined   Food Insecurity: No Food Insecurity (7/3/2024)    Hunger Vital Sign     Worried About Running Out of Food in the Last Year: Never true     Ran Out of Food in the Last Year: Never true   Transportation Needs: Patient Declined (7/23/2024)    PRAPARE - Transportation     Lack of Transportation (Medical): Patient declined     Lack of Transportation (Non-Medical): Patient declined   Physical Activity: Inactive (7/3/2024)    Exercise Vital Sign     Days of Exercise per Week: 0 days     Minutes of Exercise per Session: 0 min   Stress: No Stress Concern Present (7/3/2024)    [place] [employer] - Occupational Stress Questionnaire     Feeling of Stress : Not at all   Social Connections: Unknown (7/3/2024)    Social Connection and Isolation Panel [NHANES]     Frequency of Communication with Friends and Family: More than three times a week     Frequency of Social Gatherings with Friends and Family: More than three times a week     Attends Jehovah's witness Services: Patient declined     Active Member of Clubs or Organizations: Patient declined     Attends Club or Organization Meetings: Patient declined     Marital Status:    Intimate Partner Violence: Not At Risk (7/3/2024)    Humiliation, Afraid, Rape, and Kick questionnaire     Fear of Current or Ex-Partner: No     Emotionally Abused: No     Physically Abused: No     Sexually Abused: No   Housing Stability: Patient Declined (7/23/2024)    Housing Stability Vital Sign     Unable to Pay for Housing in the Last Year: Patient declined     Number of Times Moved in the Last Year: 1     Homeless in the Last Year: Patient declined       FAMILY HISTORY:  Family History   Family history unknown: Yes       MEDICATION LIST:  Current Outpatient Medications   Medication Instructions     acetaminophen (TYLENOL) 650 mg, oral, Every 6 hours PRN    albuterol 90 mcg/actuation inhaler 2 puffs, inhalation, Every 6 hours PRN    amLODIPine (NORVASC) 5 mg, oral, Daily    azelastine (Optivar) 0.05 % ophthalmic solution INSTILL 1 DROP INTO BOTH EYES 2 TIMES A DAY .    benzonatate (TESSALON PERLES) 100 mg, oral, Every 6 hours PRN, Do not crush or chew.    Calcium Antacid 200 mg calcium (500 mg) chewable tablet TAKE 1 TABLET BY MOUTH 2 TIMES DAILY (CHEW AND SWALLOW)    carvedilol (COREG) 25 mg, oral, 2 times daily    cholecalciferol (VITAMIN D-3) 100 mcg, oral, Daily    cyclobenzaprine (FLEXERIL) 5 mg, oral, 2 times daily    gabapentin (NEURONTIN) 300 mg, oral, Nightly    hydrALAZINE (APRESOLINE) 50 mg, oral, 3 times daily    levothyroxine (SYNTHROID, LEVOXYL) 25 mcg, oral, Daily before breakfast    magnesium oxide (MAG-OX) 400 mg, oral, Daily, Take with food at lunch time    mycophenolate (CELLCEPT) 1,000 mg, oral, Every 12 hours    pantoprazole (ProtoNix) 40 mg EC tablet TAKE 1 TABLET (40 MG) BY MOUTH EVERY MORNING (BEFORE BREAKFAST).    predniSONE (DELTASONE) 5 mg, oral, Daily, Continue on 5 mg once taper complete.    sertraline (ZOLOFT) 50 mg, oral, Daily    SUMAtriptan (IMITREX) 100 mg, oral, Once as needed    tacrolimus (PROGRAF) 2 mg, oral, Every 12 hours scheduled (0630,1830)    tenofovir alafenamide (VEMLIDY) 25 mg, oral, Daily    torsemide 80 mg, oral, 2 times daily       ALLERGY  Allergies   Allergen Reactions    Sumatriptan Headache and Dizziness     Patient has worsening headache, dizziness, chest burning/tingling, and extremity pain upon taking sumatriptan.       PHYSICAL EXAM:    Visit Vitals  BP (!) 160/92   Pulse 73   Temp 35.3 °C (95.5 °F) (Temporal)   Wt 82.3 kg (181 lb 6.4 oz)   SpO2 97%   BMI 31.14 kg/m²   OB Status Unknown   Smoking Status Never   BSA 1.93 m²          General Appearance - NAD, A&Ox3   HEENT - Supple. Not pale. No jaundice. No JVD or LAD  CVS - RRR. Normal S1/S2. No  murmur, rub or gallop  Lungs- equal air entry, scattered bibasilar crackles   Abdomen - soft , NT, ND, no guarding. No hepatosplenomegaly. No allograft tenderness  Musculoskeletal /Extremities - 1+ edema. Full ROM. No joint tenderness.   Neuro/Psych - appropriate mood and affect. Motor power V/V all extremities. CN I -XII were grossly intact.  Skin - No visible rash      LABS:    Lab Results   Component Value Date    CREATININE 5.76 (H) 09/09/2024    BUN 58 (H) 09/09/2024     09/09/2024    K 4.3 09/09/2024     09/09/2024    CO2 22 09/09/2024     Lab Results   Component Value Date    PTH 18.5 06/05/2024    CALCIUM 8.6 09/09/2024    CAION 1.04 (L) 07/31/2024    PHOS 5.0 (H) 09/09/2024     Lab Results   Component Value Date    WBC 3.1 (L) 09/09/2024    HGB 11.6 (L) 09/09/2024    HCT 37.5 09/09/2024     (H) 09/09/2024     09/09/2024     Lab Results   Component Value Date    IRON 89 07/17/2024    TIBC 173 (L) 07/17/2024    FERRITIN 691 (H) 07/17/2024     Lab Results   Component Value Date    TACROLIMUS 3.8 09/06/2024    CMVPCRIU 539 (H) 06/13/2024    EBVDNAPCR Not Detected 08/19/2024     Lab Results   Component Value Date    CMVDNAPCR Not Detected 08/19/2024    BKVDNAPCR Not Detected 08/19/2024    EBVDNAPCR Not Detected 08/19/2024     Renal BX;  7/5/24  -- CHANGES CONSISTENT WITH ACUTE T CELL-MEDIATED REJECTION, BANFF IB  -- ACUTE VASCULAR REJECTION, BANFF IIA  -- ACUTE ANTIBODY-MEDIATED REJECTION     7/17/24  -- CHANGES CONSISTENT WITH ACUTE T CELL-MEDIATED REJECTION, BANFF IA  -- ACUTE VASCULAR REJECTION, BANFF IIA  -- MICROVASCULAR INFLAMMATION AND C4d POSITIVITY, CONSISTENT WITH PERSISTENT ACUTE ANTIBODY-MEDIATED REJECTION     8/5/24  -- SEVERE TUBULITIS WITH MILD INFILTRATE, NOT MEETING CRITERIA FOR BORDERLINE FOR ACUTE T CELL-MEDIATED REJECTION  -- CHRONIC ALLOGRAFT ARTERIOPATHY, MEETING CRITERIA FOR CHRONIC ACTIVE T CELL-MEDIATED REJECTION, GRADE II  -- MODERATE GLOMERULITIS AND MODERATE  PERITUBULAR CAPILLARITIS WITH ISOLATED C4d STAINING OF PERITUBULAR CAPILLARIES, SUSPICIOUS FOR PERSISTENT ACTIVE ANTIBODY-MEDIATED REJECTION      DSA 7/27/24:  DQ2 (DQA1*02:01/DQB1*02:02) 1,895  Cw10 (C*03:04) 1,379    ASSESSMENT AND PLAN:    Ms. Leblanc is a 44 y.o. female  who is here for follow up s/p kidney transplant.    TRANSPLANT DATE: 11/30/2023 (Kidney)      1. ESRD S/P kidney transplant   - Creatinine last check was 5.76 (pre-HD)  - h/o persistent ABMR, ACR on last rpt bx 8/5/24  - Ritux infusion rescheduled due to recent COVID infection. Will plan for a biopsy this month.   - cont HD three times per week as scheduled. Will look into options for referral to local HD units given travel constraints.   - CT c/a/p w/o contrast given persistent dyspnea.   - Renal allograft function is impaired.   -Random urine protein/creatinine ratio is 530 mg/g. Due for recheck  -Allosure last check was 1.0 8/19/24. Rpt with next labs  -Ensure adequate hydration  - Avoid nephrotoxic medications, NSAIDs, and IV contrast.    2. Immunosuppression  -Tacrolimus level last check was 4.4, below goal (6-8). Incr tac to 3mg q12.   -Continue MMF 1000 mg q12, Pred 5 mg/d   - EBV, CMV, BK PCR neg 8/19/24    3. Electrolytes  -Acceptable from last lab drawn    4. Hypertension  -office BP elevated today, home Bps usually acceptable  - remains edematous on exam. Decrease amlodipine to 5mg/d, incr hydralazine to 50 mg tid.   - cont torsemide 80 mg bid  - titrate meds as indicated    5. Bone Mineral Disease/Osteoporosis  - Ca, phos acceptable  -check VIT D, PTH with next lab  - Consider DEXA every 2-3 years , defer to PCP    6.Anemia/Leukopenia:  - Hb 11.6, acceptable. No indication for DENIZ. Check iron panel, ferritin  - Mild leukopenia, WBC 3.1k. monitor.    7.Health maintenance and vaccination  - Flu shot during flu season annually  - Cancer screening is up to date per the patient    Lab : Routine transplant lab ( CBC, RFP, and anti-rejection  trough level ) every 1 week   Additional labs:  VIT D, PTH Q3 months  Viral screening PCR, Allosure and UPC per protocol.    Additional Plan :  - will look into options for dialysis at a local  center.  - SW met pt to day to discuss possible resources to help with transportation, finances, caregiver issues.    RTC 1 month(s)

## 2024-09-11 ENCOUNTER — HOSPITAL ENCOUNTER (OUTPATIENT)
Dept: DIALYSIS | Facility: HOSPITAL | Age: 44
Setting detail: DIALYSIS SERIES
Discharge: HOME | End: 2024-09-11
Payer: COMMERCIAL

## 2024-09-11 ENCOUNTER — DOCUMENTATION (OUTPATIENT)
Dept: TRANSPLANT | Facility: HOSPITAL | Age: 44
End: 2024-09-11
Payer: COMMERCIAL

## 2024-09-11 VITALS — TEMPERATURE: 96.8 F | HEART RATE: 59 BPM

## 2024-09-11 DIAGNOSIS — Z99.2 ESRD (END STAGE RENAL DISEASE) ON DIALYSIS (MULTI): Primary | ICD-10-CM

## 2024-09-11 DIAGNOSIS — N18.6 ESRD (END STAGE RENAL DISEASE) ON DIALYSIS (MULTI): Primary | ICD-10-CM

## 2024-09-11 LAB
ALBUMIN SERPL BCP-MCNC: 3.5 G/DL (ref 3.4–5)
ANION GAP SERPL CALC-SCNC: 16 MMOL/L (ref 10–20)
BUN SERPL-MCNC: 47 MG/DL (ref 6–23)
CALCIUM SERPL-MCNC: 8.1 MG/DL (ref 8.6–10.6)
CHLORIDE SERPL-SCNC: 104 MMOL/L (ref 98–107)
CO2 SERPL-SCNC: 20 MMOL/L (ref 21–32)
CREAT SERPL-MCNC: 4.97 MG/DL (ref 0.5–1.05)
EGFRCR SERPLBLD CKD-EPI 2021: 10 ML/MIN/1.73M*2
ERYTHROCYTE [DISTWIDTH] IN BLOOD BY AUTOMATED COUNT: 13.4 % (ref 11.5–14.5)
GLUCOSE SERPL-MCNC: 119 MG/DL (ref 74–99)
HCT VFR BLD AUTO: 35.5 % (ref 36–46)
HGB BLD-MCNC: 11.1 G/DL (ref 12–16)
MAGNESIUM SERPL-MCNC: 2.25 MG/DL (ref 1.6–2.4)
MCH RBC QN AUTO: 31.4 PG (ref 26–34)
MCHC RBC AUTO-ENTMCNC: 31.3 G/DL (ref 32–36)
MCV RBC AUTO: 100 FL (ref 80–100)
NRBC BLD-RTO: 0 /100 WBCS (ref 0–0)
PHOSPHATE SERPL-MCNC: 4.6 MG/DL (ref 2.5–4.9)
PLATELET # BLD AUTO: 166 X10*3/UL (ref 150–450)
POTASSIUM SERPL-SCNC: 4.3 MMOL/L (ref 3.5–5.3)
RBC # BLD AUTO: 3.54 X10*6/UL (ref 4–5.2)
SODIUM SERPL-SCNC: 136 MMOL/L (ref 136–145)
TACROLIMUS BLD-MCNC: 4.4 NG/ML
WBC # BLD AUTO: 2.3 X10*3/UL (ref 4.4–11.3)

## 2024-09-11 PROCEDURE — 80197 ASSAY OF TACROLIMUS: CPT | Mod: V7 | Performed by: INTERNAL MEDICINE

## 2024-09-11 PROCEDURE — 90937 HEMODIALYSIS REPEATED EVAL: CPT | Mod: V7

## 2024-09-11 PROCEDURE — 84100 ASSAY OF PHOSPHORUS: CPT | Mod: V7 | Performed by: INTERNAL MEDICINE

## 2024-09-11 PROCEDURE — 36415 COLL VENOUS BLD VENIPUNCTURE: CPT | Mod: V7 | Performed by: INTERNAL MEDICINE

## 2024-09-11 PROCEDURE — 83735 ASSAY OF MAGNESIUM: CPT | Mod: V7 | Performed by: INTERNAL MEDICINE

## 2024-09-11 PROCEDURE — 85027 COMPLETE CBC AUTOMATED: CPT | Mod: V7 | Performed by: INTERNAL MEDICINE

## 2024-09-11 RX ORDER — LIDOCAINE AND PRILOCAINE 25; 25 MG/G; MG/G
1 CREAM TOPICAL AS NEEDED
OUTPATIENT
Start: 2024-09-16

## 2024-09-11 RX ORDER — ACETAMINOPHEN 325 MG/1
650 TABLET ORAL EVERY 4 HOURS PRN
Status: CANCELLED | OUTPATIENT
Start: 2024-09-16

## 2024-09-11 RX ORDER — ONDANSETRON HYDROCHLORIDE 2 MG/ML
4 INJECTION, SOLUTION INTRAVENOUS
OUTPATIENT
Start: 2024-09-16

## 2024-09-11 RX ORDER — DIPHENHYDRAMINE HCL 25 MG
25 CAPSULE ORAL
OUTPATIENT
Start: 2024-09-16

## 2024-09-11 ASSESSMENT — PAIN SCALES - GENERAL: PAINLEVEL_OUTOF10: 0 - NO PAIN

## 2024-09-11 ASSESSMENT — PAIN - FUNCTIONAL ASSESSMENT: PAIN_FUNCTIONAL_ASSESSMENT: NO/DENIES PAIN

## 2024-09-11 NOTE — PROGRESS NOTES
Patient rep Danuta arrived to [facility] [address] asking to speak to coordinator, asked for AVS was left in wheelchair on Monday after appt.   Printed out AVS and provided to Danuta.   [place] still concerned about patient's creatinine and asking if dialysis can be at [place]. Again educated him patient at [facility] for dialysis for close observation and adjustment of dialysis. Need to complete infusion and possible re-biopsy then will decide from there.   Danuta had no further questions.

## 2024-09-12 ENCOUNTER — TELEPHONE (OUTPATIENT)
Dept: TRANSPLANT | Facility: HOSPITAL | Age: 44
End: 2024-09-12
Payer: COMMERCIAL

## 2024-09-12 DIAGNOSIS — T86.19 DELAYED GRAFT FUNCTION OF KIDNEY (HHS-HCC): ICD-10-CM

## 2024-09-12 NOTE — TELEPHONE ENCOUNTER
Discussed in 365   Hold off on ritux   Do biopsy and ultrasound of kidney will decide if infusion needed after results.      Email sent to infusion asking to place hold on infusion and cancel appt for now.     Direct message sent to JOSE F Patiño and Jorge to schedule biopsy and US of tx kidney     Called and PERFECTOM for [place] on updated plan, asked for call back to confirm her received message.

## 2024-09-13 ENCOUNTER — TELEPHONE (OUTPATIENT)
Dept: TRANSPLANT | Facility: HOSPITAL | Age: 44
End: 2024-09-13
Payer: COMMERCIAL

## 2024-09-13 ENCOUNTER — SPECIALTY PHARMACY (OUTPATIENT)
Dept: PHARMACY | Facility: CLINIC | Age: 44
End: 2024-09-13

## 2024-09-13 ENCOUNTER — HOSPITAL ENCOUNTER (OUTPATIENT)
Dept: DIALYSIS | Facility: HOSPITAL | Age: 44
Setting detail: DIALYSIS SERIES
Discharge: HOME | End: 2024-09-13
Payer: COMMERCIAL

## 2024-09-13 ENCOUNTER — ANCILLARY ORDERS (OUTPATIENT)
Dept: TRANSPLANT | Facility: HOSPITAL | Age: 44
End: 2024-09-13
Payer: COMMERCIAL

## 2024-09-13 VITALS — HEART RATE: 69 BPM | TEMPERATURE: 96.8 F

## 2024-09-13 DIAGNOSIS — N18.6 ESRD (END STAGE RENAL DISEASE) ON DIALYSIS (MULTI): Primary | ICD-10-CM

## 2024-09-13 DIAGNOSIS — Z99.2 ESRD (END STAGE RENAL DISEASE) ON DIALYSIS (MULTI): Primary | ICD-10-CM

## 2024-09-13 DIAGNOSIS — Z94.0 KIDNEY REPLACED BY TRANSPLANT (HHS-HCC): ICD-10-CM

## 2024-09-13 LAB
ALBUMIN SERPL BCP-MCNC: 3.6 G/DL (ref 3.4–5)
ANION GAP SERPL CALC-SCNC: 15 MMOL/L (ref 10–20)
BUN SERPL-MCNC: 41 MG/DL (ref 6–23)
CALCIUM SERPL-MCNC: 8.1 MG/DL (ref 8.6–10.6)
CHLORIDE SERPL-SCNC: 106 MMOL/L (ref 98–107)
CO2 SERPL-SCNC: 22 MMOL/L (ref 21–32)
CREAT SERPL-MCNC: 4.98 MG/DL (ref 0.5–1.05)
EGFRCR SERPLBLD CKD-EPI 2021: 10 ML/MIN/1.73M*2
ERYTHROCYTE [DISTWIDTH] IN BLOOD BY AUTOMATED COUNT: 13.5 % (ref 11.5–14.5)
GLUCOSE SERPL-MCNC: 82 MG/DL (ref 74–99)
HCT VFR BLD AUTO: 34.3 % (ref 36–46)
HGB BLD-MCNC: 10.7 G/DL (ref 12–16)
MAGNESIUM SERPL-MCNC: 2.18 MG/DL (ref 1.6–2.4)
MCH RBC QN AUTO: 31.7 PG (ref 26–34)
MCHC RBC AUTO-ENTMCNC: 31.2 G/DL (ref 32–36)
MCV RBC AUTO: 102 FL (ref 80–100)
NRBC BLD-RTO: 0 /100 WBCS (ref 0–0)
PHOSPHATE SERPL-MCNC: 3.9 MG/DL (ref 2.5–4.9)
PLATELET # BLD AUTO: 127 X10*3/UL (ref 150–450)
POTASSIUM SERPL-SCNC: 4.3 MMOL/L (ref 3.5–5.3)
RBC # BLD AUTO: 3.38 X10*6/UL (ref 4–5.2)
SODIUM SERPL-SCNC: 139 MMOL/L (ref 136–145)
TACROLIMUS BLD-MCNC: 4.4 NG/ML
WBC # BLD AUTO: 1.7 X10*3/UL (ref 4.4–11.3)

## 2024-09-13 PROCEDURE — 80069 RENAL FUNCTION PANEL: CPT | Mod: V7 | Performed by: INTERNAL MEDICINE

## 2024-09-13 PROCEDURE — 8010000001 HC DIALYSIS - HEMODIALYSIS PER DAY: Mod: V7

## 2024-09-13 PROCEDURE — 80197 ASSAY OF TACROLIMUS: CPT | Mod: V7 | Performed by: INTERNAL MEDICINE

## 2024-09-13 PROCEDURE — 36415 COLL VENOUS BLD VENIPUNCTURE: CPT | Mod: V7 | Performed by: INTERNAL MEDICINE

## 2024-09-13 PROCEDURE — 90937 HEMODIALYSIS REPEATED EVAL: CPT

## 2024-09-13 PROCEDURE — 83735 ASSAY OF MAGNESIUM: CPT | Mod: V7 | Performed by: INTERNAL MEDICINE

## 2024-09-13 PROCEDURE — 85027 COMPLETE CBC AUTOMATED: CPT | Mod: V7 | Performed by: INTERNAL MEDICINE

## 2024-09-13 RX ORDER — ONDANSETRON HYDROCHLORIDE 2 MG/ML
4 INJECTION, SOLUTION INTRAVENOUS
Status: CANCELLED | OUTPATIENT
Start: 2024-09-16

## 2024-09-13 RX ORDER — DIPHENHYDRAMINE HCL 25 MG
25 CAPSULE ORAL
Status: CANCELLED | OUTPATIENT
Start: 2024-09-16

## 2024-09-13 RX ORDER — LIDOCAINE AND PRILOCAINE 25; 25 MG/G; MG/G
1 CREAM TOPICAL AS NEEDED
Status: CANCELLED | OUTPATIENT
Start: 2024-09-16

## 2024-09-13 RX ORDER — ACETAMINOPHEN 325 MG/1
650 TABLET ORAL EVERY 4 HOURS PRN
Status: DISCONTINUED | OUTPATIENT
Start: 2024-09-13 | End: 2024-09-14 | Stop reason: HOSPADM

## 2024-09-13 RX ORDER — ACETAMINOPHEN 325 MG/1
650 TABLET ORAL EVERY 4 HOURS PRN
Status: CANCELLED | OUTPATIENT
Start: 2024-09-16

## 2024-09-13 ASSESSMENT — PAIN SCALES - GENERAL: PAINLEVEL_OUTOF10: 7

## 2024-09-13 ASSESSMENT — PAIN SCALES - WONG BAKER: WONGBAKER_NUMERICALRESPONSE: HURTS EVEN MORE

## 2024-09-13 ASSESSMENT — PAIN DESCRIPTION - LOCATION: LOCATION: HEAD

## 2024-09-13 NOTE — TELEPHONE ENCOUNTER
Returned call using [facility] . Danuta wanted to confirm the date of biopsy: 10/2 at 8:00 am. Instructed him that she will needs labs (INR) prior to the biopsy.

## 2024-09-16 ENCOUNTER — HOSPITAL ENCOUNTER (OUTPATIENT)
Dept: DIALYSIS | Facility: HOSPITAL | Age: 44
Setting detail: DIALYSIS SERIES
Discharge: HOME | End: 2024-09-16
Payer: COMMERCIAL

## 2024-09-16 ENCOUNTER — APPOINTMENT (OUTPATIENT)
Dept: INFUSION THERAPY | Facility: HOSPITAL | Age: 44
End: 2024-09-16
Payer: COMMERCIAL

## 2024-09-16 VITALS — HEART RATE: 73 BPM | TEMPERATURE: 97.2 F

## 2024-09-16 DIAGNOSIS — R06.02 SHORTNESS OF BREATH: ICD-10-CM

## 2024-09-16 DIAGNOSIS — Z94.0 KIDNEY REPLACED BY TRANSPLANT (HHS-HCC): ICD-10-CM

## 2024-09-16 DIAGNOSIS — N18.6 ESRD (END STAGE RENAL DISEASE) ON DIALYSIS (MULTI): Primary | ICD-10-CM

## 2024-09-16 DIAGNOSIS — Z99.2 ESRD (END STAGE RENAL DISEASE) ON DIALYSIS (MULTI): Primary | ICD-10-CM

## 2024-09-16 LAB
ALBUMIN SERPL BCP-MCNC: 3.7 G/DL (ref 3.4–5)
ANION GAP SERPL CALC-SCNC: 18 MMOL/L (ref 10–20)
BUN SERPL-MCNC: 51 MG/DL (ref 6–23)
CALCIUM SERPL-MCNC: 8.2 MG/DL (ref 8.6–10.6)
CHLORIDE SERPL-SCNC: 101 MMOL/L (ref 98–107)
CO2 SERPL-SCNC: 22 MMOL/L (ref 21–32)
CREAT SERPL-MCNC: 5.77 MG/DL (ref 0.5–1.05)
EGFRCR SERPLBLD CKD-EPI 2021: 9 ML/MIN/1.73M*2
ERYTHROCYTE [DISTWIDTH] IN BLOOD BY AUTOMATED COUNT: 13.1 % (ref 11.5–14.5)
GLUCOSE SERPL-MCNC: 111 MG/DL (ref 74–99)
HCT VFR BLD AUTO: 35.7 % (ref 36–46)
HGB BLD-MCNC: 11.4 G/DL (ref 12–16)
MAGNESIUM SERPL-MCNC: 2.27 MG/DL (ref 1.6–2.4)
MCH RBC QN AUTO: 31.2 PG (ref 26–34)
MCHC RBC AUTO-ENTMCNC: 31.9 G/DL (ref 32–36)
MCV RBC AUTO: 98 FL (ref 80–100)
NRBC BLD-RTO: 0 /100 WBCS (ref 0–0)
PHOSPHATE SERPL-MCNC: 4.7 MG/DL (ref 2.5–4.9)
PLATELET # BLD AUTO: 143 X10*3/UL (ref 150–450)
POTASSIUM SERPL-SCNC: 4.2 MMOL/L (ref 3.5–5.3)
RBC # BLD AUTO: 3.65 X10*6/UL (ref 4–5.2)
SODIUM SERPL-SCNC: 137 MMOL/L (ref 136–145)
TACROLIMUS BLD-MCNC: 4.6 NG/ML
WBC # BLD AUTO: 1.2 X10*3/UL (ref 4.4–11.3)

## 2024-09-16 PROCEDURE — 36415 COLL VENOUS BLD VENIPUNCTURE: CPT | Mod: V7 | Performed by: INTERNAL MEDICINE

## 2024-09-16 PROCEDURE — 80197 ASSAY OF TACROLIMUS: CPT | Mod: V7 | Performed by: INTERNAL MEDICINE

## 2024-09-16 PROCEDURE — 80069 RENAL FUNCTION PANEL: CPT | Mod: V7 | Performed by: INTERNAL MEDICINE

## 2024-09-16 PROCEDURE — 85007 BL SMEAR W/DIFF WBC COUNT: CPT | Mod: V7 | Performed by: INTERNAL MEDICINE

## 2024-09-16 PROCEDURE — 83735 ASSAY OF MAGNESIUM: CPT | Mod: V7 | Performed by: INTERNAL MEDICINE

## 2024-09-16 PROCEDURE — 85027 COMPLETE CBC AUTOMATED: CPT | Mod: V7 | Performed by: INTERNAL MEDICINE

## 2024-09-16 PROCEDURE — 2500000004 HC RX 250 GENERAL PHARMACY W/ HCPCS (ALT 636 FOR OP/ED): Mod: JZ,JG

## 2024-09-16 RX ORDER — LIDOCAINE AND PRILOCAINE 25; 25 MG/G; MG/G
1 CREAM TOPICAL AS NEEDED
Status: CANCELLED | OUTPATIENT
Start: 2024-09-23

## 2024-09-16 RX ORDER — ONDANSETRON HYDROCHLORIDE 2 MG/ML
4 INJECTION, SOLUTION INTRAVENOUS
Status: CANCELLED | OUTPATIENT
Start: 2024-09-23

## 2024-09-16 RX ORDER — ACETAMINOPHEN 325 MG/1
650 TABLET ORAL EVERY 4 HOURS PRN
Status: CANCELLED | OUTPATIENT
Start: 2024-09-23

## 2024-09-16 RX ORDER — DIPHENHYDRAMINE HCL 25 MG
25 CAPSULE ORAL
Status: CANCELLED | OUTPATIENT
Start: 2024-09-23

## 2024-09-16 ASSESSMENT — PAIN SCALES - GENERAL: PAINLEVEL_OUTOF10: 7

## 2024-09-16 ASSESSMENT — PAIN - FUNCTIONAL ASSESSMENT: PAIN_FUNCTIONAL_ASSESSMENT: NO/DENIES PAIN

## 2024-09-17 ENCOUNTER — APPOINTMENT (OUTPATIENT)
Dept: INFUSION THERAPY | Facility: HOSPITAL | Age: 44
End: 2024-09-17
Payer: COMMERCIAL

## 2024-09-17 DIAGNOSIS — D84.9 IMMUNOSUPPRESSION: ICD-10-CM

## 2024-09-17 DIAGNOSIS — Z94.0 KIDNEY REPLACED BY TRANSPLANT (HHS-HCC): ICD-10-CM

## 2024-09-17 LAB
BASOPHILS # BLD MANUAL: 0.01 X10*3/UL (ref 0–0.1)
BASOPHILS NFR BLD MANUAL: 0.8 %
DACRYOCYTES BLD QL SMEAR: ABNORMAL
EOSINOPHIL # BLD MANUAL: 0.06 X10*3/UL (ref 0–0.7)
EOSINOPHIL NFR BLD MANUAL: 4.9 %
LYMPHOCYTES # BLD MANUAL: 0.21 X10*3/UL (ref 1.2–4.8)
LYMPHOCYTES NFR BLD MANUAL: 17.2 %
MONOCYTES # BLD MANUAL: 0.37 X10*3/UL (ref 0.1–1)
MONOCYTES NFR BLD MANUAL: 31.2 %
NEUTS SEG # BLD MANUAL: 0.55 X10*3/UL (ref 1.2–7)
NEUTS SEG NFR BLD MANUAL: 45.9 %
RBC MORPH BLD: ABNORMAL

## 2024-09-17 RX ORDER — TACROLIMUS 1 MG/1
3 CAPSULE ORAL
Qty: 180 CAPSULE | Refills: 11 | Status: SHIPPED | OUTPATIENT
Start: 2024-09-17 | End: 2025-09-12

## 2024-09-18 ENCOUNTER — APPOINTMENT (OUTPATIENT)
Dept: TRANSPLANT | Facility: HOSPITAL | Age: 44
End: 2024-09-18
Payer: COMMERCIAL

## 2024-09-18 ENCOUNTER — DOCUMENTATION (OUTPATIENT)
Dept: TRANSPLANT | Facility: HOSPITAL | Age: 44
End: 2024-09-18
Payer: COMMERCIAL

## 2024-09-18 ENCOUNTER — HOSPITAL ENCOUNTER (OUTPATIENT)
Dept: DIALYSIS | Facility: HOSPITAL | Age: 44
Setting detail: DIALYSIS SERIES
Discharge: HOME | End: 2024-09-18
Payer: COMMERCIAL

## 2024-09-18 ENCOUNTER — TELEPHONE (OUTPATIENT)
Dept: TRANSPLANT | Facility: HOSPITAL | Age: 44
End: 2024-09-18
Payer: COMMERCIAL

## 2024-09-18 VITALS — HEART RATE: 74 BPM | TEMPERATURE: 97.2 F

## 2024-09-18 DIAGNOSIS — Z94.0 KIDNEY REPLACED BY TRANSPLANT (HHS-HCC): ICD-10-CM

## 2024-09-18 DIAGNOSIS — Z99.2 ESRD (END STAGE RENAL DISEASE) ON DIALYSIS (MULTI): Primary | ICD-10-CM

## 2024-09-18 DIAGNOSIS — D70.9 NEUTROPENIA, UNSPECIFIED TYPE (CMS-HCC): ICD-10-CM

## 2024-09-18 DIAGNOSIS — N18.6 ESRD (END STAGE RENAL DISEASE) ON DIALYSIS (MULTI): Primary | ICD-10-CM

## 2024-09-18 LAB
ALBUMIN SERPL BCP-MCNC: 3.5 G/DL (ref 3.4–5)
ANION GAP SERPL CALC-SCNC: 13 MMOL/L (ref 10–20)
BUN SERPL-MCNC: 34 MG/DL (ref 6–23)
CALCIUM SERPL-MCNC: 7.9 MG/DL (ref 8.6–10.6)
CHLORIDE SERPL-SCNC: 102 MMOL/L (ref 98–107)
CO2 SERPL-SCNC: 23 MMOL/L (ref 21–32)
CREAT SERPL-MCNC: 5.18 MG/DL (ref 0.5–1.05)
EGFRCR SERPLBLD CKD-EPI 2021: 10 ML/MIN/1.73M*2
ERYTHROCYTE [DISTWIDTH] IN BLOOD BY AUTOMATED COUNT: 12.9 % (ref 11.5–14.5)
GLUCOSE SERPL-MCNC: 113 MG/DL (ref 74–99)
HCT VFR BLD AUTO: 34.1 % (ref 36–46)
HGB BLD-MCNC: 10.8 G/DL (ref 12–16)
MAGNESIUM SERPL-MCNC: 2.05 MG/DL (ref 1.6–2.4)
MCH RBC QN AUTO: 31.2 PG (ref 26–34)
MCHC RBC AUTO-ENTMCNC: 31.7 G/DL (ref 32–36)
MCV RBC AUTO: 99 FL (ref 80–100)
NRBC BLD-RTO: 0 /100 WBCS (ref 0–0)
PHOSPHATE SERPL-MCNC: 3.8 MG/DL (ref 2.5–4.9)
PLATELET # BLD AUTO: 140 X10*3/UL (ref 150–450)
POTASSIUM SERPL-SCNC: 4.3 MMOL/L (ref 3.5–5.3)
RBC # BLD AUTO: 3.46 X10*6/UL (ref 4–5.2)
SODIUM SERPL-SCNC: 134 MMOL/L (ref 136–145)
TACROLIMUS BLD-MCNC: 5 NG/ML
WBC # BLD AUTO: 1.2 X10*3/UL (ref 4.4–11.3)

## 2024-09-18 PROCEDURE — 85027 COMPLETE CBC AUTOMATED: CPT | Mod: V7 | Performed by: INTERNAL MEDICINE

## 2024-09-18 PROCEDURE — 8010000001 HC DIALYSIS - HEMODIALYSIS PER DAY: Mod: V7

## 2024-09-18 PROCEDURE — 36415 COLL VENOUS BLD VENIPUNCTURE: CPT | Mod: V7 | Performed by: INTERNAL MEDICINE

## 2024-09-18 PROCEDURE — 80069 RENAL FUNCTION PANEL: CPT | Mod: V7 | Performed by: INTERNAL MEDICINE

## 2024-09-18 PROCEDURE — 90937 HEMODIALYSIS REPEATED EVAL: CPT

## 2024-09-18 PROCEDURE — 83735 ASSAY OF MAGNESIUM: CPT | Mod: V7 | Performed by: INTERNAL MEDICINE

## 2024-09-18 PROCEDURE — 80197 ASSAY OF TACROLIMUS: CPT | Mod: V7 | Performed by: INTERNAL MEDICINE

## 2024-09-18 RX ORDER — MYCOPHENOLATE MOFETIL 250 MG/1
500 CAPSULE ORAL 2 TIMES DAILY
Qty: 120 CAPSULE | Refills: 11 | Status: SHIPPED | OUTPATIENT
Start: 2024-09-18 | End: 2025-09-18

## 2024-09-18 RX ORDER — ACETAMINOPHEN 325 MG/1
650 TABLET ORAL EVERY 4 HOURS PRN
OUTPATIENT
Start: 2024-09-23

## 2024-09-18 RX ORDER — ONDANSETRON HYDROCHLORIDE 2 MG/ML
4 INJECTION, SOLUTION INTRAVENOUS
OUTPATIENT
Start: 2024-09-23

## 2024-09-18 RX ORDER — DIPHENHYDRAMINE HCL 25 MG
25 CAPSULE ORAL
OUTPATIENT
Start: 2024-09-23

## 2024-09-18 RX ORDER — LIDOCAINE AND PRILOCAINE 25; 25 MG/G; MG/G
1 CREAM TOPICAL AS NEEDED
OUTPATIENT
Start: 2024-09-23

## 2024-09-18 ASSESSMENT — PAIN SCALES - GENERAL: PAINLEVEL_OUTOF10: 0 - NO PAIN

## 2024-09-18 ASSESSMENT — PAIN - FUNCTIONAL ASSESSMENT: PAIN_FUNCTIONAL_ASSESSMENT: NO/DENIES PAIN

## 2024-09-18 NOTE — PROGRESS NOTES
Discussed with Danuta question in regards to timing of dialysis.   Sometimes it takes longer than the prescribed fluid being removed. 2 liters does not always mean 2 hours, it can be slower for patient safety and protection to the kidney.

## 2024-09-18 NOTE — TELEPHONE ENCOUNTER
Reviewed WBC 1.2 with Dr. Urbina on 9/17/24  PLAN:  Neupogen 9/18 while patient at [facility] dialysis  Decrease MMF to 500 mg BID   Will also review Tac to 3/3 that was increased recently.       Called Danuta to inform him of plan, will meet him and patient at dialysis, give zarxio injection and inform of med changes.

## 2024-09-19 ENCOUNTER — TELEPHONE (OUTPATIENT)
Dept: TRANSPLANT | Facility: HOSPITAL | Age: 44
End: 2024-09-19
Payer: COMMERCIAL

## 2024-09-19 NOTE — TELEPHONE ENCOUNTER
Notified by fax and Dr. Urbina of Chest CT denial.   Call placed to [facility] to appeal decision. [phone]  Guided to print out appeal form.   Form completed and faxed along with doctor note, last 2 CXR and CT scan req to show patient condition not improved. Faxed to [phone]    Reference number for call : [id number]

## 2024-09-20 ENCOUNTER — HOSPITAL ENCOUNTER (OUTPATIENT)
Dept: DIALYSIS | Facility: HOSPITAL | Age: 44
Setting detail: DIALYSIS SERIES
Discharge: HOME | End: 2024-09-20
Payer: COMMERCIAL

## 2024-09-20 VITALS — TEMPERATURE: 97.7 F | HEART RATE: 69 BPM

## 2024-09-20 DIAGNOSIS — Z99.2 ESRD (END STAGE RENAL DISEASE) ON DIALYSIS (MULTI): Primary | ICD-10-CM

## 2024-09-20 DIAGNOSIS — N18.6 ESRD (END STAGE RENAL DISEASE) ON DIALYSIS (MULTI): Primary | ICD-10-CM

## 2024-09-20 LAB
ALBUMIN SERPL BCP-MCNC: 3.7 G/DL (ref 3.4–5)
ANION GAP SERPL CALC-SCNC: 13 MMOL/L (ref 10–20)
BUN SERPL-MCNC: 33 MG/DL (ref 6–23)
CALCIUM SERPL-MCNC: 8.4 MG/DL (ref 8.6–10.6)
CHLORIDE SERPL-SCNC: 102 MMOL/L (ref 98–107)
CO2 SERPL-SCNC: 25 MMOL/L (ref 21–32)
CREAT SERPL-MCNC: 5.39 MG/DL (ref 0.5–1.05)
EGFRCR SERPLBLD CKD-EPI 2021: 9 ML/MIN/1.73M*2
ERYTHROCYTE [DISTWIDTH] IN BLOOD BY AUTOMATED COUNT: 12.9 % (ref 11.5–14.5)
GLUCOSE SERPL-MCNC: 79 MG/DL (ref 74–99)
HCT VFR BLD AUTO: 34.4 % (ref 36–46)
HGB BLD-MCNC: 10.9 G/DL (ref 12–16)
MAGNESIUM SERPL-MCNC: 1.92 MG/DL (ref 1.6–2.4)
MCH RBC QN AUTO: 31 PG (ref 26–34)
MCHC RBC AUTO-ENTMCNC: 31.7 G/DL (ref 32–36)
MCV RBC AUTO: 98 FL (ref 80–100)
NRBC BLD-RTO: 0 /100 WBCS (ref 0–0)
PHOSPHATE SERPL-MCNC: 3.5 MG/DL (ref 2.5–4.9)
PLATELET # BLD AUTO: 168 X10*3/UL (ref 150–450)
POTASSIUM SERPL-SCNC: 4.7 MMOL/L (ref 3.5–5.3)
RBC # BLD AUTO: 3.52 X10*6/UL (ref 4–5.2)
SODIUM SERPL-SCNC: 135 MMOL/L (ref 136–145)
TACROLIMUS BLD-MCNC: 6.7 NG/ML
WBC # BLD AUTO: 2.4 X10*3/UL (ref 4.4–11.3)

## 2024-09-20 PROCEDURE — 80069 RENAL FUNCTION PANEL: CPT | Mod: V7 | Performed by: INTERNAL MEDICINE

## 2024-09-20 PROCEDURE — 8010000001 HC DIALYSIS - HEMODIALYSIS PER DAY: Mod: V7

## 2024-09-20 PROCEDURE — 90937 HEMODIALYSIS REPEATED EVAL: CPT

## 2024-09-20 PROCEDURE — 80197 ASSAY OF TACROLIMUS: CPT | Mod: V7 | Performed by: INTERNAL MEDICINE

## 2024-09-20 PROCEDURE — 36415 COLL VENOUS BLD VENIPUNCTURE: CPT | Mod: V7 | Performed by: INTERNAL MEDICINE

## 2024-09-20 PROCEDURE — 85027 COMPLETE CBC AUTOMATED: CPT | Mod: V7 | Performed by: INTERNAL MEDICINE

## 2024-09-20 PROCEDURE — 83735 ASSAY OF MAGNESIUM: CPT | Mod: V7 | Performed by: INTERNAL MEDICINE

## 2024-09-20 RX ORDER — LIDOCAINE AND PRILOCAINE 25; 25 MG/G; MG/G
1 CREAM TOPICAL AS NEEDED
Status: CANCELLED | OUTPATIENT
Start: 2024-09-23

## 2024-09-20 RX ORDER — ONDANSETRON HYDROCHLORIDE 2 MG/ML
4 INJECTION, SOLUTION INTRAVENOUS
Status: CANCELLED | OUTPATIENT
Start: 2024-09-23

## 2024-09-20 RX ORDER — ACETAMINOPHEN 325 MG/1
650 TABLET ORAL EVERY 4 HOURS PRN
Status: CANCELLED | OUTPATIENT
Start: 2024-09-23

## 2024-09-20 RX ORDER — DIPHENHYDRAMINE HCL 25 MG
25 CAPSULE ORAL
Status: CANCELLED | OUTPATIENT
Start: 2024-09-23

## 2024-09-20 ASSESSMENT — PAIN - FUNCTIONAL ASSESSMENT: PAIN_FUNCTIONAL_ASSESSMENT: NO/DENIES PAIN

## 2024-09-23 ENCOUNTER — HOSPITAL ENCOUNTER (OUTPATIENT)
Dept: DIALYSIS | Facility: HOSPITAL | Age: 44
Setting detail: DIALYSIS SERIES
Discharge: HOME | End: 2024-09-23
Payer: COMMERCIAL

## 2024-09-23 VITALS — HEART RATE: 78 BPM | TEMPERATURE: 96.8 F

## 2024-09-23 DIAGNOSIS — N18.6 ESRD (END STAGE RENAL DISEASE) ON DIALYSIS (MULTI): Primary | ICD-10-CM

## 2024-09-23 DIAGNOSIS — Z99.2 ESRD (END STAGE RENAL DISEASE) ON DIALYSIS (MULTI): Primary | ICD-10-CM

## 2024-09-23 LAB
ALBUMIN SERPL BCP-MCNC: 3.6 G/DL (ref 3.4–5)
ANION GAP SERPL CALC-SCNC: 15 MMOL/L (ref 10–20)
BUN SERPL-MCNC: 39 MG/DL (ref 6–23)
CALCIUM SERPL-MCNC: 8.8 MG/DL (ref 8.6–10.6)
CHLORIDE SERPL-SCNC: 101 MMOL/L (ref 98–107)
CO2 SERPL-SCNC: 24 MMOL/L (ref 21–32)
CREAT SERPL-MCNC: 6.55 MG/DL (ref 0.5–1.05)
EGFRCR SERPLBLD CKD-EPI 2021: 7 ML/MIN/1.73M*2
ERYTHROCYTE [DISTWIDTH] IN BLOOD BY AUTOMATED COUNT: 12.9 % (ref 11.5–14.5)
GLUCOSE SERPL-MCNC: 98 MG/DL (ref 74–99)
HCT VFR BLD AUTO: 33.3 % (ref 36–46)
HGB BLD-MCNC: 10.7 G/DL (ref 12–16)
MAGNESIUM SERPL-MCNC: 1.93 MG/DL (ref 1.6–2.4)
MCH RBC QN AUTO: 31.1 PG (ref 26–34)
MCHC RBC AUTO-ENTMCNC: 32.1 G/DL (ref 32–36)
MCV RBC AUTO: 97 FL (ref 80–100)
NRBC BLD-RTO: 0 /100 WBCS (ref 0–0)
PHOSPHATE SERPL-MCNC: 4.3 MG/DL (ref 2.5–4.9)
PLATELET # BLD AUTO: 187 X10*3/UL (ref 150–450)
POTASSIUM SERPL-SCNC: 4 MMOL/L (ref 3.5–5.3)
RBC # BLD AUTO: 3.44 X10*6/UL (ref 4–5.2)
SODIUM SERPL-SCNC: 136 MMOL/L (ref 136–145)
TACROLIMUS BLD-MCNC: 4.2 NG/ML
WBC # BLD AUTO: 2.5 X10*3/UL (ref 4.4–11.3)

## 2024-09-23 PROCEDURE — 36415 COLL VENOUS BLD VENIPUNCTURE: CPT | Mod: V7 | Performed by: INTERNAL MEDICINE

## 2024-09-23 PROCEDURE — 85027 COMPLETE CBC AUTOMATED: CPT | Mod: V7 | Performed by: INTERNAL MEDICINE

## 2024-09-23 PROCEDURE — 83735 ASSAY OF MAGNESIUM: CPT | Mod: V7 | Performed by: INTERNAL MEDICINE

## 2024-09-23 PROCEDURE — 2500000004 HC RX 250 GENERAL PHARMACY W/ HCPCS (ALT 636 FOR OP/ED): Mod: JZ,JG

## 2024-09-23 PROCEDURE — 80069 RENAL FUNCTION PANEL: CPT | Mod: V7 | Performed by: INTERNAL MEDICINE

## 2024-09-23 PROCEDURE — 80197 ASSAY OF TACROLIMUS: CPT | Mod: V7 | Performed by: INTERNAL MEDICINE

## 2024-09-23 PROCEDURE — 90937 HEMODIALYSIS REPEATED EVAL: CPT | Mod: V7

## 2024-09-23 RX ORDER — ONDANSETRON HYDROCHLORIDE 2 MG/ML
4 INJECTION, SOLUTION INTRAVENOUS
Status: CANCELLED | OUTPATIENT
Start: 2024-09-30

## 2024-09-23 RX ORDER — ACETAMINOPHEN 325 MG/1
650 TABLET ORAL EVERY 4 HOURS PRN
Status: CANCELLED | OUTPATIENT
Start: 2024-09-30

## 2024-09-23 RX ORDER — LIDOCAINE AND PRILOCAINE 25; 25 MG/G; MG/G
1 CREAM TOPICAL AS NEEDED
Status: CANCELLED | OUTPATIENT
Start: 2024-09-30

## 2024-09-23 RX ORDER — DIPHENHYDRAMINE HCL 25 MG
25 CAPSULE ORAL
Status: CANCELLED | OUTPATIENT
Start: 2024-09-30

## 2024-09-23 ASSESSMENT — PAIN - FUNCTIONAL ASSESSMENT: PAIN_FUNCTIONAL_ASSESSMENT: NO/DENIES PAIN

## 2024-09-24 ENCOUNTER — TELEPHONE (OUTPATIENT)
Dept: TRANSPLANT | Facility: HOSPITAL | Age: 44
End: 2024-09-24
Payer: COMMERCIAL

## 2024-09-24 DIAGNOSIS — R79.1 COAGULATION TEST ABNORMALITY: Primary | ICD-10-CM

## 2024-09-24 DIAGNOSIS — Z94.0 KIDNEY REPLACED BY TRANSPLANT (HHS-HCC): Primary | ICD-10-CM

## 2024-09-24 NOTE — TELEPHONE ENCOUNTER
Department, Pt was scheduled for a Ct test with out contrast and it was denied. Wanted to know if we are appealing the decision     [phone]

## 2024-09-25 ENCOUNTER — HOSPITAL ENCOUNTER (OUTPATIENT)
Dept: DIALYSIS | Facility: HOSPITAL | Age: 44
Setting detail: DIALYSIS SERIES
Discharge: HOME | End: 2024-09-25
Payer: COMMERCIAL

## 2024-09-25 VITALS — TEMPERATURE: 97.2 F | HEART RATE: 63 BPM

## 2024-09-25 DIAGNOSIS — Z99.2 ESRD (END STAGE RENAL DISEASE) ON DIALYSIS (MULTI): Primary | ICD-10-CM

## 2024-09-25 DIAGNOSIS — N18.6 ESRD (END STAGE RENAL DISEASE) ON DIALYSIS (MULTI): Primary | ICD-10-CM

## 2024-09-25 LAB
ALBUMIN SERPL BCP-MCNC: 3.5 G/DL (ref 3.4–5)
ANION GAP SERPL CALC-SCNC: 14 MMOL/L (ref 10–20)
BUN SERPL-MCNC: 33 MG/DL (ref 6–23)
CALCIUM SERPL-MCNC: 8.4 MG/DL (ref 8.6–10.6)
CHLORIDE SERPL-SCNC: 99 MMOL/L (ref 98–107)
CO2 SERPL-SCNC: 27 MMOL/L (ref 21–32)
CREAT SERPL-MCNC: 5.58 MG/DL (ref 0.5–1.05)
EGFRCR SERPLBLD CKD-EPI 2021: 9 ML/MIN/1.73M*2
ERYTHROCYTE [DISTWIDTH] IN BLOOD BY AUTOMATED COUNT: 12.9 % (ref 11.5–14.5)
GLUCOSE SERPL-MCNC: 80 MG/DL (ref 74–99)
HCT VFR BLD AUTO: 31.4 % (ref 36–46)
HGB BLD-MCNC: 10 G/DL (ref 12–16)
MAGNESIUM SERPL-MCNC: 1.88 MG/DL (ref 1.6–2.4)
MCH RBC QN AUTO: 30.9 PG (ref 26–34)
MCHC RBC AUTO-ENTMCNC: 31.8 G/DL (ref 32–36)
MCV RBC AUTO: 97 FL (ref 80–100)
NRBC BLD-RTO: 0 /100 WBCS (ref 0–0)
PHOSPHATE SERPL-MCNC: 4.2 MG/DL (ref 2.5–4.9)
PLATELET # BLD AUTO: 164 X10*3/UL (ref 150–450)
POTASSIUM SERPL-SCNC: 4.2 MMOL/L (ref 3.5–5.3)
RBC # BLD AUTO: 3.24 X10*6/UL (ref 4–5.2)
SODIUM SERPL-SCNC: 136 MMOL/L (ref 136–145)
TACROLIMUS BLD-MCNC: <2 NG/ML
WBC # BLD AUTO: 2.6 X10*3/UL (ref 4.4–11.3)

## 2024-09-25 PROCEDURE — 80197 ASSAY OF TACROLIMUS: CPT | Mod: V7 | Performed by: INTERNAL MEDICINE

## 2024-09-25 PROCEDURE — 36415 COLL VENOUS BLD VENIPUNCTURE: CPT | Mod: V7 | Performed by: INTERNAL MEDICINE

## 2024-09-25 PROCEDURE — 80069 RENAL FUNCTION PANEL: CPT | Mod: V7 | Performed by: INTERNAL MEDICINE

## 2024-09-25 PROCEDURE — 83735 ASSAY OF MAGNESIUM: CPT | Mod: V7 | Performed by: INTERNAL MEDICINE

## 2024-09-25 PROCEDURE — 85027 COMPLETE CBC AUTOMATED: CPT | Mod: V7 | Performed by: INTERNAL MEDICINE

## 2024-09-25 RX ORDER — ACETAMINOPHEN 325 MG/1
650 TABLET ORAL EVERY 4 HOURS PRN
OUTPATIENT
Start: 2024-09-30

## 2024-09-25 RX ORDER — DIPHENHYDRAMINE HCL 25 MG
25 CAPSULE ORAL
OUTPATIENT
Start: 2024-09-30

## 2024-09-25 RX ORDER — LIDOCAINE AND PRILOCAINE 25; 25 MG/G; MG/G
1 CREAM TOPICAL AS NEEDED
OUTPATIENT
Start: 2024-09-30

## 2024-09-25 RX ORDER — ONDANSETRON HYDROCHLORIDE 2 MG/ML
4 INJECTION, SOLUTION INTRAVENOUS
OUTPATIENT
Start: 2024-09-30

## 2024-09-25 RX ORDER — LIDOCAINE AND PRILOCAINE 25; 25 MG/G; MG/G
1 CREAM TOPICAL ONCE
Qty: 1 EACH | Refills: 11 | Status: SHIPPED | OUTPATIENT
Start: 2024-09-25 | End: 2024-09-25

## 2024-09-25 ASSESSMENT — PAIN - FUNCTIONAL ASSESSMENT: PAIN_FUNCTIONAL_ASSESSMENT: NO/DENIES PAIN

## 2024-09-25 ASSESSMENT — PAIN SCALES - GENERAL: PAINLEVEL_OUTOF10: 0 - NO PAIN

## 2024-09-26 ENCOUNTER — HOSPITAL ENCOUNTER (OUTPATIENT)
Dept: RADIOLOGY | Facility: CLINIC | Age: 44
Discharge: HOME | End: 2024-09-26
Payer: COMMERCIAL

## 2024-09-26 DIAGNOSIS — T86.19 DELAYED GRAFT FUNCTION OF KIDNEY (HHS-HCC): ICD-10-CM

## 2024-09-26 DIAGNOSIS — Z94.0 KIDNEY TRANSPLANT STATUS: ICD-10-CM

## 2024-09-26 PROCEDURE — 76776 US EXAM K TRANSPL W/DOPPLER: CPT | Performed by: STUDENT IN AN ORGANIZED HEALTH CARE EDUCATION/TRAINING PROGRAM

## 2024-09-26 PROCEDURE — 76776 US EXAM K TRANSPL W/DOPPLER: CPT

## 2024-09-27 ENCOUNTER — APPOINTMENT (OUTPATIENT)
Dept: RADIOLOGY | Facility: HOSPITAL | Age: 44
End: 2024-09-27
Payer: COMMERCIAL

## 2024-09-27 ENCOUNTER — HOSPITAL ENCOUNTER (OUTPATIENT)
Dept: DIALYSIS | Facility: HOSPITAL | Age: 44
Setting detail: DIALYSIS SERIES
Discharge: HOME | End: 2024-09-27
Payer: COMMERCIAL

## 2024-09-27 VITALS — TEMPERATURE: 96.8 F | HEART RATE: 67 BPM

## 2024-09-27 DIAGNOSIS — Z99.2 ESRD (END STAGE RENAL DISEASE) ON DIALYSIS (MULTI): Primary | ICD-10-CM

## 2024-09-27 DIAGNOSIS — N18.6 ESRD (END STAGE RENAL DISEASE) ON DIALYSIS (MULTI): Primary | ICD-10-CM

## 2024-09-27 LAB
ALBUMIN SERPL BCP-MCNC: 3.7 G/DL (ref 3.4–5)
ANION GAP SERPL CALC-SCNC: 17 MMOL/L (ref 10–20)
BUN SERPL-MCNC: 36 MG/DL (ref 6–23)
CALCIUM SERPL-MCNC: 8.5 MG/DL (ref 8.6–10.6)
CHLORIDE SERPL-SCNC: 98 MMOL/L (ref 98–107)
CO2 SERPL-SCNC: 25 MMOL/L (ref 21–32)
CREAT SERPL-MCNC: 5.74 MG/DL (ref 0.5–1.05)
EGFRCR SERPLBLD CKD-EPI 2021: 9 ML/MIN/1.73M*2
ERYTHROCYTE [DISTWIDTH] IN BLOOD BY AUTOMATED COUNT: 12.8 % (ref 11.5–14.5)
GLUCOSE SERPL-MCNC: 81 MG/DL (ref 74–99)
HCT VFR BLD AUTO: 32.7 % (ref 36–46)
HGB BLD-MCNC: 10.7 G/DL (ref 12–16)
MAGNESIUM SERPL-MCNC: 1.94 MG/DL (ref 1.6–2.4)
MCH RBC QN AUTO: 31.5 PG (ref 26–34)
MCHC RBC AUTO-ENTMCNC: 32.7 G/DL (ref 32–36)
MCV RBC AUTO: 96 FL (ref 80–100)
NRBC BLD-RTO: 0 /100 WBCS (ref 0–0)
PHOSPHATE SERPL-MCNC: 4.3 MG/DL (ref 2.5–4.9)
PLATELET # BLD AUTO: 162 X10*3/UL (ref 150–450)
POTASSIUM SERPL-SCNC: 4 MMOL/L (ref 3.5–5.3)
RBC # BLD AUTO: 3.4 X10*6/UL (ref 4–5.2)
SODIUM SERPL-SCNC: 136 MMOL/L (ref 136–145)
TACROLIMUS BLD-MCNC: <2 NG/ML
WBC # BLD AUTO: 3 X10*3/UL (ref 4.4–11.3)

## 2024-09-27 PROCEDURE — 80197 ASSAY OF TACROLIMUS: CPT | Mod: V7 | Performed by: INTERNAL MEDICINE

## 2024-09-27 PROCEDURE — 36415 COLL VENOUS BLD VENIPUNCTURE: CPT | Mod: V7 | Performed by: INTERNAL MEDICINE

## 2024-09-27 PROCEDURE — 85027 COMPLETE CBC AUTOMATED: CPT | Mod: V7 | Performed by: INTERNAL MEDICINE

## 2024-09-27 PROCEDURE — 90937 HEMODIALYSIS REPEATED EVAL: CPT | Mod: V7

## 2024-09-27 PROCEDURE — 80069 RENAL FUNCTION PANEL: CPT | Mod: V7 | Performed by: INTERNAL MEDICINE

## 2024-09-27 PROCEDURE — 83735 ASSAY OF MAGNESIUM: CPT | Mod: V7 | Performed by: INTERNAL MEDICINE

## 2024-09-27 RX ORDER — DIPHENHYDRAMINE HCL 25 MG
25 CAPSULE ORAL
Status: CANCELLED | OUTPATIENT
Start: 2024-09-30

## 2024-09-27 RX ORDER — ONDANSETRON HYDROCHLORIDE 2 MG/ML
4 INJECTION, SOLUTION INTRAVENOUS
Status: CANCELLED | OUTPATIENT
Start: 2024-09-30

## 2024-09-27 RX ORDER — LIDOCAINE AND PRILOCAINE 25; 25 MG/G; MG/G
1 CREAM TOPICAL AS NEEDED
Status: CANCELLED | OUTPATIENT
Start: 2024-09-30

## 2024-09-27 RX ORDER — ACETAMINOPHEN 325 MG/1
650 TABLET ORAL EVERY 4 HOURS PRN
Status: CANCELLED | OUTPATIENT
Start: 2024-09-30

## 2024-09-30 ENCOUNTER — HOSPITAL ENCOUNTER (OUTPATIENT)
Dept: DIALYSIS | Facility: HOSPITAL | Age: 44
Setting detail: DIALYSIS SERIES
Discharge: HOME | End: 2024-09-30
Payer: COMMERCIAL

## 2024-09-30 VITALS — TEMPERATURE: 97.3 F | HEART RATE: 72 BPM

## 2024-09-30 DIAGNOSIS — N18.6 ESRD (END STAGE RENAL DISEASE) ON DIALYSIS (MULTI): Primary | ICD-10-CM

## 2024-09-30 DIAGNOSIS — Z99.2 ESRD (END STAGE RENAL DISEASE) ON DIALYSIS (MULTI): Primary | ICD-10-CM

## 2024-09-30 DIAGNOSIS — R79.1 COAGULATION TEST ABNORMALITY: ICD-10-CM

## 2024-09-30 LAB
ALBUMIN SERPL BCP-MCNC: 3.7 G/DL (ref 3.4–5)
ALLOSURE SCORE - KIDNEY: 1.5 %
ANION GAP SERPL CALC-SCNC: 16 MMOL/L (ref 10–20)
BUN SERPL-MCNC: 34 MG/DL (ref 6–23)
CALCIUM SERPL-MCNC: 8.6 MG/DL (ref 8.6–10.6)
CAREDX_ORDER_ID: NORMAL
CENTER_ORDER_ID: NORMAL
CHLORIDE SERPL-SCNC: 101 MMOL/L (ref 98–107)
CLIENT SPECIMEN ID - ALLOSURE: NORMAL
CO2 SERPL-SCNC: 24 MMOL/L (ref 21–32)
CREAT SERPL-MCNC: 7.4 MG/DL (ref 0.5–1.05)
DONOR RELATION - ALLOSURE: NORMAL
EGFRCR SERPLBLD CKD-EPI 2021: 6 ML/MIN/1.73M*2
ERYTHROCYTE [DISTWIDTH] IN BLOOD BY AUTOMATED COUNT: 12.8 % (ref 11.5–14.5)
GLUCOSE SERPL-MCNC: 86 MG/DL (ref 74–99)
HCT VFR BLD AUTO: 32.3 % (ref 36–46)
HGB BLD-MCNC: 10.4 G/DL (ref 12–16)
INR PPP: 1 (ref 0.9–1.1)
MAGNESIUM SERPL-MCNC: 2.17 MG/DL (ref 1.6–2.4)
MCH RBC QN AUTO: 31 PG (ref 26–34)
MCHC RBC AUTO-ENTMCNC: 32.2 G/DL (ref 32–36)
MCV RBC AUTO: 96 FL (ref 80–100)
NOTES - ALLOSURE: NORMAL
NRBC BLD-RTO: 0 /100 WBCS (ref 0–0)
PHOSPHATE SERPL-MCNC: 4.7 MG/DL (ref 2.5–4.9)
PLATELET # BLD AUTO: 142 X10*3/UL (ref 150–450)
POTASSIUM SERPL-SCNC: 4 MMOL/L (ref 3.5–5.3)
PROTHROMBIN TIME: 11.7 SECONDS (ref 9.8–12.8)
RBC # BLD AUTO: 3.36 X10*6/UL (ref 4–5.2)
RELATIVE CHANGE VALUE - KIDNEY: 50 %
SODIUM SERPL-SCNC: 137 MMOL/L (ref 136–145)
TACROLIMUS BLD-MCNC: <2 NG/ML
TEST COMMENTS - ALLOSURE: NORMAL
TIME POST TX - ALLOSURE: NORMAL
TRANSPLANTED ORGAN - ALLOSURE: NORMAL
TX DATE - ALLOSURE/ALLOMAP: NORMAL
WBC # BLD AUTO: 2.8 X10*3/UL (ref 4.4–11.3)
WP_ORDER_ID: NORMAL

## 2024-09-30 PROCEDURE — 84100 ASSAY OF PHOSPHORUS: CPT | Mod: V7 | Performed by: INTERNAL MEDICINE

## 2024-09-30 PROCEDURE — 90937 HEMODIALYSIS REPEATED EVAL: CPT | Mod: V7

## 2024-09-30 PROCEDURE — 36600 WITHDRAWAL OF ARTERIAL BLOOD: CPT | Mod: V7 | Performed by: INTERNAL MEDICINE

## 2024-09-30 PROCEDURE — 80197 ASSAY OF TACROLIMUS: CPT | Mod: V7 | Performed by: INTERNAL MEDICINE

## 2024-09-30 PROCEDURE — 85027 COMPLETE CBC AUTOMATED: CPT | Mod: V7 | Performed by: INTERNAL MEDICINE

## 2024-09-30 PROCEDURE — 36600 WITHDRAWAL OF ARTERIAL BLOOD: CPT | Mod: V7 | Performed by: NURSE PRACTITIONER

## 2024-09-30 PROCEDURE — 2500000004 HC RX 250 GENERAL PHARMACY W/ HCPCS (ALT 636 FOR OP/ED): Mod: JZ,JG,EC | Performed by: INTERNAL MEDICINE

## 2024-09-30 PROCEDURE — 83735 ASSAY OF MAGNESIUM: CPT | Mod: V7 | Performed by: INTERNAL MEDICINE

## 2024-09-30 PROCEDURE — 85610 PROTHROMBIN TIME: CPT | Mod: V7 | Performed by: NURSE PRACTITIONER

## 2024-09-30 RX ORDER — ACETAMINOPHEN 325 MG/1
650 TABLET ORAL EVERY 4 HOURS PRN
Status: CANCELLED | OUTPATIENT
Start: 2024-10-07

## 2024-09-30 RX ORDER — LIDOCAINE AND PRILOCAINE 25; 25 MG/G; MG/G
1 CREAM TOPICAL AS NEEDED
Status: CANCELLED | OUTPATIENT
Start: 2024-10-07

## 2024-09-30 RX ORDER — DIPHENHYDRAMINE HCL 25 MG
25 CAPSULE ORAL
Status: CANCELLED | OUTPATIENT
Start: 2024-10-07

## 2024-09-30 RX ORDER — ONDANSETRON HYDROCHLORIDE 2 MG/ML
4 INJECTION, SOLUTION INTRAVENOUS
Status: CANCELLED | OUTPATIENT
Start: 2024-10-07

## 2024-09-30 ASSESSMENT — PAIN - FUNCTIONAL ASSESSMENT
PAIN_FUNCTIONAL_ASSESSMENT: NO/DENIES PAIN
PAIN_FUNCTIONAL_ASSESSMENT: NO/DENIES PAIN

## 2024-09-30 ASSESSMENT — PAIN SCALES - GENERAL
PAINLEVEL_OUTOF10: 0 - NO PAIN
PAINLEVEL_OUTOF10: 0 - NO PAIN

## 2024-10-01 ENCOUNTER — APPOINTMENT (OUTPATIENT)
Dept: RADIOLOGY | Facility: CLINIC | Age: 44
End: 2024-10-01
Payer: COMMERCIAL

## 2024-10-02 ENCOUNTER — TELEPHONE (OUTPATIENT)
Dept: TRANSPLANT | Facility: HOSPITAL | Age: 44
End: 2024-10-02
Payer: COMMERCIAL

## 2024-10-02 ENCOUNTER — HOSPITAL ENCOUNTER (OUTPATIENT)
Dept: DIALYSIS | Facility: HOSPITAL | Age: 44
Setting detail: DIALYSIS SERIES
Discharge: HOME | End: 2024-10-02
Payer: COMMERCIAL

## 2024-10-02 ENCOUNTER — HOSPITAL ENCOUNTER (OUTPATIENT)
Dept: RADIOLOGY | Facility: HOSPITAL | Age: 44
Discharge: HOME | End: 2024-10-02
Payer: COMMERCIAL

## 2024-10-02 VITALS
SYSTOLIC BLOOD PRESSURE: 181 MMHG | OXYGEN SATURATION: 100 % | DIASTOLIC BLOOD PRESSURE: 101 MMHG | RESPIRATION RATE: 16 BRPM | HEART RATE: 91 BPM | TEMPERATURE: 97.7 F

## 2024-10-02 VITALS — HEART RATE: 85 BPM | TEMPERATURE: 97.7 F

## 2024-10-02 DIAGNOSIS — Z94.0 KIDNEY REPLACED BY TRANSPLANT (HHS-HCC): ICD-10-CM

## 2024-10-02 DIAGNOSIS — N18.6 ESRD (END STAGE RENAL DISEASE) ON DIALYSIS (MULTI): Primary | ICD-10-CM

## 2024-10-02 DIAGNOSIS — D84.9 IMMUNOSUPPRESSION: ICD-10-CM

## 2024-10-02 DIAGNOSIS — T86.19 DELAYED GRAFT FUNCTION OF KIDNEY (HHS-HCC): ICD-10-CM

## 2024-10-02 DIAGNOSIS — I10 ESSENTIAL HYPERTENSION: ICD-10-CM

## 2024-10-02 DIAGNOSIS — Z99.2 ESRD (END STAGE RENAL DISEASE) ON DIALYSIS (MULTI): Primary | ICD-10-CM

## 2024-10-02 LAB
ALBUMIN SERPL BCP-MCNC: 4 G/DL (ref 3.4–5)
ANION GAP SERPL CALC-SCNC: 14 MMOL/L (ref 10–20)
BUN SERPL-MCNC: 29 MG/DL (ref 6–23)
CALCIUM SERPL-MCNC: 9.3 MG/DL (ref 8.6–10.6)
CHLORIDE SERPL-SCNC: 101 MMOL/L (ref 98–107)
CO2 SERPL-SCNC: 23 MMOL/L (ref 21–32)
CREAT SERPL-MCNC: 6.77 MG/DL (ref 0.5–1.05)
EGFRCR SERPLBLD CKD-EPI 2021: 7 ML/MIN/1.73M*2
ERYTHROCYTE [DISTWIDTH] IN BLOOD BY AUTOMATED COUNT: 12.7 % (ref 11.5–14.5)
GLUCOSE SERPL-MCNC: 97 MG/DL (ref 74–99)
HCT VFR BLD AUTO: 34.4 % (ref 36–46)
HGB BLD-MCNC: 11.3 G/DL (ref 12–16)
MAGNESIUM SERPL-MCNC: 2.14 MG/DL (ref 1.6–2.4)
MCH RBC QN AUTO: 30.5 PG (ref 26–34)
MCHC RBC AUTO-ENTMCNC: 32.8 G/DL (ref 32–36)
MCV RBC AUTO: 93 FL (ref 80–100)
NRBC BLD-RTO: 0 /100 WBCS (ref 0–0)
PHOSPHATE SERPL-MCNC: 4.4 MG/DL (ref 2.5–4.9)
PLATELET # BLD AUTO: 145 X10*3/UL (ref 150–450)
POTASSIUM SERPL-SCNC: 4.3 MMOL/L (ref 3.5–5.3)
RBC # BLD AUTO: 3.71 X10*6/UL (ref 4–5.2)
SODIUM SERPL-SCNC: 134 MMOL/L (ref 136–145)
TACROLIMUS BLD-MCNC: <2 NG/ML
WBC # BLD AUTO: 2.1 X10*3/UL (ref 4.4–11.3)

## 2024-10-02 PROCEDURE — 36415 COLL VENOUS BLD VENIPUNCTURE: CPT | Performed by: INTERNAL MEDICINE

## 2024-10-02 PROCEDURE — 2500000004 HC RX 250 GENERAL PHARMACY W/ HCPCS (ALT 636 FOR OP/ED)

## 2024-10-02 PROCEDURE — 80069 RENAL FUNCTION PANEL: CPT | Performed by: INTERNAL MEDICINE

## 2024-10-02 PROCEDURE — 2720000007 HC OR 272 NO HCPCS

## 2024-10-02 PROCEDURE — 50200 RENAL BIOPSY PERQ: CPT

## 2024-10-02 PROCEDURE — 99152 MOD SED SAME PHYS/QHP 5/>YRS: CPT | Performed by: RADIOLOGY

## 2024-10-02 PROCEDURE — 7100000009 HC PHASE TWO TIME - INITIAL BASE CHARGE

## 2024-10-02 PROCEDURE — 50200 RENAL BIOPSY PERQ: CPT | Performed by: RADIOLOGY

## 2024-10-02 PROCEDURE — 36600 WITHDRAWAL OF ARTERIAL BLOOD: CPT | Performed by: INTERNAL MEDICINE

## 2024-10-02 PROCEDURE — 88305 TISSUE EXAM BY PATHOLOGIST: CPT | Mod: TC,SUR

## 2024-10-02 PROCEDURE — 85027 COMPLETE CBC AUTOMATED: CPT | Performed by: INTERNAL MEDICINE

## 2024-10-02 PROCEDURE — 76942 ECHO GUIDE FOR BIOPSY: CPT | Performed by: RADIOLOGY

## 2024-10-02 PROCEDURE — 2500000004 HC RX 250 GENERAL PHARMACY W/ HCPCS (ALT 636 FOR OP/ED): Performed by: RADIOLOGY

## 2024-10-02 PROCEDURE — 80197 ASSAY OF TACROLIMUS: CPT | Performed by: INTERNAL MEDICINE

## 2024-10-02 PROCEDURE — 99152 MOD SED SAME PHYS/QHP 5/>YRS: CPT

## 2024-10-02 PROCEDURE — 83735 ASSAY OF MAGNESIUM: CPT | Performed by: INTERNAL MEDICINE

## 2024-10-02 PROCEDURE — 7100000010 HC PHASE TWO TIME - EACH INCREMENTAL 1 MINUTE

## 2024-10-02 RX ORDER — MIDAZOLAM HYDROCHLORIDE 1 MG/ML
INJECTION INTRAMUSCULAR; INTRAVENOUS
Status: COMPLETED | OUTPATIENT
Start: 2024-10-02 | End: 2024-10-02

## 2024-10-02 RX ORDER — TACROLIMUS 1 MG/1
3 CAPSULE ORAL
Qty: 180 CAPSULE | Refills: 11 | Status: SHIPPED | OUTPATIENT
Start: 2024-10-02 | End: 2025-09-27

## 2024-10-02 RX ORDER — LIDOCAINE AND PRILOCAINE 25; 25 MG/G; MG/G
1 CREAM TOPICAL AS NEEDED
OUTPATIENT
Start: 2024-10-07

## 2024-10-02 RX ORDER — ACETAMINOPHEN 325 MG/1
650 TABLET ORAL EVERY 4 HOURS PRN
OUTPATIENT
Start: 2024-10-07

## 2024-10-02 RX ORDER — FENTANYL CITRATE 50 UG/ML
INJECTION, SOLUTION INTRAMUSCULAR; INTRAVENOUS
Status: COMPLETED | OUTPATIENT
Start: 2024-10-02 | End: 2024-10-02

## 2024-10-02 RX ORDER — HEPARIN SODIUM 1000 [USP'U]/ML
INJECTION, SOLUTION INTRAVENOUS; SUBCUTANEOUS
Status: DISCONTINUED
Start: 2024-10-02 | End: 2024-10-03 | Stop reason: HOSPADM

## 2024-10-02 RX ORDER — HYDRALAZINE HYDROCHLORIDE 50 MG/1
50 TABLET, FILM COATED ORAL 3 TIMES DAILY
Qty: 270 TABLET | Refills: 3 | Status: SHIPPED | OUTPATIENT
Start: 2024-10-02 | End: 2025-10-02

## 2024-10-02 RX ORDER — HYDRALAZINE HYDROCHLORIDE 20 MG/ML
10 INJECTION INTRAMUSCULAR; INTRAVENOUS ONCE
Status: COMPLETED | OUTPATIENT
Start: 2024-10-02 | End: 2024-10-02

## 2024-10-02 RX ORDER — DIPHENHYDRAMINE HCL 25 MG
25 CAPSULE ORAL
OUTPATIENT
Start: 2024-10-07

## 2024-10-02 RX ORDER — HYDRALAZINE HYDROCHLORIDE 20 MG/ML
INJECTION INTRAMUSCULAR; INTRAVENOUS
Status: COMPLETED | OUTPATIENT
Start: 2024-10-02 | End: 2024-10-02

## 2024-10-02 RX ORDER — ACETAMINOPHEN 325 MG/1
650 TABLET ORAL EVERY 4 HOURS PRN
Status: DISCONTINUED | OUTPATIENT
Start: 2024-10-02 | End: 2024-10-03 | Stop reason: HOSPADM

## 2024-10-02 RX ORDER — CARVEDILOL 25 MG/1
25 TABLET ORAL 2 TIMES DAILY
Qty: 60 TABLET | Refills: 11 | Status: SHIPPED | OUTPATIENT
Start: 2024-10-02 | End: 2025-10-02

## 2024-10-02 RX ORDER — ONDANSETRON HYDROCHLORIDE 2 MG/ML
4 INJECTION, SOLUTION INTRAVENOUS
OUTPATIENT
Start: 2024-10-07

## 2024-10-02 ASSESSMENT — PAIN SCALES - GENERAL
PAINLEVEL_OUTOF10: 0 - NO PAIN
PAINLEVEL_OUTOF10: 8
PAINLEVEL_OUTOF10: 0 - NO PAIN

## 2024-10-02 ASSESSMENT — PAIN - FUNCTIONAL ASSESSMENT
PAIN_FUNCTIONAL_ASSESSMENT: NO/DENIES PAIN
PAIN_FUNCTIONAL_ASSESSMENT: 0-10
PAIN_FUNCTIONAL_ASSESSMENT: NO/DENIES PAIN
PAIN_FUNCTIONAL_ASSESSMENT: 0-10
PAIN_FUNCTIONAL_ASSESSMENT: 0-10

## 2024-10-02 NOTE — TELEPHONE ENCOUNTER
Discussed in 365   Patient needs to be seen in clinic next week to discuss biposy results and plan.   Also sent refill per Dr. Wise for coreg and hydralazine.     Patient tac levels low at <2   Called and spoke with Danuta, who stated patient does not need anymore, I informed him yes she does, and he stated they ran out of med and please send another script.   Script sent and direct message sent to Billie.     Task sent to JOSE F Peter to get patient scheduled next week (week of Oct 7th)

## 2024-10-02 NOTE — POST-PROCEDURE NOTE
Interventional Radiology Brief Postprocedure Note    Attending: Dr. Batsheva Shanks    Assistant: Dr. Zechariah Souza    Diagnosis: Elevated Cr    Description of procedure: US guided core needle biopsy of transplant kidney     Anesthesia:  MAC    Complications: None    Estimated Blood Loss: none    Medications (Filter: Administrations occurring from 0948 to 1016 on 10/02/24) As of 10/02/24 1016      fentaNYL PF (Sublimaze) injection (mcg) Total dose:  100 mcg      Date/Time Rate/Dose/Volume Action       10/02/24  1002 50 mcg Given      1007 50 mcg Given               midazolam (Versed) injection (mg) Total dose:  2 mg      Date/Time Rate/Dose/Volume Action       10/02/24  1002 1 mg Given      1007 1 mg Given               hydrALAZINE (Apresoline) injection (mg) Total dose:  10 mg      Date/Time Rate/Dose/Volume Action       10/02/24  1007 10 mg Given                   See detailed result report with images in PACS.    The patient tolerated the procedure well without incident or complication and is in stable condition.

## 2024-10-02 NOTE — DISCHARGE INSTRUCTIONS
You received moderate sedation:  - Do not drive a car, or operate any machinery or power tools of any kind.  - Do not drink any alcoholic drinks.  - Do not take any over the counter medications that may cause drowsiness.  - Do not make any important decisions or sign any legal documents.  - You need to have a responsible adult accompany you home.  - You may resume your normal diet.  - We strongly suggest that a responsible adult be with you for the rest of the day and also during the night. This is for your protection and safety.     For questions related to your procedure:  Please call [phone] between the hours of 7:00am-5:00pm Monday through Friday.  Please call [phone] after 5:00pm and on weekends and holidays.     In the event of an emergency call 911 or go to your nearest emergency room.

## 2024-10-02 NOTE — TELEPHONE ENCOUNTER
Patient called requesting to speak with coordinator about returning a call to [facility].  Patient call back number is [phone].

## 2024-10-02 NOTE — PRE-PROCEDURE NOTE
Interventional Radiology Preprocedure Note    Indication for procedure: The encounter diagnosis was Delayed graft function of kidney (HHS-HCC).    Relevant review of systems: NA    Relevant Labs:   Lab Results   Component Value Date    CREATININE 7.40 (H) 09/30/2024    EGFR 6 (L) 09/30/2024    INR 1.0 09/30/2024    PROTIME 11.7 09/30/2024       Planned Sedation/Anesthesia: Moderate    Airway assessment: normal    Directed physical examination:    Consent obtained with the assistance of an interpretor.   Aox3.  Resting comfortably in bed.  Respiratory rate/effort within normal limits.    Mallampati: III (soft and hard palate and base of uvula visible)    ASA Score: ASA 3 - Patient with moderate systemic disease with functional limitations    Benefits, risks and alternatives of procedure and planned sedation have been discussed with the patient and/or their representative. All questions answered and they agree to proceed.

## 2024-10-03 ENCOUNTER — TELEPHONE (OUTPATIENT)
Dept: TRANSPLANT | Facility: HOSPITAL | Age: 44
End: 2024-10-03
Payer: COMMERCIAL

## 2024-10-03 ENCOUNTER — APPOINTMENT (OUTPATIENT)
Dept: RADIOLOGY | Facility: CLINIC | Age: 44
End: 2024-10-03
Payer: COMMERCIAL

## 2024-10-04 ENCOUNTER — HOSPITAL ENCOUNTER (OUTPATIENT)
Dept: DIALYSIS | Facility: HOSPITAL | Age: 44
Setting detail: DIALYSIS SERIES
Discharge: HOME | End: 2024-10-04
Payer: COMMERCIAL

## 2024-10-04 ENCOUNTER — DOCUMENTATION (OUTPATIENT)
Dept: TRANSPLANT | Facility: HOSPITAL | Age: 44
End: 2024-10-04
Payer: COMMERCIAL

## 2024-10-04 VITALS — HEART RATE: 84 BPM | TEMPERATURE: 95.9 F

## 2024-10-04 DIAGNOSIS — Z94.0 KIDNEY REPLACED BY TRANSPLANT (HHS-HCC): ICD-10-CM

## 2024-10-04 DIAGNOSIS — N18.6 ESRD (END STAGE RENAL DISEASE) ON DIALYSIS (MULTI): Primary | ICD-10-CM

## 2024-10-04 DIAGNOSIS — Z99.2 ESRD (END STAGE RENAL DISEASE) ON DIALYSIS (MULTI): Primary | ICD-10-CM

## 2024-10-04 DIAGNOSIS — D70.8 OTHER NEUTROPENIA (CMS-HCC): ICD-10-CM

## 2024-10-04 LAB
ALBUMIN SERPL BCP-MCNC: 3.6 G/DL (ref 3.4–5)
ANION GAP SERPL CALC-SCNC: 14 MMOL/L (ref 10–20)
BASOPHILS # BLD AUTO: 0.05 X10*3/UL (ref 0–0.1)
BASOPHILS NFR BLD AUTO: 3.4 %
BUN SERPL-MCNC: 27 MG/DL (ref 6–23)
CALCIUM SERPL-MCNC: 8.5 MG/DL (ref 8.6–10.6)
CHLORIDE SERPL-SCNC: 99 MMOL/L (ref 98–107)
CO2 SERPL-SCNC: 25 MMOL/L (ref 21–32)
CREAT SERPL-MCNC: 5.97 MG/DL (ref 0.5–1.05)
EGFRCR SERPLBLD CKD-EPI 2021: 8 ML/MIN/1.73M*2
EOSINOPHIL # BLD AUTO: 0.17 X10*3/UL (ref 0–0.7)
EOSINOPHIL NFR BLD AUTO: 11.6 %
ERYTHROCYTE [DISTWIDTH] IN BLOOD BY AUTOMATED COUNT: 13.2 % (ref 11.5–14.5)
GLUCOSE SERPL-MCNC: 97 MG/DL (ref 74–99)
HCT VFR BLD AUTO: 34.1 % (ref 36–46)
HGB BLD-MCNC: 10.9 G/DL (ref 12–16)
IMM GRANULOCYTES # BLD AUTO: 0.04 X10*3/UL (ref 0–0.7)
IMM GRANULOCYTES NFR BLD AUTO: 2.7 % (ref 0–0.9)
LAB AP ASR DISCLAIMER: NORMAL
LABORATORY COMMENT REPORT: NORMAL
LYMPHOCYTES # BLD AUTO: 0.18 X10*3/UL (ref 1.2–4.8)
LYMPHOCYTES NFR BLD AUTO: 12.2 %
MAGNESIUM SERPL-MCNC: 2.11 MG/DL (ref 1.6–2.4)
MCH RBC QN AUTO: 30.2 PG (ref 26–34)
MCHC RBC AUTO-ENTMCNC: 32 G/DL (ref 32–36)
MCV RBC AUTO: 95 FL (ref 80–100)
MONOCYTES # BLD AUTO: 0.33 X10*3/UL (ref 0.1–1)
MONOCYTES NFR BLD AUTO: 22.4 %
NEUTROPHILS # BLD AUTO: 0.7 X10*3/UL (ref 1.2–7.7)
NEUTROPHILS NFR BLD AUTO: 47.7 %
NRBC BLD-RTO: 0 /100 WBCS (ref 0–0)
PATH REPORT.COMMENTS IMP SPEC: NORMAL
PATH REPORT.FINAL DX SPEC: NORMAL
PATH REPORT.GROSS SPEC: NORMAL
PATH REPORT.MICROSCOPIC SPEC OTHER STN: NORMAL
PATH REPORT.RELEVANT HX SPEC: NORMAL
PATH REPORT.TOTAL CANCER: NORMAL
PHOSPHATE SERPL-MCNC: 3.7 MG/DL (ref 2.5–4.9)
PLATELET # BLD AUTO: 155 X10*3/UL (ref 150–450)
POTASSIUM SERPL-SCNC: 4.3 MMOL/L (ref 3.5–5.3)
RBC # BLD AUTO: 3.61 X10*6/UL (ref 4–5.2)
SODIUM SERPL-SCNC: 134 MMOL/L (ref 136–145)
TACROLIMUS BLD-MCNC: 4.1 NG/ML
WBC # BLD AUTO: 1.5 X10*3/UL (ref 4.4–11.3)

## 2024-10-04 PROCEDURE — 80197 ASSAY OF TACROLIMUS: CPT | Performed by: INTERNAL MEDICINE

## 2024-10-04 PROCEDURE — 90937 HEMODIALYSIS REPEATED EVAL: CPT

## 2024-10-04 PROCEDURE — 83735 ASSAY OF MAGNESIUM: CPT | Performed by: INTERNAL MEDICINE

## 2024-10-04 PROCEDURE — 84100 ASSAY OF PHOSPHORUS: CPT | Performed by: INTERNAL MEDICINE

## 2024-10-04 PROCEDURE — 36415 COLL VENOUS BLD VENIPUNCTURE: CPT | Performed by: INTERNAL MEDICINE

## 2024-10-04 PROCEDURE — 85027 COMPLETE CBC AUTOMATED: CPT | Performed by: INTERNAL MEDICINE

## 2024-10-04 RX ORDER — DIPHENHYDRAMINE HCL 25 MG
25 CAPSULE ORAL
OUTPATIENT
Start: 2024-10-07

## 2024-10-04 RX ORDER — ONDANSETRON HYDROCHLORIDE 2 MG/ML
4 INJECTION, SOLUTION INTRAVENOUS
OUTPATIENT
Start: 2024-10-07

## 2024-10-04 RX ORDER — LIDOCAINE AND PRILOCAINE 25; 25 MG/G; MG/G
1 CREAM TOPICAL AS NEEDED
OUTPATIENT
Start: 2024-10-07

## 2024-10-04 RX ORDER — ACETAMINOPHEN 325 MG/1
650 TABLET ORAL EVERY 4 HOURS PRN
OUTPATIENT
Start: 2024-10-07

## 2024-10-04 ASSESSMENT — PAIN SCALES - GENERAL: PAINLEVEL_OUTOF10: 0 - NO PAIN

## 2024-10-04 ASSESSMENT — PAIN - FUNCTIONAL ASSESSMENT: PAIN_FUNCTIONAL_ASSESSMENT: NO/DENIES PAIN

## 2024-10-04 NOTE — PROGRESS NOTES
ON CALL   Per Dr. Wise, check BK, CMV, EBV and repeat labs early next week. Differential added on to labs today. WBC: 1.5 from 2.1.

## 2024-10-05 NOTE — TELEPHONE ENCOUNTER
ON CALL    WBC: 1.5; ANC: 0.70. Hold MMF per Dr. Wise.     Called patient via . Left a voicemail to hold MMF and repeat labs on Monday. Asked for a return call to the transplant coordinator on call at [phone].

## 2024-10-07 ENCOUNTER — OFFICE VISIT (OUTPATIENT)
Dept: TRANSPLANT | Facility: HOSPITAL | Age: 44
End: 2024-10-07
Payer: COMMERCIAL

## 2024-10-07 ENCOUNTER — HOSPITAL ENCOUNTER (OUTPATIENT)
Dept: DIALYSIS | Facility: HOSPITAL | Age: 44
Setting detail: DIALYSIS SERIES
Discharge: HOME | End: 2024-10-07
Payer: COMMERCIAL

## 2024-10-07 VITALS — TEMPERATURE: 97.3 F | HEART RATE: 77 BPM

## 2024-10-07 VITALS
SYSTOLIC BLOOD PRESSURE: 128 MMHG | BODY MASS INDEX: 26.64 KG/M2 | OXYGEN SATURATION: 95 % | DIASTOLIC BLOOD PRESSURE: 76 MMHG | HEART RATE: 83 BPM | TEMPERATURE: 96 F | WEIGHT: 155.2 LBS

## 2024-10-07 DIAGNOSIS — N18.6 ESRD (END STAGE RENAL DISEASE) ON DIALYSIS (MULTI): Primary | ICD-10-CM

## 2024-10-07 DIAGNOSIS — B96.89 ACUTE BACTERIAL SINUSITIS: ICD-10-CM

## 2024-10-07 DIAGNOSIS — Z79.899 IMMUNOSUPPRESSIVE MANAGEMENT ENCOUNTER FOLLOWING KIDNEY TRANSPLANT: ICD-10-CM

## 2024-10-07 DIAGNOSIS — T86.12 FAILED KIDNEY TRANSPLANT (HHS-HCC): Primary | ICD-10-CM

## 2024-10-07 DIAGNOSIS — Z94.0 IMMUNOSUPPRESSIVE MANAGEMENT ENCOUNTER FOLLOWING KIDNEY TRANSPLANT: ICD-10-CM

## 2024-10-07 DIAGNOSIS — F32.9 MAJOR DEPRESSIVE DISORDER WITH SINGLE EPISODE, REMISSION STATUS UNSPECIFIED: ICD-10-CM

## 2024-10-07 DIAGNOSIS — Z99.2 ESRD (END STAGE RENAL DISEASE) ON DIALYSIS (MULTI): Primary | ICD-10-CM

## 2024-10-07 DIAGNOSIS — J01.90 ACUTE BACTERIAL SINUSITIS: ICD-10-CM

## 2024-10-07 LAB
ALBUMIN SERPL BCP-MCNC: 3.9 G/DL (ref 3.4–5)
ANION GAP SERPL CALC-SCNC: 14 MMOL/L (ref 10–20)
BUN SERPL-MCNC: 36 MG/DL (ref 6–23)
CALCIUM SERPL-MCNC: 8.9 MG/DL (ref 8.6–10.6)
CHLORIDE SERPL-SCNC: 93 MMOL/L (ref 98–107)
CO2 SERPL-SCNC: 24 MMOL/L (ref 21–32)
CREAT SERPL-MCNC: 7.63 MG/DL (ref 0.5–1.05)
EGFRCR SERPLBLD CKD-EPI 2021: 6 ML/MIN/1.73M*2
ERYTHROCYTE [DISTWIDTH] IN BLOOD BY AUTOMATED COUNT: 13.3 % (ref 11.5–14.5)
GLUCOSE SERPL-MCNC: 94 MG/DL (ref 74–99)
HCT VFR BLD AUTO: 35.2 % (ref 36–46)
HGB BLD-MCNC: 11.5 G/DL (ref 12–16)
MAGNESIUM SERPL-MCNC: 2 MG/DL (ref 1.6–2.4)
MCH RBC QN AUTO: 29.7 PG (ref 26–34)
MCHC RBC AUTO-ENTMCNC: 32.7 G/DL (ref 32–36)
MCV RBC AUTO: 91 FL (ref 80–100)
NRBC BLD-RTO: 0 /100 WBCS (ref 0–0)
PHOSPHATE SERPL-MCNC: 4.1 MG/DL (ref 2.5–4.9)
PLATELET # BLD AUTO: 242 X10*3/UL (ref 150–450)
POTASSIUM SERPL-SCNC: 4 MMOL/L (ref 3.5–5.3)
RBC # BLD AUTO: 3.87 X10*6/UL (ref 4–5.2)
SODIUM SERPL-SCNC: 127 MMOL/L (ref 136–145)
TACROLIMUS BLD-MCNC: 14.5 NG/ML
WBC # BLD AUTO: 2.5 X10*3/UL (ref 4.4–11.3)

## 2024-10-07 PROCEDURE — 80069 RENAL FUNCTION PANEL: CPT | Performed by: INTERNAL MEDICINE

## 2024-10-07 PROCEDURE — 83735 ASSAY OF MAGNESIUM: CPT | Performed by: INTERNAL MEDICINE

## 2024-10-07 PROCEDURE — 2500000001 HC RX 250 WO HCPCS SELF ADMINISTERED DRUGS (ALT 637 FOR MEDICARE OP): Performed by: INTERNAL MEDICINE

## 2024-10-07 PROCEDURE — 36415 COLL VENOUS BLD VENIPUNCTURE: CPT | Performed by: INTERNAL MEDICINE

## 2024-10-07 PROCEDURE — 99214 OFFICE O/P EST MOD 30 MIN: CPT | Performed by: INTERNAL MEDICINE

## 2024-10-07 PROCEDURE — 80197 ASSAY OF TACROLIMUS: CPT | Performed by: INTERNAL MEDICINE

## 2024-10-07 PROCEDURE — 90937 HEMODIALYSIS REPEATED EVAL: CPT

## 2024-10-07 PROCEDURE — 3078F DIAST BP <80 MM HG: CPT | Performed by: INTERNAL MEDICINE

## 2024-10-07 PROCEDURE — 2500000004 HC RX 250 GENERAL PHARMACY W/ HCPCS (ALT 636 FOR OP/ED): Mod: JZ,JG

## 2024-10-07 PROCEDURE — 85027 COMPLETE CBC AUTOMATED: CPT | Performed by: INTERNAL MEDICINE

## 2024-10-07 PROCEDURE — 3074F SYST BP LT 130 MM HG: CPT | Performed by: INTERNAL MEDICINE

## 2024-10-07 RX ORDER — AMOXICILLIN AND CLAVULANATE POTASSIUM 500; 125 MG/1; MG/1
1 TABLET, FILM COATED ORAL
Qty: 7 TABLET | Refills: 0 | Status: SHIPPED | OUTPATIENT
Start: 2024-10-07 | End: 2024-10-14

## 2024-10-07 RX ORDER — ACETAMINOPHEN 325 MG/1
650 TABLET ORAL EVERY 4 HOURS PRN
Status: DISCONTINUED | OUTPATIENT
Start: 2024-10-07 | End: 2024-10-08 | Stop reason: HOSPADM

## 2024-10-07 RX ORDER — ACETAMINOPHEN 325 MG/1
650 TABLET ORAL EVERY 4 HOURS PRN
Status: CANCELLED | OUTPATIENT
Start: 2024-10-14

## 2024-10-07 RX ORDER — LIDOCAINE AND PRILOCAINE 25; 25 MG/G; MG/G
1 CREAM TOPICAL AS NEEDED
Status: CANCELLED | OUTPATIENT
Start: 2024-10-14

## 2024-10-07 RX ORDER — ONDANSETRON HYDROCHLORIDE 2 MG/ML
4 INJECTION, SOLUTION INTRAVENOUS
Status: CANCELLED | OUTPATIENT
Start: 2024-10-14

## 2024-10-07 RX ORDER — MIRTAZAPINE 7.5 MG/1
7.5 TABLET, FILM COATED ORAL NIGHTLY
Qty: 30 TABLET | Refills: 11 | Status: SHIPPED | OUTPATIENT
Start: 2024-10-07 | End: 2025-10-02

## 2024-10-07 RX ORDER — DIPHENHYDRAMINE HCL 25 MG
25 CAPSULE ORAL
Status: CANCELLED | OUTPATIENT
Start: 2024-10-14

## 2024-10-07 ASSESSMENT — PAIN SCALES - GENERAL
PAINLEVEL: 0-NO PAIN
PAINLEVEL_OUTOF10: 0 - NO PAIN
PAINLEVEL_OUTOF10: 6

## 2024-10-07 ASSESSMENT — PAIN - FUNCTIONAL ASSESSMENT: PAIN_FUNCTIONAL_ASSESSMENT: NO/DENIES PAIN

## 2024-10-07 ASSESSMENT — PAIN SCALES - WONG BAKER: WONGBAKER_NUMERICALRESPONSE: HURTS LITTLE BIT

## 2024-10-07 ASSESSMENT — PAIN DESCRIPTION - LOCATION: LOCATION: HEAD

## 2024-10-07 ASSESSMENT — PAIN DESCRIPTION - DESCRIPTORS: DESCRIPTORS: SORE

## 2024-10-07 NOTE — PROGRESS NOTES
TRANSPLANT NEPHROLOGY :   OUTPATIENT CLINIC NOTE      SERVICE DATE : 10/07/2024    REASON FOR VISIT/CHIEF COMPLAINT:  S/P  TRANSPLANT SURGERY  IMMUNOSUPPRESSIVE MEDICATION MANAGEMENT  BLOOD PRESSURE MANAGEMENT    HPI:    Ms. Leblanc is a 44 y.o. female with past medical history significant for ESRD secondary to HTN/NSAID who is s/p DDKT on 11/30/23 (Dr. Marbin Lambert & Dr. Louis, KDPI 56%, PRA 48%. HCV -/-, CMV D+/R+, EBV D+/R+). Pt received a total of 4.5 mg/kg thymoglobulin induction therapy in conjunction with solumedrol, was initiated on standard triple immunosuppression therapy (Tacrolimus, Mycophenolate, prednisone).  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      Post-op course was complicated by return to OR for exploration of transplanted kidney and washout of hematoma. Hospital course was complicated by post-operative pain and constipation, H/o DGF with eventual recovery in renal function.      5/29/24 Direct admission for fever, nausea, vomiting and abdominal pain (chronic).  Dx with CMV disease, new kidney stone with negative MAG3 for obstruction. ID consulted. Pt remains on Valcyte treatment dose.     Serum Cr ~0.9 till 6/13/24, started trending up gradually since then to peak 3.38 (7/7/24) warranting admission for MARK, hypervolemia. Kidney bx 7/5/24 with acute T-cell IB, acute vascular rejection IIB, and active ABMR. DSA against DQ2 (10,158). Pt is s/p Pulse steroids, Thymo 6mg/kg, PLEX x4. Cr trended to ~2.1-2.3.      Rpt bx 7/17/24 with persistent ACR 1A, AVR IIA, ABMR although the degree of inflammation appears improved from prior bx. Pt received additional Thymo 1.5 mg/kg. Pt was restarted on HD during this admission for volume management and clearance and has been RRT dependent since then. Plan was made for outpt rituximab infusion but scheduling delayed due to recent COVID infection (managed conservatively).      Pt is currently on HD  MWF at  [facility].  UOP <1 Cup  Patient is here for follow up s/p kidney transplant.     Patient feels depressed, tired and lost appetite.  Stressed about ongoing HD. She wants to get transferred to local HD unit.  Referring neph is Dr. Raymon Sahni.    Denied chest pain, SOB, FORREST, Palpitation. Normal urination and bowel movement. Normal gait and no weakness of arms/legs. No cough, runny nose, sore throat, cold symptoms, or rash. No hearing loss. Normal vision.No problems with his sleep, mood and function. No recent infection, hospitalization, surgery or ER visits.      ROS:  Review of  14 systems was performed system by system. See HPI. Otherwise, the symptoms were negative.    PAST MEDICAL HISTORY:  Past Medical History:   Diagnosis Date    Anxiety     Chronic kidney disease     Chronic kidney disease, unspecified     CKD, patient interested in transplantation    Depression     Disease of thyroid gland     GERD (gastroesophageal reflux disease)     Hypertension     Personal history of COVID-19 07/20/2022    History of COVID-19        PAST SURGICAL HISTORY:  Past Surgical History:   Procedure Laterality Date    MR HEAD ANGIO W AND WO IV CONTRAST  01/26/2021    MR HEAD ANGIO W AND WO IV CONTRAST    MR HEAD ANGIO WO IV CONTRAST  01/26/2021    MR HEAD ANGIO WO IV CONTRAST    OTHER SURGICAL HISTORY  07/20/2022    Arteriovenous fistula creation procedure    TRANSPLANT, KIDNEY, OPEN Right 11/30/2023    US GUIDED PERCUTANEOUS PERITONEAL OR RETROPERITONEAL FLUID COLLECTION DRAINAGE  12/21/2023    US GUIDED PERCUTANEOUS PERITONEAL OR RETROPERITONEAL FLUID COLLECTION DRAINAGE 12/21/2023 Batsheva Shanks MD [facility]        SOCIAL HISTORY:  Social History     Socioeconomic History    Marital status:      Spouse name: Not on file    Number of children: Not on file    Years of education: Not on file    Highest education level: Not on file   Occupational History    Not on file   Tobacco Use    Smoking status: Never     Passive exposure: Never     Smokeless tobacco: Former     Quit date: 2021    Tobacco comments:     Chewing tobacco, quit 1 year ago   Vaping Use    Vaping status: Never Used   Substance and Sexual Activity    Alcohol use: Never    Drug use: Never    Sexual activity: Defer   Other Topics Concern    Not on file   Social History Narrative    Not on file     Social Determinants of Health     Financial Resource Strain: Patient Declined (7/23/2024)    Overall Financial Resource Strain (CARDIA)     Difficulty of Paying Living Expenses: Patient declined   Food Insecurity: No Food Insecurity (7/3/2024)    Hunger Vital Sign     Worried About Running Out of Food in the Last Year: Never true     Ran Out of Food in the Last Year: Never true   Transportation Needs: Patient Declined (7/23/2024)    PRAPARE - Transportation     Lack of Transportation (Medical): Patient declined     Lack of Transportation (Non-Medical): Patient declined   Physical Activity: Inactive (7/3/2024)    Exercise Vital Sign     Days of Exercise per Week: 0 days     Minutes of Exercise per Session: 0 min   Stress: No Stress Concern Present (7/3/2024)    [employer] - Occupational Stress Questionnaire     Feeling of Stress : Not at all   Social Connections: Not on File (9/8/2024)    Received from [employer]    Social Connections     Connectedness: 0   Intimate Partner Violence: Not At Risk (7/3/2024)    Humiliation, Afraid, Rape, and Kick questionnaire     Fear of Current or Ex-Partner: No     Emotionally Abused: No     Physically Abused: No     Sexually Abused: No   Housing Stability: Patient Declined (7/23/2024)    Housing Stability Vital Sign     Unable to Pay for Housing in the Last Year: Patient declined     Number of Times Moved in the Last Year: 1     Homeless in the Last Year: Patient declined       FAMILY HISTORY:  Family History   Family history unknown: Yes       MEDICATION LIST:  Current Outpatient Medications   Medication Instructions     acetaminophen (TYLENOL) 650 mg, oral, Every 6 hours PRN    albuterol 90 mcg/actuation inhaler 2 puffs, inhalation, Every 6 hours PRN    amLODIPine (NORVASC) 5 mg, oral, Daily    amoxicillin-pot clavulanate (Augmentin) 500-125 mg tablet 1 tablet, oral, Every 24 hours scheduled    azelastine (Optivar) 0.05 % ophthalmic solution INSTILL 1 DROP INTO BOTH EYES 2 TIMES A DAY .    Calcium Antacid 200 mg calcium (500 mg) chewable tablet TAKE 1 TABLET BY MOUTH 2 TIMES DAILY (CHEW AND SWALLOW)    carvedilol (COREG) 25 mg, oral, 2 times daily    cholecalciferol (VITAMIN D-3) 100 mcg, oral, Daily    gabapentin (NEURONTIN) 300 mg, oral, Nightly    hydrALAZINE (APRESOLINE) 50 mg, oral, 3 times daily    levothyroxine (SYNTHROID, LEVOXYL) 25 mcg, oral, Daily before breakfast    magnesium oxide (MAG-OX) 400 mg, oral, Daily, Take with food at lunch time    mirtazapine (REMERON) 7.5 mg, oral, Nightly    pantoprazole (ProtoNix) 40 mg EC tablet TAKE 1 TABLET (40 MG) BY MOUTH EVERY MORNING (BEFORE BREAKFAST).    predniSONE (DELTASONE) 5 mg, oral, Daily, Continue on 5 mg once taper complete.    sertraline (ZOLOFT) 50 mg, oral, Daily    SUMAtriptan (IMITREX) 100 mg, oral, Once as needed    tacrolimus (PROGRAF) 3 mg, oral, Every 12 hours scheduled (0630,1830)    tenofovir alafenamide (VEMLIDY) 25 mg, oral, Daily    torsemide 80 mg, oral, 2 times daily       ALLERGY  Allergies   Allergen Reactions    Sumatriptan Headache and Dizziness     Patient has worsening headache, dizziness, chest burning/tingling, and extremity pain upon taking sumatriptan.       PHYSICAL EXAM:    Visit Vitals  /76   Pulse 83   Temp 35.6 °C (96 °F) (Temporal)   Wt 70.4 kg (155 lb 3.2 oz) Comment: Pt reported   SpO2 95%   BMI 26.64 kg/m²   OB Status Unknown   Smoking Status Never   BSA 1.78 m²          Vital signs - reviewed. Acceptable BP at this office visit.   General Appearance - NAD, Good speech, oriented and alert  HEENT - Supple. Not pale. No jaundice. No  cervical lymphadenopathy. Pharynx and tonsils are not injected.  CVS - RRR. Normal S1/S2. No murmur, click , rub or gallop  Lungs- clear to auscultation bilaterally  Abdomen - soft , not tender, no guarding, no rigidity. No hepatosplenomegaly. Normal bowel sounds. No masses and ascites. S/P Kidney transplant .  Transplanted kidney is not tender.   Musculoskeletal /Extremities - no edema. Full ROM. No joint tenderness.   Neuro/Psych - appropriate mood and affect. Motor power V/V all extremities. CN I -XII were grossly intact.  Skin - No visible rash      LABS:    Lab Results   Component Value Date    WBC 2.5 (L) 10/07/2024    HGB 11.5 (L) 10/07/2024    HCT 35.2 (L) 10/07/2024     10/07/2024    ALT 14 08/19/2024    AST 11 08/19/2024     (L) 10/07/2024    K 4.0 10/07/2024    CL 93 (L) 10/07/2024    CREATININE 7.63 (H) 10/07/2024    BUN 36 (H) 10/07/2024    CO2 24 10/07/2024    TSH 0.44 07/09/2024    INR 1.0 09/30/2024    HGBA1C 4.8 05/09/2023     par    ASSESSMENT AND PLAN:    Ms. Leblanc is a 44 y.o. female  who is here for follow up s/p kidney transplant.    TRANSPLANT DATE: 11/30/2023 (Kidney)      1. ESRD S/P kidney transplant   - Creatinine last check was :  Lab Results   Component Value Date    CREATININE 7.63 (H) 10/07/2024     Bx done 10/2/2024  KIDNEY ALLOGRAFT, RIGHT ILIAC FOSSA, PERCUTANEOUS CORE BIOPSY:  -- CHANGES CONSISTENT WITH PLASMA CELL RICH ACUTE T CELL-MEDIATED REJECTION, BANFF GRADE IB  -- CHRONIC ACTIVE ANTIBODY-MEDIATED REJECTION  -- MODERATE ARTERIOSCLEROSIS     Discussed at transplant meeting. Risks of further rejection treatment outweighs benefits. She remains on chronic dialysis.    We discussed path result with pt and her family in clinic today. All questions were answered.    Continue HD  Can discuss re-refer for another transplant later once she gets stronger.     2. Immunosuppression  -Off MMF  -HOLD Tac 1 dose and restart 2 mg bid. Will taper down on tac.  Prednisone 5 mg  daily  -Continue current immunosuppression regimen.    3. Electrolytes  Lab Results   Component Value Date    GLUCOSE 94 10/07/2024    CALCIUM 8.9 10/07/2024     (L) 10/07/2024    K 4.0 10/07/2024    CO2 24 10/07/2024    CL 93 (L) 10/07/2024    BUN 36 (H) 10/07/2024    CREATININE 7.63 (H) 10/07/2024     -Acceptable from last lab drawn    4. Hypertension  Blood Pressures         10/7/2024  1126             BP: 128/76          -Home  BP had been acceptable  -Encourage to monitor home BP  -Continue current anti hypertensive medication    5. Bone Mineral Disease/Osteoporosis  Lab Results   Component Value Date    PTH 18.5 06/05/2024    CALCIUM 8.9 10/07/2024    CAION 1.04 (L) 07/31/2024    PHOS 4.1 10/07/2024    VITD25 35 06/05/2024     -check VIT D, PTH with next lab  - Consider DEXA every 2-3 years , defer to PCP  - May consider initiation of Sensipar when PTH >300 AND Ca >8.4    6.Anemia  Lab Results   Component Value Date    WBC 2.5 (L) 10/07/2024    HGB 11.5 (L) 10/07/2024    HCT 35.2 (L) 10/07/2024    MCV 91 10/07/2024     10/07/2024     Lab Results   Component Value Date    IRON 89 07/17/2024    TIBC 173 (L) 07/17/2024    FERRITIN 691 (H) 07/17/2024     -asymptomatic  - Continue to monitor  -check iron studies and ferritin. Will consider DENIZ as needed.  - No indications for blood transfusion     7.Health maintenance and vaccination  - Flu shot during flu season annually  - Cancer screening is up to date per the patient    Summary  Continue HD  Discussed with Dr. Sahni and aim to transfer back to local neph/local HD unit in [place]  Wean down immunosuppression  Augmentin x 7 days , hd dose for sinusitis and ear pain  REMERON 7.5 MG daily for appetite and depression  Next follow up 1 month    Marcia Webber    Transplant Nephrology

## 2024-10-07 NOTE — PATIENT INSTRUCTIONS
PLAN:  Start Augmentin x 7 days for possible L ear infection  Start Remeron for depression    Tac 14.5- PLAN: Skip tonight's dose and starting tomorrow take 2 mg twice a day    Biopsy discussion: No more rejection treatment, Dr Sahni (local neph)- follow with him, dialysis local dialysis center managed by local neph    Plan for relist: Pt needs to get stronger before re-listing; Pt aware that if she receives another transplant it is possible she could get CMV infection     Slowly wean IS medications     RTC 1-2 months

## 2024-10-08 ENCOUNTER — DOCUMENTATION (OUTPATIENT)
Dept: TRANSPLANT | Facility: HOSPITAL | Age: 44
End: 2024-10-08
Payer: COMMERCIAL

## 2024-10-08 DIAGNOSIS — Z94.0 KIDNEY REPLACED BY TRANSPLANT (HHS-HCC): ICD-10-CM

## 2024-10-08 NOTE — PROGRESS NOTES
Lab notified coordinator, HLA transplant antibodies was marked collected and never received in the lab.     New order placed.

## 2024-10-09 ENCOUNTER — HOSPITAL ENCOUNTER (OUTPATIENT)
Dept: DIALYSIS | Facility: HOSPITAL | Age: 44
Setting detail: DIALYSIS SERIES
Discharge: HOME | End: 2024-10-09
Payer: COMMERCIAL

## 2024-10-09 ENCOUNTER — TELEPHONE (OUTPATIENT)
Dept: TRANSPLANT | Facility: HOSPITAL | Age: 44
End: 2024-10-09
Payer: COMMERCIAL

## 2024-10-09 VITALS — HEART RATE: 77 BPM | TEMPERATURE: 97.2 F

## 2024-10-09 DIAGNOSIS — N18.6 ESRD (END STAGE RENAL DISEASE) ON DIALYSIS (MULTI): Primary | ICD-10-CM

## 2024-10-09 DIAGNOSIS — Z99.2 ESRD (END STAGE RENAL DISEASE) ON DIALYSIS (MULTI): Primary | ICD-10-CM

## 2024-10-09 LAB
ALBUMIN SERPL BCP-MCNC: 3.7 G/DL (ref 3.4–5)
ANION GAP SERPL CALC-SCNC: 15 MMOL/L (ref 10–20)
BUN SERPL-MCNC: 27 MG/DL (ref 6–23)
CALCIUM SERPL-MCNC: 8.7 MG/DL (ref 8.6–10.6)
CHLORIDE SERPL-SCNC: 101 MMOL/L (ref 98–107)
CO2 SERPL-SCNC: 23 MMOL/L (ref 21–32)
CREAT SERPL-MCNC: 7.2 MG/DL (ref 0.5–1.05)
EGFRCR SERPLBLD CKD-EPI 2021: 7 ML/MIN/1.73M*2
ERYTHROCYTE [DISTWIDTH] IN BLOOD BY AUTOMATED COUNT: 13.4 % (ref 11.5–14.5)
GLUCOSE SERPL-MCNC: 110 MG/DL (ref 74–99)
HCT VFR BLD AUTO: 36 % (ref 36–46)
HGB BLD-MCNC: 11.2 G/DL (ref 12–16)
MAGNESIUM SERPL-MCNC: 1.97 MG/DL (ref 1.6–2.4)
MCH RBC QN AUTO: 30 PG (ref 26–34)
MCHC RBC AUTO-ENTMCNC: 31.1 G/DL (ref 32–36)
MCV RBC AUTO: 97 FL (ref 80–100)
NRBC BLD-RTO: 0.6 /100 WBCS (ref 0–0)
PHOSPHATE SERPL-MCNC: 2.7 MG/DL (ref 2.5–4.9)
PLATELET # BLD AUTO: 243 X10*3/UL (ref 150–450)
POTASSIUM SERPL-SCNC: 4.3 MMOL/L (ref 3.5–5.3)
RBC # BLD AUTO: 3.73 X10*6/UL (ref 4–5.2)
SODIUM SERPL-SCNC: 135 MMOL/L (ref 136–145)
TACROLIMUS BLD-MCNC: 12.5 NG/ML
WBC # BLD AUTO: 5 X10*3/UL (ref 4.4–11.3)

## 2024-10-09 PROCEDURE — 83735 ASSAY OF MAGNESIUM: CPT | Performed by: INTERNAL MEDICINE

## 2024-10-09 PROCEDURE — 80069 RENAL FUNCTION PANEL: CPT | Performed by: INTERNAL MEDICINE

## 2024-10-09 PROCEDURE — 80197 ASSAY OF TACROLIMUS: CPT | Mod: V7 | Performed by: INTERNAL MEDICINE

## 2024-10-09 PROCEDURE — 36600 WITHDRAWAL OF ARTERIAL BLOOD: CPT | Performed by: INTERNAL MEDICINE

## 2024-10-09 PROCEDURE — 85027 COMPLETE CBC AUTOMATED: CPT | Performed by: INTERNAL MEDICINE

## 2024-10-09 RX ORDER — ACETAMINOPHEN 325 MG/1
650 TABLET ORAL EVERY 4 HOURS PRN
OUTPATIENT
Start: 2024-10-14

## 2024-10-09 RX ORDER — LIDOCAINE AND PRILOCAINE 25; 25 MG/G; MG/G
1 CREAM TOPICAL AS NEEDED
OUTPATIENT
Start: 2024-10-14

## 2024-10-09 RX ORDER — ONDANSETRON HYDROCHLORIDE 2 MG/ML
4 INJECTION, SOLUTION INTRAVENOUS
Status: CANCELLED | OUTPATIENT
Start: 2024-10-14

## 2024-10-09 RX ORDER — DIPHENHYDRAMINE HCL 25 MG
25 CAPSULE ORAL
OUTPATIENT
Start: 2024-10-14

## 2024-10-09 ASSESSMENT — PAIN - FUNCTIONAL ASSESSMENT
PAIN_FUNCTIONAL_ASSESSMENT: NO/DENIES PAIN
PAIN_FUNCTIONAL_ASSESSMENT: NO/DENIES PAIN

## 2024-10-09 ASSESSMENT — PAIN SCALES - GENERAL: PAINLEVEL_OUTOF10: 0 - NO PAIN

## 2024-10-11 ENCOUNTER — TELEPHONE (OUTPATIENT)
Dept: TRANSPLANT | Facility: HOSPITAL | Age: 44
End: 2024-10-11
Payer: COMMERCIAL

## 2024-10-11 ENCOUNTER — HOSPITAL ENCOUNTER (OUTPATIENT)
Dept: DIALYSIS | Facility: HOSPITAL | Age: 44
Setting detail: DIALYSIS SERIES
Discharge: HOME | End: 2024-10-11
Payer: COMMERCIAL

## 2024-10-11 VITALS — TEMPERATURE: 97.2 F | HEART RATE: 71 BPM

## 2024-10-11 DIAGNOSIS — Z94.0 KIDNEY REPLACED BY TRANSPLANT (HHS-HCC): Primary | ICD-10-CM

## 2024-10-11 DIAGNOSIS — N18.6 ESRD (END STAGE RENAL DISEASE) ON DIALYSIS (MULTI): Primary | ICD-10-CM

## 2024-10-11 DIAGNOSIS — Z99.2 ESRD (END STAGE RENAL DISEASE) ON DIALYSIS (MULTI): Primary | ICD-10-CM

## 2024-10-11 LAB
ALBUMIN SERPL BCP-MCNC: 3.4 G/DL (ref 3.4–5)
ANION GAP SERPL CALC-SCNC: 13 MMOL/L (ref 10–20)
BUN SERPL-MCNC: 22 MG/DL (ref 6–23)
CALCIUM SERPL-MCNC: 8.4 MG/DL (ref 8.6–10.6)
CHLORIDE SERPL-SCNC: 101 MMOL/L (ref 98–107)
CO2 SERPL-SCNC: 27 MMOL/L (ref 21–32)
CREAT SERPL-MCNC: 5.66 MG/DL (ref 0.5–1.05)
EGFRCR SERPLBLD CKD-EPI 2021: 9 ML/MIN/1.73M*2
ERYTHROCYTE [DISTWIDTH] IN BLOOD BY AUTOMATED COUNT: 13.8 % (ref 11.5–14.5)
GLUCOSE SERPL-MCNC: 87 MG/DL (ref 74–99)
HCT VFR BLD AUTO: 35.3 % (ref 36–46)
HGB BLD-MCNC: 10.9 G/DL (ref 12–16)
MAGNESIUM SERPL-MCNC: 1.97 MG/DL (ref 1.6–2.4)
MCH RBC QN AUTO: 29.9 PG (ref 26–34)
MCHC RBC AUTO-ENTMCNC: 30.9 G/DL (ref 32–36)
MCV RBC AUTO: 97 FL (ref 80–100)
NRBC BLD-RTO: 0 /100 WBCS (ref 0–0)
PHOSPHATE SERPL-MCNC: 2.6 MG/DL (ref 2.5–4.9)
PLATELET # BLD AUTO: 215 X10*3/UL (ref 150–450)
POTASSIUM SERPL-SCNC: 4 MMOL/L (ref 3.5–5.3)
RBC # BLD AUTO: 3.64 X10*6/UL (ref 4–5.2)
SODIUM SERPL-SCNC: 137 MMOL/L (ref 136–145)
TACROLIMUS BLD-MCNC: 10 NG/ML
WBC # BLD AUTO: 4.1 X10*3/UL (ref 4.4–11.3)

## 2024-10-11 PROCEDURE — 2500000004 HC RX 250 GENERAL PHARMACY W/ HCPCS (ALT 636 FOR OP/ED): Performed by: INTERNAL MEDICINE

## 2024-10-11 PROCEDURE — 84132 ASSAY OF SERUM POTASSIUM: CPT | Mod: V7 | Performed by: INTERNAL MEDICINE

## 2024-10-11 PROCEDURE — 83735 ASSAY OF MAGNESIUM: CPT | Mod: V7 | Performed by: INTERNAL MEDICINE

## 2024-10-11 PROCEDURE — 80197 ASSAY OF TACROLIMUS: CPT | Mod: V7 | Performed by: INTERNAL MEDICINE

## 2024-10-11 PROCEDURE — 36415 COLL VENOUS BLD VENIPUNCTURE: CPT | Mod: V7 | Performed by: INTERNAL MEDICINE

## 2024-10-11 PROCEDURE — 85027 COMPLETE CBC AUTOMATED: CPT | Mod: V7 | Performed by: INTERNAL MEDICINE

## 2024-10-11 PROCEDURE — 90937 HEMODIALYSIS REPEATED EVAL: CPT | Mod: V7

## 2024-10-11 RX ORDER — ACETAMINOPHEN 325 MG/1
650 TABLET ORAL EVERY 4 HOURS PRN
Status: CANCELLED | OUTPATIENT
Start: 2024-10-14

## 2024-10-11 RX ORDER — ONDANSETRON HYDROCHLORIDE 2 MG/ML
4 INJECTION, SOLUTION INTRAVENOUS
Status: CANCELLED | OUTPATIENT
Start: 2024-10-14

## 2024-10-11 RX ORDER — ONDANSETRON HYDROCHLORIDE 4 MG/2ML
4 INJECTION, SOLUTION INTRAMUSCULAR; INTRAVENOUS ONCE
Status: SHIPPED | OUTPATIENT
Start: 2024-10-11

## 2024-10-11 RX ORDER — ONDANSETRON HYDROCHLORIDE 2 MG/ML
4 INJECTION, SOLUTION INTRAVENOUS
Status: DISCONTINUED | OUTPATIENT
Start: 2024-10-11 | End: 2024-10-12 | Stop reason: HOSPADM

## 2024-10-11 RX ORDER — DIPHENHYDRAMINE HCL 25 MG
25 CAPSULE ORAL
Status: CANCELLED | OUTPATIENT
Start: 2024-10-14

## 2024-10-11 RX ORDER — LIDOCAINE AND PRILOCAINE 25; 25 MG/G; MG/G
1 CREAM TOPICAL AS NEEDED
Status: CANCELLED | OUTPATIENT
Start: 2024-10-14

## 2024-10-11 ASSESSMENT — PAIN - FUNCTIONAL ASSESSMENT: PAIN_FUNCTIONAL_ASSESSMENT: NO/DENIES PAIN

## 2024-10-11 ASSESSMENT — PAIN SCALES - GENERAL
PAINLEVEL_OUTOF10: 0 - NO PAIN
PAINLEVEL_OUTOF10: 0 - NO PAIN

## 2024-10-14 ENCOUNTER — HOSPITAL ENCOUNTER (OUTPATIENT)
Dept: DIALYSIS | Facility: HOSPITAL | Age: 44
Setting detail: DIALYSIS SERIES
Discharge: HOME | End: 2024-10-14

## 2024-10-14 VITALS — HEART RATE: 80 BPM | TEMPERATURE: 97.5 F

## 2024-10-14 DIAGNOSIS — N18.6 ESRD (END STAGE RENAL DISEASE) ON DIALYSIS (MULTI): Primary | ICD-10-CM

## 2024-10-14 DIAGNOSIS — Z99.2 ESRD (END STAGE RENAL DISEASE) ON DIALYSIS (MULTI): Primary | ICD-10-CM

## 2024-10-14 LAB
ALBUMIN SERPL BCP-MCNC: 3.7 G/DL (ref 3.4–5)
ANION GAP SERPL CALC-SCNC: 16 MMOL/L (ref 10–20)
BUN SERPL-MCNC: 45 MG/DL (ref 6–23)
CALCIUM SERPL-MCNC: 8.7 MG/DL (ref 8.6–10.6)
CHLORIDE SERPL-SCNC: 101 MMOL/L (ref 98–107)
CO2 SERPL-SCNC: 24 MMOL/L (ref 21–32)
CREAT SERPL-MCNC: 8.77 MG/DL (ref 0.5–1.05)
EGFRCR SERPLBLD CKD-EPI 2021: 5 ML/MIN/1.73M*2
ERYTHROCYTE [DISTWIDTH] IN BLOOD BY AUTOMATED COUNT: 13.7 % (ref 11.5–14.5)
GLUCOSE SERPL-MCNC: 103 MG/DL (ref 74–99)
HCT VFR BLD AUTO: 37.5 % (ref 36–46)
HGB BLD-MCNC: 11.5 G/DL (ref 12–16)
MAGNESIUM SERPL-MCNC: 2.2 MG/DL (ref 1.6–2.4)
MCH RBC QN AUTO: 29.6 PG (ref 26–34)
MCHC RBC AUTO-ENTMCNC: 30.7 G/DL (ref 32–36)
MCV RBC AUTO: 96 FL (ref 80–100)
NRBC BLD-RTO: 0 /100 WBCS (ref 0–0)
PHOSPHATE SERPL-MCNC: 4.8 MG/DL (ref 2.5–4.9)
PLATELET # BLD AUTO: 218 X10*3/UL (ref 150–450)
POTASSIUM SERPL-SCNC: 4.7 MMOL/L (ref 3.5–5.3)
RBC # BLD AUTO: 3.89 X10*6/UL (ref 4–5.2)
SODIUM SERPL-SCNC: 136 MMOL/L (ref 136–145)
TACROLIMUS BLD-MCNC: 10.9 NG/ML
WBC # BLD AUTO: 6.1 X10*3/UL (ref 4.4–11.3)

## 2024-10-14 PROCEDURE — 80069 RENAL FUNCTION PANEL: CPT | Mod: V7 | Performed by: INTERNAL MEDICINE

## 2024-10-14 PROCEDURE — 83735 ASSAY OF MAGNESIUM: CPT | Mod: V7 | Performed by: INTERNAL MEDICINE

## 2024-10-14 PROCEDURE — 90937 HEMODIALYSIS REPEATED EVAL: CPT | Mod: V7

## 2024-10-14 PROCEDURE — 80197 ASSAY OF TACROLIMUS: CPT | Mod: V7 | Performed by: INTERNAL MEDICINE

## 2024-10-14 PROCEDURE — 85027 COMPLETE CBC AUTOMATED: CPT | Mod: V7 | Performed by: INTERNAL MEDICINE

## 2024-10-14 PROCEDURE — 36415 COLL VENOUS BLD VENIPUNCTURE: CPT | Mod: V7 | Performed by: INTERNAL MEDICINE

## 2024-10-14 PROCEDURE — 2500000004 HC RX 250 GENERAL PHARMACY W/ HCPCS (ALT 636 FOR OP/ED): Mod: JZ,JG,EC | Performed by: INTERNAL MEDICINE

## 2024-10-14 RX ORDER — ACETAMINOPHEN 325 MG/1
650 TABLET ORAL EVERY 4 HOURS PRN
Status: CANCELLED | OUTPATIENT
Start: 2024-10-21

## 2024-10-14 RX ORDER — DIPHENHYDRAMINE HCL 25 MG
25 CAPSULE ORAL
Status: CANCELLED | OUTPATIENT
Start: 2024-10-21

## 2024-10-14 RX ORDER — ONDANSETRON HYDROCHLORIDE 2 MG/ML
4 INJECTION, SOLUTION INTRAVENOUS
Status: CANCELLED | OUTPATIENT
Start: 2024-10-21

## 2024-10-14 RX ORDER — LIDOCAINE AND PRILOCAINE 25; 25 MG/G; MG/G
1 CREAM TOPICAL AS NEEDED
Status: CANCELLED | OUTPATIENT
Start: 2024-10-21

## 2024-10-14 ASSESSMENT — PAIN SCALES - GENERAL: PAINLEVEL_OUTOF10: 0 - NO PAIN

## 2024-10-14 ASSESSMENT — PAIN - FUNCTIONAL ASSESSMENT: PAIN_FUNCTIONAL_ASSESSMENT: NO/DENIES PAIN

## 2024-10-16 ENCOUNTER — HOSPITAL ENCOUNTER (OUTPATIENT)
Dept: DIALYSIS | Facility: HOSPITAL | Age: 44
Setting detail: DIALYSIS SERIES
Discharge: HOME | End: 2024-10-16

## 2024-10-16 VITALS — TEMPERATURE: 97.4 F | HEART RATE: 78 BPM

## 2024-10-16 DIAGNOSIS — Z99.2 ESRD (END STAGE RENAL DISEASE) ON DIALYSIS (MULTI): Primary | ICD-10-CM

## 2024-10-16 DIAGNOSIS — N18.6 ESRD (END STAGE RENAL DISEASE) ON DIALYSIS (MULTI): Primary | ICD-10-CM

## 2024-10-16 LAB
ALBUMIN SERPL BCP-MCNC: 3.5 G/DL (ref 3.4–5)
ANION GAP SERPL CALC-SCNC: 15 MMOL/L (ref 10–20)
BUN SERPL-MCNC: 39 MG/DL (ref 6–23)
CALCIUM SERPL-MCNC: 8.9 MG/DL (ref 8.6–10.6)
CHLORIDE SERPL-SCNC: 103 MMOL/L (ref 98–107)
CO2 SERPL-SCNC: 24 MMOL/L (ref 21–32)
CREAT SERPL-MCNC: 8.41 MG/DL (ref 0.5–1.05)
EGFRCR SERPLBLD CKD-EPI 2021: 5 ML/MIN/1.73M*2
ERYTHROCYTE [DISTWIDTH] IN BLOOD BY AUTOMATED COUNT: 13.3 % (ref 11.5–14.5)
GLUCOSE SERPL-MCNC: 84 MG/DL (ref 74–99)
HCT VFR BLD AUTO: 35.9 % (ref 36–46)
HGB BLD-MCNC: 10.8 G/DL (ref 12–16)
MAGNESIUM SERPL-MCNC: 2.09 MG/DL (ref 1.6–2.4)
MCH RBC QN AUTO: 29.6 PG (ref 26–34)
MCHC RBC AUTO-ENTMCNC: 30.1 G/DL (ref 32–36)
MCV RBC AUTO: 98 FL (ref 80–100)
NRBC BLD-RTO: 0 /100 WBCS (ref 0–0)
PHOSPHATE SERPL-MCNC: 4.3 MG/DL (ref 2.5–4.9)
PLATELET # BLD AUTO: 176 X10*3/UL (ref 150–450)
POTASSIUM SERPL-SCNC: 4.4 MMOL/L (ref 3.5–5.3)
RBC # BLD AUTO: 3.65 X10*6/UL (ref 4–5.2)
SODIUM SERPL-SCNC: 138 MMOL/L (ref 136–145)
TACROLIMUS BLD-MCNC: 9.8 NG/ML
WBC # BLD AUTO: 5.6 X10*3/UL (ref 4.4–11.3)

## 2024-10-16 PROCEDURE — 83735 ASSAY OF MAGNESIUM: CPT | Mod: V7 | Performed by: INTERNAL MEDICINE

## 2024-10-16 PROCEDURE — 36415 COLL VENOUS BLD VENIPUNCTURE: CPT | Mod: V7 | Performed by: INTERNAL MEDICINE

## 2024-10-16 PROCEDURE — 80197 ASSAY OF TACROLIMUS: CPT | Mod: V7 | Performed by: INTERNAL MEDICINE

## 2024-10-16 PROCEDURE — 80069 RENAL FUNCTION PANEL: CPT | Mod: V7 | Performed by: INTERNAL MEDICINE

## 2024-10-16 PROCEDURE — 85027 COMPLETE CBC AUTOMATED: CPT | Mod: V7 | Performed by: INTERNAL MEDICINE

## 2024-10-16 RX ORDER — ONDANSETRON HYDROCHLORIDE 2 MG/ML
4 INJECTION, SOLUTION INTRAVENOUS
OUTPATIENT
Start: 2024-10-21

## 2024-10-16 RX ORDER — LIDOCAINE AND PRILOCAINE 25; 25 MG/G; MG/G
1 CREAM TOPICAL AS NEEDED
OUTPATIENT
Start: 2024-10-21

## 2024-10-16 RX ORDER — ACETAMINOPHEN 325 MG/1
650 TABLET ORAL EVERY 4 HOURS PRN
OUTPATIENT
Start: 2024-10-21

## 2024-10-16 RX ORDER — DIPHENHYDRAMINE HCL 25 MG
25 CAPSULE ORAL
OUTPATIENT
Start: 2024-10-21

## 2024-10-16 ASSESSMENT — PAIN SCALES - GENERAL: PAINLEVEL_OUTOF10: 0 - NO PAIN

## 2024-10-16 ASSESSMENT — PAIN - FUNCTIONAL ASSESSMENT
PAIN_FUNCTIONAL_ASSESSMENT: NO/DENIES PAIN
PAIN_FUNCTIONAL_ASSESSMENT: NO/DENIES PAIN

## 2024-10-18 ENCOUNTER — HOSPITAL ENCOUNTER (OUTPATIENT)
Dept: DIALYSIS | Facility: HOSPITAL | Age: 44
Setting detail: DIALYSIS SERIES
Discharge: HOME | End: 2024-10-18
Payer: COMMERCIAL

## 2024-10-18 VITALS — HEART RATE: 76 BPM | TEMPERATURE: 96.8 F

## 2024-10-18 DIAGNOSIS — N18.6 ESRD (END STAGE RENAL DISEASE) ON DIALYSIS (MULTI): Primary | ICD-10-CM

## 2024-10-18 DIAGNOSIS — Z99.2 ESRD (END STAGE RENAL DISEASE) ON DIALYSIS (MULTI): Primary | ICD-10-CM

## 2024-10-18 LAB
ALBUMIN SERPL BCP-MCNC: 3.6 G/DL (ref 3.4–5)
ANION GAP SERPL CALC-SCNC: 14 MMOL/L (ref 10–20)
BUN SERPL-MCNC: 37 MG/DL (ref 6–23)
CALCIUM SERPL-MCNC: 8.5 MG/DL (ref 8.6–10.6)
CHLORIDE SERPL-SCNC: 104 MMOL/L (ref 98–107)
CO2 SERPL-SCNC: 23 MMOL/L (ref 21–32)
CREAT SERPL-MCNC: 7.19 MG/DL (ref 0.5–1.05)
EGFRCR SERPLBLD CKD-EPI 2021: 7 ML/MIN/1.73M*2
ERYTHROCYTE [DISTWIDTH] IN BLOOD BY AUTOMATED COUNT: 13.8 % (ref 11.5–14.5)
GLUCOSE SERPL-MCNC: 88 MG/DL (ref 74–99)
HCT VFR BLD AUTO: 38.2 % (ref 36–46)
HGB BLD-MCNC: 11.8 G/DL (ref 12–16)
MAGNESIUM SERPL-MCNC: 2.18 MG/DL (ref 1.6–2.4)
MCH RBC QN AUTO: 29.6 PG (ref 26–34)
MCHC RBC AUTO-ENTMCNC: 30.9 G/DL (ref 32–36)
MCV RBC AUTO: 96 FL (ref 80–100)
NRBC BLD-RTO: 0 /100 WBCS (ref 0–0)
PHOSPHATE SERPL-MCNC: 4 MG/DL (ref 2.5–4.9)
PLATELET # BLD AUTO: 191 X10*3/UL (ref 150–450)
POTASSIUM SERPL-SCNC: 4.7 MMOL/L (ref 3.5–5.3)
RBC # BLD AUTO: 3.98 X10*6/UL (ref 4–5.2)
SODIUM SERPL-SCNC: 136 MMOL/L (ref 136–145)
TACROLIMUS BLD-MCNC: 11.3 NG/ML
WBC # BLD AUTO: 5.9 X10*3/UL (ref 4.4–11.3)

## 2024-10-18 PROCEDURE — 90937 HEMODIALYSIS REPEATED EVAL: CPT | Mod: V7

## 2024-10-18 PROCEDURE — 36415 COLL VENOUS BLD VENIPUNCTURE: CPT | Mod: V7 | Performed by: INTERNAL MEDICINE

## 2024-10-18 PROCEDURE — 80197 ASSAY OF TACROLIMUS: CPT | Mod: V7 | Performed by: INTERNAL MEDICINE

## 2024-10-18 PROCEDURE — 80069 RENAL FUNCTION PANEL: CPT | Mod: V7 | Performed by: INTERNAL MEDICINE

## 2024-10-18 PROCEDURE — 85027 COMPLETE CBC AUTOMATED: CPT | Mod: V7 | Performed by: INTERNAL MEDICINE

## 2024-10-18 PROCEDURE — 83735 ASSAY OF MAGNESIUM: CPT | Mod: V7 | Performed by: INTERNAL MEDICINE

## 2024-10-18 RX ORDER — DIPHENHYDRAMINE HCL 25 MG
25 CAPSULE ORAL
Status: CANCELLED | OUTPATIENT
Start: 2024-10-21

## 2024-10-18 RX ORDER — LIDOCAINE AND PRILOCAINE 25; 25 MG/G; MG/G
1 CREAM TOPICAL AS NEEDED
Status: CANCELLED | OUTPATIENT
Start: 2024-10-21

## 2024-10-18 RX ORDER — ONDANSETRON HYDROCHLORIDE 2 MG/ML
4 INJECTION, SOLUTION INTRAVENOUS
Status: CANCELLED | OUTPATIENT
Start: 2024-10-21

## 2024-10-18 RX ORDER — ACETAMINOPHEN 325 MG/1
650 TABLET ORAL EVERY 4 HOURS PRN
Status: CANCELLED | OUTPATIENT
Start: 2024-10-21

## 2024-10-18 ASSESSMENT — PAIN SCALES - GENERAL: PAINLEVEL_OUTOF10: 0 - NO PAIN

## 2024-10-18 ASSESSMENT — PAIN - FUNCTIONAL ASSESSMENT: PAIN_FUNCTIONAL_ASSESSMENT: NO/DENIES PAIN

## 2024-10-21 ENCOUNTER — HOSPITAL ENCOUNTER (OUTPATIENT)
Dept: DIALYSIS | Facility: HOSPITAL | Age: 44
Setting detail: DIALYSIS SERIES
Discharge: HOME | End: 2024-10-21
Payer: COMMERCIAL

## 2024-10-21 VITALS — TEMPERATURE: 96.8 F | HEART RATE: 76 BPM

## 2024-10-21 DIAGNOSIS — N18.6 ESRD (END STAGE RENAL DISEASE) ON DIALYSIS (MULTI): Primary | ICD-10-CM

## 2024-10-21 DIAGNOSIS — Z99.2 ESRD (END STAGE RENAL DISEASE) ON DIALYSIS (MULTI): Primary | ICD-10-CM

## 2024-10-21 LAB
ALBUMIN SERPL BCP-MCNC: 3.4 G/DL (ref 3.4–5)
ANION GAP SERPL CALC-SCNC: 16 MMOL/L (ref 10–20)
BUN SERPL-MCNC: 45 MG/DL (ref 6–23)
CALCIUM SERPL-MCNC: 8.4 MG/DL (ref 8.6–10.6)
CHLORIDE SERPL-SCNC: 105 MMOL/L (ref 98–107)
CO2 SERPL-SCNC: 22 MMOL/L (ref 21–32)
CREAT SERPL-MCNC: 9.35 MG/DL (ref 0.5–1.05)
EGFRCR SERPLBLD CKD-EPI 2021: 5 ML/MIN/1.73M*2
ERYTHROCYTE [DISTWIDTH] IN BLOOD BY AUTOMATED COUNT: 13.8 % (ref 11.5–14.5)
GLUCOSE SERPL-MCNC: 91 MG/DL (ref 74–99)
HCT VFR BLD AUTO: 37.2 % (ref 36–46)
HGB BLD-MCNC: 11.5 G/DL (ref 12–16)
MAGNESIUM SERPL-MCNC: 2.37 MG/DL (ref 1.6–2.4)
MCH RBC QN AUTO: 29.9 PG (ref 26–34)
MCHC RBC AUTO-ENTMCNC: 30.9 G/DL (ref 32–36)
MCV RBC AUTO: 97 FL (ref 80–100)
NRBC BLD-RTO: 0 /100 WBCS (ref 0–0)
PHOSPHATE SERPL-MCNC: 5.4 MG/DL (ref 2.5–4.9)
PLATELET # BLD AUTO: 190 X10*3/UL (ref 150–450)
POTASSIUM SERPL-SCNC: 5.1 MMOL/L (ref 3.5–5.3)
RBC # BLD AUTO: 3.84 X10*6/UL (ref 4–5.2)
SODIUM SERPL-SCNC: 138 MMOL/L (ref 136–145)
TACROLIMUS BLD-MCNC: 7.9 NG/ML
WBC # BLD AUTO: 6.5 X10*3/UL (ref 4.4–11.3)

## 2024-10-21 PROCEDURE — 90937 HEMODIALYSIS REPEATED EVAL: CPT | Mod: V7

## 2024-10-21 PROCEDURE — 83735 ASSAY OF MAGNESIUM: CPT | Mod: V7 | Performed by: INTERNAL MEDICINE

## 2024-10-21 PROCEDURE — 85027 COMPLETE CBC AUTOMATED: CPT | Mod: V7 | Performed by: INTERNAL MEDICINE

## 2024-10-21 PROCEDURE — 36415 COLL VENOUS BLD VENIPUNCTURE: CPT | Mod: V7 | Performed by: INTERNAL MEDICINE

## 2024-10-21 PROCEDURE — 80069 RENAL FUNCTION PANEL: CPT | Mod: V7 | Performed by: INTERNAL MEDICINE

## 2024-10-21 PROCEDURE — 80197 ASSAY OF TACROLIMUS: CPT | Mod: V7 | Performed by: INTERNAL MEDICINE

## 2024-10-21 RX ORDER — ONDANSETRON HYDROCHLORIDE 2 MG/ML
4 INJECTION, SOLUTION INTRAVENOUS
Status: CANCELLED | OUTPATIENT
Start: 2024-10-28

## 2024-10-21 RX ORDER — LIDOCAINE AND PRILOCAINE 25; 25 MG/G; MG/G
1 CREAM TOPICAL AS NEEDED
Status: CANCELLED | OUTPATIENT
Start: 2024-10-28

## 2024-10-21 RX ORDER — DIPHENHYDRAMINE HCL 25 MG
25 CAPSULE ORAL
Status: CANCELLED | OUTPATIENT
Start: 2024-10-28

## 2024-10-21 RX ORDER — ACETAMINOPHEN 325 MG/1
650 TABLET ORAL EVERY 4 HOURS PRN
Status: CANCELLED | OUTPATIENT
Start: 2024-10-28

## 2024-10-21 ASSESSMENT — PAIN SCALES - GENERAL: PAINLEVEL_OUTOF10: 0 - NO PAIN

## 2024-10-21 ASSESSMENT — PAIN - FUNCTIONAL ASSESSMENT: PAIN_FUNCTIONAL_ASSESSMENT: NO/DENIES PAIN

## 2024-10-22 ENCOUNTER — APPOINTMENT (OUTPATIENT)
Dept: RADIOLOGY | Facility: CLINIC | Age: 44
End: 2024-10-22
Payer: COMMERCIAL

## 2024-10-23 ENCOUNTER — HOSPITAL ENCOUNTER (OUTPATIENT)
Dept: DIALYSIS | Facility: HOSPITAL | Age: 44
Setting detail: DIALYSIS SERIES
Discharge: HOME | End: 2024-10-23
Payer: COMMERCIAL

## 2024-10-23 VITALS — HEART RATE: 84 BPM | TEMPERATURE: 97.3 F

## 2024-10-23 DIAGNOSIS — N18.6 ESRD (END STAGE RENAL DISEASE) ON DIALYSIS (MULTI): Primary | ICD-10-CM

## 2024-10-23 DIAGNOSIS — Z99.2 ESRD (END STAGE RENAL DISEASE) ON DIALYSIS (MULTI): Primary | ICD-10-CM

## 2024-10-23 LAB
ALBUMIN SERPL BCP-MCNC: 3.4 G/DL (ref 3.4–5)
ANION GAP SERPL CALC-SCNC: 17 MMOL/L (ref 10–20)
BUN SERPL-MCNC: 36 MG/DL (ref 6–23)
CALCIUM SERPL-MCNC: 8.1 MG/DL (ref 8.6–10.6)
CHLORIDE SERPL-SCNC: 103 MMOL/L (ref 98–107)
CO2 SERPL-SCNC: 24 MMOL/L (ref 21–32)
CREAT SERPL-MCNC: 8.06 MG/DL (ref 0.5–1.05)
EGFRCR SERPLBLD CKD-EPI 2021: 6 ML/MIN/1.73M*2
ERYTHROCYTE [DISTWIDTH] IN BLOOD BY AUTOMATED COUNT: 13.5 % (ref 11.5–14.5)
GLUCOSE SERPL-MCNC: 71 MG/DL (ref 74–99)
HCT VFR BLD AUTO: 35.9 % (ref 36–46)
HGB BLD-MCNC: 11 G/DL (ref 12–16)
MAGNESIUM SERPL-MCNC: 2.34 MG/DL (ref 1.6–2.4)
MCH RBC QN AUTO: 29.6 PG (ref 26–34)
MCHC RBC AUTO-ENTMCNC: 30.6 G/DL (ref 32–36)
MCV RBC AUTO: 97 FL (ref 80–100)
NRBC BLD-RTO: 0 /100 WBCS (ref 0–0)
PHOSPHATE SERPL-MCNC: 5.2 MG/DL (ref 2.5–4.9)
PLATELET # BLD AUTO: 164 X10*3/UL (ref 150–450)
POTASSIUM SERPL-SCNC: 4.8 MMOL/L (ref 3.5–5.3)
RBC # BLD AUTO: 3.71 X10*6/UL (ref 4–5.2)
SODIUM SERPL-SCNC: 139 MMOL/L (ref 136–145)
TACROLIMUS BLD-MCNC: 2.6 NG/ML
WBC # BLD AUTO: 6.4 X10*3/UL (ref 4.4–11.3)

## 2024-10-23 PROCEDURE — 82947 ASSAY GLUCOSE BLOOD QUANT: CPT | Mod: V7 | Performed by: INTERNAL MEDICINE

## 2024-10-23 PROCEDURE — 36415 COLL VENOUS BLD VENIPUNCTURE: CPT | Mod: V7 | Performed by: INTERNAL MEDICINE

## 2024-10-23 PROCEDURE — 85027 COMPLETE CBC AUTOMATED: CPT | Mod: V7 | Performed by: INTERNAL MEDICINE

## 2024-10-23 PROCEDURE — 83735 ASSAY OF MAGNESIUM: CPT | Mod: V7 | Performed by: INTERNAL MEDICINE

## 2024-10-23 PROCEDURE — 80197 ASSAY OF TACROLIMUS: CPT | Mod: V7 | Performed by: INTERNAL MEDICINE

## 2024-10-23 RX ORDER — ACETAMINOPHEN 325 MG/1
650 TABLET ORAL EVERY 4 HOURS PRN
OUTPATIENT
Start: 2024-10-28

## 2024-10-23 RX ORDER — DIPHENHYDRAMINE HCL 25 MG
25 CAPSULE ORAL
OUTPATIENT
Start: 2024-10-28

## 2024-10-23 RX ORDER — LIDOCAINE AND PRILOCAINE 25; 25 MG/G; MG/G
1 CREAM TOPICAL AS NEEDED
Status: CANCELLED | OUTPATIENT
Start: 2024-10-28

## 2024-10-23 RX ORDER — ONDANSETRON HYDROCHLORIDE 2 MG/ML
4 INJECTION, SOLUTION INTRAVENOUS
OUTPATIENT
Start: 2024-10-28

## 2024-10-23 ASSESSMENT — PAIN SCALES - GENERAL: PAINLEVEL_OUTOF10: 0 - NO PAIN

## 2024-10-23 ASSESSMENT — PAIN - FUNCTIONAL ASSESSMENT: PAIN_FUNCTIONAL_ASSESSMENT: NO/DENIES PAIN

## 2024-10-25 ENCOUNTER — HOSPITAL ENCOUNTER (OUTPATIENT)
Dept: DIALYSIS | Facility: HOSPITAL | Age: 44
Setting detail: DIALYSIS SERIES
Discharge: HOME | End: 2024-10-25
Payer: COMMERCIAL

## 2024-10-25 VITALS — HEART RATE: 97 BPM

## 2024-10-25 DIAGNOSIS — N18.6 ESRD (END STAGE RENAL DISEASE) ON DIALYSIS (MULTI): Primary | ICD-10-CM

## 2024-10-25 DIAGNOSIS — Z99.2 ESRD (END STAGE RENAL DISEASE) ON DIALYSIS (MULTI): Primary | ICD-10-CM

## 2024-10-25 LAB
ALBUMIN SERPL BCP-MCNC: 3.4 G/DL (ref 3.4–5)
ANION GAP SERPL CALC-SCNC: 15 MMOL/L (ref 10–20)
BUN SERPL-MCNC: 31 MG/DL (ref 6–23)
CALCIUM SERPL-MCNC: 8.2 MG/DL (ref 8.6–10.6)
CHLORIDE SERPL-SCNC: 103 MMOL/L (ref 98–107)
CO2 SERPL-SCNC: 26 MMOL/L (ref 21–32)
CREAT SERPL-MCNC: 8.47 MG/DL (ref 0.5–1.05)
EGFRCR SERPLBLD CKD-EPI 2021: 5 ML/MIN/1.73M*2
ERYTHROCYTE [DISTWIDTH] IN BLOOD BY AUTOMATED COUNT: 13.2 % (ref 11.5–14.5)
GLUCOSE SERPL-MCNC: 98 MG/DL (ref 74–99)
HCT VFR BLD AUTO: 35.5 % (ref 36–46)
HGB BLD-MCNC: 10.9 G/DL (ref 12–16)
MAGNESIUM SERPL-MCNC: 2.26 MG/DL (ref 1.6–2.4)
MCH RBC QN AUTO: 29.5 PG (ref 26–34)
MCHC RBC AUTO-ENTMCNC: 30.7 G/DL (ref 32–36)
MCV RBC AUTO: 96 FL (ref 80–100)
NRBC BLD-RTO: 0 /100 WBCS (ref 0–0)
PHOSPHATE SERPL-MCNC: 3.5 MG/DL (ref 2.5–4.9)
PLATELET # BLD AUTO: 125 X10*3/UL (ref 150–450)
POTASSIUM SERPL-SCNC: 4.7 MMOL/L (ref 3.5–5.3)
RBC # BLD AUTO: 3.69 X10*6/UL (ref 4–5.2)
SODIUM SERPL-SCNC: 139 MMOL/L (ref 136–145)
TACROLIMUS BLD-MCNC: <2 NG/ML
WBC # BLD AUTO: 5 X10*3/UL (ref 4.4–11.3)

## 2024-10-25 PROCEDURE — 90937 HEMODIALYSIS REPEATED EVAL: CPT | Mod: V7

## 2024-10-25 PROCEDURE — 80197 ASSAY OF TACROLIMUS: CPT | Mod: V7 | Performed by: INTERNAL MEDICINE

## 2024-10-25 PROCEDURE — 83735 ASSAY OF MAGNESIUM: CPT | Mod: V7 | Performed by: INTERNAL MEDICINE

## 2024-10-25 PROCEDURE — 36415 COLL VENOUS BLD VENIPUNCTURE: CPT | Mod: V7 | Performed by: INTERNAL MEDICINE

## 2024-10-25 PROCEDURE — 2500000001 HC RX 250 WO HCPCS SELF ADMINISTERED DRUGS (ALT 637 FOR MEDICARE OP): Performed by: INTERNAL MEDICINE

## 2024-10-25 PROCEDURE — 80069 RENAL FUNCTION PANEL: CPT | Mod: V7 | Performed by: INTERNAL MEDICINE

## 2024-10-25 PROCEDURE — 85027 COMPLETE CBC AUTOMATED: CPT | Mod: V7 | Performed by: INTERNAL MEDICINE

## 2024-10-25 RX ORDER — ACETAMINOPHEN 325 MG/1
650 TABLET ORAL EVERY 4 HOURS PRN
Status: CANCELLED | OUTPATIENT
Start: 2024-10-28

## 2024-10-25 RX ORDER — LIDOCAINE AND PRILOCAINE 25; 25 MG/G; MG/G
1 CREAM TOPICAL AS NEEDED
Status: CANCELLED | OUTPATIENT
Start: 2024-10-28

## 2024-10-25 RX ORDER — LIDOCAINE AND PRILOCAINE 25; 25 MG/G; MG/G
1 CREAM TOPICAL AS NEEDED
Status: DISCONTINUED | OUTPATIENT
Start: 2024-10-25 | End: 2024-10-26 | Stop reason: HOSPADM

## 2024-10-25 RX ORDER — DIPHENHYDRAMINE HCL 25 MG
25 CAPSULE ORAL
Status: CANCELLED | OUTPATIENT
Start: 2024-10-28

## 2024-10-25 RX ORDER — ONDANSETRON HYDROCHLORIDE 2 MG/ML
4 INJECTION, SOLUTION INTRAVENOUS
Status: CANCELLED | OUTPATIENT
Start: 2024-10-28

## 2024-10-25 ASSESSMENT — PAIN - FUNCTIONAL ASSESSMENT: PAIN_FUNCTIONAL_ASSESSMENT: NO/DENIES PAIN

## 2024-10-25 ASSESSMENT — PAIN SCALES - GENERAL
PAINLEVEL_OUTOF10: 5 - MODERATE PAIN
PAINLEVEL_OUTOF10: 0 - NO PAIN

## 2024-10-25 ASSESSMENT — PAIN DESCRIPTION - LOCATION: LOCATION: HAND

## 2024-10-25 ASSESSMENT — PAIN SCALES - WONG BAKER: WONGBAKER_NUMERICALRESPONSE: HURTS LITTLE MORE

## 2024-10-25 ASSESSMENT — PAIN DESCRIPTION - DESCRIPTORS: DESCRIPTORS: SORE

## 2024-10-28 ENCOUNTER — HOSPITAL ENCOUNTER (OUTPATIENT)
Dept: DIALYSIS | Facility: HOSPITAL | Age: 44
Setting detail: DIALYSIS SERIES
Discharge: HOME | End: 2024-10-28
Payer: COMMERCIAL

## 2024-10-28 VITALS — TEMPERATURE: 97.2 F | HEART RATE: 103 BPM

## 2024-10-28 DIAGNOSIS — Z99.2 ESRD (END STAGE RENAL DISEASE) ON DIALYSIS (MULTI): Primary | ICD-10-CM

## 2024-10-28 DIAGNOSIS — N18.6 ESRD (END STAGE RENAL DISEASE) ON DIALYSIS (MULTI): Primary | ICD-10-CM

## 2024-10-28 LAB
ALBUMIN SERPL BCP-MCNC: 3.5 G/DL (ref 3.4–5)
ANION GAP SERPL CALC-SCNC: 15 MMOL/L (ref 10–20)
BUN SERPL-MCNC: 50 MG/DL (ref 6–23)
CALCIUM SERPL-MCNC: 8.2 MG/DL (ref 8.6–10.6)
CHLORIDE SERPL-SCNC: 105 MMOL/L (ref 98–107)
CO2 SERPL-SCNC: 24 MMOL/L (ref 21–32)
CREAT SERPL-MCNC: 10.66 MG/DL (ref 0.5–1.05)
EGFRCR SERPLBLD CKD-EPI 2021: 4 ML/MIN/1.73M*2
ERYTHROCYTE [DISTWIDTH] IN BLOOD BY AUTOMATED COUNT: 13.2 % (ref 11.5–14.5)
GLUCOSE SERPL-MCNC: 97 MG/DL (ref 74–99)
HCT VFR BLD AUTO: 35.8 % (ref 36–46)
HGB BLD-MCNC: 11 G/DL (ref 12–16)
MAGNESIUM SERPL-MCNC: 2.36 MG/DL (ref 1.6–2.4)
MCH RBC QN AUTO: 29.3 PG (ref 26–34)
MCHC RBC AUTO-ENTMCNC: 30.7 G/DL (ref 32–36)
MCV RBC AUTO: 96 FL (ref 80–100)
NRBC BLD-RTO: 0 /100 WBCS (ref 0–0)
PHOSPHATE SERPL-MCNC: 3.8 MG/DL (ref 2.5–4.9)
PLATELET # BLD AUTO: 100 X10*3/UL (ref 150–450)
POTASSIUM SERPL-SCNC: 4.5 MMOL/L (ref 3.5–5.3)
RBC # BLD AUTO: 3.75 X10*6/UL (ref 4–5.2)
SODIUM SERPL-SCNC: 139 MMOL/L (ref 136–145)
TACROLIMUS BLD-MCNC: <2 NG/ML
WBC # BLD AUTO: 5.4 X10*3/UL (ref 4.4–11.3)

## 2024-10-28 PROCEDURE — 80197 ASSAY OF TACROLIMUS: CPT | Mod: V7 | Performed by: INTERNAL MEDICINE

## 2024-10-28 PROCEDURE — 80069 RENAL FUNCTION PANEL: CPT | Mod: V7 | Performed by: INTERNAL MEDICINE

## 2024-10-28 PROCEDURE — 36415 COLL VENOUS BLD VENIPUNCTURE: CPT | Mod: V7 | Performed by: INTERNAL MEDICINE

## 2024-10-28 PROCEDURE — 90937 HEMODIALYSIS REPEATED EVAL: CPT

## 2024-10-28 PROCEDURE — 83735 ASSAY OF MAGNESIUM: CPT | Mod: V7 | Performed by: INTERNAL MEDICINE

## 2024-10-28 PROCEDURE — 85027 COMPLETE CBC AUTOMATED: CPT | Mod: V7 | Performed by: INTERNAL MEDICINE

## 2024-10-28 RX ORDER — LIDOCAINE AND PRILOCAINE 25; 25 MG/G; MG/G
1 CREAM TOPICAL AS NEEDED
Status: CANCELLED | OUTPATIENT
Start: 2024-11-04

## 2024-10-28 RX ORDER — ONDANSETRON HYDROCHLORIDE 2 MG/ML
4 INJECTION, SOLUTION INTRAVENOUS
Status: CANCELLED | OUTPATIENT
Start: 2024-11-04

## 2024-10-28 RX ORDER — DIPHENHYDRAMINE HCL 25 MG
25 CAPSULE ORAL
Status: CANCELLED | OUTPATIENT
Start: 2024-11-04

## 2024-10-28 RX ORDER — ACETAMINOPHEN 325 MG/1
650 TABLET ORAL EVERY 4 HOURS PRN
Status: CANCELLED | OUTPATIENT
Start: 2024-11-04

## 2024-10-28 ASSESSMENT — PAIN - FUNCTIONAL ASSESSMENT: PAIN_FUNCTIONAL_ASSESSMENT: NO/DENIES PAIN

## 2024-10-30 ENCOUNTER — HOSPITAL ENCOUNTER (OUTPATIENT)
Dept: DIALYSIS | Facility: HOSPITAL | Age: 44
Setting detail: DIALYSIS SERIES
Discharge: HOME | End: 2024-10-30
Payer: COMMERCIAL

## 2024-10-30 VITALS — TEMPERATURE: 98.6 F | HEART RATE: 115 BPM

## 2024-10-30 DIAGNOSIS — Z99.2 ESRD (END STAGE RENAL DISEASE) ON DIALYSIS (MULTI): Primary | ICD-10-CM

## 2024-10-30 DIAGNOSIS — N18.6 ESRD (END STAGE RENAL DISEASE) ON DIALYSIS (MULTI): Primary | ICD-10-CM

## 2024-10-30 LAB
ALBUMIN SERPL BCP-MCNC: 3.4 G/DL (ref 3.4–5)
ANION GAP SERPL CALC-SCNC: 14 MMOL/L (ref 10–20)
BUN SERPL-MCNC: 48 MG/DL (ref 6–23)
CALCIUM SERPL-MCNC: 8 MG/DL (ref 8.6–10.6)
CHLORIDE SERPL-SCNC: 101 MMOL/L (ref 98–107)
CO2 SERPL-SCNC: 28 MMOL/L (ref 21–32)
CREAT SERPL-MCNC: 9.76 MG/DL (ref 0.5–1.05)
EGFRCR SERPLBLD CKD-EPI 2021: 5 ML/MIN/1.73M*2
ERYTHROCYTE [DISTWIDTH] IN BLOOD BY AUTOMATED COUNT: 13.3 % (ref 11.5–14.5)
GLUCOSE SERPL-MCNC: 104 MG/DL (ref 74–99)
HBV CORE AB SER QL: REACTIVE
HBV SURFACE AB SER-ACNC: 23.2 MIU/ML
HBV SURFACE AG SERPL QL IA: NONREACTIVE
HCT VFR BLD AUTO: 30.5 % (ref 36–46)
HGB BLD-MCNC: 9.5 G/DL (ref 12–16)
MAGNESIUM SERPL-MCNC: 2.05 MG/DL (ref 1.6–2.4)
MCH RBC QN AUTO: 29.3 PG (ref 26–34)
MCHC RBC AUTO-ENTMCNC: 31.1 G/DL (ref 32–36)
MCV RBC AUTO: 94 FL (ref 80–100)
NRBC BLD-RTO: 0 /100 WBCS (ref 0–0)
PHOSPHATE SERPL-MCNC: 2.9 MG/DL (ref 2.5–4.9)
PLATELET # BLD AUTO: 75 X10*3/UL (ref 150–450)
POTASSIUM SERPL-SCNC: 4.5 MMOL/L (ref 3.5–5.3)
RBC # BLD AUTO: 3.24 X10*6/UL (ref 4–5.2)
SODIUM SERPL-SCNC: 138 MMOL/L (ref 136–145)
TACROLIMUS BLD-MCNC: <2 NG/ML
WBC # BLD AUTO: 4.7 X10*3/UL (ref 4.4–11.3)

## 2024-10-30 PROCEDURE — 86704 HEP B CORE ANTIBODY TOTAL: CPT | Mod: V7 | Performed by: INTERNAL MEDICINE

## 2024-10-30 PROCEDURE — 36415 COLL VENOUS BLD VENIPUNCTURE: CPT | Mod: V7 | Performed by: INTERNAL MEDICINE

## 2024-10-30 PROCEDURE — 86706 HEP B SURFACE ANTIBODY: CPT | Mod: V7 | Performed by: INTERNAL MEDICINE

## 2024-10-30 PROCEDURE — 80197 ASSAY OF TACROLIMUS: CPT | Mod: V7 | Performed by: INTERNAL MEDICINE

## 2024-10-30 PROCEDURE — 85027 COMPLETE CBC AUTOMATED: CPT | Mod: V7 | Performed by: INTERNAL MEDICINE

## 2024-10-30 PROCEDURE — 80069 RENAL FUNCTION PANEL: CPT | Mod: V7 | Performed by: INTERNAL MEDICINE

## 2024-10-30 PROCEDURE — 83735 ASSAY OF MAGNESIUM: CPT | Mod: V7 | Performed by: INTERNAL MEDICINE

## 2024-10-30 PROCEDURE — 90937 HEMODIALYSIS REPEATED EVAL: CPT | Mod: V7

## 2024-10-30 PROCEDURE — 87340 HEPATITIS B SURFACE AG IA: CPT | Mod: V7 | Performed by: INTERNAL MEDICINE

## 2024-10-30 RX ORDER — ONDANSETRON HYDROCHLORIDE 2 MG/ML
4 INJECTION, SOLUTION INTRAVENOUS
OUTPATIENT
Start: 2024-11-04

## 2024-10-30 RX ORDER — DIPHENHYDRAMINE HCL 25 MG
25 CAPSULE ORAL
OUTPATIENT
Start: 2024-11-04

## 2024-10-30 RX ORDER — LIDOCAINE AND PRILOCAINE 25; 25 MG/G; MG/G
1 CREAM TOPICAL AS NEEDED
OUTPATIENT
Start: 2024-11-04

## 2024-10-30 RX ORDER — ACETAMINOPHEN 325 MG/1
650 TABLET ORAL EVERY 4 HOURS PRN
OUTPATIENT
Start: 2024-11-04

## 2024-10-30 ASSESSMENT — PAIN DESCRIPTION - DESCRIPTORS: DESCRIPTORS: HEAVINESS

## 2024-10-30 ASSESSMENT — PAIN SCALES - GENERAL: PAINLEVEL_OUTOF10: 0 - NO PAIN

## 2024-10-30 ASSESSMENT — PAIN - FUNCTIONAL ASSESSMENT: PAIN_FUNCTIONAL_ASSESSMENT: NO/DENIES PAIN

## 2024-11-01 ENCOUNTER — HOSPITAL ENCOUNTER (OUTPATIENT)
Dept: DIALYSIS | Facility: HOSPITAL | Age: 44
Setting detail: DIALYSIS SERIES
Discharge: HOME | End: 2024-11-01
Payer: COMMERCIAL

## 2024-11-01 VITALS — TEMPERATURE: 95.7 F | HEART RATE: 73 BPM

## 2024-11-01 DIAGNOSIS — Z94.0 KIDNEY REPLACED BY TRANSPLANT (HHS-HCC): ICD-10-CM

## 2024-11-01 DIAGNOSIS — N18.31 STAGE 3A CHRONIC KIDNEY DISEASE (MULTI): ICD-10-CM

## 2024-11-01 DIAGNOSIS — Z99.2 ESRD (END STAGE RENAL DISEASE) ON DIALYSIS (MULTI): Primary | ICD-10-CM

## 2024-11-01 DIAGNOSIS — D70.8 OTHER NEUTROPENIA (CMS-HCC): ICD-10-CM

## 2024-11-01 DIAGNOSIS — N18.6 ESRD (END STAGE RENAL DISEASE) ON DIALYSIS (MULTI): Primary | ICD-10-CM

## 2024-11-01 LAB
25(OH)D3 SERPL-MCNC: 66 NG/ML (ref 30–100)
ALBUMIN SERPL BCP-MCNC: 3.3 G/DL (ref 3.4–5)
ANION GAP SERPL CALC-SCNC: 15 MMOL/L (ref 10–20)
BASOPHILS # BLD AUTO: 0.05 X10*3/UL (ref 0–0.1)
BASOPHILS NFR BLD AUTO: 1.2 %
BUN SERPL-MCNC: 56 MG/DL (ref 6–23)
CALCIUM SERPL-MCNC: 8.2 MG/DL (ref 8.6–10.6)
CHLORIDE SERPL-SCNC: 98 MMOL/L (ref 98–107)
CO2 SERPL-SCNC: 26 MMOL/L (ref 21–32)
CREAT SERPL-MCNC: 9.58 MG/DL (ref 0.5–1.05)
EGFRCR SERPLBLD CKD-EPI 2021: 5 ML/MIN/1.73M*2
EOSINOPHIL # BLD AUTO: 0.36 X10*3/UL (ref 0–0.7)
EOSINOPHIL NFR BLD AUTO: 8.7 %
ERYTHROCYTE [DISTWIDTH] IN BLOOD BY AUTOMATED COUNT: 13.1 % (ref 11.5–14.5)
GLUCOSE SERPL-MCNC: 108 MG/DL (ref 74–99)
HCT VFR BLD AUTO: 30.4 % (ref 36–46)
HGB BLD-MCNC: 9.6 G/DL (ref 12–16)
IMM GRANULOCYTES # BLD AUTO: 0.07 X10*3/UL (ref 0–0.7)
IMM GRANULOCYTES NFR BLD AUTO: 1.7 % (ref 0–0.9)
LYMPHOCYTES # BLD AUTO: 0.49 X10*3/UL (ref 1.2–4.8)
LYMPHOCYTES NFR BLD AUTO: 11.8 %
MAGNESIUM SERPL-MCNC: 2.17 MG/DL (ref 1.6–2.4)
MCH RBC QN AUTO: 29.2 PG (ref 26–34)
MCHC RBC AUTO-ENTMCNC: 31.6 G/DL (ref 32–36)
MCV RBC AUTO: 92 FL (ref 80–100)
MONOCYTES # BLD AUTO: 0.67 X10*3/UL (ref 0.1–1)
MONOCYTES NFR BLD AUTO: 16.2 %
NEUTROPHILS # BLD AUTO: 2.5 X10*3/UL (ref 1.2–7.7)
NEUTROPHILS NFR BLD AUTO: 60.4 %
NRBC BLD-RTO: 0 /100 WBCS (ref 0–0)
PHOSPHATE SERPL-MCNC: 3.2 MG/DL (ref 2.5–4.9)
PLATELET # BLD AUTO: 87 X10*3/UL (ref 150–450)
POTASSIUM SERPL-SCNC: 4.3 MMOL/L (ref 3.5–5.3)
RBC # BLD AUTO: 3.29 X10*6/UL (ref 4–5.2)
SODIUM SERPL-SCNC: 135 MMOL/L (ref 136–145)
TACROLIMUS BLD-MCNC: <2 NG/ML
WBC # BLD AUTO: 4.1 X10*3/UL (ref 4.4–11.3)

## 2024-11-01 PROCEDURE — 80069 RENAL FUNCTION PANEL: CPT | Performed by: INTERNAL MEDICINE

## 2024-11-01 PROCEDURE — 82306 VITAMIN D 25 HYDROXY: CPT | Performed by: INTERNAL MEDICINE

## 2024-11-01 PROCEDURE — 90937 HEMODIALYSIS REPEATED EVAL: CPT

## 2024-11-01 PROCEDURE — 80197 ASSAY OF TACROLIMUS: CPT | Performed by: INTERNAL MEDICINE

## 2024-11-01 PROCEDURE — 83735 ASSAY OF MAGNESIUM: CPT | Performed by: INTERNAL MEDICINE

## 2024-11-01 PROCEDURE — 87799 DETECT AGENT NOS DNA QUANT: CPT | Performed by: INTERNAL MEDICINE

## 2024-11-01 PROCEDURE — 36415 COLL VENOUS BLD VENIPUNCTURE: CPT | Performed by: INTERNAL MEDICINE

## 2024-11-01 PROCEDURE — 85025 COMPLETE CBC W/AUTO DIFF WBC: CPT | Performed by: HOSPITALIST

## 2024-11-01 RX ORDER — DIPHENHYDRAMINE HCL 25 MG
25 CAPSULE ORAL
Status: CANCELLED | OUTPATIENT
Start: 2024-11-04

## 2024-11-01 RX ORDER — ONDANSETRON HYDROCHLORIDE 2 MG/ML
4 INJECTION, SOLUTION INTRAVENOUS
Status: CANCELLED | OUTPATIENT
Start: 2024-11-04

## 2024-11-01 RX ORDER — LIDOCAINE AND PRILOCAINE 25; 25 MG/G; MG/G
1 CREAM TOPICAL AS NEEDED
Status: CANCELLED | OUTPATIENT
Start: 2024-11-04

## 2024-11-01 RX ORDER — ACETAMINOPHEN 325 MG/1
650 TABLET ORAL EVERY 4 HOURS PRN
Status: CANCELLED | OUTPATIENT
Start: 2024-11-04

## 2024-11-01 ASSESSMENT — PAIN - FUNCTIONAL ASSESSMENT: PAIN_FUNCTIONAL_ASSESSMENT: NO/DENIES PAIN

## 2024-11-01 ASSESSMENT — PAIN SCALES - GENERAL: PAINLEVEL_OUTOF10: 0 - NO PAIN

## 2024-11-03 LAB
CMV DNA SERPL NAA+PROBE-LOG IU: NORMAL {LOG_IU}/ML
EBV DNA SPEC NAA+PROBE-LOG#: NORMAL {LOG_COPIES}/ML
LABORATORY COMMENT REPORT: NOT DETECTED
LABORATORY COMMENT REPORT: NOT DETECTED

## 2024-11-04 ENCOUNTER — HOSPITAL ENCOUNTER (OUTPATIENT)
Dept: DIALYSIS | Facility: HOSPITAL | Age: 44
Setting detail: DIALYSIS SERIES
Discharge: HOME | End: 2024-11-04
Payer: COMMERCIAL

## 2024-11-04 VITALS — TEMPERATURE: 97.4 F | HEART RATE: 67 BPM

## 2024-11-04 DIAGNOSIS — Z99.2 ESRD (END STAGE RENAL DISEASE) ON DIALYSIS (MULTI): Primary | ICD-10-CM

## 2024-11-04 DIAGNOSIS — N18.6 ESRD (END STAGE RENAL DISEASE) ON DIALYSIS (MULTI): Primary | ICD-10-CM

## 2024-11-04 LAB
ALBUMIN SERPL BCP-MCNC: 3.3 G/DL (ref 3.4–5)
ANION GAP SERPL CALC-SCNC: 18 MMOL/L (ref 10–20)
BUN SERPL-MCNC: 84 MG/DL (ref 6–23)
CALCIUM SERPL-MCNC: 8.4 MG/DL (ref 8.6–10.6)
CHLORIDE SERPL-SCNC: 97 MMOL/L (ref 98–107)
CO2 SERPL-SCNC: 24 MMOL/L (ref 21–32)
CREAT SERPL-MCNC: 12.78 MG/DL (ref 0.5–1.05)
EGFRCR SERPLBLD CKD-EPI 2021: 3 ML/MIN/1.73M*2
ERYTHROCYTE [DISTWIDTH] IN BLOOD BY AUTOMATED COUNT: 12.8 % (ref 11.5–14.5)
GLUCOSE SERPL-MCNC: 124 MG/DL (ref 74–99)
HCT VFR BLD AUTO: 29.2 % (ref 36–46)
HGB BLD-MCNC: 9.3 G/DL (ref 12–16)
MAGNESIUM SERPL-MCNC: 2.39 MG/DL (ref 1.6–2.4)
MCH RBC QN AUTO: 29.1 PG (ref 26–34)
MCHC RBC AUTO-ENTMCNC: 31.8 G/DL (ref 32–36)
MCV RBC AUTO: 91 FL (ref 80–100)
NRBC BLD-RTO: 0 /100 WBCS (ref 0–0)
PHOSPHATE SERPL-MCNC: 4.9 MG/DL (ref 2.5–4.9)
PLATELET # BLD AUTO: 144 X10*3/UL (ref 150–450)
POTASSIUM SERPL-SCNC: 4.5 MMOL/L (ref 3.5–5.3)
RBC # BLD AUTO: 3.2 X10*6/UL (ref 4–5.2)
SODIUM SERPL-SCNC: 134 MMOL/L (ref 136–145)
TACROLIMUS BLD-MCNC: 5.8 NG/ML
WBC # BLD AUTO: 3.9 X10*3/UL (ref 4.4–11.3)

## 2024-11-04 PROCEDURE — 80197 ASSAY OF TACROLIMUS: CPT | Performed by: INTERNAL MEDICINE

## 2024-11-04 PROCEDURE — 36415 COLL VENOUS BLD VENIPUNCTURE: CPT | Performed by: INTERNAL MEDICINE

## 2024-11-04 PROCEDURE — 83735 ASSAY OF MAGNESIUM: CPT | Performed by: INTERNAL MEDICINE

## 2024-11-04 PROCEDURE — 2500000004 HC RX 250 GENERAL PHARMACY W/ HCPCS (ALT 636 FOR OP/ED): Mod: JZ,JG,EC | Performed by: INTERNAL MEDICINE

## 2024-11-04 PROCEDURE — 90937 HEMODIALYSIS REPEATED EVAL: CPT

## 2024-11-04 PROCEDURE — 80069 RENAL FUNCTION PANEL: CPT | Performed by: INTERNAL MEDICINE

## 2024-11-04 PROCEDURE — 85027 COMPLETE CBC AUTOMATED: CPT | Performed by: INTERNAL MEDICINE

## 2024-11-04 RX ORDER — ONDANSETRON HYDROCHLORIDE 2 MG/ML
4 INJECTION, SOLUTION INTRAVENOUS
OUTPATIENT
Start: 2024-11-11

## 2024-11-04 RX ORDER — LIDOCAINE AND PRILOCAINE 25; 25 MG/G; MG/G
1 CREAM TOPICAL AS NEEDED
OUTPATIENT
Start: 2024-11-11

## 2024-11-04 RX ORDER — ACETAMINOPHEN 325 MG/1
650 TABLET ORAL EVERY 4 HOURS PRN
OUTPATIENT
Start: 2024-11-11

## 2024-11-04 RX ORDER — DIPHENHYDRAMINE HCL 25 MG
25 CAPSULE ORAL
OUTPATIENT
Start: 2024-11-11

## 2024-11-05 NOTE — PROGRESS NOTES
Social Work Note:    OSMAN spoke with Sheila on this date about the dialysis chair.  OSMAN sent more clinicals over since they didn't get the original clinicals.  OSMAN let Sheila know that the patient would prefer the TTS 11:15-12:45 start time at [facility].  OSMAN will wait to hear back about start times and dates.  OSMAN spoke with the patient's brother in law Danuta and let him know about the status.  OSMAN will continue to follow    THOMAS Aragon, MARIYAS

## 2024-11-06 ENCOUNTER — HOSPITAL ENCOUNTER (OUTPATIENT)
Dept: DIALYSIS | Facility: HOSPITAL | Age: 44
Setting detail: DIALYSIS SERIES
Discharge: HOME | End: 2024-11-06
Payer: COMMERCIAL

## 2024-11-06 ENCOUNTER — TELEPHONE (OUTPATIENT)
Dept: TRANSPLANT | Facility: HOSPITAL | Age: 44
End: 2024-11-06
Payer: COMMERCIAL

## 2024-11-06 VITALS — HEART RATE: 74 BPM | TEMPERATURE: 97.2 F

## 2024-11-06 DIAGNOSIS — Z99.2 ESRD (END STAGE RENAL DISEASE) ON DIALYSIS (MULTI): Primary | ICD-10-CM

## 2024-11-06 DIAGNOSIS — N18.6 ESRD (END STAGE RENAL DISEASE) ON DIALYSIS (MULTI): Primary | ICD-10-CM

## 2024-11-06 LAB
ALBUMIN SERPL BCP-MCNC: 3.4 G/DL (ref 3.4–5)
ANION GAP SERPL CALC-SCNC: 16 MMOL/L (ref 10–20)
BUN SERPL-MCNC: 58 MG/DL (ref 6–23)
CALCIUM SERPL-MCNC: 8.2 MG/DL (ref 8.6–10.6)
CHLORIDE SERPL-SCNC: 94 MMOL/L (ref 98–107)
CO2 SERPL-SCNC: 28 MMOL/L (ref 21–32)
CREAT SERPL-MCNC: 10.64 MG/DL (ref 0.5–1.05)
EGFRCR SERPLBLD CKD-EPI 2021: 4 ML/MIN/1.73M*2
ERYTHROCYTE [DISTWIDTH] IN BLOOD BY AUTOMATED COUNT: 12.9 % (ref 11.5–14.5)
GLUCOSE SERPL-MCNC: 117 MG/DL (ref 74–99)
HCT VFR BLD AUTO: 28.3 % (ref 36–46)
HGB BLD-MCNC: 9 G/DL (ref 12–16)
MAGNESIUM SERPL-MCNC: 2.22 MG/DL (ref 1.6–2.4)
MCH RBC QN AUTO: 28.9 PG (ref 26–34)
MCHC RBC AUTO-ENTMCNC: 31.8 G/DL (ref 32–36)
MCV RBC AUTO: 91 FL (ref 80–100)
NRBC BLD-RTO: 0 /100 WBCS (ref 0–0)
PHOSPHATE SERPL-MCNC: 4.3 MG/DL (ref 2.5–4.9)
PLATELET # BLD AUTO: 197 X10*3/UL (ref 150–450)
POTASSIUM SERPL-SCNC: 4.4 MMOL/L (ref 3.5–5.3)
RBC # BLD AUTO: 3.11 X10*6/UL (ref 4–5.2)
SODIUM SERPL-SCNC: 134 MMOL/L (ref 136–145)
TACROLIMUS BLD-MCNC: 6.1 NG/ML
WBC # BLD AUTO: 4.4 X10*3/UL (ref 4.4–11.3)

## 2024-11-06 PROCEDURE — 36415 COLL VENOUS BLD VENIPUNCTURE: CPT | Performed by: INTERNAL MEDICINE

## 2024-11-06 PROCEDURE — 80197 ASSAY OF TACROLIMUS: CPT | Mod: V7 | Performed by: INTERNAL MEDICINE

## 2024-11-06 PROCEDURE — 90937 HEMODIALYSIS REPEATED EVAL: CPT

## 2024-11-06 PROCEDURE — 85027 COMPLETE CBC AUTOMATED: CPT | Performed by: INTERNAL MEDICINE

## 2024-11-06 PROCEDURE — 80069 RENAL FUNCTION PANEL: CPT | Performed by: INTERNAL MEDICINE

## 2024-11-06 PROCEDURE — 83735 ASSAY OF MAGNESIUM: CPT | Performed by: INTERNAL MEDICINE

## 2024-11-06 RX ORDER — ACETAMINOPHEN 325 MG/1
650 TABLET ORAL EVERY 4 HOURS PRN
OUTPATIENT
Start: 2024-11-11

## 2024-11-06 RX ORDER — LIDOCAINE AND PRILOCAINE 25; 25 MG/G; MG/G
1 CREAM TOPICAL AS NEEDED
OUTPATIENT
Start: 2024-11-11

## 2024-11-06 RX ORDER — ONDANSETRON HYDROCHLORIDE 2 MG/ML
4 INJECTION, SOLUTION INTRAVENOUS
OUTPATIENT
Start: 2024-11-11

## 2024-11-06 RX ORDER — DIPHENHYDRAMINE HCL 25 MG
25 CAPSULE ORAL
OUTPATIENT
Start: 2024-11-11

## 2024-11-06 ASSESSMENT — PAIN - FUNCTIONAL ASSESSMENT: PAIN_FUNCTIONAL_ASSESSMENT: NO/DENIES PAIN

## 2024-11-06 ASSESSMENT — PAIN SCALES - GENERAL: PAINLEVEL_OUTOF10: 0 - NO PAIN

## 2024-11-06 NOTE — TELEPHONE ENCOUNTER
Patient scheduled for nephrology as requested by coordinator.   Location, date, and instructions given to patient.   [facility]  Date: 11/18/2024 Status: [facility]  Time: 8:00 AM    Arrive By:        Length: 20    Visit Type: DAISY KIDNEY PHYSICIAN [[id number]] Copay: $0.00  Provider: DAISY KIDNEY PHYSICIA    Advised patient that if any changes to appointment are needed, they must call the office so we are aware.     No further action needed

## 2024-11-06 NOTE — PROGRESS NOTES
Social Work Note:    OSMAN spoke with [facility] who reported that the patient has a chair at [facility] TTS at 10:30am.  OSMAN let patient's JAY Tipton know.  Patient will start there tomorrow 11/7.  OSMAN let MD and assistant nurse manager know.  MD was agreeable.  OSMAN will continue to follow    THOMAS Aragon, MARIYAS

## 2024-11-07 ENCOUNTER — APPOINTMENT (OUTPATIENT)
Dept: TRANSPLANT | Facility: HOSPITAL | Age: 44
End: 2024-11-07
Payer: COMMERCIAL

## 2024-11-08 ENCOUNTER — APPOINTMENT (OUTPATIENT)
Dept: DIALYSIS | Facility: HOSPITAL | Age: 44
End: 2024-11-08
Payer: COMMERCIAL

## 2024-11-08 ENCOUNTER — DOCUMENTATION (OUTPATIENT)
Dept: TRANSPLANT | Facility: HOSPITAL | Age: 44
End: 2024-11-08
Payer: COMMERCIAL

## 2024-11-08 NOTE — PROGRESS NOTES
Notified by  VIRGIL Rob that Dr Marbin Lambert and physician team have decided that there are no signs of renal recovery and patient's kidney should be officially failed. Failure date = date of discharge on 3x/week HD which was 7/31/2024. Reason = chronic rejection.   Failure reported to [facility] via [facility] and CMS via [facility]. Epic updated.

## 2024-11-11 ENCOUNTER — TELEPHONE (OUTPATIENT)
Dept: TRANSPLANT | Facility: HOSPITAL | Age: 44
End: 2024-11-11
Payer: COMMERCIAL

## 2024-11-11 NOTE — TELEPHONE ENCOUNTER
Returned [facility] call. Asked for chest xray results. Advised the most recent chest xray was from 8/14. [facility] states we need to send cxray results before sending them patients to rule out TB. [facility]  states patients brother told them they were getting chest xray done on Friday, she provided fax [phone] and asked someone faxed her the results when we get them. Will follow up with primary coordinator.

## 2024-11-15 ENCOUNTER — APPOINTMENT (OUTPATIENT)
Dept: RADIOLOGY | Facility: HOSPITAL | Age: 44
End: 2024-11-15
Payer: COMMERCIAL

## 2024-11-15 ENCOUNTER — HOSPITAL ENCOUNTER (OUTPATIENT)
Dept: RADIOLOGY | Facility: HOSPITAL | Age: 44
Discharge: HOME | End: 2024-11-15
Payer: COMMERCIAL

## 2024-11-15 DIAGNOSIS — Z94.0 KIDNEY REPLACED BY TRANSPLANT (HHS-HCC): ICD-10-CM

## 2024-11-15 DIAGNOSIS — R06.02 SHORTNESS OF BREATH: ICD-10-CM

## 2024-11-15 PROCEDURE — 71250 CT THORAX DX C-: CPT | Performed by: RADIOLOGY

## 2024-11-15 PROCEDURE — 71250 CT THORAX DX C-: CPT

## 2024-11-18 ENCOUNTER — SPECIALTY PHARMACY (OUTPATIENT)
Dept: INTERNAL MEDICINE | Facility: HOSPITAL | Age: 44
End: 2024-11-18

## 2024-11-18 ENCOUNTER — TELEPHONE (OUTPATIENT)
Dept: TRANSPLANT | Facility: HOSPITAL | Age: 44
End: 2024-11-18

## 2024-11-18 ENCOUNTER — OFFICE VISIT (OUTPATIENT)
Dept: TRANSPLANT | Facility: HOSPITAL | Age: 44
End: 2024-11-18
Payer: COMMERCIAL

## 2024-11-18 VITALS
WEIGHT: 166 LBS | BODY MASS INDEX: 28.49 KG/M2 | OXYGEN SATURATION: 97 % | HEART RATE: 73 BPM | SYSTOLIC BLOOD PRESSURE: 148 MMHG | DIASTOLIC BLOOD PRESSURE: 93 MMHG | TEMPERATURE: 97.2 F

## 2024-11-18 DIAGNOSIS — F32.9 MAJOR DEPRESSIVE DISORDER WITH SINGLE EPISODE, REMISSION STATUS UNSPECIFIED: ICD-10-CM

## 2024-11-18 DIAGNOSIS — Z94.0 KIDNEY REPLACED BY TRANSPLANT (HHS-HCC): ICD-10-CM

## 2024-11-18 DIAGNOSIS — D84.9 IMMUNOSUPPRESSION: ICD-10-CM

## 2024-11-18 DIAGNOSIS — Z94.0 IMMUNOSUPPRESSIVE MANAGEMENT ENCOUNTER FOLLOWING KIDNEY TRANSPLANT: ICD-10-CM

## 2024-11-18 DIAGNOSIS — I10 ESSENTIAL HYPERTENSION: ICD-10-CM

## 2024-11-18 DIAGNOSIS — Z48.298 AFTERCARE FOLLOWING ORGAN TRANSPLANT: ICD-10-CM

## 2024-11-18 DIAGNOSIS — N18.6 ESRD (END STAGE RENAL DISEASE) (MULTI): Primary | ICD-10-CM

## 2024-11-18 DIAGNOSIS — T86.12 FAILED KIDNEY TRANSPLANT (HHS-HCC): ICD-10-CM

## 2024-11-18 DIAGNOSIS — H10.11 ALLERGIC CONJUNCTIVITIS OF RIGHT EYE: ICD-10-CM

## 2024-11-18 DIAGNOSIS — M79.10 MUSCLE ACHE: ICD-10-CM

## 2024-11-18 DIAGNOSIS — Z79.899 IMMUNOSUPPRESSIVE MANAGEMENT ENCOUNTER FOLLOWING KIDNEY TRANSPLANT: ICD-10-CM

## 2024-11-18 PROCEDURE — 3080F DIAST BP >= 90 MM HG: CPT | Performed by: INTERNAL MEDICINE

## 2024-11-18 PROCEDURE — 3077F SYST BP >= 140 MM HG: CPT | Performed by: INTERNAL MEDICINE

## 2024-11-18 PROCEDURE — 99215 OFFICE O/P EST HI 40 MIN: CPT | Performed by: INTERNAL MEDICINE

## 2024-11-18 PROCEDURE — 99417 PROLNG OP E/M EACH 15 MIN: CPT | Performed by: INTERNAL MEDICINE

## 2024-11-18 RX ORDER — MIRTAZAPINE 7.5 MG/1
15 TABLET, FILM COATED ORAL NIGHTLY
Qty: 60 TABLET | Refills: 11 | Status: SHIPPED | OUTPATIENT
Start: 2024-11-18 | End: 2025-11-13

## 2024-11-18 RX ORDER — TACROLIMUS 1 MG/1
1 CAPSULE ORAL 2 TIMES DAILY
Qty: 28 CAPSULE | Refills: 0 | Status: SHIPPED | OUTPATIENT
Start: 2024-11-18 | End: 2024-12-07

## 2024-11-18 RX ORDER — TACROLIMUS 1 MG/1
1 CAPSULE ORAL DAILY
Qty: 30 CAPSULE | Refills: 0 | Status: SHIPPED | OUTPATIENT
Start: 2024-12-03 | End: 2025-12-17

## 2024-11-18 ASSESSMENT — PAIN SCALES - GENERAL: PAINLEVEL_OUTOF10: 0-NO PAIN

## 2024-11-18 NOTE — Clinical Note
PLAN: -Stop Torsemide today -Increase Remeron 15 mg daily -Decrease tacrolimus to 1 mg twice a day x 2 weeks (until 12/3)  -Then after 12/3: start 1 mg once a day x 2 weeks (until 12/17)  -then stop tacrolimus completely after 12/17 -Start to decrease prednisone in 1 month  -Get CT abd for pain over transplanted kidney -Ordered Ensure for poor appetite -See hepatology for Vemlidy

## 2024-11-18 NOTE — Clinical Note
PLAN: -Stop Torsemide today -Increase Remeron 15 mg daily  -Decrease tacrolimus to 1 mg twice a day x 2 weeks (until 12/3)  -Then after 12/3: start 1 mg once a day x 2 weeks (until 12/17)  -then stop tacrolimus completely after 12/17  -Start to decrease prednisone on 12/18: start 2.5 mg prednisone x 2 weeks (until 1/1) -then start 2.5 mg prednisone every other day x 2 weeks (until 1/15) -Stop pred 1/5  -Get CT abd for pain over transplanted kidney -Ordered Ensure for poor appetite -See hepatology for Vemlidy

## 2024-11-18 NOTE — PATIENT INSTRUCTIONS
PLAN:  -Stop Torsemide today  -Increase Remeron 15 mg daily    -Decrease tacrolimus to 1 mg twice a day x 2 weeks (until 12/3)   -Then after 12/3: start 1 mg once a day x 2 weeks (until 12/17)   -then stop tacrolimus completely after 12/17    -Start to decrease prednisone on 12/18: start 2.5 mg prednisone x 2 weeks (until 1/1)  -then start 2.5 mg prednisone every other day x 1 weeks (until 1/15)  -Stop pred 1/5    -Get CT abd for pain over transplanted kidney  -Ordered Ensure for poor appetite  -See hepatology for Vemlidy

## 2024-11-18 NOTE — PROGRESS NOTES
TRANSPLANT NEPHROLOGY :   OUTPATIENT CLINIC NOTE      SERVICE DATE : 11/18/2024     REASON FOR VISIT/CHIEF COMPLAINT:  S/P  TRANSPLANT SURGERY  IMMUNOSUPPRESSIVE MEDICATION MANAGEMENT  BLOOD PRESSURE MANAGEMENT    HPI:    Ms. Leblanc is a 44 y.o. female with past medical history significant for ESRD secondary to HTN/NSAID who is s/p DDKT on 11/30/23 (Dr. Marbin Lambert & Dr. Louis, KDPI 56%, PRA 48%. HCV -/-, CMV D+/R+, EBV D+/R+). Pt received a total of 4.5 mg/kg thymoglobulin induction therapy in conjunction with solumedrol, was initiated on standard triple immunosuppression therapy (Tacrolimus, Mycophenolate, prednisone).  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      Post-op course was complicated by return to OR for exploration of transplanted kidney and washout of hematoma. Hospital course was complicated by post-operative pain and constipation, H/o DGF with eventual recovery in renal function.      5/29/24 Direct admission for fever, nausea, vomiting and abdominal pain (chronic).  Dx with CMV disease, new kidney stone with negative MAG3 for obstruction. ID consulted. Pt remains on Valcyte treatment dose.     Serum Cr ~0.9 till 6/13/24, started trending up gradually since then to peak 3.38 (7/7/24) warranting admission for MARK, hypervolemia. Kidney bx 7/5/24 with acute T-cell IB, acute vascular rejection IIB, and active ABMR. DSA against DQ2 (10,158). Pt is s/p Pulse steroids, Thymo 6mg/kg, PLEX x4. Cr trended to ~2.1-2.3.      Rpt bx 7/17/24 with persistent ACR 1A, AVR IIA, ABMR although the degree of inflammation appears improved from prior bx. Pt received additional Thymo 1.5 mg/kg. Pt was restarted on HD during this admission for volume management and clearance and has been RRT dependent since then. Plan was made for outpt rituximab infusion but scheduling delayed due to recent COVID infection (managed conservatively).      Pt has been HD dependent since  7/2024, failed allograft. Recently transitioned to [facility] (TTS).     Off MMF. Tac being tapered down. On pred 5 mg/d.     C/o feeling weak, tired. Reports intermittent RLQ pain, no exacerbating or relieving factors identified. Makes very little urine recently.     Recent CT chest showed changes consistent with pulm edema.       ROS:  Review of  14 systems was performed system by system. See HPI. Otherwise, the symptoms were negative.    PAST MEDICAL HISTORY:  Past Medical History:   Diagnosis Date    Anxiety     Chronic kidney disease     Chronic kidney disease, unspecified     CKD, patient interested in transplantation    Depression     Disease of thyroid gland     GERD (gastroesophageal reflux disease)     Hypertension     Personal history of COVID-19 07/20/2022    History of COVID-19        PAST SURGICAL HISTORY:  Past Surgical History:   Procedure Laterality Date    MR HEAD ANGIO W AND WO IV CONTRAST  01/26/2021    MR HEAD ANGIO W AND WO IV CONTRAST    MR HEAD ANGIO WO IV CONTRAST  01/26/2021    MR HEAD ANGIO WO IV CONTRAST    OTHER SURGICAL HISTORY  07/20/2022    Arteriovenous fistula creation procedure    TRANSPLANT, KIDNEY, OPEN Right 11/30/2023    US GUIDED PERCUTANEOUS PERITONEAL OR RETROPERITONEAL FLUID COLLECTION DRAINAGE  12/21/2023    US GUIDED PERCUTANEOUS PERITONEAL OR RETROPERITONEAL FLUID COLLECTION DRAINAGE 12/21/2023 Batsheva Shanks MD [facility]        SOCIAL HISTORY:  Social History     Socioeconomic History    Marital status:      Spouse name: Not on file    Number of children: Not on file    Years of education: Not on file    Highest education level: Not on file   Occupational History    Not on file   Tobacco Use    Smoking status: Never     Passive exposure: Never    Smokeless tobacco: Former     Quit date: 2021    Tobacco comments:     Chewing tobacco, quit 1 year ago   Vaping Use    Vaping status: Never Used   Substance and Sexual Activity    Alcohol use: Never    Drug use:  Never    Sexual activity: Defer   Other Topics Concern    Not on file   Social History Narrative    Not on file     Social Drivers of Health     Financial Resource Strain: Patient Declined (7/23/2024)    Overall Financial Resource Strain (CARDIA)     Difficulty of Paying Living Expenses: Patient declined   Food Insecurity: No Food Insecurity (7/3/2024)    Hunger Vital Sign     Worried About Running Out of Food in the Last Year: Never true     Ran Out of Food in the Last Year: Never true   Transportation Needs: Patient Declined (7/23/2024)    PRAPARE - Transportation     Lack of Transportation (Medical): Patient declined     Lack of Transportation (Non-Medical): Patient declined   Physical Activity: Inactive (7/3/2024)    Exercise Vital Sign     Days of Exercise per Week: 0 days     Minutes of Exercise per Session: 0 min   Stress: No Stress Concern Present (7/3/2024)    [employer] [place] [employer] - Occupational Stress Questionnaire     Feeling of Stress : Not at all   Social Connections: Not on File (9/8/2024)    Received from [employer]    Social Connections     Connectedness: 0   Intimate Partner Violence: Not At Risk (7/3/2024)    Humiliation, Afraid, Rape, and Kick questionnaire     Fear of Current or Ex-Partner: No     Emotionally Abused: No     Physically Abused: No     Sexually Abused: No   Housing Stability: Patient Declined (7/23/2024)    Housing Stability Vital Sign     Unable to Pay for Housing in the Last Year: Patient declined     Number of Times Moved in the Last Year: 1     Homeless in the Last Year: Patient declined       FAMILY HISTORY:  Family History   Family history unknown: Yes       MEDICATION LIST:  Current Outpatient Medications   Medication Instructions    acetaminophen (TYLENOL) 650 mg, Every 6 hours PRN    albuterol 90 mcg/actuation inhaler 2 puffs, inhalation, Every 6 hours PRN    amLODIPine (NORVASC) 5 mg, oral, Daily    azelastine (Optivar) 0.05 % ophthalmic solution INSTILL 1  DROP INTO BOTH EYES 2 TIMES A DAY .    Calcium Antacid 200 mg calcium (500 mg) chewable tablet TAKE 1 TABLET BY MOUTH 2 TIMES DAILY (CHEW AND SWALLOW)    carvedilol (COREG) 25 mg, oral, 2 times daily    cholecalciferol (VITAMIN D-3) 100 mcg, oral, Daily    gabapentin (NEURONTIN) 300 mg, oral, Nightly    hydrALAZINE (APRESOLINE) 50 mg, oral, 3 times daily    levothyroxine (SYNTHROID, LEVOXYL) 25 mcg, Daily before breakfast    magnesium oxide (MAG-OX) 400 mg, oral, Daily, Take with food at lunch time    mirtazapine (REMERON) 15 mg, oral, Nightly    pantoprazole (ProtoNix) 40 mg EC tablet TAKE 1 TABLET (40 MG) BY MOUTH EVERY MORNING (BEFORE BREAKFAST).    predniSONE (DELTASONE) 5 mg, oral, Daily, Continue on 5 mg once taper complete.    sertraline (ZOLOFT) 50 mg, Daily    SUMAtriptan (IMITREX) 100 mg, oral, Once as needed    tacrolimus (PROGRAF) 1 mg, oral, 2 times daily    [START ON 12/3/2024] tacrolimus (PROGRAF) 1 mg, oral, Daily    tenofovir alafenamide (VEMLIDY) 25 mg, oral, Daily       ALLERGY  Allergies   Allergen Reactions    Sumatriptan Headache and Dizziness     Patient has worsening headache, dizziness, chest burning/tingling, and extremity pain upon taking sumatriptan.       PHYSICAL EXAM:    Visit Vitals  BP (!) 148/93   Pulse 73   Temp 36.2 °C (97.2 °F) (Temporal)   Wt 75.3 kg (166 lb)   SpO2 97%   BMI 28.49 kg/m²   OB Status Unknown   Smoking Status Never   BSA 1.84 m²          General Appearance - NAD, A&Ox3   HEENT - Supple. Not pale. No jaundice. No JVD or LAD  CVS - RRR. Normal S1/S2. No murmur, rub or gallop  Lungs- clear to auscultation bilaterally  Abdomen - soft , NT, ND, no guarding. No hepatosplenomegaly. No allograft tenderness  Musculoskeletal /Extremities - trace b/l LE edema. Full ROM. No joint tenderness.   Neuro/Psych - appropriate mood and affect. Motor power V/V all extremities. CN I -XII were grossly intact.  Skin - No visible rash      LABS:    Lab Results   Component Value Date     CREATININE 10.64 (H) 11/06/2024    BUN 58 (H) 11/06/2024     (L) 11/06/2024    K 4.4 11/06/2024    CL 94 (L) 11/06/2024    CO2 28 11/06/2024     Lab Results   Component Value Date    PTH 18.5 06/05/2024    CALCIUM 8.2 (L) 11/06/2024    CAION 1.04 (L) 07/31/2024    PHOS 4.3 11/06/2024     Lab Results   Component Value Date    WBC 4.4 11/06/2024    HGB 9.0 (L) 11/06/2024    HCT 28.3 (L) 11/06/2024    MCV 91 11/06/2024     11/06/2024     Lab Results   Component Value Date    IRON 89 07/17/2024    TIBC 173 (L) 07/17/2024    FERRITIN 691 (H) 07/17/2024     Lab Results   Component Value Date    TACROLIMUS 6.1 11/06/2024    CMVPCRIU 539 (H) 06/13/2024    EBVDNAPCR Not Detected 11/01/2024     Lab Results   Component Value Date    CMVDNAPCR Not Detected 11/01/2024    BKVDNAPCR Not Detected 08/19/2024    EBVDNAPCR Not Detected 11/01/2024         ASSESSMENT AND PLAN:    Ms. Leblanc is a 44 y.o. female  who is here for follow up s/p kidney transplant.    TRANSPLANT DATE: 11/30/2023 (Kidney)      1. ESRD S/P kidney transplant   - failed allograft sec to chronic ABMR.   - HD dependent since 7/2024.   - cont HD TTS via AVF at [facility] renal  - HD rx, management of EDW, anemia, CKD-MBD per primary nephrologist.   - can stop torsemide given very little UOP  - HD access functioning well.  will get records from HD unit  - Will get CT abd/pelvis given RLQ discomfort.    Immunosuppression:  - reduce tac to 1mg bid, taper off over the next month. Wean pred off over the next 2 months  - on Vemlidy, recipient anti-HbcAb+. Can stop once completely off immunosuppression, monitor HBV DNA at regular months (q4 months).    Anemia:   - cont iron supplements, DENIZ per HD unit protocol    CKD-MBD:   - Ca, Phos acceptable on last available labs.   - further management per primary nephrologist    - will refer for re-transplant eval once clinical condition improves and pt is stable  - incr mirtazapine to 15 mg/d as appetite stimulant.  Encouraged pt to start protein-calorie supplements.    RTC 3 months

## 2024-11-19 PROCEDURE — RXMED WILLOW AMBULATORY MEDICATION CHARGE

## 2024-11-21 RX ORDER — AZELASTINE HYDROCHLORIDE 0.5 MG/ML
SOLUTION/ DROPS OPHTHALMIC
Qty: 6 ML | Refills: 3 | OUTPATIENT
Start: 2024-11-21

## 2024-11-22 DIAGNOSIS — T86.12 FAILED KIDNEY TRANSPLANT (HHS-HCC): ICD-10-CM

## 2024-11-22 DIAGNOSIS — N18.6 ESRD (END STAGE RENAL DISEASE) (MULTI): ICD-10-CM

## 2024-11-23 ENCOUNTER — PHARMACY VISIT (OUTPATIENT)
Dept: PHARMACY | Facility: CLINIC | Age: 44
End: 2024-11-23
Payer: COMMERCIAL

## 2024-11-27 ENCOUNTER — SPECIALTY PHARMACY (OUTPATIENT)
Dept: PHARMACY | Facility: CLINIC | Age: 44
End: 2024-11-27

## 2024-11-27 NOTE — PROGRESS NOTES
[facility] Clinical Note    Herber Foy Rai is a 44 y.o. female, who is on the specialty pharmacy service for management of:  Hepatitis B Core.    Herber Foy Rai is taking: Vemlidy 25 mg po daily.    Medication Receipt Date: 12/2023  Medication Start Date (planned or actual): 12/2023    Herber was contacted on 11/27/2024 at 2:56 PM for a virtual pharmacy visit with encounter number [id number] from:   [facility]  [address] [place] [id number]  Dept: [phone]  Dept Fax: [phone]    Herber was offered a Telemedicine Video visit or Telephone visit.  Herber consented to a telephone visit, which was performed.    The most recent encounter visit with the referring prescriber Dr. Stanford on 11/18/24 was reviewed.  Pharmacy will continue to collaborate in the care of this patient with the referring prescriber Dr. Stanford.    General Assessment      Impression/Plan  IMPRESSION/PLAN:  Is patient high risk (potential patients:  pregnancy, geriatric, pediatric)?  no  Is laboratory follow-up needed? Yes, patient with CKD, completing labs monthly  Is a clinical intervention needed? no  Next reassessment date? Approximately 2/27/25  Additional comments: spoke with patient's , Ajith. Discussed taking the Vemlidy after the dialysis. Patient may be able to stop the Vemlidy once he is no longer taking immunosupression    Refer to the encounter summary report for documentation details about patient counseling and education.      Medication Adherence    The importance of adherence was discussed with the patient and they were advised to take the medication as prescribed by their provider. Patient was encouraged to call their physician's office if they have a question regarding a missed dose.     QOL/Patient Satisfaction  Rate your quality of life on scale of 1-10: 5 (patient had to restart dialysis since her donated kidney wasn't functioning  properly)  Rate your satisfaction with  Specialty Pharmacy on scale of 1-10: 10 - Completely satisfied      Patient was advised to contact the pharmacy if there are any changes to their medication list, including prescriptions, OTC medications, herbal products, or supplements. Patient was advised of [facility]'s dispensing process, refill timeline, contact information ([phone]), and patient management follow up. Patient confirmed understanding of education conducted during assessment. All patient questions and concerns were addressed to the best of my ability. Patient was encouraged to contact the specialty pharmacy with any questions or concerns.    Confirmed follow-up outreaches are properly scheduled and reviewed goals of therapy with the patient.        Autumn Flores, PharmD

## 2024-12-02 ENCOUNTER — TELEPHONE (OUTPATIENT)
Dept: TRANSPLANT | Facility: HOSPITAL | Age: 44
End: 2024-12-02
Payer: COMMERCIAL

## 2024-12-02 ENCOUNTER — HOSPITAL ENCOUNTER (OUTPATIENT)
Dept: RADIOLOGY | Facility: CLINIC | Age: 44
Discharge: HOME | End: 2024-12-02
Payer: COMMERCIAL

## 2024-12-02 DIAGNOSIS — Z94.0 KIDNEY REPLACED BY TRANSPLANT (HHS-HCC): ICD-10-CM

## 2024-12-02 PROCEDURE — 74176 CT ABD & PELVIS W/O CONTRAST: CPT | Performed by: RADIOLOGY

## 2024-12-02 PROCEDURE — 74176 CT ABD & PELVIS W/O CONTRAST: CPT

## 2024-12-02 NOTE — TELEPHONE ENCOUNTER
Patient called requesting to speak with coordinator about [facility] spoke with her brother about  us getting  the PT on a nutrition supplement drink. Looking to follow up .  Patient call back number is [phone]

## 2024-12-02 NOTE — TELEPHONE ENCOUNTER
Returned call to Danuta, states Herber has been short of breath, cannot lie down flat and wondering if she needs oxygen. Advised for patient to proceed to ED local is fine.   Danuta will take to [facility].     Danuta also asked if nephrologist can call dialysis center and speak with [facility] Dr. Alistair Lu thinks they are not pulling enough fluid off of patient.     Will update nephrologist.

## 2024-12-02 NOTE — TELEPHONE ENCOUNTER
Call returned to  [phone] is Sheila Mcknight, informed me patient due for dialysis tomorrow and has an appt with Caitlin Ham the nutritionist who will handle the nutrition supplement drink.

## 2024-12-02 NOTE — TELEPHONE ENCOUNTER
Patient called requesting to speak with coordinator about hard time breathing. Phone was cutting in and out but it sounded like Benji was sawing there was fluid around felipa lungs. Patient is in kidney failure but miranda said she would give her a call.  Patient call back number is [phone]

## 2024-12-03 ENCOUNTER — TELEPHONE (OUTPATIENT)
Dept: TRANSPLANT | Facility: HOSPITAL | Age: 44
End: 2024-12-03
Payer: COMMERCIAL

## 2024-12-03 DIAGNOSIS — Z94.0 KIDNEY REPLACED BY TRANSPLANT (HHS-HCC): ICD-10-CM

## 2024-12-04 RX ORDER — TENOFOVIR ALAFENAMIDE 25 MG/1
25 TABLET ORAL DAILY
Qty: 30 TABLET | Refills: 11 | OUTPATIENT
Start: 2024-12-04

## 2024-12-12 ENCOUNTER — TELEPHONE (OUTPATIENT)
Dept: TRANSPLANT | Facility: HOSPITAL | Age: 44
End: 2024-12-12
Payer: COMMERCIAL

## 2024-12-12 DIAGNOSIS — Z94.0 KIDNEY REPLACED BY TRANSPLANT (HHS-HCC): ICD-10-CM

## 2024-12-12 DIAGNOSIS — R19.03 ABDOMINAL RIGHT LOWER QUADRANT SWELLING: ICD-10-CM

## 2024-12-12 DIAGNOSIS — R10.9 ABDOMINAL PAIN, UNSPECIFIED ABDOMINAL LOCATION: ICD-10-CM

## 2024-12-12 NOTE — TELEPHONE ENCOUNTER
[facility], looking to speak with someone about getting xray results faxed over from October      [phone]  Fax [phone]

## 2024-12-12 NOTE — TELEPHONE ENCOUNTER
Reviewed CT abdomen pelvis wo IV contrast     IMPRESSION:  Mild right lower quadrant renal transplant hydronephrosis and  peritransplant thickening similar to 05/24/2024 however decreased  density of the transplant along with mild soft tissue edema, trace  free fluid, and small pleural effusions could reflect transplant  dysfunction and fluid overload. Clinical correlation recommended.      Mild retroperitoneal lymphadenopathy.      Decompressed thick-walled urinary bladder with perivesical stranding.  Cystitis not excluded.      Contracted thick-walled gallbladder with pericholecystic fluid or  gallbladder wall thickening. Further evaluation with gallbladder  ultrasound may be helpful.      2 cm right adnexal hypodensity, possible physiologic ovarian cyst in  a patient of this age.      Additional findings as described above.    PLAN:  US of gallbladder     Called to update [place]   Tasked AA Brittani to schedule on a Mon or Wed after 9am at [facility]

## 2024-12-12 NOTE — TELEPHONE ENCOUNTER
The  from Dialysis called requesting to speak with coordinator about patient . They are looking to find out if we knew if she has a HNP,is thier any medication we still provide for patient. They also wanted to know if she is eligible to be re transplanted. Please call back today and ask for anita or edelmira.  Patient call back number is [phone]

## 2024-12-16 ENCOUNTER — APPOINTMENT (OUTPATIENT)
Dept: OPHTHALMOLOGY | Facility: CLINIC | Age: 44
End: 2024-12-16
Payer: COMMERCIAL

## 2024-12-23 ENCOUNTER — HOSPITAL ENCOUNTER (OUTPATIENT)
Dept: RADIOLOGY | Facility: CLINIC | Age: 44
Discharge: HOME | End: 2024-12-23
Payer: COMMERCIAL

## 2024-12-23 DIAGNOSIS — R10.9 ABDOMINAL PAIN, UNSPECIFIED ABDOMINAL LOCATION: ICD-10-CM

## 2024-12-23 DIAGNOSIS — Z94.0 KIDNEY REPLACED BY TRANSPLANT (HHS-HCC): ICD-10-CM

## 2024-12-23 PROCEDURE — 76705 ECHO EXAM OF ABDOMEN: CPT | Performed by: RADIOLOGY

## 2024-12-23 PROCEDURE — 76705 ECHO EXAM OF ABDOMEN: CPT

## 2024-12-24 ENCOUNTER — APPOINTMENT (OUTPATIENT)
Dept: RADIOLOGY | Facility: CLINIC | Age: 44
End: 2024-12-24
Payer: COMMERCIAL

## 2024-12-26 ENCOUNTER — TELEPHONE (OUTPATIENT)
Dept: TRANSPLANT | Facility: HOSPITAL | Age: 44
End: 2024-12-26
Payer: COMMERCIAL

## 2024-12-26 NOTE — TELEPHONE ENCOUNTER
US of cathi reviewed by Dr. Urbina:   No cholecystitis. If abd pain worse, she can see GI       Direct message sent to patient brother Danuta.

## 2024-12-27 LAB
ALLOSURE SCORE - KIDNEY: 0.43 %
CAREDX_ORDER_ID: NORMAL
CENTER_ORDER_ID: NORMAL
CLIENT SPECIMEN ID - ALLOSURE: NORMAL
DONOR RELATION - ALLOSURE: NORMAL
NOTES - ALLOSURE: NORMAL
RELATIVE CHANGE VALUE - KIDNEY: -71 %
TEST COMMENTS - ALLOSURE: NORMAL
TIME POST TX - ALLOSURE: NORMAL
TRANSPLANTED ORGAN - ALLOSURE: NORMAL
TX DATE - ALLOSURE/ALLOMAP: NORMAL
WP_ORDER_ID: NORMAL

## 2025-01-13 ENCOUNTER — TELEPHONE (OUTPATIENT)
Facility: HOSPITAL | Age: 45
End: 2025-01-13
Payer: COMMERCIAL

## 2025-01-13 NOTE — TELEPHONE ENCOUNTER
Danuta called requesting to speak with coordinator about  stopping med delivery .  Danuta  call back number is  [phone] .

## 2025-01-13 NOTE — TELEPHONE ENCOUNTER
Tac and prednisone completed, call placed to [facility] to discontinue. Also updated med list in epic.     Task sent to JOSE F Peter to call Danuta to update scripts cancelled.

## 2025-01-14 ENCOUNTER — TELEPHONE (OUTPATIENT)
Facility: HOSPITAL | Age: 45
End: 2025-01-14
Payer: COMMERCIAL

## 2025-01-14 NOTE — TELEPHONE ENCOUNTER
I called patient to let him know the tacrolimus and prednisone prescriptions have been cancelled per her request. He would like miranda to call him tomorrow to discussed other medical issue.

## 2025-01-14 NOTE — TELEPHONE ENCOUNTER
Returned call. Pt's  asked when we will be stopping hydralazine. Advised this is a BP medication and will only be stopped if needed per general nephrologist. NFQ at this time

## 2025-01-31 DIAGNOSIS — I10 BENIGN ESSENTIAL HTN: ICD-10-CM

## 2025-01-31 RX ORDER — VALSARTAN 160 MG/1
160 TABLET ORAL DAILY
Qty: 30 TABLET | Refills: 5 | Status: SHIPPED | OUTPATIENT
Start: 2025-01-31

## 2025-01-31 NOTE — TELEPHONE ENCOUNTER
Medication:   Requested Prescriptions     Pending Prescriptions Disp Refills    valsartan (DIOVAN) 160 MG tablet [Pharmacy Med Name: VALSARTAN 160 MG TABLET] 30 tablet 5     Sig: TAKE 1 TABLET BY MOUTH DAILY      Last Filled:  4/15/24    Patient Phone Number: [phone] (home) [phone] (work)    Last appt: 1/9/2024   Next appt: Visit date not found    Last OARRS:        No data to display              PDMP Monitoring:    Last PDMP Bill as Reviewed ([place]):  Review User Review Instant Review Result          Preferred Pharmacy:   [facility] [id number] [facility] [place] - [address] [facility] [facility] [phone] - F [phone]  [facility] [facility] [place] [address]  Phone: [phone] Fax: [phone]    Recent Visits  Date Type Provider Dept   01/09/24 Office Visit Pete Hylton MD [facility]   10/24/23 Office Visit Pete Hylton MD [facility]   10/11/23 Office Visit Pete Hylton MD [facility]   Showing recent visits within past 540 days with a meds authorizing provider and meeting all other requirements  Future Appointments  No visits were found meeting these conditions.  Showing future appointments within next 150 days with a meds authorizing provider and meeting all other requirements     1/9/2024

## 2025-03-08 LAB
ALLOSURE SCORE - KIDNEY: < 0.04 %
CAREDX_ORDER_ID: NORMAL
CENTER_ORDER_ID: NORMAL
CLIENT SPECIMEN ID - ALLOSURE: NORMAL
DONOR RELATION - ALLOSURE: NORMAL
NOTES - ALLOSURE: NORMAL
RELATIVE CHANGE VALUE - KIDNEY: NORMAL
TEST COMMENTS - ALLOSURE: NORMAL
TIME POST TX - ALLOSURE: NORMAL
TRANSPLANTED ORGAN - ALLOSURE: NORMAL
TX DATE - ALLOSURE/ALLOMAP: NORMAL
WP_ORDER_ID: NORMAL

## 2025-03-27 ENCOUNTER — DOCUMENTATION (OUTPATIENT)
Dept: TRANSPLANT | Facility: HOSPITAL | Age: 45
End: 2025-03-27
Payer: COMMERCIAL

## 2025-03-27 ENCOUNTER — TELEPHONE (OUTPATIENT)
Dept: TRANSPLANT | Facility: HOSPITAL | Age: 45
End: 2025-03-27
Payer: COMMERCIAL

## 2025-03-27 VITALS — HEIGHT: 61 IN | BODY MASS INDEX: 29.14 KG/M2 | WEIGHT: 154.32 LBS

## 2025-03-27 NOTE — PROGRESS NOTES
Pre-Kidney Intake Questionnaire    1. Do you live in an assisted living/nursing facility? No  2. Do you have difficulty reading or writing in English? Yes.  Do you need a ? Yes -Michael  3. Do you have transportation to and from you medical appointments? Yes.  What type of transportation do you use? Car  4. Do you have a family member or friend who can accompany you to the appointment? Yes  5. Who is your primary care doctor?  Indra Kim MD  6. Who is your kidney doctor?  Alistair Sales  7. Are you currently on dialysis? Yes. What type?  Hemodialysis.  What days?  MWF.  How many years?  6 yr  8. Do you currently have any open sores or wounds? No  9. Have you ever had an amputation? No  10. Have you ever been diagnosed with cancer? No  11. Do you have a history of heart attack or stroke? No  12. Are you currently wearing oxygen? No  13. Have you been hospitalized for any reason in the last year?  1st kidney transplant rejection-  Comments : Intake complete

## 2025-03-28 ENCOUNTER — DOCUMENTATION (OUTPATIENT)
Dept: TRANSPLANT | Facility: HOSPITAL | Age: 45
End: 2025-03-28
Payer: COMMERCIAL

## 2025-03-28 ENCOUNTER — TELEPHONE (OUTPATIENT)
Dept: TRANSPLANT | Facility: HOSPITAL | Age: 45
End: 2025-03-28
Payer: COMMERCIAL

## 2025-03-28 DIAGNOSIS — N18.6 ESRD (END STAGE RENAL DISEASE) (MULTI): Primary | ICD-10-CM

## 2025-03-28 NOTE — PROGRESS NOTES
Patient was reviewed at Patient Review Meeting on 03/28/25 with Dr. Garza, Dr. Louis, Dr. Ramirez, Dr. España, and Dr. Webber and CARMEN Lambert, JD Connor, CARMEN Capellan, ELIO Cespedes, and ELIO Rico.  Per review, patient can be scheduled in a standard Tuesday or Friday clinic.    Patient will be called and scheduled in appropriate clinic per review.

## 2025-05-07 ENCOUNTER — OFFICE VISIT (OUTPATIENT)
Dept: PRIMARY CARE CLINIC | Age: 45
End: 2025-05-07
Payer: MEDICAID

## 2025-05-07 VITALS
HEIGHT: 62 IN | RESPIRATION RATE: 14 BRPM | WEIGHT: 168.4 LBS | HEART RATE: 81 BPM | OXYGEN SATURATION: 98 % | SYSTOLIC BLOOD PRESSURE: 120 MMHG | TEMPERATURE: 98.2 F | BODY MASS INDEX: 30.99 KG/M2 | DIASTOLIC BLOOD PRESSURE: 78 MMHG

## 2025-05-07 DIAGNOSIS — M54.50 CHRONIC BILATERAL LOW BACK PAIN WITHOUT SCIATICA: ICD-10-CM

## 2025-05-07 DIAGNOSIS — M25.511 CHRONIC RIGHT SHOULDER PAIN: Primary | ICD-10-CM

## 2025-05-07 DIAGNOSIS — R73.09 IMPAIRED GLUCOSE REGULATION: ICD-10-CM

## 2025-05-07 DIAGNOSIS — G89.29 CHRONIC RIGHT SHOULDER PAIN: Primary | ICD-10-CM

## 2025-05-07 DIAGNOSIS — R53.83 OTHER FATIGUE: ICD-10-CM

## 2025-05-07 DIAGNOSIS — E78.2 MIXED HYPERLIPIDEMIA: ICD-10-CM

## 2025-05-07 DIAGNOSIS — I10 BENIGN ESSENTIAL HTN: ICD-10-CM

## 2025-05-07 DIAGNOSIS — E55.9 VITAMIN D DEFICIENCY: ICD-10-CM

## 2025-05-07 DIAGNOSIS — G89.29 CHRONIC BILATERAL LOW BACK PAIN WITHOUT SCIATICA: ICD-10-CM

## 2025-05-07 PROCEDURE — 3078F DIAST BP <80 MM HG: CPT | Performed by: INTERNAL MEDICINE

## 2025-05-07 PROCEDURE — 99214 OFFICE O/P EST MOD 30 MIN: CPT | Performed by: INTERNAL MEDICINE

## 2025-05-07 PROCEDURE — G2211 COMPLEX E/M VISIT ADD ON: HCPCS | Performed by: INTERNAL MEDICINE

## 2025-05-07 PROCEDURE — 3074F SYST BP LT 130 MM HG: CPT | Performed by: INTERNAL MEDICINE

## 2025-05-07 RX ORDER — PREDNISONE 20 MG/1
20 TABLET ORAL 3 TIMES DAILY
COMMUNITY

## 2025-05-07 RX ORDER — GABAPENTIN 300 MG/1
300 CAPSULE ORAL 3 TIMES DAILY
COMMUNITY

## 2025-05-07 RX ORDER — CELECOXIB 200 MG/1
200 CAPSULE ORAL 2 TIMES DAILY
Qty: 60 CAPSULE | Refills: 3 | Status: SHIPPED | OUTPATIENT
Start: 2025-05-07

## 2025-05-07 SDOH — ECONOMIC STABILITY: FOOD INSECURITY: WITHIN THE PAST 12 MONTHS, YOU WORRIED THAT YOUR FOOD WOULD RUN OUT BEFORE YOU GOT MONEY TO BUY MORE.: NEVER TRUE

## 2025-05-07 SDOH — ECONOMIC STABILITY: FOOD INSECURITY: WITHIN THE PAST 12 MONTHS, THE FOOD YOU BOUGHT JUST DIDN'T LAST AND YOU DIDN'T HAVE MONEY TO GET MORE.: NEVER TRUE

## 2025-05-07 ASSESSMENT — ENCOUNTER SYMPTOMS
SINUS PRESSURE: 0
SORE THROAT: 0
BLOOD IN STOOL: 0
NAUSEA: 0
CHEST TIGHTNESS: 0
WHEEZING: 0
BACK PAIN: 0
EYE PAIN: 0
COLOR CHANGE: 0
COUGH: 0
TROUBLE SWALLOWING: 0
ABDOMINAL PAIN: 0
SHORTNESS OF BREATH: 0
CONSTIPATION: 0
EYE REDNESS: 0
ABDOMINAL DISTENTION: 0
VOMITING: 0
DIARRHEA: 0

## 2025-05-07 ASSESSMENT — PATIENT HEALTH QUESTIONNAIRE - PHQ9
SUM OF ALL RESPONSES TO PHQ QUESTIONS 1-9: 1
2. FEELING DOWN, DEPRESSED OR HOPELESS: SEVERAL DAYS
SUM OF ALL RESPONSES TO PHQ QUESTIONS 1-9: 1
1. LITTLE INTEREST OR PLEASURE IN DOING THINGS: NOT AT ALL
SUM OF ALL RESPONSES TO PHQ QUESTIONS 1-9: 1
SUM OF ALL RESPONSES TO PHQ QUESTIONS 1-9: 1

## 2025-05-07 NOTE — PROGRESS NOTES
Marciano RAZA Rai (1980) is a 45 y.o. female   Follow-up (Pain in RT shoulder, xrays done in ER)     General health:  This patient presents for check up and refills. The problem and medicine lists and chart were reviewed in detail. The patient has been worked up and treated for this/these condition/s and is compliant with taking the medication without any significant side effects. The patient's condition/s is/are chronic and unchanged and otherwise remains stable. Feels well with minor complaints.    Main Problem Review - R shoulder pain -45-year-old female with history of right shoulder arthritis was seen at [facility] emergency room 4/29/25 for right shoulder pain.  Patient had repeat imaging and was prescribed pain medication for the same was referred to [facility] orthopedics but they do not set up health insurance.  Patient comes in today for review.      Other problems review    1. HTN  - Patient blood pressure is stable today. Patient denies complaints or symptoms today. Patient mentions she is adherent with treatment. Patient denies chest pain or shortness of breath with exertion or at rest. Adherent to low salt diet.     2. HLD  - Patient denies complaints or symptoms of chest pain/shortness of breath with exertion or at rest.  Patient mentions they are adherent with treatment.  Patient follows a low-fat diet, tries to exercise.   The 10-year ASCVD risk score (Carmen BRISENO, et al., 2019) is: 4.1%    Values used to calculate the score:      Age: 45 years      Sex: Female      Is Non- : No      Diabetic: No      Tobacco smoker: No      Systolic Blood Pressure: 120 mmHg      Is BP treated: Yes      HDL Cholesterol: 32 mg/dL      Total Cholesterol: 277 mg/dL     Review of Systems   Constitutional:  Negative for activity change, appetite change, chills, fatigue, fever and unexpected weight change.   HENT:  Negative for congestion, ear pain, postnasal drip, sinus pressure, sore throat, tinnitus

## 2025-05-07 NOTE — ASSESSMENT & PLAN NOTE
Patient has had worsening pain.  Significant [facility] orthopedics recommended surgery, patient was lost to follow-up.  Recently seen at [facility] emergency department.  Patient rereferred [facility] orthopedics

## 2025-05-12 DIAGNOSIS — R53.83 OTHER FATIGUE: ICD-10-CM

## 2025-05-12 DIAGNOSIS — R73.09 IMPAIRED GLUCOSE REGULATION: ICD-10-CM

## 2025-05-12 DIAGNOSIS — E78.2 MIXED HYPERLIPIDEMIA: ICD-10-CM

## 2025-05-12 DIAGNOSIS — E55.9 VITAMIN D DEFICIENCY: ICD-10-CM

## 2025-05-12 LAB
25(OH)D3 SERPL-MCNC: 19.8 NG/ML
ALBUMIN SERPL-MCNC: 4.5 G/DL (ref 3.4–5)
ALBUMIN/GLOB SERPL: 1.9 {RATIO} (ref 1.1–2.2)
ALP SERPL-CCNC: 95 U/L (ref 40–129)
ALT SERPL-CCNC: 26 U/L (ref 10–40)
ANION GAP SERPL CALCULATED.3IONS-SCNC: 8 MMOL/L (ref 3–16)
AST SERPL-CCNC: 16 U/L (ref 15–37)
BASOPHILS # BLD: 0.1 K/UL (ref 0–0.2)
BASOPHILS NFR BLD: 1.1 %
BILIRUB SERPL-MCNC: 0.6 MG/DL (ref 0–1)
BUN SERPL-MCNC: 11 MG/DL (ref 7–20)
CALCIUM SERPL-MCNC: 9.2 MG/DL (ref 8.3–10.6)
CHLORIDE SERPL-SCNC: 103 MMOL/L (ref 99–110)
CHOLEST SERPL-MCNC: 254 MG/DL (ref 0–199)
CO2 SERPL-SCNC: 28 MMOL/L (ref 21–32)
CREAT SERPL-MCNC: 0.5 MG/DL (ref 0.6–1.1)
DEPRECATED RDW RBC AUTO: 14 % (ref 12.4–15.4)
EOSINOPHIL # BLD: 0.1 K/UL (ref 0–0.6)
EOSINOPHIL NFR BLD: 2.9 %
GFR SERPLBLD CREATININE-BSD FMLA CKD-EPI: >90 ML/MIN/{1.73_M2}
GLUCOSE SERPL-MCNC: 94 MG/DL (ref 70–99)
HCT VFR BLD AUTO: 39.9 % (ref 36–48)
HDLC SERPL-MCNC: 41 MG/DL (ref 40–60)
HGB BLD-MCNC: 13.4 G/DL (ref 12–16)
LDLC SERPL CALC-MCNC: ABNORMAL MG/DL
LDLC SERPL-MCNC: 168 MG/DL
LYMPHOCYTES # BLD: 1.7 K/UL (ref 1–5.1)
LYMPHOCYTES NFR BLD: 36.6 %
MCH RBC QN AUTO: 29.7 PG (ref 26–34)
MCHC RBC AUTO-ENTMCNC: 33.5 G/DL (ref 31–36)
MCV RBC AUTO: 88.6 FL (ref 80–100)
MONOCYTES # BLD: 0.4 K/UL (ref 0–1.3)
MONOCYTES NFR BLD: 9.7 %
NEUTROPHILS # BLD: 2.3 K/UL (ref 1.7–7.7)
NEUTROPHILS NFR BLD: 49.7 %
PLATELET # BLD AUTO: 186 K/UL (ref 135–450)
PMV BLD AUTO: 11.4 FL (ref 5–10.5)
POTASSIUM SERPL-SCNC: 4.1 MMOL/L (ref 3.5–5.1)
PROT SERPL-MCNC: 6.9 G/DL (ref 6.4–8.2)
RBC # BLD AUTO: 4.51 M/UL (ref 4–5.2)
SODIUM SERPL-SCNC: 139 MMOL/L (ref 136–145)
TRIGL SERPL-MCNC: 302 MG/DL (ref 0–150)
TSH SERPL DL<=0.005 MIU/L-ACNC: 1.88 UIU/ML (ref 0.27–4.2)
VLDLC SERPL CALC-MCNC: ABNORMAL MG/DL
WBC # BLD AUTO: 4.6 K/UL (ref 4–11)

## 2025-05-13 DIAGNOSIS — E78.1 HYPERTRIGLYCERIDEMIA: ICD-10-CM

## 2025-05-13 DIAGNOSIS — Z94.0 KIDNEY REPLACED BY TRANSPLANT (HHS-HCC): ICD-10-CM

## 2025-05-13 LAB
EST. AVERAGE GLUCOSE BLD GHB EST-MCNC: 119.8 MG/DL
HBA1C MFR BLD: 5.8 %

## 2025-05-14 ENCOUNTER — OFFICE VISIT (OUTPATIENT)
Dept: PRIMARY CARE CLINIC | Age: 45
End: 2025-05-14
Payer: MEDICAID

## 2025-05-14 ENCOUNTER — TELEPHONE (OUTPATIENT)
Dept: PRIMARY CARE CLINIC | Age: 45
End: 2025-05-14

## 2025-05-14 ENCOUNTER — RESULTS FOLLOW-UP (OUTPATIENT)
Dept: PRIMARY CARE CLINIC | Age: 45
End: 2025-05-14

## 2025-05-14 VITALS
BODY MASS INDEX: 30.99 KG/M2 | HEART RATE: 71 BPM | DIASTOLIC BLOOD PRESSURE: 86 MMHG | TEMPERATURE: 98.1 F | OXYGEN SATURATION: 97 % | HEIGHT: 62 IN | RESPIRATION RATE: 16 BRPM | WEIGHT: 168.4 LBS | SYSTOLIC BLOOD PRESSURE: 132 MMHG

## 2025-05-14 DIAGNOSIS — Z00.00 ENCOUNTER FOR WELL ADULT EXAM WITHOUT ABNORMAL FINDINGS: Primary | ICD-10-CM

## 2025-05-14 DIAGNOSIS — G89.29 CHRONIC RIGHT SHOULDER PAIN: ICD-10-CM

## 2025-05-14 DIAGNOSIS — M25.511 CHRONIC RIGHT SHOULDER PAIN: ICD-10-CM

## 2025-05-14 DIAGNOSIS — E78.1 HYPERTRIGLYCERIDEMIA: ICD-10-CM

## 2025-05-14 DIAGNOSIS — E55.9 VITAMIN D DEFICIENCY: ICD-10-CM

## 2025-05-14 DIAGNOSIS — E78.2 MIXED HYPERLIPIDEMIA: ICD-10-CM

## 2025-05-14 PROCEDURE — 99213 OFFICE O/P EST LOW 20 MIN: CPT | Performed by: INTERNAL MEDICINE

## 2025-05-14 PROCEDURE — 99396 PREV VISIT EST AGE 40-64: CPT | Performed by: INTERNAL MEDICINE

## 2025-05-14 PROCEDURE — 3075F SYST BP GE 130 - 139MM HG: CPT | Performed by: INTERNAL MEDICINE

## 2025-05-14 PROCEDURE — 3079F DIAST BP 80-89 MM HG: CPT | Performed by: INTERNAL MEDICINE

## 2025-05-14 RX ORDER — OMEGA-3-ACID ETHYL ESTERS 1 G/1
CAPSULE, LIQUID FILLED ORAL
Qty: 120 CAPSULE | Refills: 3 | Status: SHIPPED | OUTPATIENT
Start: 2025-05-14 | End: 2025-05-14

## 2025-05-14 RX ORDER — OMEGA-3-ACID ETHYL ESTERS 1 G/1
CAPSULE, LIQUID FILLED ORAL
Qty: 120 CAPSULE | Refills: 11 | Status: SHIPPED | OUTPATIENT
Start: 2025-05-14

## 2025-05-14 RX ORDER — ERGOCALCIFEROL 1.25 MG/1
50000 CAPSULE ORAL WEEKLY
Qty: 12 CAPSULE | Refills: 3 | Status: SHIPPED | OUTPATIENT
Start: 2025-05-14

## 2025-05-14 RX ORDER — MYCOPHENOLATE MOFETIL 250 MG/1
500 CAPSULE ORAL EVERY 12 HOURS
Qty: 120 CAPSULE | Refills: 11 | OUTPATIENT
Start: 2025-05-14 | End: 2026-05-14

## 2025-05-14 ASSESSMENT — ENCOUNTER SYMPTOMS
CHEST TIGHTNESS: 0
SORE THROAT: 0
VOMITING: 0
EYE PAIN: 0
SHORTNESS OF BREATH: 0
COUGH: 0
DIARRHEA: 0
EYE REDNESS: 0
COLOR CHANGE: 0
ABDOMINAL DISTENTION: 0
ABDOMINAL PAIN: 0
BACK PAIN: 0
CONSTIPATION: 0
BLOOD IN STOOL: 0
NAUSEA: 0
TROUBLE SWALLOWING: 0
WHEEZING: 0
SINUS PRESSURE: 0

## 2025-05-14 NOTE — PATIENT INSTRUCTIONS
weight loss surgery can improve health problems related to obesity (diabetes, high blood pressure, high cholesterol, sleep apnea) to the point that you need less or no medicine.  Finally, weight loss surgery might reduce your risk of developing heart disease, cancer, and certain infections.  AFTER WEIGHT LOSS SURGERY -- You will need to stay in the hospital until your team feels that it is safe for you to leave (on average, one to three days). Do not drive if you are taking prescription pain medicine. Begin exercising as soon as possible once you have healed; most weight loss centers will design an exercise program for you.  Once you are home, it is important to eat and drink exactly what your doctor and dietitian recommend. You will see your doctor, nurse, and dietitian on a regular basis after surgery to monitor your health, diet, and weight loss.   You will be able to slowly increase how much you eat over time, although it will always be important to:  Eat small, frequent meals and not skip meals   Chew your food slowly and completely   Avoid eating while \"distracted\" (such as eating while watching TV)   Stop eating when you feel full   Drink liquids at least 30 minutes before or after eating   Avoid foods high in fat or sugar   Take vitamin supplements, as recommended  It can take several months to learn to listen to your body so that you know when you are hungry and when you are full. You may dislike foods you previously loved, and you may begin to prefer new foods. This can be a frustrating process for some people, so talk to your dietitian if you are having trouble.  It usually takes between one and two years to lose weight after surgery. After reaching their goal weight, some people have plastic surgery (called \"body contouring\") to remove excess skin from the body, particularly in the abdominal area.  Before you decide to have weight loss surgery, you must commit to staying healthy for life. This includes

## 2025-05-14 NOTE — ASSESSMENT & PLAN NOTE
Patient has had worsening pain.  Significant [facility] orthopedics recommended surgery, patient was lost to follow-up.  Recently seen at [facility] emergency department.  Patient rereferred [facility] orthopedics has appointment this Monday

## 2025-05-14 NOTE — PROGRESS NOTES
Well Adult Note  Name: Marciano RAZA Rai Today’s Date: 2025   MRN: [id number] Sex: Female   Age: 45 y.o. Ethnicity: Non- / Non    : 1980 Race: Other       Marciano RAZA Rai is here for a well adult exam.       Assessment & Plan   Encounter for well adult exam without abnormal findings  Assessment & Plan:   Patient comes in for wellness exam   we discussed age appropriate USPSTF screens  Over 75% of the visit was spent counselling patient on appropriate lifestyle choices.   Hypertriglyceridemia  Assessment & Plan:    refill omega-3 2000 mg twice daily with meals  Orders:  -     omega-3 acid ethyl esters (LOVAZA) 1 g capsule; Take 2 capsules twice a day with food., Disp-120 capsule, R-11Normal  Vitamin D deficiency  Assessment & Plan:    refilled high-dose weekly vitamin D  Orders:  -     ergocalciferol (ERGOCALCIFEROL) 1.25 MG (03558 UT) capsule; Take 1 capsule by mouth once a week, Disp-12 capsule, R-3Normal  Chronic right shoulder pain  Assessment & Plan:  Patient has had worsening pain.  Significant [facility] orthopedics recommended surgery, patient was lost to follow-up.  Recently seen at [facility] emergency department.  Patient rereferred [facility] orthopedics has appointment this Monday  Mixed hyperlipidemia  Assessment & Plan:   Treats with diet an exercise         Return in about 4 months (around 2025) for Routine follow-up, 15-minutes .       Subjective   History:  Wellness exam - Patient presents today for annual physical. Patient  reports feeling well. Patient has a normal appetite. Patient  eats 5+ servings of vegetables/fruits each day. Patient prepares food at home multiple times/day, eats in restaurants rarely. Patient  states that she sleeps well and gets 7-8 hours of sleep on average. In regards to emotional health, patient  denies depression or anxiety. Libido is considered to be normal. In regards to bowel habits, patient  has no complaints. Regarding urination, no complaints.

## 2025-05-14 NOTE — TELEPHONE ENCOUNTER
LOAN with Marciano to call to schedule about 4 months (around 9/14/2025) for Routine follow-up, 15-minutes .

## 2025-05-14 NOTE — TELEPHONE ENCOUNTER
----- Message from Dr. Pete Hylton MD sent at 5/14/2025 12:01 AM EDT -----  Please let patient know triglycerides are 382 from 428.  Please [place] out whether she has been taking omega-3 Lovaza, please ask whether she needs a refill.    Please let patient know that their LDL/Badcholesterol is 168.    her  LDL should be under 100 to lower the incidence/risk of CVD which includes heart attack and stroke. her 10 year cardiovascular disease risk score  (Which is the % risk of having a cardiac event or stroke in the next 10 years) is   2.5%. A score >  7.5% means she should be treated with a statin drug.     I highly recommend patient starts statin drug to lower LDL and reduce risk for heart attack and stroke. If patient agrees I'll go ahead and send statin prescription.    Some patients  experience muscle cramps/charley horses which could be treated with over-the-counter coenzyme q 10 - 100 mg at night, vitamin E 400 U at night, tonic water (has quinine) 8 ounces in the evening.  Additional treatment recommendations low fat diet-less cheese/butter, fried food, fast food, increase intake of green/salads/fiber/oatmeal for breakfast, an exercise      Vitamin D low at 19.8 please find out whether she needs refill on a weekly vitamin D.    I am happy that her liver numbers have improved and I highly recommend she continues her omega-3 treatment.    Prediabetes stable at 5.8 from 6.  Normal is 5.6 and below.  Treat with low-carb diet and exercise

## 2025-05-14 NOTE — PROGRESS NOTES
The 10-year ASCVD risk score (Carmen BRISENO, et al., 2019) is: 2.5%    Values used to calculate the score:      Age: 45 years      Sex: Female      Is Non- : No      Diabetic: No      Tobacco smoker: No      Systolic Blood Pressure: 120 mmHg      Is BP treated: Yes      HDL Cholesterol: 41 mg/dL      Total Cholesterol: 254 mg/dL

## 2025-05-14 NOTE — RESULT ENCOUNTER NOTE
Please let patient know triglycerides are 382 from 428.  Please [place] out whether she has been taking omega-3 Lovaza, please ask whether she needs a refill.    Please let patient know that their LDL/Badcholesterol is 168.    her  LDL should be under 100 to lower the incidence/risk of CVD which includes heart attack and stroke. her 10 year cardiovascular disease risk score  (Which is the % risk of having a cardiac event or stroke in the next 10 years) is   2.5%. A score >7.5% means she should be treated with a statin drug.     I highly recommend patient starts statin drug to lower LDL and reduce risk for heart attack and stroke. If patient agrees I'll go ahead and send statin prescription.    Some patients  experience muscle cramps/charley horses which could be treated with over-the-counter coenzyme q 10 - 100 mg at night, vitamin E 400 U at night, tonic water (has quinine) 8 ounces in the evening.  Additional treatment recommendations low fat diet-less cheese/butter, fried food, fast food, increase intake of green/salads/fiber/oatmeal for breakfast, an exercise      Vitamin D low at 19.8 please find out whether she needs refill on a weekly vitamin D.    I am happy that her liver numbers have improved and I highly recommend she continues her omega-3 treatment.    Prediabetes stable at 5.8 from 6.  Normal is 5.6 and below.  Treat with low-carb diet and exercise

## 2025-05-16 NOTE — TELEPHONE ENCOUNTER
Her msg is in [employer] and she is active with [employer].  We have tried a few times to reach pt and keep getting VM.

## 2025-05-16 NOTE — TELEPHONE ENCOUNTER
----- Message from Dr. Pete Hylton MD sent at 5/14/2025 12:01 AM EDT -----  Please let patient know triglycerides are 382 from 428.  Please [place] out whether she has been taking omega-3 Lovaza, please ask whether she needs a refill.    Please let patient know that their LDL/Badcholesterol is 168.    her  LDL should be under 100 to lower the incidence/risk of CVD which includes heart attack and stroke. her 10 year cardiovascular disease risk score  (Which is the % risk of having a cardiac event or stroke in the next 10 years) is   2.5%. A score >  7.5% means she should be treated with a statin drug.     I highly recommend patient starts statin drug to lower LDL and reduce risk for heart attack and stroke. If patient agrees I'll go ahead and send statin prescription.    Some patients  experience muscle cramps/charley horses which could be treated with over-the-counter coenzyme q 10 - 100 mg at night, vitamin E 400 U at night, tonic water (has quinine) 8 ounces in the evening.  Additional treatment recommendations low fat diet-less cheese/butter, fried food, fast food, increase intake of green/salads/fiber/oatmeal for breakfast, an exercise      Vitamin D low at 19.8 please find out whether she needs refill on a weekly vitamin D.    I am happy that her liver numbers have improved and I highly recommend she continues her omega-3 treatment.    Prediabetes stable at 5.8 from 6.  Normal is 5.6 and below.  Treat with low-carb diet and exercise

## 2025-05-19 ENCOUNTER — OFFICE VISIT (OUTPATIENT)
Dept: ORTHOPEDIC SURGERY | Age: 45
End: 2025-05-19
Payer: MEDICAID

## 2025-05-19 VITALS — HEIGHT: 62 IN | WEIGHT: 168 LBS | BODY MASS INDEX: 30.91 KG/M2 | RESPIRATION RATE: 12 BRPM

## 2025-05-19 DIAGNOSIS — M25.511 RIGHT SHOULDER PAIN, UNSPECIFIED CHRONICITY: ICD-10-CM

## 2025-05-19 DIAGNOSIS — M19.011 ARTHRITIS OF RIGHT STERNOCLAVICULAR JOINT: Primary | ICD-10-CM

## 2025-05-19 DIAGNOSIS — S46.811A STRAIN OF RIGHT TRAPEZIUS MUSCLE, INITIAL ENCOUNTER: ICD-10-CM

## 2025-05-19 PROCEDURE — 99214 OFFICE O/P EST MOD 30 MIN: CPT | Performed by: ORTHOPAEDIC SURGERY

## 2025-05-19 NOTE — PROGRESS NOTES
[facility]  Office Visit    Date:  2025    Name:  Marciano RAZA Rai  Address:  [address] [place] [address]    :  1980      Age:   45 y.o.    SSN:  [id number]      Medical Record Number:  [id number]    HPI:   Marciano RAZA Rai is a 45 y.o. right hand dominant female here for evaluation of right shoulder pain.  Patient has had right shoulder pain has been going on for about 2 to 3 years in the absence of any specific trauma.  She was evaluated by multiple orthopedic providers was found to have right SC joint osteoarthritis.  She had met with Dr. Carmona and discussed surgical treatments and she was scheduled to move forward but ultimately decided against it.  She returns today with continued pain more in the trapezius area.  She does sometimes have some difficulty swallowing but does not have shortness of breath.  She has done physical therapy about 2 years ago.  She does not recall getting any injections of any kind however it appears that she had a CT-guided injection on 7/15/2023 to the right SC joint..  She does take oral NSAIDs as well as gabapentin which does help with the pain.    Patient states that she does get occasional numbness throughout her extremity but not in a specific pattern. No history of prior neck surgery, or shoulder surgery.    Marciano RAZA Rai is currently working not working. She used to work in manufacturing motor parts at [employer]    Patient is Monegasque speaking with  available    History of Present Illness              Pain Assessment  Location of Pain: Shoulder  Location Modifiers: Right  Severity of Pain: 6  Quality of Pain: Dull, Aching  Duration of Pain: Persistent  Frequency of Pain: Constant  Aggravating Factors: Bending, Stretching, Straightening, Other (Comment) (movement)  Limiting Behavior: Yes  Relieving Factors: Rest, Nsaids  Result of Injury: No  Work-Related Injury: No  Are there other pain locations you wish to document?:

## 2025-05-22 ENCOUNTER — DOCUMENTATION (OUTPATIENT)
Dept: TRANSPLANT | Facility: HOSPITAL | Age: 45
End: 2025-05-22
Payer: COMMERCIAL

## 2025-05-22 NOTE — PROGRESS NOTES
Per [facility] dual policy active. For listing auth fax clinical to [phone].

## 2025-05-30 ENCOUNTER — OFFICE VISIT (OUTPATIENT)
Facility: HOSPITAL | Age: 45
End: 2025-05-30
Payer: COMMERCIAL

## 2025-05-30 ENCOUNTER — NURSE ONLY (OUTPATIENT)
Facility: HOSPITAL | Age: 45
End: 2025-05-30
Payer: COMMERCIAL

## 2025-05-30 ENCOUNTER — DOCUMENTATION (OUTPATIENT)
Facility: HOSPITAL | Age: 45
End: 2025-05-30
Payer: COMMERCIAL

## 2025-05-30 ENCOUNTER — DOCUMENTATION (OUTPATIENT)
Facility: HOSPITAL | Age: 45
End: 2025-05-30

## 2025-05-30 ENCOUNTER — APPOINTMENT (OUTPATIENT)
Facility: HOSPITAL | Age: 45
End: 2025-05-30
Payer: COMMERCIAL

## 2025-05-30 ENCOUNTER — SOCIAL WORK (OUTPATIENT)
Facility: HOSPITAL | Age: 45
End: 2025-05-30
Payer: COMMERCIAL

## 2025-05-30 ENCOUNTER — LAB REQUISITION (OUTPATIENT)
Dept: LAB | Facility: CLINIC | Age: 45
End: 2025-05-30
Payer: COMMERCIAL

## 2025-05-30 VITALS
OXYGEN SATURATION: 98 % | SYSTOLIC BLOOD PRESSURE: 125 MMHG | DIASTOLIC BLOOD PRESSURE: 84 MMHG | TEMPERATURE: 96.8 F | BODY MASS INDEX: 34.59 KG/M2 | HEIGHT: 61 IN | HEART RATE: 89 BPM | WEIGHT: 183.2 LBS

## 2025-05-30 VITALS
TEMPERATURE: 96.8 F | BODY MASS INDEX: 34.15 KG/M2 | DIASTOLIC BLOOD PRESSURE: 84 MMHG | WEIGHT: 183.2 LBS | OXYGEN SATURATION: 98 % | HEART RATE: 89 BPM | SYSTOLIC BLOOD PRESSURE: 125 MMHG

## 2025-05-30 DIAGNOSIS — T86.11 CHRONIC REJECTION OF KIDNEY TRANSPLANT (HHS-HCC): ICD-10-CM

## 2025-05-30 DIAGNOSIS — Z01.818 PRE-TRANSPLANT EVALUATION FOR KIDNEY TRANSPLANT: Primary | ICD-10-CM

## 2025-05-30 DIAGNOSIS — Z13.6 ENCOUNTER FOR SCREENING FOR CARDIOVASCULAR DISORDERS: ICD-10-CM

## 2025-05-30 DIAGNOSIS — Z01.810 ENCOUNTER FOR PREPROCEDURAL CARDIOVASCULAR EXAMINATION: ICD-10-CM

## 2025-05-30 DIAGNOSIS — N18.6 END STAGE RENAL DISEASE (MULTI): ICD-10-CM

## 2025-05-30 DIAGNOSIS — Z01.818 PRE-TRANSPLANT EVALUATION FOR KIDNEY TRANSPLANT: ICD-10-CM

## 2025-05-30 DIAGNOSIS — K82.8 ADENOMYOSIS OF GALLBLADDER: Primary | ICD-10-CM

## 2025-05-30 DIAGNOSIS — N18.6 ESRD (END STAGE RENAL DISEASE) (MULTI): Primary | ICD-10-CM

## 2025-05-30 DIAGNOSIS — Z51.81 ENCOUNTER FOR THERAPEUTIC DRUG LEVEL MONITORING: ICD-10-CM

## 2025-05-30 LAB
ALBUMIN SERPL BCP-MCNC: 4.1 G/DL (ref 3.4–5)
ALP SERPL-CCNC: 89 U/L (ref 33–110)
ALT SERPL W P-5'-P-CCNC: 23 U/L (ref 7–45)
AMYLASE SERPL-CCNC: 71 U/L (ref 29–103)
AST SERPL W P-5'-P-CCNC: 27 U/L (ref 9–39)
BILIRUB DIRECT SERPL-MCNC: 0.2 MG/DL (ref 0–0.3)
BILIRUB SERPL-MCNC: 0.8 MG/DL (ref 0–1.2)
BUN SERPL-MCNC: 33 MG/DL (ref 6–23)
C PEPTIDE SERPL-MCNC: 10.9 NG/ML (ref 0.7–3.9)
CHOLEST SERPL-MCNC: 189 MG/DL (ref 0–199)
CHOLESTEROL/HDL RATIO: 4.3
CMV IGG AVIDITY SERPL IA-RTO: REACTIVE %
CREAT SERPL-MCNC: 7.01 MG/DL (ref 0.5–1.05)
EBV EA IGG SER QL: NEGATIVE
EBV NA AB SER QL: POSITIVE
EBV VCA IGG SER IA-ACNC: POSITIVE
EBV VCA IGM SER IA-ACNC: NEGATIVE
EGFRCR SERPLBLD CKD-EPI 2021: 7 ML/MIN/1.73M*2
ERYTHROCYTE [DISTWIDTH] IN BLOOD BY AUTOMATED COUNT: 15.9 % (ref 11.5–14.5)
FLOW AUTOCROSSMATCH: NORMAL
HBV CORE AB SER QL: REACTIVE
HBV SURFACE AB SER-ACNC: 12.2 MIU/ML
HBV SURFACE AG SERPL QL IA: NONREACTIVE
HCT VFR BLD AUTO: 42 % (ref 36–46)
HCV AB SER QL: NONREACTIVE
HCYS SERPL-SCNC: 18.34 UMOL/L (ref 5–13.9)
HDLC SERPL-MCNC: 44.2 MG/DL
HGB BLD-MCNC: 13.3 G/DL (ref 12–16)
HIV 1+2 AB+HIV1 P24 AG SERPL QL IA: NONREACTIVE
HLA RESULTS: NORMAL
HLA-A+B+C AB NFR SER: NORMAL %
HLA-DP+DQ+DR AB NFR SER: NORMAL %
INR PPP: 1 (ref 0.9–1.1)
MCH RBC QN AUTO: 30 PG (ref 26–34)
MCHC RBC AUTO-ENTMCNC: 31.7 G/DL (ref 32–36)
MCV RBC AUTO: 95 FL (ref 80–100)
NON-HDL CHOLESTEROL: 145 MG/DL (ref 0–149)
NRBC BLD-RTO: 0 /100 WBCS (ref 0–0)
PHOSPHATE SERPL-MCNC: 3.9 MG/DL (ref 2.5–4.9)
PLATELET # BLD AUTO: 194 X10*3/UL (ref 150–450)
PROT SERPL-MCNC: 8.4 G/DL (ref 6.4–8.2)
PROTHROMBIN TIME: 11.1 SECONDS (ref 9.8–12.4)
RBC # BLD AUTO: 4.44 X10*6/UL (ref 4–5.2)
TREPONEMA PALLIDUM IGG+IGM AB [PRESENCE] IN SERUM OR PLASMA BY IMMUNOASSAY: NONREACTIVE
VARICELLA ZOSTER IGG INDEX: 3.6 IA
VZV IGG SER QL IA: POSITIVE
WBC # BLD AUTO: 4.2 X10*3/UL (ref 4.4–11.3)

## 2025-05-30 PROCEDURE — 36415 COLL VENOUS BLD VENIPUNCTURE: CPT

## 2025-05-30 PROCEDURE — 82565 ASSAY OF CREATININE: CPT | Performed by: TRANSPLANT SURGERY

## 2025-05-30 PROCEDURE — 87389 HIV-1 AG W/HIV-1&-2 AB AG IA: CPT | Performed by: TRANSPLANT SURGERY

## 2025-05-30 PROCEDURE — 86481 TB AG RESPONSE T-CELL SUSP: CPT | Performed by: TRANSPLANT SURGERY

## 2025-05-30 PROCEDURE — 86825 HLA X-MATH NON-CYTOTOXIC: CPT | Mod: OUT | Performed by: TRANSPLANT SURGERY

## 2025-05-30 PROCEDURE — 86832 HLA CLASS I HIGH DEFIN QUAL: CPT | Mod: OUT | Performed by: TRANSPLANT SURGERY

## 2025-05-30 PROCEDURE — 80307 DRUG TEST PRSMV CHEM ANLYZR: CPT | Performed by: TRANSPLANT SURGERY

## 2025-05-30 PROCEDURE — 84520 ASSAY OF UREA NITROGEN: CPT | Performed by: TRANSPLANT SURGERY

## 2025-05-30 PROCEDURE — 86644 CMV ANTIBODY: CPT | Performed by: TRANSPLANT SURGERY

## 2025-05-30 PROCEDURE — 84681 ASSAY OF C-PEPTIDE: CPT | Performed by: TRANSPLANT SURGERY

## 2025-05-30 PROCEDURE — 86704 HEP B CORE ANTIBODY TOTAL: CPT | Performed by: TRANSPLANT SURGERY

## 2025-05-30 PROCEDURE — 80076 HEPATIC FUNCTION PANEL: CPT | Performed by: TRANSPLANT SURGERY

## 2025-05-30 PROCEDURE — 86706 HEP B SURFACE ANTIBODY: CPT | Performed by: TRANSPLANT SURGERY

## 2025-05-30 PROCEDURE — 86787 VARICELLA-ZOSTER ANTIBODY: CPT | Performed by: TRANSPLANT SURGERY

## 2025-05-30 PROCEDURE — 87340 HEPATITIS B SURFACE AG IA: CPT | Performed by: TRANSPLANT SURGERY

## 2025-05-30 PROCEDURE — 99215 OFFICE O/P EST HI 40 MIN: CPT | Performed by: STUDENT IN AN ORGANIZED HEALTH CARE EDUCATION/TRAINING PROGRAM

## 2025-05-30 PROCEDURE — 84100 ASSAY OF PHOSPHORUS: CPT | Performed by: TRANSPLANT SURGERY

## 2025-05-30 PROCEDURE — 83718 ASSAY OF LIPOPROTEIN: CPT | Performed by: TRANSPLANT SURGERY

## 2025-05-30 PROCEDURE — 82150 ASSAY OF AMYLASE: CPT | Performed by: TRANSPLANT SURGERY

## 2025-05-30 PROCEDURE — 86780 TREPONEMA PALLIDUM: CPT | Performed by: TRANSPLANT SURGERY

## 2025-05-30 PROCEDURE — 99215 OFFICE O/P EST HI 40 MIN: CPT | Mod: AF | Performed by: INTERNAL MEDICINE

## 2025-05-30 PROCEDURE — 80323 ALKALOIDS NOS: CPT | Performed by: TRANSPLANT SURGERY

## 2025-05-30 PROCEDURE — 86663 EPSTEIN-BARR ANTIBODY: CPT | Performed by: TRANSPLANT SURGERY

## 2025-05-30 PROCEDURE — 85027 COMPLETE CBC AUTOMATED: CPT | Performed by: TRANSPLANT SURGERY

## 2025-05-30 PROCEDURE — 86803 HEPATITIS C AB TEST: CPT | Performed by: TRANSPLANT SURGERY

## 2025-05-30 PROCEDURE — 83090 ASSAY OF HOMOCYSTEINE: CPT | Performed by: TRANSPLANT SURGERY

## 2025-05-30 PROCEDURE — 85610 PROTHROMBIN TIME: CPT | Performed by: TRANSPLANT SURGERY

## 2025-05-30 RX ORDER — CYCLOBENZAPRINE HCL 5 MG
5 TABLET ORAL EVERY 8 HOURS PRN
Qty: 30 TABLET | Refills: 1 | Status: SHIPPED | OUTPATIENT
Start: 2025-05-30 | End: 2026-05-30

## 2025-05-30 ASSESSMENT — PAIN SCALES - GENERAL
PAINLEVEL_OUTOF10: 0-NO PAIN
PAINLEVEL_OUTOF10: 0-NO PAIN

## 2025-05-30 NOTE — PROGRESS NOTES
Kidney Patient Summary    Date of appointment: 2025    Name: Herber Foy Rai    : 1980    MRN: [id number]    Diagnosis: HTN/NSAID    ABO:   ABO TYPE (no units)   Date Value   2024 B        Phase: Referral  Status: Active    Center Waitlist Date:       Last Seen:   Referring Nephrologist: Alistair Holcomb DO     HD Unit: [facility]   Dialysis Start:   Access:       Days:  TTS    PCP:  Indra Kim MD        Endocrinologist:     BMI Readings from Last 1 Encounters:   25 28.76 kg/m²     Blood Transfusion:      Ms. Leblanc is a 44 y.o. female with past medical history significant for ESRD secondary to HTN/NSAID who is s/p DDKT on 23 (Dr. Marbin Lambert & Dr. Louis, KDPI 56%, PRA 48%. HCV -/-, CMV D+/R+, EBV D+/R+). Pt received a total of 4.5 mg/kg thymoglobulin induction therapy in conjunction with solumedrol, was initiated on standard triple immunosuppression therapy (Tacrolimus, Mycophenolate, prednisone).  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     PAST MEDICAL HISTORY:  Medical History        Past Medical History:   Diagnosis Date    Anxiety      Chronic kidney disease      Chronic kidney disease, unspecified       CKD, patient interested in transplantation    Depression      Disease of thyroid gland      GERD (gastroesophageal reflux disease)      Hypertension      Personal history of COVID-19 2022     History of COVID-19            PAST SURGICAL HISTORY:  Surgical History         Past Surgical History:   Procedure Laterality Date    MR HEAD ANGIO W AND WO IV CONTRAST   2021     MR HEAD ANGIO W AND WO IV CONTRAST    MR HEAD ANGIO WO IV CONTRAST   2021     MR HEAD ANGIO WO IV CONTRAST    OTHER SURGICAL HISTORY   2022     Arteriovenous fistula creation procedure    TRANSPLANT, KIDNEY, OPEN Right 2023    US GUIDED PERCUTANEOUS PERITONEAL OR RETROPERITONEAL FLUID COLLECTION DRAINAGE    "12/21/2023     US GUIDED PERCUTANEOUS PERITONEAL OR RETROPERITONEAL FLUID COLLECTION DRAINAGE 12/21/2023 Batsheva Shanks MD [facility]        Donors:     Staff:  Nephrologist: Abdoulaye   Surgeon: Marbin    :      Testing Date:     Serologies:    CMV:     No results found for requested labs within last 365 days.  EBV Panel:  SAGAR-ESPINAL VCA IGG:   EBV VCA IgG Antibody (no units)   Date Value   05/09/2023 POSITIVE (A)       SAGAR-ESPINAL VCA IGM:   EBV VCA IgM Antibody (no units)   Date Value   05/09/2023 NEGATIVE       EBV EARLY ANTIGEN ANTIBODY, IGG:   SAGAR-BARR VIRUS EARLY ANTIGEN ANTIBODY, IGG (no units)   Date Value   05/09/2023 NEGATIVE       EPSTIEN-ESPINAL NUCLEAR ANTIGEN AB:   EBV Nuclear Antigen Antibody, IgG (no units)   Date Value   11/30/2023 Positive (A)         Tox:    AMPHETAMINE SCREEN BLOOD:   Amphetamine Screen, S/P (ng/mL)   Date Value   05/09/2023 Negative       METHAMPHETAMINE, BLOOD, SCREEN:   Methamphetamine Screen, S/P (ng/mL)   Date Value   05/09/2023 Negative       BENZODIAZEPINES SCREEN BLOOD:   Benzodiazepine Screen, S/P (ng/mL)   Date Value   05/09/2023 Negative       BUPRENORPHINE SCREEN BLOOD:  Buprenorphine Screen, S/P (ng/mL)   Date Value   05/09/2023 Negative       CANNABINOIDS SCREEN BLOOD:   Cannabinoids Screen, S/P (ng/mL)   Date Value   05/09/2023 Negative       COCAINE SCREEN BLOOD:   Cocaine Screen Screen, S/P (ng/mL)   Date Value   05/09/2023 Negative       METHADONE SCREEN BLOOD:   No results found for: \"METHA\"    OXYCODONE SCREEN BLOOD:   Oxycodone Screen, S/P (ng/mL)   Date Value   05/09/2023 Negative       PHENCYCLIDINE SCREEN BLOOD:   Phencyclidine Screen, S/P (ng/mL)   Date Value   05/09/2023 Negative       OPIATE SCREEN BLOOD:   Opiate Screen, S/P (ng/mL)   Date Value   05/09/2023 Negative       DRUG SCREEN COMMENT BLOOD:   Drug Screen Comment S/P (no units)   Date Value   05/09/2023 See Note       BARBITURATE SCREEN BLOOD:   Barbiturate Screen, S/P " "(ng/mL)   Date Value   05/09/2023 Negative       Cotinine:   Nicotine Blood Quantitative (ng/mL)   Date Value   05/09/2023 21     Cotinine Blood Quantitative (ng/mL)   Date Value   05/09/2023 415      HBV ag:     Hepatitis B Surface AG (no units)   Date Value   10/30/2024 Nonreactive      HBV ab:     Hepatitis B Surface AB (mIU/mL)   Date Value   10/30/2024 23.2 (H)     HBV c:       Hepatitis B Core AB; IgM (no units)   Date Value   08/19/2024 Nonreactive      HCV:          Hepatitis C AB (no units)   Date Value   08/19/2024 Nonreactive     HIV:      HIV 1/2 Antigen/Antibody Screen with Reflex to Confirmation (no units)   Date Value   11/30/2023 Nonreactive     RPR:      Syphilis Total Ab (no units)   Date Value   05/09/2023 NONREACTIVE      TSpot:   T-SPOT. TB Interpretation (no units)   Date Value   08/19/2024 Negative       VZV:   VARICELLA ZOSTER IGG:   Varicella IgG (no units)   Date Value   05/09/2023 POSITIVE       VARICELLA ZOSTER IGG INDEX:   No results found for: \"IVARG\"    A1C:       HEMOGLOBIN A1C/HEMOGLOBIN TOTAL IN BLOOD: No results found for requested labs within last 365 days.   ESTIMATED AVERAGE GLUCOSE FOR HBA1C: No results found for requested labs within last 365 days.   CPep: No results found for: \"CPEPTIDE\"   PSA:   No results found for: \"PSA\"   Other:     Test/Consult Impression Next Scheduled Date   Chest X Ray 8/14/24 IMPRESSION:  1. Similar findings on current exam mild improvement in right lower  lung field opacification. Possible minimal right pleural effusion  definite left pleural effusion. No evidence of new acute  cardiopulmonary process.      EKG7 /22/24 ECG 12 lead 7/22/2024    Narrative  Normal sinus rhythm  Low voltage QRS  Borderline ECG  When compared with ECG of 22-JUL-2024 16:49,  No significant change was found  See ED provider note for full interpretation and clinical correlation  Confirmed by Loyda Escobedo ([id number]) on 7/24/2024 5:41:56 AM        Echo 7/3/24  CONCLUSIONS:   1. " Left ventricular ejection fraction is normal, by visual estimate at 65-70%.   2. No left ventricular thrombus visualized.   3. There is normal right ventricular global systolic function.   4. No significant valvular pathology.   5. Compared with the prior exam from 8/9/2022 there are no significant edgardo    CT Cardiac Score 8/9/22 FINDINGS:  The score and distribution of calcium in the coronary arteries is as  follows:     LM 0,  LAD 0,  LCx 0,  RCA 0,     Total   0    CT Abd/Pelvis 12/4/24 CT abdomen pelvis wo IV contrast  Result Date: 12/4/2024  Mild right lower quadrant renal transplant hydronephrosis and peritransplant thickening similar to 05/24/2024 however decreased density of the transplant along with mild soft tissue edema, trace free fluid, and small pleural effusions could reflect transplant dysfunction and fluid overload. Clinical correlation recommended.   Mild retroperitoneal lymphadenopathy.   Decompressed thick-walled urinary bladder with perivesical stranding. Cystitis not excluded.   Contracted thick-walled gallbladder with pericholecystic fluid or gallbladder wall thickening. Further evaluation with gallbladder ultrasound may be helpful.   2 cm right adnexal hypodensity, possible physiologic ovarian cyst in a patient of this age.   Additional findings as described above.   MACRO: None.   Signed by: Ammy Hurtado 12/4/2024 1:02 PM Dictation workstation:   [id number]        Colonoscopy  No results found for this or any previous visit.     Pap No results found for requested labs within last 1825 days.  No results found for requested labs within last 1825 days.    Mammogram No results found for this or any previous visit.     Other:

## 2025-05-30 NOTE — PROGRESS NOTES
TRANSPLANT NEPHROLOGY CONSULT :   KIDNEY TRANSPLANT RECIPIENT EVALUATION        SERVICE DATE: 05/30/2025     REASON FOR CONSULT/CHIEF COMPLAINT:    FOR KIDNEY TRANSPLANT RECIPIENT EVALUATION.    HPI:    Ms. Leblanc is a 45 y.o. female with past medical history significant for ESRD on HD since 2018 secondary to HTN/NSAID who is s/p DDKT on 11/30/23 (Dr. Marbin Lambert & Dr. Louis, KDPI 56%, PRA 48%. HCV -/-, CMV D+/R+, EBV D+/R+). 4.5 mg/kg ATG induction, was on standard triple immunosuppression therapy (Tacrolimus, Mycophenolate, prednisone).  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      Post-op course was complicated by return to OR for exploration of transplanted kidney and washout of hematoma. H/o DGF with eventual recovery in renal function.      5/29/24 Direct admission for fever, nausea, vomiting and abdominal pain (chronic).  Dx with CMV disease, new kidney stone with negative MAG3 for obstruction.     Serum Cr ~0.9 till 6/13/24, started trending up gradually since then to peak 3.38 (7/7/24) warranting admission for MARK, hypervolemia. Kidney bx 7/5/24 with acute T-cell IB, acute vascular rejection IIB, and active ABMR. DSA against DQ2 (10,158). Pt is s/p Pulse steroids, Thymo 6mg/kg, PLEX. Cr trended to 2.1-2.3 but later started worsening in the setting of COVID illness.     Rpt bx 7/17/24 with persistent ACR 1A, AVR IIA, ABMR although the degree of inflammation appears improved from prior bx. Pt received additional Thymo 1.5 mg/kg. Pt was restarted on HD during this admission for volume management and clearance and has been RRT dependent since then. S/p ritux as outpt.      Pt has been HD dependent since 7/2024, failed allograft. [facility] Nephrologist: Dr. Sahni.    Here today for re-txp eval.     Tolerating HD well. No problems with UF.     Off immunosuppressants.     C/o pain in left post chest with certain activities x2-3  weeks, pain over the RLQ allograft area for several weeks. Denies any LUTS, hematuria, f/c/ns, n/v/d.    Makes very little urine.     No current potential donor.    The patient is here for kidney transplant recipient evaluation. Ms. Leblanc has had multiple complications from end stage severe renal disease including anemia, secondary hyperparathyroidism, and osteodystrophy. The patient is here today for an evaluation for kidney transplantation to improve quality of life and decrease the risk of cardiovascular disease, coronary artery disease and stroke.     The patient is doing well without complaints. Denied chest pain, shortness of breath, palpitation, dyspnea on exertion, dysuria, fever, nausea, vomiting, diarrhea and flu-liked symptoms. No swelling of the extremities. No recent hospitalization or ED visit.      ROS:  Review of  14 systems was performed system by system. See HPI. Otherwise, the symptoms were negative.    PAST MEDICAL HISTORY: Please see HPI.    Medical History[1]     PAST SURGICAL HISTORY: Please see HPI.    Surgical History[2]     SOCIAL HISTORY: Please see our 's note for details.    Social History     Socioeconomic History    Marital status:      Spouse name: Not on file    Number of children: Not on file    Years of education: Not on file    Highest education level: Not on file   Occupational History    Not on file   Tobacco Use    Smoking status: Never     Passive exposure: Never    Smokeless tobacco: Former     Quit date: 2021    Tobacco comments:     Chewing tobacco, quit 1 year ago   Vaping Use    Vaping status: Never Used   Substance and Sexual Activity    Alcohol use: Never    Drug use: Never    Sexual activity: Defer   Other Topics Concern    Not on file   Social History Narrative    Not on file     Social Drivers of Health     Financial Resource Strain: Patient Declined (7/23/2024)    Overall Financial Resource Strain (CARDIA)     Difficulty of Paying Living Expenses:  Patient declined   Food Insecurity: No Food Insecurity (1/2/2025)    Received from [employer]    Hunger Vital Sign     Worried About Running Out of Food in the Last Year: Never true     Ran Out of Food in the Last Year: Never true   Transportation Needs: No Transportation Needs (1/2/2025)    Received from [employer]    PRAPARE - Transportation     Lack of Transportation (Medical): No     Lack of Transportation (Non-Medical): No   Physical Activity: Inactive (7/3/2024)    Exercise Vital Sign     Days of Exercise per Week: 0 days     Minutes of Exercise per Session: 0 min   Stress: No Stress Concern Present (7/3/2024)    [employer] [place] [employer] - Occupational Stress Questionnaire     Feeling of Stress : Not at all   Social Connections: Not on File (9/8/2024)    Received from [employer]    Social Connections     Connectedness: 0   Intimate Partner Violence: Not At Risk (7/3/2024)    Humiliation, Afraid, Rape, and Kick questionnaire     Fear of Current or Ex-Partner: No     Emotionally Abused: No     Physically Abused: No     Sexually Abused: No   Housing Stability: Patient Declined (7/23/2024)    Housing Stability Vital Sign     Unable to Pay for Housing in the Last Year: Patient declined     Number of Times Moved in the Last Year: 1     Homeless in the Last Year: Patient declined       FAMILY HISTORY:  Family History[3]    MEDICATION LIST:  Current Outpatient Medications   Medication Instructions    acetaminophen (TYLENOL) 650 mg, Every 6 hours PRN    albuterol 90 mcg/actuation inhaler 2 puffs, inhalation, Every 6 hours PRN    amLODIPine (NORVASC) 5 mg, oral, Daily    azelastine (Optivar) 0.05 % ophthalmic solution INSTILL 1 DROP INTO BOTH EYES 2 TIMES A DAY .    Calcium Antacid 200 mg calcium (500 mg) chewable tablet TAKE 1 TABLET BY MOUTH 2 TIMES DAILY (CHEW AND SWALLOW)    carvedilol (COREG) 25 mg, oral, 2 times daily    cholecalciferol (VITAMIN D-3) 100 mcg, oral, Daily    gabapentin (NEURONTIN)  300 mg, oral, Nightly    hydrALAZINE (APRESOLINE) 50 mg, oral, 3 times daily    levothyroxine (SYNTHROID, LEVOXYL) 25 mcg, Daily before breakfast    magnesium oxide (MAG-OX) 400 mg, oral, Daily, Take with food at lunch time    mirtazapine (REMERON) 15 mg, oral, Nightly    pantoprazole (ProtoNix) 40 mg EC tablet TAKE 1 TABLET (40 MG) BY MOUTH EVERY MORNING (BEFORE BREAKFAST).    sertraline (ZOLOFT) 50 mg, Daily    SUMAtriptan (IMITREX) 100 mg, oral, Once as needed       ALLERGY  Allergies[4]    PHYSICAL EXAM:    Visit Vitals  /84 (BP Location: Right arm, Patient Position: Sitting, BP Cuff Size: Adult)   Pulse 89   Temp 36 °C (96.8 °F) (Temporal)   Wt 83.1 kg (183 lb 3.2 oz)   SpO2 98%   BMI 34.15 kg/m²   OB Status Unknown   Smoking Status Never   BSA 1.9 m²        General Appearance - NAD, Good speech, oriented and alert  HEENT - Supple. Not pale. No jaundice. No cervical lymphadenopathy. Pharynx and tonsils are not injected.  CVS - RRR. Normal S1/S2. No murmur, click , rub or gallop  Lungs- clear to auscultation bilaterally  Abdomen - soft , not tender, no guarding, no rigidity. No hepatosplenomegaly. Normal bowel sounds. No masses and ascites.   Musculoskeletal /Extremities - no edema. Full ROM. No joint tenderness.   Neuro/Psych - appropriate mood and affect. Motor power V/V all extremities. CN I -XII were grossly intact.  Skin - No visible rash  Dialysis access :  [facility] AVF is clean, dry and intact. No signs of infection.    LABS:    Lab Results   Component Value Date    WBC 4.4 11/06/2024    HGB 9.0 (L) 11/06/2024    HCT 28.3 (L) 11/06/2024     11/06/2024    ALT 14 08/19/2024    AST 11 08/19/2024     (L) 11/06/2024    K 4.4 11/06/2024    CL 94 (L) 11/06/2024    CREATININE 10.64 (H) 11/06/2024    BUN 58 (H) 11/06/2024    CO2 28 11/06/2024    TSH 0.44 07/09/2024    INR 1.0 09/30/2024    HGBA1C 4.8 05/09/2023     par    EKG:  Reviewed    Echocardiogram:   Echocardiogram: 2022    CONCLUSIONS:  1.  The left ventricular systolic function is normal with a 60-65% estimated ejection fraction.          ASSESSMENT AND PLAN:    Pt may prove to be a reasonable candidate for kidney transplant pending further eval.     Failed kidney txp sec to rejection. Check PRA.    No current potential donor. Has adequate family support.     Update cancer screening per age/sex    Rest of the eval per protocol.    - Flexeril prescribed for musculoskeletal pain in left chest. Further eval per PCP/primary nephrologist if symptoms persist or worsen.    - oral prednisone burst for RLQ allograft tenderness for possible graft intolerance, 60 mg/d x5days to be tapered off weekly.    This patient has ESKD/severe CKD which is major risk for cardiovascular disease. It is necessary to have echocardiogram, stress test and cardiac testing per protocol to ensure safety of kidney transplantation.    The case will be presented at the selection committee at the [facility] [place].  The final decision from the committee will be sent out to notify the patient/primary care physician/ nephrologist. The above recommendations were discussed with the patient at length.     In addition, the following were also discussed:    - Risks and benefits of transplantation, both short-term and long-term    - Risk of primary graft non-function, DGF, SGF, rejection, primary disease recurrence, return to dialysis    - Risks of immunosuppression including infections, CA, CV risk    - Need for compliance with medications and medical care in general    - We reviewed the necessity of HBV vaccination and other recommended vaccines before a kidney transplant, following the Centers for Disease Control and Prevention(CDC)'s guidelines [https://www.cdc.gov/vaccines/schedules/downloads/adult/adult-combined-schedule.pdf]     Currently, the patient has received the following vaccines:    Immunization History   Administered Date(s)  Administered    COVID-19, mRNA, LNP-S, PF, 30 mcg/0.3 mL dose 03/15/2022    Moderna SARS-CoV-2 Vaccination 04/02/2021, 04/30/2021, 05/21/2021         The patient expressed understanding of the above and wishes to proceed.  I answered all of his questions. I urged the patient to look for living donors.    - I have spent over 60 minutes with the patient, reviewing medical record, lab result , CXR result and other specialty's notes. More than 50% of the time was spent in counseling, explaining about the transplantation and answering the questions. I also reviewed the medical record, blood test results, imaging and previous studies which were obtained from the nephrologists. I also order the tests needed to complete the evaluation and I will review the results of those tests.    Thank you for this consultation. Please feel free to contact me for questions.         [1]   Past Medical History:  Diagnosis Date    Anxiety     Chronic kidney disease     Chronic kidney disease, unspecified     CKD, patient interested in transplantation    Depression     Disease of thyroid gland     GERD (gastroesophageal reflux disease)     Hypertension     Personal history of COVID-19 07/20/2022    History of COVID-19   [2]   Past Surgical History:  Procedure Laterality Date    MR HEAD ANGIO W AND WO IV CONTRAST  01/26/2021    MR HEAD ANGIO W AND WO IV CONTRAST    MR HEAD ANGIO WO IV CONTRAST  01/26/2021    MR HEAD ANGIO WO IV CONTRAST    OTHER SURGICAL HISTORY  07/20/2022    Arteriovenous fistula creation procedure    TRANSPLANT, KIDNEY, OPEN Right 11/30/2023    US GUIDED PERCUTANEOUS PERITONEAL OR RETROPERITONEAL FLUID COLLECTION DRAINAGE  12/21/2023    US GUIDED PERCUTANEOUS PERITONEAL OR RETROPERITONEAL FLUID COLLECTION DRAINAGE 12/21/2023 Batsheva Shanks MD [facility]   [3]   Family History  Family history unknown: Yes   [4]   Allergies  Allergen Reactions    Sumatriptan Headache and Dizziness     Patient has worsening headache, dizziness,  chest burning/tingling, and extremity pain upon taking sumatriptan.

## 2025-05-30 NOTE — PROGRESS NOTES
Kidney Transplant Evaluation Office Visit    Chief Complaint: Patient presents for kidney transplant evaluation    History of Present Illness:  Herber Foy Rai  is a 45 y.o. female presents with ESRD from Hypertension/ NSAID use and was on HD since 2018 until 11/2023 when she underwent a DDKT (KDPI 56, PRA 48) with ATG 4.5 induction and switched to [place] on POD 6 due to hemolytic anemia on initial Tac. She underwent a post-op return to OR for hematoma and bleeding control. Had DGF that eventually resolved.    In 5/2024 she developed CMV viremia and allograft nephrolithiasis, followed by kidney biopsy showing TCMR IB, acute vascular rejection IIB, and active ABMR as well as new DSA. She underwent treatment with Pulse steroids, Thymo 6mg/kg, PLEX with mild improvement but ultimately creatinine worsened prompting re-biopsy in 7/2024 that showed persistent ACR 1A, AVR IIA, ABMR and received additional Thymo 1.5 mg/kg and rituximab. She has been HD dependent since this admission in 7/2024. Currently tolerating HD via LUE AVF without complications. She is off immunosuppression.     Additional medical history includes Obesity BMI 35, Hypertension, Hypothyroidism, DM2, Depression, and GERD.    She returns today for an evaluation visit for a re-transplant. In clinic today, she denies any chest pain, SOB, palpitations, as well as any recent fever, abdominal pain, difficulty voiding or other urinary symptoms, constipation, or poor oral intake.    She does complain of intermittent left upper abdominal pain - musculoskeletal as well as episodic Right lower abdominal pain at the kidney allograft.    Hemodialysis: 3 times a week   Dialysis Access: LUE AVF  Urine Status: oliguric.   Disease Etiology: NSAID use, diabetic nephropathy and hypertensive nephropathy.   Disease Complications: dyslipidemia and hypertension.   PVD: NO  Prior Abdominal Surgery: YES - RLQ DDKT  Prior Malignancy: NO   BMI: Body mass index is  34.62 kg/m².  Potential Living Donors: NO  - discussed    Review of Systems:  Cardiac: Denies chest pain, palpitations  : minimal urine output. Denies history of gross hematuria, nephrolithiasis, urinary retention, or recurrent UTIs.  Vascular: Denies personal or familial history of DVT/PE. No active claudication or non-healing LE wounds.  Extremities: LE Edema -   Functional Status: Can walk up 2 flights of stairs    Past Medical History:  ESRD on Dialysis  Failed DDKT -  Hypertension  DM2  Depression  Obesity  GERD  Gallbladder Adenomyosis    Past Surgical History:  RLQ DDKT    Social History:  Functional, independent in ADLs and iADLs  Denies smoking etoh or drug use  , brother, friend here today    Transfusions: Yes  Pregnancy:  Yes  Prior transplant: RLQ DDKT [id number]    Family History:  Mother: n/a  Father: n/a  Sibling: n/a    Physical Exam:  Vitals:    25 1314   BP: 125/84   Pulse: 89   Temp: 36 °C (96.8 °F)   SpO2: 98%     Gen: A+OX3; NAD  HEENT: PERRL, sclera anicteric, MMM  Cardiac: RRR  Chest: Normal inspiratory effort  Abdomen: S/NT/ND. RLQ DDKT scar noted, tenderness over the allograft  Ext: No LE edema  Vascular: 2+ palpable femoral pulses  Psychiatric: Normal mood, affect    Assessment/Plan:  - The patient is a reasonable candidate for kidney transplantation pending complete evaluation    - Obtain a follow-up Gallbladder US for incidentally found Adenomyosis   - PO Steroid taper for allograft pain/ tenderness, monitor response and will discuss next steps  - Send HLA/PRA    - Routine age/gender based screening  - Cardiac testing per protocol: ECHO/CT Ca score/ stress test  - Non-contrast CT scan abdomen/pelvis - reviewed, good     Transplant Education:    I had a discussion with this patient regarding 1 year graft and patient survival statistics following renal transplantation for both living and  donor allograft recipients. This data included [facility] data  compared to National data readily available for review on https://www.SRTR.org. The patient also had attended the kidney transplant education class provided by the transplant institute.     The difference between allograft function was discussed comparing living donor, KDPI 0-85%, and >85% kidneys.     Further discussion included:  -The transplant selection committee process.  -The need for lifelong immunosuppressive therapy, and the side effects of these medications including the risk of infections, cancer, and lymphoma.  -The wait list time approximately is 5 years or more for  donor transplants and the statistical superiority of a living donor.     -Using identified donors with risk criteria for transmission of infection  -The possibility of utilizing  donors with known HCV antibody and/or SENAIT positivity and post-transplant treatment/surveillance protocol  -Potential transmission of infectious disease from any  donors, as well as living donors.   -The possibility of transmission of tumors and infections via the transplanted organ.  -The inability to completely test for all potential harmful tumors or infectious agents.  -The possibility of listing at multiple locations.     Surgical complications including need for reoperation(s) including but not limited to:  -Bleeding.  -Repair of leaks.  -Control of infection.  -Blood clots in the transplant vessels.  -Possible kidney transplant removal.     The medical complications including but not limited to:  -Death.  -Cardiac.  -Pulmonary.  -Infectious.  -Neurologic.  -Other Complications.     We also discussed how the kidney transplant could function:  -Non-function and possible kidney transplant removal within the first 3 to 6 months.  -Delayed graft function (dialysis needed after transplant).  -The potential of recurrence of kidney disease leading to kidney transplant graft loss.    Time Attestation:  I spent 60 minutes with the patient, over  50 minutes in counseling and education as outlined above.    Maryam Bhatt MD, [facility]  Transplant & Hepatobiliary Surgery

## 2025-05-30 NOTE — PROGRESS NOTES
Patient attended class on 05/30/2025.  Accompanied to class with , family friend, and [facility] .  Oral and written education was provided.  Patient remained attentive throughout the review session, asking appropriate questions.  Evaluation consents were signed.     HLA sensitizing questionnaire completed by patient and emailed to HLA Pathology.    PRE-TRANSPLANT EDUCATION  Patient received education regarding the following topics as part of their pre-transplant evaluation:  The evaluation process, including:   Transplant team members and roles    Required consultations and testing   Selection criteria and suitability for transplant   Listing process and receiving an organ offer   Psychosocial and financial considerations for a successful transplant   Patient responsibilities, including the necessity of adhering to a strict medical regimen  An overview of the surgical procedure   Potential medical, surgical, and psychosocial risks to transplantation, including:   Wound infection   Pneumonia   Blood clot formation   Organ rejection, failure, and possibility of re-transplantation   Lifetime immunosuppression therapy and associated risks   Arrhythmias and cardiovascular collapse   Multi-organ system failure   Death   Depression   Post-Traumatic Stress Disorder   Generalized anxiety, issues of dependence, and feelings of guilt  Available alternatives to transplantation  Donor risk factors that could affect the success of the transplant and the health of the patient, including:   Donor age   Donor medical and social history   Condition of the organ   Risk of lana cancer, HIV, Hepatitis B, Hepatitis C, or malaria if the infection is not detectable at the time of donation  Patient?s right to withdraw consent for transplantation at any time during the process  Transplants not performed in a Medicare-approved transplant center could affect the patient?s ability to have immunosuppression medication paid  for under Medicare part B.   Multiple listing options.    Patient was given the opportunity to have questions answered. Patient was provided a copy of the informed consent for transplant evaluation.    Signed evaluation informed consent received? 05/30/2025

## 2025-06-02 LAB
AMPHETAMINES SERPL QL SCN: NEGATIVE NG/ML
ANNOTATION COMMENT IMP: NORMAL
BARBITURATES SERPL QL SCN: NEGATIVE NG/ML
BENZODIAZ SERPL QL SCN: NEGATIVE NG/ML
BUPRENORPHINE SERPL-MCNC: NEGATIVE NG/ML
CANNABINOIDS SERPL QL SCN: NEGATIVE NG/ML
COCAINE SERPL QL SCN: NEGATIVE NG/ML
METHADONE SERPL QL SCN: NEGATIVE NG/ML
METHAMPHET SERPL QL: NEGATIVE NG/ML
NIL(NEG) CONTROL SPOT COUNT: NORMAL
OPIATES SERPL QL SCN: NEGATIVE NG/ML
OXYCODONE SERPL QL: NEGATIVE NG/ML
PANEL A SPOT COUNT: 0
PANEL B SPOT COUNT: 0
PCP SERPL QL SCN: NEGATIVE NG/ML
POS CONTROL SPOT COUNT: NORMAL
T-SPOT. TB INTERPRETATION: NEGATIVE

## 2025-06-02 RX ORDER — PREDNISONE 10 MG/1
TABLET ORAL
Qty: 79 TABLET | Refills: 0 | Status: SHIPPED | OUTPATIENT
Start: 2025-06-02 | End: 2025-06-28

## 2025-06-03 LAB
COTININE SERPL-MCNC: 304 NG/ML
NICOTINE SERPL-MCNC: 12 NG/ML

## 2025-06-04 ENCOUNTER — OFFICE VISIT (OUTPATIENT)
Dept: ORTHOPEDIC SURGERY | Age: 45
End: 2025-06-04
Payer: MEDICAID

## 2025-06-04 VITALS — HEIGHT: 62 IN | WEIGHT: 168 LBS | BODY MASS INDEX: 30.91 KG/M2

## 2025-06-04 DIAGNOSIS — M67.911 ROTATOR CUFF DISORDER, RIGHT: ICD-10-CM

## 2025-06-04 DIAGNOSIS — M19.011 ARTHRITIS OF RIGHT STERNOCLAVICULAR JOINT: ICD-10-CM

## 2025-06-04 DIAGNOSIS — M25.511 RIGHT SHOULDER PAIN, UNSPECIFIED CHRONICITY: Primary | ICD-10-CM

## 2025-06-04 LAB
HLA RESULTS: NORMAL
HLA-A+B+C AB NFR SER: NORMAL %
HLA-DP+DQ+DR AB NFR SER: NORMAL %

## 2025-06-04 PROCEDURE — 99214 OFFICE O/P EST MOD 30 MIN: CPT | Performed by: ORTHOPAEDIC SURGERY

## 2025-06-04 RX ORDER — ACETAMINOPHEN 325 MG/1
TABLET ORAL
COMMUNITY
Start: 2025-05-07

## 2025-06-04 NOTE — PROGRESS NOTES
proceed with an MRI of her shoulder.  This will allow us to assess her rotator cuff.  Treatment plan will depend on the findings on the MRI.  We we will plan to see her back after the MRI.  The patient is in agreement with the treatment plan.    Marciano RAZA Rai will follow up after MRI. She was in agreement with this plan and all questions were answered to her satisfaction. She was encouraged to call with any questions.       Emory Oswald MD  Sports Medicine Fellow  [facility] [place]  6/4/2025    Dr. Ayo Carmona also reviewed this patient's history, assisted in obtaining history from the patient, conducting a physical exam, reviewed imaging, provided patient education and further coordinating care.     This dictation was performed with a verbal recognition program (DRAGON) and it was checked for errors.  It is possible that there are still dictated errors within this office note.  If so, please bring any areas to my attention for an addendum.  All efforts were made to ensure that this office note is accurate.    ______________  I was physically present and personally supervised the Orthopaedic Sports Medicine Fellow in the evaluation and development of a treatment plan for this patient. I personally interviewed the patient and performed a physical examination. In addition, I discussed the patient's condition and treatment options with them. I have also reviewed and agree with the past medical, family and social history unless otherwise noted. All of the patient's questions were answered.         Ayo Carmona MD, PhD  6/4/2025

## 2025-06-04 NOTE — PROGRESS NOTES
"SW met with Pt , Pt's , and Pt's friend for psychosocial update with  present. Pt was pleasant and engaged. Pt has [facility] for insurance. Pt denied any recent hospitalizations or emergency department visits. Pt reported good compliance with medications, appointments, and dialysis treatments. Pt shared she always takes her medications but sometimes the time of day fluctuates. Pt's primary support remains her , Ajith, and Pt's secondary support remains her friend, Danuta. Pt's friend shared Pt's son, Melani, is also available to help now that he is able to drive. Pt described her mood as \"it's all fresh.\" Pt shared having time to adjust to the news and previous transplant experience has helped this process feel less overwhelming. Pt denied any current MH concerns. Pt denied any recent tobacco, alcohol, or illicit drug use. Pt is low psychosocial risk.     Plan: SW will follow up with Pt annually.    "

## 2025-06-06 ENCOUNTER — DOCUMENTATION (OUTPATIENT)
Facility: HOSPITAL | Age: 45
End: 2025-06-06
Payer: COMMERCIAL

## 2025-06-08 DIAGNOSIS — N18.6 ESRD (END STAGE RENAL DISEASE) (MULTI): ICD-10-CM

## 2025-06-09 LAB
FLOW AUTOCROSSMATCH: NORMAL
HLA RESULTS: NORMAL

## 2025-06-09 RX ORDER — CYCLOBENZAPRINE HCL 5 MG
5 TABLET ORAL EVERY 8 HOURS PRN
Qty: 30 TABLET | Refills: 1 | Status: SHIPPED | OUTPATIENT
Start: 2025-06-09 | End: 2026-06-09

## 2025-06-13 PROBLEM — Z00.00 ENCOUNTER FOR WELL ADULT EXAM WITHOUT ABNORMAL FINDINGS: Status: RESOLVED | Noted: 2024-01-09 | Resolved: 2025-06-13

## 2025-06-20 ENCOUNTER — HOSPITAL ENCOUNTER (OUTPATIENT)
Dept: RADIOLOGY | Facility: CLINIC | Age: 45
Discharge: HOME | End: 2025-06-20
Payer: COMMERCIAL

## 2025-06-20 VITALS — WEIGHT: 183.2 LBS | HEIGHT: 61 IN | BODY MASS INDEX: 34.59 KG/M2

## 2025-06-20 DIAGNOSIS — K82.8 ADENOMYOSIS OF GALLBLADDER: ICD-10-CM

## 2025-06-20 DIAGNOSIS — Z12.31 SCREENING MAMMOGRAM FOR BREAST CANCER: ICD-10-CM

## 2025-06-20 PROCEDURE — 77063 BREAST TOMOSYNTHESIS BI: CPT

## 2025-06-20 PROCEDURE — 76705 ECHO EXAM OF ABDOMEN: CPT

## 2025-06-24 ENCOUNTER — HOSPITAL ENCOUNTER (OUTPATIENT)
Dept: RADIOLOGY | Facility: EXTERNAL LOCATION | Age: 45
Discharge: HOME | End: 2025-06-24

## 2025-06-25 ENCOUNTER — OFFICE VISIT (OUTPATIENT)
Dept: ORTHOPEDIC SURGERY | Age: 45
End: 2025-06-25

## 2025-06-25 VITALS — WEIGHT: 168 LBS | HEIGHT: 62 IN | BODY MASS INDEX: 30.91 KG/M2

## 2025-06-25 DIAGNOSIS — M75.00 DIABETIC FROZEN SHOULDER ASSOCIATED WITH TYPE 2 DIABETES MELLITUS (HCC): Primary | ICD-10-CM

## 2025-06-25 DIAGNOSIS — M19.011 OSTEOARTHRITIS OF RIGHT STERNOCLAVICULAR JOINT: ICD-10-CM

## 2025-06-25 DIAGNOSIS — M25.511 RIGHT SHOULDER PAIN, UNSPECIFIED CHRONICITY: ICD-10-CM

## 2025-06-25 DIAGNOSIS — E11.618 DIABETIC FROZEN SHOULDER ASSOCIATED WITH TYPE 2 DIABETES MELLITUS (HCC): Primary | ICD-10-CM

## 2025-06-25 RX ORDER — LIDOCAINE HYDROCHLORIDE 10 MG/ML
8 INJECTION, SOLUTION INFILTRATION; PERINEURAL ONCE
Status: COMPLETED | OUTPATIENT
Start: 2025-06-25 | End: 2025-06-25

## 2025-06-25 RX ORDER — METHYLPREDNISOLONE ACETATE 40 MG/ML
80 INJECTION, SUSPENSION INTRA-ARTICULAR; INTRALESIONAL; INTRAMUSCULAR; SOFT TISSUE ONCE
Status: COMPLETED | OUTPATIENT
Start: 2025-06-25 | End: 2025-06-25

## 2025-06-25 RX ADMIN — LIDOCAINE HYDROCHLORIDE 8 ML: 10 INJECTION, SOLUTION INFILTRATION; PERINEURAL at 17:23

## 2025-06-25 RX ADMIN — METHYLPREDNISOLONE ACETATE 80 MG: 40 INJECTION, SUSPENSION INTRA-ARTICULAR; INTRALESIONAL; INTRAMUSCULAR; SOFT TISSUE at 17:25

## 2025-06-30 ENCOUNTER — TELEPHONE (OUTPATIENT)
Dept: TRANSPLANT | Facility: HOSPITAL | Age: 45
End: 2025-06-30
Payer: COMMERCIAL

## 2025-06-30 DIAGNOSIS — N80.03 ADENOMYOSIS: ICD-10-CM

## 2025-06-30 DIAGNOSIS — Z01.818 PRE-TRANSPLANT EVALUATION FOR KIDNEY TRANSPLANT: ICD-10-CM

## 2025-06-30 RX ORDER — REGADENOSON 0.08 MG/ML
0.4 INJECTION, SOLUTION INTRAVENOUS
OUTPATIENT
Start: 2025-06-30

## 2025-06-30 RX ORDER — AMINOPHYLLINE 25 MG/ML
125 INJECTION, SOLUTION INTRAVENOUS ONCE AS NEEDED
OUTPATIENT
Start: 2025-06-30

## 2025-06-30 NOTE — PROGRESS NOTES
Eval Clinic Note:  Patient attended evaluation appts on 05/30/25 with Dr. Bhatt and Dr. España.    Listing Consent: DCD and Hepatitis C.  Patient was provided with a copy of the checklist of needed testing and coordinator contact info.    LISTING EDUCATION    Patient educated regarding the following prior to placement on the transplant waiting list:  The patient?s medical condition, prognosis, and treatment plan.  The expectations and patient responsibilities while on the waiting list, including:  Keeping the transplant center informed of any changes in contact information or insurance coverage  Notifying the transplant center of any changes in medical status  Required testing and/or re-evaluation appointments while awaiting transplant  An overview of the surgical procedure, including potential risks and alternatives.  Information regarding what to expect during the inpatient admission and recovery period.  A discussion regarding organ offers and types of potential donors, including potential risks that may be associated with specific types of donors that could affect the success of the transplant or the health of the patient.  The right to refuse transplantation.     Patient was given the opportunity to have questions answered. Patient was provided a copy of the informed consent for transplant listing.    Education provided by:  Transplant Coordinator: Lizz Cespedes RN  Transplant Physician: Dr. Taco TRUJILLO    Signed listing informed consent received? 05/30/25  Patient agrees to be listed for the following:  KDPI > 85% [no]  Donors After Circulatory Death (DCD) [yes]  Donors with a Positive Core Antibody for Hepatitis B [no]  Donors with Hepatitis C Virus to recipients with hepatitis C [no]  Donors with Hepatitis C Virus to Negative hepatitis C recipients [yes]    Patient will be discussed at an upcoming selection committee to determine eligibility to be placed on the [facility] waiting list.

## 2025-07-01 ENCOUNTER — DOCUMENTATION (OUTPATIENT)
Dept: TRANSPLANT | Facility: HOSPITAL | Age: 45
End: 2025-07-01
Payer: COMMERCIAL

## 2025-07-01 ENCOUNTER — OTHER (OUTPATIENT)
Facility: HOSPITAL | Age: 45
End: 2025-07-01
Payer: COMMERCIAL

## 2025-07-01 NOTE — PROGRESS NOTES
"Spoke to pt on phone with  for virtual fin apt re benefits; [employer] dual policy active. Pt understands AR meds will be covered by Medicare part B at 80%.    Discussed Part D coverage, pt is receiving the \"extra help\".  Discussed Donor bnfts. Discussed importance of Redeterms [place]; 11/2025 . Patient had no insurance concerns; provide pt with [facility] contact info.  "

## 2025-07-08 ENCOUNTER — APPOINTMENT (OUTPATIENT)
Facility: HOSPITAL | Age: 45
End: 2025-07-08
Payer: COMMERCIAL

## 2025-07-09 ENCOUNTER — HOSPITAL ENCOUNTER (OUTPATIENT)
Dept: PHYSICAL THERAPY | Age: 45
Setting detail: THERAPIES SERIES
Discharge: HOME | End: 2025-07-09
Attending: ORTHOPAEDIC SURGERY
Payer: MEDICARE

## 2025-07-09 DIAGNOSIS — G89.29 CHRONIC RIGHT SHOULDER PAIN: Primary | ICD-10-CM

## 2025-07-09 DIAGNOSIS — M25.511 CHRONIC RIGHT SHOULDER PAIN: Primary | ICD-10-CM

## 2025-07-09 PROCEDURE — 97110 THERAPEUTIC EXERCISES: CPT

## 2025-07-09 PROCEDURE — 97140 MANUAL THERAPY 1/> REGIONS: CPT

## 2025-07-09 PROCEDURE — 97161 PT EVAL LOW COMPLEX 20 MIN: CPT

## 2025-07-09 NOTE — PLAN OF CARE
supraspinatus: Positive on R  Ag Lee (impingement): Negative  Neer's impingement: Negative    Balance:  WNL    Falls Risk Assessment (30 days):   Falls Risk assessed and no intervention required.  Time Up and Go (TUG):   Not Assessed        Exercises/Interventions     Interpretation provided by: Live  (Alyce)    Therapeutic Ex (51913)  resistance Sets/time Reps Notes/Cues/Progressions          S/L sleeper stretch  20\" 5    Supine cane flexion AAROM  1 15    Supine hammock stretch  20\" 5    Crossbody stretch  10\" 10    Scap retractions  1 15 Frequent verbal and tactile cues                               Manual Intervention (20183)  TIME            R GH inf and post jt mobs  4'     R shoulder PROM  6'  All directions                 NMR re-education (12283) resistance Sets/time Reps CUES NEEDED                                      Therapeutic Activity (37077)  Sets/time            Pt education  5'  Diagnosis, PT POC, HEP                          Modalities:    No modalities applied this session    Education/Home Exercise Program: Patient HEP program created electronically.  Refer to [email or web address] access code: [email or web address]      ASSESSMENT   Assessment:   Marciano RAZA Rai is a 45 y.o. female presenting today to Outpatient PT with signs and symptoms consistent with R shoulder pain.    Pt. presents with the functional impairments and activity limitations listed below and would benefit from Outpatient PT to address the below impairments as well as improve pain, and restore function.     Functional Impairments:   Noted spinal or UE joint hypomobility  Decreased UE functional ROM  Decreased UE functional strength  Decreased RC/scapular/core strength and neuromuscular control    Functional Activity Limitations (from functional questionnaire and intake):  Any of your usual work, housework, or school activities  Lifting a bag of groceries to waist level  Lifting a bag of groceries above your head  Vacuuming, sweeping or

## 2025-07-11 ENCOUNTER — APPOINTMENT (OUTPATIENT)
Dept: RADIOLOGY | Facility: CLINIC | Age: 45
End: 2025-07-11
Payer: COMMERCIAL

## 2025-07-11 DIAGNOSIS — R06.02 SHORTNESS OF BREATH: ICD-10-CM

## 2025-07-11 DIAGNOSIS — T86.11 CHRONIC REJECTION OF KIDNEY TRANSPLANT (HHS-HCC): ICD-10-CM

## 2025-07-11 DIAGNOSIS — Z94.0 KIDNEY REPLACED BY TRANSPLANT (HHS-HCC): ICD-10-CM

## 2025-07-12 RX ORDER — PREDNISONE 10 MG/1
TABLET ORAL
Qty: 79 TABLET | Refills: 0 | OUTPATIENT
Start: 2025-07-12 | End: 2025-08-07

## 2025-07-12 RX ORDER — ALBUTEROL SULFATE 90 UG/1
INHALANT RESPIRATORY (INHALATION)
Qty: 6.7 G | OUTPATIENT
Start: 2025-07-12

## 2025-07-14 ENCOUNTER — HOSPITAL ENCOUNTER (OUTPATIENT)
Dept: RADIOLOGY | Facility: HOSPITAL | Age: 45
End: 2025-07-14
Payer: COMMERCIAL

## 2025-07-14 ENCOUNTER — HOSPITAL ENCOUNTER (OUTPATIENT)
Dept: RADIOLOGY | Facility: HOSPITAL | Age: 45
Discharge: HOME | End: 2025-07-14
Payer: COMMERCIAL

## 2025-07-14 ENCOUNTER — APPOINTMENT (OUTPATIENT)
Dept: CARDIOLOGY | Facility: HOSPITAL | Age: 45
End: 2025-07-14
Payer: COMMERCIAL

## 2025-07-14 DIAGNOSIS — Z01.810 ENCOUNTER FOR PREPROCEDURAL CARDIOVASCULAR EXAMINATION: ICD-10-CM

## 2025-07-14 DIAGNOSIS — Z01.818 PRE-TRANSPLANT EVALUATION FOR KIDNEY TRANSPLANT: ICD-10-CM

## 2025-07-14 PROCEDURE — 78452 HT MUSCLE IMAGE SPECT MULT: CPT

## 2025-07-14 PROCEDURE — A9502 TC99M TETROFOSMIN: HCPCS | Performed by: STUDENT IN AN ORGANIZED HEALTH CARE EDUCATION/TRAINING PROGRAM

## 2025-07-14 PROCEDURE — 3430000001 HC RX 343 DIAGNOSTIC RADIOPHARMACEUTICALS: Performed by: STUDENT IN AN ORGANIZED HEALTH CARE EDUCATION/TRAINING PROGRAM

## 2025-07-14 RX ADMIN — TETROFOSMIN 11.35 MILLICURIE: 0.23 INJECTION, POWDER, LYOPHILIZED, FOR SOLUTION INTRAVENOUS at 10:17

## 2025-07-16 ENCOUNTER — NURSE ONLY (OUTPATIENT)
Facility: HOSPITAL | Age: 45
End: 2025-07-16
Payer: COMMERCIAL

## 2025-07-16 ENCOUNTER — HOSPITAL ENCOUNTER (OUTPATIENT)
Dept: CARDIOLOGY | Facility: HOSPITAL | Age: 45
Discharge: HOME | End: 2025-07-16
Payer: COMMERCIAL

## 2025-07-16 ENCOUNTER — HOSPITAL ENCOUNTER (OUTPATIENT)
Dept: RADIOLOGY | Facility: HOSPITAL | Age: 45
Discharge: HOME | End: 2025-07-16
Payer: COMMERCIAL

## 2025-07-16 DIAGNOSIS — D70.8 OTHER NEUTROPENIA: ICD-10-CM

## 2025-07-16 DIAGNOSIS — Z01.818 PRE-TRANSPLANT EVALUATION FOR KIDNEY TRANSPLANT: ICD-10-CM

## 2025-07-16 DIAGNOSIS — Z01.810 ENCOUNTER FOR PREPROCEDURAL CARDIOVASCULAR EXAMINATION: ICD-10-CM

## 2025-07-16 DIAGNOSIS — Z94.0 KIDNEY REPLACED BY TRANSPLANT (HHS-HCC): ICD-10-CM

## 2025-07-16 LAB
ABO GROUP (TYPE) IN BLOOD: NORMAL
BASOPHILS # BLD AUTO: 0.06 X10*3/UL (ref 0–0.1)
BASOPHILS NFR BLD AUTO: 1.3 %
EOSINOPHIL # BLD AUTO: 0.18 X10*3/UL (ref 0–0.7)
EOSINOPHIL NFR BLD AUTO: 3.8 %
ERYTHROCYTE [DISTWIDTH] IN BLOOD BY AUTOMATED COUNT: 14.8 % (ref 11.5–14.5)
HCT VFR BLD AUTO: 43.9 % (ref 36–46)
HGB BLD-MCNC: 14.1 G/DL (ref 12–16)
IMM GRANULOCYTES # BLD AUTO: 0.16 X10*3/UL (ref 0–0.7)
IMM GRANULOCYTES NFR BLD AUTO: 3.4 % (ref 0–0.9)
LYMPHOCYTES # BLD AUTO: 1.67 X10*3/UL (ref 1.2–4.8)
LYMPHOCYTES NFR BLD AUTO: 35 %
MCH RBC QN AUTO: 30 PG (ref 26–34)
MCHC RBC AUTO-ENTMCNC: 32.1 G/DL (ref 32–36)
MCV RBC AUTO: 93 FL (ref 80–100)
MONOCYTES # BLD AUTO: 0.75 X10*3/UL (ref 0.1–1)
MONOCYTES NFR BLD AUTO: 15.7 %
NEUTROPHILS # BLD AUTO: 1.95 X10*3/UL (ref 1.2–7.7)
NEUTROPHILS NFR BLD AUTO: 40.8 %
NRBC BLD-RTO: 0 /100 WBCS (ref 0–0)
PLATELET # BLD AUTO: 204 X10*3/UL (ref 150–450)
RBC # BLD AUTO: 4.7 X10*6/UL (ref 4–5.2)
RH FACTOR (ANTIGEN D): NORMAL
WBC # BLD AUTO: 4.8 X10*3/UL (ref 4.4–11.3)

## 2025-07-16 PROCEDURE — A9502 TC99M TETROFOSMIN: HCPCS | Performed by: STUDENT IN AN ORGANIZED HEALTH CARE EDUCATION/TRAINING PROGRAM

## 2025-07-16 PROCEDURE — 2500000004 HC RX 250 GENERAL PHARMACY W/ HCPCS (ALT 636 FOR OP/ED): Mod: JW | Performed by: STUDENT IN AN ORGANIZED HEALTH CARE EDUCATION/TRAINING PROGRAM

## 2025-07-16 PROCEDURE — 71046 X-RAY EXAM CHEST 2 VIEWS: CPT

## 2025-07-16 PROCEDURE — C8929 TTE W OR WO FOL WCON,DOPPLER: HCPCS

## 2025-07-16 PROCEDURE — 86832 HLA CLASS I HIGH DEFIN QUAL: CPT | Mod: OUT | Performed by: TRANSPLANT SURGERY

## 2025-07-16 PROCEDURE — 93017 CV STRESS TEST TRACING ONLY: CPT

## 2025-07-16 PROCEDURE — 93306 TTE W/DOPPLER COMPLETE: CPT | Performed by: INTERNAL MEDICINE

## 2025-07-16 PROCEDURE — 2500000004 HC RX 250 GENERAL PHARMACY W/ HCPCS (ALT 636 FOR OP/ED): Performed by: STUDENT IN AN ORGANIZED HEALTH CARE EDUCATION/TRAINING PROGRAM

## 2025-07-16 PROCEDURE — 93018 CV STRESS TEST I&R ONLY: CPT | Performed by: INTERNAL MEDICINE

## 2025-07-16 PROCEDURE — 85025 COMPLETE CBC W/AUTO DIFF WBC: CPT

## 2025-07-16 PROCEDURE — 0932T N-INVS DET HRT FAIL AUG ECHO: CPT | Performed by: INTERNAL MEDICINE

## 2025-07-16 PROCEDURE — 93016 CV STRESS TEST SUPVJ ONLY: CPT | Performed by: INTERNAL MEDICINE

## 2025-07-16 PROCEDURE — 86901 BLOOD TYPING SEROLOGIC RH(D): CPT | Performed by: STUDENT IN AN ORGANIZED HEALTH CARE EDUCATION/TRAINING PROGRAM

## 2025-07-16 PROCEDURE — 3430000001 HC RX 343 DIAGNOSTIC RADIOPHARMACEUTICALS: Performed by: STUDENT IN AN ORGANIZED HEALTH CARE EDUCATION/TRAINING PROGRAM

## 2025-07-16 RX ORDER — REGADENOSON 0.08 MG/ML
0.4 INJECTION, SOLUTION INTRAVENOUS
Status: COMPLETED | OUTPATIENT
Start: 2025-07-16 | End: 2025-07-16

## 2025-07-16 RX ORDER — AMINOPHYLLINE 25 MG/ML
125 INJECTION, SOLUTION INTRAVENOUS ONCE AS NEEDED
Status: DISCONTINUED | OUTPATIENT
Start: 2025-07-16 | End: 2025-07-17 | Stop reason: HOSPADM

## 2025-07-16 RX ADMIN — HUMAN ALBUMIN MICROSPHERES AND PERFLUTREN 0.5 ML: 10; .22 INJECTION, SOLUTION INTRAVENOUS at 15:21

## 2025-07-16 RX ADMIN — TETROFOSMIN 35.5 MILLICURIE: 0.23 INJECTION, POWDER, LYOPHILIZED, FOR SOLUTION INTRAVENOUS at 11:31

## 2025-07-16 RX ADMIN — AMINOPHYLLINE 125 MG: 25 INJECTION, SOLUTION INTRAVENOUS at 11:48

## 2025-07-16 RX ADMIN — REGADENOSON 0.4 MG: 0.08 INJECTION, SOLUTION INTRAVENOUS at 11:47

## 2025-07-16 NOTE — NURSING NOTE
Pre stress EKG was NSR with prolonged QT. EKG sent to Dr. Hackett and Dr. Ferguson via secure chat. Dr. Sandoval said okay to proceed. Stress test done with no issues.

## 2025-07-17 ENCOUNTER — HOSPITAL ENCOUNTER (OUTPATIENT)
Dept: PHYSICAL THERAPY | Age: 45
Setting detail: THERAPIES SERIES
Discharge: HOME | End: 2025-07-17
Attending: ORTHOPAEDIC SURGERY
Payer: COMMERCIAL

## 2025-07-17 LAB
AORTIC VALVE PEAK VELOCITY: 1.69 M/S
AV PEAK GRADIENT: 11 MMHG
AVA (PEAK VEL): 2.42 CM2
EJECTION FRACTION APICAL 4 CHAMBER: 64.4
EJECTION FRACTION: 67 %
LEFT VENTRICLE INTERNAL DIMENSION DIASTOLE: 3.8 CM (ref 3.5–6)
LEFT VENTRICULAR OUTFLOW TRACT DIAMETER: 1.9 CM
MITRAL VALVE E/A RATIO: 0.59
RIGHT VENTRICLE FREE WALL PEAK S': 14 CM/S
TRICUSPID ANNULAR PLANE SYSTOLIC EXCURSION: 1.7 CM

## 2025-07-17 PROCEDURE — 97110 THERAPEUTIC EXERCISES: CPT

## 2025-07-17 PROCEDURE — 97140 MANUAL THERAPY 1/> REGIONS: CPT

## 2025-07-17 NOTE — CONSULTS
Session ID: [id number]  Session Duration: Longer than 51 minutes  Language: Alyce   ID: #[id number]   Name: Lizbeth

## 2025-07-17 NOTE — FLOWSHEET NOTE
[facility] - Outpatient Rehabilitation and Therapy: [id number], [place] [address] office: [phone] fax: [phone]         Physical Therapy: TREATMENT/PROGRESS NOTE   Patient: Marciano RAZA Rai (45 y.o. female)   Examination Date: 2025   :  1980 MRN: [id number]   Visit #: 2   Insurance Allowable Auth Needed   30 ([employer]) []Yes    [x]No    Insurance: Payor: [facility] / Plan: [facility] DUAL / Product Type: *No Product type* /   Insurance ID: [id number] - (Medicare Managed)  Secondary Insurance (if applicable): [employer] [place]ICA[place]   Treatment Diagnosis:     ICD-10-CM    1. Chronic right shoulder pain  M25.511     G89.29          Medical Diagnosis:  Right shoulder pain, unspecified chronicity [M25.511]  Diabetic frozen shoulder associated with type 2 diabetes mellitus (HCC) [E11.618, M75.00]  Osteoarthritis of right sternoclavicular joint [M19.011]   Referring Physician: Ayo Carmona MD  PCP: Pete Hylton MD     Plan of care signed (Y/N):     Date of Patient follow up with Physician: 25     Plan of Care Report: NO  POC update due: (10 visits /OR AUTH LIMITS, whichever is less)  2025                                             Medical History:  Comorbidities:  Hypertension  Relevant Medical History:                                          Precautions/ Contra-indications:           Latex allergy:  NO  Pacemaker:    NO  Contraindications for Manipulation: None  Date of Surgery: N/A  Other:    Red Flags:  None    Suicide Screening:   The patient did not verbalize a primary behavioral concern, suicidal ideation, suicidal intent, or demonstrate suicidal behaviors.    Preferred Language for Healthcare:   [x] English       [] other:    SUBJECTIVE EXAMINATION     Patient stated complaint: Pt reports that her shoulder is feeling better overall, but still has discomfort when reaching behind her back. Pt reports

## 2025-07-21 PROBLEM — N14.0 ANALGESIC NEPHROPATHY: Status: ACTIVE | Noted: 2025-07-21

## 2025-07-21 PROBLEM — R11.0 NAUSEA: Status: ACTIVE | Noted: 2021-12-28

## 2025-07-21 PROBLEM — E66.9 OBESITY: Status: ACTIVE | Noted: 2019-08-13

## 2025-07-21 PROBLEM — E03.9 HYPOTHYROIDISM: Status: ACTIVE | Noted: 2017-03-03

## 2025-07-21 PROBLEM — K12.1 ORAL ULCER: Status: ACTIVE | Noted: 2025-01-02

## 2025-07-21 PROBLEM — F33.1 MAJOR DEPRESSIVE DISORDER, RECURRENT EPISODE, MODERATE: Status: ACTIVE | Noted: 2023-01-18

## 2025-07-21 PROBLEM — G47.9 SLEEP DISTURBANCE: Status: ACTIVE | Noted: 2021-12-28

## 2025-07-21 PROBLEM — K59.09 OTHER CONSTIPATION: Status: ACTIVE | Noted: 2021-12-28

## 2025-07-21 PROBLEM — R51.9 HEADACHE: Status: ACTIVE | Noted: 2020-07-16

## 2025-07-21 PROBLEM — R53.1 GENERALIZED WEAKNESS: Status: ACTIVE | Noted: 2021-12-28

## 2025-07-21 PROBLEM — F51.01 PRIMARY INSOMNIA: Status: ACTIVE | Noted: 2017-03-03

## 2025-07-21 PROBLEM — Z86.16 HISTORY OF SEVERE ACUTE RESPIRATORY SYNDROME CORONAVIRUS 2 (SARS-COV-2) DISEASE: Status: ACTIVE | Noted: 2025-07-21

## 2025-07-21 PROBLEM — R51.9 CHRONIC DAILY HEADACHE: Status: ACTIVE | Noted: 2021-12-28

## 2025-07-21 PROBLEM — R52 PAIN, UNSPECIFIED: Status: ACTIVE | Noted: 2020-09-24

## 2025-07-21 PROBLEM — D72.829 LEUKOCYTOSIS: Status: ACTIVE | Noted: 2019-08-09

## 2025-07-21 PROBLEM — R79.89 SERUM CREATININE RAISED: Status: ACTIVE | Noted: 2018-05-25

## 2025-07-21 PROBLEM — F41.1 GAD (GENERALIZED ANXIETY DISORDER): Status: ACTIVE | Noted: 2023-01-18

## 2025-07-21 PROBLEM — A41.9 SEPTICEMIA (MULTI): Status: ACTIVE | Noted: 2021-12-28

## 2025-07-21 PROBLEM — G43.709 CHRONIC MIGRAINE WITHOUT AURA WITHOUT STATUS MIGRAINOSUS, NOT INTRACTABLE: Status: ACTIVE | Noted: 2017-03-03

## 2025-07-21 PROBLEM — M54.12 CERVICAL RADICULOPATHY: Status: ACTIVE | Noted: 2021-12-16

## 2025-07-21 PROBLEM — S33.5XXA LUMBAR SPRAIN: Status: ACTIVE | Noted: 2021-12-16

## 2025-07-21 PROBLEM — R41.3 MEMORY LOSS: Status: ACTIVE | Noted: 2024-01-22

## 2025-07-21 PROBLEM — N91.2 AMENORRHEA: Status: ACTIVE | Noted: 2024-10-08

## 2025-07-21 PROBLEM — E55.9 VITAMIN D DEFICIENCY: Status: ACTIVE | Noted: 2017-03-03

## 2025-07-21 PROBLEM — R78.81 MSSA BACTEREMIA: Status: ACTIVE | Noted: 2019-08-10

## 2025-07-21 PROBLEM — F68.8 PERSONALITY CHANGE: Status: ACTIVE | Noted: 2024-01-22

## 2025-07-21 PROBLEM — B95.61 MSSA BACTEREMIA: Status: ACTIVE | Noted: 2019-08-10

## 2025-07-21 PROBLEM — N18.9 CHRONIC KIDNEY DISEASE: Status: ACTIVE | Noted: 2020-10-20

## 2025-07-21 PROBLEM — G47.9 DIFFICULTY SLEEPING: Status: ACTIVE | Noted: 2021-12-16

## 2025-07-21 PROBLEM — F33.41 RECURRENT MAJOR DEPRESSIVE DISORDER, IN PARTIAL REMISSION: Status: ACTIVE | Noted: 2022-10-18

## 2025-07-21 PROBLEM — E78.2 MODERATE MIXED HYPERLIPIDEMIA NOT REQUIRING STATIN THERAPY: Status: ACTIVE | Noted: 2022-10-12

## 2025-07-21 RX ORDER — DOXYCYCLINE 100 MG/1
100 CAPSULE ORAL 2 TIMES DAILY
COMMUNITY
Start: 2025-07-04

## 2025-07-21 RX ORDER — ATORVASTATIN CALCIUM 40 MG/1
40 TABLET, FILM COATED ORAL DAILY
COMMUNITY
Start: 2025-02-05

## 2025-07-21 RX ORDER — MELOXICAM 15 MG/1
1 TABLET ORAL
COMMUNITY
Start: 2025-05-28

## 2025-07-21 RX ORDER — KETOCONAZOLE 20 MG/ML
1 SHAMPOO, SUSPENSION TOPICAL WEEKLY
COMMUNITY
Start: 2025-06-24

## 2025-07-21 RX ORDER — IBUPROFEN 200 MG
1 TABLET ORAL EVERY 24 HOURS
COMMUNITY
Start: 2025-05-28

## 2025-07-21 RX ORDER — LANOLIN ALCOHOL/MO/W.PET/CERES
400 CREAM (GRAM) TOPICAL DAILY
COMMUNITY
Start: 2025-05-13

## 2025-07-21 RX ORDER — LIDOCAINE AND PRILOCAINE 25; 25 MG/G; MG/G
1 CREAM TOPICAL
COMMUNITY
Start: 2025-05-22

## 2025-07-21 RX ORDER — DICLOFENAC SODIUM 75 MG/1
1 TABLET, DELAYED RELEASE ORAL
COMMUNITY
Start: 2025-01-24

## 2025-07-21 RX ORDER — DIPHENHYDRAMINE HCL 12.5 MG/5ML
12.5 LIQUID ORAL EVERY 4 HOURS PRN
COMMUNITY
Start: 2025-01-02

## 2025-07-21 RX ORDER — SUCRALFATE 1 G/1
1 TABLET ORAL
COMMUNITY

## 2025-07-21 RX ORDER — MEDROXYPROGESTERONE ACETATE 150 MG/ML
150 INJECTION, SUSPENSION INTRAMUSCULAR
COMMUNITY
Start: 2025-03-14

## 2025-07-21 RX ORDER — LIDOCAINE HYDROCHLORIDE 20 MG/ML
1.25 SOLUTION ORAL; TOPICAL AS NEEDED
COMMUNITY
Start: 2025-01-02

## 2025-07-21 RX ORDER — PSYLLIUM HUSK 0.4 G
1 CAPSULE ORAL DAILY
COMMUNITY
Start: 2025-02-05

## 2025-07-21 RX ORDER — FEXOFENADINE HYDROCHLORIDE 180 MG/1
180 TABLET, FILM COATED ORAL DAILY
COMMUNITY

## 2025-07-21 RX ORDER — TORSEMIDE 20 MG/1
80 TABLET ORAL DAILY
COMMUNITY
Start: 2024-10-25

## 2025-07-21 RX ORDER — ISOPROPYL ALCOHOL 99 %
1 SOLUTION, NON-ORAL MISCELLANEOUS
COMMUNITY
Start: 2024-09-08

## 2025-07-21 RX ORDER — VALGANCICLOVIR 450 MG/1
450 TABLET, FILM COATED ORAL 2 TIMES DAILY
COMMUNITY
Start: 2025-03-02

## 2025-07-21 RX ORDER — TRAZODONE HYDROCHLORIDE 50 MG/1
50 TABLET ORAL NIGHTLY
COMMUNITY
Start: 2024-12-17

## 2025-07-21 RX ORDER — B COMPLEX, C NO.20/FOLIC ACID 1 MG
1 CAPSULE ORAL
COMMUNITY
Start: 2025-07-11

## 2025-07-21 RX ORDER — ALUMINUM HYDROXIDE, MAGNESIUM HYDROXIDE, SIMETHICONE 400; 400; 40 MG/10ML; MG/10ML; MG/10ML
5 SUSPENSION ORAL EVERY 4 HOURS PRN
COMMUNITY
Start: 2025-01-02

## 2025-07-21 RX ORDER — MYCOPHENOLATE MOFETIL 250 MG/1
250 CAPSULE ORAL 2 TIMES DAILY
COMMUNITY
Start: 2025-05-13

## 2025-07-21 RX ORDER — BUPROPION HYDROCHLORIDE 150 MG/1
150 TABLET, EXTENDED RELEASE ORAL DAILY
COMMUNITY
Start: 2025-03-26

## 2025-07-21 RX ORDER — PREDNISONE 20 MG/1
2 TABLET ORAL
COMMUNITY
Start: 2024-07-31

## 2025-07-22 ENCOUNTER — HOSPITAL ENCOUNTER (OUTPATIENT)
Dept: PHYSICAL THERAPY | Age: 45
Setting detail: THERAPIES SERIES
Discharge: HOME | End: 2025-07-22
Attending: ORTHOPAEDIC SURGERY
Payer: MEDICAID

## 2025-07-22 PROCEDURE — 97110 THERAPEUTIC EXERCISES: CPT

## 2025-07-22 PROCEDURE — 97140 MANUAL THERAPY 1/> REGIONS: CPT

## 2025-07-22 PROCEDURE — 97530 THERAPEUTIC ACTIVITIES: CPT

## 2025-07-22 NOTE — FLOWSHEET NOTE
movement, function, and ADLs as indicated by Functional Deficits.  [] Progressing: [] Met: [] Not Met: [] Adjusted    IF APPLICABLE:  [] Patient to demonstrate independence in wear and care for custom orthotic device. (Only if applicable for orthotic eval)     Long Term Goals: To be achieved in: 6-8 weeks  Disability index score of 24% or less for the SPADI to assist with reaching prior level of function with activities such as reaching overhead and behind her back.  [] Progressing: [] Met: [] Not Met: [] Adjusted  Patient will demonstrate increased AROM of R shoulder flexion to 165 degrees without pain to allow for proper joint functioning to enable patient to reach overhead.   [] Progressing: [] Met: [] Not Met: [] Adjusted  Patient will demonstrate increased Strength of R shoulder external rotators to at least 4+/5 throughout without pain to allow for proper functional mobility to enable patient to return to lifting in order to perform work duties.   [] Progressing: [] Met: [] Not Met: [] Adjusted  Patient will return to reaching overhead into a cabinet without increased symptoms or restriction.   [] Progressing: [] Met: [] Not Met: [] Adjusted  Patient will be able to reach behind her back without increased symptoms or restriction.     [] Progressing: [] Met: [] Not Met: [] Adjusted     Overall Progression Towards Functional goals/ Treatment Progress Update:  [] Patient is progressing as expected towards functional goals listed.    [] Progression is slowed due to complexities/Impairments listed.  [] Progression has been slowed due to co-morbidities.  [x] Plan just implemented, too soon (<30days) to assess goals progression   [] Goals require adjustment due to lack of progress  [] Patient is not progressing as expected and requires additional follow up with physician  [] Other:     TREATMENT PLAN     Frequency/Duration: 2x/week for 6-8 weeks for the following treatment interventions: (pt only able to schedule

## 2025-07-23 ENCOUNTER — OFFICE VISIT (OUTPATIENT)
Dept: CARDIOLOGY | Facility: HOSPITAL | Age: 45
End: 2025-07-23
Payer: COMMERCIAL

## 2025-07-23 VITALS
SYSTOLIC BLOOD PRESSURE: 142 MMHG | WEIGHT: 191.4 LBS | BODY MASS INDEX: 32.68 KG/M2 | OXYGEN SATURATION: 98 % | HEIGHT: 64 IN | DIASTOLIC BLOOD PRESSURE: 89 MMHG | HEART RATE: 84 BPM

## 2025-07-23 DIAGNOSIS — Z01.810 PREOP CARDIOVASCULAR EXAM: Primary | ICD-10-CM

## 2025-07-23 PROCEDURE — 3079F DIAST BP 80-89 MM HG: CPT | Performed by: INTERNAL MEDICINE

## 2025-07-23 PROCEDURE — 99204 OFFICE O/P NEW MOD 45 MIN: CPT | Performed by: INTERNAL MEDICINE

## 2025-07-23 PROCEDURE — 93005 ELECTROCARDIOGRAM TRACING: CPT | Performed by: INTERNAL MEDICINE

## 2025-07-23 PROCEDURE — 93010 ELECTROCARDIOGRAM REPORT: CPT | Performed by: INTERNAL MEDICINE

## 2025-07-23 PROCEDURE — 3077F SYST BP >= 140 MM HG: CPT | Performed by: INTERNAL MEDICINE

## 2025-07-23 PROCEDURE — 3008F BODY MASS INDEX DOCD: CPT | Performed by: INTERNAL MEDICINE

## 2025-07-23 PROCEDURE — 99212 OFFICE O/P EST SF 10 MIN: CPT | Mod: 25

## 2025-07-23 RX ORDER — DOCUSATE SODIUM 100 MG/1
1 CAPSULE, LIQUID FILLED ORAL
COMMUNITY
Start: 2025-07-22

## 2025-07-23 ASSESSMENT — PAIN SCALES - GENERAL: PAINLEVEL_OUTOF10: 0-NO PAIN

## 2025-07-23 NOTE — PROGRESS NOTES
Primary Care Physician: No Assigned PCP Generic Provider, MD  Date of Visit: 07/23/2025  4:20 PM EDT  Location of visit: [facility]     Chief Complaint:   Chief Complaint   Patient presents with    New Patient Visit     npv    cardiac clearance for surgery      Preoperative cardiac risk assessment for anticipated renal transplant.  Labs clinical course reviewed.       HPI / Summary:   Herber Foy Rai is a 45 y.o. female presents for new cardiovascular evaluation. No ref. provider found   July 16 echo showed increased septal and posterior wall thicknesses preserved EF no significant valvular pathology normal RV size and function no estimate of RVSP was given.  Normal LV diastolic filling pattern normal nuclear stress test with no evidence of scar or ischemia    CT chest without contrast done in November of last year showed no coronary calcium small left pleural effusion with mild groundglass opacities cardiac enlargement    ESRD on HD since 2018 this is secondary to HTN and NSAID.  November 2023 DDKT had to go back to the OR for reexploration of the transplanted kidney and removal of hematoma.  Admitted May 2024 for nausea vomiting initial creatinine 0.9 but continued to trend upward worsening after COVID.  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  No tobacco or alcohol history.  No cardiac exertional chest heaviness suggestive of angina.  No worsening edema.  No palpitations.  No lightheadedness syncope or presyncope.  She is tolerating dialysis  Specialty Problems          Cardiology Problems    Moderate mixed hyperlipidemia not requiring statin therapy    HTN (hypertension)        Medical History[1]       Surgical History[2]       Social History:   reports that she has never smoked. She has never been exposed to tobacco smoke. She quit smokeless tobacco use about 4 years ago. She  reports that she does not drink alcohol and does not use drugs.      Allergies:  RX Allergies[3]    Outpatient Medications:  Current Outpatient Medications   Medication Instructions    acetaminophen (TYLENOL) 650 mg, Every 6 hours PRN    albuterol 90 mcg/actuation inhaler 2 puffs, inhalation, Every 6 hours PRN    Allergy Relief (fexofenadine) 180 mg, Daily    amLODIPine (NORVASC) 5 mg, oral, Daily    Antacid-Antigas 200-200-20 mg/5 mL oral suspension 5 mL, Every 4 hours PRN    atorvastatin (LIPITOR) 40 mg, Daily    azelastine (Optivar) 0.05 % ophthalmic solution INSTILL 1 DROP INTO BOTH EYES 2 TIMES A DAY .    buPROPion SR (WELLBUTRIN SR) 150 mg, Daily    Calcium Antacid 200 mg calcium (500 mg) chewable tablet TAKE 1 TABLET BY MOUTH 2 TIMES DAILY (CHEW AND SWALLOW)    carvedilol (COREG) 25 mg, oral, 2 times daily    Chest Congestion Relief DM  mg tablet 1 tablet, 3 times daily before meals    Children's Allergy (diphenhyd) 12.5 mg, Every 4 hours PRN    cholecalciferol (VITAMIN D-3) 100 mcg, oral, Daily    cyclobenzaprine (FLEXERIL) 5 mg, oral, Every 8 hours PRN    diclofenac (Voltaren) 75 mg EC tablet 1 tablet, Every 12 hours scheduled (0630,1830)    docusate sodium (Colace) 100 mg capsule 1 capsule, Daily (0630)    doxycycline (VIBRAMYCIN) 100 mg, 2 times daily    gabapentin (NEURONTIN) 300 mg, oral, Nightly    hydrALAZINE (APRESOLINE) 50 mg, oral, 3 times daily    ketoconazole (NIZOral) 2 % shampoo 1 Application, Weekly    levothyroxine (SYNTHROID, LEVOXYL) 25 mcg, Daily before breakfast    Lidocaine Viscous 2 % solution 1.25 mL, As needed    lidocaine-prilocaine (Emla) 2.5-2.5 % cream 1 Application, Once PRN Procedure    magnesium oxide (Mag-Ox) 400 mg (241.3 mg elemental) tablet 400 mg of magnesium oxide, Daily    medroxyPROGESTERone (DEPO-PROVERA) 150 mg, every 12 weeks    meloxicam (Mobic) 15 mg tablet 1 tablet, Daily (0630)    mirtazapine (REMERON) 15 mg, oral, Nightly    mycophenolate (CELLCEPT) 250  "mg, 2 times daily    nicotine (Nicoderm CQ) 14 mg/24 hr patch 1 patch, Every 24 hours    pantoprazole (ProtoNix) 40 mg EC tablet TAKE 1 TABLET (40 MG) BY MOUTH EVERY MORNING (BEFORE BREAKFAST).    predniSONE (Deltasone) 20 mg tablet 2 tablets, Daily (0630)    Reguloid, psyllium husk, 0.4 gram capsule 1 capsule, Daily    sertraline (ZOLOFT) 50 mg, Daily    sucralfate (CARAFATE) 1 g, 4 times daily before meals and nightly    SUMAtriptan (IMITREX) 100 mg, oral, Once as needed    torsemide (DEMADEX) 80 mg, Daily    traZODone (DESYREL) 50 mg, Nightly    Triphrocaps 1 mg capsule 1 capsule, Daily (0630)    valGANciclovir (VALCYTE) 450 mg, 2 times daily       ROS     Physical Exam:  Vitals:    07/23/25 1603   BP: 142/89   BP Location: Right arm   Patient Position: Sitting   Pulse: 84   SpO2: 98%   Weight: 86.8 kg (191 lb 6.4 oz)   Height: 1.626 m (5' 4\")     Wt Readings from Last 5 Encounters:   07/23/25 86.8 kg (191 lb 6.4 oz)   06/20/25 83.1 kg (183 lb 3.2 oz)   05/30/25 83.1 kg (183 lb 3.2 oz)   05/30/25 83.1 kg (183 lb 3.2 oz)   03/27/25 70 kg (154 lb 5.2 oz)     Body mass index is 32.85 kg/m².   Physical Exam   Well-appearing female no acute distress somewhat cushingoid.  Difficult to  neck veins.  Distant heart sounds without gallop.  Clear lungs.  Soft abdomen.  Trace ankle edema with intact pedal pulses  Last Labs:  CMP:  Recent Labs     05/30/25  1104 11/06/24  0727 11/04/24  0730 11/01/24  0713 10/30/24  0720 10/28/24  0715   NA  --  134* 134* 135* 138 139   K  --  4.4 4.5 4.3 4.5 4.5   CL  --  94* 97* 98 101 105   CO2  --  28 24 26 28 24   ANIONGAP  --  16 18 15 14 15   BUN 33* 58* 84* 56* 48* 50*   CREATININE 7.01* 10.64* 12.78* 9.58* 9.76* 10.66*   EGFR 7* 4* 3* 5* 5* 4*   GLUCOSE  --  117* 124* 108* 104* 97     Recent Labs     05/30/25  1104 11/06/24  0727 11/04/24  0730 11/01/24  0713 10/30/24  0720 08/19/24  1222 08/19/24  0827 07/23/24  0906 07/22/24  1821 07/03/24  0614 07/02/24  1559 05/29/24  0544 " 05/28/24  0639   ALBUMIN 4.1 3.4 3.3* 3.3* 3.4   < > 3.6   < > 3.7   < > 3.7   < > 2.9*  2.9*   ALKPHOS 89  --   --   --   --   --  47  --  68  --  52  --  50   ALT 23  --   --   --   --   --  14  --  35  --  12  --  14   AST 27  --   --   --   --   --  11  --  27  --  15  --  19   BILITOT 0.8  --   --   --   --   --  0.4  --  0.4  --  0.7  --  0.5   LIPASE  --   --   --   --   --   --   --   --  11  --   --   --   --     < > = values in this interval not displayed.     CBC:  Recent Labs     07/16/25  1313 05/30/25  1104 11/06/24  0727 11/04/24  0730 11/01/24  0713   WBC 4.8 4.2* 4.4 3.9* 4.1*   HGB 14.1 13.3 9.0* 9.3* 9.6*   HCT 43.9 42.0 28.3* 29.2* 30.4*    194 197 144* 87*   MCV 93 95 91 91 92     COAG:   Recent Labs     05/30/25  1104 09/30/24  0902 07/30/24  1154 07/22/24  1821 07/15/24  0513 07/12/24  0609   INR 1.0 1.0  --  1.0 0.9 1.1   DDIMERVTE  --   --  953*  --   --   --      HEME/ENDO:  Recent Labs     07/17/24  0612 07/09/24  0521 11/30/23  0823 05/09/23  1156 09/22/22  0853 07/19/22  1535   FERRITIN 691*  --  1,298*  --   --   --    IRONSAT 51*  --  40  --   --   --    TSH  --  0.44  --   --   --   --    HGBA1C  --   --   --  4.8 4.8 5.0      CARDIAC:   Recent Labs     07/22/24  2044 07/22/24  1821 07/02/24  1658 07/02/24  1559 12/05/23  0554 12/03/23  0638   LDH  --   --   --   --  395* 539*   TROPHS 26 29 5 5  --   --    BNP  --  786*  --  272*  --   --      Recent Labs     05/30/25  1104   CHOL 189   HDL 44.2       Last Cardiology Tests:  ECG:      Echo:  Echo Results:  Transthoracic Echo (TTE) Complete With Contrast 07/16/2025    [facility], [address] [place] [address]  Tel [phone] and Fax [phone]    TRANSTHORACIC ECHOCARDIOGRAM REPORT      Patient Name:       DREW BABCOCK RAI        Reading Physician:    [id number] Castaneda MD  Study Date:         7/16/2025           Ordering Provider:    [id number] DENNIS  MRN/PID:            [id number]             Fellow:  Accession#:         [id number]        Nurse:                Virginia Vora RN  Date of Birth/Age:  1980 / 45 years Sonographer:          Ramila Cardoso RDCS  Gender assigned at  F                   Additional Staff:  Birth:  Height:             152.40 cm           Admit Date:  Weight:             84.82 kg            Admission Status:     Outpatient  BSA / BMI:          1.81 m2 / 36.52     Encounter#:           [id number]  kg/m2  Blood Pressure:     125/84 mmHg         Department Location:  [facility]  Non Invasive    Study Type:    TRANSTHORACIC ECHO (TTE) COMPLETE  Diagnosis/ICD: Encounter for other preprocedural examination-Z01.818  Indication:    Pre transplant evaluation for kidney transplant  CPT Code:      Echo Complete w Full Doppler-08738    Patient History:  Diabetes:          Yes  Pertinent History: HTN. DDKT 11/30/23, ESRD.    Study Detail: The following Echo studies were performed: 2D, M-Mode, Doppler and  color flow. Technically challenging study due to body habitus.  Optison used as a contrast agent for endocardial border  definition. Total contrast used for this procedure was 1.0 mL via  IV push.      PHYSICIAN INTERPRETATION:  Left Ventricle: Left ventricular ejection fraction is normal calculated by Way's biplane at 67%. There are no regional left ventricular wall motion abnormalities. The left ventricular cavity size is normal. There is moderately increased septal and mildly increased posterior left ventricular wall thickness. There is left ventricular concentric remodeling. Spectral Doppler shows a normal pattern of left ventricular diastolic filling.  Left Atrium: The left atrial size is normal.  Right Ventricle: The right ventricle is normal in size. There is normal right ventricular global systolic function.  Right Atrium: The right atrium is normal in size.  Aortic Valve: The aortic valve is trileaflet. There is no evidence of aortic valve regurgitation.  Mitral Valve:  The mitral valve is normal in structure. There is no evidence of mitral valve regurgitation. The E Vmax is 0.54 m/s.  Tricuspid Valve: The tricuspid valve is structurally normal. There is trace tricuspid regurgitation.  Pulmonic Valve: The pulmonic valve is structurally normal. There is physiologic pulmonic valve regurgitation.  Pericardium: Trivial pericardial effusion.  Aorta: The aortic root is normal.  Systemic Veins: The inferior vena cava appears normal in size, with IVC inspiratory collapse greater than 50%.  In comparison to the previous echocardiogram(s): Compared with study dated 7/3/2024, no significant change.      CONCLUSIONS:  1. Left ventricular ejection fraction is normal calculated by Way's biplane at 67%.  2. Spectral Doppler shows a normal pattern of left ventricular diastolic filling.  3. There is moderately increased septal thickness.  4. There is normal right ventricular global systolic function.    RECOMMENDATIONS:  Utilizing an FDA cleared automated machine learning algorithm (EchoGo Heart Failure by [employer]), the analysis of the apical 4-chamber echocardiogram suggests the presence of heart failure with preserved ejection fraction (HFpEF)*. Clinical correlation looking for additional heart failure signs and symptoms is recommended, as a definite diagnosis of heart failure cannot be made by imaging alone.  *Per ACC/AHA/HFSA universal diagnosis of heart failure, HFpEF is defined as 1) signs and symptoms leading to clinical diagnosis of heart failure, 2) an ejection fraction of at least 50%, and 3) evidence of elevated intra-cardiac filling pressures by echocardiography, BNP elevation, or catheterization.    QUANTITATIVE DATA SUMMARY:    2D MEASUREMENTS:          Normal Ranges:  Ao Root d:       3.00 cm  (2.0-3.7cm)  LAs:             3.70 cm  (2.7-4.0cm)  IVSd:            1.30 cm  (0.6-1.1cm)  LVPWd:           1.20 cm  (0.6-1.1cm)  LVIDd:           3.80 cm  (3.9-5.9cm)  LVIDs:            2.70 cm  LV Mass Index:   90 g/m2  LVEDV Index:     45 ml/m2  LV % FS          28.9 %      LEFT ATRIUM:                Normal Ranges:  LA Volume Index: 20.6 ml/m2      RIGHT ATRIUM:          Normal Ranges:  RA Area A4C:  12.9 cm2      AORTA MEASUREMENTS:         Normal Ranges:  Asc Ao, d:          3.40 cm (2.1-3.4cm)      LV SYSTOLIC FUNCTION:  Normal Ranges:  EF-A4C View:    64 % (>=55%)  EF-A2C View:    69 %  EF-Biplane:     67 %  LV EF Reported: 67 %      LV DIASTOLIC FUNCTION:             Normal Ranges:  MV Peak E:             0.54 m/s    (0.7-1.2 m/s)  MV Peak A:             0.92 m/s    (0.42-0.7 m/s)  E/A Ratio:             0.59        (1.0-2.2)  MV e'                  0.066 m/s   (>8.0)  MV lateral e'          0.08 m/s  MV medial e'           0.05 m/s  MV A Dur:              151.00 msec  E/e' Ratio:            8.19        (<8.0)  PulmV Sys Damián:         59.10 cm/s  PulmV Davis Damián:        37.30 cm/s  PulmV S/D Damián:         1.60  PulmV A Revs Damián:      36.80 cm/s  PulmV A Revs Dur:      132.00 msec      MITRAL VALVE:          Normal Ranges:  MV DT:        222 msec (150-240msec)      AORTIC VALVE:            Normal Ranges:  AoV Vmax:      1.69 m/s  (<=1.7m/s)  AoV Peak P.4 mmHg (<20mmHg)  LVOT Max Damián:  1.44 m/s  (<=1.1m/s)  LVOT VTI:      24.40 cm  LVOT Diameter: 1.90 cm   (1.8-2.4cm)  AoV Area,Vmax: 2.42 cm2  (2.5-4.5cm2)      RIGHT VENTRICLE:  RV Basal 2.88 cm  RV Mid   1.86 cm  RV Major 7.0 cm  TAPSE:   17.1 mm  RV s'    0.14 m/s      TRICUSPID VALVE/RVSP:         Normal Ranges:  Est. RA Pressure:     3  IVC Diam:             1.60 cm      PULMONIC VALVE:          Normal Ranges:  PV Max Damián:     1.1 m/s  (0.6-0.9m/s)  PV Max P.8 mmHg      PULMONARY VEINS:  PulmV A Revs Dur: 132.00 msec  PulmV A Revs Damián: 36.80 cm/s  PulmV Davis Damián:   37.30 cm/s  PulmV S/D Damián:    1.60  PulmV Sys Damián:    59.10 cm/s      [id number] Hossein Castaneda MD  Electronically signed on 2025 at 10:10:13 AM        ** Final  **      Transthoracic Echo (TTE) Complete With Contrast 07/03/2024    Narrative  [facility], [address] [place] [address]  Tel [phone] and Fax [phone]    TRANSTHORACIC ECHOCARDIOGRAM REPORT      Patient Name:      DREW BABCOCK RAI         Reading Physician:    [id number] Jenny Law MD  Study Date:        7/3/2024             Ordering Provider:    [id number] MICHELLE HOLBROOK  MRN/PID:           [id number]             Fellow:  Accession#:        [id number]         Nurse:                Aylin Blunt RN  Date of Birth/Age: 1980 / 44 years  Sonographer:          Dahlia SMITH  Gender:            F                    Additional Staff:  Height:            165.00 cm            Admit Date:           7/2/2024  Weight:            79.83 kg             Admission Status:     Inpatient - STAT  BSA / BMI:         1.87 m2 / 29.32      Encounter#:           [id number]  kg/m2  Blood Pressure:    133/87 mmHg          Department Location:  [facility] Non  Invasive    Study Type:    TRANSTHORACIC ECHO (TTE) COMPLETE  Diagnosis/ICD: Shortness of breath-R06.02; Elevated Troponin-R79.89; Localized  edema-R60.0  Indication:    Shortness of Breath; Transplanted Kidney; Bilateral Edema of  Lower Extremity    Patient History:  Pertinent History: HTN. SOB, ESRD, S/P DDKT 11/30/2023.    Study Detail: The following Echo studies were performed: 2D, M-Mode, Doppler and  color flow. Technically challenging study due to poor acoustic  windows, prominent lung artifact and body habitus. Definity used  as a contrast agent for endocardial border definition. Total  contrast used for this procedure was 1.5 mL via IV push.      PHYSICIAN INTERPRETATION:  Left Ventricle: Left ventricular ejection fraction is normal, by visual estimate at 65-70%. There are no regional wall motion abnormalities. The left ventricular cavity size is normal. Spectral Doppler shows a normal pattern of left ventricular diastolic filling. There is no  definite left ventricular thrombus visualized.  Left Atrium: The left atrium is normal in size.  Right Ventricle: The right ventricle is normal in size. There is normal right ventricular global systolic function.  Right Atrium: The right atrium is normal in size.  Aortic Valve: The aortic valve is probably trileaflet. There is minimal aortic valve cusp calcification. The aortic valve dimensionless index is 0.79. There is no evidence of aortic valve regurgitation. The peak instantaneous gradient of the aortic valve is 19.0 mmHg. The mean gradient of the aortic valve is 8.0 mmHg.  Mitral Valve: The mitral valve is normal in structure. There is mild mitral annular calcification. There is trace mitral valve regurgitation.  Tricuspid Valve: The tricuspid valve was not well visualized. There is trace tricuspid regurgitation. The right ventricular systolic pressure is unable to be estimated.  Pulmonic Valve: The pulmonic valve is structurally normal. There is physiologic pulmonic valve regurgitation.  Pericardium: There is a trivial pericardial effusion.  Aorta: The aortic root is normal. The Ao Sinus is 3.10 cm. The Asc Ao is 3.10 cm.  Systemic Veins: The inferior vena cava appears to be of normal size. There is IVC inspiratory collapse greater than 50%.  In comparison to the previous echocardiogram(s): Compared with the prior exam from 8/9/2022 there are no significant changes.      CONCLUSIONS:  1. Left ventricular ejection fraction is normal, by visual estimate at 65-70%.  2. No left ventricular thrombus visualized.  3. There is normal right ventricular global systolic function.  4. No significant valvular pathology.  5. Compared with the prior exam from 8/9/2022 there are no significant changes.    QUANTITATIVE DATA SUMMARY:  2D MEASUREMENTS:  Normal Ranges:  Ao Root d:     3.10 cm   (2.0-3.7cm)  LAs:           4.60 cm   (2.7-4.0cm)  IVSd:          0.90 cm   (0.6-1.1cm)  LVPWd:         1.00 cm   (0.6-1.1cm)  LVIDd:          5.20 cm   (3.9-5.9cm)  LVIDs:         2.70 cm  LV Mass Index: 96.9 g/m2  LV % FS        48.1 %    LA VOLUME:  Normal Ranges:  LA Vol A4C:        62.1 ml   (22+/-6mL/m2)  LA Vol Index A4C:  33.2ml/m2  LA Area A4C:       21.3 cm2  LA Major Axis A4C: 6.2 cm  LA Vol A4C:        58.4 ml    RA VOLUME BY A/L METHOD:  Normal Ranges:  RA Vol A4C:        41.3 ml    (8.3-19.5ml)  RA Vol Index A4C:  22.0 ml/m2  RA Area A4C:       16.1 cm2  RA Major Axis A4C: 5.3 cm    AORTA MEASUREMENTS:  Normal Ranges:  Ao Sinus, d: 3.10 cm (2.1-3.5cm)  Asc Ao, d:   3.10 cm (2.1-3.4cm)    LV SYSTOLIC FUNCTION BY 2D PLANIMETRY (MOD):  Normal Ranges:  EF-A4C View:    71 % (>=55%)  EF-A2C View:    72 %  EF-Biplane:     70 %  EF-Visual:      68 %  LV EF Reported: 68 %    LV DIASTOLIC FUNCTION:  Normal Ranges:  MV Peak E:        1.35 m/s    (0.7-1.2 m/s)  MV Peak A:        0.87 m/s    (0.42-0.7 m/s)  E/A Ratio:        1.55        (1.0-2.2)  MV e'             0.110 m/s   (>8.0)  MV lateral e'     0.12 m/s  MV medial e'      0.10 m/s  MV A Dur:         90.00 msec  E/e' Ratio:       12.23       (<8.0)  PulmV Sys Damián:    26.50 cm/s  PulmV Davis Damián:   19.40 cm/s  PulmV S/D Damián:    1.40  PulmV A Revs Damián: 20.80 cm/s  PulmV A Revs Dur: 103.00 msec    MITRAL VALVE:  Normal Ranges:  MV DT: 140 msec (150-240msec)    AORTIC VALVE:  Normal Ranges:  AoV Vmax:                2.18 m/s  (<=1.7m/s)  AoV Peak P.0 mmHg (<20mmHg)  AoV Mean P.0 mmHg  (1.7-11.5mmHg)  LVOT Max Damián:            1.35 m/s  (<=1.1m/s)  AoV VTI:                 32.80 cm  (18-25cm)  LVOT VTI:                26.00 cm  LVOT Diameter:           1.90 cm   (1.8-2.4cm)  AoV Area, VTI:           2.25 cm2  (2.5-5.5cm2)  AoV Area,Vmax:           1.76 cm2  (2.5-4.5cm2)  AoV Dimensionless Index: 0.79      RIGHT VENTRICLE:  RV Basal 3.50 cm  RV Mid   2.80 cm  RV Major 5.9 cm  TAPSE:   25.8 mm  RV s'    0.20 m/s    TRICUSPID VALVE/RVSP:  Normal Ranges:  Est. RA  Pressure: 3 mmHg  IVC Diam:         1.90 cm    PULMONIC VALVE:  Normal Ranges:  PV Accel Time: 114 msec (>120ms)  PV Max Damián:    1.2 m/s  (0.6-0.9m/s)  PV Max P.5 mmHg  PV Mean PG:    3.0 mmHg  PV VTI:        24.20 cm    Pulmonary Veins:  PulmV A Revs Dur: 103.00 msec  PulmV A Revs Damián: 20.80 cm/s  PulmV Davis Damián:   19.40 cm/s  PulmV S/D Damián:    1.40  PulmV Sys Damián:    26.50 cm/s      [id number] Jenny Law MD  Electronically signed on 7/3/2024 at 5:08:27 PM        ** Final **       Cath:      Stress Test:  Stress Results:  Regadenoson Stress Test With Myocardial Perfusion Spect (Single Study) 2025    Narrative  Interpreted By:  Aretha Shrestha,  STUDY:  NUCLEAR STRESS TEST; 2025 12:38 pm    INDICATION:  Signs/Symptoms:prekidney recipient transplant evaluation.    ,Z01.818 Encounter for other preprocedural examination,Z01.810  Encounter for preprocedural cardiovascular examination    COMPARISON:  Prior cardiac perfusion study on 2022    ACCESSION NUMBER(S):  [id number]    ORDERING CLINICIAN:  CHARISSE DENNIS    TECHNIQUE:  DIVISION OF NUCLEAR MEDICINE  STRESS MYOCARDIAL PERFUSION SCAN, ONE DAY PROTOCOL    The patient received an intravenous dose of 11.35 MILLICURIE of  Tc-99m (Myoview) and resting emission tomographic (SPECT) images of  the myocardium were acquired. The patient then received an  intravenous infusion of 0.4mg regadenoson (Lexiscan)  followed by an  additional dose of 35.5 MILLICURIE of Tc-99m (Myoview). Stress phase  SPECT images of the myocardium were then acquired. These included  ECG-gated images to assess and quantify ventricular function.    A low-dose, nondiagnostic regional CT was utilized for attenuation  correction purposes.    Limiting Factors: None    FINDINGS:  Quality of the Study: Good, bad, limited    Techincal Limitations/Significant Artifacts: None    Cardiac Imaging: Images show no definite fixed or reversible  perfusion defects throughout the visualized cardiac  walls.    Attenuated Corrected Images: Do they show improvement on non  attenuated images    Left ventricular size (EDV):  (Normal less than 120 mL)    Gated images: Normal wall motion and thickening. Left ventricular  ejection fraction estimated greater than 65% (normal 45% or greater)    Other Significant Findings:Transient ischemic dilatation (TID) 1.30,  rest ejection fraction significantly greater than the stress ejection  fraction, significant right ventricular prominence.    Nondiagnostic low dose CT does not reveal any definite gross  abnormalities suggestive of an emergent/oncologic process.    Impression  1. Negative myocardial perfusion study without evidence of inducible  myocardial ischemia or prior infarction.  2. The left ventricle is normal in size.  3. Normal LV wall motion with a post-stress LV EF estimated greater  than 65%.    I personally reviewed the images/study and I agree with the findings  as stated. This study was interpreted at [place], [place].    MACRO:  None    Signed by: Aretha Shrestha 7/16/2025 3:55 PM  Dictation workstation:   [id number]         Cardiac Imaging:  Transthoracic Echo (TTE) [facility], [address] [place] [address]                 Tel [phone] and Fax [phone]    TRANSTHORACIC ECHOCARDIOGRAM REPORT       Patient Name:       DREW BABCOCK GHANSHYAM        Reading Physician:    [id number] Castaneda MD  Study Date:         7/16/2025           Ordering Provider:    [id number] DENNIS  MRN/PID:            [id number]            Fellow:  Accession#:         [id number]        Nurse:                Virginia Vora RN  Date of Birth/Age:  1980 / 45 years Sonographer:          Ramila Cardoso RDCS  Gender assigned at  F                   Additional Staff:  Birth:  Height:              152.40 cm           Admit Date:  Weight:             84.82 kg            Admission Status:     Outpatient  BSA / BMI:          1.81 m2 / 36.52     Encounter#:           [id number]                      kg/m2  Blood Pressure:     125/84 mmHg         Department Location:  [facility]                                                                Non Invasive    Study Type:    TRANSTHORACIC ECHO (TTE) COMPLETE  Diagnosis/ICD: Encounter for other preprocedural examination-Z01.818  Indication:    Pre transplant evaluation for kidney transplant  CPT Code:      Echo Complete w Full Doppler-66047    Patient History:  Diabetes:          Yes  Pertinent History: HTN. DDKT 11/30/23, ESRD.    Study Detail: The following Echo studies were performed: 2D, M-Mode, Doppler and                color flow. Technically challenging study due to body habitus.                Optison used as a contrast agent for endocardial border                definition. Total contrast used for this procedure was 1.0 mL via                IV push.       PHYSICIAN INTERPRETATION:  Left Ventricle: Left ventricular ejection fraction is normal calculated by Way's biplane at 67%. There are no regional left ventricular wall motion abnormalities. The left ventricular cavity size is normal. There is moderately increased septal and mildly increased posterior left ventricular wall thickness. There is left ventricular concentric remodeling. Spectral Doppler shows a normal pattern of left ventricular diastolic filling.  Left Atrium: The left atrial size is normal.  Right Ventricle: The right ventricle is normal in size. There is normal right ventricular global systolic function.  Right Atrium: The right atrium is normal in size.  Aortic Valve: The aortic valve is trileaflet. There is no evidence of aortic valve regurgitation.  Mitral Valve: The mitral valve is normal in structure. There is no evidence of mitral valve regurgitation. The E Vmax is 0.54  m/s.  Tricuspid Valve: The tricuspid valve is structurally normal. There is trace tricuspid regurgitation.  Pulmonic Valve: The pulmonic valve is structurally normal. There is physiologic pulmonic valve regurgitation.  Pericardium: Trivial pericardial effusion.  Aorta: The aortic root is normal.  Systemic Veins: The inferior vena cava appears normal in size, with IVC inspiratory collapse greater than 50%.  In comparison to the previous echocardiogram(s): Compared with study dated 7/3/2024, no significant change.       CONCLUSIONS:   1. Left ventricular ejection fraction is normal calculated by Way's biplane at 67%.   2. Spectral Doppler shows a normal pattern of left ventricular diastolic filling.   3. There is moderately increased septal thickness.   4. There is normal right ventricular global systolic function.    RECOMMENDATIONS:  Utilizing an FDA cleared automated machine learning algorithm ([employer] Heart Failure by [employer]), the analysis of the apical 4-chamber echocardiogram suggests the presence of heart failure with preserved ejection fraction (HFpEF)*. Clinical correlation looking for additional heart failure signs and symptoms is recommended, as a definite diagnosis of heart failure cannot be made by imaging alone.  *Per ACC/AHA/HFSA universal diagnosis of heart failure, HFpEF is defined as 1) signs and symptoms leading to clinical diagnosis of heart failure, 2) an ejection fraction of at least 50%, and 3) evidence of elevated intra-cardiac filling pressures by echocardiography, BNP elevation, or catheterization.     QUANTITATIVE DATA SUMMARY:     2D MEASUREMENTS:          Normal Ranges:  Ao Root d:       3.00 cm  (2.0-3.7cm)  LAs:             3.70 cm  (2.7-4.0cm)  IVSd:            1.30 cm  (0.6-1.1cm)  LVPWd:           1.20 cm  (0.6-1.1cm)  LVIDd:           3.80 cm  (3.9-5.9cm)  LVIDs:           2.70 cm  LV Mass Index:   90 g/m2  LVEDV Index:     45 ml/m2  LV % FS          28.9 %       LEFT ATRIUM:                 Normal Ranges:  LA Volume Index: 20.6 ml/m2       RIGHT ATRIUM:          Normal Ranges:  RA Area A4C:  12.9 cm2       AORTA MEASUREMENTS:         Normal Ranges:  Asc Ao, d:          3.40 cm (2.1-3.4cm)       LV SYSTOLIC FUNCTION:                       Normal Ranges:  EF-A4C View:    64 % (>=55%)  EF-A2C View:    69 %  EF-Biplane:     67 %  LV EF Reported: 67 %       LV DIASTOLIC FUNCTION:             Normal Ranges:  MV Peak E:             0.54 m/s    (0.7-1.2 m/s)  MV Peak A:             0.92 m/s    (0.42-0.7 m/s)  E/A Ratio:             0.59        (1.0-2.2)  MV e'                  0.066 m/s   (>8.0)  MV lateral e'          0.08 m/s  MV medial e'           0.05 m/s  MV A Dur:              151.00 msec  E/e' Ratio:            8.19        (<8.0)  PulmV Sys Damián:         59.10 cm/s  PulmV Davis Damián:        37.30 cm/s  PulmV S/D Damián:         1.60  PulmV A Revs Damián:      36.80 cm/s  PulmV A Revs Dur:      132.00 msec       MITRAL VALVE:          Normal Ranges:  MV DT:        222 msec (150-240msec)       AORTIC VALVE:            Normal Ranges:  AoV Vmax:      1.69 m/s  (<=1.7m/s)  AoV Peak P.4 mmHg (<20mmHg)  LVOT Max Damián:  1.44 m/s  (<=1.1m/s)  LVOT VTI:      24.40 cm  LVOT Diameter: 1.90 cm   (1.8-2.4cm)  AoV Area,Vmax: 2.42 cm2  (2.5-4.5cm2)       RIGHT VENTRICLE:  RV Basal 2.88 cm  RV Mid   1.86 cm  RV Major 7.0 cm  TAPSE:   17.1 mm  RV s'    0.14 m/s       TRICUSPID VALVE/RVSP:         Normal Ranges:  Est. RA Pressure:     3  IVC Diam:             1.60 cm       PULMONIC VALVE:          Normal Ranges:  PV Max Damián:     1.1 m/s  (0.6-0.9m/s)  PV Max P.8 mmHg       PULMONARY VEINS:  PulmV A Revs Dur: 132.00 msec  PulmV A Revs Damián: 36.80 cm/s  PulmV Davis Damián:   37.30 cm/s  PulmV S/D Damián:    1.60  PulmV Sys Damián:    59.10 cm/s       [id number] Hossein Castaneda MD  Electronically signed on 2025 at 10:10:13 AM       ** Final **    ECG shows sinus rhythm nonspecific ST-T abnormality no acute  changes    Assessment/Plan     45-year-old with but needs to stent being seen a perioperative cardiac risk evaluation in anticipation of a repeat renal transplant cardiovascular testing was reviewed I see no cardiac contraindications or any clinical history to suggest elevated cardiac risk.  I would estimate her risk is being low and acceptable for repeat transplant with no further cardiac testing advised at this juncture thank you for allow me to participate in this very pleasant lady's care      Orders:  No orders of the defined types were placed in this encounter.     Followup Appts:  Future Appointments   Date Time Provider Department Center   7/23/2025  4:20 PM Hossein Castaneda MD [id number]           ____________________________________________________________  Hossein Castaneda MD    Senior Attending Physician  [facility]  [facility]       [1]   Past Medical History:  Diagnosis Date    Anxiety     Chronic kidney disease     Chronic kidney disease, unspecified     CKD, patient interested in transplantation    Depression     Disease of thyroid gland     GERD (gastroesophageal reflux disease)     Hypertension     Personal history of COVID-19 07/20/2022    History of COVID-19   [2]   Past Surgical History:  Procedure Laterality Date    MR HEAD ANGIO W AND WO IV CONTRAST  01/26/2021    MR HEAD ANGIO W AND WO IV CONTRAST    MR HEAD ANGIO WO IV CONTRAST  01/26/2021    MR HEAD ANGIO WO IV CONTRAST    OTHER SURGICAL HISTORY  07/20/2022    Arteriovenous fistula creation procedure    TRANSPLANT, KIDNEY, OPEN Right 11/30/2023    US GUIDED PERCUTANEOUS PERITONEAL OR RETROPERITONEAL FLUID COLLECTION DRAINAGE  12/21/2023    US GUIDED PERCUTANEOUS PERITONEAL OR RETROPERITONEAL FLUID COLLECTION DRAINAGE 12/21/2023 Batsheva Shanks MD [facility]   [3]   Allergies  Allergen Reactions    Sumatriptan Headache and Dizziness     Patient has worsening headache, dizziness,  chest burning/tingling, and extremity pain upon taking sumatriptan.

## 2025-07-25 LAB
ATRIAL RATE: 85 BPM
P AXIS: 35 DEGREES
P OFFSET: 194 MS
P ONSET: 153 MS
PR INTERVAL: 132 MS
Q ONSET: 219 MS
QRS COUNT: 14 BEATS
QRS DURATION: 88 MS
QT INTERVAL: 422 MS
QTC CALCULATION(BAZETT): 502 MS
QTC FREDERICIA: 474 MS
R AXIS: 10 DEGREES
T AXIS: 20 DEGREES
T OFFSET: 430 MS
VENTRICULAR RATE: 85 BPM

## 2025-07-29 ENCOUNTER — APPOINTMENT (OUTPATIENT)
Dept: PHYSICAL THERAPY | Age: 45
End: 2025-07-29
Attending: ORTHOPAEDIC SURGERY
Payer: MEDICAID

## 2025-07-29 ENCOUNTER — LAB REQUISITION (OUTPATIENT)
Dept: LAB | Facility: CLINIC | Age: 45
End: 2025-07-29
Payer: COMMERCIAL

## 2025-07-29 DIAGNOSIS — N18.6 END STAGE RENAL DISEASE (MULTI): ICD-10-CM

## 2025-07-31 LAB
HLA RESULTS: NORMAL
HLA-A+B+C AB NFR SER: NORMAL %
HLA-DP+DQ+DR AB NFR SER: NORMAL %

## 2025-08-03 DIAGNOSIS — I10 BENIGN ESSENTIAL HTN: ICD-10-CM

## 2025-08-05 RX ORDER — VALSARTAN 160 MG/1
160 TABLET ORAL DAILY
Qty: 30 TABLET | Refills: 5 | Status: SHIPPED | OUTPATIENT
Start: 2025-08-05

## 2025-08-06 ENCOUNTER — TELEPHONE (OUTPATIENT)
Facility: HOSPITAL | Age: 45
End: 2025-08-06

## 2025-08-08 DIAGNOSIS — N80.03 ADENOMYOSIS: Primary | ICD-10-CM

## 2025-08-11 DIAGNOSIS — N18.6 ESRD (END STAGE RENAL DISEASE) (MULTI): ICD-10-CM

## 2025-08-11 RX ORDER — CYCLOBENZAPRINE HCL 5 MG
5 TABLET ORAL EVERY 8 HOURS PRN
Qty: 30 TABLET | Refills: 1 | Status: SHIPPED | OUTPATIENT
Start: 2025-08-11 | End: 2026-08-11

## 2025-08-20 ENCOUNTER — HOSPITAL ENCOUNTER (OUTPATIENT)
Dept: RADIOLOGY | Facility: CLINIC | Age: 45
Discharge: HOME | End: 2025-08-20
Payer: COMMERCIAL

## 2025-08-20 DIAGNOSIS — N80.03 ADENOMYOSIS: ICD-10-CM

## (undated) DEVICE — CATHETER TRAY, SURESTEP, 16FR, URINE METER W/STATLOCK

## (undated) DEVICE — CLEANER, ELECTROSURGICAL, TIP, 5 X 5 CM, LF

## (undated) DEVICE — DRAIN, PENROSE, 0.25 X 18 IN, LATEX, STERILE

## (undated) DEVICE — DRESSING, TRANSPARENT, TEGADERM, 4 X 4-3/4 IN

## (undated) DEVICE — MANIFOLD, 4 PORT NEPTUNE STANDARD

## (undated) DEVICE — Device

## (undated) DEVICE — SEALANT, HEMOSTATIC, FLOSEAL, 10 ML

## (undated) DEVICE — ELECTRODE, ELECTROSURGICAL, BLADE EXT 4 INCH, INSULATED

## (undated) DEVICE — INFUSION SET, LOW SORBING, W/O INJECTION PORTS, DEHP FREE, APPROX LENGTH 107IN

## (undated) DEVICE — BAG, DRAINAGE, JACKSON -PRATT, W/100 ML BULB

## (undated) DEVICE — TRAY, MINOR, SINGLE BASIN, STERILE

## (undated) DEVICE — REMOVER, STAPLE, PREMIUM

## (undated) DEVICE — PUNCH, AORTIC 4MM, BULLET TIP

## (undated) DEVICE — DRESSING, GAUZE, 16 PLY, 4 X 4 IN, STERILE

## (undated) DEVICE — TOWEL, OR, XRAY DETECT 5 PK, WHITE, 17X26, W/DMT TAG, ST

## (undated) DEVICE — PAD, GROUNDING, ELECTROSURGICAL, DUAL

## (undated) DEVICE — CLIP, SPRING, BULLDOG, FOGARTY, SOFT JAW, 6 MM, LF

## (undated) DEVICE — INSERT, CLAMP, SURGICAL, SOFT/TRACTION, STEALTH, 1 MM

## (undated) DEVICE — SPONGE, LAP, XRAY DECT, 12IN X 12IN, W/MASTER DMT, STERILE

## (undated) DEVICE — SYRINGE, 20 CC, LUER LOCK, MONOJECT, W/O CAP, LF

## (undated) DEVICE — SPONGE, HEMOSTATIC, CELLULOSE, SURGICEL, NU-KNIT, 6 X 9 IN

## (undated) DEVICE — BOWL, BASIN, 32 OZ, STERILE

## (undated) DEVICE — ELECTRODE, ELECTROSURGICAL, BLADE, EXTENDED, 6.5 IN, STAINLESS STEEL

## (undated) DEVICE — DRAPE, PAD, INSTRUMENT, MAGNETIC, MEDIUM, 10 X 16 IN, DISPOSABLE

## (undated) DEVICE — DISSECTOR, KITTNER, PEANUT, 5MM

## (undated) DEVICE — SPONGE, HEMOSTAT, SURGICEL FIBRILLAR, ABS, 4 X 4, LF

## (undated) DEVICE — COVER, EQUIPMENT, SOLUTION, SLUSH, 112 X 168 CM, LF, STERILE